# Patient Record
Sex: MALE | Race: ASIAN | NOT HISPANIC OR LATINO | Employment: OTHER | URBAN - METROPOLITAN AREA
[De-identification: names, ages, dates, MRNs, and addresses within clinical notes are randomized per-mention and may not be internally consistent; named-entity substitution may affect disease eponyms.]

---

## 2017-01-03 ENCOUNTER — CARE COORDINATION (OUTPATIENT)
Dept: GASTROENTEROLOGY | Facility: CLINIC | Age: 57
End: 2017-01-03

## 2017-01-03 DIAGNOSIS — B19.20 HEPATITIS C: ICD-10-CM

## 2017-01-03 DIAGNOSIS — R10.13 DYSPEPSIA: Primary | ICD-10-CM

## 2017-01-03 NOTE — PROGRESS NOTES
Care Coordination Telephone Call  GI service    Called patient to discuss plan of care.  At the request of Dr. Sandhu I have made referral to the hepatology department for evaluation of liver disease.  Also,we have discussed stool specimen for H. Pylori and he will go to [facility] to arrange for this.  He is not currently taking PPI.    I have asked the patient to call with any additional questions or concerns and have provided my contact information.      Evelin STALEYN, HNBC, STAR-T  RN Care Coordinator  Surgical Oncology and GI service  Ph: [phone]  FAX: [phone]

## 2017-02-01 ENCOUNTER — OFFICE VISIT (OUTPATIENT)
Dept: FAMILY MEDICINE | Facility: CLINIC | Age: 57
End: 2017-02-01
Payer: COMMERCIAL

## 2017-02-01 VITALS
TEMPERATURE: 97.1 F | DIASTOLIC BLOOD PRESSURE: 82 MMHG | BODY MASS INDEX: 33.6 KG/M2 | WEIGHT: 240 LBS | HEIGHT: 71 IN | HEART RATE: 53 BPM | SYSTOLIC BLOOD PRESSURE: 110 MMHG

## 2017-02-01 DIAGNOSIS — R09.81 NASAL CONGESTION: ICD-10-CM

## 2017-02-01 DIAGNOSIS — Z23 NEED FOR VACCINATION: ICD-10-CM

## 2017-02-01 DIAGNOSIS — R94.5 ABNORMAL RESULTS OF LIVER FUNCTION STUDIES: Primary | ICD-10-CM

## 2017-02-01 DIAGNOSIS — Z23 ENCOUNTER FOR IMMUNIZATION: Primary | ICD-10-CM

## 2017-02-01 DIAGNOSIS — F41.9 ANXIETY: ICD-10-CM

## 2017-02-01 DIAGNOSIS — G47.09 OTHER INSOMNIA: ICD-10-CM

## 2017-02-01 PROCEDURE — 90715 TDAP VACCINE 7 YRS/> IM: CPT | Performed by: FAMILY MEDICINE

## 2017-02-01 PROCEDURE — 90471 IMMUNIZATION ADMIN: CPT | Performed by: FAMILY MEDICINE

## 2017-02-01 PROCEDURE — 99214 OFFICE O/P EST MOD 30 MIN: CPT | Mod: 25 | Performed by: FAMILY MEDICINE

## 2017-02-01 RX ORDER — CLONAZEPAM 0.5 MG/1
.5-1 TABLET ORAL 2 TIMES DAILY PRN
Qty: 40 TABLET | Refills: 0 | Status: SHIPPED | OUTPATIENT
Start: 2017-02-01 | End: 2017-02-09

## 2017-02-01 RX ORDER — TRAZODONE HYDROCHLORIDE 50 MG/1
50 TABLET, FILM COATED ORAL AT BEDTIME
Qty: 30 TABLET | Refills: 1 | Status: SHIPPED | OUTPATIENT
Start: 2017-02-01 | End: 2017-09-18

## 2017-02-01 RX ORDER — FLUTICASONE PROPIONATE 50 MCG
1-2 SPRAY, SUSPENSION (ML) NASAL DAILY
Qty: 16 G | Status: SHIPPED | OUTPATIENT
Start: 2017-02-01 | End: 2017-09-18

## 2017-02-01 RX ORDER — SERTRALINE HYDROCHLORIDE 100 MG/1
100 TABLET, FILM COATED ORAL DAILY
Qty: 90 TABLET | Refills: 0 | Status: SHIPPED | OUTPATIENT
Start: 2017-02-01 | End: 2017-09-18

## 2017-02-01 ASSESSMENT — ANXIETY QUESTIONNAIRES
6. BECOMING EASILY ANNOYED OR IRRITABLE: NOT AT ALL
IF YOU CHECKED OFF ANY PROBLEMS ON THIS QUESTIONNAIRE, HOW DIFFICULT HAVE THESE PROBLEMS MADE IT FOR YOU TO DO YOUR WORK, TAKE CARE OF THINGS AT HOME, OR GET ALONG WITH OTHER PEOPLE: NOT DIFFICULT AT ALL
5. BEING SO RESTLESS THAT IT IS HARD TO SIT STILL: NOT AT ALL
3. WORRYING TOO MUCH ABOUT DIFFERENT THINGS: NOT AT ALL
7. FEELING AFRAID AS IF SOMETHING AWFUL MIGHT HAPPEN: NOT AT ALL
1. FEELING NERVOUS, ANXIOUS, OR ON EDGE: NOT AT ALL
2. NOT BEING ABLE TO STOP OR CONTROL WORRYING: NOT AT ALL
GAD7 TOTAL SCORE: 0

## 2017-02-01 ASSESSMENT — PATIENT HEALTH QUESTIONNAIRE - PHQ9: 5. POOR APPETITE OR OVEREATING: NOT AT ALL

## 2017-02-01 NOTE — MR AVS SNAPSHOT
After Visit Summary   2/1/2017    Angus Wynne    MRN: [id number]           Patient Information     Date Of Birth          1960        Visit Information        Provider Department      2/1/2017 2:00 PM Fifi Fleming MD [facility] [place]        Today's Diagnoses     Encounter for immunization    -  1     Anxiety         Other insomnia         Nasal congestion           Care Instructions    Take medications as directed.  Treatment  and symptomatic cares discussed   Follow up in 4-6 weeks sooner  if problem or concern           Follow-ups after your visit        Your next 10 appointments already scheduled     Feb 13, 2017  9:00 AM   Lab with  LAB   [facility] ([facility])    [address]  1st Floor  [facility] [id number] [phone]            Feb 13, 2017 10:00 AM   (Arrive by 9:45 AM)   New General Liver with Laure Andrews NP   [facility] ([facility])    [address]  [address] [facility] [id number] [phone]            Feb 27, 2017 10:00 AM   New Visit with Rachel Perez PSYD   [facility] [place] ([facility] [place])    [address] [place] [address] [phone]              Future tests that were ordered for you today     Open Future Orders        Priority Expected Expires Ordered    Hepatic panel Routine 2/1/2017 5/2/2017 2/1/2017    Basic metabolic panel Routine 2/1/2017 5/2/2017 2/1/2017    CBC with platelets Routine 2/1/2017 5/2/2017 2/1/2017    INR Routine 2/1/2017 5/2/2017 2/1/2017            Who to contact     If you have questions or need follow up information about today's clinic visit or your schedule please contact [facility] [place] directly at [phone].  Normal or non-critical lab and imaging results will be communicated to you by MyChart, letter or phone within 4 business days after the clinic has  "received the results. If you do not hear from us within 7 days, please contact the clinic through [employer] or phone. If you have a critical or abnormal lab result, we will notify you by phone as soon as possible.  Submit refill requests through [employer] or call your pharmacy and they will forward the refill request to us. Please allow 3 business days for your refill to be completed.          Additional Information About Your Visit        [facility] Information     [employer] gives you secure access to your electronic health record. If you see a primary care provider, you can also send messages to your care team and make appointments. If you have questions, please call your primary care clinic.  If you do not have a primary care provider, please call [phone] and they will assist you.        Care EveryWhere ID     This is your Care EveryWhere ID. This could be used by other organizations to access your [place] medical records  [id number]        Your Vitals Were     Pulse Temperature Height BMI (Body Mass Index)          53 97.1  F (36.2  C) (Tympanic) 5' 11.26\" (1.81 m) 33.23 kg/m2         Blood Pressure from Last 3 Encounters:   02/01/17 110/82   12/21/16 106/72   09/22/16 110/80    Weight from Last 3 Encounters:   02/01/17 240 lb (108.863 kg)   12/21/16 242 lb (109.77 kg)   09/22/16 235 lb 9.6 oz (106.867 kg)              We Performed the Following     INJECTION INTRAMUSCULAR OR SUB-Q     TDAP (ADACEL AGES 11-64)          Today's Medication Changes          These changes are accurate as of: 2/1/17  2:33 PM.  If you have any questions, ask your nurse or doctor.               Start taking these medicines.        Dose/Directions    fluticasone 50 MCG/ACT spray   Commonly known as:  FLONASE   Used for:  Nasal congestion   Started by:  Fifi Fleming MD        Dose:  1-2 spray   Spray 1-2 sprays into both nostrils daily   Quantity:  16 g   Refills:  prn         These medicines have changed or have updated " prescriptions.        Dose/Directions    clonazePAM 0.5 MG tablet   Commonly known as:  klonoPIN   This may have changed:    - medication strength  - how much to take  - when to take this  - reasons to take this   Used for:  Anxiety   Changed by:  Fifi Fleming MD        Dose:  0.5-1 mg   Take 1-2 tablets (0.5-1 mg) by mouth 2 times daily as needed for anxiety   Quantity:  40 tablet   Refills:  0            Where to get your medicines      These medications were sent to [facility] [place] - [place] - [address] [place] [address], [place] [address]     Phone:  [phone]    - fluticasone 50 MCG/ACT spray  - sertraline 100 MG tablet  - traZODone 50 MG tablet      Some of these will need a paper prescription and others can be bought over the counter.  Ask your nurse if you have questions.     Bring a paper prescription for each of these medications    - clonazePAM 0.5 MG tablet             Primary Care Provider Office Phone # Fax #    Fifi Fleming [phone]       [facility] [address] [place] [address] [address]  [place] [address]        Thank you!     Thank you for [place] [facility]  [place]. Our goal is always to provide you with excellent care. Hearing back from our patients is one way we can continue to improve our services. Please take a few minutes to complete the written survey that you may receive in the mail after your visit with us. Thank you!             Your Updated Medication List - Protect others around you: Learn how to safely use, store and throw away your medicines at www.disposemymeds.org.          This list is accurate as of: 2/1/17  2:33 PM.  Always use your most recent med list.                   Brand Name Dispense Instructions for use    clonazePAM 0.5 MG tablet    klonoPIN    40 tablet    Take 1-2 tablets (0.5-1 mg) by mouth 2 times daily as needed for anxiety       fluticasone 50 MCG/ACT  spray    FLONASE    16 g    Spray 1-2 sprays into both nostrils daily       levothyroxine 112 MCG tablet    SYNTHROID/LEVOTHROID    90 tablet    Take 1 tablet (112 mcg) by mouth daily       losartan 50 MG tablet    COZAAR     Take 50 mg by mouth daily       sertraline 100 MG tablet    ZOLOFT    90 tablet    Take 1 tablet (100 mg) by mouth daily       traZODone 50 MG tablet    DESYREL    30 tablet    Take 1 tablet (50 mg) by mouth At Bedtime

## 2017-02-01 NOTE — NURSING NOTE
"Chief Complaint   Patient presents with     Refill Request       Initial /98 mmHg  Pulse 61  Temp(Src) 97.1  F (36.2  C) (Tympanic)  Ht 5' 11.26\" (1.81 m)  Wt 240 lb (108.863 kg)  BMI 33.23 kg/m2 Estimated body mass index is 33.23 kg/(m^2) as calculated from the following:    Height as of this encounter: 5' 11.26\" (1.81 m).    Weight as of this encounter: 240 lb (108.863 kg).  BP completed using cuff size: josh TAYLOR MA Student  "

## 2017-02-01 NOTE — Clinical Note
My Depression Action Plan  Name: Angus Wynne   Date of Birth 1960  Date: 2/1/2017    My doctor: Fifi Fleming   My clinic: [address] [address]  [phone]          GREEN    ZONE   Good Control    What it looks like:     Things are going generally well. You have normal up s and down s. You may even feel depressed from time to time, but bad moods usually last less than a day.   What you need to do:  1. Continue to care for yourself (see self care plan)  2. Check your depression survival kit and update it as needed  3. Follow your physician s recommendations including any medication.  4. Do not stop taking medication unless you consult with your physician first.           YELLOW         ZONE Getting Worse    What it looks like:     Depression is starting to interfere with your life.     It may be hard to get out of bed; you may be starting to isolate yourself from others.    Symptoms of depression are starting to last most all day and this has happened for several days.     You may have suicidal thoughts but they are not constant.   What you need to do:     1. Call your care team, your response to treatment will improve if you keep your care team informed of your progress. Yellow periods are signs an adjustment may need to be made.     2. Continue your self-care, even if you have to fake it!    3. Talk to someone in your support network    4. Open up your depression survival kit           RED    ZONE Medical Alert - Get Help    What it looks like:     Depression is seriously interfering with your life.     You may experience these or other symptoms: You can t get out of bed most days, can t work or engage in other necessary activities, you have trouble taking care of basic hygiene, or basic responsibilities, thoughts of suicide or death that will not go away, self-injurious behavior.     What you need to do:  1. Call your care  team and request a same-day appointment. If they are not available (weekends or after hours) call your local crisis line, emergency room or 911.      Electronically signed by: Fifi Fleming, February 1, 2017    Depression Self Care Plan / Survival Kit    Self-Care for Depression  Here s the deal. Your body and mind are really not as separate as most people think.  What you do and think affects how you feel and how you feel influences what you do and think. This means if you do things that people who feel good do, it will help you feel better.  Sometimes this is all it takes.  There is also a place for medication and therapy depending on how severe your depression is, so be sure to consult with your medical provider and/ or Behavioral Health Consultant if your symptoms are worsening or not improving.     In order to better manage my stress, I will:    Exercise  Get some form of exercise, every day. This will help reduce pain and release endorphins, the  feel good  chemicals in your brain. This is almost as good as taking antidepressants!  This is not the same as joining a gym and then never going! (they count on that by the way ) It can be as simple as just going for a walk or doing some gardening, anything that will get you moving.      Hygiene   Maintain good hygiene (Get out of bed in the morning, Make your bed, Brush your teeth, Take a shower, and Get dressed like you were going to work, even if you are unemployed).  If your clothes don't fit try to get ones that do.    Diet  I will strive to eat foods that are good for me, drink plenty of water, and avoid excessive sugar, caffeine, alcohol, and other mood-altering substances.  Some foods that are helpful in depression are: complex carbohydrates, B vitamins, flaxseed, fish or fish oil, fresh fruits and vegetables.    Psychotherapy  I agree to participate in Individual Therapy (if recommended).    Medication  If prescribed medications, I agree to take them.   Missing doses can result in serious side effects.  I understand that drinking alcohol, or other illicit drug use, may cause potential side effects.  I will not stop my medication abruptly without first discussing it with my provider.    Staying Connected With Others  I will stay in touch with my friends, family members, and my primary care provider/team.    Use your imagination  Be creative.  We all have a creative side; it doesn t matter if it s oil painting, sand castles, or mud pies! This will also kick up the endorphins.    Witness Beauty  (AKA stop and smell the roses) Take a look outside, even in mid-winter. Notice colors, textures. Watch the squirrels and birds.     Service to others  Be of service to others.  There is always someone else in need.  By helping others we can  get out of ourselves  and remember the really important things.  This also provides opportunities for practicing all the other parts of the program.    Humor  Laugh and be silly!  Adjust your TV habits for less news and crime-drama and more comedy.    Control your stress  Try breathing deep, massage therapy, biofeedback, and meditation. Find time to relax each day.     My support system    Clinic Contact:  Phone number:    Contact 1:  Phone number:    Contact 2:  Phone number:    Lutheran/:  Phone number:    Therapist:  Phone number:    Local crisis center:    Phone number:    Other community support:  Phone number:

## 2017-02-01 NOTE — PATIENT INSTRUCTIONS
Take medications as directed.  Treatment  and symptomatic cares discussed   Follow up in 4-6 weeks sooner  if problem or concern

## 2017-02-01 NOTE — PROGRESS NOTES
SUBJECTIVE:                                                    Angus Wynne is a 56 year old male who presents to clinic today for the following health issues:      Medication Followup of all medications    Taking Medication as prescribed: yes    Side Effects:  None    Medication Helping Symptoms:  yes     Depression and Anxiety Follow-Up    Status since last visit: Improved with Zoloft although still has  some anxiety and especially at night when he cannot fall asleep ,  so he takes klonopin for that.   He is trying to cut down and now  still taking 1 mg at night and sometimes even half of taht, but he needs refill on script .      he was given trazodone at some point for sleep, although he does not recall if he tried it     Other associated symptoms:See phq-9 and heber 7     Complicating factors:  none     Significant life event: no          Current substance abuse: None, no alcohol since last visit  few weeks ago                   other concerns   He also has ongoing problem with nasal congestion for almost a year. He thinsk dryness in winter causes problem he is also taking a OTC decongestant nos spray and he is suing almost 3-4/ days sometimes and it does not really help much . denies any other allergy sx at this time except stuffy nose especially rt nostril feels worse   2. He also wants to get his TDAp today         Problem list and histories reviewed & adjusted, as indicated.  Additional history: as documented    Problem list, Medication list, Allergies, and Medical/Social/Surgical histories reviewed in [facility] and updated as appropriate.    ROS:  C: NEGATIVE for fever, chills, change in weight  HEETN- As per HPI   R: NEGATIVE for significant cough or SOB  CV: NEGATIVE for chest pain, palpitations or peripheral edema  GI: NEGATIVE for nausea, abdominal pain, heartburn, or change in bowel habits  N: NEGATIVE for weakness, dizziness or paresthesias  P: NEGATIVE for changes in mood or affect, except  as per HPI  "    OBJECTIVE:                                                    /82 mmHg  Pulse 53  Temp(Src) 97.1  F (36.2  C) (Tympanic)  Ht 5' 11.26\" (1.81 m)  Wt 240 lb (108.863 kg)  BMI 33.23 kg/m2  Body mass index is 33.23 kg/(m^2).  GENERAL: healthy, alert and no distress  NECK: no adenopathy,   ENT- nose with swollen turbinates rt more then left and clear  rhinorrhea . No sinus tenderness.   RESP: lungs clear to auscultation - no rales, rhonchi or wheezes  CV: regular rate and rhythm, normal S1 S2, no S3 or S4, no murmur,  NEURO: Normal strength and tone, mentation intact and speech normal  PSYCH: mentation appears normal, affect normal , speech slightly pressured          ASSESSMENT/PLAN:                                                        (F41.9) Anxiety  Comment: improved , doing better on Zoloft, need to go off klonopin   Plan: clonazepam (KLONOPIN) 0.5 MG tablet, sertraline        (ZOLOFT) 100 MG tablet            (G47.09) Other insomnia  Comment: need improvement   Plan: traZODone (DESYREL) 50 MG tablet      Discussed cares, talked about signs and symptoms of anxiety/ depression and sleep problem/  treatment options. Willing to try trazodone again, bc he did not take it previously given, he will continue with same dose of Zoloft for now. Need to continue to taper off klonopin, need to stay away form alcohol . Pros/ cons of med's discussed . encouraged to see  to help and referral given but he declines. spent sometimes counseling patient. Follow up in 4-6 weeks,  sooner if problem.       (R09.81) Nasal congestion  Comment: chronic OTC decongestant use  Plan: fluticasone (FLONASE) 50 MCG/ACT spray    Willing to try Flonase, need to taper off use of OTC nose spray . Follow up in 4-6 weeks sooner if problem       (Z23) Encounter for immunization  (primary encounter diagnosis)  Comment:   Plan: TDAP (ADACEL AGES 11-64), INJECTION         INTRAMUSCULAR OR SUB-Q            Patient expressed " understanding and agreement with treatment plan. All patient's questions were answered, will let me know if has more later.  Medications: Rx's: Reviewed the potential side effects/complications of medications prescribed.     Fifi Fleming MD  [facility]

## 2017-02-02 ASSESSMENT — PATIENT HEALTH QUESTIONNAIRE - PHQ9: SUM OF ALL RESPONSES TO PHQ QUESTIONS 1-9: 2

## 2017-02-02 ASSESSMENT — ANXIETY QUESTIONNAIRES: GAD7 TOTAL SCORE: 0

## 2017-02-09 ENCOUNTER — TELEPHONE (OUTPATIENT)
Dept: FAMILY MEDICINE | Facility: CLINIC | Age: 57
End: 2017-02-09

## 2017-02-09 DIAGNOSIS — F41.9 ANXIETY: Primary | ICD-10-CM

## 2017-02-09 RX ORDER — CLONAZEPAM 0.5 MG/1
.5-1 TABLET ORAL 2 TIMES DAILY PRN
Qty: 40 TABLET | Refills: 0 | Status: SHIPPED | OUTPATIENT
Start: 2017-02-09 | End: 2017-02-10

## 2017-02-09 RX ORDER — CLONAZEPAM 0.5 MG/1
.5-1 TABLET ORAL 2 TIMES DAILY PRN
Qty: 20 TABLET | Refills: 0 | Status: CANCELLED | OUTPATIENT
Start: 2017-02-09

## 2017-02-09 NOTE — TELEPHONE ENCOUNTER
patient states that he wants a refill of Klonopin 0.5 mg -1 mg daily    America Macias RN  [facility]  [phone]

## 2017-02-10 RX ORDER — CLONAZEPAM 0.5 MG/1
.5-1 TABLET ORAL 2 TIMES DAILY PRN
Qty: 40 TABLET | Refills: 0 | Status: SHIPPED | OUTPATIENT
Start: 2017-02-10 | End: 2017-09-18

## 2017-02-10 NOTE — TELEPHONE ENCOUNTER
Script printed after TC left  Nothing on Printer  Clinic pharmacy has nothing  Was this given to someone? Did it print? Please redo  Radha MELCHOR

## 2017-02-10 NOTE — TELEPHONE ENCOUNTER
Script not able to be found  Script printed after TC left   Nothing on Printer   Clinic pharmacy has nothing  Routing to covering Provider  Radha MELCHOR

## 2017-02-13 ENCOUNTER — TELEPHONE (OUTPATIENT)
Dept: FAMILY MEDICINE | Facility: CLINIC | Age: 57
End: 2017-02-13

## 2017-02-13 DIAGNOSIS — R94.5 ABNORMAL RESULTS OF LIVER FUNCTION STUDIES: ICD-10-CM

## 2017-02-13 DIAGNOSIS — Z87.19 HX OF ACUTE ALCOHOLIC HEPATITIS: Primary | ICD-10-CM

## 2017-02-13 DIAGNOSIS — R79.89 ELEVATED LFTS: ICD-10-CM

## 2017-02-13 LAB
ERYTHROCYTE [DISTWIDTH] IN BLOOD BY AUTOMATED COUNT: 12.7 % (ref 10–15)
HCT VFR BLD AUTO: 46.4 % (ref 40–53)
HGB BLD-MCNC: 15.7 G/DL (ref 13.3–17.7)
MCH RBC QN AUTO: 30.3 PG (ref 26.5–33)
MCHC RBC AUTO-ENTMCNC: 33.8 G/DL (ref 31.5–36.5)
MCV RBC AUTO: 90 FL (ref 78–100)
PLATELET # BLD AUTO: 165 10E9/L (ref 150–450)
RBC # BLD AUTO: 5.18 10E12/L (ref 4.4–5.9)
WBC # BLD AUTO: 5.2 10E9/L (ref 4–11)

## 2017-02-13 PROCEDURE — 80048 BASIC METABOLIC PNL TOTAL CA: CPT | Performed by: FAMILY MEDICINE

## 2017-02-13 PROCEDURE — 85027 COMPLETE CBC AUTOMATED: CPT | Performed by: FAMILY MEDICINE

## 2017-02-13 PROCEDURE — 85610 PROTHROMBIN TIME: CPT | Performed by: FAMILY MEDICINE

## 2017-02-13 PROCEDURE — 36415 COLL VENOUS BLD VENIPUNCTURE: CPT | Performed by: FAMILY MEDICINE

## 2017-02-13 PROCEDURE — 80076 HEPATIC FUNCTION PANEL: CPT | Performed by: FAMILY MEDICINE

## 2017-02-13 NOTE — TELEPHONE ENCOUNTER
Patient walk into clinic requesting to speak with Dr. Fleming   He spoke with specialist at [facility] and had lab ordered.  Lab done at  today - in process.     Per patient he does not wish to see specialist due to transportation issue and his insurance only cover through Feb 2017 as of now.  Insurance has been approve on a month to month basis.    Please review and advise.      Karen Booker RN

## 2017-02-13 NOTE — TELEPHONE ENCOUNTER
It is best that he see GI and they can decide on ordering test, as we discussed previously during office visit. He should reschedule appointment

## 2017-02-13 NOTE — TELEPHONE ENCOUNTER
Patient was to see hepatologist today but unable to make appointment. Is wondering if PCP will place an order for liver US? Please advise.     Triage to call with response [phone] okay to leave detailed message.     Maria Eugenia Marie RN   [facility] - Triage

## 2017-02-13 NOTE — TELEPHONE ENCOUNTER
Left detailed message informing of below. Encouraged patient to call with any questions or concerns.   Maria Eugenia Marie RN   [facility] - Triage

## 2017-02-13 NOTE — TELEPHONE ENCOUNTER
He can verify with insurance , to see if they can arrange transportation. Since GI specialist  has already ordered test and started the process to evaluate, it make sense to go back to see them at least once for evaluation, hopefull he would not have to go that way very frequently. He can also check with insurance if there is another group(like [place] [facility]) close to home, where it is more convenient for pt to go .  Let me know what he decides

## 2017-02-14 ENCOUNTER — TELEPHONE (OUTPATIENT)
Dept: GASTROENTEROLOGY | Facility: CLINIC | Age: 57
End: 2017-02-14

## 2017-02-14 ENCOUNTER — TRANSFERRED RECORDS (OUTPATIENT)
Dept: HEALTH INFORMATION MANAGEMENT | Facility: CLINIC | Age: 57
End: 2017-02-14

## 2017-02-14 LAB
ALBUMIN SERPL-MCNC: 4.2 G/DL (ref 3.4–5)
ALP SERPL-CCNC: 57 U/L (ref 40–150)
ALT SERPL W P-5'-P-CCNC: 51 U/L (ref 0–70)
ANION GAP SERPL CALCULATED.3IONS-SCNC: 9 MMOL/L (ref 3–14)
AST SERPL W P-5'-P-CCNC: 32 U/L (ref 0–45)
BILIRUB DIRECT SERPL-MCNC: 0.1 MG/DL (ref 0–0.2)
BILIRUB SERPL-MCNC: 0.4 MG/DL (ref 0.2–1.3)
BUN SERPL-MCNC: 13 MG/DL (ref 7–30)
CALCIUM SERPL-MCNC: 9.4 MG/DL (ref 8.5–10.1)
CHLORIDE SERPL-SCNC: 107 MMOL/L (ref 94–109)
CO2 SERPL-SCNC: 24 MMOL/L (ref 20–32)
CREAT SERPL-MCNC: 0.82 MG/DL (ref 0.66–1.25)
GFR SERPL CREATININE-BSD FRML MDRD: ABNORMAL ML/MIN/1.7M2
GLUCOSE SERPL-MCNC: 109 MG/DL (ref 70–99)
INR PPP: 0.95 (ref 0.86–1.14)
POTASSIUM SERPL-SCNC: 4.5 MMOL/L (ref 3.4–5.3)
PROT SERPL-MCNC: 7.6 G/DL (ref 6.8–8.8)
SODIUM SERPL-SCNC: 140 MMOL/L (ref 133–144)

## 2017-02-14 NOTE — TELEPHONE ENCOUNTER
Patient advised.  [place] [facility] referral pended.  States that [place] location is okay  Advised that when making appt he can request to look at other locations as well.    Please sign order.   Thank you    Karen Booker RN

## 2017-02-14 NOTE — TELEPHONE ENCOUNTER
Left message for patient with referral information   Preferred Location: [facility] [phone]  Patient to call and schedule appt with GI.  Advised that once appt scheduled, let us know the location so that lab result can be fax to them.    Patient to call with further questions    Karen Booker RN

## 2017-02-15 ENCOUNTER — TRANSFERRED RECORDS (OUTPATIENT)
Dept: HEALTH INFORMATION MANAGEMENT | Facility: CLINIC | Age: 57
End: 2017-02-15

## 2017-02-16 ENCOUNTER — TRANSFERRED RECORDS (OUTPATIENT)
Dept: HEALTH INFORMATION MANAGEMENT | Facility: CLINIC | Age: 57
End: 2017-02-16

## 2017-02-16 ENCOUNTER — TELEPHONE (OUTPATIENT)
Dept: FAMILY MEDICINE | Facility: CLINIC | Age: 57
End: 2017-02-16

## 2017-02-16 NOTE — TELEPHONE ENCOUNTER
Discussed lab results of U/S with pt over the phone. it was ordered by his GI, so he plans to follow up with them as well

## 2017-02-17 ENCOUNTER — TRANSFERRED RECORDS (OUTPATIENT)
Dept: HEALTH INFORMATION MANAGEMENT | Facility: CLINIC | Age: 57
End: 2017-02-17

## 2017-02-27 ENCOUNTER — OFFICE VISIT (OUTPATIENT)
Dept: PSYCHOLOGY | Facility: CLINIC | Age: 57
End: 2017-02-27
Payer: COMMERCIAL

## 2017-02-27 DIAGNOSIS — F43.22 ADJUSTMENT DISORDER WITH ANXIETY: Primary | ICD-10-CM

## 2017-02-27 PROCEDURE — 90834 PSYTX W PT 45 MINUTES: CPT | Performed by: PSYCHOLOGIST

## 2017-02-27 NOTE — Clinical Note
Angus Mccabe has started counseling. We have one more visit scheduled, and then he is probably traveling to [facility] for a few months. We discussed lifestyle changes to improve his sleep, increase social contact, and cope more appropriately with the stress of his son. Please contact me with questions or concerns, thank you!

## 2017-02-27 NOTE — MR AVS SNAPSHOT
MRN:[id number]                      After Visit Summary   2/27/2017    Angus Wynne    MRN: [id number]           Visit Information        Provider Department      2/27/2017 10:00 AM Rachel Perez PsyD [facility]      Your next 10 appointments already scheduled     Mar 16, 2017  1:00 PM CDT   Return Visit with Rachel Perez PsyD   [facility] [place] ([facility])    [address] [address] [phone]              MyChart Information     [employer] gives you secure access to your electronic health record. If you see a primary care provider, you can also send messages to your care team and make appointments. If you have questions, please call your primary care clinic.  If you do not have a primary care provider, please call [phone] and they will assist you.        Care EveryWhere ID     This is your Care EveryWhere ID. This could be used by other organizations to access your [place] medical records  [id number]

## 2017-02-28 NOTE — PROGRESS NOTES
Progress Note - Initial Session    Client Name:  Angus Wynne Date: 2/27/2017         Service Type: Individual      Session Start Time: 10:00  Session End Time: 10:45      Session Length: 45     Session #: 1     Attendees: Client attended alone         Diagnostic Assessment in progress.  Unable to complete documentation at the conclusion of the first session due to more time needed to complete diagnostic interview. Client did not complete intake paperwork, so intake information was gathered verbally.      Mental Status Assessment:  Appearance:   Appropriate   Eye Contact:   Good   Psychomotor Behavior: Normal   Attitude:   Cooperative  Pleasant   Orientation:   All  Speech   Rate / Production: Normal    Volume:  Normal   Mood:    client endorsed few symptoms on the PHQ-9 and RUTH-7, however during interview acknowledged worry and boredom   Affect:    Appropriate   Thought Content:  worry about his adult son   Thought Form:  Coherent  Logical   Insight:    Fair       Safety Issues and Plan for Safety and Risk Management:  Client denies current fears or concerns for personal safety.  Client denies current or recent suicidal ideation or behaviors.  Client denies current or recent homicidal ideation or behaviors.  Client denies current or recent self injurious behavior or ideation.  Client denies other safety concerns.  A safety and risk management plan has not been developed at this time, however client was given the after-hours number / 911 should there be a change in any of these risk factors.  Client reports there are no firearms in the house.      Diagnostic Criteria:  A. The development of emotional or behavioral symptoms in response to an identifiable stressor(s) occurring within 3 months of the onset of the stressor(s)  B. These symptoms or behaviors are clinically significant, as evidenced by one or both of the following:       - Significant impairment in social, occupational, or other important  areas of functioning  C. The stress-related disturbance does not meet criteria for another disorder & is not not an exacerbation of another mental disorder  D. The symptoms do not represent normal bereavement  E. Once the stressor or its consequences have terminated, the symptoms do not persist for more than an additional 6 months       * Adjustment Disorder with Anxiety: The predominant manfestations are symptoms such as nervousness, worry, or jitteriness, or, in children separation anxiety from major attachment figures        DSM5 Diagnoses: (Sustained by DSM5 Criteria Listed Above)  Diagnoses: Adjustment Disorders  309.24 (F43.22) With anxiety, provisional  Psychosocial & Contextual Factors: little social contact, son with a gambling problem  WHODAS 2.0 (12 item)            This questionnaire asks about difficulties due to health conditions. Health conditions  include  disease or illnesses, other health problems that may be short or long lasting,  injuries, mental health or emotional problems, and problems with alcohol or drugs.                     Think back over the past 30 days and answer these questions, thinking about how much  difficulty you had doing the following activities. For each question, please [place] only  one response.    S1 Standing for long periods such as 30 minutes? Mild =           2   S2 Taking care of household responsibilities? None =         1   S3 Learning a new task, for example, learning how to get to a new place? None =         1   S4 How much of a problem do you have joining community activities (for example, festivals, L[employer] day or other activities) in the same way as anyone else can? None =         1   S5 How much have you been emotionally affected by your health problems? Moderate =   3     In the past 30 days, how much difficulty did you have in:   S6 Concentrating on doing something for ten minutes? None =         1   S7 Walking a long distance such as a kilometer (or  equivalent)? None =         1   S8 Washing your whole body? None =         1   S9 Getting dressed? None =         1   S10 Dealing with people you do not know? None =         1   S11 Maintaining a friendship? None =         1   S12 Your day to day work? None =         1     H1 Overall, in the past 30 days, how many days were these difficulties present? Record number of days 0   H2 In the past 30 days, for how many days were you totally unable to carry out your usual activities or work because of any health condition? Record number of days  0   H3 In the past 30 days, not counting the days that you were totally unable, for how many days did you cut back or reduce your usual activities or work because of any health condition? Record number of days 0       Collateral Reports Completed:  Routed note to PCP      PLAN: (Homework, other):  Complete diagnostic interview next session.    Client verbalized agreement with increasing structure in his day by returning to his daily practice of yoga and meditation, and increasing physical health and social contacts by going to the gym to swim.       Rachel Perez PsyD LP

## 2017-09-18 ENCOUNTER — APPOINTMENT (OUTPATIENT)
Dept: GENERAL RADIOLOGY | Facility: CLINIC | Age: 57
End: 2017-09-18
Attending: EMERGENCY MEDICINE
Payer: COMMERCIAL

## 2017-09-18 ENCOUNTER — OFFICE VISIT (OUTPATIENT)
Dept: FAMILY MEDICINE | Facility: CLINIC | Age: 57
End: 2017-09-18
Payer: COMMERCIAL

## 2017-09-18 ENCOUNTER — HOSPITAL ENCOUNTER (OUTPATIENT)
Facility: CLINIC | Age: 57
Setting detail: OBSERVATION
Discharge: HOME | End: 2017-09-19
Attending: EMERGENCY MEDICINE | Admitting: INTERNAL MEDICINE
Payer: COMMERCIAL

## 2017-09-18 VITALS
DIASTOLIC BLOOD PRESSURE: 82 MMHG | OXYGEN SATURATION: 98 % | WEIGHT: 237 LBS | HEIGHT: 71 IN | BODY MASS INDEX: 33.18 KG/M2 | HEART RATE: 64 BPM | SYSTOLIC BLOOD PRESSURE: 120 MMHG | TEMPERATURE: 97 F

## 2017-09-18 DIAGNOSIS — E03.8 OTHER SPECIFIED HYPOTHYROIDISM: ICD-10-CM

## 2017-09-18 DIAGNOSIS — E78.5 HYPERLIPIDEMIA WITH TARGET LDL LESS THAN 130: ICD-10-CM

## 2017-09-18 DIAGNOSIS — R06.02 SOB (SHORTNESS OF BREATH): ICD-10-CM

## 2017-09-18 DIAGNOSIS — F10.90 ALCOHOL USE DISORDER: ICD-10-CM

## 2017-09-18 DIAGNOSIS — R53.83 OTHER FATIGUE: Primary | ICD-10-CM

## 2017-09-18 DIAGNOSIS — E78.5 HYPERLIPIDEMIA WITH TARGET LDL LESS THAN 130: Primary | ICD-10-CM

## 2017-09-18 DIAGNOSIS — F41.9 ANXIETY: ICD-10-CM

## 2017-09-18 DIAGNOSIS — G47.09 OTHER INSOMNIA: ICD-10-CM

## 2017-09-18 DIAGNOSIS — I10 ESSENTIAL HYPERTENSION: ICD-10-CM

## 2017-09-18 DIAGNOSIS — R07.9 CHEST PAIN: ICD-10-CM

## 2017-09-18 LAB
ANION GAP SERPL CALCULATED.3IONS-SCNC: 8 MMOL/L (ref 3–14)
BASOPHILS # BLD AUTO: 0 10E9/L (ref 0–0.2)
BASOPHILS NFR BLD AUTO: 0.2 %
BUN SERPL-MCNC: 11 MG/DL (ref 7–30)
CALCIUM SERPL-MCNC: 9.1 MG/DL (ref 8.5–10.1)
CHLORIDE SERPL-SCNC: 102 MMOL/L (ref 94–109)
CO2 SERPL-SCNC: 25 MMOL/L (ref 20–32)
CREAT SERPL-MCNC: 0.84 MG/DL (ref 0.66–1.25)
DIFFERENTIAL METHOD BLD: ABNORMAL
EOSINOPHIL # BLD AUTO: 0.2 10E9/L (ref 0–0.7)
EOSINOPHIL NFR BLD AUTO: 3.5 %
ERYTHROCYTE [DISTWIDTH] IN BLOOD BY AUTOMATED COUNT: 12.4 % (ref 10–15)
GFR SERPL CREATININE-BSD FRML MDRD: >90 ML/MIN/1.7M2
GLUCOSE SERPL-MCNC: 125 MG/DL (ref 70–99)
HCT VFR BLD AUTO: 45.7 % (ref 40–53)
HGB BLD-MCNC: 16 G/DL (ref 13.3–17.7)
IMM GRANULOCYTES # BLD: 0 10E9/L (ref 0–0.4)
IMM GRANULOCYTES NFR BLD: 0.2 %
INTERPRETATION ECG - MUSE: NORMAL
LYMPHOCYTES # BLD AUTO: 1.7 10E9/L (ref 0.8–5.3)
LYMPHOCYTES NFR BLD AUTO: 36.8 %
MCH RBC QN AUTO: 33.1 PG (ref 26.5–33)
MCHC RBC AUTO-ENTMCNC: 35 G/DL (ref 31.5–36.5)
MCV RBC AUTO: 94 FL (ref 78–100)
MONOCYTES # BLD AUTO: 0.3 10E9/L (ref 0–1.3)
MONOCYTES NFR BLD AUTO: 6.5 %
NEUTROPHILS # BLD AUTO: 2.4 10E9/L (ref 1.6–8.3)
NEUTROPHILS NFR BLD AUTO: 52.8 %
NRBC # BLD AUTO: 0 10*3/UL
NRBC BLD AUTO-RTO: 0 /100
PLATELET # BLD AUTO: 158 10E9/L (ref 150–450)
POTASSIUM SERPL-SCNC: 3.7 MMOL/L (ref 3.4–5.3)
RBC # BLD AUTO: 4.84 10E12/L (ref 4.4–5.9)
SODIUM SERPL-SCNC: 135 MMOL/L (ref 133–144)
TROPONIN I SERPL-MCNC: <0.015 UG/L (ref 0–0.04)
WBC # BLD AUTO: 4.6 10E9/L (ref 4–11)

## 2017-09-18 PROCEDURE — 36415 COLL VENOUS BLD VENIPUNCTURE: CPT | Performed by: PHYSICIAN ASSISTANT

## 2017-09-18 PROCEDURE — 99207 ZZC CDG-MDM COMPONENT: MEETS MODERATE - UP CODED: CPT | Performed by: PHYSICIAN ASSISTANT

## 2017-09-18 PROCEDURE — 84439 ASSAY OF FREE THYROXINE: CPT | Performed by: FAMILY MEDICINE

## 2017-09-18 PROCEDURE — 93005 ELECTROCARDIOGRAM TRACING: CPT

## 2017-09-18 PROCEDURE — 25000132 ZZH RX MED GY IP 250 OP 250 PS 637: Performed by: EMERGENCY MEDICINE

## 2017-09-18 PROCEDURE — 99207 ZZC APP CREDIT; MD BILLING SHARED VISIT: CPT | Performed by: INTERNAL MEDICINE

## 2017-09-18 PROCEDURE — 36415 COLL VENOUS BLD VENIPUNCTURE: CPT | Performed by: FAMILY MEDICINE

## 2017-09-18 PROCEDURE — 96374 THER/PROPH/DIAG INJ IV PUSH: CPT

## 2017-09-18 PROCEDURE — 93000 ELECTROCARDIOGRAM COMPLETE: CPT | Performed by: FAMILY MEDICINE

## 2017-09-18 PROCEDURE — 25000128 H RX IP 250 OP 636: Performed by: EMERGENCY MEDICINE

## 2017-09-18 PROCEDURE — 99285 EMERGENCY DEPT VISIT HI MDM: CPT | Mod: 25

## 2017-09-18 PROCEDURE — 80061 LIPID PANEL: CPT | Performed by: FAMILY MEDICINE

## 2017-09-18 PROCEDURE — 99207 ZZC CDG-CODE CATEGORY CHANGED: CPT | Performed by: PHYSICIAN ASSISTANT

## 2017-09-18 PROCEDURE — G0378 HOSPITAL OBSERVATION PER HR: HCPCS

## 2017-09-18 PROCEDURE — 84443 ASSAY THYROID STIM HORMONE: CPT | Performed by: FAMILY MEDICINE

## 2017-09-18 PROCEDURE — 99220 ZZC INITIAL OBSERVATION CARE,LEVL III: CPT | Performed by: PHYSICIAN ASSISTANT

## 2017-09-18 PROCEDURE — 84484 ASSAY OF TROPONIN QUANT: CPT | Performed by: PHYSICIAN ASSISTANT

## 2017-09-18 PROCEDURE — 80048 BASIC METABOLIC PNL TOTAL CA: CPT | Performed by: EMERGENCY MEDICINE

## 2017-09-18 PROCEDURE — 71020 XR CHEST 2 VW: CPT

## 2017-09-18 PROCEDURE — 84484 ASSAY OF TROPONIN QUANT: CPT | Performed by: EMERGENCY MEDICINE

## 2017-09-18 PROCEDURE — 80076 HEPATIC FUNCTION PANEL: CPT | Performed by: FAMILY MEDICINE

## 2017-09-18 PROCEDURE — 25000132 ZZH RX MED GY IP 250 OP 250 PS 637: Performed by: PHYSICIAN ASSISTANT

## 2017-09-18 PROCEDURE — 99215 OFFICE O/P EST HI 40 MIN: CPT | Performed by: FAMILY MEDICINE

## 2017-09-18 PROCEDURE — 85025 COMPLETE CBC W/AUTO DIFF WBC: CPT | Performed by: EMERGENCY MEDICINE

## 2017-09-18 RX ORDER — NITROGLYCERIN 0.4 MG/1
0.4 TABLET SUBLINGUAL EVERY 5 MIN PRN
Status: DISCONTINUED | OUTPATIENT
Start: 2017-09-18 | End: 2017-09-19 | Stop reason: HOSPADM

## 2017-09-18 RX ORDER — ASPIRIN 81 MG/1
81 TABLET ORAL DAILY
Status: DISCONTINUED | OUTPATIENT
Start: 2017-09-19 | End: 2017-09-19 | Stop reason: HOSPADM

## 2017-09-18 RX ORDER — LOSARTAN POTASSIUM 50 MG/1
50 TABLET ORAL DAILY
Qty: 90 TABLET | Refills: 1 | Status: SHIPPED | OUTPATIENT
Start: 2017-09-18 | End: 2018-07-25

## 2017-09-18 RX ORDER — TRAZODONE HYDROCHLORIDE 50 MG/1
50-100 TABLET, FILM COATED ORAL
Status: DISCONTINUED | OUTPATIENT
Start: 2017-09-18 | End: 2017-09-19 | Stop reason: HOSPADM

## 2017-09-18 RX ORDER — SERTRALINE HYDROCHLORIDE 100 MG/1
100 TABLET, FILM COATED ORAL DAILY
Qty: 90 TABLET | Refills: 0 | Status: SHIPPED | OUTPATIENT
Start: 2017-09-18 | End: 2017-12-06

## 2017-09-18 RX ORDER — ACETAMINOPHEN 325 MG/1
650 TABLET ORAL EVERY 4 HOURS PRN
Status: DISCONTINUED | OUTPATIENT
Start: 2017-09-18 | End: 2017-09-19 | Stop reason: HOSPADM

## 2017-09-18 RX ORDER — LEVOTHYROXINE SODIUM 112 UG/1
112 TABLET ORAL DAILY
Qty: 90 TABLET | Refills: 3 | Status: ON HOLD | OUTPATIENT
Start: 2017-09-18 | End: 2017-09-19

## 2017-09-18 RX ORDER — NALOXONE HYDROCHLORIDE 0.4 MG/ML
.1-.4 INJECTION, SOLUTION INTRAMUSCULAR; INTRAVENOUS; SUBCUTANEOUS
Status: DISCONTINUED | OUTPATIENT
Start: 2017-09-18 | End: 2017-09-19 | Stop reason: HOSPADM

## 2017-09-18 RX ORDER — LOSARTAN POTASSIUM 50 MG/1
50 TABLET ORAL DAILY
Status: DISCONTINUED | OUTPATIENT
Start: 2017-09-18 | End: 2017-09-19 | Stop reason: HOSPADM

## 2017-09-18 RX ORDER — CLONAZEPAM 0.5 MG/1
.5-1 TABLET ORAL 2 TIMES DAILY PRN
Status: DISCONTINUED | OUTPATIENT
Start: 2017-09-18 | End: 2017-09-19 | Stop reason: HOSPADM

## 2017-09-18 RX ORDER — MORPHINE SULFATE 2 MG/ML
2 INJECTION, SOLUTION INTRAMUSCULAR; INTRAVENOUS
Status: COMPLETED | OUTPATIENT
Start: 2017-09-18 | End: 2017-09-18

## 2017-09-18 RX ORDER — NITROGLYCERIN 0.4 MG/1
0.4 TABLET SUBLINGUAL EVERY 5 MIN PRN
Status: DISCONTINUED | OUTPATIENT
Start: 2017-09-18 | End: 2017-09-18

## 2017-09-18 RX ORDER — LIDOCAINE 40 MG/G
CREAM TOPICAL
Status: DISCONTINUED | OUTPATIENT
Start: 2017-09-18 | End: 2017-09-19 | Stop reason: HOSPADM

## 2017-09-18 RX ORDER — CLONAZEPAM 0.5 MG/1
.5-1 TABLET ORAL 2 TIMES DAILY PRN
Qty: 20 TABLET | Refills: 0 | Status: SHIPPED | OUTPATIENT
Start: 2017-09-18 | End: 2017-11-28

## 2017-09-18 RX ORDER — TRAZODONE HYDROCHLORIDE 50 MG/1
TABLET, FILM COATED ORAL
Qty: 90 TABLET | Refills: 1 | Status: SHIPPED | OUTPATIENT
Start: 2017-09-18 | End: 2018-01-22

## 2017-09-18 RX ORDER — ACETAMINOPHEN 650 MG/1
650 SUPPOSITORY RECTAL EVERY 4 HOURS PRN
Status: DISCONTINUED | OUTPATIENT
Start: 2017-09-18 | End: 2017-09-19 | Stop reason: HOSPADM

## 2017-09-18 RX ORDER — ALUMINA, MAGNESIA, AND SIMETHICONE 2400; 2400; 240 MG/30ML; MG/30ML; MG/30ML
15-30 SUSPENSION ORAL EVERY 4 HOURS PRN
Status: DISCONTINUED | OUTPATIENT
Start: 2017-09-18 | End: 2017-09-19 | Stop reason: HOSPADM

## 2017-09-18 RX ADMIN — CLONAZEPAM 1 MG: 0.5 TABLET ORAL at 22:38

## 2017-09-18 RX ADMIN — SERTRALINE HYDROCHLORIDE 100 MG: 50 TABLET ORAL at 20:06

## 2017-09-18 RX ADMIN — NITROGLYCERIN 0.4 MG: 0.4 TABLET SUBLINGUAL at 13:47

## 2017-09-18 RX ADMIN — NITROGLYCERIN 0.4 MG: 0.4 TABLET SUBLINGUAL at 13:10

## 2017-09-18 RX ADMIN — LOSARTAN POTASSIUM 50 MG: 50 TABLET ORAL at 17:06

## 2017-09-18 RX ADMIN — MORPHINE SULFATE 2 MG: 2 INJECTION, SOLUTION INTRAMUSCULAR; INTRAVENOUS at 13:41

## 2017-09-18 ASSESSMENT — ANXIETY QUESTIONNAIRES
1. FEELING NERVOUS, ANXIOUS, OR ON EDGE: NOT AT ALL
6. BECOMING EASILY ANNOYED OR IRRITABLE: NOT AT ALL
5. BEING SO RESTLESS THAT IT IS HARD TO SIT STILL: NOT AT ALL
IF YOU CHECKED OFF ANY PROBLEMS ON THIS QUESTIONNAIRE, HOW DIFFICULT HAVE THESE PROBLEMS MADE IT FOR YOU TO DO YOUR WORK, TAKE CARE OF THINGS AT HOME, OR GET ALONG WITH OTHER PEOPLE: NOT DIFFICULT AT ALL
7. FEELING AFRAID AS IF SOMETHING AWFUL MIGHT HAPPEN: NOT AT ALL
2. NOT BEING ABLE TO STOP OR CONTROL WORRYING: SEVERAL DAYS
GAD7 TOTAL SCORE: 2
3. WORRYING TOO MUCH ABOUT DIFFERENT THINGS: SEVERAL DAYS

## 2017-09-18 ASSESSMENT — PATIENT HEALTH QUESTIONNAIRE - PHQ9
SUM OF ALL RESPONSES TO PHQ QUESTIONS 1-9: 2
5. POOR APPETITE OR OVEREATING: NOT AT ALL

## 2017-09-18 ASSESSMENT — ENCOUNTER SYMPTOMS
NERVOUS/ANXIOUS: 1
DIAPHORESIS: 1
CHILLS: 1
SHORTNESS OF BREATH: 1

## 2017-09-18 NOTE — PROGRESS NOTES
SUBJECTIVE:   Angus Wynne is a 57 year old male who presents to clinic today for the following health issues:      Depression and Anxiety Follow-Up    Status since last visit: Worsened with dosage change    Other associated symptoms:None    Complicating factors:     Significant life event: No     Current substance abuse: None    PHQ-9 SCORE 9/23/2016 12/21/2016 2/1/2017   Total Score 0 3 2     RUTH-7 SCORE 9/23/2016 12/21/2016 2/1/2017   Total Score 0 4 0       PHQ-9  English  PHQ-9   Any Language  GAD7  Hypothyroidism Follow-up      Since last visit, patient describes the following symptoms: Weight stable, no hair loss, no skin changes, no constipation, no loose stools      Amount of exercise or physical activity: None    Problems taking medications regularly: No    Medication side effects: none    Diet: regular (no restrictions)    Medication Followup of Clonazepam and Sertraline     Taking Medication as prescribed:     Side Effects:      Medication Helping Symptoms:  NO           PROBLEMS TO ADD ON...  admits drinking too much when he was back in [place] , so got back a month ago.now he is trying to go down . Although  he admits it his anxiety can trigger all the problem and he is taking klonopin 1 mg at bed time he was quiet a bit down on it after taking Zoloft, but back hoe with other family stresses situation etc he starts drinking more and his anxiety can get worse, but he is willing to try to work on it again/ he was given trazodone did not help much but willing to try again so he can taper his klonopin     He is also concern with feeling more tired lately and e gets sob easily after doing things around the house sometimes feels tightness but he thinsk that could be anxiety , but he is worried about his hear bc of FAM hx of heart disease in his dad, so wanted to get checked. He tells me that he had possible stress test and angiogram for his heart 8-10 ago in [place], and it was good at that time . He denies  any chest pain , palpitation sob  at this time     Hyperlipidemia Follow-Up      Rate your low fat/cholesterol diet?: good    Taking statin?  No, but will consider although has liver issue related to alcohol so he worries and reluctant     Other lipid medications/supplements?:  none    Hypertension Follow-up      Outpatient blood pressures are being checked at home.  Results are good .    Low Salt Diet: no added salt    Depression and Anxiety Follow-Up    Status since last visit: No change, still has on and of anxiety , Zoloft has helped , still taking klonopin almost daily at night , no taking trazodone bc it made him tired next day     Other associated symptoms:See phq-9 and heber 7    although he understands he needs to go down on klonopin willing to try trazodone again for sleep     Complicating factors: frequent use of klonopin also alcohol use makes anxiety worse     Significant life event: No     Current substance abuse: Alcohol, he is trying to cut down again since e is back     PHQ-9 SCORE 12/21/2016 2/1/2017 9/18/2017   Total Score 3 2 2     HEBER-7 SCORE 12/21/2016 2/1/2017 9/18/2017   Total Score 4 0 2       PHQ-9  English  PHQ-9   Any Language  GAD7            Problem list and histories reviewed & adjusted, as indicated.  Additional history: as documented    Patient Active Problem List   Diagnosis     BPPV (benign paroxysmal positional vertigo), unspecified laterality     Other specified hypothyroidism     Hx of acute alcoholic hepatitis     Hx of colonic polyp     Gastroesophageal reflux disease, esophagitis presence not specified     Vitamin D deficiency     Anxiety     Alcohol use disorder (H)     Neck pain     Cervicalgia     Cervical spondylosis without myelopathy     Essential hypertension     Hyperlipidemia with target LDL less than 130     Nasal congestion     Past Surgical History:   Procedure Laterality Date     NO HISTORY OF SURGERY         Social History   Substance Use Topics     Smoking  "status: Former Smoker     Quit date: 9/28/2015     Smokeless tobacco: Never Used     Alcohol use No     Family History   Problem Relation Age of Onset     Coronary Artery Disease Father      at age 60      Hyperlipidemia Brother      DIABETES No family hx of      CEREBROVASCULAR DISEASE No family hx of      Colon Cancer No family hx of      Prostate Cancer No family hx of              Reviewed and updated as needed this visit by clinical staff       Reviewed and updated as needed this visit by Provider         ROS:  Constitutional, HEENT, cardiovascular, pulmonary, GI, , musculoskeletal, neuro, skin, endocrine and psych systems are negative, except as otherwise noted.      OBJECTIVE:   /82  Pulse 64  Temp 97  F (36.1  C) (Tympanic)  Ht 5' 11.26\" (1.81 m)  Wt 237 lb (107.5 kg)  SpO2 98%  BMI 32.81 kg/m2  Body mass index is 32.81 kg/(m^2).  GENERAL: healthy, alert and no distress  NECK: no adenopathy, no asymmetry, masses, or scars and thyroid normal to palpation  RESP: lungs clear to auscultation - no rales, rhonchi or wheezes  CV: regular rate and rhythm, normal S1 S2, no S3 or S4, no murmur,   ABDOMEN: soft, nontender, no hepatosplenomegaly, no masses and bowel sounds normal  MS: no edema  PSYCH: mentation appears normal, affect normal/bright        ASSESSMENT/PLAN:       (R53.83) Other fatigue  (primary encounter diagnosis)  Comment:   Plan: TSH with free T4 reflex            (E03.8) Other specified hypothyroidism  Comment:   Plan: levothyroxine (SYNTHROID/LEVOTHROID) 112 MCG         tablet            (F41.9) Anxiety  Comment:   Plan: sertraline (ZOLOFT) 100 MG tablet            (G47.09) Other insomnia  Comment:   Plan: traZODone (DESYREL) 50 MG tablet            (F10.99) Alcohol use disorder (H)  Comment: was doing more while he was in [place] a  Month ago   Plan:Hepatic panel   he is trying to go down again     (E78.5) Hyperlipidemia with target LDL less than 130  Comment:   Plan: Lipid Profile " (Chol, Trig, HDL, LDL calc)      (R06.02) SOB (shortness of breath)  Comment:   Plan: EKG 12-lead complete w/read - Clinics,         CARDIOLOGY EVAL ADULT REFERRAL                  Discussed cares concerns and talked about his need to reduce alcohol, and going de on klonopin and he is willing to try it again, so script were give   Also  wanted to proceed with EKG, and it shows some T waves , may suggest anterior lateral  ischemia, but no previous ekg available for comparison. although after I told him about EKG abnormality  he got really nervous and started having mild panic attack and reported feeling anxious and sob and then he felt slight chest tightness, at first he wanted to wait for his son to come and take him to er , his wife is also here with him although his anxiety got worse and he then decided that he wants to take the ambulance to ER, So then we called the ambulance .  he  left with his family and paramedics in stable condition. His son also arrived in the meantime and I talked to him as well . He was able to fill his clonopin from our [facility], so he was also told to take one pill as he was leaving to help calm down his anxiety a little bit. He was also given asa, as well   He left in stable condition via ambulance. [facility] ER was also notified       Patient expressed understanding and agreement with treatment plan. All patient's questions were answered, will let me know if has more later.  Medications: Rx's: Reviewed the potential side effects/complications of medications prescribed.   Spent 50+ min with patient and more then 50% of the time spent counseling and coordination of cares       Fifi Fleming MD  [facility]

## 2017-09-18 NOTE — ED NOTES
"[facility]  ED Nurse Handoff Report    ED Chief complaint: Chest Pain (pt at the clinic today with c/p chest pressure and some anxiety starting around 1100 today. 12 lead done at clinic and sent for further workup via ems. given klonopin at the clinic)      ED Diagnosis:   Final diagnoses:   None       Code Status: Full Code    Allergies: No Known Allergies    Activity level - Baseline/Home:  Independent    Activity Level - Current:   Independent     Needed?: No    Isolation: No  Infection: Not Applicable    Bariatric?: No    Vital Signs:   Vitals:    09/18/17 1335 09/18/17 1336 09/18/17 1344 09/18/17 1352   BP: 120/90  122/88 115/71   Pulse:    86   Resp:  8 17    Temp:       TempSrc:       SpO2: 97% 98% 97% 96%   Weight:       Height:           Cardiac Rhythm: ,   Cardiac  Cardiac Rhythm: Normal sinus rhythm    Pain level: 0-10 Pain Scale: (S) 2    Is this patient confused?: No    Patient Report: Initial Complaint: chest pain  Focused Assessment: hypertensive to 150/100, otherwise VSS on R/A. Pt at PCP this AM for fasting labs, began to have L sided chest \"heaviness\" 3/10 and radiating into \"lip and tongue\", has been experiencing increasing IBARRA for \"a few days\". Clinic EKG abnormal with no previous EKG to compare, pt's PCP gave klonopin and ASA. In ED pt received Nitro 0.4 mg SL x2, both times causing headache (morphine 2 mg IV helpful) and reduction of chest discomfort from 3 to 2/10 after second dose (second dose also caused an increase in pain radiating through L jaw, MD notified). Cardiac WNL other than EKG changes and \"heaviness\" since 1100; Respiratory exam exhibits IBARRA, otherwise WNL. Neuro WNL. Plan to admit.  Tests Performed: labs, EKG, CXR  Abnormal Results: EKG  Treatments provided: Nitro 0.4 mg SL x2, 2 mg morphine IV x2    Family Comments: wife and son at bedside    OBS brochure/video discussed/provided to patient: Yes    ED Medications:   Medications   nitroGLYcerin " (NITROSTAT) sublingual tablet 0.4 mg (0.4 mg Sublingual Given 9/18/17 1347)   morphine (PF) injection 2 mg (2 mg Intravenous Given 9/18/17 1341)       Drips infusing?:  No      ED NURSE PHONE NUMBER: [phone]

## 2017-09-18 NOTE — PHARMACY-ADMISSION MEDICATION HISTORY
Admission medication history interview status for the 9/18/2017  admission is complete. See EPIC admission navigator for prior to admission medications     Medication history source reliability:Good    Actions taken by pharmacist (provider contacted, etc):interviewed pt - family present     Additional medication history information not noted on PTA med list :Pt just received RX for Trazodone today - has not started yet    Medication reconciliation/reorder completed by provider prior to medication history? No    Time spent in this activity: 20 minutes    Prior to Admission medications    Medication Sig Last Dose Taking? Auth Provider   levothyroxine (SYNTHROID/LEVOTHROID) 112 MCG tablet Take 1 tablet (112 mcg) by mouth daily 9/18/2017 at Unknown time Yes Fifi Fleming MD   sertraline (ZOLOFT) 100 MG tablet Take 1 tablet (100 mg) by mouth daily 9/17/2017 at PM Yes Fifi Fleming MD   losartan (COZAAR) 50 MG tablet Take 1 tablet (50 mg) by mouth daily 9/17/2017 at Unknown time Yes Fifi Fleming MD   clonazePAM (KLONOPIN) 0.5 MG tablet Take 1-2 tablets (0.5-1 mg) by mouth 2 times daily as needed for anxiety 9/18/2017 at AM Yes Fifi Fleming MD   traZODone (DESYREL) 50 MG tablet Take 1-2 tab daily for sleep not started yet at [facility]  Fifi Fleming MD

## 2017-09-18 NOTE — ED PROVIDER NOTES
History     Chief Complaint:  Chest Pain     HPI   Angus Wynne is a 57 year old male with a history of hypertension and anxiety who presents to the emergency department today for evaluation of chest pain and is accompanied by his family. The patient was at his primary care clinic, when he had sudden onset of substernal chest pain, shortness of breath, and sweating at 1100. Now, he reports substernal chest heaviness that is also present in his lips and tongue, as well as some chills. He was given one dose of Klonopin for anxiety and one dose of aspirin at clinic. Of note, the patient had a negative angiography 7 years ago in [facility] for chest pain, but has been without symptoms until today. The patient also notes that he did take his blood pressure medication this morning.    Allergies:  No Known Drug Allergies    Medications:    Levothyroxine  Trazodone  Sertraline  Klonopin  Losartan  Flonase    Past Medical History:    Hypertension  Hypothyroid  Vertigo    Past Surgical History:    Coronary angiography    Family History:    CAD - father  Hyperlipidemia - brother    Social History:  The patient was accompanied to the ED by his family.  Smoking Status: Former smoker  Smokeless Tobacco: Never used  Alcohol Use: No  Marital Status:   [2]     Review of Systems   Constitutional: Positive for chills and diaphoresis.   Respiratory: Positive for shortness of breath.    Cardiovascular: Positive for chest pain.        Chest pressure   Psychiatric/Behavioral: The patient is nervous/anxious.    All other systems reviewed and are negative.    Physical Exam   Vitals:  Patient Vitals for the past 24 hrs:   BP Temp Temp src Pulse Heart Rate Resp SpO2 Height Weight   09/18/17 1415 - - - - 63 13 98 % - -   09/18/17 1352 115/71 - - 86 - - 96 % - -   09/18/17 1344 122/88 - - - 64 17 97 % - -   09/18/17 1336 - - - - 66 8 98 % - -   09/18/17 1335 120/90 - - - - - 97 % - -   09/18/17 1314 114/85 - - - 61 14 100 % - -   09/18/17  1257 (!) 150/104 98.4  F (36.9  C) Oral 56 - 20 100 % 1.829 m (6') 107.5 kg (237 lb)     Physical Exam  Constitutional: The patient is oriented to person, place, and time. Anxious.  HENT:   Head: Atraumatic  Right Ear: Normal  Left Ear: Normal  Nose: Nose normal.   Mouth/Throat: Oropharynx is clear and moist. No erythema or exudate.   Eyes: Conjunctivae and EOM are normal. Pupils are equal, round, and reactive to light. No discharge  Neck: Normal range of motion. Neck supple.   Cardiovascular: Normal rate, regular rhythm, no murmur gallops or rubs. Intact distal pulses.    Pulmonary/Chest: CTA bilaterally. No wheezes rale or rhonchi.  Abdominal: Soft. Non tender.  No masses   Musculoskeletal: No edema. No bony deformity. Normal range of motion  Lymphadenopathy:     The patient has no cervical adenopathy.   Neurological: The patient is alert and oriented to person, place, and time. The patient has normal strength and normal reflexes. No cranial nerve deficit. Coordination normal.  Skin: Skin is warm and dry. No rash noted. The patient is not diaphoretic.   Psychiatric: The patient has a normal mood and affect.    Emergency Department Course     ECG:  ECG taken at 1254, ECG read at 1257  Normal sinus rhythm  T wave abnormality, consider lateral ischemia  Abnormal ECG  Rate 64 bpm. TX interval 148. QRS duration 92. QT/QTc 432/445. P-R-T axes -11 -6 101.    Imaging:  Radiology findings were communicated with the patient and family who voiced understanding of the findings.    XR Chest 2 views  Negative.  Reading per radiology    Laboratory:  Laboratory findings were communicated with the patient and family who voiced understanding of the findings.    CBC: WNL. (WBC 4.6, HGB 16.0, )   BMP: Glucose 125 (H) o/w WNL (Creatinine 0.84)  Troponin (Collected 1304): <0.015    Interventions:  1310 nitroglycerin 0.4 mg sublingual  1341 morphine 2 mg IV  1347 nitroglycerin 0.4 mg sublingual     Emergency Department  Course:  Nursing notes and vitals reviewed.  I performed an exam of the patient as documented above.   IV was inserted and blood was drawn for laboratory testing, results above.  The patient was sent for a chest xray 2 views while in the emergency department, results above.   At 1340 the patient was rechecked and was updated on the results of laboratory and imaging studies.   I discussed the treatment plan with the patient. They expressed understanding of this plan and consented to admission. I discussed the patient with Kyung Mcbride PA-C, for Dr. Keith of the hospitalist service, who will admit the patient to a monitored bed for further evaluation and treatment.  I personally reviewed the laboratory and imaging results with the patient and family and answered all related questions prior to admission.    Impression & Plan      Medical Decision Making:  This patient presents with chest pain of unclear etiology, however, I have concern that it represent a cardiac etiology.  Their EKG, CXR, and trop have been unremarkable however I do feel the warrant admission to observation for further EKGs, trops, and provocative testing. consider pulmonary embolism but did not find clinical evidence to support this diagnosis. There are no focal infiltrates, effusions, pulm edema, or evidence of PTX on CXR. The pt has not had recent fevers or viral infections to suggest pericarditis.  They are at low risk for aortic pathology and have nl aortic contour on CXR.  They have not had recent chest trauma.  All of this was discussed with the pt and they have agreed to come in for further work-up.  So far they have received ASA, nitro and morphine for pain control.  At the time of their transfer out of the ED they are HD stable.    Diagnosis:    ICD-10-CM    1. Chest pain R07.9      Disposition:   Admission    Scribe Disclosure:  Valente ROMO, am serving as a scribe at 12:53 PM on 9/18/2017 to document services personally performed  by Raj Epstein MD, based on my observations and the provider's statements to me.    9/18/2017    EMERGENCY DEPARTMENT       Raj Epstein MD  09/18/17 0474

## 2017-09-18 NOTE — H&P
PRIMARY CARE PROVIDER:  Fifi Fleming MD.       CHIEF COMPLAINT:  Chest pain.  History obtained from the patient.      HISTORY OF PRESENT ILLNESS:  Angus Wynne is an extremely pleasant 57-year-old male with past medical history of hypertension and anxiety who presented to the Emergency Department from his primary care provider's office for evaluation of chest pain.  He had a routine appointment with his primary care provider today.  While there, he started discussing some recent symptoms of generalized exercise intolerance.  He noted that he becomes breathless after meals and when climbing a flight of stairs.  Occasionally has some chest pain but describes it as a mild tightness.  As part of a workup for this his primary care provider checked a TSH and fasting lipid profile which is pending.  Additionally, an EKG was obtained which did show some T-wave inversion in the lateral leads.  Upon hearing that his EKG was abnormal, he became acutely anxious and developed chest pain.  He elected to present to the Emergency Department for further evaluation.  In the Emergency Department, he was evaluated by Dr. Epstein.  Repeat EKG showed persistent but stable T-wave inversion.  Laboratory evaluation including troponin was negative.  Given his symptoms, admission was requested for further evaluation.      Presently the patient is evaluated in the Emergency Department, wife at bedside.  He received nitroglycerin and morphine with primary resolution of his symptoms.  He does note that if he takes a deep breath, he has a mild twinge.  He denies fevers or chills.  No recent upper respiratory symptoms.  He flew back from [place] over 2 months ago.  No previous cardiac history but does have a family history of cardiac illness.  He notes that he underwent multiple stress tests and an angiogram approximately 8 years ago in [place].  All of these were normal.  He had similar symptoms at that time but they were a worse degree and it was  determined that the symptoms are related hypothyroidism.      PAST MEDICAL HISTORY:   1.  Hypertension.   2.  Anxiety.   3.  History of alcoholic hepatitis.   4.  BPPV.        PRIOR TO ADMISSION MEDICATIONS:    1.  Klonopin 0.5 mg b.i.d. p.r.n.   2.  Flonase nasal spray.   3.  Synthroid 112 mcg daily.   4.  Cozaar 50 mg daily.   5.  Zoloft 100 mg daily.   6.  Trazodone 50 mg tablet 1 tablet daily.      ALLERGIES:  No known drug allergies.      PAST SURGICAL HISTORY:  No previous surgery.       FAMILY HISTORY:  Father had coronary artery disease and ended up with an ICD placement, mother had leukemia, elder brother had bladder cancer.      SOCIAL HISTORY:  He is a previous heavy smoker but quit 7 years ago.  He currently smokes socially, drinks alcohol 2 beverages per night.  He is .      REVIEW OF SYSTEMS:  A 10-point review of systems was completed.  Pertinent positives are noted other systems negative.      PHYSICAL EXAMINATION:     GENERAL: Angus Wynne is a pleasant 57-year-old male who appears his stated age and lying in bed in no acute distress.   VITAL SIGNS:  Blood pressure is 115/71, pulse 65, O2 sat 98% on room air.   HEENT:  Normocephalic, atraumatic.  Eyes:  Pupils equal, round, react to light.  Oropharynx is midline.  Mucous membranes are moist.   NECK:  Supple, no adenopathy, no thyromegaly.   CARDIOVASCULAR:  Regular rate and rhythm, no murmurs.  Chest wall nontender to palpation.   PULMONARY:  Normal effort.  Clear to auscultation bilaterally.   GASTROINTESTINAL:  Obese abdomen, nontender, nondistended.   EXTREMITIES:  Moves all 4 extremities.  Dorsalis pedis and radial pulses are palpated bilaterally.   NEUROLOGIC:  Alert and oriented.  Cranial nerves II-XII grossly intact.      LABORATORY DATA:  Reviewed in Epic.       I personally reviewed the EKG which reveals T-wave inversion in lateral leads.      ASSESSMENT:  Angus Wynne is a 57-year-old male with past medical history of hypertension  and anxiety who presented to the Emergency Department with complaints of chest pain and is being admitted for further observation.   1.  Chest pain.  The patient developed chest pain today after hearing that his EKG was slightly abnormal at clinic.  He did present with complaints of dyspnea on exertion.  EKG with T-wave changes in lateral leads.  Troponin was unremarkable.  He will be admitted under observation status.  Serial troponins will be obtained.  A fasting lipid profile is pending from his clinic visit earlier today.  If his troponins remain negative, he will undergo an exercise stress test in the morning.  He has undergone exercise stress tests in the past and believes he can complete this without difficulty.   2.  Hypertension.  Continue prior to admission losartan.   3.  Anxiety.  Continue Klonopin and Zoloft.   4.  Deep venous thrombosis prophylaxis.  Ambulation.      CODE STATUS:  Full code.      The patient was discussed with Dr. Keith of the Hospitalist Service who independently interviewed and examined the patient.  He is in agreement with the above plan.         CHEIKH KEITH MD       As dictated by DAYAN ALVAREZ PA-C            D: 2017 14:47   T: 2017 15:38   MT: GANESH      Name:     WILLIAM HARRIS   MRN:      [id number]        Account:      [id number]   :      1960           Admitted:     [id number]      Document: [id number]       cc: Fifi Fleming MD

## 2017-09-18 NOTE — ED NOTES
"2nd dose of Nitro SL caused pt's chest pain \"heaviness\" to decrease to 2/10 (from 3/10) and also caused pt to begin experiencing L jaw pain into L mandible.  "

## 2017-09-18 NOTE — IP AVS SNAPSHOT
MRN:[id number]                      After Visit Summary   9/18/2017    Angus Wynne    MRN: [id number]           Thank you!     Thank you for choosing [facility] for your care. Our goal is always to provide you with excellent care. Hearing back from our patients is one way we can continue to improve our services. Please take a few minutes to complete the written survey that you may receive in the mail after you visit with us. Thank you!        Patient Information     Date Of Birth          1960        About your hospital stay     You were admitted on:  September 18, 2017 You last received care in the:  [facility]    You were discharged on:  September 19, 2017        Reason for your hospital stay       You were admitted for evaluation of chest pain, shortness of breath and fatigue. You had a stress test that was negative. Your cholesterol is very high. You should continue lifestyle modifications but the recommendation is that you start taking Lipitor to help improve your numbers. Your thyroid function tests were mildly abnormal. Given hypothyroidism has caused similar symptoms of shortness of breath and fatigue in the past it is recommended that you increase your dose to 125 mcg/d.                  Who to Call     For medical emergencies, please call 911.  For non-urgent questions about your medical care, please call your primary care provider or clinic, [phone]          Attending Provider     Provider Specialty    Raj Epstein MD Emergency Medicine    SagarHoward menjivar MD Internal Medicine       Primary Care Provider Office Phone # Fax #    Fifi Fleming [phone]      After Care Instructions     Activity       Your activity upon discharge: activity as tolerated            Diet       Follow this diet upon discharge: Orders Placed This Encounter      Low Fat Diet                  Follow-up Appointments     Follow-up and recommended labs and tests         Follow up with primary care provider, Fifi Fleming, within 6 weeks to evaluate medication change and for hospital follow- up.  The following labs/tests are recommended: Repeat thyroid function tests and continue routine monitoring of cholesterol                  Your next 10 appointments already scheduled     Sep 25, 2017  1:40 PM CDT   Office Visit with Fifi Fleming MD   [facility] ([facility])    [address] [address] [phone]           Bring a current list of meds and any records pertaining to this visit. For Physicals, please bring immunization records and any forms needing to be filled out. Please arrive 10 minutes early to complete paperwork.              Pending Results     No orders found for last 3 day(s).            Statement of Approval     Ordered          09/19/17 3315  I have reviewed and agree with all the recommendations and orders detailed in this document.  EFFECTIVE NOW     Approved and electronically signed by:  Kyung Mcbride PA-C             Admission Information     Date & Time Provider Department Dept. Phone    9/18/2017 Howard Keith MD [facility] [phone]      Your Vitals Were     Blood Pressure Pulse Temperature Respirations Height Weight    118/78 (BP Location: Right arm) 86 98.3  F (36.8  C) (Oral) 16 1.829 m (6') 107.5 kg (237 lb)    Pulse Oximetry BMI (Body Mass Index)                100% 32.14 kg/m2          MyChart Information     [employer] gives you secure access to your electronic health record. If you see a primary care provider, you can also send messages to your care team and make appointments. If you have questions, please call your primary care clinic.  If you do not have a primary care provider, please call [phone] and they will assist you.        Care EveryWhere ID     This is your Care EveryWhere ID. This could be used by other organizations to access  your [facility] medical records  [id number]        Equal Access to Services     SAAD BARAKAT : Hadii jackelin Mace, waroldanda alayna, qaybta kaalmasarabjit shafer, nadeen rendon. So [facility] [phone].    ATENCIÓN: Si habla español, tiene a levine disposición servicios gratuitos de asistencia lingüística. Llame al [phone].    We comply with applicable federal civil rights laws and [place] laws. We do not discriminate on the basis of race, color, national origin, age, disability sex, sexual orientation or gender identity.               Review of your medicines      START taking        Dose / Directions    aspirin 81 MG EC tablet   Used for:  Hyperlipidemia with target LDL less than 130        Dose:  81 mg   Take 1 tablet (81 mg) by mouth daily   Refills:  0       atorvastatin 40 MG tablet   Commonly known as:  LIPITOR   Used for:  Hyperlipidemia with target LDL less than 130        Dose:  40 mg   Take 1 tablet (40 mg) by mouth daily   Quantity:  60 tablet   Refills:  1         CONTINUE these medicines which have NOT CHANGED        Dose / Directions    clonazePAM 0.5 MG tablet   Commonly known as:  klonoPIN   Used for:  Anxiety        Dose:  0.5-1 mg   Take 1-2 tablets (0.5-1 mg) by mouth 2 times daily as needed for anxiety   Quantity:  20 tablet   Refills:  0       levothyroxine 112 MCG tablet   Commonly known as:  SYNTHROID/LEVOTHROID   Used for:  Other specified hypothyroidism        Dose:  125 mcg   Take 1 tablet (112 mcg) by mouth daily   Quantity:  90 tablet   Refills:  3       losartan 50 MG tablet   Commonly known as:  COZAAR   Used for:  Essential hypertension        Dose:  50 mg   Take 1 tablet (50 mg) by mouth daily   Quantity:  90 tablet   Refills:  1       sertraline 100 MG tablet   Commonly known as:  ZOLOFT   Used for:  Anxiety        Dose:  100 mg   Take 1 tablet (100 mg) by mouth daily   Quantity:  90 tablet   Refills:  0       traZODone 50 MG tablet   Commonly  known as:  DESYREL   Used for:  Other insomnia        Take 1-2 tab daily for sleep   Quantity:  90 tablet   Refills:  1            Where to get your medicines      These medications were sent to [facility] [place] - [place] - [address]  [address], [place] [address]     Phone:  [phone]     atorvastatin 40 MG tablet    levothyroxine 112 MCG tablet         Some of these will need a paper prescription and others can be bought over the counter. Ask your nurse if you have questions.     You don't need a prescription for these medications     aspirin 81 MG EC tablet                Protect others around you: Learn how to safely use, store and throw away your medicines at www.disposemymeds.org.             Medication List: This is a list of all your medications and when to take them. Check marks below indicate your daily home schedule. Keep this list as a reference.      Medications           Morning Afternoon Evening Bedtime As Needed    aspirin 81 MG EC tablet   Take 1 tablet (81 mg) by mouth daily   Last time this was given:  81 mg on 9/19/2017  9:55 AM   Next Dose Due:  Tomorrow AM                                   atorvastatin 40 MG tablet   Commonly known as:  LIPITOR   Take 1 tablet (40 mg) by mouth daily                                clonazePAM 0.5 MG tablet   Commonly known as:  klonoPIN   Take 1-2 tablets (0.5-1 mg) by mouth 2 times daily as needed for anxiety   Last time this was given:  1 mg on 9/18/2017 10:38 PM                                levothyroxine 112 MCG tablet   Commonly known as:  SYNTHROID/LEVOTHROID   Take 1 tablet (112 mcg) by mouth daily   Last time this was given:  112 mcg on 9/19/2017  9:55 AM   Next Dose Due:  Tomorrow AM                                   losartan 50 MG tablet   Commonly known as:  COZAAR   Take 1 tablet (50 mg) by mouth daily   Last time this was given:  50 mg on 9/19/2017  9:55 AM   Next Dose Due:  Tomorrow AM                                    sertraline 100 MG tablet   Commonly known as:  ZOLOFT   Take 1 tablet (100 mg) by mouth daily   Last time this was given:  100 mg on 9/18/2017  8:06 PM   Next Dose Due:  Tonight                                    traZODone 50 MG tablet   Commonly known as:  DESYREL   Take 1-2 tab daily for sleep

## 2017-09-18 NOTE — MR AVS SNAPSHOT
After Visit Summary   9/18/2017    Angus Wynne    MRN: [id number]           Patient Information     Date Of Birth          1960        Visit Information        Provider Department      9/18/2017 9:40 AM Fifi Fleming MD [facility]        Today's Diagnoses     Other fatigue    -  1    Other specified hypothyroidism        Anxiety        Other insomnia        Alcohol use disorder (H)        SOB (shortness of breath)        Hyperlipidemia with target LDL less than 130        Essential hypertension           Follow-ups after your visit        Additional Services     CARDIOLOGY EVAL ADULT REFERRAL       Your provider has referred you to:  FMG: [facility] [phone]   [email or web address]  UMP: [facility] [phone]   [email or web address]    Please be aware that coverage of these services is subject to the terms and limitations of your health insurance plan.  Call member services at your health plan with any benefit or coverage questions.      Type of Referral:  New Cardiology Consult    Timeframe requested:  ASAP    Please bring the following to your appointment:  >>   Any x-rays, CTs or MRIs which have been performed.  Contact the facility where they were done to arrange for  prior to your scheduled appointment.    >>   List of current medications  >>   This referral request   >>   Any documents/labs given to you for this referral                  Your next 10 appointments already scheduled     Sep 25, 2017  1:40 PM CDT   Office Visit with Fifi Fleming MD   [facility] ([facility]    [address] [place] [id number] [phone]           Bring a current list of meds and any records pertaining to this visit. For Physicals, please  bring immunization records and any forms needing to be filled out. Please arrive 10 minutes early to complete paperwork.              Future tests that were ordered for you today     Open Standing Orders        Priority Remaining Interval Expires Ordered    Notify Provider-Pain Management Routine 81821/18121 PRN  9/18/2017    Pulse oximetry nursing Routine [id number]/[id number] PRN  9/18/2017    Notify Physician Routine [id number] PRN  9/18/2017    Notify Physician to consider change to inpatient status IF Routine [id number] PRN  9/18/2017    Notify Physician to consider change to inpatient status IF Routine [id number] PRN  9/18/2017    EKG 12-lead, tracing only Routine 100/100 CONDITIONAL (SPECIFY)  9/18/2017    Troponin I - Now then in 6 hours x 3  Timed 1/3 NOW THEN EVERY 6 HOURS  9/18/2017    Review medications with patient STAT [id number] PRN  9/18/2017    Oxygen: Nasal cannula, Oxygen mask STAT 43915/27965 CONTINUOUS  9/18/2017            Who to contact     If you have questions or need follow up information about today's clinic visit or your schedule please contact [facility] [place] directly at [phone].  Normal or non-critical lab and imaging results will be communicated to you by Telecardiahart, letter or phone within 4 business days after the clinic has received the results. If you do not hear from us within 7 days, please contact the clinic through Telecardiahart or phone. If you have a critical or abnormal lab result, we will notify you by phone as soon as possible.  Submit refill requests through [employer] or call your pharmacy and they will forward the refill request to us. Please allow 3 business days for your refill to be completed.          Additional Information About Your Visit        Tele[employer] Information     [employer] gives you secure access to your electronic health record. If you see a primary care provider, you can also send messages to your care team and make appointments. If you have questions,  "please call your primary care clinic.  If you do not have a primary care provider, please call [phone] and they will assist you.        Care EveryWhere ID     This is your Care EveryWhere ID. This could be used by other organizations to access your [facility] medical records  [id number]        Your Vitals Were     Pulse Temperature Height Pulse Oximetry BMI (Body Mass Index)       64 97  F (36.1  C) (Tympanic) 5' 11.26\" (1.81 m) 98% 32.81 kg/m2        Blood Pressure from Last 3 Encounters:   09/18/17 (!) 146/95   09/18/17 120/82   02/01/17 110/82    Weight from Last 3 Encounters:   09/18/17 237 lb (107.5 kg)   09/18/17 237 lb (107.5 kg)   02/01/17 240 lb (108.9 kg)              We Performed the Following     CARDIOLOGY EVAL ADULT REFERRAL     EKG 12-lead complete w/read - Clinics     Hepatic panel     Lipid Profile (Chol, Trig, HDL, LDL calc)     TSH with free T4 reflex          Today's Medication Changes          These changes are accurate as of: 9/18/17 12:47 PM.  If you have any questions, ask your nurse or doctor.               These medicines have changed or have updated prescriptions.        Dose/Directions    levothyroxine 112 MCG tablet   Commonly known as:  SYNTHROID/LEVOTHROID   This may have changed:  See the new instructions.   Used for:  Other specified hypothyroidism        Dose:  112 mcg   Take 1 tablet (112 mcg) by mouth daily   Quantity:  90 tablet   Refills:  3       traZODone 50 MG tablet   Commonly known as:  DESYREL   This may have changed:    - how much to take  - how to take this  - when to take this  - additional instructions   Used for:  Other insomnia        Take 1-2 tab daily for sleep   Quantity:  90 tablet   Refills:  1            Where to get your medicines      These medications were sent to [facility] [place] - [place] - [address]  [address], [place] [address]     Phone:  [phone]     levothyroxine 112 MCG tablet    " losartan 50 MG tablet    sertraline 100 MG tablet    traZODone 50 MG tablet         Some of these will need a paper prescription and others can be bought over the counter.  Ask your nurse if you have questions.     Bring a paper prescription for each of these medications     clonazePAM 0.5 MG tablet                Primary Care Provider Office Phone # Fax #    Fifi Dayron Fleming [phone]       [address]  [place] [address]        Equal Access to Services     [facility] YUVAL : Hadii aad ku hadasho Soomaali, waaxda luqadaha, qaybta kaalmada adeegyada, waxay idiin hayaan adeeg kharash lalinh . So [facility] [phone].    ATENCIÓN: Si habla español, tiene a levine disposición servicios gratuitos de asistencia lingüística. [facility] [phone].    We comply with applicable federal civil rights laws and [place] laws. We do not discriminate on the basis of race, color, national origin, age, disability sex, sexual orientation or gender identity.            Thank you!     Thank you for choosing [facility] [place]  for your care. Our goal is always to provide you with excellent care. Hearing back from our patients is one way we can continue to improve our services. Please take a few minutes to complete the written survey that you may receive in the mail after your visit with us. Thank you!             Your Updated Medication List - Protect others around you: Learn how to safely use, store and throw away your medicines at www.disposemymeds.org.          This list is accurate as of: 9/18/17 12:47 PM.  Always use your most recent med list.                   Brand Name Dispense Instructions for use Diagnosis    clonazePAM 0.5 MG tablet    klonoPIN    20 tablet    Take 1-2 tablets (0.5-1 mg) by mouth 2 times daily as needed for anxiety    Anxiety       levothyroxine 112 MCG tablet    SYNTHROID/LEVOTHROID    90 tablet    Take 1 tablet (112 mcg) by mouth daily    Other specified  hypothyroidism       losartan 50 MG tablet    COZAAR    90 tablet    Take 1 tablet (50 mg) by mouth daily    Essential hypertension       sertraline 100 MG tablet    ZOLOFT    90 tablet    Take 1 tablet (100 mg) by mouth daily    Anxiety       traZODone 50 MG tablet    DESYREL    90 tablet    Take 1-2 tab daily for sleep    Other insomnia

## 2017-09-18 NOTE — IP AVS SNAPSHOT
[facility]    [address] [place] [id number]    Phone:  [phone]                                       After Visit Summary   9/18/2017    Angus Wynne    MRN: [id number]           After Visit Summary Signature Page     I have received my discharge instructions, and my questions have been answered. I have discussed any challenges I see with this plan with the nurse or doctor.    ..........................................................................................................................................  Patient/Patient Representative Signature      ..........................................................................................................................................  Patient Representative Print Name and Relationship to Patient    ..................................................               ................................................  Date                                            Time    ..........................................................................................................................................  Reviewed by Signature/Title    ...................................................              ..............................................  Date                                                            Time

## 2017-09-18 NOTE — PROGRESS NOTES
Observation goals PRIOR TO DISCHARGE     Comments: List all goals to be met before discharge home:   - Serial troponins and stress test complete. - NO, stress test scheduled for tomorrow  - Seen and cleared by consultant if applicable - NO  - Adequate pain control on oral analgesia - Yes, pt denies pain  - Vital signs normal or at patient baseline - NO, BP elevated  - Safe disposition plan has been identified - Yes, home with wife  - Nurse to notify provider when observation goals have been met and patient is ready for discharge - Not ready for discharge

## 2017-09-18 NOTE — NURSING NOTE
"Chief Complaint   Patient presents with     Recheck Medication       Initial /82  Pulse 64  Temp 97  F (36.1  C) (Tympanic)  Ht 5' 11.26\" (1.81 m)  Wt 237 lb (107.5 kg)  SpO2 98%  BMI 32.81 kg/m2 Estimated body mass index is 32.81 kg/(m^2) as calculated from the following:    Height as of this encounter: 5' 11.26\" (1.81 m).    Weight as of this encounter: 237 lb (107.5 kg).  Medication Reconciliation: complete  "

## 2017-09-18 NOTE — ED NOTES
Bed: ED03  Expected date:   Expected time:   Means of arrival:   [facility]

## 2017-09-18 NOTE — PROGRESS NOTES
D: PT admitted after clinic visit today w/ PCP.    I: Post hosp PCP f/u appt scheduled for Next Monday--encouraging pt to follow thru on this CP episode post hosp d/c. See AVS for appt reflected.   Pippa Ross Care Transitions Nurse,  RN, BSN, [facility]  Cell Phone # [phone]

## 2017-09-19 ENCOUNTER — APPOINTMENT (OUTPATIENT)
Dept: CARDIOLOGY | Facility: CLINIC | Age: 57
End: 2017-09-19
Attending: PHYSICIAN ASSISTANT
Payer: COMMERCIAL

## 2017-09-19 VITALS
WEIGHT: 237 LBS | BODY MASS INDEX: 32.1 KG/M2 | RESPIRATION RATE: 16 BRPM | TEMPERATURE: 98.3 F | DIASTOLIC BLOOD PRESSURE: 78 MMHG | HEART RATE: 86 BPM | HEIGHT: 72 IN | OXYGEN SATURATION: 100 % | SYSTOLIC BLOOD PRESSURE: 118 MMHG

## 2017-09-19 LAB
ALBUMIN SERPL-MCNC: 4.3 G/DL (ref 3.4–5)
ALP SERPL-CCNC: 53 U/L (ref 40–150)
ALT SERPL W P-5'-P-CCNC: 64 U/L (ref 0–70)
AST SERPL W P-5'-P-CCNC: 50 U/L (ref 0–45)
BILIRUB DIRECT SERPL-MCNC: 0.2 MG/DL (ref 0–0.2)
BILIRUB SERPL-MCNC: 0.9 MG/DL (ref 0.2–1.3)
CHOLEST SERPL-MCNC: 309 MG/DL
HDLC SERPL-MCNC: 61 MG/DL
LDLC SERPL CALC-MCNC: 185 MG/DL
NONHDLC SERPL-MCNC: 248 MG/DL
PROT SERPL-MCNC: 8 G/DL (ref 6.8–8.8)
T4 FREE SERPL-MCNC: 0.9 NG/DL (ref 0.76–1.46)
TRIGL SERPL-MCNC: 317 MG/DL
TROPONIN I SERPL-MCNC: <0.015 UG/L (ref 0–0.04)
TSH SERPL DL<=0.005 MIU/L-ACNC: 9.03 MU/L (ref 0.4–4)

## 2017-09-19 PROCEDURE — 36415 COLL VENOUS BLD VENIPUNCTURE: CPT | Performed by: PHYSICIAN ASSISTANT

## 2017-09-19 PROCEDURE — 93016 CV STRESS TEST SUPVJ ONLY: CPT | Performed by: INTERNAL MEDICINE

## 2017-09-19 PROCEDURE — 40000264 ECHO STRESS WITH OPTISON

## 2017-09-19 PROCEDURE — 93018 CV STRESS TEST I&R ONLY: CPT | Performed by: INTERNAL MEDICINE

## 2017-09-19 PROCEDURE — 93321 DOPPLER ECHO F-UP/LMTD STD: CPT | Mod: 26 | Performed by: INTERNAL MEDICINE

## 2017-09-19 PROCEDURE — 84484 ASSAY OF TROPONIN QUANT: CPT | Performed by: PHYSICIAN ASSISTANT

## 2017-09-19 PROCEDURE — 25000132 ZZH RX MED GY IP 250 OP 250 PS 637: Performed by: PHYSICIAN ASSISTANT

## 2017-09-19 PROCEDURE — G0378 HOSPITAL OBSERVATION PER HR: HCPCS

## 2017-09-19 PROCEDURE — 25500064 ZZH RX 255 OP 636: Performed by: INTERNAL MEDICINE

## 2017-09-19 PROCEDURE — 99217 ZZC OBSERVATION CARE DISCHARGE: CPT | Performed by: PHYSICIAN ASSISTANT

## 2017-09-19 PROCEDURE — 93325 DOPPLER ECHO COLOR FLOW MAPG: CPT | Mod: 26 | Performed by: INTERNAL MEDICINE

## 2017-09-19 PROCEDURE — 93350 STRESS TTE ONLY: CPT | Mod: 26 | Performed by: INTERNAL MEDICINE

## 2017-09-19 RX ORDER — LEVOTHYROXINE SODIUM 112 UG/1
125 TABLET ORAL DAILY
Qty: 90 TABLET | Refills: 3 | Status: SHIPPED | OUTPATIENT
Start: 2017-09-19 | End: 2017-09-25 | Stop reason: DRUGHIGH

## 2017-09-19 RX ORDER — LEVOTHYROXINE SODIUM 112 UG/1
112 TABLET ORAL DAILY
Status: DISCONTINUED | OUTPATIENT
Start: 2017-09-19 | End: 2017-09-19 | Stop reason: HOSPADM

## 2017-09-19 RX ORDER — ATORVASTATIN CALCIUM 40 MG/1
40 TABLET, FILM COATED ORAL DAILY
Qty: 60 TABLET | Refills: 1 | Status: SHIPPED | OUTPATIENT
Start: 2017-09-19 | End: 2018-01-22

## 2017-09-19 RX ORDER — LEVOTHYROXINE SODIUM 25 UG/1
12.5 TABLET ORAL DAILY
Qty: 90 TABLET | Refills: 3 | Status: CANCELLED | OUTPATIENT
Start: 2017-09-19

## 2017-09-19 RX ADMIN — HUMAN ALBUMIN MICROSPHERES AND PERFLUTREN 6 ML: 10; .22 INJECTION, SOLUTION INTRAVENOUS at 09:56

## 2017-09-19 RX ADMIN — LOSARTAN POTASSIUM 50 MG: 50 TABLET ORAL at 09:55

## 2017-09-19 RX ADMIN — LEVOTHYROXINE SODIUM 112 MCG: 112 TABLET ORAL at 09:55

## 2017-09-19 RX ADMIN — ASPIRIN 81 MG: 81 TABLET, COATED ORAL at 09:55

## 2017-09-19 ASSESSMENT — ANXIETY QUESTIONNAIRES: GAD7 TOTAL SCORE: 2

## 2017-09-19 NOTE — PROGRESS NOTES
Observation Goals    1. Serial troponins and stress test complete. Partially Met. Serial troponins negative and stress test done, not yet reviewed.  2. Seen and cleared by consultant if applicable. Met. Stress echo done.  3. Adequate pain control on oral analgesia. Met. Denies pain.  4. Vital signs normal or at patient baseline. Met. Afebrile. Vss.   5. Safe disposition plan has been identified. Not Met. Discharge pending.

## 2017-09-19 NOTE — PLAN OF CARE
Problem: Goal Outcome Summary  Goal: Goal Outcome Summary  Outcome: Improving  Pt A&O. VSS. Tele NSR. Chest pain has subsided. Pt slightly anxious about labs/tests. Low fat/no caffeine diet. Plan to be NPO at midnight for stress echo tomorrow.         Observation goals PRIOR TO DISCHARGE     Comments: List all goals to be met before discharge home:   - Serial troponins and stress test complete. - Troponin's WDL, stress test planned for tomorrow  - Seen and cleared by consultant if applicable - NO  - Adequate pain control on oral analgesia - Yes  - Vital signs normal or at patient baseline - Yes  - Safe disposition plan has been identified - Yes

## 2017-09-19 NOTE — DISCHARGE SUMMARY
PRIMARY CARE PHYSICIAN:  Hollis Fleming MD       DATE OF ADMISSION:  09/18/2017      DATE OF DISCHARGE:  09/19/2017      DISCHARGE DIAGNOSES:   1.  Atypical chest pain, resolved.   2.  Dyslipidemia.   3.  Hypothyroidism.   4.  Hypertension.      DISCHARGE MEDICATIONS:       Review of your medicines      START taking       Dose / Directions    aspirin 81 MG EC tablet   Used for:  Hyperlipidemia with target LDL less than 130        Dose:  81 mg   Take 1 tablet (81 mg) by mouth daily   Refills:  0       atorvastatin 40 MG tablet   Commonly known as:  LIPITOR   Used for:  Hyperlipidemia with target LDL less than 130        Dose:  40 mg   Take 1 tablet (40 mg) by mouth daily   Quantity:  60 tablet   Refills:  1         CONTINUE these medicines which have NOT CHANGED       Dose / Directions    clonazePAM 0.5 MG tablet   Commonly known as:  klonoPIN   Used for:  Anxiety        Dose:  0.5-1 mg   Take 1-2 tablets (0.5-1 mg) by mouth 2 times daily as needed for anxiety   Quantity:  20 tablet   Refills:  0       levothyroxine 112 MCG tablet   Commonly known as:  SYNTHROID/LEVOTHROID   Used for:  Other specified hypothyroidism        Dose:  125 mcg   Take 1 tablet (112 mcg) by mouth daily   Quantity:  90 tablet   Refills:  3       losartan 50 MG tablet   Commonly known as:  COZAAR   Used for:  Essential hypertension        Dose:  50 mg   Take 1 tablet (50 mg) by mouth daily   Quantity:  90 tablet   Refills:  1       sertraline 100 MG tablet   Commonly known as:  ZOLOFT   Used for:  Anxiety        Dose:  100 mg   Take 1 tablet (100 mg) by mouth daily   Quantity:  90 tablet   Refills:  0       traZODone 50 MG tablet   Commonly known as:  DESYREL   Used for:  Other insomnia        Take 1-2 tab daily for sleep   Quantity:  90 tablet   Refills:  1            Where to get your medicines      These medications were sent to [facility] [place] - [address]  [address]  [place] [address]     Phone:  [phone]      atorvastatin 40 MG tablet     levothyroxine 112 MCG tablet         Some of these will need a paper prescription and others can be bought over the counter. Ask your nurse if you have questions.     You don't need a prescription for these medications      aspirin 81 MG EC tablet               DISPOSITION:  The patient will be discharged home on 09/19/2017.      FOLLOWUP INSTRUCTIONS:  Follow up with primary care provider in 4-6 weeks for repeat thyroid function studies as well as ongoing management of dyslipidemia.      PERTINENT LABORATORY DATA:   1.  Troponin less than 0.015 x3.  Labs obtained from PCP visit prior to admission include fasting lipid profile which revealed a cholesterol of 309 and LDL of 185.   2.  TSH of 9.03 and T4 of 0.90.   3.  Chest x-ray unremarkable.   4.  EKG with T-wave inversions in lateral leads.      IMAGING:  Stress echocardiogram completed 09/19/2017, shows normal stress echocardiogram with no evidence of stress-induced ischemia, no chest pain or significant ST changes with exercise.      PENDING LABORATORIES:  None.      PROCEDURES:  None.      CONSULTANTS:  None.      HISTORY OF PRESENT ILLNESS:  Angus Wynne is an extremely pleasant 57-year-old male with past medical history of hypertension and anxiety who presented to the Emergency Department from his primary care provider clinic for evaluation of chest pain.  He had initially gone to his primary care provider's for evaluation of fatigue and breathlessness.  As part of that workup, she obtained an EKG, which revealed some nonspecific T-wave inversions in the lateral leads.  After hearing that his EKG was abnormal, he developed acute chest pain and elected to present to the Emergency Department for further evaluation.  Repeat EKG was unremarkable and troponin showed no evidence of ischemia.  He was admitted under observation.  For additional details regarding HPI, please see H&P by Kyung  CARLIN Mcbride.      HOSPITAL COURSE:   1.  Atypical chest pain.  Chest pain was brought on in the setting of anxiety.  It resolved on arrival to the Emergency Department with nitroglycerin and morphine.  He remained symptom free throughout his stay.  The EKG was nonischemic.  Serial troponins were obtained and were negative.  He underwent an exercise stress test, which showed no abnormalities.  Will continue lifestyle modifications and risk factor reduction with treatment of hypertension and dyslipidemia.  He was instructed to take a daily baby aspirin.  He will continue ongoing followup with his primary care provider.   2.  Dyslipidemia.  Previously not on statin.  Fasting lipid profile obtained in his primary care provider's office yesterday revealed a total cholesterol of greater than 300 and LDL greater of 180.  Initiation of Lipitor 40 mg daily was recommended.  Discussed lifestyle modifications.  Followup with PCP for ongoing management is recommended.   3.  Hypothyroidism.  The patient has a history of hypothyroidism, typically maintained on Synthroid 112 mcg daily.  He does note that he had similar symptoms approximately a decade ago when he was initially diagnosed with hypothyroidism.  Thyroid function tests obtained at primary care provider's office revealed a TSH of 9 and T4 of 0.90.  Given elevated TSH and low normal T4, will adjust Synthroid to 125 mcg daily.  He will follow up with his primary care provider for thyroid function test in 4-6 weeks.   4.  Hypertension.  Continue losartan.      PHYSICAL EXAMINATION:   GENERAL:  Angus Wynne is a 57-year-old male who is sitting in bed in no acute distress.   VITAL SIGNS:  Blood pressure is 118/78, heart rate 60, temperature 98.3.   HEENT:  Normocephalic, atraumatic.  Eyes:  Pupils equal, round, react to light.   NECK:  Supple.  No adenopathy, no thyromegaly.   CARDIOVASCULAR:  Regular rate and rhythm.  No murmurs.   PULMONARY:  Normal effort.  Lungs clear  to auscultation.   ABDOMEN:  Obese abdomen, nontender, nondistended.   EXTREMITIES:  Moves all 4 extremities.  Dorsalis pedis and radial pulses are palpated bilaterally.   NEUROLOGIC:  Alert and oriented.  Cranial nerves II-XII grossly intact.      TOTAL DISCHARGE TIME:  Greater than 30 minutes.      This patient was discussed with Dr. Dewey Peoples of the Hospitalist Service, who independently interviewed and examined the patient.  He is in agreement with the above plan.         DEWEY PEOPLES MD       As dictated by DAYAN ALVAREZ PA-C            D: 2017 13:23   T: 2017 14:20   MT: TOOTIE      Name:     WILLIAM HARRIS   MRN:      [id number]        Account:        [id number]   :      1960           Admit Date:                                       Discharge Date: 2017      Document: [id number]

## 2017-09-19 NOTE — PROGRESS NOTES
Observation Goals    1. Serial troponins and stress test complete. Met. Serial troponins negative and stress test done, PA reviewed the result with pt.  2. Seen and cleared by consultant if applicable. Met. Stress echo done.  3. Adequate pain control on oral analgesia. Met. Denies pain.  4. Vital signs normal or at patient baseline. Met. Afebrile. Vss.   5. Safe disposition plan has been identified. Not Met. Discharge pending.     Discharge to home at 1330. Son is coming to pick him up. Pt requesting to wait in the lobby. Discharge summary reviewed with patient. All question answered. All belongings packed and sent home with patient. Denies chest pain. Independent ADLs.

## 2017-09-19 NOTE — PLAN OF CARE
Pt A&O. VSS. Tele NSR. Denies chest pain. Pt resting in room. Independent.     Observation Goals  Comments: List all goals to be met before discharge home:   - Serial troponins and stress test complete. - Troponin's WDL, stress test planned for tomorrow  - Seen and cleared by consultant if applicable - NO  - Adequate pain control on oral analgesia - Yes  - Vital signs normal or at patient baseline - Yes  - Safe disposition plan has been identified - Yes

## 2017-09-20 ENCOUNTER — TELEPHONE (OUTPATIENT)
Dept: FAMILY MEDICINE | Facility: CLINIC | Age: 57
End: 2017-09-20

## 2017-09-20 NOTE — TELEPHONE ENCOUNTER
ED / Discharge Outreach Protocol    Patient Contact    Attempt # 1    Was call answered?  No.  Left message on voicemail with information to call me back.  Lora Villalba RN - Triage  [facility]

## 2017-09-20 NOTE — TELEPHONE ENCOUNTER
Pt was seen at [facility] on Tue 9/19/2017.  Reason for visit: Chest Pain, Hyperlipidemia With Target Ldl Less Than 130      Thank you!  Pt has appointment 9/25/17  Radha MELCHOR

## 2017-09-21 NOTE — TELEPHONE ENCOUNTER
ED / Discharge Outreach Protocol    Patient Contact    Attempt # 2    Was call answered?  No.  Left message on voicemail with information to call me back.    America MaciasRN  [facility]  [phone]

## 2017-09-22 NOTE — TELEPHONE ENCOUNTER
ED / Discharge Outreach Protocol    Patient Contact    Attempt # 3    Was call answered?  No.  Left message on voicemail with information to call me back.  Patient does have follow up appointment scheduled for 9/25/17.  Ashleigh Msesina RN

## 2017-09-25 ENCOUNTER — OFFICE VISIT (OUTPATIENT)
Dept: FAMILY MEDICINE | Facility: CLINIC | Age: 57
End: 2017-09-25
Payer: COMMERCIAL

## 2017-09-25 VITALS
BODY MASS INDEX: 32.1 KG/M2 | HEART RATE: 68 BPM | WEIGHT: 237 LBS | SYSTOLIC BLOOD PRESSURE: 125 MMHG | HEIGHT: 72 IN | DIASTOLIC BLOOD PRESSURE: 91 MMHG | OXYGEN SATURATION: 98 % | TEMPERATURE: 97.5 F

## 2017-09-25 DIAGNOSIS — E78.5 HYPERLIPIDEMIA WITH TARGET LDL LESS THAN 130: ICD-10-CM

## 2017-09-25 DIAGNOSIS — Z09 HOSPITAL DISCHARGE FOLLOW-UP: Primary | ICD-10-CM

## 2017-09-25 DIAGNOSIS — F41.9 ANXIETY: ICD-10-CM

## 2017-09-25 DIAGNOSIS — E03.8 OTHER SPECIFIED HYPOTHYROIDISM: ICD-10-CM

## 2017-09-25 DIAGNOSIS — R09.81 NASAL CONGESTION: ICD-10-CM

## 2017-09-25 PROCEDURE — 99214 OFFICE O/P EST MOD 30 MIN: CPT | Performed by: FAMILY MEDICINE

## 2017-09-25 RX ORDER — FLUTICASONE PROPIONATE 50 MCG
1-2 SPRAY, SUSPENSION (ML) NASAL DAILY
Qty: 16 G | Status: SHIPPED | OUTPATIENT
Start: 2017-09-25 | End: 2019-02-03

## 2017-09-25 RX ORDER — LEVOTHYROXINE SODIUM 125 UG/1
125 TABLET ORAL DAILY
Qty: 90 TABLET | Refills: 0 | Status: SHIPPED | OUTPATIENT
Start: 2017-09-25 | End: 2018-01-22

## 2017-09-25 NOTE — PROGRESS NOTES
SUBJECTIVE:   Angus Wynne is a 57 year old male who presents to clinic today for the following health issues:          Hospital Follow-up Visit:    Hospital/Nursing Home/IP Rehab Facility: [facility]  Date of Admission: 9/18/2017  Date of Discharge: 9/19/2017  Reason(s) for Admission: Chest pain- feeling better regarding pain             Problems taking medications regularly:  None       Medication changes since discharge: aspirin 81 mg and Atorvastatin 40 mg        Problems adhering to non-medication therapy:  None    Summary of hospitalization:  [facility] discharge summary reviewed    was hospitalized for chest pain/ sob. Had negative cardiac w/u . It was thought be panic attack , he is improved and feeling better     Diagnostic Tests/Treatments reviewed.  Follow up needed: none  Other Healthcare Providers Involved in Patient s Care:         None  Update since discharge: improved.     Post Discharge Medication Reconciliation: discharge medications reconciled, continue medications without change.  Plan of care communicated with patient     Coding guidelines for this visit:  Type of Medical   Decision Making Face-to-Face Visit       within 7 Days of discharge Face-to-Face Visit        within 14 days of discharge   Moderate Complexity [id number]   High Complexity [id number]              PROBLEMS TO ADD ON...  Hyperlipidemia Follow-Up      Rate your low fat/cholesterol diet?: good    Taking statin?  Yes, no muscle aches from statin. His dose was started on Lipitor while  he was in hospital     Other lipid medications/supplements?:  none    Anxiety Follow-Up    Status since last visit: Improved , no panic attack since discharge. He is planning to start exercise especially swimming and he  thinks it really does help him relax    Other associated symptoms:None    Complicating factors:   Significant life event: No   Current substance abuse: None  Depression symptoms: No  RUTH-7 SCORE  12/21/2016 2/1/2017 9/18/2017   Total Score 4 0 2       GAD7          Hypothyroidism Follow-up       Had thyroid labs recently, showing elevated TSH, so here to discuss results . Taking his med's regularly     Since last visit, patient describes the following symptoms: Weight stable, no hair loss, no skin changes, no constipation, no loose stools     Also could bhakti refill on Flonase. Works good for his nasal congestion and allergies       Problem list and histories reviewed & adjusted, as indicated.  Additional history: as documented    Patient Active Problem List   Diagnosis     BPPV (benign paroxysmal positional vertigo), unspecified laterality     Other specified hypothyroidism     Hx of acute alcoholic hepatitis     Hx of colonic polyp     Gastroesophageal reflux disease, esophagitis presence not specified     Vitamin D deficiency     Anxiety     Alcohol use disorder (H)     Neck pain     Cervicalgia     Cervical spondylosis without myelopathy     Essential hypertension     Hyperlipidemia with target LDL less than 130     Nasal congestion     Past Surgical History:   Procedure Laterality Date     NO HISTORY OF SURGERY         Social History   Substance Use Topics     Smoking status: Former Smoker     Quit date: 9/28/2015     Smokeless tobacco: Never Used     Alcohol use No     Family History   Problem Relation Age of Onset     Coronary Artery Disease Father      at age 60      Hyperlipidemia Brother      DIABETES No family hx of      CEREBROVASCULAR DISEASE No family hx of      Colon Cancer No family hx of      Prostate Cancer No family hx of              Reviewed and updated as needed this visit by clinical staff       Reviewed and updated as needed this visit by Provider         ROS:  Constitutional, HEENT, cardiovascular, pulmonary, GI, , musculoskeletal, neuro, skin, endocrine and psych systems are negative, except as otherwise noted.      OBJECTIVE:   BP (!) 127/91  Pulse 68  Temp 97.5  F (36.4  C)  (Tympanic)  Ht 6' (1.829 m)  Wt 237 lb (107.5 kg)  SpO2 98%  BMI 32.14 kg/m2  Body mass index is 32.14 kg/(m^2).  GENERAL: healthy, alert and no distress  NECK: no adenopathy, no asymmetry, masses, or scars and thyroid normal to palpation  RESP: lungs clear to auscultation - no rales, rhonchi or wheezes  CV: regular rate and rhythm, normal S1 S2, no S3 or S4, no murmur, click or rub, no peripheral edema and peripheral pulses strong  ABDOMEN: soft, nontender, no hepatosplenomegaly, no masses and bowel sounds normal  MS: no edema  NEURO: Normal strength and tone, mentation intact and speech normal  PSYCH: mentation appears normal, affect normal/bright, fatigued, speech pressured, judgement and insight intact and appearance well groomed        ASSESSMENT/PLAN:       (Z09) Hospital discharge follow-up  (primary encounter diagnosis)  Comment:   Plan: was hospitalized for chest pain/ sob. Had negative cardiac w/u . It was thought be panic attack , he is improved and feeling better     (E03.8) Other specified hypothyroidism  Comment:   Plan: levothyroxine (SYNTHROID/LEVOTHROID) 125 MCG         tablet         thyroid test is abnormal suggesting lower than normal thyroid activity. Willing to  increase dose of  thyroid  medication now and we can recheck your levels in 6-12 weeks.        (E78.5) Hyperlipidemia with target LDL less than 130  Comment:   Plan: just started statin, per cardiology in the hospital     (F41.9) Anxiety  Comment:   Plan: improved. He will continue with zoloft. He will also taper himself  klonopin . F/u check in 2-3 months,sooner if problem     (R09.81) Nasal congestion  Comment: need to go back  on Flonase   Plan: fluticasone (FLONASE) 50 MCG/ACT spray                Patient expressed understanding and agreement with treatment plan. All patient's questions were answered, will let me know if has more later.  Medications: Rx's: Reviewed the potential side effects/complications of medications  prescribed.     Fifi Fleming MD  [facility]

## 2017-09-25 NOTE — PATIENT INSTRUCTIONS
Take medications as directed.  Treatment  and symptomatic cares discussed   Follow up for labs and med's check in 8-10 weeks , sooner  if problem as needed

## 2017-09-25 NOTE — NURSING NOTE
Chief Complaint   Patient presents with     Hospital F/U       Initial BP (!) 127/91  Pulse 68  Temp 97.5  F (36.4  C) (Tympanic)  Ht 6' (1.829 m)  Wt 237 lb (107.5 kg)  SpO2 98%  BMI 32.14 kg/m2 Estimated body mass index is 32.14 kg/(m^2) as calculated from the following:    Height as of this encounter: 6' (1.829 m).    Weight as of this encounter: 237 lb (107.5 kg).  Medication Reconciliation: complete

## 2017-09-25 NOTE — MR AVS SNAPSHOT
After Visit Summary   9/25/2017    Angus Wynne    MRN: [id number]           Patient Information     Date Of Birth          1960        Visit Information        Provider Department      9/25/2017 1:40 PM Fifi Fleming MD [facility]        Today's Diagnoses     Hospital discharge follow-up    -  1    Other specified hypothyroidism        Hyperlipidemia with target LDL less than 130        Anxiety        Nasal congestion          Care Instructions    Take medications as directed.  Treatment  and symptomatic cares discussed   Follow up for labs and med's check in 8-10 weeks , sooner  if problem as needed             Follow-ups after your visit        Who to contact     If you have questions or need follow up information about today's clinic visit or your schedule please contact [facility] [place] directly at [phone].  Normal or non-critical lab and imaging results will be communicated to you by MyChart, letter or phone within 4 business days after the clinic has received the results. If you do not hear from us within 7 days, please contact the clinic through MyChart or phone. If you have a critical or abnormal lab result, we will notify you by phone as soon as possible.  Submit refill requests through [employer] or call your pharmacy and they will forward the refill request to us. Please allow 3 business days for your refill to be completed.          Additional Information About Your Visit        MyChart Information     [employer] gives you secure access to your electronic health record. If you see a primary care provider, you can also send messages to your care team and make appointments. If you have questions, please call your primary care clinic.  If you do not have a primary care provider, please call [phone] and they will assist you.        Care EveryWhere ID     This is your Care EveryWhere ID. This could be used by other organizations to access your  [facility] medical records  [id number]        Your Vitals Were     Pulse Temperature Height Pulse Oximetry BMI (Body Mass Index)       68 97.5  F (36.4  C) (Tympanic) 6' (1.829 m) 98% 32.14 kg/m2        Blood Pressure from Last 3 Encounters:   09/25/17 (!) 127/91   09/19/17 118/78   09/18/17 120/82    Weight from Last 3 Encounters:   09/25/17 237 lb (107.5 kg)   09/18/17 237 lb (107.5 kg)   09/18/17 237 lb (107.5 kg)              Today, you had the following     No orders found for display         Today's Medication Changes          These changes are accurate as of: 9/25/17  2:34 PM.  If you have any questions, ask your nurse or doctor.               Start taking these medicines.        Dose/Directions    fluticasone 50 MCG/ACT spray   Commonly known as:  FLONASE   Used for:  Nasal congestion   Started by:  Fifi Fleming MD        Dose:  1-2 spray   Spray 1-2 sprays into both nostrils daily   Quantity:  16 g   Refills:  prn         These medicines have changed or have updated prescriptions.        Dose/Directions    levothyroxine 125 MCG tablet   Commonly known as:  SYNTHROID/LEVOTHROID   This may have changed:    - medication strength  - how much to take   Used for:  Other specified hypothyroidism   Changed by:  Fifi Fleming MD        Dose:  125 mcg   Take 1 tablet (125 mcg) by mouth daily   Quantity:  90 tablet   Refills:  0            Where to get your medicines      These medications were sent to [facility] [place] [address] [place] [address]     Phone:  [phone]     fluticasone 50 MCG/ACT spray    levothyroxine 125 MCG tablet                Primary Care Provider Office Phone # Fax #    Fifi Fleming [phone]       [address] [place] [address] [address]  [place] [address]        Equal Access to Services     SAAD BARAKAT AH: Hadii jackelin Mace, mackenzieda alayna, qajesica shafer,  nadeen irvinchristina diaz'aan ah. So [facility] [phone].    ATENCIÓN: Si estefanyla marty, tiene a levine disposición servicios gratuitos de asistencia lingüística. Cassidy calle [phone].    We comply with applicable federal civil rights laws and [place] laws. We do not discriminate on the basis of race, color, national origin, age, disability sex, sexual orientation or gender identity.            Thank you!     Thank you for choosing [facility] [place]  for your care. Our goal is always to provide you with excellent care. Hearing back from our patients is one way we can continue to improve our services. Please take a few minutes to complete the written survey that you may receive in the mail after your visit with us. Thank you!             Your Updated Medication List - Protect others around you: Learn how to safely use, store and throw away your medicines at www.disposemymeds.org.          This list is accurate as of: 9/25/17  2:34 PM.  Always use your most recent med list.                   Brand Name Dispense Instructions for use Diagnosis    aspirin 81 MG EC tablet      Take 1 tablet (81 mg) by mouth daily    Hyperlipidemia with target LDL less than 130       atorvastatin 40 MG tablet    LIPITOR    60 tablet    Take 1 tablet (40 mg) by mouth daily    Hyperlipidemia with target LDL less than 130       clonazePAM 0.5 MG tablet    klonoPIN    20 tablet    Take 1-2 tablets (0.5-1 mg) by mouth 2 times daily as needed for anxiety    Anxiety       fluticasone 50 MCG/ACT spray    FLONASE    16 g    Spray 1-2 sprays into both nostrils daily    Nasal congestion       levothyroxine 125 MCG tablet    SYNTHROID/LEVOTHROID    90 tablet    Take 1 tablet (125 mcg) by mouth daily    Other specified hypothyroidism       losartan 50 MG tablet    COZAAR    90 tablet    Take 1 tablet (50 mg) by mouth daily    Essential hypertension       sertraline 100 MG tablet    ZOLOFT    90 tablet    Take 1 tablet (100 mg) by  mouth daily    Anxiety       traZODone 50 MG tablet    DESYREL    90 tablet    Take 1-2 tab daily for sleep    Other insomnia

## 2017-11-28 DIAGNOSIS — F41.9 ANXIETY: ICD-10-CM

## 2017-11-28 RX ORDER — CLONAZEPAM 0.5 MG/1
.5-1 TABLET ORAL 2 TIMES DAILY PRN
Qty: 10 TABLET | Refills: 0 | Status: SHIPPED | OUTPATIENT
Start: 2017-11-28 | End: 2017-12-06

## 2017-11-28 NOTE — TELEPHONE ENCOUNTER
clonazePAM (KLONOPIN) 0.5 MG      Last Written Prescription Date:  9/18/17  Last Fill Quantity: 20,   # refills: 0  Last Office Visit: 9/25/17  Future Office visit:       Routing refill request to provider for review/approval because:  Drug not on the [facility], [facility] or [facility] refill protocol or controlled substance

## 2017-11-29 NOTE — TELEPHONE ENCOUNTER
Ok for refill. Script printed. Ok to give to patient.   . Remind pt to do follow up for med check

## 2017-11-29 NOTE — TELEPHONE ENCOUNTER
Script taken to clinic pharmacy  Patient informed and will call back when he checks with son to see when he can bring him  NTBS: Medication check  Radha MELCHOR

## 2017-12-06 ENCOUNTER — OFFICE VISIT (OUTPATIENT)
Dept: FAMILY MEDICINE | Facility: CLINIC | Age: 57
End: 2017-12-06
Payer: COMMERCIAL

## 2017-12-06 VITALS
TEMPERATURE: 97.8 F | OXYGEN SATURATION: 98 % | HEART RATE: 61 BPM | BODY MASS INDEX: 33.59 KG/M2 | HEIGHT: 72 IN | SYSTOLIC BLOOD PRESSURE: 146 MMHG | WEIGHT: 248 LBS | DIASTOLIC BLOOD PRESSURE: 101 MMHG

## 2017-12-06 DIAGNOSIS — M54.2 NECK PAIN: ICD-10-CM

## 2017-12-06 DIAGNOSIS — I10 ESSENTIAL HYPERTENSION: ICD-10-CM

## 2017-12-06 DIAGNOSIS — F41.9 ANXIETY: Primary | ICD-10-CM

## 2017-12-06 DIAGNOSIS — Z23 NEED FOR PROPHYLACTIC VACCINATION AND INOCULATION AGAINST INFLUENZA: ICD-10-CM

## 2017-12-06 PROCEDURE — 99214 OFFICE O/P EST MOD 30 MIN: CPT | Mod: 25 | Performed by: FAMILY MEDICINE

## 2017-12-06 PROCEDURE — 90686 IIV4 VACC NO PRSV 0.5 ML IM: CPT | Performed by: FAMILY MEDICINE

## 2017-12-06 PROCEDURE — 90471 IMMUNIZATION ADMIN: CPT | Performed by: FAMILY MEDICINE

## 2017-12-06 RX ORDER — CLONAZEPAM 0.5 MG/1
.25-.5 TABLET ORAL
Qty: 30 TABLET | Refills: 0
Start: 2017-12-06 | End: 2017-12-21

## 2017-12-06 RX ORDER — SERTRALINE HYDROCHLORIDE 100 MG/1
100 TABLET, FILM COATED ORAL DAILY
Qty: 90 TABLET | Refills: 1 | Status: SHIPPED | OUTPATIENT
Start: 2017-12-06 | End: 2018-07-25

## 2017-12-06 ASSESSMENT — ANXIETY QUESTIONNAIRES
6. BECOMING EASILY ANNOYED OR IRRITABLE: NOT AT ALL
GAD7 TOTAL SCORE: 3
3. WORRYING TOO MUCH ABOUT DIFFERENT THINGS: SEVERAL DAYS
7. FEELING AFRAID AS IF SOMETHING AWFUL MIGHT HAPPEN: NOT AT ALL
2. NOT BEING ABLE TO STOP OR CONTROL WORRYING: SEVERAL DAYS
5. BEING SO RESTLESS THAT IT IS HARD TO SIT STILL: NOT AT ALL
1. FEELING NERVOUS, ANXIOUS, OR ON EDGE: NOT AT ALL
IF YOU CHECKED OFF ANY PROBLEMS ON THIS QUESTIONNAIRE, HOW DIFFICULT HAVE THESE PROBLEMS MADE IT FOR YOU TO DO YOUR WORK, TAKE CARE OF THINGS AT HOME, OR GET ALONG WITH OTHER PEOPLE: NOT DIFFICULT AT ALL

## 2017-12-06 ASSESSMENT — PATIENT HEALTH QUESTIONNAIRE - PHQ9
SUM OF ALL RESPONSES TO PHQ QUESTIONS 1-9: 3
5. POOR APPETITE OR OVEREATING: SEVERAL DAYS

## 2017-12-06 NOTE — PROGRESS NOTES
"  SUBJECTIVE:   Angus Wynne is a 57 year old male who presents to clinic today for the following health issues:        Medication Followup of  klonopin / Zoloft     Taking Medication as prescribed: yes    Side Effects:  None    Medication Helping Symptoms:  yes         Depression and Anxiety Follow-Up    Status since last visit: No change, he is doing well on current dose of  Zoloft. It helps with anxiety . Also trazodone to help with sleep. He is trying to reduce the use  of klonopin, and now he is down to just 0.5 mg tab daily. He thinsk he still need one  dose to help him sleep.  So far he is feeling well and wish to continue on same med's     Other associated symptoms:None    Complicating factors:     Significant life event: No     Current substance abuse: None, he is not drinking much alcohol any more. Only occasional social now     PHQ-9 Score and MyChart F/U Questions 2/1/2017 9/18/2017 12/6/2017   Total Score 2 2 3   Q9: Suicide Ideation Not at all Not at all Not at all     RUTH-7 SCORE 2/1/2017 9/18/2017 12/6/2017   Total Score 0 2 3       PHQ-9  English  PHQ-9   Any Language  GAD7  Suicide Assessment Five-step Evaluation and Treatment (SAFE-T)      Amount of exercise or physical activity: None    Problems taking medications regularly: No    Medication side effects: none    Diet: regular (no restrictions)      PROBLEMS TO ADD ON...  Hypertension Follow-up      Outpatient blood pressures are not being checked  As regular but mostly it has been  ok. Note elevated BP today although he said \" he did not take the medication this morning.\" He is feeling well otherwise     Has gained weight but it is lack of excise bc renaldo stays home  most days and does not like going out  in cold for walk and does not drive,  so not able to get out for walk or go to gym etc,  but tries to do his  stretches etc.     Low Salt Diet: no added salt          Problem list and histories reviewed & adjusted, as indicated.  Additional " history: as documented    Patient Active Problem List   Diagnosis     BPPV (benign paroxysmal positional vertigo), unspecified laterality     Other specified hypothyroidism     Hx of acute alcoholic hepatitis     Hx of colonic polyp     Gastroesophageal reflux disease, esophagitis presence not specified     Vitamin D deficiency     Anxiety     Alcohol use disorder (H)     Neck pain     Cervicalgia     Cervical spondylosis without myelopathy     Essential hypertension     Hyperlipidemia with target LDL less than 130     Nasal congestion     Past Surgical History:   Procedure Laterality Date     NO HISTORY OF SURGERY         Social History   Substance Use Topics     Smoking status: Former Smoker     Quit date: 9/28/2015     Smokeless tobacco: Never Used     Alcohol use No     Family History   Problem Relation Age of Onset     Coronary Artery Disease Father      at age 60      Hyperlipidemia Brother      DIABETES No family hx of      CEREBROVASCULAR DISEASE No family hx of      Colon Cancer No family hx of      Prostate Cancer No family hx of              Reviewed and updated as needed this visit by clinical staffTobacco  Allergies  Meds       Reviewed and updated as needed this visit by Provider         ROS:  Constitutional, HEENT, cardiovascular, pulmonary, GI, , musculoskeletal, neuro, skin, endocrine and psych systems are negative, except as otherwise noted.      OBJECTIVE:   BP (!) 146/101  Pulse 61  Temp 97.8  F (36.6  C) (Tympanic)  Ht 6' (1.829 m)  Wt 248 lb (112.5 kg)  SpO2 98%  BMI 33.63 kg/m2  Body mass index is 33.63 kg/(m^2).  GENERAL: healthy, alert and no distress  NECK: no adenopathy, no asymmetry, masses, or scars and thyroid normal to palpation  RESP: lungs clear to auscultation - no rales, rhonchi or wheezes  CV: regular rate and rhythm, normal S1 S2, no S3 or S4, no murmur, click or rub, no peripheral edema and peripheral pulses strong  ABDOMEN: soft, nontender, no hepatosplenomegaly, no  masses and bowel sounds normal  MS: no edema  NEURO: Normal strength and tone, mentation intact and speech normal  PSYCH: mentation appears normal, affect normal/bright        ASSESSMENT/PLAN:         (F41.9) Anxiety  Comment: stable   Plan: sertraline (ZOLOFT) 100 MG tablet, clonazePAM         (KLONOPIN) 0.5 MG tablet   Plan:  Need to continue to taper off klonopin continue Zoloft same dose. Recheck in 3-4 months  sooner if problem    (I10) Essential hypertension  Comment:   Plan: BP not  in adequate control but he did not take his med's . Discussed cares, low fat low salt  Diet, need to loose weight etc. Check labs, call pt with results. Refill given. encouraged home BP monitoring/ nurse visit . Follow up recheck few weeks if remains elevated  or problem.       (Z23) Need for prophylactic vaccination and inoculation against influenza  (primary encounter diagnosis)  Comment:   Plan: Vaccine Administration, Initial [75692], FLU         VAC, SPLIT VIRUS IM > 3 YO (QUADRIVALENT)         [45208]      Fifi Fleming MD  [facility]      Injectable Influenza Immunization Documentation    1.  Is the person to be vaccinated sick today?   No    2. Does the person to be vaccinated have an allergy to a component   of the vaccine?   No  Egg Allergy Algorithm Link    3. Has the person to be vaccinated ever had a serious reaction   to influenza vaccine in the past?   No    4. Has the person to be vaccinated ever had Guillain-Barré syndrome?   No    Form completed by af

## 2017-12-06 NOTE — MR AVS SNAPSHOT
After Visit Summary   12/6/2017    Angus Wynne    MRN: [id number]           Patient Information     Date Of Birth          1960        Visit Information        Provider Department      12/6/2017 11:40 AM [facility]i [facility]        Today's Diagnoses     Need for prophylactic vaccination and inoculation against influenza    -  1    Anxiety        Neck pain        Essential hypertension           Follow-ups after your visit        Follow-up notes from your care team     Return in about 3 months (around 3/6/2018) for BP  check with nurse.      Who to contact     If you have questions or need follow up information about today's clinic visit or your schedule please contact [facility] directly at [phone].  Normal or non-critical lab and imaging results will be communicated to you by Runnithart, letter or phone within 4 business days after the clinic has received the results. If you do not hear from us within 7 days, please contact the clinic through Runnithart or phone. If you have a critical or abnormal lab result, we will notify you by phone as soon as possible.  Submit refill requests through ecobee or call your pharmacy and they will forward the refill request to us. Please allow 3 business days for your refill to be completed.          Additional Information About Your Visit        MyChart Information     ecobee gives you secure access to your electronic health record. If you see a primary care provider, you can also send messages to your care team and make appointments. If you have questions, please call your primary care clinic.  If you do not have a primary care provider, please call [phone] and they will assist you.        Care EveryWhere ID     This is your Care EveryWhere ID. This could be used by other organizations to access your [facility] medical records  [id number]        Your Vitals Were     Pulse Temperature Height Pulse  Oximetry BMI (Body Mass Index)       61 97.8  F (36.6  C) (Tympanic) 6' (1.829 m) 98% 33.63 kg/m2        Blood Pressure from Last 3 Encounters:   12/06/17 (!) 146/101   09/25/17 (!) 125/91   09/19/17 118/78    Weight from Last 3 Encounters:   12/06/17 248 lb (112.5 kg)   09/25/17 237 lb (107.5 kg)   09/18/17 237 lb (107.5 kg)              We Performed the Following     FLU VAC, SPLIT VIRUS IM > 3 YO (QUADRIVALENT) [82211]     Vaccine Administration, Initial [47433]          Today's Medication Changes          These changes are accurate as of: 12/6/17 12:03 PM.  If you have any questions, ask your nurse or doctor.               These medicines have changed or have updated prescriptions.        Dose/Directions    clonazePAM 0.5 MG tablet   Commonly known as:  klonoPIN   This may have changed:    - how much to take  - when to take this   Used for:  Anxiety   Changed by:  Fifi Fleming MD        Dose:  0.25-0.5 mg   Take 0.5-1 tablets (0.25-0.5 mg) by mouth nightly as needed for anxiety   Quantity:  30 tablet   Refills:  0            Where to get your medicines      These medications were sent to [facility] [place] [facility] [place], [place] [address]  [address] [place] [address] [place] [address]     Phone:  [phone]     sertraline 100 MG tablet         Some of these will need a paper prescription and others can be bought over the counter.  Ask your nurse if you have questions.     You don't need a prescription for these medications     clonazePAM 0.5 MG tablet                Primary Care Provider Office Phone # Fax #    Fifi Fleming [phone]       [address] [place] [address] [address]  [place] [address]        Equal Access to Services     [facility] YUVAL AH: Toi Mace, waaxda luqadaha, qaybta jimmiealalida shafer, nadeen rendon. So [facility] [place] [phone].    ATENCIÓN: Si habla español, tiene a levine disposición servicios gratuitos de  asistencia lingüística. Cassidy al [phone].    We comply with applicable federal civil rights laws and [place] laws. We do not discriminate on the basis of race, color, national origin, age, disability, sex, sexual orientation, or gender identity.            Thank you!     Thank you for choosing [facility]  for your care. Our goal is always to provide you with excellent care. Hearing back from our patients is one way we can continue to improve our services. Please take a few minutes to complete the written survey that you may receive in the mail after your visit with us. Thank you!             Your Updated Medication List - Protect others around you: Learn how to safely use, store and throw away your medicines at www.disposemymeds.org.          This list is accurate as of: 12/6/17 12:03 PM.  Always use your most recent med list.                   Brand Name Dispense Instructions for use Diagnosis    aspirin 81 MG EC tablet      Take 1 tablet (81 mg) by mouth daily    Hyperlipidemia with target LDL less than 130       atorvastatin 40 MG tablet    LIPITOR    60 tablet    Take 1 tablet (40 mg) by mouth daily    Hyperlipidemia with target LDL less than 130       clonazePAM 0.5 MG tablet    klonoPIN    30 tablet    Take 0.5-1 tablets (0.25-0.5 mg) by mouth nightly as needed for anxiety    Anxiety       fluticasone 50 MCG/ACT spray    FLONASE    16 g    Spray 1-2 sprays into both nostrils daily    Nasal congestion       levothyroxine 125 MCG tablet    SYNTHROID/LEVOTHROID    90 tablet    Take 1 tablet (125 mcg) by mouth daily    Other specified hypothyroidism       losartan 50 MG tablet    COZAAR    90 tablet    Take 1 tablet (50 mg) by mouth daily    Essential hypertension       sertraline 100 MG tablet    ZOLOFT    90 tablet    Take 1 tablet (100 mg) by mouth daily    Anxiety       traZODone 50 MG tablet    DESYREL    90 tablet    Take 1-2 tab daily for sleep    Other insomnia

## 2017-12-06 NOTE — NURSING NOTE
Chief Complaint   Patient presents with     Recheck Medication       Initial BP (!) 146/101  Pulse 61  Temp 97.8  F (36.6  C) (Tympanic)  Ht 6' (1.829 m)  Wt 248 lb (112.5 kg)  SpO2 98%  BMI 33.63 kg/m2 Estimated body mass index is 33.63 kg/(m^2) as calculated from the following:    Height as of this encounter: 6' (1.829 m).    Weight as of this encounter: 248 lb (112.5 kg).  Medication Reconciliation: complete

## 2017-12-07 ASSESSMENT — ANXIETY QUESTIONNAIRES: GAD7 TOTAL SCORE: 3

## 2017-12-21 ENCOUNTER — TELEPHONE (OUTPATIENT)
Dept: FAMILY MEDICINE | Facility: CLINIC | Age: 57
End: 2017-12-21

## 2017-12-21 DIAGNOSIS — F41.9 ANXIETY: ICD-10-CM

## 2017-12-21 RX ORDER — CLONAZEPAM 0.5 MG/1
.25-.5 TABLET ORAL
Qty: 30 TABLET | Refills: 0 | Status: SHIPPED | OUTPATIENT
Start: 2017-12-21 | End: 2018-01-22

## 2017-12-21 NOTE — TELEPHONE ENCOUNTER
Clonazepam      Last Written Prescription Date: 12/6/2017  Last Fill Quantity: 30,  # refills: 0   Last Office Visit with [facility], [facility] or [facility] prescribing provider: 12/6/2017                                             Routing refill request to provider for review/approval because:  Drug not on the [facility] refill protocol   Lora Villalba RN - Triage  [facility]

## 2018-01-22 DIAGNOSIS — E03.8 OTHER SPECIFIED HYPOTHYROIDISM: ICD-10-CM

## 2018-01-22 DIAGNOSIS — E78.5 HYPERLIPIDEMIA WITH TARGET LDL LESS THAN 130: ICD-10-CM

## 2018-01-22 DIAGNOSIS — G47.09 OTHER INSOMNIA: ICD-10-CM

## 2018-01-22 DIAGNOSIS — F41.9 ANXIETY: ICD-10-CM

## 2018-01-22 RX ORDER — CLONAZEPAM 0.5 MG/1
.25-.5 TABLET ORAL
Qty: 30 TABLET | Refills: 0 | Status: SHIPPED | OUTPATIENT
Start: 2018-01-22 | End: 2018-03-05

## 2018-01-22 RX ORDER — ATORVASTATIN CALCIUM 40 MG/1
40 TABLET, FILM COATED ORAL DAILY
Qty: 90 TABLET | Refills: 2 | Status: SHIPPED | OUTPATIENT
Start: 2018-01-22 | End: 2018-07-25

## 2018-01-22 NOTE — TELEPHONE ENCOUNTER
"Requested Prescriptions   Pending Prescriptions Disp Refills     atorvastatin (LIPITOR) 40 MG tablet 60 tablet 1     Sig: Take 1 tablet (40 mg) by mouth daily    Statins Protocol Passed    1/22/2018  1:26 PM       Passed - LDL on file in past 12 months    Recent Labs   Lab Test  09/18/17   1120   LDL  185*            Passed - No abnormal creatine kinase in past 12 months    No lab results found.         Passed - Recent or future visit with authorizing provider    Patient had office visit in the last year or has a visit in the next 30 days with authorizing provider.  See \"Patient Info\" tab in inbasket, or \"Choose Columns\" in Meds & Orders section of the refill encounter.            Passed - Patient is age 18 or older        Last Written Prescription Date:  9/19/2017  Last Fill Quantity: 60,  # refills: 1   Last Office Visit with [facility], [facility] or [facility] prescribing provider:  12/6/2017   Future Office Visit:         Prescription approved per [facility] Refill Protocol.    Fernanda Gonsales, BS, RN, PHN  [facility] [phone]      "

## 2018-01-22 NOTE — LETTER
January 30, 2018      Angus Wynne  [address]  [place] [address]        Dear Angus,     Your levothyroxine (SYNTHROID/LEVOTHROID) 125 MCG tablet and traZODone (DESYREL) 50 MG tablet were refilled  You are due for a Medication check/labs with Fifi Fleming MD. Your TSH was abnormal last time so she is wanting to recheck that.  Patient should come in Fasting to his appointment. Please do not eat 8-10 hours before your appointment . . Water and Black coffee is ok but do not add cream or sugar. You may take your medications.    Sincerely,    [facility] [place] Staff

## 2018-01-22 NOTE — TELEPHONE ENCOUNTER
clonazepam      Last Written Prescription Date:  12/21/17  Last Fill Quantity: 30,   # refills: 0  Last Office Visit: 12/6/17  Future Office visit:       Routing refill request to provider for review/approval because:  Drug not on the [facility] or [facility] refill protocol or controlled substance    Maria Eugenia Marie RN   [facility] - Triage

## 2018-01-23 RX ORDER — TRAZODONE HYDROCHLORIDE 50 MG/1
TABLET, FILM COATED ORAL
Qty: 90 TABLET | Refills: 0 | Status: SHIPPED | OUTPATIENT
Start: 2018-01-23 | End: 2018-03-05

## 2018-01-23 RX ORDER — LEVOTHYROXINE SODIUM 125 UG/1
TABLET ORAL
Qty: 30 TABLET | Refills: 0 | Status: SHIPPED | OUTPATIENT
Start: 2018-01-23 | End: 2018-02-15

## 2018-01-23 NOTE — TELEPHONE ENCOUNTER
Script refill faxed. Remind pt to do follow up for med check and labs, since he is due.  States TSH  was abnormal

## 2018-01-23 NOTE — TELEPHONE ENCOUNTER
Routing refill request to provider for review/approval because:  Labs out of range:  tsh    America Macias,RN  [facility]  [phone]

## 2018-01-23 NOTE — TELEPHONE ENCOUNTER
"  Requested Prescriptions   Pending Prescriptions Disp Refills     levothyroxine (SYNTHROID/LEVOTHROID) 125 MCG tablet [Pharmacy Med Name: LEVOTHYROXINE SODIUM 125MCG TABS]  Last Written Prescription Date:  9/25/2017  Last Fill Quantity: 90 tablet,  # refills: 0   [facility] Visit with [facility] or [facility] prescribing provider:  12/6/2017   Future Office Visit:      90 tablet 0     Sig: TAKE ONE TABLET BY MOUTH EVERY DAY    Thyroid Protocol Failed    1/22/2018 10:21 AM       Failed - Normal TSH on file in past 12 months    Recent Labs   Lab Test  09/18/17   1120   TSH  9.03*           Passed - Patient is 12 years or older       Passed - Recent or future visit with authorizing provider's specialty    Patient had office visit in the last year or has a visit in the next 30 days with authorizing provider.  See \"Patient Info\" tab in inGreenTec-USAet, or \"Choose Columns\" in Meds & Orders section of the refill encounter.                     traZODone (DESYREL) 50 MG tablet [Pharmacy Med Name: TRAZODONE HCL 50MG TABS]  Last Written Prescription Date:  9/18/2017  Last Fill Quantity: 90 tablet,  # refills: 1   Last Office Visit with [facility] or [facility] prescribing provider:  12/6/2017   Future Office Visit:      90 tablet 1     Sig: TAKE ONE TO TWO TABLETS BY MOUTH DAILY FOR SLEEP    Serotonin Modulators Passed    1/22/2018 10:21 AM       Passed - Recent or future visit with authorizing provider's specialty    Patient had office visit in the last year or has a visit in the next 30 days with authorizing provider.  See \"Patient Info\" tab in inbasket, or \"Choose Columns\" in Meds & Orders section of the refill encounter.          Passed - Patient is age 18 or older          "

## 2018-01-26 NOTE — TELEPHONE ENCOUNTER
Patient hasn't checked mychart   left voicemail message for patient to contact Main Clinic Number to schedule.  NTBS: Medication check/labs with Fifi Fleming MD  Patient should come in Fasting to his appointment. Please do not eat 8-10 hours before your appointment . . Water and Black coffee is ok but do not add cream or sugar. You may take your medications.  Radha MELCHOR

## 2018-02-15 ENCOUNTER — OFFICE VISIT (OUTPATIENT)
Dept: FAMILY MEDICINE | Facility: CLINIC | Age: 58
End: 2018-02-15
Payer: COMMERCIAL

## 2018-02-15 VITALS
SYSTOLIC BLOOD PRESSURE: 130 MMHG | WEIGHT: 255 LBS | HEIGHT: 72 IN | HEART RATE: 67 BPM | DIASTOLIC BLOOD PRESSURE: 88 MMHG | TEMPERATURE: 96.8 F | OXYGEN SATURATION: 98 % | BODY MASS INDEX: 34.54 KG/M2

## 2018-02-15 DIAGNOSIS — I10 ESSENTIAL HYPERTENSION: ICD-10-CM

## 2018-02-15 DIAGNOSIS — E78.5 HYPERLIPIDEMIA WITH TARGET LDL LESS THAN 130: ICD-10-CM

## 2018-02-15 DIAGNOSIS — Z87.19 HX OF ACUTE ALCOHOLIC HEPATITIS: ICD-10-CM

## 2018-02-15 DIAGNOSIS — R42 VERTIGO: ICD-10-CM

## 2018-02-15 DIAGNOSIS — Z00.00 ROUTINE GENERAL MEDICAL EXAMINATION AT A HEALTH CARE FACILITY: Primary | ICD-10-CM

## 2018-02-15 DIAGNOSIS — E03.8 OTHER SPECIFIED HYPOTHYROIDISM: ICD-10-CM

## 2018-02-15 DIAGNOSIS — Z12.5 SCREENING PSA (PROSTATE SPECIFIC ANTIGEN): ICD-10-CM

## 2018-02-15 PROBLEM — Z86.0100 HISTORY OF COLONIC POLYPS: Status: ACTIVE | Noted: 2018-02-15

## 2018-02-15 PROCEDURE — 36415 COLL VENOUS BLD VENIPUNCTURE: CPT | Performed by: FAMILY MEDICINE

## 2018-02-15 PROCEDURE — 84443 ASSAY THYROID STIM HORMONE: CPT | Performed by: FAMILY MEDICINE

## 2018-02-15 PROCEDURE — G0103 PSA SCREENING: HCPCS | Performed by: FAMILY MEDICINE

## 2018-02-15 PROCEDURE — 99396 PREV VISIT EST AGE 40-64: CPT | Performed by: FAMILY MEDICINE

## 2018-02-15 PROCEDURE — 84439 ASSAY OF FREE THYROXINE: CPT | Performed by: FAMILY MEDICINE

## 2018-02-15 PROCEDURE — 80061 LIPID PANEL: CPT | Performed by: FAMILY MEDICINE

## 2018-02-15 PROCEDURE — 99213 OFFICE O/P EST LOW 20 MIN: CPT | Mod: 25 | Performed by: FAMILY MEDICINE

## 2018-02-15 PROCEDURE — 80053 COMPREHEN METABOLIC PANEL: CPT | Performed by: FAMILY MEDICINE

## 2018-02-15 RX ORDER — MECLIZINE HYDROCHLORIDE 25 MG/1
25 TABLET ORAL 3 TIMES DAILY PRN
Qty: 20 TABLET | Refills: 0 | Status: SHIPPED | OUTPATIENT
Start: 2018-02-15 | End: 2018-07-25

## 2018-02-15 RX ORDER — LEVOTHYROXINE SODIUM 125 UG/1
125 TABLET ORAL DAILY
Qty: 90 TABLET | Refills: 1 | Status: SHIPPED | OUTPATIENT
Start: 2018-02-15 | End: 2018-05-14 | Stop reason: DRUGHIGH

## 2018-02-15 ASSESSMENT — ANXIETY QUESTIONNAIRES
5. BEING SO RESTLESS THAT IT IS HARD TO SIT STILL: NOT AT ALL
1. FEELING NERVOUS, ANXIOUS, OR ON EDGE: NOT AT ALL
6. BECOMING EASILY ANNOYED OR IRRITABLE: NOT AT ALL
2. NOT BEING ABLE TO STOP OR CONTROL WORRYING: NOT AT ALL
GAD7 TOTAL SCORE: 0
7. FEELING AFRAID AS IF SOMETHING AWFUL MIGHT HAPPEN: NOT AT ALL
3. WORRYING TOO MUCH ABOUT DIFFERENT THINGS: NOT AT ALL

## 2018-02-15 ASSESSMENT — PATIENT HEALTH QUESTIONNAIRE - PHQ9: 5. POOR APPETITE OR OVEREATING: NOT AT ALL

## 2018-02-15 NOTE — PATIENT INSTRUCTIONS
Preventive Health Recommendations  Male Ages 50   64    Yearly exam:             See your health care provider every year in order to  o   Review health changes.   o   Discuss preventive care.    o   Review your medicines if your doctor has prescribed any.     Have a cholesterol test every 5 years, or more frequently if you are at risk for high cholesterol/heart disease.     Have a diabetes test (fasting glucose) every three years. If you are at risk for diabetes, you should have this test more often.     Have a colonoscopy at age 50, or have a yearly FIT test (stool test). These exams will check for colon cancer.      Talk with your health care provider about whether or not a prostate cancer screening test (PSA) is right for you.    You should be tested each year for STDs (sexually transmitted diseases), if you re at risk.     Shots: Get a flu shot each year. Get a tetanus shot every 10 years.     Nutrition:    Eat at least 5 servings of fruits and vegetables daily.     Eat whole-grain bread, whole-wheat pasta and brown rice instead of white grains and rice.     Talk to your provider about Calcium and Vitamin D.     Lifestyle    Exercise for at least 150 minutes a week (30 minutes a day, 5 days a week). This will help you control your weight and prevent disease.     Limit alcohol to one drink per day.     No smoking.     Wear sunscreen to prevent skin cancer.     See your dentist every six months for an exam and cleaning.     See your eye doctor every 1 to 2 years.    Benign Paroxysmal Positional Vertigo    Benign paroxysmal positional vertigo is a common condition. You feel as if the room is spinning after changing position, moving your head quickly, or even just rolling over in bed.  Vertigo is a false feeling of motion plus disorientation that makes it seem as though the room is spinning. A vertigo attack may cause sudden nausea, vomiting, and heavy sweating. Severe vertigo causes a loss of balance. You may  even fall down.  Vertigo is caused by a problem with the inner ear. The inner ear is located behind the middle ear. It is a part of the balance center of the body. It contains small calcium particles within fluid-filled canals (semi-circular canals). These particles can move out of position as a result of aging, head trauma, or disease of the inner ear. Once that happens, moving your head in certain ways may cause the particles to stimulate the inner ear. This creates the feeling of vertigo.  An episode of vertigo may last seconds, minutes, or hours. Once you are over the first episode of vertigo, it may never return. Sometimes symptoms return off and on over several weeks or longer.  Home care  Follow these guidelines when caring for yourself at home:    Rest quietly in bed if your symptoms are severe. Change position slowly. There is usually one position that will feel best. This might be lying on one side or lying on your back with your head slightly raised on pillows.    Do not drive or work with dangerous machinery for one week after symptoms disappear. This is in case symptoms return suddenly.    Take medicine as prescribed to relieve your symptoms. Unless another medicine was prescribed for nausea, vomiting, and vertigo, you may use over-the-counter motion sickness medicine, such as meclizine or dimenhydrinate.  Follow-up care  Follow up with your healthcare provider, or as directed. Tell your provider about any ringing in the ear or hearing loss.  If you had a CT or MRI scan, a specialist will review it. You will be told of any new findings that may affect your care.  When to seek medical advice  Call your healthcare provider right away if any of these occur:    Vertigo gets worse even after taking prescribed medicine    Repeated vomiting even after taking prescribed medicine    Increased weakness or fainting    Severe headache or unusual drowsiness or confusion    Weakness of an arm or leg or one side of  the face    Difficulty walking    Difficulty with speech or vision    Seizure    Trouble hearing    Fever of 100.4 F (38 C) or higher, or as directed by your healthcare provider    Fast heart rate    Chest pain   Date Last Reviewed: 7/10/2015    [id number] [employer]. [address] [place] [address]. All rights reserved. This information is not intended as a substitute for professional medical care. Always follow your healthcare professional's instructions.

## 2018-02-15 NOTE — LETTER
My Depression Action Plan  Name: Angus Wynne   Date of Birth 1960  Date: 2/15/2018    My doctor: Fifi Fleming   My clinic: [address] [address]  [phone]          GREEN    ZONE   Good Control    What it looks like:     Things are going generally well. You have normal up s and down s. You may even feel depressed from time to time, but bad moods usually last less than a day.   What you need to do:  1. Continue to care for yourself (see self care plan)  2. Check your depression survival kit and update it as needed  3. Follow your physician s recommendations including any medication.  4. Do not stop taking medication unless you consult with your physician first.           YELLOW         ZONE Getting Worse    What it looks like:     Depression is starting to interfere with your life.     It may be hard to get out of bed; you may be starting to isolate yourself from others.    Symptoms of depression are starting to last most all day and this has happened for several days.     You may have suicidal thoughts but they are not constant.   What you need to do:     1. Call your care team, your response to treatment will improve if you keep your care team informed of your progress. Yellow periods are signs an adjustment may need to be made.     2. Continue your self-care, even if you have to fake it!    3. Talk to someone in your support network    4. Open up your depression survival kit           RED    ZONE Medical Alert - Get Help    What it looks like:     Depression is seriously interfering with your life.     You may experience these or other symptoms: You can t get out of bed most days, can t work or engage in other necessary activities, you have trouble taking care of basic hygiene, or basic responsibilities, thoughts of suicide or death that will not go away, self-injurious behavior.     What you need to do:  1. Call your care  team and request a same-day appointment. If they are not available (weekends or after hours) call your local crisis line, emergency room or 911.      Electronically signed by: Fifi Fleming, February 15, 2018    Depression Self Care Plan / Survival Kit    Self-Care for Depression  Here s the deal. Your body and mind are really not as separate as most people think.  What you do and think affects how you feel and how you feel influences what you do and think. This means if you do things that people who feel good do, it will help you feel better.  Sometimes this is all it takes.  There is also a place for medication and therapy depending on how severe your depression is, so be sure to consult with your medical provider and/ or Behavioral Health Consultant if your symptoms are worsening or not improving.     In order to better manage my stress, I will:    Exercise  Get some form of exercise, every day. This will help reduce pain and release endorphins, the  feel good  chemicals in your brain. This is almost as good as taking antidepressants!  This is not the same as joining a gym and then never going! (they count on that by the way ) It can be as simple as just going for a walk or doing some gardening, anything that will get you moving.      Hygiene   Maintain good hygiene (Get out of bed in the morning, Make your bed, Brush your teeth, Take a shower, and Get dressed like you were going to work, even if you are unemployed).  If your clothes don't fit try to get ones that do.    Diet  I will strive to eat foods that are good for me, drink plenty of water, and avoid excessive sugar, caffeine, alcohol, and other mood-altering substances.  Some foods that are helpful in depression are: complex carbohydrates, B vitamins, flaxseed, fish or fish oil, fresh fruits and vegetables.    Psychotherapy  I agree to participate in Individual Therapy (if recommended).    Medication  If prescribed medications, I agree to take them.   Missing doses can result in serious side effects.  I understand that drinking alcohol, or other illicit drug use, may cause potential side effects.  I will not stop my medication abruptly without first discussing it with my provider.    Staying Connected With Others  I will stay in touch with my friends, family members, and my primary care provider/team.    Use your imagination  Be creative.  We all have a creative side; it doesn t matter if it s oil painting, sand castles, or mud pies! This will also kick up the endorphins.    Witness Beauty  (AKA stop and smell the roses) Take a look outside, even in mid-winter. Notice colors, textures. Watch the squirrels and birds.     Service to others  Be of service to others.  There is always someone else in need.  By helping others we can  get out of ourselves  and remember the really important things.  This also provides opportunities for practicing all the other parts of the program.    Humor  Laugh and be silly!  Adjust your TV habits for less news and crime-drama and more comedy.    Control your stress  Try breathing deep, massage therapy, biofeedback, and meditation. Find time to relax each day.     My support system    Clinic Contact:  Phone number:    Contact 1:  Phone number:    Contact 2:  Phone number:    Alevism/:  Phone number:    Therapist:  Phone number:    Local crisis center:    Phone number:    Other community support:  Phone number:

## 2018-02-15 NOTE — MR AVS SNAPSHOT
After Visit Summary   2/15/2018    Angus Wynne    MRN: [id number]           Patient Information     Date Of Birth          1960        Visit Information        Provider Department      2/15/2018 10:40 AM Fifi Fleming MD [facility]        Today's Diagnoses     Routine general medical examination at a health care facility    -  1    Hx of acute alcoholic hepatitis        Hyperlipidemia with target LDL less than 130        Essential hypertension        Screening PSA (prostate specific antigen)        History of colonic polyps        Other specified hypothyroidism        Vertigo          Care Instructions      Preventive Health Recommendations  Male Ages 50 - 64    Yearly exam:             See your health care provider every year in order to  o   Review health changes.   o   Discuss preventive care.    o   Review your medicines if your doctor has prescribed any.     Have a cholesterol test every 5 years, or more frequently if you are at risk for high cholesterol/heart disease.     Have a diabetes test (fasting glucose) every three years. If you are at risk for diabetes, you should have this test more often.     Have a colonoscopy at age 50, or have a yearly FIT test (stool test). These exams will check for colon cancer.      Talk with your health care provider about whether or not a prostate cancer screening test (PSA) is right for you.    You should be tested each year for STDs (sexually transmitted diseases), if you re at risk.     Shots: Get a flu shot each year. Get a tetanus shot every 10 years.     Nutrition:    Eat at least 5 servings of fruits and vegetables daily.     Eat whole-grain bread, whole-wheat pasta and brown rice instead of white grains and rice.     Talk to your provider about Calcium and Vitamin D.     Lifestyle    Exercise for at least 150 minutes a week (30 minutes a day, 5 days a week). This will help you control your weight and prevent disease.      Limit alcohol to one drink per day.     No smoking.     Wear sunscreen to prevent skin cancer.     See your dentist every six months for an exam and cleaning.     See your eye doctor every 1 to 2 years.    Benign Paroxysmal Positional Vertigo    Benign paroxysmal positional vertigo is a common condition. You feel as if the room is spinning after changing position, moving your head quickly, or even just rolling over in bed.  Vertigo is a false feeling of motion plus disorientation that makes it seem as though the room is spinning. A vertigo attack may cause sudden nausea, vomiting, and heavy sweating. Severe vertigo causes a loss of balance. You may even fall down.  Vertigo is caused by a problem with the inner ear. The inner ear is located behind the middle ear. It is a part of the balance center of the body. It contains small calcium particles within fluid-filled canals (semi-circular canals). These particles can move out of position as a result of aging, head trauma, or disease of the inner ear. Once that happens, moving your head in certain ways may cause the particles to stimulate the inner ear. This creates the feeling of vertigo.  An episode of vertigo may last seconds, minutes, or hours. Once you are over the first episode of vertigo, it may never return. Sometimes symptoms return off and on over several weeks or longer.  Home care  Follow these guidelines when caring for yourself at home:    Rest quietly in bed if your symptoms are severe. Change position slowly. There is usually one position that will feel best. This might be lying on one side or lying on your back with your head slightly raised on pillows.    Do not drive or work with dangerous machinery for one week after symptoms disappear. This is in case symptoms return suddenly.    Take medicine as prescribed to relieve your symptoms. Unless another medicine was prescribed for nausea, vomiting, and vertigo, you may use over-the-counter motion  sickness medicine, such as meclizine or dimenhydrinate.  Follow-up care  Follow up with your healthcare provider, or as directed. Tell your provider about any ringing in the ear or hearing loss.  If you had a CT or MRI scan, a specialist will review it. You will be told of any new findings that may affect your care.  When to seek medical advice  Call your healthcare provider right away if any of these occur:    Vertigo gets worse even after taking prescribed medicine    Repeated vomiting even after taking prescribed medicine    Increased weakness or fainting    Severe headache or unusual drowsiness or confusion    Weakness of an arm or leg or one side of the face    Difficulty walking    Difficulty with speech or vision    Seizure    Trouble hearing    Fever of 100.4 F (38 C) or higher, or as directed by your healthcare provider    Fast heart rate    Chest pain   Date Last Reviewed: 7/10/2015    8664-3673 [employer]. [address] [place] [address]. All rights reserved. This information is not intended as a substitute for professional medical care. Always follow your healthcare professional's instructions.                Follow-ups after your visit        Follow-up notes from your care team     Return in about 3 months (around 5/15/2018).      Who to contact     If you have questions or need follow up information about today's clinic visit or your schedule please contact [facility] [place] directly at [phone].  Normal or non-critical lab and imaging results will be communicated to you by MyChart, letter or phone within 4 business days after the clinic has received the results. If you do not hear from us within 7 days, please contact the clinic through MyChart or phone. If you have a critical or abnormal lab result, we will notify you by phone as soon as possible.  Submit refill requests through [employer]umi or call your pharmacy and they will forward the refill request to us.  Please allow 3 business days for your refill to be completed.          Additional Information About Your Visit        [employer] Information     [employer] gives you secure access to your electronic health record. If you see a primary care provider, you can also send messages to your care team and make appointments. If you have questions, please call your primary care clinic.  If you do not have a primary care provider, please call [phone] and they will assist you.        Care EveryWhere ID     This is your Care EveryWhere ID. This could be used by other organizations to access your [place] medical records  [id number]        Your Vitals Were     Pulse Temperature Height Pulse Oximetry BMI (Body Mass Index)       67 96.8  F (36  C) (Tympanic) 6' (1.829 m) 98% 34.58 kg/m2        Blood Pressure from Last 3 Encounters:   02/15/18 130/88   12/06/17 (!) 146/101   09/25/17 (!) 125/91    Weight from Last 3 Encounters:   02/15/18 255 lb (115.7 kg)   12/06/17 248 lb (112.5 kg)   09/25/17 237 lb (107.5 kg)              We Performed the Following     Comprehensive metabolic panel     DEPRESSION ACTION PLAN (DAP)     Lipid panel reflex to direct LDL Fasting     Prostate spec antigen screen     TSH with free T4 reflex          Today's Medication Changes          These changes are accurate as of 2/15/18 11:35 AM.  If you have any questions, ask your nurse or doctor.               Start taking these medicines.        Dose/Directions    meclizine 25 MG tablet   Commonly known as:  ANTIVERT   Used for:  Vertigo   Started by:  Fifi Fleming MD        Dose:  25 mg   Take 1 tablet (25 mg) by mouth 3 times daily as needed for dizziness   Quantity:  20 tablet   Refills:  0         These medicines have changed or have updated prescriptions.        Dose/Directions    levothyroxine 125 MCG tablet   Commonly known as:  SYNTHROID/LEVOTHROID   This may have changed:  See the new instructions.   Used for:  Other specified  hypothyroidism   Changed by:  Fifi Fleming MD        Dose:  125 mcg   Take 1 tablet (125 mcg) by mouth daily   Quantity:  90 tablet   Refills:  1            Where to get your medicines      These medications were sent to [facility] [place] - [place], [place] - [address]  [address], [place] [address]     Phone:  [phone]     levothyroxine 125 MCG tablet    meclizine 25 MG tablet                Primary Care Provider Office Phone # Fax #    Fifi Fleming [phone]       [address] [place] [address] [address]  [place] [address]        Equal Access to Services     SYD BARAKAT : Hadii jackelin shea hadasho Soomaali, waaxda luqadaha, qaybta kaalmada adeegyada, nadeen esquivel . So [facility] [phone].    ATENCIÓN: Si habla español, tiene a levine disposición servicios gratuitos de asistencia lingüística. Llame al [phone].    We comply with applicable federal civil rights laws and [place] laws. We do not discriminate on the basis of race, color, national origin, age, disability, sex, sexual orientation, or gender identity.            Thank you!     Thank you for choosing [facility] [place]  for your care. Our goal is always to provide you with excellent care. Hearing back from our patients is one way we can continue to improve our services. Please take a few minutes to complete the written survey that you may receive in the mail after your visit with us. Thank you!             Your Updated Medication List - Protect others around you: Learn how to safely use, store and throw away your medicines at www.disposemymeds.org.          This list is accurate as of 2/15/18 11:35 AM.  Always use your most recent med list.                   Brand Name Dispense Instructions for use Diagnosis    aspirin 81 MG EC tablet      Take 1 tablet (81 mg) by mouth daily    Hyperlipidemia with target LDL less than 130       atorvastatin 40 MG  tablet    LIPITOR    90 tablet    Take 1 tablet (40 mg) by mouth daily    Hyperlipidemia with target LDL less than 130       clonazePAM 0.5 MG tablet    klonoPIN    30 tablet    Take 0.5-1 tablets (0.25-0.5 mg) by mouth nightly as needed for anxiety    Anxiety       fluticasone 50 MCG/ACT spray    FLONASE    16 g    Spray 1-2 sprays into both nostrils daily    Nasal congestion       levothyroxine 125 MCG tablet    SYNTHROID/LEVOTHROID    90 tablet    Take 1 tablet (125 mcg) by mouth daily    Other specified hypothyroidism       losartan 50 MG tablet    COZAAR    90 tablet    Take 1 tablet (50 mg) by mouth daily    Essential hypertension       meclizine 25 MG tablet    ANTIVERT    20 tablet    Take 1 tablet (25 mg) by mouth 3 times daily as needed for dizziness    Vertigo       sertraline 100 MG tablet    ZOLOFT    90 tablet    Take 1 tablet (100 mg) by mouth daily    Anxiety       traZODone 50 MG tablet    DESYREL    90 tablet    TAKE ONE TO TWO TABLETS BY MOUTH DAILY FOR SLEEP    Other insomnia

## 2018-02-15 NOTE — LETTER
February 20, 2018      Angus Wynne  [address] [place] [address]        Dear ,    We are writing to inform you of your test results.      Your PSA (prostate cancer blood test)  is normal.   Normal , kidney functions, calcium, glucose and  electrolytes(various salts in your body)       Lipid mildly elevated, much better than previous. stay on same medication dose. Need to watch diet( low fat, low cholesterol, high fiber diet) exercise. May also take omega-3 fatty acid( fish oil or flex seed oil capsule OTC) to help improve lipid. Follow up recheck in 1 year     Liver tests are mildly  Abnormal and higher then previous again . Ok to watch and return to clinic for a retest in 6-8 weeks   In the meantime you should  avoid high fat diet, alcohol and OTC tylenol like medication, to protect your liver .     Your thyroid test is much better but still slightly abnormal suggesting lower than normal thyroid activity.  I suggest that you incease dose of  thyroid  medication now and we can recheck your levels in 6-12 weeks.  I can send a new prescription  to your pharmacy , so let me know if you are willing to increase the dose and which pharmacy you want script sent.     Test results indicate you may require additional follow up, see comment below.    Resulted Orders   Lipid panel reflex to direct LDL Fasting   Result Value Ref Range    Cholesterol 196 <200 mg/dL    Triglycerides 191 (H) <150 mg/dL      Comment:      Borderline high:  150-199 mg/dl  High:             200-499 mg/dl  Very high:       >499 mg/dl      HDL Cholesterol 66 >39 mg/dL    LDL Cholesterol Calculated 92 <100 mg/dL      Comment:      Desirable:       <100 mg/dl    Non HDL Cholesterol 130 (H) <130 mg/dL      Comment:      Above Desirable:  130-159 mg/dl  Borderline high:  160-189 mg/dl  High:             190-219 mg/dl  Very high:       >219 mg/dl     Comprehensive metabolic panel   Result Value Ref Range    Sodium 138 133 - 144  mmol/L    Potassium 4.0 3.4 - 5.3 mmol/L    Chloride 103 94 - 109 mmol/L    Carbon Dioxide 25 20 - 32 mmol/L    Anion Gap 10 3 - 14 mmol/L    Glucose 103 (H) 70 - 99 mg/dL    Urea Nitrogen 13 7 - 30 mg/dL    Creatinine 0.80 0.66 - 1.25 mg/dL    GFR Estimate >90 >60 mL/min/1.7m2      Comment:      Non  GFR Calc    GFR Estimate If Black >90 >60 mL/min/1.7m2      Comment:       GFR Calc    Calcium 9.3 8.5 - 10.1 mg/dL    Bilirubin Total 0.6 0.2 - 1.3 mg/dL    Albumin 4.4 3.4 - 5.0 g/dL    Protein Total 7.9 6.8 - 8.8 g/dL    Alkaline Phosphatase 53 40 - 150 U/L    ALT 99 (H) 0 - 70 U/L    AST 58 (H) 0 - 45 U/L   Prostate spec antigen screen   Result Value Ref Range    PSA 0.33 0 - 4 ug/L      Comment:      Assay Method:  Chemiluminescence using Siemens Vista analyzer   TSH with free T4 reflex   Result Value Ref Range    TSH 4.48 (H) 0.40 - 4.00 mU/L   T4 free   Result Value Ref Range    T4 Free 0.95 0.76 - 1.46 ng/dL       If you have any questions or concerns, please call the clinic at the number listed above.       Sincerely,        Fifi Fleming MD

## 2018-02-15 NOTE — PROGRESS NOTES
SUBJECTIVE:   CC: Angus Wynne is an 57 year old male who presents for preventative health visit.     Healthy Habits:    Do you get at least three servings of calcium containing foods daily (dairy, green leafy vegetables, etc.)? yes    Amount of exercise or daily activities, outside of work: no    Problems taking medications regularly No    Medication side effects: No    Have you had an eye exam in the past two years? yes    Do you see a dentist twice per year? no    Do you have sleep apnea, excessive snoring or daytime drowsiness?yes snoring        PROBLEMS TO ADD ON...  He wants to have his liver tests done today.  They have been elevated previously because this because of his excessive alcohol use.  Although he has not been drinking at all for long time now she wants to have his blood test done today to make sure that all back to normal.  Trying to eat better and exercise more     Dizziness      Duration: had yesterday ,  last few seconds on and off a few times,,.  has hx of vertigo and had neuro w/u in [place] and test were all negative     Description   Feeling faint:  no   Feeling like the surroundings are moving: YES mostly happen when he is out of bed, move certain ways  Loss of consciousness or falls: no     Intensity:  mild    Accompanying signs and symptoms:   Nausea/vomiting: no   Palpitations: no   Weakness in arms or legs: no   Vision or speech changes: no   Ringing in ears (Tinnitus): no   Hearing loss related to dizziness: no   Other (fevers/chills/sweating/dyspnea): no     History (similar episodes/head trauma/previous evaluation/recent bleeding): He had an episode he had some vertigo many years ago when he was in [place] and had a significant neurological workup including CT scan and everything was normal.  He has not had any recurrence of vertigo  until yesterday.  He had taken some Antivert previously it has worked for him, although he does not really have any more.    Precipitating or  alleviating factors (new med's/chemicals): None  Worse with activity/head movement: YES    Therapies tried and outcome: None      Hypothyroidism Follow-up      Since last visit, patient describes the following symptoms: Weight stable, no hair loss, no skin changes, no constipation, no loose stools    Dose was increased recently , so need recheck     Hyperlipidemia Follow-Up      Rate your low fat/cholesterol diet?: good    Taking statin?  Yes, no muscle aches from statin    Other lipid medications/supplements?:  none    Hypertension Follow-up      Outpatient blood pressures are being checked at home.  Results are good .    Low Salt Diet: no added salt    Need refill , due for labs             Today's PHQ-2 Score:   PHQ-2 ( 1999 [employer]) 2/15/2018 2/1/2017   Q1: Little interest or pleasure in doing things 0 0   Q2: Feeling down, depressed or hopeless 0 0   PHQ-2 Score 0 0   Q1: Little interest or pleasure in doing things - -   Q2: Feeling down, depressed or hopeless - -   PHQ-2 Score - -       Abuse: Current or Past(Physical, Sexual or Emotional)- No  Do you feel safe in your environment - Yes    Social History   Substance Use Topics     Smoking status: Former Smoker     Quit date: 9/28/2015     Smokeless tobacco: Never Used     Alcohol use No      If you drink alcohol do you typically have >3 drinks per day or >7 drinks per week? No                      Last PSA:   PSA   Date Value Ref Range Status   12/28/2015 0.53 0 - 4 ug/L Final       Reviewed orders with patient. Reviewed health maintenance and updated orders accordingly - Yes      Reviewed and updated as needed this visit by clinical staff         Reviewed and updated as needed this visit by Provider        Past Medical History:   Diagnosis Date     HTN (hypertension)      Hypothyroid      Vertigo     2015 ,         ROS:  C: NEGATIVE for fever, chills, change in weight  I: NEGATIVE for worrisome rashes, moles or lesions  E: NEGATIVE for vision changes or  irritation  ENT: NEGATIVE for ear, mouth and throat problems  R: NEGATIVE for significant cough or SOB  CV: NEGATIVE for chest pain, palpitations or peripheral edema  GI: NEGATIVE for nausea, abdominal pain, heartburn, or change in bowel habits   male: negative for dysuria, hematuria, decreased urinary stream, erectile dysfunction, urethral discharge  M: NEGATIVE for significant arthralgias or myalgia  N: NEGATIVE for weakness, dizziness or paresthesias  NEURO: NEGATIVE for weakness, dizziness or paresthesias and except vertigo   ENDOCRINE: NEGATIVE for temperature intolerance, skin/hair changes  P: NEGATIVE for changes in mood or affect    OBJECTIVE:   There were no vitals taken for this visit.  EXAM:  GENERAL: healthy, alert and no distress  EYES: Eyes grossly normal to inspection, PERRL and conjunctivae and sclerae normal  HENT: ear canals and TM's normal, nose and mouth without ulcers or lesions  NECK: no adenopathy, no asymmetry, masses, or scars and thyroid normal to palpation  RESP: lungs clear to auscultation - no rales, rhonchi or wheezes  CV: regular rate and rhythm, normal S1 S2, no S3 or S4, no murmur, click or rub, no peripheral edema and peripheral pulses strong  ABDOMEN: soft, nontender, no hepatosplenomegaly, no masses and bowel sounds normal   (male): testicles normal without atrophy or masses, no hernias and penis normal without urethral discharge  RECTAL: normal sphincter tone, no rectal masses and prostate of normal size for age, smooth, nontender without masses/nodules  MS: no gross musculoskeletal defects noted, no edema  SKIN: no suspicious lesions or rashes  NEURO: Normal strength and tone, mentation intact and speech normal,   PSYCH: mentation appears normal, affect normal/bright    ASSESSMENT/PLAN:   (Z00.00) Routine general medical examination at a health care facility  (primary encounter diagnosis)  Comment:   Plan: Lipid panel reflex to direct LDL Fasting, DEPRESSION ACTION PLAN  (DAP)            (Z87.19) Hx of acute alcoholic hepatitis  Comment:   Plan: Comprehensive metabolic panel        He is staying sober.  Wants to have a follow-up liver test.    (E78.5) Hyperlipidemia with target LDL less than 130  Comment:   Plan: Lipid panel reflex to direct LDL Fasting            (I10) Essential hypertension  Comment: Stable need refill and due for labs  Plan: Comprehensive metabolic panel            (Z12.5) Screening PSA (prostate specific antigen)  Comment:   Plan: Prostate spec antigen screen    (E03.8) Other specified hypothyroidism  Comment:   Plan: TSH with free T4 reflex, levothyroxine         (SYNTHROID/LEVOTHROID) 125 MCG tablet        Check labs.  Adjust dose further if needed    (R42) Vertigo  Comment:   Plan: meclizine (ANTIVERT) 25 MG tablet        Patient with known history of vertigo, having some symptoms again since yesterday, mild.  clinically doing well.  Given prescription for meclizine to take as needed.  Talked about warning signs symptoms.  Should be seen urgently.  If any frequent or recurrent problem may consider further evaluation or physical therapy as needed.      COUNSELING:  Reviewed preventive health counseling, as reflected in patient instructions       Regular exercise       Healthy diet/nutrition       Vision screening       reports that he quit smoking about 2 years ago. He has never used smokeless tobacco.    Estimated body mass index is 33.63 kg/(m^2) as calculated from the following:    Height as of 12/6/17: 6' (1.829 m).    Weight as of 12/6/17: 248 lb (112.5 kg).   Weight management plan: Discussed healthy diet and exercise guidelines and patient will follow up in 12 months in clinic to re-evaluate.    Counseling Resources:  ATP IV Guidelines  Pooled Cohorts Equation Calculator  FRAX Risk Assessment  ICSI Preventive Guidelines  Dietary Guidelines for Americans, 2010  USDA's MyPlate  ASA Prophylaxis  Lung CA Screening    Fifi Fleming MD  [place]  [facility]

## 2018-02-15 NOTE — NURSING NOTE
Chief Complaint   Patient presents with     Physical       Initial /88  Pulse 67  Temp 96.8  F (36  C) (Tympanic)  Ht 6' (1.829 m)  Wt 255 lb (115.7 kg)  SpO2 98%  BMI 34.58 kg/m2 Estimated body mass index is 34.58 kg/(m^2) as calculated from the following:    Height as of this encounter: 6' (1.829 m).    Weight as of this encounter: 255 lb (115.7 kg).  Medication Reconciliation: complete

## 2018-02-16 LAB
ALBUMIN SERPL-MCNC: 4.4 G/DL (ref 3.4–5)
ALP SERPL-CCNC: 53 U/L (ref 40–150)
ALT SERPL W P-5'-P-CCNC: 99 U/L (ref 0–70)
ANION GAP SERPL CALCULATED.3IONS-SCNC: 10 MMOL/L (ref 3–14)
AST SERPL W P-5'-P-CCNC: 58 U/L (ref 0–45)
BILIRUB SERPL-MCNC: 0.6 MG/DL (ref 0.2–1.3)
BUN SERPL-MCNC: 13 MG/DL (ref 7–30)
CALCIUM SERPL-MCNC: 9.3 MG/DL (ref 8.5–10.1)
CHLORIDE SERPL-SCNC: 103 MMOL/L (ref 94–109)
CHOLEST SERPL-MCNC: 196 MG/DL
CO2 SERPL-SCNC: 25 MMOL/L (ref 20–32)
CREAT SERPL-MCNC: 0.8 MG/DL (ref 0.66–1.25)
GFR SERPL CREATININE-BSD FRML MDRD: >90 ML/MIN/1.7M2
GLUCOSE SERPL-MCNC: 103 MG/DL (ref 70–99)
HDLC SERPL-MCNC: 66 MG/DL
LDLC SERPL CALC-MCNC: 92 MG/DL
NONHDLC SERPL-MCNC: 130 MG/DL
POTASSIUM SERPL-SCNC: 4 MMOL/L (ref 3.4–5.3)
PROT SERPL-MCNC: 7.9 G/DL (ref 6.8–8.8)
PSA SERPL-ACNC: 0.33 UG/L (ref 0–4)
SODIUM SERPL-SCNC: 138 MMOL/L (ref 133–144)
T4 FREE SERPL-MCNC: 0.95 NG/DL (ref 0.76–1.46)
TRIGL SERPL-MCNC: 191 MG/DL
TSH SERPL DL<=0.005 MIU/L-ACNC: 4.48 MU/L (ref 0.4–4)

## 2018-02-16 ASSESSMENT — ANXIETY QUESTIONNAIRES: GAD7 TOTAL SCORE: 0

## 2018-02-16 ASSESSMENT — PATIENT HEALTH QUESTIONNAIRE - PHQ9: SUM OF ALL RESPONSES TO PHQ QUESTIONS 1-9: 0

## 2018-02-20 ENCOUNTER — OFFICE VISIT (OUTPATIENT)
Dept: PODIATRY | Facility: CLINIC | Age: 58
End: 2018-02-20
Payer: COMMERCIAL

## 2018-02-20 ENCOUNTER — RADIANT APPOINTMENT (OUTPATIENT)
Dept: GENERAL RADIOLOGY | Facility: CLINIC | Age: 58
End: 2018-02-20
Attending: FAMILY MEDICINE
Payer: COMMERCIAL

## 2018-02-20 VITALS
BODY MASS INDEX: 34.54 KG/M2 | SYSTOLIC BLOOD PRESSURE: 130 MMHG | DIASTOLIC BLOOD PRESSURE: 91 MMHG | WEIGHT: 255 LBS | HEIGHT: 72 IN

## 2018-02-20 DIAGNOSIS — G89.29 CHRONIC MIDLINE LOW BACK PAIN WITHOUT SCIATICA: ICD-10-CM

## 2018-02-20 DIAGNOSIS — M79.642 BILATERAL HAND PAIN: ICD-10-CM

## 2018-02-20 DIAGNOSIS — M79.641 BILATERAL HAND PAIN: ICD-10-CM

## 2018-02-20 DIAGNOSIS — M54.50 CHRONIC MIDLINE LOW BACK PAIN WITHOUT SCIATICA: ICD-10-CM

## 2018-02-20 DIAGNOSIS — M79.641 BILATERAL HAND PAIN: Primary | ICD-10-CM

## 2018-02-20 DIAGNOSIS — M79.642 BILATERAL HAND PAIN: Primary | ICD-10-CM

## 2018-02-20 LAB — ERYTHROCYTE [SEDIMENTATION RATE] IN BLOOD BY WESTERGREN METHOD: 9 MM/H (ref 0–20)

## 2018-02-20 PROCEDURE — 85652 RBC SED RATE AUTOMATED: CPT | Performed by: FAMILY MEDICINE

## 2018-02-20 PROCEDURE — 86140 C-REACTIVE PROTEIN: CPT | Performed by: FAMILY MEDICINE

## 2018-02-20 PROCEDURE — 99204 OFFICE O/P NEW MOD 45 MIN: CPT | Performed by: FAMILY MEDICINE

## 2018-02-20 PROCEDURE — 36415 COLL VENOUS BLD VENIPUNCTURE: CPT | Performed by: FAMILY MEDICINE

## 2018-02-20 PROCEDURE — 86200 CCP ANTIBODY: CPT | Performed by: FAMILY MEDICINE

## 2018-02-20 PROCEDURE — 73130 X-RAY EXAM OF HAND: CPT | Mod: LT

## 2018-02-20 PROCEDURE — 72100 X-RAY EXAM L-S SPINE 2/3 VWS: CPT | Mod: FY

## 2018-02-20 ASSESSMENT — ENCOUNTER SYMPTOMS: BACK PAIN: 1

## 2018-02-20 NOTE — MR AVS SNAPSHOT
After Visit Summary   2/20/2018    Angus Wynne    MRN: [id number]           Patient Information     Date Of Birth          1960        Visit Information        Provider Department      2/20/2018 3:20 PM Barrett Sarabia MD [facility] [place]        Today's Diagnoses     Bilateral hand pain    -  1    Chronic midline low back pain without sciatica           Follow-ups after your visit        Additional Services     [facility] PT, HAND, AND CHIROPRACTIC REFERRAL       **This order will print in the [facility] Scheduling Office**    Physical Therapy, Hand Therapy and Chiropractic Care are available through:    *[facility]  *[facility]  *[facility]    Call one number to schedule at any of the above locations: [phone].    Your provider has referred you to: Physical Therapy at [facility] or [facility]    Indication/Reason for Referral:    Chronic midline low back pain without sciatica    Onset of Illness:   Therapy Orders: Evaluate and Treat  Special Programs: None  Special Request: None    Bailey Avalos      Additional Comments for the Therapist or Chiropractor:     Please be aware that coverage of these services is subject to the terms and limitations of your health insurance plan.  Call member services at your health plan with any benefit or coverage questions.      Please bring the following to your appointment:    *Your personal calendar for scheduling future appointments  *Comfortable clothing                  Your next 10 appointments already scheduled     Feb 23, 2018  2:20 PM CST   Return Visit with Barrett Sarabia MD   [facility] [place] ([facility] [place])    [address] [place] [id number]   [phone]            Feb 26, 2018  9:20 AM CST   (Arrive by 9:05 AM)   HEIDI Extremity with Raj Norris PT   [facility]  [facility] ([facility] [place])     [address] [place] [address]  [address]  [place] [address]   [phone]            Mar 02, 2018 11:20 AM CST   HEIDI Extremity with Raj Norris PT   [facility] - [facility] ([facility] [place])    [address] [place] [address]  [address]  [place] [address]   [phone]              Who to contact     If you have questions or need follow up information about today's clinic visit or your schedule please contact [facility] [place] directly at [phone].  Normal or non-critical lab and imaging results will be communicated to you by MyChart, letter or phone within 4 business days after the clinic has received the results. If you do not hear from us within 7 days, please contact the clinic through [employer] or phone. If you have a critical or abnormal lab result, we will notify you by phone as soon as possible.  Submit refill requests through [email or web address] or call your pharmacy and they will forward the refill request to us. Please allow 3 business days for your refill to be completed.          Additional Information About Your Visit        [employer] Information     [email or web address] gives you secure access to your electronic health record. If you see a primary care provider, you can also send messages to your care team and make appointments. If you have questions, please call your primary care clinic.  If you do not have a primary care provider, please call [phone] and they will assist you.        Care EveryWhere ID     This is your Care EveryWhere ID. This could be used by other organizations to access your [facility] medical records  [id number]        Your Vitals Were     Height BMI (Body Mass Index)                6' (1.829 m) 34.58 kg/m2           Blood Pressure from Last 3 Encounters:   02/20/18 (!) 130/91   02/15/18 130/88   12/06/17 (!) 146/101    Weight from Last 3 Encounters:   02/20/18 255 lb (115.7 kg)   02/15/18 255 lb (115.7 kg)   12/06/17 248 lb (112.5 kg)               We Performed the Following     CRP inflammation     Cyclic Citrullinated Peptide Antibody IgG     Erythrocyte sedimentation rate auto     [facility] PT, HAND, AND CHIROPRACTIC REFERRAL        Primary Care Provider Office Phone # Fax #    Fifi Fleming [phone]       [address]  [place] [address]        Equal Access to Services     [facility]: Hadii aad ku hadasho Soomaali, waaxda luqadaha, qaybta kaalmada adeegyada, waxay idiin hayaan adeeg kharash la'aan . So [facility] [phone].    ATENCIÓN: Si lynne espilsa, tiene a levine disposición servicios gratuitos de asistencia lingüística. [facility] [phone].    We comply with applicable federal civil rights laws and [place] laws. We do not discriminate on the basis of race, color, national origin, age, disability, sex, sexual orientation, or gender identity.            Thank you!     Thank you for choosing [facility] [place]  for your care. Our goal is always to provide you with excellent care. Hearing back from our patients is one way we can continue to improve our services. Please take a few minutes to complete the written survey that you may receive in the mail after your visit with us. Thank you!             Your Updated Medication List - Protect others around you: Learn how to safely use, store and throw away your medicines at www.disposemymeds.org.          This list is accurate as of 2/20/18 11:59 PM.  Always use your most recent med list.                   Brand Name Dispense Instructions for use Diagnosis    aspirin 81 MG EC tablet      Take 1 tablet (81 mg) by mouth daily    Hyperlipidemia with target LDL less than 130       atorvastatin 40 MG tablet    LIPITOR    90 tablet    Take 1 tablet (40 mg) by mouth daily    Hyperlipidemia with target LDL less than 130       clonazePAM 0.5 MG tablet    klonoPIN    30 tablet    Take 0.5-1 tablets (0.25-0.5 mg) by mouth nightly as needed for anxiety    Anxiety        fluticasone 50 MCG/ACT spray    FLONASE    16 g    Spray 1-2 sprays into both nostrils daily    Nasal congestion       levothyroxine 125 MCG tablet    SYNTHROID/LEVOTHROID    90 tablet    Take 1 tablet (125 mcg) by mouth daily    Other specified hypothyroidism       losartan 50 MG tablet    COZAAR    90 tablet    Take 1 tablet (50 mg) by mouth daily    Essential hypertension       meclizine 25 MG tablet    ANTIVERT    20 tablet    Take 1 tablet (25 mg) by mouth 3 times daily as needed for dizziness    Vertigo       sertraline 100 MG tablet    ZOLOFT    90 tablet    Take 1 tablet (100 mg) by mouth daily    Anxiety       traZODone 50 MG tablet    DESYREL    90 tablet    TAKE ONE TO TWO TABLETS BY MOUTH DAILY FOR SLEEP    Other insomnia

## 2018-02-20 NOTE — LETTER
2/20/2018         RE: Angus Wynne  [address]  [place] [address]        Dear Colleague,    Thank you for referring your patient, Angus Wynne, to the [facility] [place]. Please see a copy of my visit note below.    HPI   [facility]   Clinic Visit s Feb 20, 2018    PCP: Fifi Fleming      Angus is a 57 year old male who is seen as self referral for   Chief Complaint   Patient presents with     Hand Pain       Injury: Reports insidious onset without acute precipitating event.     ambidextrous-hand dominant    Location of Pain: bilateral hands and tailbone  Duration of Pain: 6 year(s) - back and 2 years for hands  Rating of Pain at worst: 8/10  Rating of Pain Currently: 5/10  Pain is better with: movement   Pain is worse with: mornings   Treatment so far consists of: Pain medication: ibuprofen (Advil, Motrin)  Associated symptoms: stiffness  Recent imaging completed: No recent imaging completed.  Prior History of related problems: fracture scaphoid bone years ago    Social History: not currently working     Past Medical History:   Diagnosis Date     HTN (hypertension)      Hypothyroid      Vertigo     2015 ,        Patient Active Problem List    Diagnosis Date Noted     History of colonic polyps 02/15/2018     Priority: Medium     Vertigo 02/15/2018     Priority: Medium     Had had negative neurological workup in [place], few years ago       Nasal congestion 02/01/2017     Priority: Medium     chronic otc decongetsent use       Cervicalgia 05/02/2016     Priority: Medium     Cervical spondylosis without myelopathy 05/02/2016     Priority: Medium     Essential hypertension 05/02/2016     Priority: Medium     Hyperlipidemia with target LDL less than 130 05/02/2016     Priority: Medium     Neck pain 04/25/2016     Priority: Medium     chronic        Anxiety 03/08/2016     Priority: Medium     Alcohol use disorder (H) 03/08/2016     Priority: Medium      excessive use, he has stopped now since 03/01/2016        BPPV (benign paroxysmal positional vertigo), unspecified laterality 12/28/2015     Priority: Medium      06/ 2015 , was seen at [place] in [place] and had mri brain, normal       Other specified hypothyroidism 12/28/2015     Priority: Medium     Hx of acute alcoholic hepatitis 12/28/2015     Priority: Medium     04/2014        Hx of colonic polyp 12/28/2015     Priority: Medium     s/p colonoscopy , need 3 yr f/u        Gastroesophageal reflux disease, esophagitis presence not specified 12/28/2015     Priority: Medium     s/p endoscopy 10/02/2015, but they were unable to do biopsy , so need another f/u check        Vitamin D deficiency 12/28/2015     Priority: Medium       Family History   Problem Relation Age of Onset     Coronary Artery Disease Father      at age 60      Hyperlipidemia Brother      DIABETES No family hx of      CEREBROVASCULAR DISEASE No family hx of      Colon Cancer No family hx of      Prostate Cancer No family hx of        Social History     Social History     Marital status:      Spouse name: N/A     Number of children: N/A     Years of education: N/A     Social History Main Topics     Smoking status: Former Smoker     Quit date: 9/28/2015     Smokeless tobacco: Never Used     Alcohol use No       Past Surgical History:   Procedure Laterality Date     NO HISTORY OF SURGERY         Review of Systems   Musculoskeletal: Positive for back pain and joint pain.   All other systems reviewed and are negative.      Physical Exam  BP (!) 130/91  Ht 6' (1.829 m)  Wt 255 lb (115.7 kg)  BMI 34.58 kg/m2  Constitutional:well-developed, well-nourished, and in no distress.   Cardiovascular: Intact distal pulses.    Neurological: alert. Gait Normal:   Gait, station, stance, and balance appear normal for age  Skin: Skin is warm and dry.   Psychiatric: Mood and affect normal.   Respiratory: unlabored, speaks in full sentences  Lymph: no LAD, no  lymphangitis      Back Exam   Sensation: Normal.  Gait: Normal.    Tenderness   The patient is experiencing tenderness in the lumbar.    Range of Motion   Flexion:                    Normal  Extension:                Abnormal  Lateral Bend Left:    Abnormal  Lateral Bend Right:  Abnormal  Rotation Right:         Abnormal  Rotation Left:           Abnormal  SLR    Right: Negative  Left:    Negative    Muscle Strength   Normal back strength    Left Hand/Wrist Exam   Sensation: Normal    Tenderness   None    Range of Motion   Wrist  Pronation:  Normal  Supination: Normal  Flexion:       Normal  Extension:  Normal  Hand  DIP Thumb: Normal  DIP Index:    Normal  DIP Middle:  Normal  DIP Ring:     Normal  DIP Little:     Normal  MP Thumb:  Normal  MP Index:     Normal  MP Middle:   Normal  MP Ring:      Normal  MP Little:      Normal  PIP Index:     Normal  PIP Middle:   Normal  PIP Ring:      Normal  PIP Little:      Normal    Muscle Strength   Wrist Extension:   5/5  Wrist Flexion:       5/5  :                      5/5    Tests   Phalen s Sign: Negative  Tinel s Sign (Medial Nerve): Negative  Finkelstein: Negative    Right Hand/Wrist Exam   Sensation: Normal    Tenderness   None    Range of Motion   Wrist  Pronation:  Normal  Supination: Normal  Flexion:      Normal  Extension:  Normal  Hand  DIP Thumb: Normal  DIP Index:    Normal  DIP Middle:  Normal  DIP Ring:     Normal  DIP Little:     Normal  MP Thumb:  Normal  MP Index:     Normal  MP Middle:   Normal  MP Ring:      Normal  MP Little:      Normal  PIP Index:    Normal  PIP Middle:  Normal  PIP Ring:     Normal  PIP Little:     Normal    Muscle Strength   Wrist Extension:   5/5  Wrist Flexion:       5/5  :                      5/5    Tests   Phalen s Sign: Negative  Tinel s Sign (Medial Nerve): Negative  Finkelstein: Negative               X-ray images Ordered and independently reviewed by me in the office today with the patient. X-ray shows:   Recent  Results (from the past 48 hour(s))   XR Lumbar Spine 2/3 Views    Narrative    LUMBAR SPINE THREE VIEWS  2/20/2018 3:53 PM     HISTORY: Chronic midline lower back pain without sciatica.    COMPARISON: None.      Impression    IMPRESSION: Mild degenerative disc disease L3-L4 and L4-L5. Arterial  calcifications.    SARA DELUCA MD   XR Hand Bilateral G/E 3 Views    Narrative    2/20/2018    Normal alignment. No fractures, dislocation or lesions. No soft tissue   abnormalities.         ASSESSMENT/PLAN    ICD-10-CM    1. Bilateral hand pain M79.641 XR Hand Bilateral G/E 3 Views    M79.642 Erythrocyte sedimentation rate auto     CRP inflammation     Cyclic Citrullinated Peptide Antibody IgG   2. Chronic midline low back pain without sciatica M54.5 XR Lumbar Spine 2/3 Views    G89.29 [facility] PT, HAND, AND CHIROPRACTIC REFERRAL     Back: Nature of nonradicular low back pain discussed at length. Elaborated on role of activity rather than inactivity to minimize symptom duration. Short term pain relief symptoms discussed and side effects of various options reviewed. Emphasized long term management with strengthening and stabilization. Other options including chiropractic and physical therapy explored.  Pt opts to proceed with PT for low back.    Hand exam is unremarkable.  Morning stiffness noted.  Rheumatologic process cannot be excluded.  Differential includes polymyalgia rheumatica, rheumatoid arthritis, osteoarthritis, deconditioning.  Limited rheumatologic screening labs ordered.  Return for recheck.    Again, thank you for allowing me to participate in the care of your patient.        Sincerely,        Barrett Sarabia MD

## 2018-02-20 NOTE — PROGRESS NOTES
[facility]   Clinic Visit s Feb 20, 2018    PCP: Fifi Fleming Sikander      Angus is a 57 year old male who is seen as self referral for   Chief Complaint   Patient presents with     Hand Pain       Injury: Reports insidious onset without acute precipitating event.     ambidextrous-hand dominant    Location of Pain: bilateral hands and tailbone  Duration of Pain: 6 year(s) - back and 2 years for hands  Rating of Pain at worst: 8/10  Rating of Pain Currently: 5/10  Pain is better with: movement   Pain is worse with: mornings   Treatment so far consists of: Pain medication: ibuprofen (Advil, Motrin)  Associated symptoms: stiffness  Recent imaging completed: No recent imaging completed.  Prior History of related problems: fracture scaphoid bone years ago    Social History: not currently working     Past Medical History:   Diagnosis Date     HTN (hypertension)      Hypothyroid      Vertigo     2015 ,        Patient Active Problem List    Diagnosis Date Noted     History of colonic polyps 02/15/2018     Priority: Medium     Vertigo 02/15/2018     Priority: Medium     Had had negative neurological workup in [place], few years ago       Nasal congestion 02/01/2017     Priority: Medium     chronic otc decongetsent use       Cervicalgia 05/02/2016     Priority: Medium     Cervical spondylosis without myelopathy 05/02/2016     Priority: Medium     Essential hypertension 05/02/2016     Priority: Medium     Hyperlipidemia with target LDL less than 130 05/02/2016     Priority: Medium     Neck pain 04/25/2016     Priority: Medium     chronic        Anxiety 03/08/2016     Priority: Medium     Alcohol use disorder (H) 03/08/2016     Priority: Medium     excessive use, he has stopped now since 03/01/2016        BPPV (benign paroxysmal positional vertigo), unspecified laterality 12/28/2015     Priority: Medium      06/ 2015 , was seen at er in [place] and had mri brain, normal       Other specified  hypothyroidism 12/28/2015     Priority: Medium     Hx of acute alcoholic hepatitis 12/28/2015     Priority: Medium     04/2014        Hx of colonic polyp 12/28/2015     Priority: Medium     s/p colonoscopy , need 3 yr f/u        Gastroesophageal reflux disease, esophagitis presence not specified 12/28/2015     Priority: Medium     s/p endoscopy 10/02/2015, but they were unable to do biopsy , so need another f/u check        Vitamin D deficiency 12/28/2015     Priority: Medium       Family History   Problem Relation Age of Onset     Coronary Artery Disease Father      at age 60      Hyperlipidemia Brother      DIABETES No family hx of      CEREBROVASCULAR DISEASE No family hx of      Colon Cancer No family hx of      Prostate Cancer No family hx of        Social History     Social History     Marital status:      Spouse name: N/A     Number of children: N/A     Years of education: N/A     Social History Main Topics     Smoking status: Former Smoker     Quit date: 9/28/2015     Smokeless tobacco: Never Used     Alcohol use No       Past Surgical History:   Procedure Laterality Date     NO HISTORY OF SURGERY         Review of Systems   Musculoskeletal: Positive for back pain and joint pain.   All other systems reviewed and are negative.      Physical Exam  BP (!) 130/91  Ht 6' (1.829 m)  Wt 255 lb (115.7 kg)  BMI 34.58 kg/m2  Constitutional:well-developed, well-nourished, and in no distress.   Cardiovascular: Intact distal pulses.    Neurological: alert. Gait Normal:   Gait, station, stance, and balance appear normal for age  Skin: Skin is warm and dry.   Psychiatric: Mood and affect normal.   Respiratory: unlabored, speaks in full sentences  Lymph: no LAD, no lymphangitis      Back Exam   Sensation: Normal.  Gait: Normal.    Tenderness   The patient is experiencing tenderness in the lumbar.    Range of Motion   Flexion:                    Normal  Extension:                Abnormal  Lateral Bend Left:     Abnormal  Lateral Bend Right:  Abnormal  Rotation Right:         Abnormal  Rotation Left:           Abnormal  SLR    Right: Negative  Left:    Negative    Muscle Strength   Normal back strength    Left Hand/Wrist Exam   Sensation: Normal    Tenderness   None    Range of Motion   Wrist  Pronation:  Normal  Supination: Normal  Flexion:       Normal  Extension:  Normal  Hand  DIP Thumb: Normal  DIP Index:    Normal  DIP Middle:  Normal  DIP Ring:     Normal  DIP Little:     Normal  MP Thumb:  Normal  MP Index:     Normal  MP Middle:   Normal  MP Ring:      Normal  MP Little:      Normal  PIP Index:     Normal  PIP Middle:   Normal  PIP Ring:      Normal  PIP Little:      Normal    Muscle Strength   Wrist Extension:   5/5  Wrist Flexion:       5/5  :                      5/5    Tests   Phalen s Sign: Negative  Tinel s Sign (Medial Nerve): Negative  Finkelstein: Negative    Right Hand/Wrist Exam   Sensation: Normal    Tenderness   None    Range of Motion   Wrist  Pronation:  Normal  Supination: Normal  Flexion:      Normal  Extension:  Normal  Hand  DIP Thumb: Normal  DIP Index:    Normal  DIP Middle:  Normal  DIP Ring:     Normal  DIP Little:     Normal  MP Thumb:  Normal  MP Index:     Normal  MP Middle:   Normal  MP Ring:      Normal  MP Little:      Normal  PIP Index:    Normal  PIP Middle:  Normal  PIP Ring:     Normal  PIP Little:     Normal    Muscle Strength   Wrist Extension:   5/5  Wrist Flexion:       5/5  :                      5/5    Tests   Phalen s Sign: Negative  Tinel s Sign (Medial Nerve): Negative  Finkelstein: Negative               X-ray images Ordered and independently reviewed by me in the office today with the patient. X-ray shows:   Recent Results (from the past 48 hour(s))   XR Lumbar Spine 2/3 Views    Narrative    LUMBAR SPINE THREE VIEWS  2/20/2018 3:53 PM     HISTORY: Chronic midline lower back pain without sciatica.    COMPARISON: None.      Impression    IMPRESSION: Mild  degenerative disc disease L3-L4 and L4-L5. Arterial  calcifications.    SARA DELUCA MD   XR Hand Bilateral G/E 3 Views    Narrative    2/20/2018    Normal alignment. No fractures, dislocation or lesions. No soft tissue   abnormalities.         ASSESSMENT/PLAN    ICD-10-CM    1. Bilateral hand pain M79.641 XR Hand Bilateral G/E 3 Views    M79.642 Erythrocyte sedimentation rate auto     CRP inflammation     Cyclic Citrullinated Peptide Antibody IgG   2. Chronic midline low back pain without sciatica M54.5 XR Lumbar Spine 2/3 Views    G89.29 [facility] PT, HAND, AND CHIROPRACTIC REFERRAL     Back: Nature of nonradicular low back pain discussed at length. Elaborated on role of activity rather than inactivity to minimize symptom duration. Short term pain relief symptoms discussed and side effects of various options reviewed. Emphasized long term management with strengthening and stabilization. Other options including chiropractic and physical therapy explored.  Pt opts to proceed with PT for low back.    Hand exam is unremarkable.  Morning stiffness noted.  Rheumatologic process cannot be excluded.  Differential includes polymyalgia rheumatica, rheumatoid arthritis, osteoarthritis, deconditioning.  Limited rheumatologic screening labs ordered.  Return for recheck.

## 2018-02-21 LAB — CRP SERPL-MCNC: <2.9 MG/L (ref 0–8)

## 2018-02-23 ENCOUNTER — OFFICE VISIT (OUTPATIENT)
Dept: ORTHOPEDICS | Facility: CLINIC | Age: 58
End: 2018-02-23
Payer: COMMERCIAL

## 2018-02-23 VITALS
HEIGHT: 72 IN | BODY MASS INDEX: 34.54 KG/M2 | WEIGHT: 255 LBS | DIASTOLIC BLOOD PRESSURE: 91 MMHG | SYSTOLIC BLOOD PRESSURE: 130 MMHG

## 2018-02-23 DIAGNOSIS — M19.049 PRIMARY LOCALIZED OSTEOARTHROSIS OF HAND, UNSPECIFIED LATERALITY: Primary | ICD-10-CM

## 2018-02-23 PROCEDURE — 99213 OFFICE O/P EST LOW 20 MIN: CPT | Performed by: FAMILY MEDICINE

## 2018-02-23 ASSESSMENT — ENCOUNTER SYMPTOMS: BACK PAIN: 1

## 2018-02-23 NOTE — LETTER
2/23/2018         RE: Angus Wynne  [id number]  [place] [address]        Dear Colleague,    Thank you for referring your patient, Angus Wynne, to the [facility] [place]. Please see a copy of my visit note below.    HPI   [facility]   Follow-up Visit s Feb 23, 2018    PCP: Fifi Fleming      Subjective:  Angus is a 57 year old male who is seen in follow up for evaluation of   Chief Complaint   Patient presents with     RECHECK     His last visit was on 2/20/18.  Since that time, symptoms have been almost the same, very slightly better . Angus Wynne is accompanied today by spouse .     Patient has noticed a stable course of symptoms with labs treatment.    Pain is located bilateral hands and low back, constant.  Patient is using no aids.    Patient denies any new injuries.    Patient's past medical, surgical, social and family histories are reviewed today.    History from previous visit on 2/20/18  Injury: Reports insidious onset without acute precipitating event.     ambidextrous-hand dominant    Location of Pain: bilateral hands and tailbone  Duration of Pain: 6 year(s) - back and 2 years for hands  Rating of Pain at worst: 8/10  Rating of Pain Currently: 5/10  Pain is better with: movement   Pain is worse with: mornings   Treatment so far consists of: Pain medication: ibuprofen (Advil, Motrin)  Associated symptoms: stiffness  Recent imaging completed: No recent imaging completed.  Prior History of related problems: fracture scaphoid bone years ago    Social History: not currently working     Past Medical History:   Diagnosis Date     HTN (hypertension)      Hypothyroid      Vertigo     2015 ,        Patient Active Problem List    Diagnosis Date Noted     History of colonic polyps 02/15/2018     Priority: Medium     Vertigo 02/15/2018     Priority: Medium     Had had negative neurological workup in [place], few years ago        Nasal congestion 02/01/2017     Priority: Medium     chronic otc decongetsent use       Cervicalgia 05/02/2016     Priority: Medium     Cervical spondylosis without myelopathy 05/02/2016     Priority: Medium     Essential hypertension 05/02/2016     Priority: Medium     Hyperlipidemia with target LDL less than 130 05/02/2016     Priority: Medium     Neck pain 04/25/2016     Priority: Medium     chronic        Anxiety 03/08/2016     Priority: Medium     Alcohol use disorder (H) 03/08/2016     Priority: Medium     excessive use, he has stopped now since 03/01/2016        BPPV (benign paroxysmal positional vertigo), unspecified laterality 12/28/2015     Priority: Medium      06/ 2015 , was seen at er in [place] and had mri brain, normal       Other specified hypothyroidism 12/28/2015     Priority: Medium     Hx of acute alcoholic hepatitis 12/28/2015     Priority: Medium     04/2014        Hx of colonic polyp 12/28/2015     Priority: Medium     s/p colonoscopy , need 3 yr f/u        Gastroesophageal reflux disease, esophagitis presence not specified 12/28/2015     Priority: Medium     s/p endoscopy 10/02/2015, but they were unable to do biopsy , so need another f/u check        Vitamin D deficiency 12/28/2015     Priority: Medium       Family History   Problem Relation Age of Onset     Coronary Artery Disease Father      at age 60      Hyperlipidemia Brother      DIABETES No family hx of      CEREBROVASCULAR DISEASE No family hx of      Colon Cancer No family hx of      Prostate Cancer No family hx of        Social History     Social History     Marital status:      Spouse name: N/A     Number of children: N/A     Years of education: N/A     Social History Main Topics     Smoking status: Former Smoker     Quit date: 9/28/2015     Smokeless tobacco: Never Used     Alcohol use No       Past Surgical History:   Procedure Laterality Date     NO HISTORY OF SURGERY         Review of Systems   Musculoskeletal:  Positive for back pain and joint pain.   All other systems reviewed and are negative.      Physical Exam  BP (!) 130/91  Ht 6' (1.829 m)  Wt 255 lb (115.7 kg)  BMI 34.58 kg/m2  Constitutional:well-developed, well-nourished, and in no distress.   Cardiovascular: Intact distal pulses.    Neurological: alert. Gait Normal:   Gait, station, stance, and balance appear normal for age  Skin: Skin is warm and dry.   Psychiatric: Mood and affect normal.   Respiratory: unlabored, speaks in full sentences  Lymph: no LAD, no lymphangitis      Left Hand/Wrist Exam   Sensation: Normal    Tenderness   None    Range of Motion   Wrist  Pronation:  Normal  Supination: Normal  Flexion:       Normal  Extension:  Normal  Hand  DIP Thumb: Normal  DIP Index:    Normal  DIP Middle:  Normal  DIP Ring:     Normal  DIP Little:     Normal  MP Thumb:  Normal  MP Index:     Normal  MP Middle:   Normal  MP Ring:      Normal  MP Little:      Normal  PIP Index:     Normal  PIP Middle:   Normal  PIP Ring:      Normal  PIP Little:      Normal    Muscle Strength   Wrist Extension:   5/5  Wrist Flexion:       5/5  :                      5/5    Tests   Phalen s Sign: Negative  Tinel s Sign (Medial Nerve): Negative  Finkelstein: Negative    Right Hand/Wrist Exam   Sensation: Normal    Tenderness   None    Range of Motion   Wrist  Pronation:  Normal  Supination: Normal  Flexion:      Normal  Extension:  Normal  Hand  DIP Thumb: Normal  DIP Index:    Normal  DIP Middle:  Normal  DIP Ring:     Normal  DIP Little:     Normal  MP Thumb:  Normal  MP Index:     Normal  MP Middle:   Normal  MP Ring:      Normal  MP Little:      Normal  PIP Index:    Normal  PIP Middle:  Normal  PIP Ring:     Normal  PIP Little:     Normal    Muscle Strength   Wrist Extension:   5/5  Wrist Flexion:       5/5  :                      5/5    Tests   Phalen s Sign: Negative  Tinel s Sign (Medial Nerve): Negative  Finkelstein: Negative           Component      Latest Ref Rng  & Units 2/20/2018   Sed Rate      0 - 20 mm/h 9   CRP Inflammation      0.0 - 8.0 mg/L <2.9         ASSESSMENT/PLAN    ICD-10-CM    1. Primary localized osteoarthrosis of hand, unspecified laterality M19.049      Reassurance, normal labs.  Reviewed images and exam with patient as well.  Suspect mild osteoarthritis, but poor overall deconditioned health playing a role.  Encouraged regular activity, discussed over-the-counter pain remedies such as topical capsaicin or Lidoderm.  Monitor for increased pain swelling or other deformities.  Follow-up as needed.    Time spent in one-on-one evalution and discussion with an established patient of this clinic.  regarding nature of problem, course, prior treatments, and therapeutic options, at least 50% of which was spent in counseling and coordination of care: 15 minutes.    Again, thank you for allowing me to participate in the care of your patient.        Sincerely,        Barrett Sarabia MD

## 2018-02-23 NOTE — PROGRESS NOTES
[facility]   Follow-up Visit s Feb 23, 2018    PCP: Fifi Fleming      Subjective:  Angus is a 57 year old male who is seen in follow up for evaluation of   Chief Complaint   Patient presents with     RECHECK     His last visit was on 2/20/18.  Since that time, symptoms have been almost the same, very slightly better . Angus Wynne is accompanied today by spouse .     Patient has noticed a stable course of symptoms with labs treatment.    Pain is located bilateral hands and low back, constant.  Patient is using no aids.    Patient denies any new injuries.    Patient's past medical, surgical, social and family histories are reviewed today.    History from previous visit on 2/20/18  Injury: Reports insidious onset without acute precipitating event.     ambidextrous-hand dominant    Location of Pain: bilateral hands and tailbone  Duration of Pain: 6 year(s) - back and 2 years for hands  Rating of Pain at worst: 8/10  Rating of Pain Currently: 5/10  Pain is better with: movement   Pain is worse with: mornings   Treatment so far consists of: Pain medication: ibuprofen (Advil, Motrin)  Associated symptoms: stiffness  Recent imaging completed: No recent imaging completed.  Prior History of related problems: fracture scaphoid bone years ago    Social History: not currently working     Past Medical History:   Diagnosis Date     HTN (hypertension)      Hypothyroid      Vertigo     2015 ,        Patient Active Problem List    Diagnosis Date Noted     History of colonic polyps 02/15/2018     Priority: Medium     Vertigo 02/15/2018     Priority: Medium     Had had negative neurological workup in [place], few years ago       Nasal congestion 02/01/2017     Priority: Medium     chronic otc decongetsent use       Cervicalgia 05/02/2016     Priority: Medium     Cervical spondylosis without myelopathy 05/02/2016     Priority: Medium     Essential hypertension 05/02/2016     Priority: Medium      Hyperlipidemia with target LDL less than 130 05/02/2016     Priority: Medium     Neck pain 04/25/2016     Priority: Medium     chronic        Anxiety 03/08/2016     Priority: Medium     Alcohol use disorder (H) 03/08/2016     Priority: Medium     excessive use, he has stopped now since 03/01/2016        BPPV (benign paroxysmal positional vertigo), unspecified laterality 12/28/2015     Priority: Medium      06/ 2015 , was seen at er in [place] and had mri brain, normal       Other specified hypothyroidism 12/28/2015     Priority: Medium     Hx of acute alcoholic hepatitis 12/28/2015     Priority: Medium     04/2014        Hx of colonic polyp 12/28/2015     Priority: Medium     s/p colonoscopy , need 3 yr f/u        Gastroesophageal reflux disease, esophagitis presence not specified 12/28/2015     Priority: Medium     s/p endoscopy 10/02/2015, but they were unable to do biopsy , so need another f/u check        Vitamin D deficiency 12/28/2015     Priority: Medium       Family History   Problem Relation Age of Onset     Coronary Artery Disease Father      at age 60      Hyperlipidemia Brother      DIABETES No family hx of      CEREBROVASCULAR DISEASE No family hx of      Colon Cancer No family hx of      Prostate Cancer No family hx of        Social History     Social History     Marital status:      Spouse name: N/A     Number of children: N/A     Years of education: N/A     Social History Main Topics     Smoking status: Former Smoker     Quit date: 9/28/2015     Smokeless tobacco: Never Used     Alcohol use No       Past Surgical History:   Procedure Laterality Date     NO HISTORY OF SURGERY         Review of Systems   Musculoskeletal: Positive for back pain and joint pain.   All other systems reviewed and are negative.      Physical Exam  BP (!) 130/91  Ht 6' (1.829 m)  Wt 255 lb (115.7 kg)  BMI 34.58 kg/m2  Constitutional:well-developed, well-nourished, and in no distress.   Cardiovascular: Intact distal  pulses.    Neurological: alert. Gait Normal:   Gait, station, stance, and balance appear normal for age  Skin: Skin is warm and dry.   Psychiatric: Mood and affect normal.   Respiratory: unlabored, speaks in full sentences  Lymph: no LAD, no lymphangitis      Left Hand/Wrist Exam   Sensation: Normal    Tenderness   None    Range of Motion   Wrist  Pronation:  Normal  Supination: Normal  Flexion:       Normal  Extension:  Normal  Hand  DIP Thumb: Normal  DIP Index:    Normal  DIP Middle:  Normal  DIP Ring:     Normal  DIP Little:     Normal  MP Thumb:  Normal  MP Index:     Normal  MP Middle:   Normal  MP Ring:      Normal  MP Little:      Normal  PIP Index:     Normal  PIP Middle:   Normal  PIP Ring:      Normal  PIP Little:      Normal    Muscle Strength   Wrist Extension:   5/5  Wrist Flexion:       5/5  :                      5/5    Tests   Phalen s Sign: Negative  Tinel s Sign (Medial Nerve): Negative  Finkelstein: Negative    Right Hand/Wrist Exam   Sensation: Normal    Tenderness   None    Range of Motion   Wrist  Pronation:  Normal  Supination: Normal  Flexion:      Normal  Extension:  Normal  Hand  DIP Thumb: Normal  DIP Index:    Normal  DIP Middle:  Normal  DIP Ring:     Normal  DIP Little:     Normal  MP Thumb:  Normal  MP Index:     Normal  MP Middle:   Normal  MP Ring:      Normal  MP Little:      Normal  PIP Index:    Normal  PIP Middle:  Normal  PIP Ring:     Normal  PIP Little:     Normal    Muscle Strength   Wrist Extension:   5/5  Wrist Flexion:       5/5  :                      5/5    Tests   Phalen s Sign: Negative  Tinel s Sign (Medial Nerve): Negative  Finkelstein: Negative           Component      Latest Ref Rng & Units 2/20/2018   Sed Rate      0 - 20 mm/h 9   CRP Inflammation      0.0 - 8.0 mg/L <2.9         ASSESSMENT/PLAN    ICD-10-CM    1. Primary localized osteoarthrosis of hand, unspecified laterality M19.049      Reassurance, normal labs.  Reviewed images and exam with patient  as well.  Suspect mild osteoarthritis, but poor overall deconditioned health playing a role.  Encouraged regular activity, discussed over-the-counter pain remedies such as topical capsaicin or Lidoderm.  Monitor for increased pain swelling or other deformities.  Follow-up as needed.    Time spent in one-on-one evalution and discussion with an established patient of this clinic.  regarding nature of problem, course, prior treatments, and therapeutic options, at least 50% of which was spent in counseling and coordination of care: 15 minutes.

## 2018-02-23 NOTE — PATIENT INSTRUCTIONS
Patient Education    Capsaicin Medicated plaster    Capsaicin Topical cream    Capsaicin Topical solution  Capsaicin Topical cream  What is this medicine?  CAPSAICIN (cap SAY sin) is a pain reliever. It is used to treat pain in muscles or joints.  This medicine may be used for other purposes; ask your health care provider or pharmacist if you have questions.  What should I tell my health care provider before I take this medicine?  They need to know if you have any of these conditions:    broken or irritated skin    an unusual or allergic reaction to capsaicin, hot peppers, other medicines, foods, dyes, or preservatives    pregnant or trying to get pregnant    breast-feeding  How should I use this medicine?  Use this medicine on the skin. Do not take by mouth. Follow the directions on the package or prescription label. Rub into painful area until there is little or no visible medicine left on the skin surface. Wash hands with soap and water after applying. If you are using this medicine for pain in the hands, do not wash your hands for at least 30 minutes after using this medicine. After using this medicine do not use a bandage, a heating pad, or expose the affected area to direct sun. Use your medicine at regular intervals. Do not use your medicine more often than directed.  Talk to your pediatrician regarding the use of this medicine in children. Special care may be needed.  Overdosage: If you think you have taken too much of this medicine contact a poison control center or emergency room at once.  NOTE: This medicine is only for you. Do not share this medicine with others.  What if I miss a dose?  If you miss a dose, use it as soon as you can. If it is almost time for your next dose, use only that dose. Do not use double or extra doses.  What may interact with this medicine?  Interactions are not expected. Do not use any other skin products on the affected area without asking your doctor or health care  professional.  This list may not describe all possible interactions. Give your health care provider a list of all the medicines, herbs, non-prescription drugs, or dietary supplements you use. Also tell them if you smoke, drink alcohol, or use illegal drugs. Some items may interact with your medicine.  What should I watch for while using this medicine?  Tell your doctor or healthcare professional if your symptoms do not start to get better or if they get worse.  What side effects may I notice from receiving this medicine?  Side effects that you should report to your doctor or health care professional as soon as possible:    allergic reactions like skin rash, itching or hives, swelling of the face, lips, or tongue    burning pain, redness that does not go away    cough    skin sores or thinning  Side effects that usually do not require medical attention (report to your doctor or health care professional if they continue or are bothersome):    mild stinging or warmth where used  This list may not describe all possible side effects. Call your doctor for medical advice about side effects. You may report side effects to FDA at 2-082-FDA-2064.  Where should I keep my medicine?  Keep out of the reach of young children.  Store at room temperature, between 15 and 30 degrees C (59 and 86 degrees F). Do not freeze. Protect from light. Throw away any unused medicine after the expiration date.  NOTE:This sheet is a summary. It may not cover all possible information. If you have questions about this medicine, talk to your doctor, pharmacist, or health care provider. Copyright  2016 [employer]

## 2018-02-23 NOTE — MR AVS SNAPSHOT
After Visit Summary   2/23/2018    Angus Wynne    MRN: [id number]           Patient Information     Date Of Birth          1960        Visit Information        Provider Department      2/23/2018 2:20 PM Barrett Sarabia MD [facility] [place]        Today's Diagnoses     Primary localized osteoarthrosis of hand, unspecified laterality    -  1      Care Instructions      Patient Education    Capsaicin Medicated plaster    Capsaicin Topical cream    Capsaicin Topical solution  Capsaicin Topical cream  What is this medicine?  CAPSAICIN (cap SAY sin) is a pain reliever. It is used to treat pain in muscles or joints.  This medicine may be used for other purposes; ask your health care provider or pharmacist if you have questions.  What should I tell my health care provider before I take this medicine?  They need to know if you have any of these conditions:    broken or irritated skin    an unusual or allergic reaction to capsaicin, hot peppers, other medicines, foods, dyes, or preservatives    pregnant or trying to get pregnant    breast-feeding  How should I use this medicine?  Use this medicine on the skin. Do not take by mouth. Follow the directions on the package or prescription label. Rub into painful area until there is little or no visible medicine left on the skin surface. Wash hands with soap and water after applying. If you are using this medicine for pain in the hands, do not wash your hands for at least 30 minutes after using this medicine. After using this medicine do not use a bandage, a heating pad, or expose the affected area to direct sun. Use your medicine at regular intervals. Do not use your medicine more often than directed.  Talk to your pediatrician regarding the use of this medicine in children. Special care may be needed.  Overdosage: If you think you have taken too much of this medicine contact a poison control center or emergency room at  once.  NOTE: This medicine is only for you. Do not share this medicine with others.  What if I miss a dose?  If you miss a dose, use it as soon as you can. If it is almost time for your next dose, use only that dose. Do not use double or extra doses.  What may interact with this medicine?  Interactions are not expected. Do not use any other skin products on the affected area without asking your doctor or health care professional.  This list may not describe all possible interactions. Give your health care provider a list of all the medicines, herbs, non-prescription drugs, or dietary supplements you use. Also tell them if you smoke, drink alcohol, or use illegal drugs. Some items may interact with your medicine.  What should I watch for while using this medicine?  Tell your doctor or healthcare professional if your symptoms do not start to get better or if they get worse.  What side effects may I notice from receiving this medicine?  Side effects that you should report to your doctor or health care professional as soon as possible:    allergic reactions like skin rash, itching or hives, swelling of the face, lips, or tongue    burning pain, redness that does not go away    cough    skin sores or thinning  Side effects that usually do not require medical attention (report to your doctor or health care professional if they continue or are bothersome):    mild stinging or warmth where used  This list may not describe all possible side effects. Call your doctor for medical advice about side effects. You may report side effects to FDA at 8-887-FDA-9141.  Where should I keep my medicine?  Keep out of the reach of young children.  Store at room temperature, between 15 and 30 degrees C (59 and 86 degrees F). Do not freeze. Protect from light. Throw away any unused medicine after the expiration date.  NOTE:This sheet is a summary. It may not cover all possible information. If you have questions about this medicine, talk to  your doctor, pharmacist, or health care provider. Copyright  2016 Gold Standard                Follow-ups after your visit        Your next 10 appointments already scheduled     Feb 23, 2018  2:20 PM CST   Return Visit with Barrett Sarabia MD   [facility] [place] ([facility] [place])    [address]  [place] [address]   [phone]            Feb 26, 2018  9:20 AM CST   (Arrive by 9:05 AM)   HEIDI Extremity with Raj Norris, PT   [facility] [place] [facility] ([facility] [place])    [address]  [address]  [place] [address]   [phone]            Mar 02, 2018 11:20 AM CST   HEIDI Extremity with Raj Norris PT   [facility] [place] [facility] ([facility] [place])    [address]  [address]  [place] [address]   [phone]              Who to contact     If you have questions or need follow up information about today's clinic visit or your schedule please contact [facility] [place] directly at [phone].  Normal or non-critical lab and imaging results will be communicated to you by [employer], letter or phone within 4 business days after the clinic has received the results. If you do not hear from us within 7 days, please contact the clinic through [employer] or phone. If you have a critical or abnormal lab result, we will notify you by phone as soon as possible.  Submit refill requests through [employer] or call your pharmacy and they will forward the refill request to us. Please allow 3 business days for your refill to be completed.          Additional Information About Your Visit        [employer] Information     [employer] gives you secure access to your electronic health record. If you see a primary care provider, you can also send messages to your care team and make appointments. If you have questions, please call  your primary care clinic.  If you do not have a primary care provider, please call [phone] and they will assist you.        Care EveryWhere ID     This is your Care EveryWhere ID. This could be used by other organizations to access your [place] medical records  [id number]        Your Vitals Were     Height BMI (Body Mass Index)                6' (1.829 m) 34.58 kg/m2           Blood Pressure from Last 3 Encounters:   02/23/18 (!) 130/91   02/20/18 (!) 130/91   02/15/18 130/88    Weight from Last 3 Encounters:   02/23/18 255 lb (115.7 kg)   02/20/18 255 lb (115.7 kg)   02/15/18 255 lb (115.7 kg)              Today, you had the following     No orders found for display       Primary Care Provider Office Phone # Fax #    Fifi Dayron Fleming [phone] [address]  [place] [address]        Equal Access to Services     [facility]: Hadii aad ku hadasho Soomaali, waaxda luqadaha, qaybta kaalmada adeegyada, waxay idiin haysandran breanna esquivel . So [facility] [phone].    ATENCIÓN: Si habla español, tiene a levine disposición servicios gratuitos de asistencia lingüística. Llame al [phone].    We comply with applicable federal civil rights laws and [place] laws. We do not discriminate on the basis of race, color, national origin, age, disability, sex, sexual orientation, or gender identity.            Thank you!     Thank you for choosing [facility] [place]  for your care. Our goal is always to provide you with excellent care. Hearing back from our patients is one way we can continue to improve our services. Please take a few minutes to complete the written survey that you may receive in the mail after your visit with us. Thank you!             Your Updated Medication List - Protect others around you: Learn how to safely use, store and throw away your medicines at www.disposemymeds.org.          This list is accurate as of 2/23/18  2:09 PM.   Always use your most recent med list.                   Brand Name Dispense Instructions for use Diagnosis    aspirin 81 MG EC tablet      Take 1 tablet (81 mg) by mouth daily    Hyperlipidemia with target LDL less than 130       atorvastatin 40 MG tablet    LIPITOR    90 tablet    Take 1 tablet (40 mg) by mouth daily    Hyperlipidemia with target LDL less than 130       clonazePAM 0.5 MG tablet    klonoPIN    30 tablet    Take 0.5-1 tablets (0.25-0.5 mg) by mouth nightly as needed for anxiety    Anxiety       fluticasone 50 MCG/ACT spray    FLONASE    16 g    Spray 1-2 sprays into both nostrils daily    Nasal congestion       levothyroxine 125 MCG tablet    SYNTHROID/LEVOTHROID    90 tablet    Take 1 tablet (125 mcg) by mouth daily    Other specified hypothyroidism       losartan 50 MG tablet    COZAAR    90 tablet    Take 1 tablet (50 mg) by mouth daily    Essential hypertension       meclizine 25 MG tablet    ANTIVERT    20 tablet    Take 1 tablet (25 mg) by mouth 3 times daily as needed for dizziness    Vertigo       sertraline 100 MG tablet    ZOLOFT    90 tablet    Take 1 tablet (100 mg) by mouth daily    Anxiety       traZODone 50 MG tablet    DESYREL    90 tablet    TAKE ONE TO TWO TABLETS BY MOUTH DAILY FOR SLEEP    Other insomnia

## 2018-02-26 ENCOUNTER — THERAPY VISIT (OUTPATIENT)
Dept: PHYSICAL THERAPY | Facility: CLINIC | Age: 58
End: 2018-02-26
Payer: COMMERCIAL

## 2018-02-26 DIAGNOSIS — M54.50 LUMBAGO: Primary | ICD-10-CM

## 2018-02-26 LAB — CCP AB SER IA-ACNC: <1 U/ML

## 2018-02-26 PROCEDURE — 97161 PT EVAL LOW COMPLEX 20 MIN: CPT | Mod: GP | Performed by: PHYSICAL THERAPIST

## 2018-02-26 PROCEDURE — 97110 THERAPEUTIC EXERCISES: CPT | Mod: GP | Performed by: PHYSICAL THERAPIST

## 2018-02-26 PROCEDURE — G8981 BODY POS CURRENT STATUS: HCPCS | Mod: GP | Performed by: PHYSICAL THERAPIST

## 2018-02-26 PROCEDURE — G8982 BODY POS GOAL STATUS: HCPCS | Mod: GP | Performed by: PHYSICAL THERAPIST

## 2018-02-26 NOTE — MR AVS SNAPSHOT
After Visit Summary   2/26/2018    Angus Wynne    MRN: [id number]           Patient Information     Date Of Birth          1960        Visit Information        Provider Department      2/26/2018 9:20 AM Raj Norris PT [facility] [place] [facility]        Today's Diagnoses     Lumbago    -  1       Follow-ups after your visit        Your next 10 appointments already scheduled     Mar 06, 2018  9:40 AM CST   HEIDI Extremity with Raj Norris PT   [facility] ([facility] [place])    [address]  [address]  [place] [address] [phone]              Who to contact     If you have questions or need follow up information about today's clinic visit or your schedule please contact [facility] [place] [facility] directly at [phone].  Normal or non-critical lab and imaging results will be communicated to you by [employer], letter or phone within 4 business days after the clinic has received the results. If you do not hear from us within 7 days, please contact the clinic through [employer] or phone. If you have a critical or abnormal lab result, we will notify you by phone as soon as possible.  Submit refill requests through [employer] or call your pharmacy and they will forward the refill request to us. Please allow 3 business days for your refill to be completed.          Additional Information About Your Visit        MyChart Information     [employer] gives you secure access to your electronic health record. If you see a primary care provider, you can also send messages to your care team and make appointments. If you have questions, please call your primary care clinic.  If you do not have a primary care provider, please call [phone] and they will assist you.        Care EveryWhere ID     This is your Care EveryWhere ID. This could be used by other organizations to  access your [place] medical records  [id number]         Blood Pressure from Last 3 Encounters:   02/23/18 (!) 130/91   02/20/18 (!) 130/91   02/15/18 130/88    Weight from Last 3 Encounters:   02/23/18 115.7 kg (255 lb)   02/20/18 115.7 kg (255 lb)   02/15/18 115.7 kg (255 lb)              We Performed the Following     HC PT EVAL, LOW COMPLEXITY     HEIDI INITIAL EVAL REPORT     THERAPEUTIC EXERCISES        Primary Care Provider Office Phone # Fax #    Fifi Dayron Fleming [phone]       [address]  [place] [address]        Equal Access to Services     [facility] : Hadii aad ku hadasho Soomaali, waaxda luqadaha, qaybta kaalmada adeegyada, waxay lakeshain mina esquivel . So [facility] [phone].    ATENCIÓN: Si habla español, tiene a levine disposición servicios gratuitos de asistencia lingüística. Llame al [phone].    We comply with applicable federal civil rights laws and [place] laws. We do not discriminate on the basis of race, color, national origin, age, disability, sex, sexual orientation, or gender identity.            Thank you!     Thank you for choosing [facility]  [place] [facility]  for your care. Our goal is always to provide you with excellent care. Hearing back from our patients is one way we can continue to improve our services. Please take a few minutes to complete the written survey that you may receive in the mail after your visit with us. Thank you!             Your Updated Medication List - Protect others around you: Learn how to safely use, store and throw away your medicines at www.disposemymeds.org.          This list is accurate as of 2/26/18 11:41 AM.  Always use your most recent med list.                   Brand Name Dispense Instructions for use Diagnosis    aspirin 81 MG EC tablet      Take 1 tablet (81 mg) by mouth daily    Hyperlipidemia with target LDL less than 130       atorvastatin 40 MG tablet     LIPITOR    90 tablet    Take 1 tablet (40 mg) by mouth daily    Hyperlipidemia with target LDL less than 130       clonazePAM 0.5 MG tablet    klonoPIN    30 tablet    Take 0.5-1 tablets (0.25-0.5 mg) by mouth nightly as needed for anxiety    Anxiety       fluticasone 50 MCG/ACT spray    FLONASE    16 g    Spray 1-2 sprays into both nostrils daily    Nasal congestion       levothyroxine 125 MCG tablet    SYNTHROID/LEVOTHROID    90 tablet    Take 1 tablet (125 mcg) by mouth daily    Other specified hypothyroidism       losartan 50 MG tablet    COZAAR    90 tablet    Take 1 tablet (50 mg) by mouth daily    Essential hypertension       meclizine 25 MG tablet    ANTIVERT    20 tablet    Take 1 tablet (25 mg) by mouth 3 times daily as needed for dizziness    Vertigo       sertraline 100 MG tablet    ZOLOFT    90 tablet    Take 1 tablet (100 mg) by mouth daily    Anxiety       traZODone 50 MG tablet    DESYREL    90 tablet    TAKE ONE TO TWO TABLETS BY MOUTH DAILY FOR SLEEP    Other insomnia

## 2018-02-26 NOTE — PROGRESS NOTES
[facility] Initial Evaluation  Subjective:  Patient is a 57 year old male presenting with rehab back hpi.   Angus Wynne is a 57 year old male with a lumbar condition.  Condition occurred with:  Insidious onset.  Condition occurred: for unknown reasons.  This is a chronic condition  Patient is referred with a long hx of central LBP. Pain is worse with bending, sitting more than 30' and walking. He is better when lying down and when sitting upright. PMH includes vertigo, neck pain and bilateral hand stiffness in the morning..    Patient reports pain:  Central lumbar spine.  Radiates to:  No radiation.  Pain is described as aching and is constant and reported as 6/10.  Associated symptoms:  Loss of motion/stiffness. Pain is worse during the day.  Symptoms are exacerbated by bending, walking, sitting and twisting and relieved by rest.  Since onset symptoms are gradually worsening.  Special tests:  X-ray.      General health as reported by patient is fair.  Pertinent medical history includes:  Overweight, high blood pressure, thyroid problems and sleep disorder/apnea.    Other surgeries include:  No.  Current medications:  Thyroid medication, sleep medication and high blood pressure medication.    Patient is currently not working due to present treatment problem.      Barriers include:  None as reported by the patient.    Red flags:  None as reported by the patient.                        Objective:  System    Physical Exam      [facility] Lumbar Evaluation    Posture:  Sitting: fair  Standing: fair  Lordosis: WNL  Lateral Shift: no  Correction of Posture: better    Movement Loss:  Flexion (Flex): min and pain  Extension (EXT): major and pain  Side [place] R (SG R): major and pain  Side [place] L (SG L): major and pain  Test Movements:  FIS: During: increases  After: no worse  Mechanical Response: no effectPretest Movements: 3/10 central LB  Repeat FIS: During: increases  After: worse  Mechanical  Response: IncROM  EIS: During: increases  After: worse  Mechanical Response: no effect    JACK: During: increases  After: no worse  Mechanical Response: no effect  Repeat JACK: During: increases  After: worse  Mechanical Response: no effect        Static Tests:          Lying Prone in Extension: minor decrease in LBP    Conclusion: dysfunction  Principle of Treatment:    Flexion: SKTC x 10/ 4 x daily  Extension: DELFINO x 2-3'/ 4 x daily    Other: core strengthening                                       ROS    Assessment/Plan:    Patient is a 57 year old male with lumbar complaints.    Patient has the following significant findings with corresponding treatment plan.                Diagnosis 1:  LBP  Pain -  electric stimulation, mechanical traction, self management, education and home program  Decreased ROM/flexibility - therapeutic exercise and home program  Impaired muscle performance - neuro re-education and home program  Decreased function - therapeutic activities and home program    Therapy Evaluation Codes:   1) History comprised of:   Personal factors that impact the plan of care:      Language and Time since onset of symptoms.    Comorbidity factors that impact the plan of care are:      Dizziness, Osteoarthritis, Overweight and Sleep disorder/apnea.     Medications impacting care: None.  2) Examination of Body Systems comprised of:   Body structures and functions that impact the plan of care:      Lumbar spine.   Activity limitations that impact the plan of care are:      Bending, Dressing, Lifting, Reading/Computer work, Sitting and Walking.  3) Clinical presentation characteristics are:   Stable/Uncomplicated.  4) Decision-Making    Low complexity using standardized patient assessment instrument and/or measureable assessment of functional outcome.  Cumulative Therapy Evaluation is: Low complexity.    Previous and current functional limitations:  (See Goal Flow Sheet for this information)    Short term and Long  term goals: (See Goal Flow Sheet for this information)     Communication ability:  Patient appears to be able to clearly communicate and understand verbal and written communication and follow directions correctly.  Treatment Explanation - The following has been discussed with the patient:   RX ordered/plan of care  Anticipated outcomes  Possible risks and side effects  This patient would benefit from PT intervention to resume normal activities.   Rehab potential is fair.    Frequency:  1 X week, once daily  Duration:  for 4 weeks  Discharge Plan:  Achieve all LTG.  Independent in home treatment program.  Reach maximal therapeutic benefit.    Please refer to the daily flowsheet for treatment today, total treatment time and time spent performing 1:1 timed codes.

## 2018-03-05 DIAGNOSIS — G47.09 OTHER INSOMNIA: ICD-10-CM

## 2018-03-05 DIAGNOSIS — F41.9 ANXIETY: ICD-10-CM

## 2018-03-05 RX ORDER — CLONAZEPAM 0.5 MG/1
.25-.5 TABLET ORAL
Qty: 30 TABLET | Refills: 0 | Status: SHIPPED | OUTPATIENT
Start: 2018-03-05 | End: 2018-04-19

## 2018-03-06 NOTE — TELEPHONE ENCOUNTER
Script taken to the clinic pharmacy  Radha MELCHOR  
clonazepam      Last Written Prescription Date:  1/22/18  Last Fill Quantity: 30,   # refills: 0  Last Office Visit: 2/15/18  Future Office visit:       Routing refill request to provider for review/approval because:  Drug not on the G, P or [facility] refill protocol or controlled substance    Maria Eugenia Marie RN   [facility] - Triage       
no transient paralysis/no paresthesias/no weakness

## 2018-03-07 RX ORDER — TRAZODONE HYDROCHLORIDE 50 MG/1
TABLET, FILM COATED ORAL
Qty: 90 TABLET | Refills: 2 | Status: SHIPPED | OUTPATIENT
Start: 2018-03-07 | End: 2018-07-24

## 2018-03-07 NOTE — TELEPHONE ENCOUNTER
"Requested Prescriptions   Pending Prescriptions Disp Refills     traZODone (DESYREL) 50 MG tablet [Pharmacy Med Name: TRAZODONE HCL 50MG TABS]  Last Written Prescription Date:  1/23/18  Last Fill Quantity: 90,  # refills: 0   Last office visit: 2/15/2018 with prescribing provider:  2/15/18   Future Office Visit:     90 tablet 0     Sig: TAKE ONE TO TWO TABLETS BY MOUTH DAILY FOR SLEEP    Serotonin Modulators Passed    3/5/2018 10:57 AM       Passed - Recent (12 mo) or future (30 days) visit within the authorizing provider's specialty    Patient had office visit in the last year or has a visit in the next 30 days with authorizing provider.  See \"Patient Info\" tab in inbasket, or \"Choose Columns\" in Meds & Orders section of the refill encounter.            Passed - Patient is age 18 or older          "

## 2018-04-19 ENCOUNTER — TELEPHONE (OUTPATIENT)
Dept: FAMILY MEDICINE | Facility: CLINIC | Age: 58
End: 2018-04-19

## 2018-04-19 DIAGNOSIS — F41.9 ANXIETY: ICD-10-CM

## 2018-04-19 RX ORDER — CLONAZEPAM 0.5 MG/1
.25-.5 TABLET ORAL
Qty: 14 TABLET | Refills: 0 | Status: SHIPPED | OUTPATIENT
Start: 2018-04-19 | End: 2018-05-23

## 2018-04-19 NOTE — TELEPHONE ENCOUNTER
klonopin      Last Written Prescription Date:  3/5/18  Last Fill Quantity: 30,   # refills: 0  Last Office Visit: 2/15/18  Future Office visit:       Routing refill request to provider for review/approval because:  Drug not on the [facility], P or [facility] refill protocol or controlled substance+

## 2018-04-19 NOTE — LETTER
April 20, 2018      Angus Wynne  [address] [place] [address]        Dear Angus,     Your clonazepam was refilled for 14 days  You are due to be seen in clinic by your Provider prior to your next refill   Please contact the clinic and allow enough time to schedule prior to your next refill need.  [phone]      * If we do not get a response from you or see that you have scheduled in the next two days; we will try contacting you again.      Radha  for :  Dr. Fifi Miller

## 2018-05-03 ENCOUNTER — OFFICE VISIT (OUTPATIENT)
Dept: FAMILY MEDICINE | Facility: CLINIC | Age: 58
End: 2018-05-03
Payer: COMMERCIAL

## 2018-05-03 VITALS
BODY MASS INDEX: 35.76 KG/M2 | HEIGHT: 72 IN | HEART RATE: 66 BPM | WEIGHT: 264 LBS | SYSTOLIC BLOOD PRESSURE: 127 MMHG | OXYGEN SATURATION: 97 % | TEMPERATURE: 97.1 F | DIASTOLIC BLOOD PRESSURE: 88 MMHG

## 2018-05-03 DIAGNOSIS — I10 ESSENTIAL HYPERTENSION: ICD-10-CM

## 2018-05-03 DIAGNOSIS — E03.8 OTHER SPECIFIED HYPOTHYROIDISM: Primary | ICD-10-CM

## 2018-05-03 DIAGNOSIS — F41.9 ANXIETY: ICD-10-CM

## 2018-05-03 DIAGNOSIS — R79.89 ELEVATED LFTS: ICD-10-CM

## 2018-05-03 LAB
ALBUMIN SERPL-MCNC: 4.1 G/DL (ref 3.4–5)
ALP SERPL-CCNC: 63 U/L (ref 40–150)
ALT SERPL W P-5'-P-CCNC: 78 U/L (ref 0–70)
AST SERPL W P-5'-P-CCNC: 48 U/L (ref 0–45)
BILIRUB DIRECT SERPL-MCNC: 0.1 MG/DL (ref 0–0.2)
BILIRUB SERPL-MCNC: 0.4 MG/DL (ref 0.2–1.3)
PROT SERPL-MCNC: 7.6 G/DL (ref 6.8–8.8)
T4 FREE SERPL-MCNC: 0.82 NG/DL (ref 0.76–1.46)
TSH SERPL DL<=0.005 MIU/L-ACNC: 7.8 MU/L (ref 0.4–4)

## 2018-05-03 PROCEDURE — 84439 ASSAY OF FREE THYROXINE: CPT | Performed by: FAMILY MEDICINE

## 2018-05-03 PROCEDURE — 99214 OFFICE O/P EST MOD 30 MIN: CPT | Performed by: FAMILY MEDICINE

## 2018-05-03 PROCEDURE — 36415 COLL VENOUS BLD VENIPUNCTURE: CPT | Performed by: FAMILY MEDICINE

## 2018-05-03 PROCEDURE — 84443 ASSAY THYROID STIM HORMONE: CPT | Performed by: FAMILY MEDICINE

## 2018-05-03 PROCEDURE — 80076 HEPATIC FUNCTION PANEL: CPT | Performed by: FAMILY MEDICINE

## 2018-05-03 RX ORDER — LOSARTAN POTASSIUM 100 MG/1
100 TABLET ORAL DAILY
Qty: 90 TABLET | Refills: 1 | Status: SHIPPED | OUTPATIENT
Start: 2018-05-03 | End: 2018-12-13

## 2018-05-03 ASSESSMENT — ANXIETY QUESTIONNAIRES
3. WORRYING TOO MUCH ABOUT DIFFERENT THINGS: NOT AT ALL
IF YOU CHECKED OFF ANY PROBLEMS ON THIS QUESTIONNAIRE, HOW DIFFICULT HAVE THESE PROBLEMS MADE IT FOR YOU TO DO YOUR WORK, TAKE CARE OF THINGS AT HOME, OR GET ALONG WITH OTHER PEOPLE: NOT DIFFICULT AT ALL
5. BEING SO RESTLESS THAT IT IS HARD TO SIT STILL: NOT AT ALL
2. NOT BEING ABLE TO STOP OR CONTROL WORRYING: NOT AT ALL
1. FEELING NERVOUS, ANXIOUS, OR ON EDGE: NOT AT ALL
6. BECOMING EASILY ANNOYED OR IRRITABLE: NOT AT ALL
GAD7 TOTAL SCORE: 1
7. FEELING AFRAID AS IF SOMETHING AWFUL MIGHT HAPPEN: NOT AT ALL

## 2018-05-03 ASSESSMENT — PATIENT HEALTH QUESTIONNAIRE - PHQ9: 5. POOR APPETITE OR OVEREATING: SEVERAL DAYS

## 2018-05-03 NOTE — NURSING NOTE
Chief Complaint   Patient presents with     Recheck Medication       Initial BP (!) 135/91  Pulse 63  Temp 97.1  F (36.2  C) (Tympanic)  Ht 6' (1.829 m)  Wt 264 lb (119.7 kg)  SpO2 97%  BMI 35.8 kg/m2 Estimated body mass index is 35.8 kg/(m^2) as calculated from the following:    Height as of this encounter: 6' (1.829 m).    Weight as of this encounter: 264 lb (119.7 kg).  Medication Reconciliation: complete

## 2018-05-03 NOTE — MR AVS SNAPSHOT
After Visit Summary   5/3/2018    Angus Wynne    MRN: [id number]           Patient Information     Date Of Birth          1960        Visit Information        Provider Department      5/3/2018 8:00 AM Fifi Fleming MD [facility]        Today's Diagnoses     Other specified hypothyroidism    -  1    Essential hypertension        Anxiety        Elevated LFTs          Care Instructions    Take medications as directed.  treatment  and symptomatic cares discussed   Follow up if problem or concern             Follow-ups after your visit        Follow-up notes from your care team     Return in about 3 weeks (around 5/24/2018).      Who to contact     If you have questions or need follow up information about today's clinic visit or your schedule please contact [facility] [place] directly at [phone].  Normal or non-critical lab and imaging results will be communicated to you by MyChart, letter or phone within 4 business days after the clinic has received the results. If you do not hear from us within 7 days, please contact the clinic through MyChart or phone. If you have a critical or abnormal lab result, we will notify you by phone as soon as possible.  Submit refill requests through [employer] or call your pharmacy and they will forward the refill request to us. Please allow 3 business days for your refill to be completed.          Additional Information About Your Visit        MyChart Information     [employer] gives you secure access to your electronic health record. If you see a primary care provider, you can also send messages to your care team and make appointments. If you have questions, please call your primary care clinic.  If you do not have a primary care provider, please call [phone] and they will assist you.        Care EveryWhere ID     This is your Care EveryWhere ID. This could be used by other organizations to access your [facility]  records  [id number]        Your Vitals Were     Pulse Temperature Height Pulse Oximetry BMI (Body Mass Index)       66 97.1  F (36.2  C) (Tympanic) 6' (1.829 m) 97% 35.8 kg/m2        Blood Pressure from Last 3 Encounters:   05/03/18 127/88   02/23/18 (!) 130/91   02/20/18 (!) 130/91    Weight from Last 3 Encounters:   05/03/18 264 lb (119.7 kg)   02/23/18 255 lb (115.7 kg)   02/20/18 255 lb (115.7 kg)              We Performed the Following     Hepatic panel     T4 free     TSH with free T4 reflex          Today's Medication Changes          These changes are accurate as of 5/3/18 11:59 PM.  If you have any questions, ask your nurse or doctor.               These medicines have changed or have updated prescriptions.        Dose/Directions    * losartan 50 MG tablet   Commonly known as:  COZAAR   This may have changed:  Another medication with the same name was added. Make sure you understand how and when to take each.   Used for:  Essential hypertension   Changed by:  Fifi Fleming MD        Dose:  50 mg   Take 1 tablet (50 mg) by mouth daily   Quantity:  90 tablet   Refills:  1       * losartan 100 MG tablet   Commonly known as:  COZAAR   This may have changed:  You were already taking a medication with the same name, and this prescription was added. Make sure you understand how and when to take each.   Used for:  Essential hypertension   Changed by:  Fifi Fleming MD        Dose:  100 mg   Take 1 tablet (100 mg) by mouth daily   Quantity:  90 tablet   Refills:  1       * Notice:  This list has 2 medication(s) that are the same as other medications prescribed for you. Read the directions carefully, and ask your doctor or other care provider to review them with you.         Where to get your medicines      These medications were sent to [facility] [place] - [place], [place] - [address] [place], [place] [address] [facility] [place] [address]     Phone:  [phone]     losartan  100 MG tablet                Primary Care Provider Office Phone # Fax #    Fifi Dayron Fleming [phone]       [address]  [place] [address]        Equal Access to Services     SAAD BARAKAT : Hadii jackelin ku hadsamio Soomaali, waaxda luqadaha, qaybta kaalmada adeegyada, waxisaiah idiin ramakrishnan breanna love laCelestinoraeann rendon. So [facility] [phone].    ATENCIÓN: Si habla español, tiene a levine disposición servicios gratuitos de asistencia lingüística. Llame al [phone].    We comply with applicable federal civil rights laws and [place] laws. We do not discriminate on the basis of race, color, national origin, age, disability, sex, sexual orientation, or gender identity.            Thank you!     Thank you for choosing [facility] [place]  for your care. Our goal is always to provide you with excellent care. Hearing back from our patients is one way we can continue to improve our services. Please take a few minutes to complete the written survey that you may receive in the mail after your visit with us. Thank you!             Your Updated Medication List - Protect others around you: Learn how to safely use, store and throw away your medicines at www.disposemymeds.org.          This list is accurate as of 5/3/18 11:59 PM.  Always use your most recent med list.                   Brand Name Dispense Instructions for use Diagnosis    aspirin 81 MG EC tablet      Take 1 tablet (81 mg) by mouth daily    Hyperlipidemia with target LDL less than 130       atorvastatin 40 MG tablet    LIPITOR    90 tablet    Take 1 tablet (40 mg) by mouth daily    Hyperlipidemia with target LDL less than 130       clonazePAM 0.5 MG tablet    klonoPIN    14 tablet    Take 0.5-1 tablets (0.25-0.5 mg) by mouth nightly as needed for anxiety    Anxiety       fluticasone 50 MCG/ACT spray    FLONASE    16 g    Spray 1-2 sprays into both nostrils daily    Nasal congestion       levothyroxine 125 MCG tablet     SYNTHROID/LEVOTHROID    90 tablet    Take 1 tablet (125 mcg) by mouth daily    Other specified hypothyroidism       * losartan 50 MG tablet    COZAAR    90 tablet    Take 1 tablet (50 mg) by mouth daily    Essential hypertension       * losartan 100 MG tablet    COZAAR    90 tablet    Take 1 tablet (100 mg) by mouth daily    Essential hypertension       meclizine 25 MG tablet    ANTIVERT    20 tablet    Take 1 tablet (25 mg) by mouth 3 times daily as needed for dizziness    Vertigo       sertraline 100 MG tablet    ZOLOFT    90 tablet    Take 1 tablet (100 mg) by mouth daily    Anxiety       traZODone 50 MG tablet    DESYREL    90 tablet    TAKE ONE TO TWO TABLETS BY MOUTH DAILY FOR SLEEP    Other insomnia       * Notice:  This list has 2 medication(s) that are the same as other medications prescribed for you. Read the directions carefully, and ask your doctor or other care provider to review them with you.

## 2018-05-03 NOTE — PROGRESS NOTES
SUBJECTIVE:   Angus Wynne is a 57 year old male who presents to clinic today for the following health issues:      Medication Followup of Losartan, Levothyroxine, Atorvastatin, Sertraline     Taking Medication as prescribed: yes    Side Effects:  None    Medication Helping Symptoms:  yes         Hypertension Follow-up ADD ON ON PROBLEM       Outpatient blood pressures are being checked at store.  Results are ok. No problem taking med's.    Low Salt Diet: no added salt    Depression and Anxiety Follow-Up    Status since last visit: Improved some anxiety on and off, mostly at night ans hard to sleep, so take Klonopin mostly at bed time     Other associated symptoms:better , since he is on Zoloft and he is comfortable at current dose     Complicating factors:     Significant life event: No     Current substance abuse: None   wants thyroid checked, as it was not normal lat time. Has some fatigue, hard to loose weight although admits that he could better with exercise   Also had elevated LFT, not drinking as much now . Has seen GI previously as well. Just wants labs.      PHQ-9 9/18/2017 12/6/2017 2/15/2018   Total Score 2 3 0   Q9: Suicide Ideation Not at all Not at all Not at all     RUTH-7 SCORE 9/18/2017 12/6/2017 2/15/2018   Total Score 2 3 0       PHQ-9  English  PHQ-9   Any Language  RUTH-7  Suicide Assessment Five-step Evaluation and Treatment (SAFE-T)    Problem list and histories reviewed & adjusted, as indicated.  Additional history: as documented    Patient Active Problem List   Diagnosis     BPPV (benign paroxysmal positional vertigo), unspecified laterality     Other specified hypothyroidism     Hx of acute alcoholic hepatitis     Hx of colonic polyp     Gastroesophageal reflux disease, esophagitis presence not specified     Vitamin D deficiency     Anxiety     Alcohol use disorder (H)     Neck pain     Cervicalgia     Cervical spondylosis without myelopathy     Essential hypertension     Hyperlipidemia  with target LDL less than 130     Nasal congestion     History of colonic polyps     Vertigo     Lumbago     Past Surgical History:   Procedure Laterality Date     NO HISTORY OF SURGERY         Social History   Substance Use Topics     Smoking status: Former Smoker     Quit date: 9/28/2015     Smokeless tobacco: Never Used     Alcohol use No     Family History   Problem Relation Age of Onset     Coronary Artery Disease Father      at age 60      Hyperlipidemia Brother      DIABETES No family hx of      CEREBROVASCULAR DISEASE No family hx of      Colon Cancer No family hx of      Prostate Cancer No family hx of            Reviewed and updated as needed this visit by clinical staff       Reviewed and updated as needed this visit by Provider         ROS:  Constitutional, HEENT, cardiovascular, pulmonary, GI, , musculoskeletal, neuro, skin, endocrine and psych systems are negative, except as otherwise noted.    OBJECTIVE:     /88  Pulse 66  Temp 97.1  F (36.2  C) (Tympanic)  Ht 6' (1.829 m)  Wt 264 lb (119.7 kg)  SpO2 97%  BMI 35.8 kg/m2  Body mass index is 35.8 kg/(m^2).  GENERAL: healthy, alert and no distress  NECK: no adenopathy, no asymmetry, masses, or scars and thyroid normal to palpation  RESP: lungs clear to auscultation - no rales, rhonchi or wheezes  CV: regular rate and rhythm, normal S1 S2, no S3 or S4, no murmur, click or rub, no peripheral edema and peripheral pulses strong  ABDOMEN: soft, nontender, no hepatosplenomegaly, no masses and bowel sounds normal  MS: no gross musculoskeletal defects noted, no edema    Diagnostic Test Results:  none     ASSESSMENT/PLAN:     (E03.8) Other specified hypothyroidism  (primary encounter diagnosis)  Comment:   Plan: TSH with free T4 reflex, T4 free        check labs , adjust med's as needed.     (I10) Essential hypertension  Comment: stable   Plan: losartan (COZAAR) 100 MG tablet        BP in adequate control. Discussed cares, low fat low salt  diet  etc.  Refill given. encouraged home BP monitoring. Follow up recheck in 6 months, sooner if problem.       (F41.9) Anxiety  Comment:   Plan: he thinsk he is doing well on current med's. Still need Klonopin mostly to sleep at night , although his use has gone down quiet a bit   Need to monitor Klonopin use. F/u in 3-4 months.      (R79.89) Elevated LFTs  Comment:   Plan: Hepatic panel          Check labs. refill sent.Cares and  treatment discussed follow. up if problem   Patient expressed understanding and agreement with treatment plan. All patient's questions were answered, will let me know if has more later.  Medications: Rx's: Reviewed the potential side effects/complications of medications prescribed.       Fifi Fleming MD  [facility]

## 2018-05-04 ASSESSMENT — PATIENT HEALTH QUESTIONNAIRE - PHQ9: SUM OF ALL RESPONSES TO PHQ QUESTIONS 1-9: 0

## 2018-05-04 ASSESSMENT — ANXIETY QUESTIONNAIRES: GAD7 TOTAL SCORE: 1

## 2018-05-10 ENCOUNTER — TELEPHONE (OUTPATIENT)
Dept: FAMILY MEDICINE | Facility: CLINIC | Age: 58
End: 2018-05-10

## 2018-05-10 DIAGNOSIS — E03.8 OTHER SPECIFIED HYPOTHYROIDISM: Primary | ICD-10-CM

## 2018-05-10 NOTE — TELEPHONE ENCOUNTER
Spoke with patient regarding lab results. He reports he is taking his thyroid medication regularly as prescribed. He states he is only taking the one ordered by Dr. Fleming and not from Angela. Pharmacy pended, please advise.     Liver test, remain mildly abnormal but stable.  Need to monitor and recheck in 6-8 weeks.   Please continue to monitor and avoid alcohol use.   Thyroid test remains abnormal, affect is slightly worse than previous , and I am curious if you taking your medication regularly and have not been missing any doses?  Also make sure he is taking the same brand prescribed here(not switching back and forth from other brand from [facility]) sometimes switching brands can also affect the blood level.  If that is the case I your thoughts change the dose, and stay on the same medication dose and recheck in 6 weeks.     Although if you think you have been taking the prescription pretty consistently, we may have to consider the higher dose of the medication.  Let me know your test thoughts?    Triage to call with response [phone] okay to leave detailed message.     Maria Eugenia Marie RN   [facility] - Triage

## 2018-05-14 RX ORDER — LEVOTHYROXINE SODIUM 137 UG/1
137 TABLET ORAL DAILY
Qty: 90 TABLET | Refills: 1 | Status: SHIPPED | OUTPATIENT
Start: 2018-05-14 | End: 2018-12-13 | Stop reason: DRUGHIGH

## 2018-05-14 NOTE — TELEPHONE ENCOUNTER
Patient notified of information noted below from provider via voice mail message.   Bella Martinez RN Triage  [facility]

## 2018-05-23 DIAGNOSIS — F41.9 ANXIETY: ICD-10-CM

## 2018-05-23 NOTE — TELEPHONE ENCOUNTER
clonazepam      Last Written Prescription Date:  4/19/18  Last Fill Quantity: 14,   # refills: 0  Last Office Visit: 5/3/18  Future Office visit:       Routing refill request to provider for review/approval because:  Drug not on the G, P or [facility] refill protocol or controlled substance    Maria Eugenia Marie RN   [facility] - Triage

## 2018-05-29 RX ORDER — CLONAZEPAM 0.5 MG/1
.25-.5 TABLET ORAL
Qty: 14 TABLET | Refills: 0 | Status: SHIPPED | OUTPATIENT
Start: 2018-05-29 | End: 2018-06-21

## 2018-05-30 NOTE — TELEPHONE ENCOUNTER
Received faxed refill request from [facility] for Calcipotriene Cream 0.005% BID. This med has never been ordered previously. Left non detailed message for patient to return call to find out why he is taking the medication and who ordered it.   Maria Eugenia Marie RN   [facility] - Triage

## 2018-06-07 NOTE — TELEPHONE ENCOUNTER
I reached his wife and asked her to have him call us back  Tanya Talbot RN- Triage [employer]

## 2018-06-11 RX ORDER — CALCIPOTRIENE 50 UG/G
CREAM TOPICAL 2 TIMES DAILY
Qty: 100 G | Refills: 3 | OUTPATIENT
Start: 2018-06-11

## 2018-06-11 NOTE — TELEPHONE ENCOUNTER
I declined the Rx to the pharmacy.  Patient never called back  Tanya Talbot RN- Triage [employer]

## 2018-06-11 NOTE — TELEPHONE ENCOUNTER
Refill request is 12 days old- routing to provider to address.    America Macias,RN BSN  [facility]  [phone]

## 2018-06-12 NOTE — TELEPHONE ENCOUNTER
Patient returned call and reports that the request should have been for his wife and from dr. Sarabia.  Will clarify with his pharmacy  Lora Villalba RN - Triage  [facility]

## 2018-06-21 DIAGNOSIS — F41.9 ANXIETY: ICD-10-CM

## 2018-06-21 RX ORDER — CLONAZEPAM 0.5 MG/1
.25-.5 TABLET ORAL
Qty: 14 TABLET | Refills: 0 | Status: SHIPPED | OUTPATIENT
Start: 2018-06-21 | End: 2018-12-13

## 2018-06-21 NOTE — TELEPHONE ENCOUNTER
Clonazepam      Last Written Prescription Date: 5/29/2018  Last Fill Quantity: 14,  # refills: 0   Last Office Visit with [facility], [facility] or [facility] prescribing provider: 5/3/2018                                             Routing refill request to provider for review/approval because:  Drug not on the G refill protocol   Lora Villalba RN - Triage  [facility]

## 2018-06-21 NOTE — TELEPHONE ENCOUNTER
Prescription of clonazepam was taken to the clinic pharmacy  Date:June 21, 2018  Signature:Radha MELCHOR

## 2018-07-24 DIAGNOSIS — G47.09 OTHER INSOMNIA: ICD-10-CM

## 2018-07-25 ENCOUNTER — OFFICE VISIT (OUTPATIENT)
Dept: FAMILY MEDICINE | Facility: CLINIC | Age: 58
End: 2018-07-25
Payer: COMMERCIAL

## 2018-07-25 VITALS
SYSTOLIC BLOOD PRESSURE: 130 MMHG | OXYGEN SATURATION: 96 % | WEIGHT: 267 LBS | HEART RATE: 73 BPM | BODY MASS INDEX: 36.21 KG/M2 | DIASTOLIC BLOOD PRESSURE: 84 MMHG | TEMPERATURE: 98.2 F

## 2018-07-25 DIAGNOSIS — I10 ESSENTIAL HYPERTENSION: ICD-10-CM

## 2018-07-25 DIAGNOSIS — E78.5 HYPERLIPIDEMIA WITH TARGET LDL LESS THAN 130: ICD-10-CM

## 2018-07-25 DIAGNOSIS — F41.9 ANXIETY: ICD-10-CM

## 2018-07-25 DIAGNOSIS — E03.8 OTHER SPECIFIED HYPOTHYROIDISM: Primary | ICD-10-CM

## 2018-07-25 DIAGNOSIS — E66.01 MORBID OBESITY (H): ICD-10-CM

## 2018-07-25 PROCEDURE — 99214 OFFICE O/P EST MOD 30 MIN: CPT | Performed by: FAMILY MEDICINE

## 2018-07-25 PROCEDURE — 80053 COMPREHEN METABOLIC PANEL: CPT | Performed by: FAMILY MEDICINE

## 2018-07-25 PROCEDURE — 84443 ASSAY THYROID STIM HORMONE: CPT | Performed by: FAMILY MEDICINE

## 2018-07-25 PROCEDURE — 36415 COLL VENOUS BLD VENIPUNCTURE: CPT | Performed by: FAMILY MEDICINE

## 2018-07-25 RX ORDER — TRAZODONE HYDROCHLORIDE 50 MG/1
TABLET, FILM COATED ORAL
Qty: 90 TABLET | Refills: 2 | Status: SHIPPED | OUTPATIENT
Start: 2018-07-25 | End: 2018-12-13

## 2018-07-25 RX ORDER — SERTRALINE HYDROCHLORIDE 100 MG/1
100 TABLET, FILM COATED ORAL DAILY
Qty: 90 TABLET | Refills: 1 | Status: SHIPPED | OUTPATIENT
Start: 2018-07-25 | End: 2018-12-13

## 2018-07-25 RX ORDER — ATORVASTATIN CALCIUM 40 MG/1
40 TABLET, FILM COATED ORAL DAILY
Qty: 90 TABLET | Refills: 2 | Status: SHIPPED | OUTPATIENT
Start: 2018-07-25 | End: 2018-12-13

## 2018-07-25 NOTE — TELEPHONE ENCOUNTER
"Requested Prescriptions   Pending Prescriptions Disp Refills     traZODone (DESYREL) 50 MG tablet  Last Written Prescription Date:  3/7/18  Last Fill Quantity: 90,  # refills: 2   Last office visit: 5/3/2018 with prescribing provider:  YES    Future Office Visit:   Next 5 appointments (look out 90 days)     Jul 25, 2018  3:20 PM CDT   Office Visit with Vahe Mobley MD   [facility] ([address] [id number] [phone]                  90 tablet 2    Serotonin Modulators Passed    7/24/2018  5:50 PM       Passed - Recent (12 mo) or future (30 days) visit within the authorizing provider's specialty    Patient had office visit in the last 12 months or has a visit in the next 30 days with authorizing provider or within the authorizing provider's specialty.  See \"Patient Info\" tab in inbasket, or \"Choose Columns\" in Meds & Orders section of the refill encounter.           Passed - Patient is age 18 or older          "

## 2018-07-25 NOTE — PROGRESS NOTES
SUBJECTIVE:   Angus Wynne is a 58 year old male who presents to clinic today for the following health issues:      Hypertension Follow-up  Patient is currently on losartan 100 mg.  He denies any chest pain no shortness of breath.  He feels his blood pressure is stable.  He is planning to visit back home for 2-3 months.  His mood is stable    Outpatient blood pressures are not being checked.    Low Salt Diet: low salt      Amount of exercise or physical activity: None    Problems taking medications regularly: No    Medication side effects: none    Diet: low salt            Problem list and histories reviewed & adjusted, as indicated.  Additional history: as documented        Reviewed and updated as needed this visit by clinical staff  Tobacco  Allergies  Meds       Reviewed and updated as needed this visit by Provider         ROS:  CONSTITUTIONAL: NEGATIVE for fever, chills, change in weight  ENT/MOUTH: NEGATIVE for ear, mouth and throat problems  RESP: NEGATIVE for significant cough or SOB  CV: NEGATIVE for chest pain, palpitations or peripheral edema    OBJECTIVE:                                                    /84  Pulse 73  Temp 98.2  F (36.8  C) (Tympanic)  Wt 267 lb (121.1 kg)  SpO2 96%  BMI 36.21 kg/m2  Body mass index is 36.21 kg/(m^2).   GENERAL: healthy, alert, well nourished, well hydrated, no distress  HENT: ear canals- normal; TMs- normal; Nose- normal; Mouth- no ulcers, no lesions  NECK: no tenderness, no adenopathy, no asymmetry, no masses, no stiffness; thyroid- normal to palpation  RESP: lungs clear to auscultation - no rales, no rhonchi, no wheezes  CV: regular rates and rhythm, normal S1 S2, no S3 or S4 and no murmur, no click or rub -  ABDOMEN: soft, no tenderness, no  hepatosplenomegaly, no masses, normal bowel sounds  PSYCH: Alert and oriented times 3; speech- coherent , normal rate and volume; able to articulate logical thoughts, able to abstract reason, no tangential  thoughts, no hallucinations or delusions, affect- normal    {     ASSESSMENT/PLAN:                                                        ICD-10-CM    1. Other specified hypothyroidism E03.8    2. Hyperlipidemia with target LDL less than 130 E78.5 atorvastatin (LIPITOR) 40 MG tablet   3. Morbid obesity (H) E66.01    4. Anxiety F41.9 sertraline (ZOLOFT) 100 MG tablet   5. Essential hypertension I10 TSH     Comprehensive metabolic panel     Patient is overall stable on his medications.  Medication refilled as per his request.  We will get some blood work and will follow up on that.  Advised to follow-up in 6 months for recheck      Vahe Mobley MD  [facility]

## 2018-07-25 NOTE — MR AVS SNAPSHOT
After Visit Summary   7/25/2018    Angus Wynne    MRN: [id number]           Patient Information     Date Of Birth          1960        Visit Information        Provider Department      7/25/2018 3:20 PM [facility]        Today's Diagnoses     Other specified hypothyroidism    -  1    Hyperlipidemia with target LDL less than 130        Morbid obesity (H)        Anxiety        Essential hypertension           Follow-ups after your visit        Follow-up notes from your care team     Return in about 6 months (around 1/25/2019) for Physical Exam.      Who to contact     If you have questions or need follow up information about today's clinic visit or your schedule please contact [facility] directly at [phone].  Normal or non-critical lab and imaging results will be communicated to you by Codewarshart, letter or phone within 4 business days after the clinic has received the results. If you do not hear from us within 7 days, please contact the clinic through Codewarshart or phone. If you have a critical or abnormal lab result, we will notify you by phone as soon as possible.  Submit refill requests through Acertiv or call your pharmacy and they will forward the refill request to us. Please allow 3 business days for your refill to be completed.          Additional Information About Your Visit        MyChart Information     Acertiv gives you secure access to your electronic health record. If you see a primary care provider, you can also send messages to your care team and make appointments. If you have questions, please call your primary care clinic.  If you do not have a primary care provider, please call [phone] and they will assist you.        Care EveryWhere ID     This is your Care EveryWhere ID. This could be used by other organizations to access your [place] medical records  [id number]        Your Vitals Were     Pulse Temperature Pulse  Oximetry BMI (Body Mass Index)          73 98.2  F (36.8  C) (Tympanic) 96% 36.21 kg/m2         Blood Pressure from Last 3 Encounters:   07/25/18 130/84   05/03/18 127/88   02/23/18 (!) 130/91    Weight from Last 3 Encounters:   07/25/18 267 lb (121.1 kg)   05/03/18 264 lb (119.7 kg)   02/23/18 255 lb (115.7 kg)              We Performed the Following     Comprehensive metabolic panel     TSH          Today's Medication Changes          These changes are accurate as of 7/25/18  3:40 PM.  If you have any questions, ask your nurse or doctor.               These medicines have changed or have updated prescriptions.        Dose/Directions    losartan 100 MG tablet   Commonly known as:  COZAAR   This may have changed:  Another medication with the same name was removed. Continue taking this medication, and follow the directions you see here.   Used for:  Essential hypertension   Changed by:  Vahe Mobley MD        Dose:  100 mg   Take 1 tablet (100 mg) by mouth daily   Quantity:  90 tablet   Refills:  1         Stop taking these medicines if you haven't already. Please contact your care team if you have questions.     aspirin 81 MG EC tablet   Stopped by:  Vahe Mobley MD           meclizine 25 MG tablet   Commonly known as:  ANTIVERT   Stopped by:  Vahe Mobley MD                Where to get your medicines      These medications were sent to [facility] [place] - [place], [place] - [address]  [address], [place] [address]     Phone:  [phone]     atorvastatin 40 MG tablet    sertraline 100 MG tablet                Primary Care Provider Office Phone # Fax #    Fifi Fleming [phone]       [address] [place] [address] [address]  [place] [address]        Equal Access to Services     [facility] YUVAL AH: Hadii jackelin whitney Sojer, waaxda luqadaha, qaybta kaalmada adechristinayada, nadeen rendon. So [facility] [phone].    ATENCIÓN: Iqra batista  español, tiene a levine disposición servicios gratuitos de asistencia lingüística. Cassidy calle [phone].    We comply with applicable federal civil rights laws and [place] laws. We do not discriminate on the basis of race, color, national origin, age, disability, sex, sexual orientation, or gender identity.            Thank you!     Thank you for choosing [facility]  for your care. Our goal is always to provide you with excellent care. Hearing back from our patients is one way we can continue to improve our services. Please take a few minutes to complete the written survey that you may receive in the mail after your visit with us. Thank you!             Your Updated Medication List - Protect others around you: Learn how to safely use, store and throw away your medicines at www.disposemymeds.org.          This list is accurate as of 7/25/18  3:40 PM.  Always use your most recent med list.                   Brand Name Dispense Instructions for use Diagnosis    atorvastatin 40 MG tablet    LIPITOR    90 tablet    Take 1 tablet (40 mg) by mouth daily    Hyperlipidemia with target LDL less than 130       clonazePAM 0.5 MG tablet    klonoPIN    14 tablet    Take 0.5-1 tablets (0.25-0.5 mg) by mouth nightly as needed for anxiety    Anxiety       fluticasone 50 MCG/ACT spray    FLONASE    16 g    Spray 1-2 sprays into both nostrils daily    Nasal congestion       levothyroxine 137 MCG tablet    SYNTHROID/LEVOTHROID    90 tablet    Take 1 tablet (137 mcg) by mouth daily    Other specified hypothyroidism       losartan 100 MG tablet    COZAAR    90 tablet    Take 1 tablet (100 mg) by mouth daily    Essential hypertension       sertraline 100 MG tablet    ZOLOFT    90 tablet    Take 1 tablet (100 mg) by mouth daily    Anxiety       traZODone 50 MG tablet    DESYREL    90 tablet    TAKE ONE TO TWO TABLETS BY MOUTH DAILY FOR SLEEP    Other insomnia

## 2018-07-26 LAB
ALBUMIN SERPL-MCNC: 4.4 G/DL (ref 3.4–5)
ALP SERPL-CCNC: 55 U/L (ref 40–150)
ALT SERPL W P-5'-P-CCNC: 76 U/L (ref 0–70)
ANION GAP SERPL CALCULATED.3IONS-SCNC: 7 MMOL/L (ref 3–14)
AST SERPL W P-5'-P-CCNC: 37 U/L (ref 0–45)
BILIRUB SERPL-MCNC: 0.7 MG/DL (ref 0.2–1.3)
BUN SERPL-MCNC: 11 MG/DL (ref 7–30)
CALCIUM SERPL-MCNC: 8.7 MG/DL (ref 8.5–10.1)
CHLORIDE SERPL-SCNC: 103 MMOL/L (ref 94–109)
CO2 SERPL-SCNC: 29 MMOL/L (ref 20–32)
CREAT SERPL-MCNC: 0.95 MG/DL (ref 0.66–1.25)
GFR SERPL CREATININE-BSD FRML MDRD: 81 ML/MIN/1.7M2
GLUCOSE SERPL-MCNC: 102 MG/DL (ref 70–99)
POTASSIUM SERPL-SCNC: 4.4 MMOL/L (ref 3.4–5.3)
PROT SERPL-MCNC: 7.7 G/DL (ref 6.8–8.8)
SODIUM SERPL-SCNC: 139 MMOL/L (ref 133–144)
TSH SERPL DL<=0.005 MIU/L-ACNC: 4.82 MU/L (ref 0.4–4)

## 2018-07-26 ASSESSMENT — PATIENT HEALTH QUESTIONNAIRE - PHQ9: SUM OF ALL RESPONSES TO PHQ QUESTIONS 1-9: 3

## 2018-11-13 ENCOUNTER — HEALTH MAINTENANCE LETTER (OUTPATIENT)
Age: 58
End: 2018-11-13

## 2018-12-13 ENCOUNTER — OFFICE VISIT (OUTPATIENT)
Dept: FAMILY MEDICINE | Facility: CLINIC | Age: 58
End: 2018-12-13
Payer: COMMERCIAL

## 2018-12-13 VITALS
DIASTOLIC BLOOD PRESSURE: 88 MMHG | BODY MASS INDEX: 35.89 KG/M2 | HEART RATE: 74 BPM | SYSTOLIC BLOOD PRESSURE: 138 MMHG | OXYGEN SATURATION: 99 % | TEMPERATURE: 97.3 F | HEIGHT: 72 IN | WEIGHT: 265 LBS

## 2018-12-13 DIAGNOSIS — E03.8 OTHER SPECIFIED HYPOTHYROIDISM: ICD-10-CM

## 2018-12-13 DIAGNOSIS — G47.09 OTHER INSOMNIA: ICD-10-CM

## 2018-12-13 DIAGNOSIS — F41.9 ANXIETY: ICD-10-CM

## 2018-12-13 DIAGNOSIS — Z23 NEED FOR PROPHYLACTIC VACCINATION AND INOCULATION AGAINST INFLUENZA: Primary | ICD-10-CM

## 2018-12-13 DIAGNOSIS — E78.5 HYPERLIPIDEMIA WITH TARGET LDL LESS THAN 130: ICD-10-CM

## 2018-12-13 DIAGNOSIS — R06.02 SOB (SHORTNESS OF BREATH): ICD-10-CM

## 2018-12-13 DIAGNOSIS — Z12.11 COLON CANCER SCREENING: ICD-10-CM

## 2018-12-13 DIAGNOSIS — I10 ESSENTIAL HYPERTENSION: ICD-10-CM

## 2018-12-13 PROCEDURE — 80053 COMPREHEN METABOLIC PANEL: CPT | Performed by: FAMILY MEDICINE

## 2018-12-13 PROCEDURE — 84439 ASSAY OF FREE THYROXINE: CPT | Performed by: FAMILY MEDICINE

## 2018-12-13 PROCEDURE — 90682 RIV4 VACC RECOMBINANT DNA IM: CPT | Performed by: FAMILY MEDICINE

## 2018-12-13 PROCEDURE — 90471 IMMUNIZATION ADMIN: CPT | Performed by: FAMILY MEDICINE

## 2018-12-13 PROCEDURE — 99214 OFFICE O/P EST MOD 30 MIN: CPT | Mod: 25 | Performed by: FAMILY MEDICINE

## 2018-12-13 PROCEDURE — 84443 ASSAY THYROID STIM HORMONE: CPT | Performed by: FAMILY MEDICINE

## 2018-12-13 PROCEDURE — 80061 LIPID PANEL: CPT | Performed by: FAMILY MEDICINE

## 2018-12-13 PROCEDURE — 36415 COLL VENOUS BLD VENIPUNCTURE: CPT | Performed by: FAMILY MEDICINE

## 2018-12-13 RX ORDER — SERTRALINE HYDROCHLORIDE 100 MG/1
100 TABLET, FILM COATED ORAL DAILY
Qty: 90 TABLET | Refills: 1 | Status: SHIPPED | OUTPATIENT
Start: 2018-12-13 | End: 2019-08-19

## 2018-12-13 RX ORDER — TRAZODONE HYDROCHLORIDE 50 MG/1
TABLET, FILM COATED ORAL
Qty: 90 TABLET | Refills: 0 | Status: SHIPPED | OUTPATIENT
Start: 2018-12-13 | End: 2019-01-14

## 2018-12-13 RX ORDER — ATORVASTATIN CALCIUM 40 MG/1
40 TABLET, FILM COATED ORAL DAILY
Qty: 90 TABLET | Refills: 1 | Status: SHIPPED | OUTPATIENT
Start: 2018-12-13 | End: 2019-07-30

## 2018-12-13 RX ORDER — CLONAZEPAM 0.5 MG/1
0.5 TABLET ORAL
Qty: 14 TABLET | Refills: 0
Start: 2018-12-13 | End: 2021-05-28

## 2018-12-13 RX ORDER — LOSARTAN POTASSIUM 100 MG/1
100 TABLET ORAL DAILY
Qty: 90 TABLET | Refills: 1 | Status: SHIPPED | OUTPATIENT
Start: 2018-12-13 | End: 2019-06-10

## 2018-12-13 RX ORDER — LEVOTHYROXINE SODIUM 150 UG/1
150 TABLET ORAL DAILY
Qty: 90 TABLET | Refills: 1 | Status: SHIPPED | OUTPATIENT
Start: 2018-12-13 | End: 2019-06-10

## 2018-12-13 ASSESSMENT — ANXIETY QUESTIONNAIRES
1. FEELING NERVOUS, ANXIOUS, OR ON EDGE: NOT AT ALL
7. FEELING AFRAID AS IF SOMETHING AWFUL MIGHT HAPPEN: NOT AT ALL
3. WORRYING TOO MUCH ABOUT DIFFERENT THINGS: NOT AT ALL
IF YOU CHECKED OFF ANY PROBLEMS ON THIS QUESTIONNAIRE, HOW DIFFICULT HAVE THESE PROBLEMS MADE IT FOR YOU TO DO YOUR WORK, TAKE CARE OF THINGS AT HOME, OR GET ALONG WITH OTHER PEOPLE: NOT DIFFICULT AT ALL
6. BECOMING EASILY ANNOYED OR IRRITABLE: NOT AT ALL
GAD7 TOTAL SCORE: 0
2. NOT BEING ABLE TO STOP OR CONTROL WORRYING: NOT AT ALL
5. BEING SO RESTLESS THAT IT IS HARD TO SIT STILL: NOT AT ALL

## 2018-12-13 ASSESSMENT — PATIENT HEALTH QUESTIONNAIRE - PHQ9
5. POOR APPETITE OR OVEREATING: NOT AT ALL
SUM OF ALL RESPONSES TO PHQ QUESTIONS 1-9: 4

## 2018-12-13 ASSESSMENT — MIFFLIN-ST. JEOR: SCORE: 2050.03

## 2018-12-13 NOTE — PROGRESS NOTES
SUBJECTIVE:   Angus Wynne is a 58 year old male who presents to clinic today for the following health issues:      Medication Followup of  Atorvastatin, Levothyroxine, Clonazepam     Taking Medication as prescribed: yes    Side Effects:  None    Medication Helping Symptoms:  yes         Hyperlipidemia Follow-Up      Rate your low fat/cholesterol diet?: fair    Taking statin?  Yes, no muscle aches from statin    Other lipid medications/supplements?:  none    Hypertension Follow-up      Outpatient blood pressures are being checked at store.  Results are ok.     Concerned with exertional sob, but no associated chest pain, palpitation etc. Denies any cough, wheezing or previous hx of asthma.  He had a negative stress several months ago., although he is still worried and wants a referral to cardiology     Low Salt Diet: not monitoring salt      Amount of exercise or physical activity: None    Problems taking medications regularly: No    Medication side effects: none    Diet: regular (no restrictions)    PROBLEMS TO ADD ON...    Depression and Anxiety Follow-Up    Status since last visit: No change    Other associated symptoms:See phq-9 and heber 7    Complicating factors:     Significant life event: No     Current substance abuse: none. Has  hx of excessive alcohol use in the past, although he was drinking while in [place] 3 months  ago but he is not drinking now . occasional may be once a week     PHQ 2/15/2018 5/3/2018 7/25/2018   PHQ-9 Total Score 0 0 3   Q9: Suicide Ideation Not at all Not at all Not at all     HEBER-7 SCORE 12/6/2017 2/15/2018 5/3/2018   Total Score 3 0 1       PHQ-9  English  PHQ-9   Any Language  HEBER-7  Suicide Assessment Five-step Evaluation and Treatment (SAFE-T)  Hypothyroidism Follow-up      Since last visit, patient describes the following symptoms: Weight stable, no hair loss, no skin changes, no constipation, no loose stools. He was taking med's from [place] last few months, although needs a  refill. Due for labs       Problem list and histories reviewed & adjusted, as indicated.  Additional history: as documented    Patient Active Problem List   Diagnosis     BPPV (benign paroxysmal positional vertigo), unspecified laterality     Other specified hypothyroidism     Hx of acute alcoholic hepatitis     Hx of colonic polyp     Gastroesophageal reflux disease, esophagitis presence not specified     Vitamin D deficiency     Anxiety     Alcohol use disorder     Neck pain     Cervicalgia     Cervical spondylosis without myelopathy     Essential hypertension     Hyperlipidemia with target LDL less than 130     Nasal congestion     History of colonic polyps     Vertigo     Lumbago     Morbid obesity (H)     Past Surgical History:   Procedure Laterality Date     NO HISTORY OF SURGERY         Social History     Tobacco Use     Smoking status: Former Smoker     Last attempt to quit: 9/28/2015     Years since quitting: 3.2     Smokeless tobacco: Never Used   Substance Use Topics     Alcohol use: No     Alcohol/week: 0.0 oz     Family History   Problem Relation Age of Onset     Coronary Artery Disease Father         at age 60      Hyperlipidemia Brother      Diabetes No family hx of      Cerebrovascular Disease No family hx of      Colon Cancer No family hx of      Prostate Cancer No family hx of            Reviewed and updated as needed this visit by clinical staff       Reviewed and updated as needed this visit by Provider         ROS:  Constitutional, HEENT, cardiovascular, pulmonary, GI, , musculoskeletal, neuro, skin, endocrine and psych systems are negative, except as otherwise noted.    OBJECTIVE:     BP (!) 152/98   Pulse 74   Temp 97.3  F (36.3  C) (Tympanic)   Ht 1.829 m (6')   Wt 120.2 kg (265 lb)   SpO2 99%   BMI 35.94 kg/m    Body mass index is 35.94 kg/m .  GENERAL: healthy, alert and no distress  EYES: Eyes grossly normal to inspection, PERRL and conjunctivae and sclerae normal  HENT: ear canals  and TM's normal, nose and mouth without ulcers or lesions  NECK: no adenopathy, no asymmetry, masses, or scars and thyroid normal to palpation  RESP: lungs clear to auscultation - no rales, rhonchi or wheezes  CV: regular rate and rhythm, normal S1 S2, no S3 or S4, no murmur, click or rub, no peripheral edema and peripheral pulses strong  ABDOMEN: soft, nontender, no hepatosplenomegaly, no masses and bowel sounds normal  MS: no edema  SKIN: no suspicious lesions or rashes  NEURO: Normal strength and tone, mentation intact and speech normal  PSYCH: mentation appears normal, affect normal/bright        ASSESSMENT/PLAN:       (E78.5) Hyperlipidemia with target LDL less than 130  Comment:   Plan: atorvastatin (LIPITOR) 40 MG tablet,         Comprehensive metabolic panel, Lipid panel         reflex to direct LDL Fasting            (I10) Essential hypertension  Comment: stable  Plan: losartan (COZAAR) 100 MG tablet, Comprehensive         metabolic panel, Lipid panel reflex to direct         LDL Fasting            (F41.9) Anxiety  Comment:   Plan: sertraline (ZOLOFT) 100 MG tablet, clonazePAM         (KLONOPIN) 0.5 MG tablet            (E03.8) Other specified hypothyroidism  Comment:   Plan: levothyroxine (SYNTHROID/LEVOTHROID) 150 MCG         tablet, TSH with free T4 reflex            (G47.09) Other insomnia  Comment:   Plan: traZODone (DESYREL) 50 MG tablet            (R06.02) SOB (shortness of breath)  Comment: on and off exertional   Plan: CARDIOLOGY EVAL ADULT REFERRAL        He wish to proceed to see cardiology to further evaluate     (Z12.11) Colon cancer screening  Comment:   Plan: Fecal colorectal cancer screen (FIT)            (Z23) Need for prophylactic vaccination and inoculation against influenza  (primary encounter diagnosis)  Comment:   Plan: Vaccine Administration, Initial [92623], FLU         VACCINE, (RIV4) RECOMBINANT HA  , IM (FluBlok,         egg free) [22076]- >18 YRS (G recommended          50-64  YRS), T4 free, T4 free, CANCELED: FLU         VACCINE, SPLIT VIRUS, IM (QUADRIVALENT)         [51080]- >3 YRS                Patient expressed understanding and agreement with treatment plan. All patient's questions were answered, will let me know if has more later.  Medications: Rx's: Reviewed the potential side effects/complications of medications prescribed.       Fifi Fleming MD  [facility]

## 2018-12-14 LAB
ALBUMIN SERPL-MCNC: 4.4 G/DL (ref 3.4–5)
ALP SERPL-CCNC: 52 U/L (ref 40–150)
ALT SERPL W P-5'-P-CCNC: 51 U/L (ref 0–70)
ANION GAP SERPL CALCULATED.3IONS-SCNC: 13 MMOL/L (ref 3–14)
AST SERPL W P-5'-P-CCNC: 43 U/L (ref 0–45)
BILIRUB SERPL-MCNC: 0.8 MG/DL (ref 0.2–1.3)
BUN SERPL-MCNC: 7 MG/DL (ref 7–30)
CALCIUM SERPL-MCNC: 9.3 MG/DL (ref 8.5–10.1)
CHLORIDE SERPL-SCNC: 103 MMOL/L (ref 94–109)
CHOLEST SERPL-MCNC: 163 MG/DL
CO2 SERPL-SCNC: 21 MMOL/L (ref 20–32)
CREAT SERPL-MCNC: 0.99 MG/DL (ref 0.66–1.25)
GFR SERPL CREATININE-BSD FRML MDRD: 77 ML/MIN/1.7M2
GLUCOSE SERPL-MCNC: 95 MG/DL (ref 70–99)
HDLC SERPL-MCNC: 55 MG/DL
LDLC SERPL CALC-MCNC: 72 MG/DL
NONHDLC SERPL-MCNC: 108 MG/DL
POTASSIUM SERPL-SCNC: 4.3 MMOL/L (ref 3.4–5.3)
PROT SERPL-MCNC: 7.8 G/DL (ref 6.8–8.8)
SODIUM SERPL-SCNC: 137 MMOL/L (ref 133–144)
T4 FREE SERPL-MCNC: 0.97 NG/DL (ref 0.76–1.46)
TRIGL SERPL-MCNC: 181 MG/DL
TSH SERPL DL<=0.005 MIU/L-ACNC: 5.33 MU/L (ref 0.4–4)

## 2018-12-14 ASSESSMENT — ANXIETY QUESTIONNAIRES: GAD7 TOTAL SCORE: 0

## 2018-12-20 PROBLEM — Z12.11 COLON CANCER SCREENING: Status: ACTIVE | Noted: 2018-12-20

## 2018-12-21 NOTE — PATIENT INSTRUCTIONS
Take medications as directed.  Referral given  Check labs   Treatment  and  cares discussed   Follow up if problem or concern

## 2019-01-14 DIAGNOSIS — G47.09 OTHER INSOMNIA: ICD-10-CM

## 2019-01-14 NOTE — TELEPHONE ENCOUNTER
"Requested Prescriptions   Pending Prescriptions Disp Refills     traZODone (DESYREL) 50 MG tablet [Pharmacy Med Name: TRAZODONE HCL 50MG TABS]  Last Written Prescription Date:  12/13/18  Last Fill Quantity: 90,  # refills: 0   Last office visit: 12/13/2018 with prescribing provider:  Lonnie   Future Office Visit:     90 tablet 0     Sig: TAKE ONE TO TWO TABLETS BY MOUTH DAILY FOR SLEEP    Serotonin Modulators Passed - 1/14/2019  8:57 AM       Passed - Recent (12 mo) or future (30 days) visit within the authorizing provider's specialty    Patient had office visit in the last 12 months or has a visit in the next 30 days with authorizing provider or within the authorizing provider's specialty.  See \"Patient Info\" tab in inbasket, or \"Choose Columns\" in Meds & Orders section of the refill encounter.             Passed - Medication is active on med list       Passed - Patient is age 18 or older          "

## 2019-01-15 RX ORDER — TRAZODONE HYDROCHLORIDE 50 MG/1
TABLET, FILM COATED ORAL
Qty: 90 TABLET | Refills: 0 | Status: SHIPPED | OUTPATIENT
Start: 2019-01-15 | End: 2019-02-14

## 2019-01-15 NOTE — TELEPHONE ENCOUNTER
Routing refill request to provider for review/approval because:  Patient needs to be seen because:  Per last OV      Instructions         Return in about 2 months (around 2/13/2019).   Take medications as directed.  Referral given  Check labs   Treatment  and  cares discussed   Follow up if problem or concern           After Visit Summary (Printed 12/13/2018)

## 2019-02-03 ENCOUNTER — HOSPITAL ENCOUNTER (OUTPATIENT)
Facility: CLINIC | Age: 59
Setting detail: OBSERVATION
Discharge: HOME | End: 2019-02-04
Attending: EMERGENCY MEDICINE | Admitting: INTERNAL MEDICINE
Payer: COMMERCIAL

## 2019-02-03 ENCOUNTER — APPOINTMENT (OUTPATIENT)
Dept: CT IMAGING | Facility: CLINIC | Age: 59
End: 2019-02-03
Payer: COMMERCIAL

## 2019-02-03 ENCOUNTER — APPOINTMENT (OUTPATIENT)
Dept: GENERAL RADIOLOGY | Facility: CLINIC | Age: 59
End: 2019-02-03
Payer: COMMERCIAL

## 2019-02-03 DIAGNOSIS — M54.6 ACUTE MIDLINE THORACIC BACK PAIN: ICD-10-CM

## 2019-02-03 DIAGNOSIS — S22.000A CLOSED COMPRESSION FRACTURE OF THORACIC VERTEBRA, INITIAL ENCOUNTER (H): ICD-10-CM

## 2019-02-03 DIAGNOSIS — M25.532 LEFT WRIST PAIN: ICD-10-CM
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
## 2019-02-03 LAB
ANION GAP SERPL CALCULATED.3IONS-SCNC: 5 MMOL/L (ref 3–14)
BASOPHILS # BLD AUTO: 0 10E9/L (ref 0–0.2)
BASOPHILS NFR BLD AUTO: 0.2 %
BUN SERPL-MCNC: 10 MG/DL (ref 7–30)
CALCIUM SERPL-MCNC: 8.5 MG/DL (ref 8.5–10.1)
CHLORIDE SERPL-SCNC: 105 MMOL/L (ref 94–109)
CO2 SERPL-SCNC: 24 MMOL/L (ref 20–32)
CREAT SERPL-MCNC: 0.77 MG/DL (ref 0.66–1.25)
DIFFERENTIAL METHOD BLD: ABNORMAL
EOSINOPHIL # BLD AUTO: 0.2 10E9/L (ref 0–0.7)
EOSINOPHIL NFR BLD AUTO: 3.2 %
ERYTHROCYTE [DISTWIDTH] IN BLOOD BY AUTOMATED COUNT: 12 % (ref 10–15)
GFR SERPL CREATININE-BSD FRML MDRD: >90 ML/MIN/{1.73_M2}
GLUCOSE SERPL-MCNC: 114 MG/DL (ref 70–99)
HCT VFR BLD AUTO: 40.6 % (ref 40–53)
HGB BLD-MCNC: 14.2 G/DL (ref 13.3–17.7)
IMM GRANULOCYTES # BLD: 0 10E9/L (ref 0–0.4)
IMM GRANULOCYTES NFR BLD: 0.2 %
LYMPHOCYTES # BLD AUTO: 1.2 10E9/L (ref 0.8–5.3)
LYMPHOCYTES NFR BLD AUTO: 19 %
MCH RBC QN AUTO: 32.8 PG (ref 26.5–33)
MCHC RBC AUTO-ENTMCNC: 35 G/DL (ref 31.5–36.5)
MCV RBC AUTO: 94 FL (ref 78–100)
MONOCYTES # BLD AUTO: 0.4 10E9/L (ref 0–1.3)
MONOCYTES NFR BLD AUTO: 5.5 %
NEUTROPHILS # BLD AUTO: 4.6 10E9/L (ref 1.6–8.3)
NEUTROPHILS NFR BLD AUTO: 71.9 %
NRBC # BLD AUTO: 0 10*3/UL
NRBC BLD AUTO-RTO: 0 /100
PLATELET # BLD AUTO: 103 10E9/L (ref 150–450)
POTASSIUM SERPL-SCNC: 4.7 MMOL/L (ref 3.4–5.3)
RBC # BLD AUTO: 4.33 10E12/L (ref 4.4–5.9)
SODIUM SERPL-SCNC: 134 MMOL/L (ref 133–144)
WBC # BLD AUTO: 6.3 10E9/L (ref 4–11)

## 2019-02-03 PROCEDURE — 73130 X-RAY EXAM OF HAND: CPT | Mod: LT

## 2019-02-03 PROCEDURE — 40000847 ZZHCL STATISTIC MORPHOLOGY W/INTERP HISTOLOGY TC 85060: Performed by: INTERNAL MEDICINE

## 2019-02-03 PROCEDURE — 99220 ZZC INITIAL OBSERVATION CARE,LEVL III: CPT | Performed by: INTERNAL MEDICINE

## 2019-02-03 PROCEDURE — G0378 HOSPITAL OBSERVATION PER HR: HCPCS

## 2019-02-03 PROCEDURE — 85060 BLOOD SMEAR INTERPRETATION: CPT | Performed by: INTERNAL MEDICINE

## 2019-02-03 PROCEDURE — 73110 X-RAY EXAM OF WRIST: CPT | Mod: LT

## 2019-02-03 PROCEDURE — 80048 BASIC METABOLIC PNL TOTAL CA: CPT | Performed by: EMERGENCY MEDICINE

## 2019-02-03 PROCEDURE — 72131 CT LUMBAR SPINE W/O DYE: CPT

## 2019-02-03 PROCEDURE — 25000132 ZZH RX MED GY IP 250 OP 250 PS 637: Performed by: HOSPITALIST

## 2019-02-03 PROCEDURE — 72128 CT CHEST SPINE W/O DYE: CPT

## 2019-02-03 PROCEDURE — 99285 EMERGENCY DEPT VISIT HI MDM: CPT | Mod: 25

## 2019-02-03 PROCEDURE — 22310 CLOSED TX VERT FX W/O MANJ: CPT

## 2019-02-03 PROCEDURE — 72125 CT NECK SPINE W/O DYE: CPT

## 2019-02-03 PROCEDURE — 85025 COMPLETE CBC W/AUTO DIFF WBC: CPT | Performed by: EMERGENCY MEDICINE

## 2019-02-03 PROCEDURE — 25000132 ZZH RX MED GY IP 250 OP 250 PS 637: Performed by: INTERNAL MEDICINE

## 2019-02-03 RX ORDER — PROCHLORPERAZINE 25 MG
25 SUPPOSITORY, RECTAL RECTAL EVERY 12 HOURS PRN
Status: DISCONTINUED | OUTPATIENT
Start: 2019-02-03 | End: 2019-02-04 | Stop reason: HOSPADM

## 2019-02-03 RX ORDER — OXYCODONE HYDROCHLORIDE 5 MG/1
5-10 TABLET ORAL EVERY 4 HOURS PRN
Status: DISCONTINUED | OUTPATIENT
Start: 2019-02-03 | End: 2019-02-04 | Stop reason: HOSPADM

## 2019-02-03 RX ORDER — POLYETHYLENE GLYCOL 3350 17 G/17G
17 POWDER, FOR SOLUTION ORAL DAILY PRN
Status: DISCONTINUED | OUTPATIENT
Start: 2019-02-03 | End: 2019-02-04 | Stop reason: HOSPADM

## 2019-02-03 RX ORDER — ONDANSETRON 4 MG/1
4 TABLET, ORALLY DISINTEGRATING ORAL EVERY 6 HOURS PRN
Status: DISCONTINUED | OUTPATIENT
Start: 2019-02-03 | End: 2019-02-04 | Stop reason: HOSPADM

## 2019-02-03 RX ORDER — AMOXICILLIN 250 MG
2 CAPSULE ORAL 2 TIMES DAILY PRN
Status: DISCONTINUED | OUTPATIENT
Start: 2019-02-03 | End: 2019-02-04 | Stop reason: HOSPADM

## 2019-02-03 RX ORDER — HYDROMORPHONE HYDROCHLORIDE 1 MG/ML
.3-.5 INJECTION, SOLUTION INTRAMUSCULAR; INTRAVENOUS; SUBCUTANEOUS EVERY 4 HOURS PRN
Status: DISCONTINUED | OUTPATIENT
Start: 2019-02-03 | End: 2019-02-04 | Stop reason: HOSPADM

## 2019-02-03 RX ORDER — LANOLIN ALCOHOL/MO/W.PET/CERES
3 CREAM (GRAM) TOPICAL
Status: DISCONTINUED | OUTPATIENT
Start: 2019-02-03 | End: 2019-02-04 | Stop reason: HOSPADM

## 2019-02-03 RX ORDER — AMOXICILLIN 250 MG
1 CAPSULE ORAL 2 TIMES DAILY PRN
Status: DISCONTINUED | OUTPATIENT
Start: 2019-02-03 | End: 2019-02-04 | Stop reason: HOSPADM

## 2019-02-03 RX ORDER — CLONAZEPAM 0.5 MG/1
0.5 TABLET ORAL
Status: DISCONTINUED | OUTPATIENT
Start: 2019-02-03 | End: 2019-02-04 | Stop reason: HOSPADM

## 2019-02-03 RX ORDER — NALOXONE HYDROCHLORIDE 0.4 MG/ML
.1-.4 INJECTION, SOLUTION INTRAMUSCULAR; INTRAVENOUS; SUBCUTANEOUS
Status: DISCONTINUED | OUTPATIENT
Start: 2019-02-03 | End: 2019-02-04 | Stop reason: HOSPADM

## 2019-02-03 RX ORDER — ACETAMINOPHEN 500 MG
1000 TABLET ORAL 3 TIMES DAILY
Status: DISCONTINUED | OUTPATIENT
Start: 2019-02-03 | End: 2019-02-04 | Stop reason: HOSPADM

## 2019-02-03 RX ORDER — PROCHLORPERAZINE MALEATE 10 MG
10 TABLET ORAL EVERY 6 HOURS PRN
Status: DISCONTINUED | OUTPATIENT
Start: 2019-02-03 | End: 2019-02-04 | Stop reason: HOSPADM

## 2019-02-03 RX ORDER — ONDANSETRON 2 MG/ML
4 INJECTION INTRAMUSCULAR; INTRAVENOUS EVERY 6 HOURS PRN
Status: DISCONTINUED | OUTPATIENT
Start: 2019-02-03 | End: 2019-02-04 | Stop reason: HOSPADM

## 2019-02-03 RX ADMIN — CLONAZEPAM 0.5 MG: 0.5 TABLET ORAL at 22:37

## 2019-02-03 RX ADMIN — ACETAMINOPHEN 1000 MG: 500 TABLET, FILM COATED ORAL at 22:10

## 2019-02-03 ASSESSMENT — ENCOUNTER SYMPTOMS
HEADACHES: 0
MYALGIAS: 1
NAUSEA: 0
NUMBNESS: 0
NECK PAIN: 1
SHORTNESS OF BREATH: 0
VOMITING: 0
ABDOMINAL PAIN: 0
BACK PAIN: 1
WEAKNESS: 0

## 2019-02-03 ASSESSMENT — MIFFLIN-ST. JEOR: SCORE: 1966.12

## 2019-02-03 NOTE — ED TRIAGE NOTES
Pt was walking out on driveway catching a ride with [employer] and pt slipped on ice and fell on his back and hurt left hand. Sitting 8/10, stANDING 10/10

## 2019-02-04 VITALS
DIASTOLIC BLOOD PRESSURE: 82 MMHG | HEART RATE: 65 BPM | HEIGHT: 71 IN | SYSTOLIC BLOOD PRESSURE: 119 MMHG | RESPIRATION RATE: 16 BRPM | OXYGEN SATURATION: 97 % | WEIGHT: 250 LBS | TEMPERATURE: 97.9 F | BODY MASS INDEX: 35 KG/M2

## 2019-02-04 LAB
ANION GAP SERPL CALCULATED.3IONS-SCNC: 5 MMOL/L (ref 3–14)
BASOPHILS # BLD AUTO: 0 10E9/L (ref 0–0.2)
BASOPHILS NFR BLD AUTO: 0.4 %
BUN SERPL-MCNC: 10 MG/DL (ref 7–30)
CALCIUM SERPL-MCNC: 8.9 MG/DL (ref 8.5–10.1)
CHLORIDE SERPL-SCNC: 106 MMOL/L (ref 94–109)
CO2 SERPL-SCNC: 27 MMOL/L (ref 20–32)
COPATH REPORT: NORMAL
CREAT SERPL-MCNC: 1.02 MG/DL (ref 0.66–1.25)
DIFFERENTIAL METHOD BLD: ABNORMAL
EOSINOPHIL # BLD AUTO: 0.2 10E9/L (ref 0–0.7)
EOSINOPHIL NFR BLD AUTO: 4.5 %
ERYTHROCYTE [DISTWIDTH] IN BLOOD BY AUTOMATED COUNT: 12.3 % (ref 10–15)
GFR SERPL CREATININE-BSD FRML MDRD: 79 ML/MIN/{1.73_M2}
GLUCOSE SERPL-MCNC: 115 MG/DL (ref 70–99)
HCT VFR BLD AUTO: 43.4 % (ref 40–53)
HGB BLD-MCNC: 15.1 G/DL (ref 13.3–17.7)
IMM GRANULOCYTES # BLD: 0 10E9/L (ref 0–0.4)
IMM GRANULOCYTES NFR BLD: 0.2 %
LYMPHOCYTES # BLD AUTO: 1.5 10E9/L (ref 0.8–5.3)
LYMPHOCYTES NFR BLD AUTO: 31.8 %
MCH RBC QN AUTO: 32.6 PG (ref 26.5–33)
MCHC RBC AUTO-ENTMCNC: 34.8 G/DL (ref 31.5–36.5)
MCV RBC AUTO: 94 FL (ref 78–100)
MONOCYTES # BLD AUTO: 0.4 10E9/L (ref 0–1.3)
MONOCYTES NFR BLD AUTO: 8.2 %
NEUTROPHILS # BLD AUTO: 2.5 10E9/L (ref 1.6–8.3)
NEUTROPHILS NFR BLD AUTO: 54.9 %
NRBC # BLD AUTO: 0 10*3/UL
NRBC BLD AUTO-RTO: 0 /100
PLATELET # BLD AUTO: 116 10E9/L (ref 150–450)
POTASSIUM SERPL-SCNC: 4.7 MMOL/L (ref 3.4–5.3)
RBC # BLD AUTO: 4.63 10E12/L (ref 4.4–5.9)
RETICS # AUTO: 63.4 10E9/L (ref 25–95)
RETICS/RBC NFR AUTO: 1.4 % (ref 0.5–2)
SODIUM SERPL-SCNC: 138 MMOL/L (ref 133–144)
WBC # BLD AUTO: 4.6 10E9/L (ref 4–11)

## 2019-02-04 PROCEDURE — 25000132 ZZH RX MED GY IP 250 OP 250 PS 637: Performed by: INTERNAL MEDICINE

## 2019-02-04 PROCEDURE — 85045 AUTOMATED RETICULOCYTE COUNT: CPT | Performed by: INTERNAL MEDICINE

## 2019-02-04 PROCEDURE — 36415 COLL VENOUS BLD VENIPUNCTURE: CPT | Performed by: INTERNAL MEDICINE

## 2019-02-04 PROCEDURE — G0378 HOSPITAL OBSERVATION PER HR: HCPCS

## 2019-02-04 PROCEDURE — 99217 ZZC OBSERVATION CARE DISCHARGE: CPT | Performed by: HOSPITALIST

## 2019-02-04 PROCEDURE — 85025 COMPLETE CBC W/AUTO DIFF WBC: CPT | Performed by: INTERNAL MEDICINE

## 2019-02-04 PROCEDURE — 80048 BASIC METABOLIC PNL TOTAL CA: CPT | Performed by: INTERNAL MEDICINE

## 2019-02-04 PROCEDURE — 25000132 ZZH RX MED GY IP 250 OP 250 PS 637: Performed by: HOSPITALIST

## 2019-02-04 PROCEDURE — 99221 1ST HOSP IP/OBS SF/LOW 40: CPT | Performed by: PHYSICIAN ASSISTANT

## 2019-02-04 RX ORDER — ACETAMINOPHEN 500 MG
1000 TABLET ORAL 3 TIMES DAILY PRN
Qty: 180 TABLET | Refills: 0
Start: 2019-02-04 | End: 2019-07-30

## 2019-02-04 RX ORDER — LEVOTHYROXINE SODIUM 75 UG/1
150 TABLET ORAL DAILY
Status: DISCONTINUED | OUTPATIENT
Start: 2019-02-04 | End: 2019-02-04 | Stop reason: HOSPADM

## 2019-02-04 RX ORDER — LOSARTAN POTASSIUM 100 MG/1
100 TABLET ORAL DAILY
Status: DISCONTINUED | OUTPATIENT
Start: 2019-02-04 | End: 2019-02-04 | Stop reason: HOSPADM

## 2019-02-04 RX ADMIN — LOSARTAN POTASSIUM 100 MG: 100 TABLET, FILM COATED ORAL at 08:41

## 2019-02-04 RX ADMIN — LEVOTHYROXINE SODIUM 150 MCG: 75 TABLET ORAL at 08:41

## 2019-02-04 RX ADMIN — ACETAMINOPHEN 1000 MG: 500 TABLET, FILM COATED ORAL at 08:41

## 2019-02-04 NOTE — DISCHARGE INSTRUCTIONS
Plan:   -Orthotics consult for TLSO brace - OK to be off the shelf  - continue to wear TLSO at all times when out of bed  - Repeat XR of thoracic spine AP and Lateral in 6 weeks.   - Return to clinic following repeat XR spine in 6 weeks   - Call the [facility] clinic for any questions or concerns during interim, at [phone]  - Seek medical attention immediately if any paresthesias, weakness, gait instability, bowel/bladder incontinence, new pain.

## 2019-02-04 NOTE — PLAN OF CARE
AVSS; pt denied pain; on bedrest; neurosurgery consult ordered; pt NPO x meds after midnight; CMS intact with palpable DP/radial pulses; voiding; pt appeared to sleep well overnight.

## 2019-02-04 NOTE — PROGRESS NOTES
[facility]    Medicine Progress Note - Hospitalist Service       Date of Admission:  2/3/2019  Assessment & Plan   Mr. Angus Wynne is a 58-year-old male patient with history including hypertension, hyperlipidemia, hypothyroidism, depression/anxiety, osteoarthritis, gastroesophageal reflux disease, who presents with a fall and found to have suffered an acute T9 compression fracture.     1.  Fall with acute T9 compression fracture.   - CT of the thoracic spine showed acute T9 superior endplate compression fracture with less than 10% height loss without evidence of posterior extension or significant spinal canal narrowing.    - Neurosurgery was contacted and it was felt that he may benefit from kyphoplasty or possible brace.    - Admitted under observation - await NS recommendations   - acetaminophen scheduled 1000 mg 3 times a day.    - will order p.r.n. oxycodone and p.r.n. IV Dilaudid for breakthrough pain.    - Neurosurgery to see the patient to consider a kyphoplasty or a possible brace.     2.  Benign essential hypertension.  Continue losartan.     3.  Hyperlipidemia.  Resume atorvastatin at home.     4.  Depression/anxiety.  Continue sertraline and p.r.n. clonazepam.     5.  Hypothyroidism.  Continue levothyroxine.     6.  Thrombocytopenia.   - The patient has a low platelet count of 103,000.    - Previous platelet counts have been normal.  Unclear of the etiology.    - We will clear up if this could be a medication effect or another cause - smear pending    Diet: NPO for Medical/Clinical Reasons Except for: Meds    DVT Prophylaxis: Pneumatic Compression Devices  Bond Catheter: not present  Code Status: Full Code      Disposition Plan   Expected discharge: pending neurosurgery eval and recommendations    Entered: Camron Edwards MD 02/04/2019, 8:06 AM       The patient's care was discussed with the Bedside Nurse and Patient.    Camron Edwards MD  Hospitalist Service  [facility]  [facility]    ______________________________________________________________________    Interval History   No new complaints, no tingling, numbness or weakness in extremities reported. Comfortable unless tries to move significantly.     Data reviewed today: I reviewed all medications, new labs and imaging results over the last 24 hours. I personally reviewed no images or EKG's today.    Physical Exam   Vital Signs: Temp: 98.7  F (37.1  C) Temp src: Oral BP: 116/68 Pulse: 75   Resp: 16 SpO2: 98 % O2 Device: None (Room air)    Weight: 250 lbs 0 oz  General Appearance: Nad, very pleasant  Respiratory: ctabl  Cardiovascular: s1s2 heard, reg rate, no pitting edema  GI: s, nt, nd, +bs  Other: Aaox3, no focal deficits, eomi, moving all 4 extremities, sensation grossly intact    Data   Recent Labs   Lab 02/04/19  0611 02/03/19  1810 02/03/19  0611   WBC  --  6.3 4.6   HGB  --  14.2 15.1   MCV  --  94 94   PLT  --  103* 116*    134  --    POTASSIUM 4.7 4.7  --    CHLORIDE 106 105  --    CO2 27 24  --    BUN 10 10  --    CR 1.02 0.77  --    ANIONGAP 5 5  --    SAMMY 8.9 8.5  --    * 114*  --      Recent Results (from the past 24 hour(s))   XR Hand Left 3 Views    Narrative    HAND VIEWS LEFT  2/3/2019 6:24 PM     HISTORY: fall    COMPARISON: None.      Impression    IMPRESSION: Mild degenerative changes first carpometacarpal joint. No  evidence for acute fracture or dislocation.    ISMA GONZALES MD   XR Wrist Left 3 Views    Narrative    WRIST THREE VIEWS LEFT 2/3/2019 6:25 PM     HISTORY: fall pain, suspect scaphoid fx    COMPARISON: None.      Impression    IMPRESSION: No definite scaphoid fracture, on AP view the scaphoid is  foreshortened and suboptimally visualized. Recommend additional  dedicated navicular views.    ISMA GONZALES MD   CT Cervical Spine w/o Contrast    Narrative    CT CERVICAL SPINE WITHOUT CONTRAST   2/3/2019 6:53 PM     HISTORY: Cervical spine trauma, low clinical risk (NEXUS/CCR).      TECHNIQUE: Axial images of the cervical spine were obtained without  intravenous contrast. Multiplanar reformations were performed.   Radiation dose for this scan was reduced using automated exposure  control, adjustment of the mA and/or kV according to patient size, or  iterative reconstruction technique.    COMPARISON: None.    FINDINGS: Alignment is significant for slight loss of the normal  lordosis. Mild to moderate degenerative changes are present most  conspicuous at C5-C6 and C6-C7. No evidence of acute fracture or  traumatic subluxation. No evidence of acute traumatic disc herniation  or epidural hematoma. Superior endplate and C5 Schmorl's node noted.  Paraspinal soft tissues are unremarkable.      Impression    IMPRESSION: No evidence of acute fracture or subluxation in the  cervical spine.    SARA COLEMAN MD   CT Thoracic Spine w/o Contrast    Narrative    CT THORACIC SPINE WITHOUT CONTRAST   2/3/2019 6:56 PM     HISTORY: Thoracolumbar spine trauma, minor-mod, low back pain.     TECHNIQUE: Axial images of the thoracic spine were obtained without  intravenous contrast. Multiplanar reformations were performed.   Radiation dose for this scan was reduced using automated exposure  control, adjustment of the mA and/or kV according to patient size, or  iterative reconstruction technique.    COMPARISON: None.    FINDINGS:  Alignment is maintained. Multilevel mild to moderate degenerative  changes are present. A minimally displaced fracture is present  involving the superior endplate of the T9 vertebral body. No evidence  of posterior extension or significant spinal canal stenosis. No other  fractures are identified. No evidence of acute cement disc herniation  or epidural hematoma. There is suggestion of trace anterior paraspinal  blood product along the T9 fracture. Paraspinal soft tissues are  otherwise unremarkable.      Impression    IMPRESSION:  Acute T9 superior endplate compression fracture with less  than 10%  height loss. No evidence of posterior extension or significant spinal  canal narrowing.         SARA COLEMAN MD   CT Lumbar Spine w/o Contrast    Narrative    CT LUMBAR SPINE WITHOUT CONTRAST  2/3/2019 7:00 PM     HISTORY: Thoracolumbar spine trauma, minor-moderate, low back pain.     TECHNIQUE: Axial images of the lumbar spine were obtained without  intravenous contrast. Multiplanar reformations were performed.   Radiation dose for this scan was reduced using automated exposure  control, adjustment of the mA and/or kV according to patient size, or  iterative reconstruction technique.    COMPARISON: X-rays 2/20/2018    FINDINGS: Alignment is maintained. No evidence of acute fracture or  traumatic subluxation. There is slight height loss involving the L3  and L4 vertebral bodies, unchanged. No evidence of acute traumatic  disc herniation or epidural hematoma. Vascular calcifications noted.  Bladder is mildly distended.      Impression    IMPRESSION:  No evidence of acute fracture or traumatic subluxation.        SARA COLEMAN MD

## 2019-02-04 NOTE — CONSULTS
[facility]    Neurosurgery Consultation     Date of Admission:  2/3/2019  Date of Consult (When I saw the patient): 02/04/19    Assessment & Plan   Angus Wynne is a 58 year old male who was admitted on 2/3/2019. I was asked to see the patient for acute T9 compression fracture.    Active Problems:    Thoracic compression fracture (H)    Assessment: acute T9 superior endplate compression fracture with <10% height loss. Neuro intact on exam.      Plan:   -Orthotics consult for TLSO brace - OK to be off the shelf  - continue to wear TLSO at all times when out of bed  - Repeat XR of thoracic spine AP and Lateral in 6 weeks.   - Return to clinic following repeat XR spine in 6 weeks   - Call the [facility] for any questions or concerns during interim, at [phone]  - Seek medical attention immediately if any paresthesias, weakness, gait instability, bowel/bladder incontinence, new pain.    Case discussed with Obinna Latham PA-C (IR) regarding kyphoplasty. Advised conservative treatment with bracing and if pain persists in next few weeks can place referral for outpatient eval.      I have discussed the following assessment and plan with Dr. Reji Pearson who is in agreement with initial plan and will follow up with further consultation recommendations.    Mary Evans PA-C  [facility]  [address] [address]    Tel [phone]  Pager [phone]        Code Status    Full Code    Reason for Consult   Reason for consult: I was asked by Nader Edward [facility] to evaluate this patient for acute T9 compression fracture.    Primary Care Physician   Fifi Fleming    Chief Complaint   Back pain    History is obtained from the patient and electronic health record    History of Present Illness   Angus Wynne is a 58 year old male who presents with fall on ice yesterday afternoon. Was found to have acute T9 compression fracture.  Was admitted for observation. He has full strength on exam. No radicular pain, paresthesias, or weakness.    Past Medical History   I have reviewed this patient's medical history and updated it with pertinent information if needed.   Past Medical History:   Diagnosis Date     HTN (hypertension)      Hypothyroid      Vertigo     2015 ,        Past Surgical History   I have reviewed this patient's surgical history and updated it with pertinent information if needed.  Past Surgical History:   Procedure Laterality Date     NO HISTORY OF SURGERY         Prior to Admission Medications   Prior to Admission Medications   Prescriptions Last Dose Informant Patient Reported? Taking?   atorvastatin (LIPITOR) 40 MG tablet 2/2/2019 at HS Self No Yes   Sig: Take 1 tablet (40 mg) by mouth daily   clonazePAM (KLONOPIN) 0.5 MG tablet 2/2/2019 at HS Self No Yes   Sig: Take 1 tablet (0.5 mg) by mouth nightly as needed for anxiety   levothyroxine (SYNTHROID/LEVOTHROID) 150 MCG tablet 2/3/2019 at AM Self No Yes   Sig: Take 1 tablet (150 mcg) by mouth daily   losartan (COZAAR) 100 MG tablet 2/3/2019 at AM Self No Yes   Sig: Take 1 tablet (100 mg) by mouth daily   sertraline (ZOLOFT) 100 MG tablet 2/2/2019 at 1730 Self No Yes   Sig: Take 1 tablet (100 mg) by mouth daily   Patient taking differently: Take 100 mg by mouth daily with dinner   traZODone (DESYREL) 50 MG tablet  at PRN Self No Yes   Sig: TAKE ONE TO TWO TABLETS BY MOUTH DAILY FOR SLEEP      Facility-Administered Medications: None     Allergies   No Known Allergies    Social History   I have reviewed this patient's social history and updated it with pertinent information if needed. Angus Ricci  reports that he quit smoking about 3 years ago. he has never used smokeless tobacco. He reports that he does not drink alcohol or use drugs.    Family History   I have reviewed this patient's family history and updated it with pertinent information if needed.   Family History   Problem  "Relation Age of Onset     Coronary Artery Disease Father         at age 60      Hyperlipidemia Brother      Diabetes No family hx of      Cerebrovascular Disease No family hx of      Colon Cancer No family hx of      Prostate Cancer No family hx of        Review of Systems   CONSTITUTIONAL: NEGATIVE for fever, chills, change in weight  INTEGUMENTARY/SKIN: NEGATIVE for worrisome rashes, moles or lesions  EYES: NEGATIVE for vision changes or irritation  ENT/MOUTH: NEGATIVE for ear, mouth and throat problems  RESP: NEGATIVE for significant cough or SOB  BREAST: NEGATIVE for masses, tenderness or discharge  CV: NEGATIVE for chest pain, palpitations or peripheral edema  GI: NEGATIVE for nausea, abdominal pain, heartburn, or change in bowel habits  : NEGATIVE for frequency, dysuria, or hematuria  MUSCULOSKELETAL: NEGATIVE for significant arthralgias or myalgia  NEURO: NEGATIVE for weakness, dizziness or paresthesias  ENDOCRINE: NEGATIVE for temperature intolerance, skin/hair changes  HEME: NEGATIVE for bleeding problems  PSYCHIATRIC: NEGATIVE for changes in mood or affect    Physical Exam   Temp: 97.9  F (36.6  C) Temp src: Oral BP: 119/82 Pulse: 65   Resp: 16 SpO2: 97 % O2 Device: None (Room air)    Vital Signs with Ranges  Temp:  [97.9  F (36.6  C)-99.8  F (37.7  C)] 97.9  F (36.6  C)  Pulse:  [65-75] 65  Resp:  [16-20] 16  BP: (116-144)/() 119/82  SpO2:  [97 %-98 %] 97 %  250 lbs 0 oz     , Blood pressure 119/82, pulse 65, temperature 97.9  F (36.6  C), temperature source Oral, resp. rate 16, height 5' 11\" (1.803 m), weight 250 lb (113.4 kg), SpO2 97 %.  250 lbs 0 oz  HEENT:  Normocephalic, atraumatic.  PERRLA.  EOM s intact.   Neck:  Supple, non-tender, without lymphadenopathy.  Heart:  No peripheral edema  Lungs:  No SOB  Abdomen:  Soft, non-tender, non-distended.   Skin:  Warm and dry, good capillary refill.  Extremities:  Good radial and dorsalis pedis pulses bilaterally, no edema, cyanosis or " clubbing.    NEUROLOGICAL EXAMINATION:   Mental status:  Alert and Oriented x 3, speech is fluent.  Cranial nerves:  II-XII intact.   Motor:  Strength is 5/5 throughout the upper and lower extremities  Shoulder Abduction:  Right:  5/5   Left:  5/5  Biceps:                      Right:  5/5   Left:  5/5  Triceps:                     Right:  5/5   Left:  5/5  Wrist Extensors:       Right:  5/5   Left:  5/5  Wrist Flexors:           Right:  5/5   Left:  5/5  interosseus :            Right:  5/5   Left:  5/5   Hip Flexor:                Right: 5/5  Left:  5/5  Hip Adductor:             Right:  5/5  Left:  5/5  Hip Abductor:             Right:  5/5  Left:  5/5  Gastroc Soleus:        Right:  5/5  Left:  5/5  Tib/Ant:                      Right:  5/5  Left:  5/5  EHL:                     Right:  5/5  Left:  5/5  Sensation:  intact  Reflexes:   Negative Babinski.  Negative Clonus.    Gait:  Stable.    Thoracic examination reveals tenderness to mid spine.      Data   CBC RESULTS:   Recent Labs   Lab Test 02/03/19  1810   WBC 6.3   RBC 4.33*   HGB 14.2   HCT 40.6   MCV 94   MCH 32.8   MCHC 35.0   RDW 12.0   *     Basic Metabolic Panel:  Lab Results   Component Value Date     02/04/2019      Lab Results   Component Value Date    POTASSIUM 4.7 02/04/2019     Lab Results   Component Value Date    CHLORIDE 106 02/04/2019     Lab Results   Component Value Date    SAMMY 8.9 02/04/2019     Lab Results   Component Value Date    CO2 27 02/04/2019     Lab Results   Component Value Date    BUN 10 02/04/2019     Lab Results   Component Value Date    CR 1.02 02/04/2019     Lab Results   Component Value Date     02/04/2019     INR:  Lab Results   Component Value Date    INR 0.95 02/13/2017

## 2019-02-04 NOTE — H&P
Admitted:     02/03/2019      PRIMARY CARE PROVIDER:  Dr. Fifi Fleming, [facility].      CHIEF COMPLAINT:  Fall, back pain.      HISTORY OF PRESENT ILLNESS:  Mr. Angus Wynne is a 58-year-old gentleman with history notable for hypertension, hyperlipidemia, depression/anxiety, hypothyroidism, GERD, who presents with the above acute issues.  The patient suffered a fall on his icy driveway earlier this afternoon.  He did injure his left hand and also fell backwards and hurt his upper back.  He was assisted back into his home by his wife.  He has significant pain when he sat up and attempt to use the bathroom.  It was a pressure sensation when he was standing.  He also notes pain radiating from his back into his belly area when he takes deep breath.  Denies any urinary incontinence.  No paresthesias.  Overall, given his symptomatology, he came into [facility] for further evaluation.      The patient was seen in the ER.  Vital signs checked and show a temperature 99.1, heart rate 70, blood pressure 143/111, respiration rate 16, O2 saturation 98%.  He went on to have imaging including a left hand x-ray, which did not show any acute fracture.  He had left wrist x-rays, which showed no obvious fracture.  His CT cervical spine showed no evidence of acute fracture.  CT thoracic spine was done and showed acute T9 superior endplate compression fracture with less than 10% height loss without evidence of posterior extension or significant spinal canal narrowing.  CT lumbar spine did not show any acute fractures.  Neurosurgery was contacted, and it was felt that he may benefit from either a brace or possibly kyphoplasty.  Given these issues, a request for observation admission was made.      The patient denies any chest pain.  No shortness of breath.  No bloody stools or nausea, vomiting.  No fevers or chills.      PAST MEDICAL HISTORY:   1.  Hypertension.   2.  Hyperlipidemia.   3.  Depression/anxiety.   4.   Hypothyroidism.   5.  GERD.   6.  Osteoarthritis.   7.  Vitamin D deficiency.   8.  History of benign paroxysmal positional vertigo.   9.  History alcohol use disorder, but quit in 2016.      PAST SURGICAL HISTORY:  The patient denies any surgeries.      ALLERGIES:  NO KNOWN DRUG ALLERGIES.      HOME MEDICATIONS:   1.  Atorvastatin 40 mg a day.   2.  Clonazepam 0.5 mg at bedtime p.r.n.     3.  Levothyroxine 150 mcg a day.   4.  Losartan 100 mg a day.   5.  Sertraline 100 mg a day.   6.  Trazodone 50 mg 1-2 tablets at bedtime.      SOCIAL HISTORY:  The patient is .  He has 1 son.  He does not drink alcohol.  Used to smoke a pack a day of cigarettes, but cut down significantly about 10 years ago, and now only smokes a few cigarettes every 10 days, usually socially.  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      FAMILY HISTORY:  Reviewed and noncontributory.      REVIEW OF SYSTEMS:  As mentioned in HPI; otherwise, 10-point review of systems negative.      PHYSICAL EXAMINATION:   VITAL SIGNS:  Temperature 99.1, heart rate 70, blood pressure 131/92, respiration rate 16, O2 saturation 97%.   GENERAL:  A pleasant 58-year-old gentleman who was lying in bed, appeared conversant and friendly.   HEENT:  Pupils are equal, round, and reactive.  There was no scleral icterus or conjunctival injection.  Oropharynx reveals no erythema or exudate.   NECK:  No bruit, JVD, adenopathy.   CARDIOVASCULAR:  Regular rate and rhythm, without murmurs, rubs, or gallops.   LUNGS:  Diminished at bases.  No crackles or wheezes.   ABDOMEN:  Soft, nontender, nondistended, positive bowel sounds.  No femoral bruit.   EXTREMITIES:  No significant edema without knee joint swelling.   SKIN:  No skin rash.     NEUROLOGIC:  Grossly nonfocal.  The patient is able to move all extremities.      LABORATORY DATA:  BMP is normal.  Glucose slightly elevated at 114.  CBC showed a white count 6.3, hemoglobin 14.2, platelets 103.      IMAGING:  As above.       ASSESSMENT AND PLAN:  Mr. William Wynne is a 58-year-old male patient with history including hypertension, hyperlipidemia, hypothyroidism and depression/anxiety, who presents with a fall and found to have suffered an acute T9 compression fracture.     1.  Fall with acute T9 compression fracture.  CT of the thoracic spine showed acute T9 superior endplate compression fracture with less than 10% height loss without evidence of posterior extension or significant spinal canal narrowing.  Neurosurgery was contacted and it was felt that he may benefit from kyphoplasty or possible brace.  We will admit him under observation.  We will order acetaminophen schedule 1000 mg 3 times a day.  We will order p.r.n. oxycodone and p.r.n. IV Dilaudid for breakthrough pain.  We will ask Neurosurgery to see the patient to consider a kyphoplasty or a possible brace.     2.  Left wrist strain due to fall.  Continue wrist brace/immobilizer.    3.  Thrombocytopenia, unclear etiology.  The patient has a low platelet count of 103,000.  Previous platelet counts have been normal.  Unclear of the etiology; could be a medication effect or coming from other cause.  We will check peripheral smear.  Otherwise, monitor his CBC.    4.  Benign essential hypertension.  Continue losartan.     5.  Hyperlipidemia.  Resume atorvastatin at home.     6.  Depression/anxiety.  Continue sertraline and p.r.n. clonazepam.     7.  Hypothyroidism.  Continue levothyroxine.    8.  Prophylaxis:  Pneumo boots.      CODE STATUS:  FULL CODE.         GUERRERO MORENO JR., MD             D: 2019   T: 2019   MT: MUSA      Name:     WILLIAM WYNNE   MRN:      [id number]        Account:      [id number]   :      1960        Admitted:     2019                   Document: [id number]

## 2019-02-04 NOTE — PROVIDER NOTIFICATION
Dr Alvarez notified pt stated he thought he would be unable to void while on bedrest; MD stated ok to straight cath pt prn; also asked MD if blood morphology and reticulocyte count labs could be done with am labs; MD said that was ok.

## 2019-02-04 NOTE — PHARMACY-ADMISSION MEDICATION HISTORY
Admission Medication History    Admission medication history interview status for the 2/3/2019 admission is complete. See EPIC admission navigator for prior to admission medications     Medication history source reliability:Good    Actions taken by pharmacist (provider contacted, etc):None     Additional medication history information not noted on PTA med list :None    Medication reconciliation/reorder completed by provider prior to medication history? No    Time spent in this activity: 10 minutes    Prior to Admission medications    Medication Sig Last Dose Taking? Auth Provider   atorvastatin (LIPITOR) 40 MG tablet Take 1 tablet (40 mg) by mouth daily 2/2/2019 at HS Yes Fifi Fleming MD   clonazePAM (KLONOPIN) 0.5 MG tablet Take 1 tablet (0.5 mg) by mouth nightly as needed for anxiety 2/2/2019 at HS Yes Fifi Fleming MD   levothyroxine (SYNTHROID/LEVOTHROID) 150 MCG tablet Take 1 tablet (150 mcg) by mouth daily 2/3/2019 at AM Yes Fifi Fleming MD   losartan (COZAAR) 100 MG tablet Take 1 tablet (100 mg) by mouth daily 2/3/2019 at AM Yes Fifi Fleming MD   sertraline (ZOLOFT) 100 MG tablet Take 1 tablet (100 mg) by mouth daily  Patient taking differently: Take 100 mg by mouth daily with dinner 2/2/2019 at 1730 Fifi Hays MD   traZODone (DESYREL) 50 MG tablet TAKE ONE TO TWO TABLETS BY MOUTH DAILY FOR SLEEP  at PRN Yes Fifi Fleming MD David S. Cline, PharmD

## 2019-02-04 NOTE — ED NOTES
"[facility]  ED Nurse Handoff Report    ED Chief complaint: Fall      ED Diagnosis:   Final diagnoses:   Closed compression fracture of thoracic vertebra, initial encounter (H)   Acute midline thoracic back pain   Fall, initial encounter       Code Status: See prior    Allergies: No Known Allergies    Activity level - Baseline/Home:  Independent    Activity Level - Current:   Independent     Needed?: No    Isolation: No  Infection: Not Applicable  Bariatric?: No    Vital Signs:   Vitals:    02/03/19 1721 02/03/19 1800 02/03/19 1900 02/03/19 1906   BP: (!) 143/111  (!) 131/92    Pulse: 70      Resp: 16      Temp:    99.1  F (37.3  C)   SpO2: 98% 97%     Weight: 113.4 kg (250 lb)      Height: 1.803 m (5' 11\")          Cardiac Rhythm: ,        Pain level:      Is this patient confused?: No   Does this patient have a guardian?  No         If yes, is there guardianship documents in the Epic \"Code/ACP\" activity?  N/A         Guardian Notified?  N/A  [place] - Suicide Severity Rating Scale Completed?  Yes  If yes, what color did the patient score?  White    Patient Report: Initial Complaint: Fall; Back pain.  Focused Assessment: Pt slipped on ice landing on back. See CT spine results. Pt Aox3. Neuros intact.   Tests Performed: Labs and CT.  Abnormal Results:   Labs Ordered and Resulted from Time of ED Arrival Up to the Time of Departure from the ED   CBC WITH PLATELETS DIFFERENTIAL - Abnormal; Notable for the following components:       Result Value    RBC Count 4.33 (*)     Platelet Count 103 (*)     All other components within normal limits   BASIC METABOLIC PANEL - Abnormal; Notable for the following components:    Glucose 114 (*)     All other components within normal limits       Treatments provided: See mar.    Family Comments: N/A    OBS brochure/video discussed/provided to patient/family: Yes              Name of person given brochure if not patient: n.a              Relationship to " patient: n.a    ED Medications: Medications - No data to display    Drips infusing?:  No    For the majority of the shift this patient was Green.   Interventions performed were Routine ED cares.    Severe Sepsis OR Septic Shock Diagnosis Present: No    To be done/followed up on inpatient unit:  N/A    ED NURSE PHONE NUMBER: [phone]

## 2019-02-04 NOTE — PROGRESS NOTES
Observation Goals:     -diagnostic tests and consults completed and resulted - not met  -vital signs normal or at patient baseline - met    Waiting for neuro consult.

## 2019-02-04 NOTE — PROGRESS NOTES
-diagnostic tests and consults completed and resulted - met  -vital signs normal or at patient baseline - met  Nurse to notify provider when observation goals have been met and patient is ready for discharge.    Patient fitted with back brace per ortho. Instructions given.   Discharge information gone over with patient. Questions answered. Patient wheeled to door 6 where he was met by his family

## 2019-02-04 NOTE — PROGRESS NOTES
S: Pt. seen in [facility]. Male .Nathan GILLIS. RX: OTS TLSO with Sternal extension. DX: T9 superior endplate FX  O: Pt. has not had any orthotic intervention in the past  A: Today I F/D a [employer] 464 TLSO. TLSO to provide trunk support, triplanar control and restricts gross truck motion in the sagittal, coronal and transverse planes. Donning doffing wear and care instruction given.   P: Pt. to be seen as needed.    Coy CHIU,LO

## 2019-02-04 NOTE — PROGRESS NOTES
RECEIVING UNIT ED HANDOFF REVIEW    ED Nurse Handoff Report was reviewed by: Gilma Smallwood on February 3, 2019 at 9:05 PM

## 2019-02-04 NOTE — PLAN OF CARE
Alert and oriented, vs with slight elevated blood pressure. C/o of minimal pain at rest. Cms intact to left hand and everardo feet.   Tolerating diet. Voiding without diff.

## 2019-02-04 NOTE — PROGRESS NOTES
Observation Goals:    -diagnostic tests and consults completed and resulted - not met  -vital signs normal or at patient baseline - met

## 2019-02-04 NOTE — DISCHARGE SUMMARY
[facility]  Hospitalist Discharge Summary       Date of Admission:  2/3/2019  Date of Discharge:  2/4/2019  Discharging Provider: Camron Edwards MD      Discharge Diagnoses   1. Fall on ice  2. Thoracic compression fracture - acute T9 superior endplate compression fracture with <10% height loss  3. Thrombocytopenia    Follow-ups Needed After Discharge   Follow-up Appointments     Follow-up and recommended labs and tests       [facility] in 6 weeks with xray prior.  Please call the clinic at [phone] to schedule your appointment.         Follow-up and recommended labs and tests       Neurosurgery as indicated               Unresulted Labs Ordered in the Past 30 Days of this Admission     Date and Time Order Name Status Description    12/13/2018 1504 T4 FREE In process       PCP to follow up on above.     Hospital Course      Mr. Angus Wynne is a 58-year-old male patient with history including hypertension, hyperlipidemia, hypothyroidism, depression/anxiety, osteoarthritis, gastroesophageal reflux disease, who presents with a fall and found to have suffered an acute T9 compression fracture.     1.  Fall with acute T9 compression fracture.   - CT of the thoracic spine showed acute T9 superior endplate compression fracture with less than 10% height loss without evidence of posterior extension or significant spinal canal narrowing.    - Neurosurgery was contacted and it was felt that he may benefit from kyphoplasty or possible brace.    - Admitted under observation - await NS recommendations   - acetaminophen scheduled 1000 mg 3 times a day.    - will order p.r.n. oxycodone and p.r.n. IV Dilaudid for breakthrough pain.    - Neurosurgery to see the patient to consider a kyphoplasty or a possible brace --> recommended brace to be supplied by orthotics - NS ok with discharge to home - follow up and recommendations included above.     2.  Benign essential  hypertension.  Continue losartan.     3.  Hyperlipidemia.  Resume atorvastatin at home.     4.  Depression/anxiety.  Continue sertraline and p.r.n. clonazepam.     5.  Hypothyroidism.  Continue levothyroxine.     6.  Thrombocytopenia.   - The patient has a low platelet count of 103,000.    - Previous platelet counts have been normal.  Unclear of the etiology.    - Attempt to clear up if this could be a medication effect or another cause - smear pending  - Further management per PCP as outpatient.     Diet: NPO for Medical/Clinical Reasons Except for: Meds    DVT Prophylaxis: Pneumatic Compression Devices  Bond Catheter: not present  Code Status: Full Code        Consultations This Hospital Stay   SOCIAL WORK IP CONSULT  NEUROSURGERY IP CONSULT  ORTHOSIS SPINAL IP CONSULT  ORTHOSIS SPINAL IP CONSULT    Code Status   Full Code    Time Spent on this Encounter   I, Camron Edwards, personally saw the patient today and spent greater than 30 minutes discharging this patient.       Camron Edwards MD  [facility] [place]  ______________________________________________________________________    Physical Exam   Vital Signs: Temp: 97.9  F (36.6  C) Temp src: Oral BP: 119/82 Pulse: 65   Resp: 16 SpO2: 97 % O2 Device: None (Room air)    Weight: 250 lbs 0 oz  General Appearance: NAD, pleasant  HEENT: normocephalic, eomi, mmm  Respiratory: ctabl  Cardiovascular: s1s2 heard, reg rate, no pitting edema  GI: s, nt, nd, +bs  Other: Aaox3, no focal deficits, sensory grossly intact, moving all extremities without obvious deficit       Primary Care Physician   Fifi Fleming    Discharge Disposition   Discharged to home  Condition at discharge: Stable    Significant Results and Procedures   Most Recent 3 CBC's:  Recent Labs   Lab Test 02/03/19  1810 02/03/19  0611 09/18/17  1304   WBC 6.3 4.6 4.6   HGB 14.2 15.1 16.0   MCV 94 94 94   * 116* 158     Most Recent 3 BMP's:  Recent Labs   Lab Test 02/04/19  0611  02/03/19  1810 12/13/18  1504    134 137   POTASSIUM 4.7 4.7 4.3   CHLORIDE 106 105 103   CO2 27 24 21   BUN 10 10 7   CR 1.02 0.77 0.99   ANIONGAP 5 5 13   SAMMY 8.9 8.5 9.3   * 114* 95   ,   Results for orders placed or performed during the hospital encounter of 02/03/19   CT Lumbar Spine w/o Contrast    Narrative    CT LUMBAR SPINE WITHOUT CONTRAST  2/3/2019 7:00 PM     HISTORY: Thoracolumbar spine trauma, minor-moderate, low back pain.     TECHNIQUE: Axial images of the lumbar spine were obtained without  intravenous contrast. Multiplanar reformations were performed.   Radiation dose for this scan was reduced using automated exposure  control, adjustment of the mA and/or kV according to patient size, or  iterative reconstruction technique.    COMPARISON: X-rays 2/20/2018    FINDINGS: Alignment is maintained. No evidence of acute fracture or  traumatic subluxation. There is slight height loss involving the L3  and L4 vertebral bodies, unchanged. No evidence of acute traumatic  disc herniation or epidural hematoma. Vascular calcifications noted.  Bladder is mildly distended.      Impression    IMPRESSION:  No evidence of acute fracture or traumatic subluxation.        SARA COLEMAN MD   CT Thoracic Spine w/o Contrast    Narrative    CT THORACIC SPINE WITHOUT CONTRAST   2/3/2019 6:56 PM     HISTORY: Thoracolumbar spine trauma, minor-mod, low back pain.     TECHNIQUE: Axial images of the thoracic spine were obtained without  intravenous contrast. Multiplanar reformations were performed.   Radiation dose for this scan was reduced using automated exposure  control, adjustment of the mA and/or kV according to patient size, or  iterative reconstruction technique.    COMPARISON: None.    FINDINGS:  Alignment is maintained. Multilevel mild to moderate degenerative  changes are present. A minimally displaced fracture is present  involving the superior endplate of the T9 vertebral body. No evidence  of  posterior extension or significant spinal canal stenosis. No other  fractures are identified. No evidence of acute cement disc herniation  or epidural hematoma. There is suggestion of trace anterior paraspinal  blood product along the T9 fracture. Paraspinal soft tissues are  otherwise unremarkable.      Impression    IMPRESSION:  Acute T9 superior endplate compression fracture with less than 10%  height loss. No evidence of posterior extension or significant spinal  canal narrowing.         SARA COLEMAN MD   CT Cervical Spine w/o Contrast    Narrative    CT CERVICAL SPINE WITHOUT CONTRAST   2/3/2019 6:53 PM     HISTORY: Cervical spine trauma, low clinical risk (NEXUS/CCR).     TECHNIQUE: Axial images of the cervical spine were obtained without  intravenous contrast. Multiplanar reformations were performed.   Radiation dose for this scan was reduced using automated exposure  control, adjustment of the mA and/or kV according to patient size, or  iterative reconstruction technique.    COMPARISON: None.    FINDINGS: Alignment is significant for slight loss of the normal  lordosis. Mild to moderate degenerative changes are present most  conspicuous at C5-C6 and C6-C7. No evidence of acute fracture or  traumatic subluxation. No evidence of acute traumatic disc herniation  or epidural hematoma. Superior endplate and C5 Schmorl's node noted.  Paraspinal soft tissues are unremarkable.      Impression    IMPRESSION: No evidence of acute fracture or subluxation in the  cervical spine.    SARA COLEMAN MD   XR Hand Left 3 Views    Narrative    HAND VIEWS LEFT  2/3/2019 6:24 PM     HISTORY: fall    COMPARISON: None.      Impression    IMPRESSION: Mild degenerative changes first carpometacarpal joint. No  evidence for acute fracture or dislocation.    ISMA GONZALES MD   XR Wrist Left 3 Views    Narrative    WRIST THREE VIEWS LEFT 2/3/2019 6:25 PM     HISTORY: fall pain, suspect scaphoid fx    COMPARISON: None.       Impression    IMPRESSION: No definite scaphoid fracture, on AP view the scaphoid is  foreshortened and suboptimally visualized. Recommend additional  dedicated navicular views.    ISMA GONZALES MD       Discharge Orders      XR Thoracic Lumbar Standing 2 Views     Follow-up and recommended labs and tests     Please [facility] in 6 weeks with xray prior.  Please call the clinic at [phone] to schedule your appointment.     Activity    - continue to wear TLSO at all times when out of bed  - Repeat XR of thoracic spine AP and Lateral in 6 weeks.   - Return to clinic following repeat XR spine in 6 weeks   - Call the [facility] for any questions or concerns during interim, at [phone]  - Seek medical attention immediately if any paresthesias, weakness, gait instability, bowel/bladder incontinence, new pain.    If pain persists in 2-3 weeks, please contact our clinic and we can place referral for evaluation for possible kyphoplasty.     Reason for your hospital stay    Fall with back pain     Follow-up and recommended labs and tests     Neurosurgery as indicated     Activity    Your activity upon discharge: activity as directed by neurosurgery     Discharge Instructions    Continue with neurosurgery recommendations     Full Code     Diet    Follow this diet upon discharge: Orders Placed This Encounter      Regular Diet Adult     Discharge Medications   Current Discharge Medication List      START taking these medications    Details   acetaminophen (TYLENOL) 500 MG tablet Take 2 tablets (1,000 mg) by mouth 3 times daily as needed for mild pain  Qty: 180 tablet, Refills: 0    Associated Diagnoses: Closed compression fracture of thoracic vertebra, initial encounter (H)         CONTINUE these medications which have NOT CHANGED    Details   atorvastatin (LIPITOR) 40 MG tablet Take 1 tablet (40 mg) by mouth daily  Qty: 90 tablet, Refills: 1    Associated Diagnoses:  Hyperlipidemia with target LDL less than 130      clonazePAM (KLONOPIN) 0.5 MG tablet Take 1 tablet (0.5 mg) by mouth nightly as needed for anxiety  Qty: 14 tablet, Refills: 0    Comments: Has some from [facility] at this time  Associated Diagnoses: Anxiety      levothyroxine (SYNTHROID/LEVOTHROID) 150 MCG tablet Take 1 tablet (150 mcg) by mouth daily  Qty: 90 tablet, Refills: 1    Comments: Dose was increased to 150 in [facility], 09/2018  Associated Diagnoses: Other specified hypothyroidism      losartan (COZAAR) 100 MG tablet Take 1 tablet (100 mg) by mouth daily  Qty: 90 tablet, Refills: 1    Associated Diagnoses: Essential hypertension      sertraline (ZOLOFT) 100 MG tablet Take 1 tablet (100 mg) by mouth daily  Qty: 90 tablet, Refills: 1    Associated Diagnoses: Anxiety      traZODone (DESYREL) 50 MG tablet TAKE ONE TO TWO TABLETS BY MOUTH DAILY FOR SLEEP  Qty: 90 tablet, Refills: 0    Associated Diagnoses: Other insomnia           Allergies   No Known Allergies

## 2019-02-05 ENCOUNTER — TELEPHONE (OUTPATIENT)
Dept: FAMILY MEDICINE | Facility: CLINIC | Age: 59
End: 2019-02-05

## 2019-02-05 NOTE — TELEPHONE ENCOUNTER
Non detailed message left for pt to return call to clinic and ask to speak with a triage nurse.    Fara HAWKINS RN  EP Triage

## 2019-02-05 NOTE — TELEPHONE ENCOUNTER
Patient was discharged from [facility] on 02/04/2019 after being treated for Closed Compression Fracture Of Thoracic Vertebra, Initial Encounter (H). Triage please follow up with patient.      .Cherie AMADOR    [facility] [place]

## 2019-02-14 DIAGNOSIS — G47.09 OTHER INSOMNIA: ICD-10-CM

## 2019-02-14 RX ORDER — TRAZODONE HYDROCHLORIDE 50 MG/1
TABLET, FILM COATED ORAL
Qty: 90 TABLET | Refills: 0 | Status: SHIPPED | OUTPATIENT
Start: 2019-02-14 | End: 2019-04-15

## 2019-02-14 NOTE — TELEPHONE ENCOUNTER
"Requested Prescriptions   Pending Prescriptions Disp Refills     traZODone (DESYREL) 50 MG tablet [Pharmacy Med Name: TRAZODONE HCL 50MG TABS] 90 tablet 0     Sig: TAKE ONE TO TWO TABLETS BY MOUTH DAILY FOR SLEEP    Serotonin Modulators Passed - 2/14/2019  4:12 PM       Passed - Recent (12 mo) or future (30 days) visit within the authorizing provider's specialty    Patient had office visit in the last 12 months or has a visit in the next 30 days with authorizing provider or within the authorizing provider's specialty.  See \"Patient Info\" tab in inbasket, or \"Choose Columns\" in Meds & Orders section of the refill encounter.             Passed - Medication is active on med list       Passed - Patient is age 18 or older        Prescription approved per [facility] Refill Protocol.  Maria Eugenia Marie RN   [facility] - Triage     "

## 2019-02-18 ENCOUNTER — TELEPHONE (OUTPATIENT)
Dept: NEUROSURGERY | Facility: CLINIC | Age: 59
End: 2019-02-18

## 2019-02-18 DIAGNOSIS — S22.000A CLOSED COMPRESSION FRACTURE OF THORACIC VERTEBRA, INITIAL ENCOUNTER (H): ICD-10-CM

## 2019-02-18 DIAGNOSIS — S22.079A T9 VERTEBRAL FRACTURE (H): Primary | ICD-10-CM

## 2019-02-18 DIAGNOSIS — M51.34 DDD (DEGENERATIVE DISC DISEASE), THORACIC: ICD-10-CM

## 2019-02-18 NOTE — TELEPHONE ENCOUNTER
REASON FOR CALL: Pt's EC/sonEliazar called on pt's behalf. States that pt is in pain and they would like to f/u sooner than the 6wk that was recommended. Tried explaining to EC why it is important to f/u in 6wk, and why it was recommended. EC states that he would like to talk to an RN instead in regards to pt's pain and on what pt should do. They are not willing to schedule for a 6wk f/u at this time.

## 2019-02-21 RX ORDER — HYDROCODONE BITARTRATE AND ACETAMINOPHEN 5; 325 MG/1; MG/1
1-2 TABLET ORAL EVERY 6 HOURS PRN
Qty: 40 TABLET | Refills: 0 | Status: SHIPPED | OUTPATIENT
Start: 2019-02-21 | End: 2019-03-20

## 2019-02-21 NOTE — TELEPHONE ENCOUNTER
Patient returned call to discuss options and follow up for his T9 endplate fracture, seen through ED on 2/4 after a fall on the ice. I explained that we recommend bracing and follow up in 6 weeks to allow time for healing. Patient is reporting 6-8/10 pain and states tylenol isn't helping. I discussed possible vertebroplasty option and offered to place a referral to IR. Patient doesn't wish to proceed at this time and is asking if we could prescribe something to help with the pain. Will forward request to care team, if approved would like to  Rx in [place].

## 2019-02-21 NOTE — TELEPHONE ENCOUNTER
Informed patient's son Eliazar that Rx was approved and will be available for  at the  pharmacy in [place].    Appointment scheduled for follow up appointment with CLAU shen.

## 2019-03-20 ENCOUNTER — OFFICE VISIT (OUTPATIENT)
Dept: NEUROSURGERY | Facility: CLINIC | Age: 59
End: 2019-03-20
Attending: PHYSICIAN ASSISTANT
Payer: COMMERCIAL

## 2019-03-20 ENCOUNTER — ANCILLARY PROCEDURE (OUTPATIENT)
Dept: GENERAL RADIOLOGY | Facility: CLINIC | Age: 59
End: 2019-03-20
Attending: PHYSICIAN ASSISTANT
Payer: COMMERCIAL

## 2019-03-20 VITALS
SYSTOLIC BLOOD PRESSURE: 121 MMHG | BODY MASS INDEX: 33.86 KG/M2 | HEIGHT: 72 IN | DIASTOLIC BLOOD PRESSURE: 90 MMHG | TEMPERATURE: 98.3 F | OXYGEN SATURATION: 98 % | WEIGHT: 250 LBS | HEART RATE: 82 BPM

## 2019-03-20 DIAGNOSIS — S22.000A CLOSED COMPRESSION FRACTURE OF THORACIC VERTEBRA, INITIAL ENCOUNTER (H): Primary | ICD-10-CM

## 2019-03-20 DIAGNOSIS — S22.000A CLOSED COMPRESSION FRACTURE OF THORACIC VERTEBRA, INITIAL ENCOUNTER (H): ICD-10-CM

## 2019-03-20 PROCEDURE — 72080 X-RAY EXAM THORACOLMB 2/> VW: CPT

## 2019-03-20 PROCEDURE — 99213 OFFICE O/P EST LOW 20 MIN: CPT | Performed by: PHYSICIAN ASSISTANT

## 2019-03-20 PROCEDURE — G0463 HOSPITAL OUTPT CLINIC VISIT: HCPCS

## 2019-03-20 RX ORDER — HYDROCODONE BITARTRATE AND ACETAMINOPHEN 5; 325 MG/1; MG/1
1 TABLET ORAL EVERY 6 HOURS PRN
Qty: 30 TABLET | Refills: 0 | Status: SHIPPED | OUTPATIENT
Start: 2019-03-20 | End: 2019-07-22

## 2019-03-20 ASSESSMENT — PAIN SCALES - GENERAL: PAINLEVEL: MODERATE PAIN (4)

## 2019-03-20 ASSESSMENT — MIFFLIN-ST. JEOR: SCORE: 1981.99

## 2019-03-20 NOTE — NURSING NOTE
Angus Wynne is a 58 year old male who presents for:  Chief Complaint   Patient presents with     Follow Up     ED Follow up for T9 fracture.         Initial Vitals:  /90 (BP Location: Left arm, Patient Position: Sitting, Cuff Size: Adult Large)   Pulse 82   Temp 98.3  F (36.8  C) (Oral)   Ht 6' (1.829 m)   Wt 250 lb (113.4 kg)   SpO2 98%   BMI 33.91 kg/m   Estimated body mass index is 33.91 kg/m  as calculated from the following:    Height as of this encounter: 6' (1.829 m).    Weight as of this encounter: 250 lb (113.4 kg).. Body surface area is 2.4 meters squared. BP completed using cuff size: large  Moderate Pain (4)        Nursing Comments: ED follow up for T9 Fracture. Patient rates his pain level at a 4 today.         Daly Rosales

## 2019-03-20 NOTE — PATIENT INSTRUCTIONS
- Get thoracic xray downstairs - I will call and leave voicemail with results and recommendations  - Given one more refill for pain medication, will need to go to primary care doctor or pain clinic if ongoing pain  - Seek medical attention immediately if any paresthesias, weakness, gait instability, bowel/bladder incontinence, new pain.

## 2019-03-20 NOTE — PROGRESS NOTES
[facility]  Neurosurgery followup:    HPI: 6 week follow up for acute T9 compression fracture. States he has been doing well but does have some continued mid back pain at fracture height. Has been wearing TLSO but states it is causing pain and he feels it doesn't fit right. Norco has been helpful with pain. No radicular pain or weakness.  Exam:  Constitutional:  Alert, well nourished, NAD.  HEENT: Normocephalic, atraumatic.   Pulm:  Without shortness of breath   CV:  No pitting edema of BLE.      Neurological:  Awake  Alert  Oriented x 3  Motor exam:        IP Q DF PF EHL  R   5  5   5   5    5  L   5  5   5   5    5     Reflexes are 2+ in the patellar and Achilles. There is no clonus. Downgoing Babinski.    Able to spontaneously move L/E bilaterally  Sensation intact throughout all L/E dermatomes     Imaging: Thoracic xray reviewed in office today with continued T9 compression fracture.  A/P: 6 week follow up for acute T9 compression fracture. Doing well with some continued localized mid thoracic pain. No radicular pain or weakness. Has been wearing TLSO but feels as though it doesn't fit correctly. Thoracic xray with continued T9 compression fracture. He was offered vertebroplasty prior but he does not want to move forward with this. I did leave voicemail for patient and encourage he continue to wear brace when out of bed for another 6 weeks and follow up at this time. I did also offer an orthotics consult to have brace fitted. Advised to call back with any questions or concerns.      - Seek medical attention immediately if any paresthesias, weakness, gait instability, bowel/bladder incontinence, new pain.            Mary Evans PA-C  [facility]  [address]  [address] [place] [id number]    Tel [phone]  Pager [phone]

## 2019-03-20 NOTE — LETTER
3/20/2019         RE: Angus Wynne  [address]  [place] [address]        Dear Colleague,    Thank you for referring your patient, Angus Wynne, to the [facility]. Please see a copy of my visit note below.    [facility]  Neurosurgery followup:    HPI: 6 week follow up for acute T9 compression fracture. States he has been doing well but does have some continued mid back pain at fracture height. Has been wearing TLSO but states it is causing pain and he feels it doesn't fit right. Norco has been helpful with pain. No radicular pain or weakness.  Exam:  Constitutional:  Alert, well nourished, NAD.  HEENT: Normocephalic, atraumatic.   Pulm:  Without shortness of breath   CV:  No pitting edema of BLE.      Neurological:  Awake  Alert  Oriented x 3  Motor exam:        IP Q DF PF EHL  R   5  5   5   5    5  L   5  5   5   5    5     Reflexes are 2+ in the patellar and Achilles. There is no clonus. Downgoing Babinski.    Able to spontaneously move L/E bilaterally  Sensation intact throughout all L/E dermatomes     Imaging: Thoracic xray reviewed in office today with continued T9 compression fracture.  A/P: 6 week follow up for acute T9 compression fracture. Doing well with some continued localized mid thoracic pain. No radicular pain or weakness. Has been wearing TLSO but feels as though it doesn't fit correctly. Thoracic xray with continued T9 compression fracture. He was offered vertebroplasty prior but he does not want to move forward with this. I did leave voicemail for patient and encourage he continue to wear brace when out of bed for another 6 weeks and follow up at this time. I did also offer an orthotics consult to have brace fitted. Advised to call back with any questions or concerns.      - Seek medical attention immediately if any paresthesias, weakness, gait instability, bowel/bladder incontinence, new pain.            Mary Evans PA-C  [facility]  [address]  [address]  [place] [address]    Tel [phone]  Pager [phone]      Again, thank you for allowing me to participate in the care of your patient.        Sincerely,        Mary Evans PA-C

## 2019-03-22 DIAGNOSIS — S22.000D CLOSED COMPRESSION FRACTURE OF THORACIC VERTEBRA WITH ROUTINE HEALING, SUBSEQUENT ENCOUNTER: Primary | ICD-10-CM

## 2019-04-15 DIAGNOSIS — G47.09 OTHER INSOMNIA: ICD-10-CM

## 2019-04-15 NOTE — TELEPHONE ENCOUNTER
"Requested Prescriptions   Pending Prescriptions Disp Refills     traZODone (DESYREL) 50 MG tablet [Pharmacy Med Name: TRAZODONE HCL 50MG TABS] 90 tablet 0     Sig: TAKE ONE TO TWO TABLETS BY MOUTH DAILY FOR SLEEP  Last Written Prescription Date:  2/14/19  Last Fill Quantity: 90,  # refills: 0   Last office visit: 12/13/2018 with prescribing provider:  Lonnie   Future Office Visit:           Serotonin Modulators Passed - 4/15/2019  1:11 PM        Passed - Recent (12 mo) or future (30 days) visit within the authorizing provider's specialty     Patient had office visit in the last 12 months or has a visit in the next 30 days with authorizing provider or within the authorizing provider's specialty.  See \"Patient Info\" tab in inbasket, or \"Choose Columns\" in Meds & Orders section of the refill encounter.              Passed - Medication is active on med list        Passed - Patient is age 18 or older          "

## 2019-04-16 RX ORDER — TRAZODONE HYDROCHLORIDE 50 MG/1
TABLET, FILM COATED ORAL
Qty: 90 TABLET | Refills: 2 | Status: SHIPPED | OUTPATIENT
Start: 2019-04-16 | End: 2019-08-20

## 2019-04-18 ENCOUNTER — TELEPHONE (OUTPATIENT)
Dept: FAMILY MEDICINE | Facility: CLINIC | Age: 59
End: 2019-04-18

## 2019-04-18 NOTE — TELEPHONE ENCOUNTER
Patient states he was drinking soup and spilled the coup on his right hand      Right hand burn     Onset: Half an hour ago   Location; right hand (back of palm and all fingers)   Red: yes  Blistering: no   Itching: yes  Pain: 3-4/10  White patches: no  Broken skin: no  Movement okay: yes able to move and all fingers   Numbness and tingling in hand or fingers: no   Swelling: no states looks similar to left hand just red       Therapies tried:  Water  coconut oil Applied     Informed patient at this time Per telephone Triage Protocols for Nurses by Jayme advise that he continue to keep hand clean. May place hand under cool water as needed. Do not apply ointments, grease, butter, or pain killing medicines on hand. May apply aloe vera or yogurt for a soothing effect. Watch for signs of infection. If you begin to develop any blisters or other changes in the skin color I informed patient he should call the clinic back to get triage again. Pt advised of recommendations. Pt advised to call the clinic with any further symptoms or changes. Also advised to go to UC or ER if symptoms increase. The patient indicates understanding of these issues and agrees with the plan.    Florence STALEYN, RN   [facility]

## 2019-04-19 ENCOUNTER — OFFICE VISIT (OUTPATIENT)
Dept: FAMILY MEDICINE | Facility: CLINIC | Age: 59
End: 2019-04-19
Payer: COMMERCIAL

## 2019-04-19 ENCOUNTER — ALLIED HEALTH/NURSE VISIT (OUTPATIENT)
Dept: NURSING | Facility: CLINIC | Age: 59
End: 2019-04-19
Payer: COMMERCIAL

## 2019-04-19 VITALS — SYSTOLIC BLOOD PRESSURE: 117 MMHG | DIASTOLIC BLOOD PRESSURE: 82 MMHG

## 2019-04-19 DIAGNOSIS — T23.261A PARTIAL THICKNESS BURN OF BACK OF RIGHT HAND, INITIAL ENCOUNTER: Primary | ICD-10-CM

## 2019-04-19 DIAGNOSIS — T30.0 BURN: Primary | ICD-10-CM

## 2019-04-19 PROCEDURE — 99213 OFFICE O/P EST LOW 20 MIN: CPT | Performed by: PHYSICIAN ASSISTANT

## 2019-04-19 PROCEDURE — 99207 ZZC NO CHARGE NURSE ONLY: CPT

## 2019-04-19 NOTE — PROGRESS NOTES
SUBJECTIVE:   Angus Wynne is a 58 year old male who presents to clinic today for the following   health issues:      Pt walked into clinic with burn on right hand from hot soup yesterday.  Has a large blister that is fluid filled and several smaller blisters up the arm.  Pt is able to open and close hand.  Is able to feel sensation up arm.     Huddled with Daniel who will see pt.  Pt has been added to Daniel Dunham's schedule.    Fara HAWKINS RN  EP Triage        SUBJECTIVE:   Angus Wynne is a 58 year old male who presents to clinic today for the following   health issues:      Burn:  Angus presents to the clinic today with a burn to the dorsum of his right hand that he sustained yesterday after spilling hot soup.  Yesterday, the area was red, but today he noticed large fluid filled bullea to the dorsum of his right hand, and several smaller blistering lesions.  His hand is not currently painful.  He has not noticed any changes in sensation or ROM.  After this accident, he washed his hand and has left it open to air thus far.           Additional history: as documented    Reviewed  and updated as needed this visit by clinical staff         Reviewed and updated as needed this visit by Provider         Patient Active Problem List   Diagnosis     BPPV (benign paroxysmal positional vertigo), unspecified laterality     Other specified hypothyroidism     Hx of acute alcoholic hepatitis     Hx of colonic polyp     Gastroesophageal reflux disease, esophagitis presence not specified     Vitamin D deficiency     Anxiety     Alcohol use disorder     Neck pain     Cervicalgia     Cervical spondylosis without myelopathy     Essential hypertension     Hyperlipidemia with target LDL less than 130     Nasal congestion     History of colonic polyps     Vertigo     Lumbago     Morbid obesity (H)     Colon cancer screening     Thoracic compression fracture (H)     Past Surgical History:   Procedure Laterality Date     NO HISTORY OF  SURGERY         Social History     Tobacco Use     Smoking status: Former Smoker     Last attempt to quit: 9/28/2015     Years since quitting: 3.5     Smokeless tobacco: Never Used   Substance Use Topics     Alcohol use: No     Alcohol/week: 0.0 oz     Family History   Problem Relation Age of Onset     Coronary Artery Disease Father         at age 60      Hyperlipidemia Brother      Diabetes No family hx of      Cerebrovascular Disease No family hx of      Colon Cancer No family hx of      Prostate Cancer No family hx of          Current Outpatient Medications   Medication Sig Dispense Refill     atorvastatin (LIPITOR) 40 MG tablet Take 1 tablet (40 mg) by mouth daily (Patient not taking: Reported on 4/19/2019) 90 tablet 1     clonazePAM (KLONOPIN) 0.5 MG tablet Take 1 tablet (0.5 mg) by mouth nightly as needed for anxiety (Patient not taking: Reported on 3/20/2019) 14 tablet 0     HYDROcodone-acetaminophen (NORCO) 5-325 MG tablet Take 1 tablet by mouth every 6 hours as needed for pain 30 tablet 0     levothyroxine (SYNTHROID/LEVOTHROID) 150 MCG tablet Take 1 tablet (150 mcg) by mouth daily 90 tablet 1     losartan (COZAAR) 100 MG tablet Take 1 tablet (100 mg) by mouth daily 90 tablet 1     sertraline (ZOLOFT) 100 MG tablet Take 1 tablet (100 mg) by mouth daily (Patient taking differently: Take 100 mg by mouth daily with dinner) 90 tablet 1     traZODone (DESYREL) 50 MG tablet TAKE ONE TO TWO TABLETS BY MOUTH DAILY FOR SLEEP 90 tablet 2     No Known Allergies    ROS:  Constitutional, HEENT, cardiovascular, pulmonary, gi and gu systems are negative, except as otherwise noted.    OBJECTIVE:     /82 (BP Location: Left arm, Patient Position: Sitting, Cuff Size: Adult Large)   There is no height or weight on file to calculate BMI.  GENERAL: healthy, alert and no distress  SKIN:  3x3 round, fluid filled intact bullae to dorsum of right hand which is non TTP, there are several small fluid filled blisters just  proximal to this  the volar surface of his right forearm. There is mild surrounding erythema without TTP or warmth.  Patient has FROM of digits of right hand and wrist, he has 5/5 strength of digits.  sensation is intact along the skin of the right hand and arm to light touch    Diagnostic Test Results:  none     ASSESSMENT/PLAN:       1. Partial thickness burn of back of right hand, initial encounter  Advised that we avoid debridement of the intact bullae of the dorsum of his right hand as well as to the other smaller blistering areas in order to help prevent infection.  He was advised that these lesions may open spontaneously while they heal.  Today, I applied bacitracin ointment and a non stick dressing.  His hand was wrapped with gauze.  Home care instructions were given as well as directions in case the lesions open and drain.  He has been advised to monitor for signs of infection.  I recommended that we use keflex x 5 days for prophylaxis, he declines antibiotics today.  Follow up in 7 days, sooner if symptoms worsening.     Follow-Up: 7 days    Daniel Dunham PA-C  [facility]

## 2019-04-19 NOTE — NURSING NOTE
Pt walked into clinic with burn on right hand from hot soup yesterday.  Has a large blister that is fluid filled and several smaller blisters up the arm.  Pt is able to open and close hand.  Is able to feel sensation up arm.    Huddled with Daniel who will see pt.  Pt has been added to Daniel Dunham's schedule.    Fara HAWKINS RN  EP Triage

## 2019-06-11 ENCOUNTER — TELEPHONE (OUTPATIENT)
Dept: FAMILY MEDICINE | Facility: CLINIC | Age: 59
End: 2019-06-11

## 2019-06-11 DIAGNOSIS — I10 ESSENTIAL HYPERTENSION: Primary | ICD-10-CM

## 2019-06-11 DIAGNOSIS — E78.5 HYPERLIPIDEMIA WITH TARGET LDL LESS THAN 130: ICD-10-CM

## 2019-06-11 NOTE — LETTER
June 18, 2019      Angus Wynne  [address] [place] [address]        Dear Angus,    I care about your health and have reviewed your health plan. I have reviewed your medical conditions, medication list, and lab results and am making recommendations based on this review, to better manage your health.    You are in particular need of attention regarding:  -Cardiology appointment    I am recommending that you:  -Please [phone] to schedule a Cardiology appointment    Here is a list of Health Maintenance topics that are due now or due soon:  Health Maintenance Due   Topic Date Due     One-time HIV Screening  06/15/1973     Zoster (Shingles) Vaccine (1 of 2) 06/15/2008     Colonscopy  09/09/2018     Preventive Care Visit  02/15/2019     Liver Function Panel  05/03/2019     Depression Assessment  06/13/2019       Please call us at [phone] (or use [employer]) to address the above recommendations.     Thank you for trusting [facility] and we appreciate the opportunity to serve you.  We look forward to supporting your healthcare needs in the future.    Healthy Regards,    Fifi Fleming MD

## 2019-06-11 NOTE — TELEPHONE ENCOUNTER
Reason for Call:  Other appointment    Detailed comments: Pt has seen Wali the cardiologist before, would like to set up an appt with him now.  Personal referral please.  Please put message on my chart.    Phone Number Patient can be reached at: Cell number on file:    Telephone Information:   Mobile [phone]       Best Time: anytime    Can we leave a detailed message on this number? YES    Call taken on 6/11/2019 at 1:58 PM by Andressa Liang

## 2019-06-13 NOTE — TELEPHONE ENCOUNTER
Referral placed     Remind pt to do follow up for med check and routine physical here as well,   since she is due.

## 2019-06-14 NOTE — TELEPHONE ENCOUNTER
Called patient and left a non detailed vm to call back.      .Cherie AMADOR    [facility] [place]

## 2019-06-17 ENCOUNTER — ANCILLARY PROCEDURE (OUTPATIENT)
Dept: GENERAL RADIOLOGY | Facility: CLINIC | Age: 59
End: 2019-06-17
Attending: PHYSICIAN ASSISTANT
Payer: COMMERCIAL

## 2019-06-17 ENCOUNTER — OFFICE VISIT (OUTPATIENT)
Dept: NEUROSURGERY | Facility: CLINIC | Age: 59
End: 2019-06-17
Attending: PHYSICIAN ASSISTANT
Payer: COMMERCIAL

## 2019-06-17 VITALS
WEIGHT: 250 LBS | HEIGHT: 72 IN | DIASTOLIC BLOOD PRESSURE: 88 MMHG | BODY MASS INDEX: 33.86 KG/M2 | SYSTOLIC BLOOD PRESSURE: 129 MMHG | OXYGEN SATURATION: 94 % | HEART RATE: 74 BPM | TEMPERATURE: 98.2 F

## 2019-06-17 DIAGNOSIS — S22.000D CLOSED COMPRESSION FRACTURE OF THORACIC VERTEBRA WITH ROUTINE HEALING, SUBSEQUENT ENCOUNTER: Primary | ICD-10-CM

## 2019-06-17 PROCEDURE — 99213 OFFICE O/P EST LOW 20 MIN: CPT | Performed by: PHYSICIAN ASSISTANT

## 2019-06-17 PROCEDURE — G0463 HOSPITAL OUTPT CLINIC VISIT: HCPCS

## 2019-06-17 PROCEDURE — 72070 X-RAY EXAM THORAC SPINE 2VWS: CPT

## 2019-06-17 ASSESSMENT — MIFFLIN-ST. JEOR: SCORE: 1976.99

## 2019-06-17 ASSESSMENT — PAIN SCALES - GENERAL: PAINLEVEL: MODERATE PAIN (4)

## 2019-06-17 NOTE — LETTER
6/17/2019         RE: Angus Wynne  [address]  [place] [address]        Dear Colleague,    Thank you for referring your patient, Angus Wynne, to the [facility]. Please see a copy of my visit note below.    [facility]  Neurosurgery followup:    HPI: ~4 months s/p T9 compression fracture. States he has been doing well. He has not been wearing brace and only has some mild localized low back stiffness, worse with axial loading movements. No radicular pain or weakness.  Exam:  Constitutional:  Alert, well nourished, NAD.  HEENT: Normocephalic, atraumatic.   Pulm:  Without shortness of breath   CV:  No pitting edema of BLE.      Neurological:  Awake  Alert  Oriented x 3  Motor exam:        IP Q DF PF EHL  R   5  5   5   5    5  L   5  5   5   5    5     Reflexes are 2+ in the patellar and Achilles. There is no clonus. Downgoing Babinski.    Able to spontaneously move L/E bilaterally  Sensation intact throughout all L/E dermatomes  Imaging: XR thoracic spine from today reviewed in office. Reveals stable T9 compression fracture.  A/P: ~4 months s/p T9 compression fracture. Doing well with only mild localized low back pain with axial loading movement. XRs with stable T9 fracture. Discussed no longer needs to wear brace and can slowly increase activity and start to exercise. Discussed formal PT, but he would like to do yoga/gym on his own. Follow up as needed. Patient voiced understanding and agreement.      Mary Evans PA-C  [facility]  [address] [address]    Tel [phone]  Pager [phone]\    Again, thank you for allowing me to participate in the care of your patient.        Sincerely,        Mary Evans PA-C

## 2019-06-17 NOTE — TELEPHONE ENCOUNTER
Called patient left a non detailed vm to call back.      .Cherie AMADOR    [facility] [place]

## 2019-06-17 NOTE — PROGRESS NOTES
[facility]  Neurosurgery followup:    HPI: ~4 months s/p T9 compression fracture. States he has been doing well. He has not been wearing brace and only has some mild localized low back stiffness, worse with axial loading movements. No radicular pain or weakness.  Exam:  Constitutional:  Alert, well nourished, NAD.  HEENT: Normocephalic, atraumatic.   Pulm:  Without shortness of breath   CV:  No pitting edema of BLE.      Neurological:  Awake  Alert  Oriented x 3  Motor exam:        IP Q DF PF EHL  R   5  5   5   5    5  L   5  5   5   5    5     Reflexes are 2+ in the patellar and Achilles. There is no clonus. Downgoing Babinski.    Able to spontaneously move L/E bilaterally  Sensation intact throughout all L/E dermatomes  Imaging: XR thoracic spine from today reviewed in office. Reveals stable T9 compression fracture.  A/P: ~4 months s/p T9 compression fracture. Doing well with only mild localized low back pain with axial loading movement. XRs with stable T9 fracture. Discussed no longer needs to wear brace and can slowly increase activity and start to exercise. Discussed formal PT, but he would like to do yoga/gym on his own. Follow up as needed. Patient voiced understanding and agreement.      Mary Evans PA-C  [facility]  [address] [address]    Tel [phone]  Pager [phone]\

## 2019-06-17 NOTE — NURSING NOTE
Angus Wynne is a 59 year old male who presents for:  Chief Complaint   Patient presents with     Follow Up     Follow up for thoracic compression fracture.         Initial Vitals:  /88 (BP Location: Left arm, Patient Position: Sitting, Cuff Size: Adult Large)   Pulse 74   Temp 98.2  F (36.8  C) (Oral)   Ht 6' (1.829 m)   Wt 250 lb (113.4 kg)   SpO2 94%   BMI 33.91 kg/m   Estimated body mass index is 33.91 kg/m  as calculated from the following:    Height as of this encounter: 6' (1.829 m).    Weight as of this encounter: 250 lb (113.4 kg).. Body surface area is 2.4 meters squared. BP completed using cuff size: large  Moderate Pain (4)        Nursing Comments: Pain level of a 4 out of 10 today.         Daly Rosales

## 2019-06-18 NOTE — TELEPHONE ENCOUNTER
Called patient phone went straight to , TC will send patient a letter.      .Cherie AMADOR    [facility]

## 2019-07-19 PROBLEM — R06.09 DOE (DYSPNEA ON EXERTION): Status: ACTIVE | Noted: 2019-07-19

## 2019-07-22 ENCOUNTER — OFFICE VISIT (OUTPATIENT)
Dept: CARDIOLOGY | Facility: CLINIC | Age: 59
End: 2019-07-22
Attending: FAMILY MEDICINE
Payer: COMMERCIAL

## 2019-07-22 VITALS
SYSTOLIC BLOOD PRESSURE: 115 MMHG | HEART RATE: 67 BPM | BODY MASS INDEX: 33.46 KG/M2 | DIASTOLIC BLOOD PRESSURE: 81 MMHG | WEIGHT: 247 LBS | HEIGHT: 72 IN

## 2019-07-22 DIAGNOSIS — R06.09 DOE (DYSPNEA ON EXERTION): Primary | ICD-10-CM

## 2019-07-22 DIAGNOSIS — I10 ESSENTIAL HYPERTENSION: ICD-10-CM

## 2019-07-22 DIAGNOSIS — E78.5 HYPERLIPIDEMIA WITH TARGET LDL LESS THAN 130: ICD-10-CM

## 2019-07-22 PROCEDURE — 93000 ELECTROCARDIOGRAM COMPLETE: CPT | Performed by: INTERNAL MEDICINE

## 2019-07-22 PROCEDURE — 99204 OFFICE O/P NEW MOD 45 MIN: CPT | Performed by: INTERNAL MEDICINE

## 2019-07-22 RX ORDER — ASPIRIN 81 MG/1
81 TABLET ORAL DAILY
COMMUNITY
End: 2021-09-08

## 2019-07-22 ASSESSMENT — MIFFLIN-ST. JEOR: SCORE: 1963.38

## 2019-07-22 NOTE — LETTER
7/22/2019    Fifi Fleming MD  [address]  [place] [address]    RE: Angus Wynne       Dear Colleague,    I had the pleasure of seeing Angus Wynne in the [facility].      Cardiology Consultation     Assessment & Plan     1.  History of T9 compression fracture, followed by neurosurgery  2.  History of alcoholic hepatitis  3.  Hypothyroidism  4.  Gastroesophageal reflux disease  5.  Anxiety  6.  Hypertension  7.  Hyperlipidemia   8.  ETOH abuse      Recommendations    We reviewed patient's prior cardiac testing and his recent symptoms.  We have offered him a nuclear myocardial perfusion study however patient wishes to have a more formal evaluation of his coronaries.  After lengthy discussion we have agreed to proceed with coronary CTA for evaluation.  This will be scheduled in the next few weeks.  I will have him follow-up with my [facility] colleagues to go over the results of the study.  For the time being let us continue with his aspirin losartan and Lipitor at their current dosages.        Thank you kindly for this consult.        Raolu Camargo MD      HPI:    Patient is a pleasant 59-year-old male with a prior cardiac history of coronary angiography being performed 10 years ago in [place] for an abnormal EKG.  The details are unknown to patient.  However he remembers being told that his coronary anatomy was normal.  He in 2017 had an EKG performed due to shortness of breath.  This demonstrated symmetric T wave inversions in V4 to V6.  As a result his primary care physician advised a hospital evaluation.  He underwent a stress echocardiogram demonstrating normal LV systolic function at rest with no evidence of ischemia.  The Duke score was low for suspected coronary artery disease.  Patient more recently had a fall in February 2019 he slipped on a patch of ice and hurt his vertebra..  He had a T9 fracture.  Neurosurgery advise some form of surgical therapy  however patient wished conservative management and wore a brace for several months.  Fortunately he states that he has gotten back to 90% of his previous level of functioning as a result.  He is sent to cartilage clinic due to long-standing shortness of breath which he feels is worse today than it was last year.  He does report of weight gain during the time of his recent injury and being sedentary as a result.  He also has anxiety in the background and he mentioned several times that he wishes to have an evaluation of his coronaries to allay his concerns of significant obstructive disease.  He has never had any chest pain however he has dyspnea on exertion which is confounded by recent weight gain as well.  Here for evaluation.  EKG performed today demonstrates again sinus rhythm with symmetric T wave inversions in V4 to V6 which were seen in the past.      Stress Echo:   Interpretation Summary  This was a normal stress echocardiogram with no evidence of stress-induced  ischemia.  There was no chest pain or significant ST changes with exercise.  The Duke treadmill score was low risk ( >5 Edwards score).  Elevated BP art rest. The study was technically difficult. There is no  comparison study available.      Primary Care Physician   Fifi Fleming      Patient Active Problem List   Diagnosis     BPPV (benign paroxysmal positional vertigo), unspecified laterality     Other specified hypothyroidism     Hx of acute alcoholic hepatitis     Hx of colonic polyp     Gastroesophageal reflux disease, esophagitis presence not specified     Vitamin D deficiency     Anxiety     Alcohol use disorder     Neck pain     Cervicalgia     Cervical spondylosis without myelopathy     Essential hypertension     Hyperlipidemia with target LDL less than 130     Nasal congestion     History of colonic polyps     Vertigo     Lumbago     Morbid obesity (H)     Colon cancer screening     Thoracic compression fracture (H)     IBARRA (dyspnea on  exertion)       Past Medical History   I have reviewed this patient's medical history and updated it with pertinent information if needed.   Past Medical History:   Diagnosis Date     HTN (hypertension)      Hypothyroid      Vertigo     2015 ,        Past Surgical History   I have reviewed this patient's surgical history and updated it with pertinent information if needed.  Past Surgical History:   Procedure Laterality Date     NO HISTORY OF SURGERY         Prior to Admission Medications   Cannot display prior to admission medications because the patient has not been admitted in this contact.     [unfilled]  [unfilled]  Allergies   No Known Allergies    Social History    reports that he quit smoking about 3 years ago. He has never used smokeless tobacco. He reports that he does not drink alcohol or use drugs.    Family History   Family History   Problem Relation Age of Onset     Coronary Artery Disease Father         at age 60      Hyperlipidemia Brother      Diabetes No family hx of      Cerebrovascular Disease No family hx of      Colon Cancer No family hx of      Prostate Cancer No family hx of        Review of Systems   The comprehensive 10 point Review of Systems is negative other than noted in the HPI or here.     Physical Exam   Vital Signs with Ranges     Wt Readings from Last 4 Encounters:   07/22/19 112 kg (247 lb)   06/17/19 113.4 kg (250 lb)   03/20/19 113.4 kg (250 lb)   02/03/19 113.4 kg (250 lb)     [unfilled]      Vitals:  Blood pressure 115/81, pulse 67, height 1.829 m (6'), weight 112 kg (247 lb).      Constitutional:   awake, alert, cooperative, no apparent distress, and appears stated age     ENT:   Normocephalic, without obvious abnormality, atraumatic, sinuses nontender on palpation, external ears without lesions, oral pharynx with moist mucous membranes, tonsils without erythema or exudates, gums normal and good dentition.     Neck:   Supple, symmetrical, trachea midline, no  adenopathy, thyroid symmetric, not enlarged and no tenderness, skin normal     Back:   Symmetric, no curvature, spinous processes are non-tender on palpation, paraspinous muscles are non-tender on palpation, no costal vertebral tenderness     Lungs:   No increased work of breathing, good air exchange, clear to auscultation bilaterally, no crackles or wheezing     Cardiovascular:   Normal apical impulse, regular rate and rhythm, normal S1 and S2, no S3 or S4, and no murmur noted     Abdomen:   No scars, normal bowel sounds, soft, non-distended, non-tender, no masses palpated, no hepatosplenomegally     Musculoskeletal:   There is no redness, warmth, or swelling of the joints.  Full range of motion noted.  Motor strength is 5 out of 5 all extremities bilaterally.  Tone is normal.       Neurologic:   Awake, alert, oriented to name, place and time.  Cranial nerves II-XII are grossly intact.  Motor is 5 out of 5 bilaterally.  Cerebellar finger to nose, heel to shin intact.  Sensory is intact.  Babinski down going, Romberg negative, and gait is normal.       @LABRCNTIPR(tropi:5,troponinies:5)@    @LABRCNTIPR(wbc:3,hgb:3,mcv:3,plt:3,inr:3,na:3,potassium:3,chloride:3,co2:3,bun:3,cr:3,gfrestimated:3,gfrestblack:3,aniongap:3,akhil:3,glc:3,albumin:2,prottotal:2,bilitotal:2,alkphos:2,alt:2,ast:2,lipase:2,tropi:3)@  Recent Labs   Lab Test 18  1504 02/15/18  1134   CHOL 163 196   HDL 55 66   LDL 72 92   TRIG 181* 191*     @LABRCNTIP(wbc:3,hgb:3,hct:3,mcv:3,plt:3,iron:3,ironsat:3,reticabsct:3,retp:3,feb:3,hilda:3,b12:3,folic:3,epoe:3,morph:3)@  @LABRCNTIP(PH:3,PHV:3,PO2:3,PO2V:3,sat:3,PCO2:3,PCO2V:3,HCO3:3,HCO3V:3)@  @LABRCNTIP(NTBNPI:3,NTBNP:3)@  @LABRCNTIP(DD:1)@  @LABRCNTIP(sed:3,crp:3)@  @LABRCNTIP(PLT:3)@  @LABRCNTIP(TSH:3)@  @LABRCNTIP(color:1,appearance:1,urineg,urinebili:1,urineketone:1,s,ubld:1,urineph:1,protein:1,urobilinogen:1,nitrite:1,leukest:1,rbcu:1,wbcu:1)@    Imaging:  No results found for this or any  previous visit (from the past 48 hour(s)).    Echo:  No results found for this or any previous visit (from the past 4320 hour(s)).         Thank you for allowing me to participate in the care of your patient.    Sincerely,     Raoul Camargo MD     [facility]

## 2019-07-22 NOTE — PROGRESS NOTES
Cardiology Consultation     Assessment & Plan     1.  History of T9 compression fracture, followed by neurosurgery  2.  History of alcoholic hepatitis  3.  Hypothyroidism  4.  Gastroesophageal reflux disease  5.  Anxiety  6.  Hypertension  7.  Hyperlipidemia   8.  ETOH abuse      Recommendations    We reviewed patient's prior cardiac testing and his recent symptoms.  We have offered him a nuclear myocardial perfusion study however patient wishes to have a more formal evaluation of his coronaries.  After lengthy discussion we have agreed to proceed with coronary CTA for evaluation.  This will be scheduled in the next few weeks.  I will have him follow-up with my [facility] colleagues to go over the results of the study.  For the time being let us continue with his aspirin losartan and Lipitor at their current dosages.        Thank you kindly for this consult.        Raoul Camargo MD      HPI:    Patient is a pleasant 59-year-old male with a prior cardiac history of coronary angiography being performed 10 years ago in [place] for an abnormal EKG.  The details are unknown to patient.  However he remembers being told that his coronary anatomy was normal.  He in 2017 had an EKG performed due to shortness of breath.  This demonstrated symmetric T wave inversions in V4 to V6.  As a result his primary care physician advised a hospital evaluation.  He underwent a stress echocardiogram demonstrating normal LV systolic function at rest with no evidence of ischemia.  The Duke score was low for suspected coronary artery disease.  Patient more recently had a fall in February 2019 he slipped on a patch of ice and hurt his vertebra..  He had a T9 fracture.  Neurosurgery advise some form of surgical therapy however patient wished conservative management and wore a brace for several months.  Fortunately he states that he has gotten back to 90% of his previous level of functioning as a result.  He is sent to cartilage clinic due  to long-standing shortness of breath which he feels is worse today than it was last year.  He does report of weight gain during the time of his recent injury and being sedentary as a result.  He also has anxiety in the background and he mentioned several times that he wishes to have an evaluation of his coronaries to allay his concerns of significant obstructive disease.  He has never had any chest pain however he has dyspnea on exertion which is confounded by recent weight gain as well.  Here for evaluation.  EKG performed today demonstrates again sinus rhythm with symmetric T wave inversions in V4 to V6 which were seen in the past.      Stress Echo:   Interpretation Summary  This was a normal stress echocardiogram with no evidence of stress-induced  ischemia.  There was no chest pain or significant ST changes with exercise.  The Duke treadmill score was low risk ( >5 Edwards score).  Elevated BP art rest. The study was technically difficult. There is no  comparison study available.      Primary Care Physician   Fifi Fleming      Patient Active Problem List   Diagnosis     BPPV (benign paroxysmal positional vertigo), unspecified laterality     Other specified hypothyroidism     Hx of acute alcoholic hepatitis     Hx of colonic polyp     Gastroesophageal reflux disease, esophagitis presence not specified     Vitamin D deficiency     Anxiety     Alcohol use disorder     Neck pain     Cervicalgia     Cervical spondylosis without myelopathy     Essential hypertension     Hyperlipidemia with target LDL less than 130     Nasal congestion     History of colonic polyps     Vertigo     Lumbago     Morbid obesity (H)     Colon cancer screening     Thoracic compression fracture (H)     IBARRA (dyspnea on exertion)       Past Medical History   I have reviewed this patient's medical history and updated it with pertinent information if needed.   Past Medical History:   Diagnosis Date     HTN (hypertension)      Hypothyroid       Vertigo     2015 ,        Past Surgical History   I have reviewed this patient's surgical history and updated it with pertinent information if needed.  Past Surgical History:   Procedure Laterality Date     NO HISTORY OF SURGERY         Prior to Admission Medications   Cannot display prior to admission medications because the patient has not been admitted in this contact.     [unfilled]  [unfilled]  Allergies   No Known Allergies    Social History    reports that he quit smoking about 3 years ago. He has never used smokeless tobacco. He reports that he does not drink alcohol or use drugs.    Family History   Family History   Problem Relation Age of Onset     Coronary Artery Disease Father         at age 60      Hyperlipidemia Brother      Diabetes No family hx of      Cerebrovascular Disease No family hx of      Colon Cancer No family hx of      Prostate Cancer No family hx of        Review of Systems   The comprehensive 10 point Review of Systems is negative other than noted in the HPI or here.     Physical Exam   Vital Signs with Ranges     Wt Readings from Last 4 Encounters:   07/22/19 112 kg (247 lb)   06/17/19 113.4 kg (250 lb)   03/20/19 113.4 kg (250 lb)   02/03/19 113.4 kg (250 lb)     [unfilled]      Vitals:  Blood pressure 115/81, pulse 67, height 1.829 m (6'), weight 112 kg (247 lb).      Constitutional:   awake, alert, cooperative, no apparent distress, and appears stated age     ENT:   Normocephalic, without obvious abnormality, atraumatic, sinuses nontender on palpation, external ears without lesions, oral pharynx with moist mucous membranes, tonsils without erythema or exudates, gums normal and good dentition.     Neck:   Supple, symmetrical, trachea midline, no adenopathy, thyroid symmetric, not enlarged and no tenderness, skin normal     Back:   Symmetric, no curvature, spinous processes are non-tender on palpation, paraspinous muscles are non-tender on palpation, no costal vertebral  tenderness     Lungs:   No increased work of breathing, good air exchange, clear to auscultation bilaterally, no crackles or wheezing     Cardiovascular:   Normal apical impulse, regular rate and rhythm, normal S1 and S2, no S3 or S4, and no murmur noted     Abdomen:   No scars, normal bowel sounds, soft, non-distended, non-tender, no masses palpated, no hepatosplenomegally     Musculoskeletal:   There is no redness, warmth, or swelling of the joints.  Full range of motion noted.  Motor strength is 5 out of 5 all extremities bilaterally.  Tone is normal.       Neurologic:   Awake, alert, oriented to name, place and time.  Cranial nerves II-XII are grossly intact.  Motor is 5 out of 5 bilaterally.  Cerebellar finger to nose, heel to shin intact.  Sensory is intact.  Babinski down going, Romberg negative, and gait is normal.       @LABRCNTIPR(tropi:5,troponinies:5)@    @LABRCNTIPR(wbc:3,hgb:3,mcv:3,plt:3,inr:3,na:3,potassium:3,chloride:3,co2:3,bun:3,cr:3,gfrestimated:3,gfrestblack:3,aniongap:3,akhil:3,glc:3,albumin:2,prottotal:2,bilitotal:2,alkphos:2,alt:2,ast:2,lipase:2,tropi:3)@  Recent Labs   Lab Test 18  1504 02/15/18  1134   CHOL 163 196   HDL 55 66   LDL 72 92   TRIG 181* 191*     @LABRCNTIP(wbc:3,hgb:3,hct:3,mcv:3,plt:3,iron:3,ironsat:3,reticabsct:3,retp:3,feb:3,hilda:3,b12:3,folic:3,epoe:3,morph:3)@  @LABRCNTIP(PH:3,PHV:3,PO2:3,PO2V:3,sat:3,PCO2:3,PCO2V:3,HCO3:3,HCO3V:3)@  @LABRCNTIP(NTBNPI:3,NTBNP:3)@  @LABRCNTIP(DD:1)@  @LABRCNTIP(sed:3,crp:3)@  @LABRCNTIP(PLT:3)@  @LABRCNTIP(TSH:3)@  @LABRCNTIP(color:1,appearance:1,urineg,urinebili:1,urineketone:1,s,ubld:1,urineph:1,protein:1,urobilinogen:1,nitrite:1,leukest:1,rbcu:1,wbcu:1)@    Imaging:  No results found for this or any previous visit (from the past 48 hour(s)).    Echo:  No results found for this or any previous visit (from the past 4320 hour(s)).

## 2019-07-30 ENCOUNTER — OFFICE VISIT (OUTPATIENT)
Dept: FAMILY MEDICINE | Facility: CLINIC | Age: 59
End: 2019-07-30
Payer: COMMERCIAL

## 2019-07-30 VITALS
OXYGEN SATURATION: 98 % | DIASTOLIC BLOOD PRESSURE: 72 MMHG | HEART RATE: 66 BPM | BODY MASS INDEX: 34.95 KG/M2 | SYSTOLIC BLOOD PRESSURE: 128 MMHG | TEMPERATURE: 97.9 F | HEIGHT: 72 IN | WEIGHT: 258 LBS

## 2019-07-30 DIAGNOSIS — Z86.0100 HISTORY OF COLONIC POLYPS: ICD-10-CM

## 2019-07-30 DIAGNOSIS — F41.9 ANXIETY: ICD-10-CM

## 2019-07-30 DIAGNOSIS — E78.5 HYPERLIPIDEMIA WITH TARGET LDL LESS THAN 130: ICD-10-CM

## 2019-07-30 DIAGNOSIS — Z00.00 ROUTINE HISTORY AND PHYSICAL EXAMINATION OF ADULT: Primary | ICD-10-CM

## 2019-07-30 DIAGNOSIS — I10 ESSENTIAL HYPERTENSION: ICD-10-CM

## 2019-07-30 DIAGNOSIS — Z87.19 HX OF ACUTE ALCOHOLIC HEPATITIS: ICD-10-CM

## 2019-07-30 PROCEDURE — 80061 LIPID PANEL: CPT | Performed by: FAMILY MEDICINE

## 2019-07-30 PROCEDURE — G0103 PSA SCREENING: HCPCS | Performed by: FAMILY MEDICINE

## 2019-07-30 PROCEDURE — 99396 PREV VISIT EST AGE 40-64: CPT | Performed by: FAMILY MEDICINE

## 2019-07-30 PROCEDURE — 80053 COMPREHEN METABOLIC PANEL: CPT | Performed by: FAMILY MEDICINE

## 2019-07-30 PROCEDURE — 36415 COLL VENOUS BLD VENIPUNCTURE: CPT | Performed by: FAMILY MEDICINE

## 2019-07-30 PROCEDURE — 99213 OFFICE O/P EST LOW 20 MIN: CPT | Mod: 25 | Performed by: FAMILY MEDICINE

## 2019-07-30 PROCEDURE — 87389 HIV-1 AG W/HIV-1&-2 AB AG IA: CPT | Performed by: FAMILY MEDICINE

## 2019-07-30 RX ORDER — ATORVASTATIN CALCIUM 40 MG/1
40 TABLET, FILM COATED ORAL DAILY
Qty: 90 TABLET | Refills: 1 | Status: SHIPPED | OUTPATIENT
Start: 2019-07-30 | End: 2019-08-13

## 2019-07-30 ASSESSMENT — ANXIETY QUESTIONNAIRES
7. FEELING AFRAID AS IF SOMETHING AWFUL MIGHT HAPPEN: NOT AT ALL
3. WORRYING TOO MUCH ABOUT DIFFERENT THINGS: SEVERAL DAYS
GAD7 TOTAL SCORE: 2
2. NOT BEING ABLE TO STOP OR CONTROL WORRYING: SEVERAL DAYS
5. BEING SO RESTLESS THAT IT IS HARD TO SIT STILL: NOT AT ALL
6. BECOMING EASILY ANNOYED OR IRRITABLE: NOT AT ALL
IF YOU CHECKED OFF ANY PROBLEMS ON THIS QUESTIONNAIRE, HOW DIFFICULT HAVE THESE PROBLEMS MADE IT FOR YOU TO DO YOUR WORK, TAKE CARE OF THINGS AT HOME, OR GET ALONG WITH OTHER PEOPLE: NOT DIFFICULT AT ALL
1. FEELING NERVOUS, ANXIOUS, OR ON EDGE: NOT AT ALL

## 2019-07-30 ASSESSMENT — MIFFLIN-ST. JEOR: SCORE: 2013.28

## 2019-07-30 ASSESSMENT — PATIENT HEALTH QUESTIONNAIRE - PHQ9
5. POOR APPETITE OR OVEREATING: NOT AT ALL
SUM OF ALL RESPONSES TO PHQ QUESTIONS 1-9: 2

## 2019-07-30 NOTE — PATIENT INSTRUCTIONS
Patient Education     Prevention Guidelines, Men Ages 50 to 64  Screening tests and vaccines are an important part of managing your health. A screening test is done to find possible disorders or diseases in people who don't have any symptoms. The goal is to find a disease early so lifestyle changes can be made and you can be watched more closely to reduce the risk of disease, or to detect it early enough to treat it most effectively. Screening tests are not considered diagnostic, but are used to determine if more testing is needed. Health counseling is essential, too. Below are guidelines for these, for men ages 50 to 64. Talk with your healthcare provider to make sure you re up-to-date on what you need.  Screening Who needs it How often   Alcohol misuse All men in this age group At routine exams   Blood pressure All men in this age group Yearly checkup if your blood pressure is normal  Normal blood pressure is less than 120/80 mm Hg  If your blood pressure reading is higher than normal, follow the advice of your healthcare provider      Colorectal cancer All men in this age group Flexible sigmoidoscopy every 5 years, or colonoscopy every 10 years, or double-contrast barium enema every 5 years; yearly fecal occult blood test or fecal immunochemical test; or a stool DNA test as often as your healthcare provider advises; talk with your healthcare provider about which tests are best for you   Depression All men in this age group At routine exams   Type 2 diabetes or prediabetes All men beginning at age 45 and men without symptoms at any age who are overweight or obese and have 1 or more other risk factors for diabetes At least every 3 years (yearly if your blood sugar has already begun to rise)   Type 2 diabetes All men with prediabetes Every year   Hepatitis C Men at increased risk for infection - talk with your healthcare provider At routine exams. All men ages 50 to 70 should be tested at least once for hepatitis  C.   High cholesterol or triglycerides All men in this age group At least every 5 years   HIV Men at increased risk for infection - talk with your healthcare provider At routine exams   Lung cancer Adults age 55 to 80 who have smoked Yearly screening in smokers with 30 pack-year history of smoking or who quit within 15 years   Obesity All men in this age group At routine exams   Prostate cancer Starting at age 45, talk to healthcare provider about risks and benefits of digital rectal exam (DI) and prostate-specific antigen (PSA) screening1 At routine exams   Syphilis Men at increased risk for infection - talk with your healthcare provider At routine exams   Tuberculosis Men at increased risk for infection - talk with your healthcare provider Ask your healthcare provider   Vision All men in this age group Ask your healthcare provider   Vaccine Who needs it How often   Chickenpox (varicella) All men in this age group who have no record of this infection or vaccine 2 doses; second dose should be given at least 4 weeks after the first dose   Hepatitis A Men at increased risk for infection - talk with your healthcare provider 2 doses given at least 6 months apart   Hepatitis B Men at increased risk for infection - talk with your healthcare provider 3 doses over 6 months; second dose should be given 1 month after the first dose; the third dose should be given at least 2 months after the second dose and at least 4 months after the first dose   Haemophilus influenzae Type B (HIB) Men at increased risk for infection - talk with your healthcare provider 1 to 3 doses   Influenza (flu) All men in this age group Once a year   Measles, mumps, rubella (MMR) Men in this age group through their late 50s who have no record of these infections or vaccines 1 or 2 doses; ask your healthcare provider   Meningococcal Men at increased risk for infection - talk with your healthcare provider 1 or more doses   Pneumococcal conjugate vaccine  (PCV13) and pneumococcal polysaccharide vaccine (PPSV23) Men at increased risk for infection - talk with your healthcare provider PCV13: 1 dose ages 19 to 65 (protects against 13 types of pneumococcal bacteria)     PPSV23: 1 to 2 doses through age 64, or 1 dose at 65 or older (protects against 23 types of pneumococcal bacteria)      Tetanus/diphtheria/  pertussis (Td/Tdap) booster All men in this age group Td every 10 years, or a one-time dose of Tdap instead of a Td booster after age 18, then Td every 10 years   Zoster All men ages 60 and older 1 dose   Counseling Who needs it How often   Diet and exercise Men who are overweight or obese When diagnosed, and then at routine exams   Sexually transmitted infection prevention Men at increased risk for infection - talk with your healthcare provider At routine exams   Use of daily aspirin Men in this age group at risk for cardiovascular health problems At routine exams   Use of tobacco and the health effects it can cause All men in this age group Every visit   [address] [place] [address] [facility]  Date Last Reviewed: 2/1/2017 2000-2018 [employer]. [address] [place] [address], [place] [address]. All rights reserved. This information is not intended as a substitute for professional medical care. Always follow your healthcare professional's instructions.

## 2019-07-30 NOTE — PROGRESS NOTES
SUBJECTIVE:   CC: Angus Wynne is an 59 year old male who presents for preventative health visit.     Healthy Habits:    Getting at least 3 servings of Calcium per day:  Yes    Bi-annual eye exam:  Yes    Dental care twice a year:  NO    Sleep apnea or symptoms of sleep apnea:  None    Diet:  Regular (no restrictions)    Frequency of exercise:  None    Duration of exercise:  N/A    Taking medications regularly:  Yes    Medication side effects:  None    PHQ-2 Total Score:    Additional concerns today:  No          PROBLEMS TO ADD ON...  Has hx of elevated LFT related to alcohol use. Wants labs today. He is feeling well and denies any GI sx. He still drinks some but not much now   Hyperlipidemia Follow-Up      Are you having any of the following symptoms? (Select all that apply)  No complaints of shortness of breath, chest pain or pressure.  No increased sweating or nausea with activity.  No left-sided neck or arm pain.  No complaints of pain in calves when walking 1-2 blocks.    Are you regularly taking any medication or supplement to lower your cholesterol?   Yes- see epic     Are you having muscle aches or other side effects that you think could be caused by your cholesterol lowering medication?  No      Hypertension Follow-up      Do you check your blood pressure regularly outside of the clinic? Yes     Are you following a low salt diet? Yes    Are your blood pressures ever more than 140 on the top number (systolic) OR more   than 90 on the bottom number (diastolic), for example 140/90? No  Anxiety Follow-Up    How are you doing with your anxiety since your last visit? No change    Are you having other symptoms that might be associated with anxiety? No    Have you had a significant life event? No     Are you feeling depressed? No    Do you have any concerns with your use of alcohol or other drugs? Yes:  not much but still drinks soem over weekend     Social History     Tobacco Use     Smoking status: Former Smoker      Last attempt to quit: 9/28/2015     Years since quitting: 3.8     Smokeless tobacco: Never Used   Substance Use Topics     Alcohol use: No     Alcohol/week: 0.0 oz     Drug use: No     RUTH-7 SCORE 5/3/2018 12/13/2018 7/30/2019   Total Score 1 0 2     PHQ 7/25/2018 12/13/2018 7/30/2019   PHQ-9 Total Score 3 4 2   Q9: Thoughts of better off dead/self-harm past 2 weeks Not at all Not at all Not at all     No flowsheet data found.  No flowsheet data found.      Today's PHQ-2 Score:   PHQ-2 ( 1999 Pfizer) 7/30/2019   Q1: Little interest or pleasure in doing things 0   Q2: Feeling down, depressed or hopeless 0   PHQ-2 Score 0   Q1: Little interest or pleasure in doing things -   Q2: Feeling down, depressed or hopeless -   PHQ-2 Score -       Abuse: Current or Past(Physical, Sexual or Emotional)- No  Do you feel safe in your environment? Yes    Social History     Tobacco Use     Smoking status: Former Smoker     Last attempt to quit: 9/28/2015     Years since quitting: 3.8     Smokeless tobacco: Never Used   Substance Use Topics     Alcohol use: No     Alcohol/week: 0.0 oz     If you drink alcohol do you typically have >3 drinks per day or >7 drinks per week? No    Alcohol Use 12/28/2015   Prescreen: >3 drinks/day or >7 drinks/week? The patient does not drink >3 drinks per day nor >7 drinks per week.   No flowsheet data found.    Last PSA:   PSA   Date Value Ref Range Status   02/15/2018 0.33 0 - 4 ug/L Final     Comment:     Assay Method:  Chemiluminescence using Siemens Vista analyzer       Reviewed orders with patient. Reviewed health maintenance and updated orders accordingly - Yes  Patient Active Problem List   Diagnosis     BPPV (benign paroxysmal positional vertigo), unspecified laterality     Other specified hypothyroidism     Hx of acute alcoholic hepatitis     Hx of colonic polyp     Gastroesophageal reflux disease, esophagitis presence not specified     Vitamin D deficiency     Anxiety     Alcohol use  disorder     Neck pain     Cervicalgia     Cervical spondylosis without myelopathy     Essential hypertension     Hyperlipidemia with target LDL less than 130     Nasal congestion     History of colonic polyps     Vertigo     Lumbago     Morbid obesity (H)     Colon cancer screening     Thoracic compression fracture (H)     IBARRA (dyspnea on exertion)     Past Surgical History:   Procedure Laterality Date     NO HISTORY OF SURGERY         Social History     Tobacco Use     Smoking status: Former Smoker     Last attempt to quit: 9/28/2015     Years since quitting: 3.8     Smokeless tobacco: Never Used   Substance Use Topics     Alcohol use: No     Alcohol/week: 0.0 oz     Family History   Problem Relation Age of Onset     Coronary Artery Disease Father         at age 60      Hyperlipidemia Brother      Diabetes No family hx of      Cerebrovascular Disease No family hx of      Colon Cancer No family hx of      Prostate Cancer No family hx of            Reviewed and updated as needed this visit by clinical staff         Reviewed and updated as needed this visit by Provider        Past Medical History:   Diagnosis Date     HTN (hypertension)      Hypothyroid      Vertigo     2015 ,         Review of Systems  CONSTITUTIONAL: NEGATIVE for fever, chills, change in weight  INTEGUMENTARY/SKIN: NEGATIVE for worrisome rashes, moles or lesions  EYES: NEGATIVE for vision changes or irritation  ENT: NEGATIVE for ear, mouth and throat problems  RESP: NEGATIVE for significant cough or SOB  CV: NEGATIVE for chest pain, palpitations or peripheral edema  GI: NEGATIVE for nausea, abdominal pain, heartburn, or change in bowel habits   male: negative for dysuria, hematuria, decreased urinary stream, erectile dysfunction, urethral discharge  MUSCULOSKELETAL: NEGATIVE for significant arthralgias or myalgia  NEURO: NEGATIVE for weakness, dizziness or paresthesias  ENDOCRINE: NEGATIVE for temperature intolerance, skin/hair  changes  HEME/ALLERGY/IMMUNE: NEGATIVE for bleeding problems  PSYCHIATRIC: NEGATIVE for changes in mood or affect     OBJECTIVE:     Physical Exam  GENERAL: healthy, alert and no distress  EYES: Eyes grossly normal to inspection, PERRL and conjunctivae and sclerae normal  HENT: ear canals and TM's normal, nose and mouth without ulcers or lesions  NECK: no adenopathy, no asymmetry, masses, or scars and thyroid normal to palpation  RESP: lungs clear to auscultation - no rales, rhonchi or wheezes  CV: regular rate and rhythm, normal S1 S2, no S3 or S4, no murmur, click or rub, no peripheral edema   ABDOMEN: soft, nontender, no hepatosplenomegaly, no masses and bowel sounds normal   (male): normal male genitalia without lesions or urethral discharge, no hernia  RECTAL: normal sphincter tone, no rectal masses, prostate normal size, smooth, nontender without nodules or masses  MS: no gross musculoskeletal defects noted, no edema  SKIN: no suspicious lesions or rashes  NEURO: Normal strength and tone, mentation intact and speech normal  PSYCH: mentation appears normal, affect normal        ASSESSMENT/PLAN:   (Z00.00) Routine history and physical examination of adult  (primary encounter diagnosis)  Comment:   Plan: HIV Screening, GASTROENTEROLOGY ADULT REF         PROCEDURE ONLY, Lipid panel reflex to direct         LDL Fasting, atorvastatin (LIPITOR) 40 MG         tablet, Comprehensive metabolic panel, Prostate        spec antigen screen            (E78.5) Hyperlipidemia with target LDL less than 130  Comment:   Plan: Lipid panel reflex to direct LDL Fasting,         atorvastatin (LIPITOR) 40 MG tablet,         Comprehensive metabolic panel            (I10) Essential hypertension  Comment: stable   Plan: Comprehensive metabolic panel            (F41.9) Anxiety  Comment:   Plan: stable.     (Z86.010) History of colonic polyps  Comment:   Plan:     (Z87.19) Hx of acute alcoholic hepatitis  Comment:   Plan: Comprehensive  metabolic panel        Discussed cares concerns. Reminded to avoid alcohol consumption. Has hx of elevated LFT, so check labs. Cares and  concerns discussed follow up if problem         Check labs. refill sent.Cares and  treatment discussed. follow up if problem   Patient expressed understanding and agreement with treatment plan. All patient's questions were answered, will let me know if has more later.  Medications: Rx's: Reviewed the potential side effects/complications of medications prescribed.     COUNSELING:   Reviewed preventive health counseling, as reflected in patient instructions       Regular exercise       Healthy diet/nutrition       Vision screening       Hearing screening       Immunizations    Consider Vaccinated for: Zoster             Alcohol Use       Colon cancer screening       Prostate cancer screening    Estimated body mass index is 33.5 kg/m  as calculated from the following:    Height as of 7/22/19: 1.829 m (6').    Weight as of 7/22/19: 112 kg (247 lb).     Weight management plan: Discussed healthy diet and exercise guidelines     reports that he quit smoking about 3 years ago. He has never used smokeless tobacco.      Counseling Resources:  ATP IV Guidelines  Pooled Cohorts Equation Calculator  FRAX Risk Assessment  ICSI Preventive Guidelines  Dietary Guidelines for Americans, 2010  USDA's MyPlate  ASA Prophylaxis  Lung CA Screening    Fifi Fleming MD  [facility] [place]

## 2019-07-31 ENCOUNTER — HOSPITAL ENCOUNTER (OUTPATIENT)
Dept: CARDIOLOGY | Facility: CLINIC | Age: 59
Discharge: HOME | End: 2019-07-31
Attending: INTERNAL MEDICINE | Admitting: INTERNAL MEDICINE
Payer: COMMERCIAL

## 2019-07-31 VITALS — SYSTOLIC BLOOD PRESSURE: 111 MMHG | HEART RATE: 65 BPM | DIASTOLIC BLOOD PRESSURE: 69 MMHG

## 2019-07-31 DIAGNOSIS — R06.09 DOE (DYSPNEA ON EXERTION): ICD-10-CM

## 2019-07-31 LAB
ALBUMIN SERPL-MCNC: 4.5 G/DL (ref 3.4–5)
ALP SERPL-CCNC: 58 U/L (ref 40–150)
ALT SERPL W P-5'-P-CCNC: 76 U/L (ref 0–70)
ANION GAP SERPL CALCULATED.3IONS-SCNC: 8 MMOL/L (ref 3–14)
AST SERPL W P-5'-P-CCNC: 56 U/L (ref 0–45)
BILIRUB SERPL-MCNC: 0.5 MG/DL (ref 0.2–1.3)
BUN SERPL-MCNC: 11 MG/DL (ref 7–30)
CALCIUM SERPL-MCNC: 9.1 MG/DL (ref 8.5–10.1)
CHLORIDE SERPL-SCNC: 106 MMOL/L (ref 94–109)
CHOLEST SERPL-MCNC: 276 MG/DL
CO2 SERPL-SCNC: 25 MMOL/L (ref 20–32)
CREAT SERPL-MCNC: 0.91 MG/DL (ref 0.66–1.25)
GFR SERPL CREATININE-BSD FRML MDRD: >90 ML/MIN/{1.73_M2}
GLUCOSE SERPL-MCNC: 109 MG/DL (ref 70–99)
HDLC SERPL-MCNC: 63 MG/DL
HIV 1+2 AB+HIV1 P24 AG SERPL QL IA: NONREACTIVE
LDLC SERPL CALC-MCNC: 151 MG/DL
NONHDLC SERPL-MCNC: 213 MG/DL
POTASSIUM SERPL-SCNC: 4.4 MMOL/L (ref 3.4–5.3)
PROT SERPL-MCNC: 8.3 G/DL (ref 6.8–8.8)
PSA SERPL-ACNC: 0.33 UG/L (ref 0–4)
SODIUM SERPL-SCNC: 139 MMOL/L (ref 133–144)
TRIGL SERPL-MCNC: 308 MG/DL

## 2019-07-31 PROCEDURE — 25000132 ZZH RX MED GY IP 250 OP 250 PS 637: Performed by: INTERNAL MEDICINE

## 2019-07-31 PROCEDURE — 75574 CT ANGIO HRT W/3D IMAGE: CPT

## 2019-07-31 PROCEDURE — 25000128 H RX IP 250 OP 636: Performed by: INTERNAL MEDICINE

## 2019-07-31 PROCEDURE — 75574 CT ANGIO HRT W/3D IMAGE: CPT | Mod: 26 | Performed by: INTERNAL MEDICINE

## 2019-07-31 PROCEDURE — 25000125 ZZHC RX 250: Performed by: INTERNAL MEDICINE

## 2019-07-31 RX ORDER — ACYCLOVIR 200 MG/1
0-1 CAPSULE ORAL
Status: DISCONTINUED | OUTPATIENT
Start: 2019-07-31 | End: 2019-08-01 | Stop reason: HOSPADM

## 2019-07-31 RX ORDER — NITROGLYCERIN 0.4 MG/1
0.4 TABLET SUBLINGUAL
Status: DISCONTINUED | OUTPATIENT
Start: 2019-07-31 | End: 2019-08-01 | Stop reason: HOSPADM

## 2019-07-31 RX ORDER — METHYLPREDNISOLONE SODIUM SUCCINATE 125 MG/2ML
125 INJECTION, POWDER, LYOPHILIZED, FOR SOLUTION INTRAMUSCULAR; INTRAVENOUS
Status: DISCONTINUED | OUTPATIENT
Start: 2019-07-31 | End: 2019-08-01 | Stop reason: HOSPADM

## 2019-07-31 RX ORDER — DIPHENHYDRAMINE HYDROCHLORIDE 50 MG/ML
25-50 INJECTION INTRAMUSCULAR; INTRAVENOUS
Status: DISCONTINUED | OUTPATIENT
Start: 2019-07-31 | End: 2019-08-01 | Stop reason: HOSPADM

## 2019-07-31 RX ORDER — ONDANSETRON 2 MG/ML
4 INJECTION INTRAMUSCULAR; INTRAVENOUS
Status: DISCONTINUED | OUTPATIENT
Start: 2019-07-31 | End: 2019-08-01 | Stop reason: HOSPADM

## 2019-07-31 RX ORDER — DIPHENHYDRAMINE HCL 25 MG
25 CAPSULE ORAL
Status: DISCONTINUED | OUTPATIENT
Start: 2019-07-31 | End: 2019-08-01 | Stop reason: HOSPADM

## 2019-07-31 RX ORDER — IOPAMIDOL 755 MG/ML
500 INJECTION, SOLUTION INTRAVASCULAR ONCE
Status: COMPLETED | OUTPATIENT
Start: 2019-07-31 | End: 2019-07-31

## 2019-07-31 RX ORDER — METOPROLOL TARTRATE 50 MG
50-100 TABLET ORAL
Status: DISCONTINUED | OUTPATIENT
Start: 2019-07-31 | End: 2019-08-01 | Stop reason: HOSPADM

## 2019-07-31 RX ORDER — METOPROLOL TARTRATE 1 MG/ML
5-15 INJECTION, SOLUTION INTRAVENOUS
Status: DISCONTINUED | OUTPATIENT
Start: 2019-07-31 | End: 2019-08-01 | Stop reason: HOSPADM

## 2019-07-31 RX ADMIN — IOPAMIDOL 120 ML: 755 INJECTION, SOLUTION INTRAVENOUS at 13:09

## 2019-07-31 RX ADMIN — METOPROLOL TARTRATE 5 MG: 5 INJECTION INTRAVENOUS at 11:40

## 2019-07-31 RX ADMIN — NITROGLYCERIN 0.4 MG: 0.4 TABLET SUBLINGUAL at 11:36

## 2019-07-31 ASSESSMENT — ANXIETY QUESTIONNAIRES: GAD7 TOTAL SCORE: 2

## 2019-08-13 ENCOUNTER — OFFICE VISIT (OUTPATIENT)
Dept: CARDIOLOGY | Facility: CLINIC | Age: 59
End: 2019-08-13
Attending: INTERNAL MEDICINE
Payer: COMMERCIAL

## 2019-08-13 VITALS
HEART RATE: 65 BPM | WEIGHT: 258.3 LBS | DIASTOLIC BLOOD PRESSURE: 80 MMHG | HEIGHT: 72 IN | BODY MASS INDEX: 34.98 KG/M2 | SYSTOLIC BLOOD PRESSURE: 118 MMHG

## 2019-08-13 DIAGNOSIS — I25.10 CORONARY ARTERY DISEASE INVOLVING NATIVE CORONARY ARTERY OF NATIVE HEART WITHOUT ANGINA PECTORIS: Primary | ICD-10-CM

## 2019-08-13 DIAGNOSIS — E78.5 HYPERLIPIDEMIA LDL GOAL <100: ICD-10-CM

## 2019-08-13 DIAGNOSIS — R06.09 DOE (DYSPNEA ON EXERTION): ICD-10-CM

## 2019-08-13 PROCEDURE — 99214 OFFICE O/P EST MOD 30 MIN: CPT | Performed by: PHYSICIAN ASSISTANT

## 2019-08-13 RX ORDER — ROSUVASTATIN CALCIUM 40 MG/1
40 TABLET, COATED ORAL DAILY
Qty: 30 TABLET | Refills: 3 | Status: SHIPPED | OUTPATIENT
Start: 2019-08-13 | End: 2020-01-22

## 2019-08-13 ASSESSMENT — MIFFLIN-ST. JEOR: SCORE: 2014.64

## 2019-08-13 NOTE — PROGRESS NOTES
Primary Cardiologist: Dr. Camargo     Reason for Visit: review CT coronary angiogram results    History of Present Illness:   This is a pleasant 59 year old male who with past medical history notable for hypertension, hyperlipidemia, anxiety, hx of stress echocardiogram 2 yrs ago, demonstrating no evidence of ischemia. He was seen by Dr. Camargo for reports of persistent dyspnea on exertion and was ultimately set up for CT coronary angiogram as patient strongly desired to find out what his coronary arteries looked like.     He returns to clinic, stating he is doing ok. He admits that he has not been very active since his back injury. He also does not eat very healthy. He sometimes will notice exertional SOB but this is stable. He has no other symptoms or concerns.     Assessment and Plan:   This is a pleasant 59 year old male who with past medical history notable for hypertension, hyperlipidemia, anxiety, hx of stress echocardiogram 2 yrs ago, demonstrating no evidence of ischemia. He was seen by Dr. Camargo for reports of persistent dyspnea on exertion and was ultimately set up for CT coronary angiogram as patient strongly desired to find out what  What his coronary arteries looked like.     His CT coronary angiogram shows only trivial disease in all 3 arteries. At this point, we will focus on risk factor modification. He will continue to be on baby aspirin. His most recent lipid panel shows a significant backslide in his numbers. He tells me his atorvastatin was increased to 40 mg several months ago. It is unclear what may have caused his numbers to get worse but I suspect his recent sedentary lifestyle is contributory. We will switch this to rosuvastatin 40 mg and have his numbers checked in about 2 months. He would like to do this through Dr. Fleming's office. He will follow up with us on as needed basis.     Thank you for allowing me to participate in the care of this patient today.     Thank you for allowing me to  participate in the care of this pleasant patient today.    This note was completed in part using Dragon voice recognition software. Although reviewed after completion, some word and grammatical errors may occur.    Orders this Visit:  No orders of the defined types were placed in this encounter.    Orders Placed This Encounter   Medications     rosuvastatin (CRESTOR) 40 MG tablet     Sig: Take 1 tablet (40 mg) by mouth daily     Dispense:  30 tablet     Refill:  3     Medications Discontinued During This Encounter   Medication Reason     atorvastatin (LIPITOR) 40 MG tablet          Encounter Diagnoses   Name Primary?     IBARRA (dyspnea on exertion)      Coronary artery disease involving native coronary artery of native heart without angina pectoris Yes     Hyperlipidemia LDL goal <100        CURRENT MEDICATIONS:  Current Outpatient Medications   Medication Sig Dispense Refill     aspirin 81 MG EC tablet Take 81 mg by mouth daily       clonazePAM (KLONOPIN) 0.5 MG tablet Take 1 tablet (0.5 mg) by mouth nightly as needed for anxiety 14 tablet 0     levothyroxine (SYNTHROID/LEVOTHROID) 150 MCG tablet TAKE ONE TABLET BY MOUTH ONCE DAILY 90 tablet 0     losartan (COZAAR) 100 MG tablet TAKE ONE TABLET BY MOUTH ONCE DAILY 90 tablet 0     rosuvastatin (CRESTOR) 40 MG tablet Take 1 tablet (40 mg) by mouth daily 30 tablet 3     sertraline (ZOLOFT) 100 MG tablet Take 1 tablet (100 mg) by mouth daily (Patient taking differently: Take 100 mg by mouth daily with dinner) 90 tablet 1     traZODone (DESYREL) 50 MG tablet TAKE ONE TO TWO TABLETS BY MOUTH DAILY FOR SLEEP 90 tablet 2       ALLERGIES   No Known Allergies    PAST MEDICAL HISTORY:  Past Medical History:   Diagnosis Date     HTN (hypertension)      Hypothyroid      Vertigo     2015 ,        PAST SURGICAL HISTORY:  Past Surgical History:   Procedure Laterality Date     NO HISTORY OF SURGERY         FAMILY HISTORY:  Family History   Problem Relation Age of Onset     Coronary  Artery Disease Father         at age 60      Hyperlipidemia Brother      Diabetes No family hx of      Cerebrovascular Disease No family hx of      Colon Cancer No family hx of      Prostate Cancer No family hx of        SOCIAL HISTORY:  Social History     Socioeconomic History     Marital status:      Spouse name: None     Number of children: None     Years of education: None     Highest education level: None   Occupational History     None   Social Needs     Financial resource strain: None     Food insecurity:     Worry: None     Inability: None     Transportation needs:     Medical: None     Non-medical: None   Tobacco Use     Smoking status: Former Smoker     Last attempt to quit: 9/28/2015     Years since quitting: 3.8     Smokeless tobacco: Never Used   Substance and Sexual Activity     Alcohol use: No     Alcohol/week: 0.0 oz     Comment: 2-3 drinks a week     Drug use: No     Sexual activity: Yes   Lifestyle     Physical activity:     Days per week: None     Minutes per session: None     Stress: None   Relationships     Social connections:     Talks on phone: None     Gets together: None     Attends Anabaptist service: None     Active member of club or organization: None     Attends meetings of clubs or organizations: None     Relationship status: None     Intimate partner violence:     Fear of current or ex partner: None     Emotionally abused: None     Physically abused: None     Forced sexual activity: None   Other Topics Concern     Parent/sibling w/ CABG, MI or angioplasty before 65F 55M? Not Asked   Social History Narrative    , one child, non smoker - quit 3 months  ago , alcohol none now( but had used excessively previously )        Review of Systems:  Skin:  not assessed     Eyes:    double vision  ENT:  Positive for vertigo  Respiratory:  Positive for dyspnea on exertion;shortness of breath  Cardiovascular:    Positive for;palpitations;chest pain;heaviness  Gastroenterology: not  assessed    Genitourinary:  not assessed    Musculoskeletal:       Neurologic:  Negative    Psychiatric:  Positive for anxiety  Heme/Lymph/Imm:  Negative    Endocrine:  Positive for thyroid disorder    Physical Exam:  Vitals: /80   Pulse 65   Ht 1.829 m (6')   Wt 117.2 kg (258 lb 4.8 oz)   BMI 35.03 kg/m       GEN:  NAD  NECK: No JVD  C/V:  Regular rate and rhythm, no murmur, rub or gallop.  RESP: Clear to auscultation bilaterally without wheezing, rales, or rhonchi.  GI: Abdomen soft, nontender, nondistended.   EXTREM: No LE edema.   NEURO: Alert and oriented, cooperative. No obvious focal deficits.   PSYCH: Normal affect.  SKIN: Warm and dry.       Recent Lab Results:  LIPID RESULTS:  Lab Results   Component Value Date    CHOL 276 (H) 07/30/2019    HDL 63 07/30/2019     (H) 07/30/2019    TRIG 308 (H) 07/30/2019       LIVER ENZYME RESULTS:  Lab Results   Component Value Date    AST 56 (H) 07/30/2019    ALT 76 (H) 07/30/2019       CBC RESULTS:  Lab Results   Component Value Date    WBC 6.3 02/03/2019    RBC 4.33 (L) 02/03/2019    HGB 14.2 02/03/2019    HCT 40.6 02/03/2019    MCV 94 02/03/2019    MCH 32.8 02/03/2019    MCHC 35.0 02/03/2019    RDW 12.0 02/03/2019     (L) 02/03/2019       BMP RESULTS:  Lab Results   Component Value Date     07/30/2019    POTASSIUM 4.4 07/30/2019    CHLORIDE 106 07/30/2019    CO2 25 07/30/2019    ANIONGAP 8 07/30/2019     (H) 07/30/2019    BUN 11 07/30/2019    CR 0.91 07/30/2019    GFRESTIMATED >90 07/30/2019    GFRESTBLACK >90 07/30/2019    SAMMY 9.1 07/30/2019        A1C RESULTS:  No results found for: A1C    INR RESULTS:  Lab Results   Component Value Date    INR 0.95 02/13/2017           Jerry Monahan PA-C   August 13, 2019

## 2019-08-13 NOTE — LETTER
8/13/2019    Fifi Fleming MD  [address]  [place] [address]    RE: Angus Wynne       Dear Colleague,    I had the pleasure of seeing Angus Wynne in the [facility].    Primary Cardiologist: Dr. Camargo     Reason for Visit: review CT coronary angiogram results    History of Present Illness:   This is a pleasant 59 year old male who with past medical history notable for hypertension, hyperlipidemia, anxiety, hx of stress echocardiogram 2 yrs ago, demonstrating no evidence of ischemia. He was seen by Dr. Camargo for reports of persistent dyspnea on exertion and was ultimately set up for CT coronary angiogram as patient strongly desired to find out what his coronary arteries looked like.     He returns to clinic, stating he is doing ok. He admits that he has not been very active since his back injury. He also does not eat very healthy. He sometimes will notice exertional SOB but this is stable. He has no other symptoms or concerns.     Assessment and Plan:   This is a pleasant 59 year old male who with past medical history notable for hypertension, hyperlipidemia, anxiety, hx of stress echocardiogram 2 yrs ago, demonstrating no evidence of ischemia. He was seen by Dr. Camargo for reports of persistent dyspnea on exertion and was ultimately set up for CT coronary angiogram as patient strongly desired to find out what  What his coronary arteries looked like.     His CT coronary angiogram shows only trivial disease in all 3 arteries. At this point, we will focus on risk factor modification. He will continue to be on baby aspirin. His most recent lipid panel shows a significant backslide in his numbers. He tells me his atorvastatin was increased to 40 mg several months ago. It is unclear what may have caused his numbers to get worse but I suspect his recent sedentary lifestyle is contributory. We will switch this to rosuvastatin 40 mg and have his numbers checked in about  2 months. He would like to do this through Dr. Fleming's office. He will follow up with us on as needed basis.     Thank you for allowing me to participate in the care of this patient today.     Thank you for allowing me to participate in the care of this pleasant patient today.    This note was completed in part using Dragon voice recognition software. Although reviewed after completion, some word and grammatical errors may occur.    Orders this Visit:  No orders of the defined types were placed in this encounter.    Orders Placed This Encounter   Medications     rosuvastatin (CRESTOR) 40 MG tablet     Sig: Take 1 tablet (40 mg) by mouth daily     Dispense:  30 tablet     Refill:  3     Medications Discontinued During This Encounter   Medication Reason     atorvastatin (LIPITOR) 40 MG tablet          Encounter Diagnoses   Name Primary?     IBARRA (dyspnea on exertion)      Coronary artery disease involving native coronary artery of native heart without angina pectoris Yes     Hyperlipidemia LDL goal <100        CURRENT MEDICATIONS:  Current Outpatient Medications   Medication Sig Dispense Refill     aspirin 81 MG EC tablet Take 81 mg by mouth daily       clonazePAM (KLONOPIN) 0.5 MG tablet Take 1 tablet (0.5 mg) by mouth nightly as needed for anxiety 14 tablet 0     levothyroxine (SYNTHROID/LEVOTHROID) 150 MCG tablet TAKE ONE TABLET BY MOUTH ONCE DAILY 90 tablet 0     losartan (COZAAR) 100 MG tablet TAKE ONE TABLET BY MOUTH ONCE DAILY 90 tablet 0     rosuvastatin (CRESTOR) 40 MG tablet Take 1 tablet (40 mg) by mouth daily 30 tablet 3     sertraline (ZOLOFT) 100 MG tablet Take 1 tablet (100 mg) by mouth daily (Patient taking differently: Take 100 mg by mouth daily with dinner) 90 tablet 1     traZODone (DESYREL) 50 MG tablet TAKE ONE TO TWO TABLETS BY MOUTH DAILY FOR SLEEP 90 tablet 2       ALLERGIES   No Known Allergies    PAST MEDICAL HISTORY:  Past Medical History:   Diagnosis Date     HTN (hypertension)       Hypothyroid      Vertigo     2015 ,        PAST SURGICAL HISTORY:  Past Surgical History:   Procedure Laterality Date     NO HISTORY OF SURGERY         FAMILY HISTORY:  Family History   Problem Relation Age of Onset     Coronary Artery Disease Father         at age 60      Hyperlipidemia Brother      Diabetes No family hx of      Cerebrovascular Disease No family hx of      Colon Cancer No family hx of      Prostate Cancer No family hx of        SOCIAL HISTORY:  Social History     Socioeconomic History     Marital status:      Spouse name: None     Number of children: None     Years of education: None     Highest education level: None   Occupational History     None   Social Needs     Financial resource strain: None     Food insecurity:     Worry: None     Inability: None     Transportation needs:     Medical: None     Non-medical: None   Tobacco Use     Smoking status: Former Smoker     Last attempt to quit: 9/28/2015     Years since quitting: 3.8     Smokeless tobacco: Never Used   Substance and Sexual Activity     Alcohol use: No     Alcohol/week: 0.0 oz     Comment: 2-3 drinks a week     Drug use: No     Sexual activity: Yes   Lifestyle     Physical activity:     Days per week: None     Minutes per session: None     Stress: None   Relationships     Social connections:     Talks on phone: None     Gets together: None     Attends Sabianist service: None     Active member of club or organization: None     Attends meetings of clubs or organizations: None     Relationship status: None     Intimate partner violence:     Fear of current or ex partner: None     Emotionally abused: None     Physically abused: None     Forced sexual activity: None   Other Topics Concern     Parent/sibling w/ CABG, MI or angioplasty before 65F 55M? Not Asked   Social History Narrative    , one child, non smoker - quit 3 months  ago , alcohol none now( but had used excessively previously )        Review of Systems:  Skin:  not  assessed     Eyes:    double vision  ENT:  Positive for vertigo  Respiratory:  Positive for dyspnea on exertion;shortness of breath  Cardiovascular:    Positive for;palpitations;chest pain;heaviness  Gastroenterology: not assessed    Genitourinary:  not assessed    Musculoskeletal:       Neurologic:  Negative    Psychiatric:  Positive for anxiety  Heme/Lymph/Imm:  Negative    Endocrine:  Positive for thyroid disorder    Physical Exam:  Vitals: /80   Pulse 65   Ht 1.829 m (6')   Wt 117.2 kg (258 lb 4.8 oz)   BMI 35.03 kg/m        GEN:  NAD  NECK: No JVD  C/V:  Regular rate and rhythm, no murmur, rub or gallop.  RESP: Clear to auscultation bilaterally without wheezing, rales, or rhonchi.  GI: Abdomen soft, nontender, nondistended.   EXTREM: No LE edema.   NEURO: Alert and oriented, cooperative. No obvious focal deficits.   PSYCH: Normal affect.  SKIN: Warm and dry.       Recent Lab Results:  LIPID RESULTS:  Lab Results   Component Value Date    CHOL 276 (H) 07/30/2019    HDL 63 07/30/2019     (H) 07/30/2019    TRIG 308 (H) 07/30/2019       LIVER ENZYME RESULTS:  Lab Results   Component Value Date    AST 56 (H) 07/30/2019    ALT 76 (H) 07/30/2019       CBC RESULTS:  Lab Results   Component Value Date    WBC 6.3 02/03/2019    RBC 4.33 (L) 02/03/2019    HGB 14.2 02/03/2019    HCT 40.6 02/03/2019    MCV 94 02/03/2019    MCH 32.8 02/03/2019    MCHC 35.0 02/03/2019    RDW 12.0 02/03/2019     (L) 02/03/2019       BMP RESULTS:  Lab Results   Component Value Date     07/30/2019    POTASSIUM 4.4 07/30/2019    CHLORIDE 106 07/30/2019    CO2 25 07/30/2019    ANIONGAP 8 07/30/2019     (H) 07/30/2019    BUN 11 07/30/2019    CR 0.91 07/30/2019    GFRESTIMATED >90 07/30/2019    GFRESTBLACK >90 07/30/2019    SAMMY 9.1 07/30/2019        A1C RESULTS:  No results found for: A1C    INR RESULTS:  Lab Results   Component Value Date    INR 0.95 02/13/2017         Thank you for allowing me to participate in  the care of your patient.    Sincerely,     Jerry Monahan PA-C     [facility]

## 2019-08-13 NOTE — PATIENT INSTRUCTIONS
Today's Plan:   1) Stop atorvastatin and start ROSUVASTATIN. Check cholesterol in 2 months.   2) Increase activity.    If you have questions or concerns please call my nurse team at [phone].     Scheduling phone number: [phone]  Reminder: Please bring in all current medications, over the counter supplements and vitamin bottles to your next appointment.    It was a pleasure seeing you today!     Jerry Monahan PA-C  8/13/2019

## 2019-08-19 DIAGNOSIS — G47.09 OTHER INSOMNIA: ICD-10-CM

## 2019-08-19 DIAGNOSIS — F41.9 ANXIETY: ICD-10-CM

## 2019-08-19 NOTE — TELEPHONE ENCOUNTER
"Requested Prescriptions   Pending Prescriptions Disp Refills     sertraline (ZOLOFT) 100 MG tablet [Pharmacy Med Name: SERTRALINE HCL 100MG TABS] 90 tablet 1     Sig: TAKE ONE TABLET BY MOUTH ONCE DAILY       SSRIs Protocol Passed - 8/19/2019  9:03 AM        Passed - Recent (12 mo) or future (30 days) visit within the authorizing provider's specialty     Patient had office visit in the last 12 months or has a visit in the next 30 days with authorizing provider or within the authorizing provider's specialty.  See \"Patient Info\" tab in inbasket, or \"Choose Columns\" in Meds & Orders section of the refill encounter.              Passed - Medication is active on med list        Passed - Patient is age 18 or older        traZODone (DESYREL) 50 MG tablet [Pharmacy Med Name: TRAZODONE HCL 50MG TABS] 90 tablet 2     Sig: TAKE ONE TO TWO TABLETS BY MOUTH DAILY FOR SLEEP       Serotonin Modulators Passed - 8/19/2019  9:03 AM        Passed - Recent (12 mo) or future (30 days) visit within the authorizing provider's specialty     Patient had office visit in the last 12 months or has a visit in the next 30 days with authorizing provider or within the authorizing provider's specialty.  See \"Patient Info\" tab in inbasket, or \"Choose Columns\" in Meds & Orders section of the refill encounter.              Passed - Medication is active on med list        Passed - Patient is age 18 or older        traZODone (DESYREL) 50 MG tablet  Last Written Prescription Date:  4-16-19  Last Fill Quantity: 90,  # refills: 2   Last office visit: 7/30/2019 with prescribing provider:  ERIKA Fleming   Future Office Visit:      sertraline (ZOLOFT) 100 MG tablet  Last Written Prescription Date:  12-13-18  Last Fill Quantity: 90,  # refills: 1   Last office visit: 7/30/2019 with prescribing provider:  ERIKA Fleming   Future Office Visit:      "

## 2019-08-20 ENCOUNTER — TELEPHONE (OUTPATIENT)
Dept: FAMILY MEDICINE | Facility: CLINIC | Age: 59
End: 2019-08-20

## 2019-08-20 DIAGNOSIS — G47.09 OTHER INSOMNIA: ICD-10-CM

## 2019-08-20 RX ORDER — SERTRALINE HYDROCHLORIDE 100 MG/1
100 TABLET, FILM COATED ORAL DAILY
Qty: 90 TABLET | Refills: 1 | Status: SHIPPED | OUTPATIENT
Start: 2019-08-20 | End: 2019-10-08

## 2019-08-20 RX ORDER — TRAZODONE HYDROCHLORIDE 50 MG/1
TABLET, FILM COATED ORAL
Qty: 90 TABLET | Refills: 2 | Status: SHIPPED | OUTPATIENT
Start: 2019-08-20 | End: 2019-12-19

## 2019-08-20 RX ORDER — TRAZODONE HYDROCHLORIDE 50 MG/1
TABLET, FILM COATED ORAL
Qty: 90 TABLET | Refills: 2 | OUTPATIENT
Start: 2019-08-20

## 2019-08-20 NOTE — TELEPHONE ENCOUNTER
Pharmacy dropped off a prescription for Trazodone 50 mg from 4/16/2019 but, there is only 30 tablets left on Rx which is not enough for 45 days supply.       Direction: take one to two tablets by mouth daily for sleep.  Qty: 90..............45 days  Refills: 2

## 2019-09-05 ENCOUNTER — HOSPITAL ENCOUNTER (OUTPATIENT)
Facility: CLINIC | Age: 59
End: 2019-09-05
Attending: INTERNAL MEDICINE | Admitting: INTERNAL MEDICINE
Payer: COMMERCIAL

## 2019-09-24 DIAGNOSIS — E03.8 OTHER SPECIFIED HYPOTHYROIDISM: ICD-10-CM

## 2019-09-24 DIAGNOSIS — I10 ESSENTIAL HYPERTENSION: ICD-10-CM

## 2019-09-24 NOTE — LETTER
October 1, 2019      Angus Wynne  [address] [place] [address]        Dear Angus,    I care about your health and have reviewed your health plan. I have reviewed your medical conditions, medication list, and lab results and am making recommendations based on this review, to better manage your health.    You are in particular need of attention regarding:  -Medication    I am recommending that you:  -Schedule an appointemnt    Here is a list of Health Maintenance topics that are due now or due soon:  Health Maintenance Due   Topic Date Due     Pneumococcal Vaccine (1 of 1 - PPSV23) 06[birth year]/1977     Colonscopy  09/09/2018     Liver Function Panel  05/03/2019     Flu Vaccine (1) 09/01/2019     Zoster (Shingles) Vaccine (2 of 2) 10/09/2019       Please call us at [phone] (or [email or web address]) to address the above recommendations.     Thank you for trusting [facility] and we appreciate the opportunity to serve you.  We look forward to supporting your healthcare needs in the future.    Healthy Regards,    Fifi Fleming MD

## 2019-09-24 NOTE — TELEPHONE ENCOUNTER
"Requested Prescriptions   Pending Prescriptions Disp Refills     levothyroxine (SYNTHROID/LEVOTHROID) 150 MCG tablet [Pharmacy Med Name:  Last Written Prescription Date:  6-  Last Fill Quantity: 90 tablet,  # refills: 0   Last office visit: 7/30/2019 with prescribing provider:     Future Office Visit:     LEVOTHYROXINE SODIUM 150MCG TABS] 90 tablet 0     Sig: TAKE ONE TABLET BY MOUTH ONCE DAILY       Thyroid Protocol Failed - 9/24/2019 11:07 AM        Failed - Normal TSH on file in past 12 months     Recent Labs   Lab Test 12/13/18  1504   TSH 5.33*              Passed - Patient is 12 years or older        Passed - Recent (12 mo) or future (30 days) visit within the authorizing provider's specialty     Patient had office visit in the last 12 months or has a visit in the next 30 days with authorizing provider or within the authorizing provider's specialty.  See \"Patient Info\" tab in inbasket, or \"Choose Columns\" in Meds & Orders section of the refill encounter.              Passed - Medication is active on med list                losartan (COZAAR) 100 MG tablet [Pharmacy Med Name: LOSARTAN POTASSIUM  Last Written Prescription Date:  6-  Last Fill Quantity: 90 tablet,  # refills: 0   Last office visit: 7/30/2019 with prescribing provider:     Future Office Visit:     100MG TABS] 90 tablet 0     Sig: TAKE ONE TABLET BY MOUTH ONCE DAILY       Angiotensin-II Receptors Passed - 9/24/2019 11:07 AM        Passed - Last blood pressure under 140/90 in past 12 months     BP Readings from Last 3 Encounters:   08/13/19 118/80   07/31/19 111/69   07/30/19 128/72                 Passed - Recent (12 mo) or future (30 days) visit within the authorizing provider's specialty     Patient had office visit in the last 12 months or has a visit in the next 30 days with authorizing provider or within the authorizing provider's specialty.  See \"Patient Info\" tab in inbasket, or \"Choose Columns\" in Meds & Orders section of the " refill encounter.              Passed - Medication is active on med list        Passed - Patient is age 18 or older        Passed - Normal serum creatinine on file in past 12 months     Recent Labs   Lab Test 07/30/19  1431   CR 0.91             Passed - Normal serum potassium on file in past 12 months     Recent Labs   Lab Test 07/30/19  1431   POTASSIUM 4.4

## 2019-09-25 RX ORDER — LOSARTAN POTASSIUM 100 MG/1
TABLET ORAL
Qty: 90 TABLET | Refills: 1 | Status: SHIPPED | OUTPATIENT
Start: 2019-09-25 | End: 2020-02-20

## 2019-09-25 NOTE — TELEPHONE ENCOUNTER
Routing refill request to provider for review/approval because:  Labs out of range:  TSH    Filled per [facility] protocol-losartan    REBEKA PerezN, RN  [employer] Triage

## 2019-09-26 RX ORDER — LEVOTHYROXINE SODIUM 150 UG/1
TABLET ORAL
Qty: 30 TABLET | Refills: 0 | Status: SHIPPED | OUTPATIENT
Start: 2019-09-26 | End: 2019-10-08

## 2019-09-26 NOTE — TELEPHONE ENCOUNTER
Routing to team to inform and assist in scheduling.   Maria Eugenia Marie RN   [facility] - Triage

## 2019-09-30 NOTE — TELEPHONE ENCOUNTER
Cherie Terrazas contacted Angus on 09/30/19 and left a message. If patient calls back please schedule appointment as soon as possible for a med check.      .Cherie AMADOR    [facility] [place]

## 2019-10-01 NOTE — TELEPHONE ENCOUNTER
Cherie Terrazas contacted Angus on 10/01/19 and left a message. If patient calls back please schedule appointment as soon as possible for a med check. Letter sent.        .Cherie AMADOR    [facility] [place]

## 2019-10-08 ENCOUNTER — OFFICE VISIT (OUTPATIENT)
Dept: FAMILY MEDICINE | Facility: CLINIC | Age: 59
End: 2019-10-08
Payer: COMMERCIAL

## 2019-10-08 VITALS
DIASTOLIC BLOOD PRESSURE: 82 MMHG | WEIGHT: 263 LBS | OXYGEN SATURATION: 98 % | BODY MASS INDEX: 35.62 KG/M2 | TEMPERATURE: 97.3 F | SYSTOLIC BLOOD PRESSURE: 128 MMHG | HEIGHT: 72 IN | HEART RATE: 73 BPM

## 2019-10-08 DIAGNOSIS — E03.8 OTHER SPECIFIED HYPOTHYROIDISM: ICD-10-CM

## 2019-10-08 DIAGNOSIS — F41.9 ANXIETY: ICD-10-CM

## 2019-10-08 DIAGNOSIS — L72.3 SEBACEOUS CYST: ICD-10-CM

## 2019-10-08 DIAGNOSIS — Z23 NEED FOR VACCINATION: ICD-10-CM

## 2019-10-08 DIAGNOSIS — Z23 NEED FOR PROPHYLACTIC VACCINATION AND INOCULATION AGAINST INFLUENZA: ICD-10-CM

## 2019-10-08 DIAGNOSIS — B35.1 ONYCHOMYCOSIS: Primary | ICD-10-CM

## 2019-10-08 PROCEDURE — 90670 PCV13 VACCINE IM: CPT | Performed by: FAMILY MEDICINE

## 2019-10-08 PROCEDURE — 84439 ASSAY OF FREE THYROXINE: CPT | Performed by: FAMILY MEDICINE

## 2019-10-08 PROCEDURE — 90471 IMMUNIZATION ADMIN: CPT | Performed by: FAMILY MEDICINE

## 2019-10-08 PROCEDURE — 84443 ASSAY THYROID STIM HORMONE: CPT | Performed by: FAMILY MEDICINE

## 2019-10-08 PROCEDURE — 99214 OFFICE O/P EST MOD 30 MIN: CPT | Mod: 25 | Performed by: FAMILY MEDICINE

## 2019-10-08 PROCEDURE — 90472 IMMUNIZATION ADMIN EACH ADD: CPT | Performed by: FAMILY MEDICINE

## 2019-10-08 PROCEDURE — 90682 RIV4 VACC RECOMBINANT DNA IM: CPT | Performed by: FAMILY MEDICINE

## 2019-10-08 PROCEDURE — 36415 COLL VENOUS BLD VENIPUNCTURE: CPT | Performed by: FAMILY MEDICINE

## 2019-10-08 RX ORDER — SERTRALINE HYDROCHLORIDE 100 MG/1
100 TABLET, FILM COATED ORAL DAILY
Qty: 90 TABLET | Refills: 1 | Status: SHIPPED | OUTPATIENT
Start: 2019-10-08 | End: 2020-05-06

## 2019-10-08 RX ORDER — CICLOPIROX 80 MG/ML
SOLUTION TOPICAL
Qty: 6 ML | Refills: 0 | Status: SHIPPED | OUTPATIENT
Start: 2019-10-08 | End: 2021-05-28

## 2019-10-08 RX ORDER — LEVOTHYROXINE SODIUM 150 UG/1
150 TABLET ORAL DAILY
Qty: 90 TABLET | Refills: 3 | Status: SHIPPED | OUTPATIENT
Start: 2019-10-08 | End: 2020-11-03

## 2019-10-08 ASSESSMENT — PATIENT HEALTH QUESTIONNAIRE - PHQ9
5. POOR APPETITE OR OVEREATING: NOT AT ALL
SUM OF ALL RESPONSES TO PHQ QUESTIONS 1-9: 0

## 2019-10-08 ASSESSMENT — ANXIETY QUESTIONNAIRES
6. BECOMING EASILY ANNOYED OR IRRITABLE: NOT AT ALL
7. FEELING AFRAID AS IF SOMETHING AWFUL MIGHT HAPPEN: NOT AT ALL
1. FEELING NERVOUS, ANXIOUS, OR ON EDGE: NOT AT ALL
3. WORRYING TOO MUCH ABOUT DIFFERENT THINGS: NOT AT ALL
GAD7 TOTAL SCORE: 0
2. NOT BEING ABLE TO STOP OR CONTROL WORRYING: NOT AT ALL
5. BEING SO RESTLESS THAT IT IS HARD TO SIT STILL: NOT AT ALL

## 2019-10-08 ASSESSMENT — MIFFLIN-ST. JEOR: SCORE: 2035.96

## 2019-10-08 NOTE — PROGRESS NOTES
Maria Eugenia Wynne is a 59 year old male who presents to clinic today for the following health issues:    HPI     He has some cyst on his back and one behind the left ear.  It has been there  for few years.  does not cause any significant pain but may have increased in size and it is bothersome to him, so he is debating removal.     He is also concerned of the fungal fungus on his toenail (both feet big toes) for last several months.  Has been a  for that is then going to be wanted to get checked.     Is also due for his thyroid check.  Last TSH were out of range medication dose was adjusted.  He is feeling okay on current dose due for labs.           Anxiety Follow-Up    How are you doing with your anxiety since your last visit? Worsened a little , but he think he is doing okay on current medication.  Takes his Xanax (and usually gets his prescription from Angela)  3 -4 times a week mostly to help with sleep at night as trazodone sometimes does not help enough sleep    Are you having other symptoms that might be associated with anxiety? No    Have you had a significant life event? No     Are you feeling depressed? Yes:  n/a    Do you have any concerns with your use of alcohol or other drugs? No, he has reduced his alcohol use although still uses pack of beer every 2 to 3 weeks.     Social History     Tobacco Use     Smoking status: Former Smoker     Last attempt to quit: 2015     Years since quittin.0     Smokeless tobacco: Never Used   Substance Use Topics     Alcohol use: No     Alcohol/week: 0.0 standard drinks     Comment: 2-3 drinks a week     Drug use: No     RUTH-7 SCORE 5/3/2018 2018 2019   Total Score 1 0 2     PHQ 2018   PHQ-9 Total Score 3 4 2   Q9: Thoughts of better off dead/self-harm past 2 weeks Not at all Not at all Not at all     No flowsheet data found.  No flowsheet data found.        How many servings of fruits and vegetables  do you eat daily?  2-3    On average, how many sweetened beverages do you drink each day (soda, juice, sweet tea, etc)?   0    How many days per week do you miss taking your medication? 0          Patient Active Problem List   Diagnosis     BPPV (benign paroxysmal positional vertigo), unspecified laterality     Other specified hypothyroidism     Hx of acute alcoholic hepatitis     Hx of colonic polyp     Gastroesophageal reflux disease, esophagitis presence not specified     Vitamin D deficiency     Anxiety     Alcohol use disorder     Neck pain     Cervicalgia     Cervical spondylosis without myelopathy     Essential hypertension     Hyperlipidemia with target LDL less than 130     Nasal congestion     History of colonic polyps     Vertigo     Lumbago     Morbid obesity (H)     Colon cancer screening     Thoracic compression fracture (H)     IBARRA (dyspnea on exertion)     Past Surgical History:   Procedure Laterality Date     NO HISTORY OF SURGERY         Social History     Tobacco Use     Smoking status: Former Smoker     Last attempt to quit: 2015     Years since quittin.0     Smokeless tobacco: Never Used   Substance Use Topics     Alcohol use: No     Alcohol/week: 0.0 standard drinks     Comment: 2-3 drinks a week     Family History   Problem Relation Age of Onset     Coronary Artery Disease Father         at age 60      Hyperlipidemia Brother      Diabetes No family hx of      Cerebrovascular Disease No family hx of      Colon Cancer No family hx of      Prostate Cancer No family hx of            Reviewed and updated as needed this visit by Provider         Review of Systems   ROS COMP: Constitutional, HEENT, cardiovascular, pulmonary, GI, , musculoskeletal, neuro, skin, endocrine and psych systems are negative, except as otherwise noted.      Objective    There were no vitals taken for this visit.  There is no height or weight on file to calculate BMI.  Physical Exam   GENERAL: healthy, alert and no  distress  NECK: no adenopathy, thyroid nontender.   RESP: lungs clear to auscultation - no rales, rhonchi or wheezes  CV: regular rate and rhythm, normal S1 S2, no S3 or S4,   ABDOMEN: soft, nontender, no hepatosplenomegaly, no masses and bowel sounds normal  MS: no leg edema.    Big toes both feet black discoloration still have nail.  Non tender  He has large what feels like a sebaceous cyst to the right of midline mid back, approximately an 1.5 inch.  Nontender mobile  Also had another smaller lump behind the left ear on his scalp, mobile non tender.  Feels like possible cyst or lipoma  NEURO: Normal grossly normal  PSYCH: mentation appears normal, affect normal, slightly uptight, speech pressured.  Well-groomed.  Insight intact            Assessment & Plan         (F41.9) Anxiety  Comment:    Plan: sertraline (ZOLOFT) 100 MG tablet        He is comfortable with current dose of Zoloft.  Discussed exercise adequate sleep reducing stress.    Also encouraged him to taper off his Xanax that could be contributing some of the rebound anxiety.  Talked about avoiding excessive alcohol use.    he is comfortable staying on same dose of Zoloft and trazodone for now.  Refills given.  Follow-up check in 4 to 6 months sooner if any problem.     (E03.8) Other specified hypothyroidism  Comment:   Plan: TSH with free T4 reflex, levothyroxine         (SYNTHROID/LEVOTHROID) 150 MCG tablet        Check lab.  This medication is needed.     (B35.1) Onychomycosis  (primary encounter diagnosis)  Comment: Big toes both feet  Plan: PODIATRY/FOOT & ANKLE SURGERY REFERRAL,         ciclopirox (PENLAC) 8 % external solution        Discussed care and concerns, and the stable nature of this requiring long-term treatment.  Is willing to try topical Penlac to see if that would help.  Does not improve or get any worse he will see the podiatrist to further evaluate.     (Z23) Need for vaccination  Comment:   Plan: Pneumococcal vaccine 13 valent  PCV13 IM         (Prevnar) [03266], ADMIN: Vaccine, Initial         (14576)            (Z23) Need for prophylactic vaccination and inoculation against influenza  Comment:   Plan: INFLUENZA QUAD, RECOMBINANT, P-FREE (RIV4)         (FLUBLOCK) [79635], Vaccine Administration,         Each Additional [52965]            (L72.3) Sebaceous cyst  Comment: back , also one behind lef ear   Plan: SKIN CARE REFERRAL        Patient skin debating removal.  Referral given.  Cares and concerns discussed follow-up as needed.        Check labs. refill sent.Cares and  treatment discussed.  follow up if problem   Patient expressed understanding and agreement with treatment plan. All patient's questions were answered, will let me know if has more later.  Medications: Rx's: Reviewed the potential side effects/complications of medications prescribed.     BMI:   Estimated body mass index is 35.67 kg/m  as calculated from the following:    Height as of this encounter: 1.829 m (6').    Weight as of this encounter: 119.3 kg (263 lb).   Weight management plan: Discussed healthy diet and exercise guidelines    Fifi Fleming MD  [facility]

## 2019-10-09 LAB
T4 FREE SERPL-MCNC: 0.84 NG/DL (ref 0.76–1.46)
TSH SERPL DL<=0.005 MIU/L-ACNC: 4.89 MU/L (ref 0.4–4)

## 2019-10-09 ASSESSMENT — ANXIETY QUESTIONNAIRES: GAD7 TOTAL SCORE: 0

## 2019-10-21 ENCOUNTER — OFFICE VISIT (OUTPATIENT)
Dept: FAMILY MEDICINE | Facility: CLINIC | Age: 59
End: 2019-10-21
Payer: COMMERCIAL

## 2019-10-21 VITALS — DIASTOLIC BLOOD PRESSURE: 88 MMHG | SYSTOLIC BLOOD PRESSURE: 130 MMHG

## 2019-10-21 DIAGNOSIS — L28.0 LICHEN SIMPLEX CHRONICUS: ICD-10-CM

## 2019-10-21 DIAGNOSIS — L72.0 EPIDERMOID CYST OF SKIN: Primary | ICD-10-CM

## 2019-10-21 DIAGNOSIS — T30.0 BURN INJURY: ICD-10-CM

## 2019-10-21 PROCEDURE — 99213 OFFICE O/P EST LOW 20 MIN: CPT | Performed by: FAMILY MEDICINE

## 2019-10-21 RX ORDER — CLOBETASOL PROPIONATE 0.5 MG/G
OINTMENT TOPICAL 2 TIMES DAILY
Qty: 30 G | Refills: 0 | Status: SHIPPED | OUTPATIENT
Start: 2019-10-21 | End: 2019-11-20

## 2019-10-21 RX ORDER — TRIAMCINOLONE ACETONIDE 1 MG/G
OINTMENT TOPICAL 2 TIMES DAILY
Qty: 30 G | Refills: 0 | Status: SHIPPED | OUTPATIENT
Start: 2019-10-21 | End: 2019-11-04

## 2019-10-21 NOTE — PATIENT INSTRUCTIONS
FUTURE APPOINTMENTS  Follow up at your convenience for two 40 minute appointments for excision of the cysts on the scalp and back. Be aware that you will have to modify activity for the 2 weeks after the procedure.    Make sure to apply sunscreen regularly to the burn injury site on the right hand.    TOPICAL STEROID INSTRUCTIONS  Triamcinolone 0.1% ointment OR clobetasol 0.05% ointment  Compare prices, but do not use both.  Apply two times per day for 10-14 days. Then, use only when needed.  1. Wash hands before applying topical steroid.  2. Apply sparingly (just enough to rub in) onto affected areas of the left elbow.      This higher strength steroid should never be used on face nor groin.    After the initial treatment, topical steroid may be used as needed for flare-ups but only for short-term treatment. If you are using this for prolonged periods of time to control flare-ups, return to clinic for re-evaluation of treatment.    Keep in mind to also regularly use moisturizer, as this preventative measure can help maintain your skin's natural protective moisture barrier.
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
## 2019-11-04 ENCOUNTER — OFFICE VISIT (OUTPATIENT)
Dept: FAMILY MEDICINE | Facility: CLINIC | Age: 59
End: 2019-11-04
Payer: COMMERCIAL

## 2019-11-04 VITALS — DIASTOLIC BLOOD PRESSURE: 84 MMHG | SYSTOLIC BLOOD PRESSURE: 132 MMHG

## 2019-11-04 DIAGNOSIS — L72.11 PILAR CYST: Primary | ICD-10-CM

## 2019-11-04 DIAGNOSIS — L72.0 EPIDERMOID CYST: ICD-10-CM

## 2019-11-04 PROCEDURE — 12031 INTMD RPR S/A/T/EXT 2.5 CM/<: CPT | Performed by: FAMILY MEDICINE

## 2019-11-04 PROCEDURE — 11423 EXC H-F-NK-SP B9+MARG 2.1-3: CPT | Mod: 51 | Performed by: FAMILY MEDICINE

## 2019-11-04 PROCEDURE — 88304 TISSUE EXAM BY PATHOLOGIST: CPT | Mod: TC | Performed by: FAMILY MEDICINE

## 2019-11-04 PROCEDURE — 11441 EXC FACE-MM B9+MARG 0.6-1 CM: CPT | Mod: 51 | Performed by: FAMILY MEDICINE

## 2019-11-04 NOTE — LETTER
11/4/2019         RE: Angus Wynne  [address]  [place] [address]        Dear Colleague,    Thank you for referring your patient, Angus Wynne, to the [facility] [place]. Please see a copy of my visit note below.    [facility] - PRIMARY CARE SKIN    CC: cyst   SUBJECTIVE:   Angus Wynne is a(n) 59 year old male who presents to clinic today with wife for excision of cysts behind the left ear, on the right ear, and on the back. He has two appointments scheduled and will return for excision of the remaining cyst(s) on 11/14/19.    Family Medical History  Skin Cancer: NO  Son has ichthyosis.    Occupation: retired, previously worked as a  (indoor).    Refer to electronic medical record (EMR) for past medical history and medications.    INTEGUMENTARY/SKIN: POSITIVE for lumps or bumps  ROS: 14 point review of systems was negative except the symptoms listed above in the HPI.    This document serves as a record of the services and decisions personally performed and made by Sunitha Hart MD and was created by Kodak Lara, a trained medical scribe, based on personal observations and provider statements to the medical scribe.  November 4, 2019 8:34 AM   Kodak Lara     OBJECTIVE:   GENERAL: healthy, alert and no distress.  SKIN: Brown Skin Type - V.  Scalp, Trunk, Hands and Arm were examined. The dermatoscope was used to help evaluate pigmented lesions.  Skin Pertinent Findings:  Left occipital scalp: 3 cm subepidermal mass most consistent with epidermoid cyst    Right back, at T8: 4 cm subepidermal mass most consistent with epidermoid cyst    Right ear, pinna: 1 cm subepidermal mass most consistent with epidermoid cyst    ASSESSMENT:     Encounter Diagnoses   Name Primary?     Pilar cyst Yes     Epidermoid cyst      MDM:   Discussion regarding treatment options for epidermoid cysts, including observation and excision.   Discussed risks of the excision  "procedure, including infection and scarring. It was explained that the cyst can reoccur, especially if it has become inflamed or infected prior to removal.    PLAN:   Patient Instructions   FUTURE APPOINTMENTS  Follow up on 11/14/19 for excision of the cyst on the back and for Suture Removal.    WOUND CARE INSTRUCTIONS  1. Wash hands before every dressing change.  2. After 24 hours, change dressing daily.  3. Wash the wound area with a mild soap, then rinse.  4. Gently pat dry with a sterile gauze or Q-tip.  5. Using a Q-tip, apply Vaseline or Aquaphor only over entire wound. Do NOT use Neosporin - as many people react to neomycin.  6. Finally, cover with a bandage or sterile non-stick gauze with micropore paper tape.  7. Repeat once daily until wound has healed.      Soap, water and shampoo will not hurt this area.    Do not go swimming or take baths, but showering is encouraged.    Limit use of the area where the procedure was done for a few days to allow for optimal healing.    If you experience bleeding:  Wash hands and hold firm pressure on the area for 10 minutes without checking to see if the bleeding has stopped. \"Checking\" pulls off the protective wound clot and restarts the bleeding all over again. Re-apply pressure for 10 minutes if necessary to stop bleeding.  Use additional sterile gauze and tape to maintain pressure once bleeding has stopped.  If bleeding continues, then call back to clinic at [phone].    Signs of Infection:  Infection can occur in any area where skin has been disrupted.  If you notice persistent redness, swelling, colored drainage, increasing pain, fever or other signs of infection, please call us at: [phone] and ask to have me or my colleague paged. We will call you back to discuss.    Pathology Results:  You will be notified, generally via letter or MyChart, in approximately 10 days. If there is anything we need to discuss or further treatment needed, I will call you " to discuss it.    PATIENT INFORMATION : WOUNDS  During the healing process you will notice a number of changes. All wounds develop a small halo of redness surrounding the wound.  This means healing is occurring. Severe itching with extensive redness usually indicates sensitivity to the ointment or bandage tape used to dress the wound.  You should call our office if this develops.      Swelling  and/or discoloration around your surgical site is common, particularly when performed around the eye.    All wounds normally drain.  The larger the wound the more drainage there will be.  After 7-10 days, you will notice the wound beginning to shrink and new skin will begin to grow.  The wound is healed when you can see skin has formed over the entire area.  A healed wound has a healthy, shiny look to the surface and is red to dark pink in color to normalize.  Wounds may take approximately 4-6 weeks to heal.  Larger wounds may take 6-8 weeks. After the wound is healed you may discontinue dressing changes.    You may experience a sensation of tightness as your wound heals. This is normal and will gradually subside.    Your healed wound may be sensitive to temperature changes. This sensitivity improves with time, but if you re having a lot of discomfort, try to avoid temperature extremes.    Patients frequently experience itching after their wound appears to have healed because of the continue healing under the skin.  Plain Vaseline will help relieve the itching.        TT: 40 minutes  CT: 10 minutes    The information in this document, created by the medical scribe for me, accurately reflects the services I personally performed and the decisions made by me. I have reviewed and approved this document for accuracy prior to leaving the patient care area.  November 4, 2019 8:34 AM  Sunitha Hart MD  [facility]    Again, thank you for allowing me to participate in the care of your patient.         Sincerely,        Sunitha Hart MD

## 2019-11-04 NOTE — PATIENT INSTRUCTIONS
"FUTURE APPOINTMENTS  Follow up on 11/14/19 for excision of the cyst on the back and for Suture Removal.    If any problems with wound healing:  call directly back to clinic RN at [phone]    WOUND CARE INSTRUCTIONS  1. Wash hands before every dressing change.  2. After 24 hours, change dressing daily.  3. Wash the wound area with a mild soap, then rinse.  4. Gently pat dry with a sterile gauze or Q-tip.  5. Using a Q-tip, apply Vaseline or Aquaphor only over entire wound. Do NOT use Neosporin - as many people react to neomycin.  6. Finally, cover with a bandage or sterile non-stick gauze with micropore paper tape.  7. Repeat once daily until wound has healed.      Soap, water and shampoo will not hurt this area.    Do not go swimming or take baths, but showering is encouraged.    Limit use of the area where the procedure was done for a few days to allow for optimal healing.    If you experience bleeding:  Wash hands and hold firm pressure on the area for 10 minutes without checking to see if the bleeding has stopped. \"Checking\" pulls off the protective wound clot and restarts the bleeding all over again. Re-apply pressure for 10 minutes if necessary to stop bleeding.  Use additional sterile gauze and tape to maintain pressure once bleeding has stopped.  If bleeding continues, then call back to clinic at [phone].    Signs of Infection:  Infection can occur in any area where skin has been disrupted.  If you notice persistent redness, swelling, colored drainage, increasing pain, fever or other signs of infection, please call us at: [phone] and ask to have me or my colleague paged. We will call you back to discuss.    Pathology Results:  You will be notified, generally via letter or MyChart, in approximately 10 days. If there is anything we need to discuss or further treatment needed, I will call you to discuss it.    PATIENT INFORMATION : WOUNDS  During the healing process you will notice a number " of changes. All wounds develop a small halo of redness surrounding the wound.  This means healing is occurring. Severe itching with extensive redness usually indicates sensitivity to the ointment or bandage tape used to dress the wound.  You should call our office if this develops.      Swelling  and/or discoloration around your surgical site is common, particularly when performed around the eye.    All wounds normally drain.  The larger the wound the more drainage there will be.  After 7-10 days, you will notice the wound beginning to shrink and new skin will begin to grow.  The wound is healed when you can see skin has formed over the entire area.  A healed wound has a healthy, shiny look to the surface and is red to dark pink in color to normalize.  Wounds may take approximately 4-6 weeks to heal.  Larger wounds may take 6-8 weeks. After the wound is healed you may discontinue dressing changes.    You may experience a sensation of tightness as your wound heals. This is normal and will gradually subside.    Your healed wound may be sensitive to temperature changes. This sensitivity improves with time, but if you re having a lot of discomfort, try to avoid temperature extremes.    Patients frequently experience itching after their wound appears to have healed because of the continue healing under the skin.  Plain Vaseline will help relieve the itching.

## 2019-11-04 NOTE — PROCEDURES
Name : Excision  Indication : Excision of irritated/enlarging cyst.  Location(s) : Left occipital scalp: 3 cm subepidermal mass most consistent with epidermoid cyst  Completed by : Swati Hart MD  Photo Taken : no.  Anesthesia : Patient was anesthetized by infiltrating the area surrounding the lesion with 1% lidocaine.   epinephrine 1:[id number] : YES.  Note : Discussed risks of the excision procedure, including pain, infection, scarring, hypopigmentation, hyperpigmentation and potential for recurrence or need for retreatment. Benefits of treatment and alternative treatments were also discussed.    During this procedure, the universal protocol was utilized. The patient's identity was confirmed by no less than two patient identifiers, correct procedure was verified, correct site was verified and marked as applicable and a final pause was completed.    Sterile technique was used throughout the procedure. The skin was cleaned and prepped with surgical cleanser. Once adequate anesthesia was obtained, lesion excision was performed using a #10 blade to make a longitudinal incision over the top of the cyst. The cyst lining was identified, then dissected out with a Metzenbaum and a curved Daphnie. The lining was removed in total    Culture was not obtained.    Tissue samples submitted : YES.    Irrigated with sterile saline: Yes.    Direct pressure was applied for hemostasis.   Defect was approximated with 0 Vicryl.  Edges were approximated with 3-0 Prolene interrupted simple stitch.    No bleeding was present upon the completion of the procedure. A dry sterile dressing was applied. Patient tolerated the procedure well and was discharged in satisfactory condition.  Total number of stitches in closure of epidermis : 3    Length : <2.5 cm.    Primary provider and referring provider will be informed regarding the wound culture and tissue sample report when it returns.    Suture removal : 7-10 days.    Name : Excision  Indication :  Excision of irritated/enlarging cyst.  Location(s) : right ear pinna: 1 cm subepidermal mass most consistent with cyst.  Completed by : Swati Hart MD  Photo Taken : no.  Anesthesia : Patient was anesthetized by infiltrating the area surrounding the lesion with 1% lidocaine.   epinephrine 1:[id number] : YES.  Note : Discussed risks of the excision procedure, including pain, infection, scarring, hypopigmentation, hyperpigmentation and potential for recurrence or need for retreatment. Benefits of treatment and alternative treatments were also discussed.    During this procedure, the universal protocol was utilized. The patient's identity was confirmed by no less than two patient identifiers, correct procedure was verified, correct site was verified and marked as applicable and a final pause was completed.    Sterile technique was used throughout the procedure. The skin was cleaned and prepped with surgical cleanser. Once adequate anesthesia was obtained, lesion excision was performed using a #15 blade to make a longitudinal incision over the top of the cyst. The cyst lining was identified, then dissected out with a Metzenbaum and a curved Daphnie. The lining was removed in total.    Culture was not obtained.    Tissue samples submitted : NO.    Irrigated with sterile saline: Yes.    Direct pressure was applied for hemostasis.   Edges were approximated with 4-0 Prolene interrupted simple stitch.    No bleeding was present upon the completion of the procedure. A dry sterile dressing was applied. Patient tolerated the procedure well and was discharged in satisfactory condition.  Total number of stitches in closure of epidermis : 3    Primary provider and referring provider will be informed regarding the wound culture and tissue sample report when it returns.    Suture removal : 7-10 days.

## 2019-11-04 NOTE — PROGRESS NOTES
[facility] - PRIMARY CARE SKIN    CC: cyst   SUBJECTIVE:   Angus Wynne is a(n) 59 year old male who presents to clinic today with wife for excision of cysts behind the left ear, on the right ear, and on the back. He has two appointments scheduled and will return for excision of the remaining cyst(s) on 11/14/19.    Family Medical History  Skin Cancer: NO  Son has ichthyosis.    Occupation: retired, previously worked as a  (indoor).    Refer to electronic medical record (EMR) for past medical history and medications.    INTEGUMENTARY/SKIN: POSITIVE for lumps or bumps  ROS: 14 point review of systems was negative except the symptoms listed above in the HPI.    This document serves as a record of the services and decisions personally performed and made by Sunitha Hart MD and was created by Kodak Lara, a trained medical scribe, based on personal observations and provider statements to the medical scribe.  November 4, 2019 8:34 AM   Kodak Lara     OBJECTIVE:   GENERAL: healthy, alert and no distress.  SKIN: Brown Skin Type - V.  Scalp, Trunk, Hands and Arm were examined. The dermatoscope was used to help evaluate pigmented lesions.  Skin Pertinent Findings:  Left occipital scalp: 3 cm subepidermal mass most consistent with epidermoid cyst    Right back, at T8: 4 cm subepidermal mass most consistent with epidermoid cyst    Right ear, pinna: 1 cm subepidermal mass most consistent with epidermoid cyst    ASSESSMENT:     Encounter Diagnoses   Name Primary?     Pilar cyst Yes     Epidermoid cyst      MDM:   Discussion regarding treatment options for epidermoid cysts, including observation and excision.   Discussed risks of the excision procedure, including infection and scarring. It was explained that the cyst can reoccur, especially if it has become inflamed or infected prior to removal.    PLAN:   Patient Instructions   FUTURE APPOINTMENTS  Follow up on 11/14/19 for excision of the cyst  "on the back and for Suture Removal.    WOUND CARE INSTRUCTIONS  1. Wash hands before every dressing change.  2. After 24 hours, change dressing daily.  3. Wash the wound area with a mild soap, then rinse.  4. Gently pat dry with a sterile gauze or Q-tip.  5. Using a Q-tip, apply Vaseline or Aquaphor only over entire wound. Do NOT use Neosporin - as many people react to neomycin.  6. Finally, cover with a bandage or sterile non-stick gauze with micropore paper tape.  7. Repeat once daily until wound has healed.      Soap, water and shampoo will not hurt this area.    Do not go swimming or take baths, but showering is encouraged.    Limit use of the area where the procedure was done for a few days to allow for optimal healing.    If you experience bleeding:  Wash hands and hold firm pressure on the area for 10 minutes without checking to see if the bleeding has stopped. \"Checking\" pulls off the protective wound clot and restarts the bleeding all over again. Re-apply pressure for 10 minutes if necessary to stop bleeding.  Use additional sterile gauze and tape to maintain pressure once bleeding has stopped.  If bleeding continues, then call back to clinic at [phone].    Signs of Infection:  Infection can occur in any area where skin has been disrupted.  If you notice persistent redness, swelling, colored drainage, increasing pain, fever or other signs of infection, please call us at: [phone] and ask to have me or my colleague paged. We will call you back to discuss.    Pathology Results:  You will be notified, generally via letter or MyChart, in approximately 10 days. If there is anything we need to discuss or further treatment needed, I will call you to discuss it.    PATIENT INFORMATION : WOUNDS  During the healing process you will notice a number of changes. All wounds develop a small halo of redness surrounding the wound.  This means healing is occurring. Severe itching with extensive redness usually " indicates sensitivity to the ointment or bandage tape used to dress the wound.  You should call our office if this develops.      Swelling  and/or discoloration around your surgical site is common, particularly when performed around the eye.    All wounds normally drain.  The larger the wound the more drainage there will be.  After 7-10 days, you will notice the wound beginning to shrink and new skin will begin to grow.  The wound is healed when you can see skin has formed over the entire area.  A healed wound has a healthy, shiny look to the surface and is red to dark pink in color to normalize.  Wounds may take approximately 4-6 weeks to heal.  Larger wounds may take 6-8 weeks. After the wound is healed you may discontinue dressing changes.    You may experience a sensation of tightness as your wound heals. This is normal and will gradually subside.    Your healed wound may be sensitive to temperature changes. This sensitivity improves with time, but if you re having a lot of discomfort, try to avoid temperature extremes.    Patients frequently experience itching after their wound appears to have healed because of the continue healing under the skin.  Plain Vaseline will help relieve the itching.        TT: 40 minutes  CT: 10 minutes    The information in this document, created by the medical scribe for me, accurately reflects the services I personally performed and the decisions made by me. I have reviewed and approved this document for accuracy prior to leaving the patient care area.  November 4, 2019 8:34 AM  Sunitha Hart MD  [facility] [place]

## 2019-11-06 LAB — COPATH REPORT: NORMAL

## 2019-11-11 ENCOUNTER — TELEPHONE (OUTPATIENT)
Dept: FAMILY MEDICINE | Facility: CLINIC | Age: 59
End: 2019-11-11

## 2019-11-11 NOTE — TELEPHONE ENCOUNTER
----- Message from Sunitha Hart MD sent at 11/9/2019  8:23 AM CST -----  Please call :     Benign cyst    Thank you,  MAY

## 2019-11-11 NOTE — TELEPHONE ENCOUNTER
Called and LM for patient to call back in regards to pathology results.    BARBARA Turner-BSN-PHN  [facility]  [phone]

## 2019-11-13 NOTE — TELEPHONE ENCOUNTER
Called and spoke to patient.     Educated patient on pathology results- benign cyst.     No further treatment needed, patient voiced understanding.    BARBARA Turner-BSN-PHN  [facility]  [phone]

## 2019-11-14 ENCOUNTER — OFFICE VISIT (OUTPATIENT)
Dept: FAMILY MEDICINE | Facility: CLINIC | Age: 59
End: 2019-11-14
Payer: COMMERCIAL

## 2019-11-14 VITALS — SYSTOLIC BLOOD PRESSURE: 110 MMHG | DIASTOLIC BLOOD PRESSURE: 64 MMHG

## 2019-11-14 DIAGNOSIS — L72.0 EPIDERMOID CYST: Primary | ICD-10-CM

## 2019-11-14 PROCEDURE — 12032 INTMD RPR S/A/T/EXT 2.6-7.5: CPT | Mod: 79 | Performed by: FAMILY MEDICINE

## 2019-11-14 PROCEDURE — 11404 EXC TR-EXT B9+MARG 3.1-4 CM: CPT | Mod: 79 | Performed by: FAMILY MEDICINE

## 2019-11-14 PROCEDURE — 88304 TISSUE EXAM BY PATHOLOGIST: CPT | Mod: TC | Performed by: FAMILY MEDICINE

## 2019-11-14 NOTE — PROCEDURES
Name: Wide Excision  Indication: Wide excision of tissue for pathology evaluation of epidermoid cyst.  Location(s): Right back, at T8: 4 cm subepidermal mass most consistent with epidermoid cyst  Completed by: Swati Hart MD.  Photo Taken: NO.  Anesthesia: Patient was anesthetized by infiltrating the area surrounding the lesion with 1% lidocaine and epinephrine 1:[id number].  Note: Prior to procedure we discussed expectations for healing, risk of infection, and scar formation. Discussed other treatment options available. Discussed the risk of pain, infection, scarring, hypo- or hyperpigmentation and recurrence or need for re-treatment. The benefits of treatment and alternative treatments were also discussed.    During this procedure, the universal protocol was utilized. The patient's identity was confirmed by no less than two patient identifiers, correct procedure was verified, correct site was verified and marked as applicable and a final pause was completed.    Sterile technique was used throughout the procedure. The skin was cleaned and prepped with Chloroprep. Sterile drapes were laid out. Once adequate anesthesia was obtained, a vertical incision was made with a #10 blade through the epidermis and dermis.  Large amount of keratin was expressed. Thick lining was dissected out with a Metzenbaum.The tissue specimen was placed in formalin and sent to pathology.             Direct pressure and cautery was applied for hemostasis.              Irrigated with sterile saline: YES.              Culture was not obtained.  Defect was approximated with 0 Vicryl.  Edges were approximated with 3-0 Prolene interrupted simple stitch.  Minimal bleeding occurred. Dressing was applied and patient left in satisfactory condition.    Widest diameter: 4 cm.  Final length of defect: 4 cm.

## 2019-11-14 NOTE — LETTER
"    11/14/2019         RE: Angus Wynne  [address]  [place] [address]        Dear Colleague,    Thank you for referring your patient, Angus Wynne, to the [facility]. Please see a copy of my visit note below.    [facility] - PRIMARY CARE SKIN    CC: cyst   SUBJECTIVE:   Angus Wynne is a(n) 59 year old male who presents to clinic today to have a epidermoid cyst removed on his back.      Family Medical History  Skin Cancer: NO  Son has ichthyosis.    Occupation: retired, previously worked as a  (indoor).    Refer to electronic medical record (EMR) for past medical history and medications.    INTEGUMENTARY/SKIN: POSITIVE for lumps or bumps  ROS: 14 point review of systems was negative except the symptoms listed above in the HPI.        OBJECTIVE:   GENERAL: healthy, alert and no distress.  SKIN: Brown Skin Type - V.  Scalp, Trunk, Hands and Arm were examined. The dermatoscope was used to help evaluate pigmented lesions.  Skin Pertinent Findings:    Right back, at T8: 4 cm subepidermal mass most consistent with epidermoid cyst      ASSESSMENT:     Encounter Diagnosis   Name Primary?     Epidermoid cyst Yes     MDM:   Discussion regarding treatment options for epidermoid cysts, including observation and excision.   Discussed risks of the excision procedure, including infection and scarring. It was explained that the cyst can reoccur, especially if it has become inflamed or infected prior to removal.    PLAN:   Patient Instructions   FUTURE APPOINTMENTS  Follow up in 10-14 days for suture removal   Dr. Hart cell phone #: [phone] \"For emergency only\"  For other questions please feel free to call [facility] at [phone]    BANDAGE CARE INSTRUCTIONS    Keep dressing completely dry.    Make sure to avoid soaking the procedure area.    Limit use of the area where the procedure was done for a few days to allow for optimal " "healing.    If you experience bleeding:  Wash hands and hold firm pressure on the area for 10 minutes without checking to see if the bleeding has stopped. \"Checking\" pulls off the protective wound clot and restarts the bleeding all over again. Re-apply pressure for 10 minutes if necessary to stop bleeding.  Use additional sterile gauze and tape to maintain pressure once bleeding has stopped.  If bleeding continues, then call back to clinic at [phone].    Signs of Infection:  Infection can occur in any area where skin has been disrupted.  If you notice persistent redness, swelling, colored drainage, increasing pain, fever or other signs of infection, please call us at: [phone] and ask to have me or my colleague paged. We will call you back to discuss.    Pathology Results:  You will be notified, generally via letter or MyChart, in approximately 10 days. If there is anything we need to discuss or further treatment needed, I will call you to discuss it.        TT: 20 minutes  CT: 15 minutes      Again, thank you for allowing me to participate in the care of your patient.        Sincerely,        Sunitha Hart MD    "

## 2019-11-14 NOTE — PROGRESS NOTES
"[facility] - PRIMARY CARE SKIN    CC: cyst   SUBJECTIVE:   Angus Wynne is a(n) 59 year old male who presents to clinic today to have a epidermoid cyst removed on his back.      Family Medical History  Skin Cancer: NO  Son has ichthyosis.    Occupation: retired, previously worked as a  (indoor).    Refer to electronic medical record (EMR) for past medical history and medications.    INTEGUMENTARY/SKIN: POSITIVE for lumps or bumps  ROS: 14 point review of systems was negative except the symptoms listed above in the HPI.        OBJECTIVE:   GENERAL: healthy, alert and no distress.  SKIN: Brown Skin Type - V.  Scalp, Trunk, Hands and Arm were examined. The dermatoscope was used to help evaluate pigmented lesions.  Skin Pertinent Findings:    Right back, at T8: 4 cm subepidermal mass most consistent with epidermoid cyst      ASSESSMENT:     Encounter Diagnosis   Name Primary?     Epidermoid cyst Yes     MDM:   Discussion regarding treatment options for epidermoid cysts, including observation and excision.   Discussed risks of the excision procedure, including infection and scarring. It was explained that the cyst can reoccur, especially if it has become inflamed or infected prior to removal.    PLAN:   Patient Instructions   FUTURE APPOINTMENTS  Follow up in 10-14 days for suture removal   Dr. Hart cell phone #: [phone] \"For emergency only\"  For other questions please feel free to call [facility] [place] at [phone]    BANDAGE CARE INSTRUCTIONS    Keep dressing completely dry.    Make sure to avoid soaking the procedure area.    Limit use of the area where the procedure was done for a few days to allow for optimal healing.    If you experience bleeding:  Wash hands and hold firm pressure on the area for 10 minutes without checking to see if the bleeding has stopped. \"Checking\" pulls off the protective wound clot and restarts the bleeding all over again. Re-apply " pressure for 10 minutes if necessary to stop bleeding.  Use additional sterile gauze and tape to maintain pressure once bleeding has stopped.  If bleeding continues, then call back to clinic at [phone].    Signs of Infection:  Infection can occur in any area where skin has been disrupted.  If you notice persistent redness, swelling, colored drainage, increasing pain, fever or other signs of infection, please call us at: [phone] and ask to have me or my colleague paged. We will call you back to discuss.    Pathology Results:  You will be notified, generally via letter or MyChart, in approximately 10 days. If there is anything we need to discuss or further treatment needed, I will call you to discuss it.        TT: 20 minutes  CT: 15 minutes

## 2019-11-14 NOTE — PATIENT INSTRUCTIONS
"FUTURE APPOINTMENTS  Follow up in 10-14 days for suture removal   Dr. Hart cell phone #: [phone] \"For emergency only\"  For other questions please feel free to call [facility] [place] at [phone]    BANDAGE CARE INSTRUCTIONS    Keep dressing completely dry.    Make sure to avoid soaking the procedure area.    Limit use of the area where the procedure was done for a few days to allow for optimal healing.    If you experience bleeding:  Wash hands and hold firm pressure on the area for 10 minutes without checking to see if the bleeding has stopped. \"Checking\" pulls off the protective wound clot and restarts the bleeding all over again. Re-apply pressure for 10 minutes if necessary to stop bleeding.  Use additional sterile gauze and tape to maintain pressure once bleeding has stopped.  If bleeding continues, then call back to clinic at [phone].    Signs of Infection:  Infection can occur in any area where skin has been disrupted.  If you notice persistent redness, swelling, colored drainage, increasing pain, fever or other signs of infection, please call us at: [phone] and ask to have me or my colleague paged. We will call you back to discuss.    Pathology Results:  You will be notified, generally via letter or MyChart, in approximately 10 days. If there is anything we need to discuss or further treatment needed, I will call you to discuss it.      "

## 2019-11-18 ENCOUNTER — TELEPHONE (OUTPATIENT)
Dept: FAMILY MEDICINE | Facility: CLINIC | Age: 59
End: 2019-11-18

## 2019-11-18 LAB — COPATH REPORT: NORMAL

## 2019-11-18 NOTE — LETTER
November 18, 2019    Angus Wynne  [address] [place] [address]        Dear Angus,    Great news! The lesion(s) that was removed has been found to be benign (not cancer) and is called a(n) cyst. Thus, no further treatment is needed.        Thank you for allowing me to be involved in your health care and for choosing [facility].  If you have any questions or concerns please feel free to contact me at [phone].      Sincerely,      Sunitha Hart M.D.

## 2019-11-18 NOTE — TELEPHONE ENCOUNTER
----- Message from Sunitha Hart MD sent at 11/18/2019 11:33 AM CST -----  Please call :    Benign cyst    Thank you,   Sunitha Hart M.D.

## 2019-11-27 ENCOUNTER — ALLIED HEALTH/NURSE VISIT (OUTPATIENT)
Dept: NURSING | Facility: CLINIC | Age: 59
End: 2019-11-27
Payer: COMMERCIAL

## 2019-11-27 DIAGNOSIS — Z48.02 ENCOUNTER FOR REMOVAL OF SUTURES: Primary | ICD-10-CM

## 2019-11-27 PROCEDURE — 99207 ZZC NO CHARGE NURSE ONLY: CPT

## 2019-11-27 NOTE — NURSING NOTE
Angus Wynne presents to the clinic today for removal of sutures.  The patient has had the sutures in place for 13 days.  There has been no history of infection or drainage.  6 sutures are seen located on the back.  The wound is healing well with no signs of infection.  Tetanus status is up to date.   All sutures were easily removed today.  Routine wound care discussed.  The patient will follow up as needed.  America MIRANDA RN BSN  [facility]  [phone]

## 2020-01-21 DIAGNOSIS — E78.5 HYPERLIPIDEMIA LDL GOAL <100: ICD-10-CM

## 2020-01-21 NOTE — TELEPHONE ENCOUNTER
"Last Written Prescription Date:  8/13/19  Last Fill Quantity: 30 tablets,  # refills: 3   Last office visit: 10/8/2019 with prescribing provider:   Lonnie  Future Office Visit:      Requested Prescriptions   Pending Prescriptions Disp Refills     rosuvastatin (CRESTOR) 40 MG tablet 30 tablet 3     Sig: Take 1 tablet (40 mg) by mouth daily       Statins Protocol Passed - 1/21/2020  4:41 PM        Passed - LDL on file in past 12 months     Recent Labs   Lab Test 07/30/19  1431   *             Passed - No abnormal creatine kinase in past 12 months     No lab results found.             Passed - Recent (12 mo) or future (30 days) visit within the authorizing provider's specialty     Patient has had an office visit with the authorizing provider or a provider within the authorizing providers department within the previous 12 mos or has a future within next 30 days. See \"Patient Info\" tab in inbasket, or \"Choose Columns\" in Meds & Orders section of the refill encounter.              Passed - Medication is active on med list        Passed - Patient is age 18 or older          "

## 2020-01-22 RX ORDER — ROSUVASTATIN CALCIUM 40 MG/1
40 TABLET, COATED ORAL DAILY
Qty: 30 TABLET | Refills: 0 | Status: SHIPPED | OUTPATIENT
Start: 2020-01-22 | End: 2020-02-20

## 2020-01-22 NOTE — TELEPHONE ENCOUNTER
A 30 day supply is given, patient is due for an office visit.  Please call to  assist the patient in scheduling an appointment.  Per LOV note from 7/30/2019: Lipid mildly elevated.  Need to watch diet( low fat, low cholesterol, high fiber diet) exercise. May also take omega-3 fatty acid( fish oil or flex seed oil capsule OTC) to help improve lipid.  Stay on same medication dose Follow up recheck in 4-6 months.     MONI Williamson, RN  [employer] [facility]

## 2020-01-22 NOTE — TELEPHONE ENCOUNTER
Burtt message sent.      .Cherie AMADOR    [facility] [place]

## 2020-01-29 NOTE — TELEPHONE ENCOUNTER
Cherie Terrazas contacted Angus on 01/29/20 and left a message. If patient calls back please schedule appointment as soon as possible.    /  .Cherie AMADOR    [facility] [place]

## 2020-02-04 ENCOUNTER — TRANSFERRED RECORDS (OUTPATIENT)
Dept: HEALTH INFORMATION MANAGEMENT | Facility: CLINIC | Age: 60
End: 2020-02-04

## 2020-02-05 NOTE — TELEPHONE ENCOUNTER
Cherie Terrazas contacted Angus on 02/05/20 and left a message. If patient calls back please schedule appointment as soon as possible.     .Cherie AMADOR    [facility] [place]

## 2020-02-20 DIAGNOSIS — I10 ESSENTIAL HYPERTENSION: ICD-10-CM

## 2020-02-20 DIAGNOSIS — E78.5 HYPERLIPIDEMIA LDL GOAL <100: ICD-10-CM

## 2020-02-20 RX ORDER — LOSARTAN POTASSIUM 100 MG/1
TABLET ORAL
Qty: 90 TABLET | Refills: 1 | Status: SHIPPED | OUTPATIENT
Start: 2020-02-20 | End: 2020-08-11

## 2020-02-20 RX ORDER — ROSUVASTATIN CALCIUM 40 MG/1
TABLET, COATED ORAL
Qty: 30 TABLET | Refills: 1 | Status: SHIPPED | OUTPATIENT
Start: 2020-02-20 | End: 2020-04-21

## 2020-02-20 NOTE — LETTER
March 4, 2020      Angus Wynne  [address] [place] [address]        Dear Angus,    I care about your health and have reviewed your health plan. I have reviewed your medical conditions, medication list, and lab results and am making recommendations based on this review, to better manage your health.    You are in particular need of attention regarding:  -Medications and labs    I am recommending that you:  -Schedule an appointment    Here is a list of Health Maintenance topics that are due now or due soon:  Health Maintenance Due   Topic Date Due     Colonoscopy  09/09/2018     Liver Function Panel  11/03/2018     PHQ-2  01/01/2020       Please call us at [phone] (or use [employer]) to address the above recommendations.     Thank you for trusting [facility] and we appreciate the opportunity to serve you.  We look forward to supporting your healthcare needs in the future.    Healthy Regards,    Fifi Fleming MD

## 2020-02-20 NOTE — TELEPHONE ENCOUNTER
Routing refill request to provider for review/approval because:  Rossana given x1 and patient did not follow up, please advise  Patient is due to recheck LDL from back in July 2019.     Heidy Miranda RN, BSN  [facility]

## 2020-02-20 NOTE — TELEPHONE ENCOUNTER
"Last Written Prescription Date:  1/22/2020  Last Fill Quantity: 30,  # refills: 0   Last office visit: 11/14/2019 with prescribing provider:    Future Office Visit:      Requested Prescriptions   Pending Prescriptions Disp Refills     ROSUVASTATIN 40 MG PO tablet [Pharmacy Med Name: ROSUVASTATIN 40MG TABLETS] 30 tablet 0     Sig: TAKE ONE TABLET BY MOUTH ONCE DAILY. DUE FOR FOLLOW UP       Statins Protocol Passed - 2/20/2020  9:16 AM        Passed - LDL on file in past 12 months     Recent Labs   Lab Test 07/30/19  1431   *             Passed - No abnormal creatine kinase in past 12 months     No lab results found.             Passed - Recent (12 mo) or future (30 days) visit within the authorizing provider's specialty     Patient has had an office visit with the authorizing provider or a provider within the authorizing providers department within the previous 12 mos or has a future within next 30 days. See \"Patient Info\" tab in inbasket, or \"Choose Columns\" in Meds & Orders section of the refill encounter.              Passed - Medication is active on med list        Passed - Patient is age 18 or older          "

## 2020-02-20 NOTE — TELEPHONE ENCOUNTER
"Requested Prescriptions   Pending Prescriptions Disp Refills     LOSARTAN 100 MG PO tablet [Pharmacy Med Name: LOSARTAN 100MG TABLETS] 90 tablet 1     Sig: TAKE ONE TABLET BY MOUTH ONCE DAILY       Angiotensin-II Receptors Passed - 2/20/2020  9:16 AM        Passed - Last blood pressure under 140/90 in past 12 months     BP Readings from Last 3 Encounters:   11/14/19 110/64   11/04/19 132/84   10/21/19 130/88                 Passed - Recent (12 mo) or future (30 days) visit within the authorizing provider's specialty     Patient has had an office visit with the authorizing provider or a provider within the authorizing providers department within the previous 12 mos or has a future within next 30 days. See \"Patient Info\" tab in inbasket, or \"Choose Columns\" in Meds & Orders section of the refill encounter.              Passed - Medication is active on med list        Passed - Patient is age 18 or older        Passed - Normal serum creatinine on file in past 12 months     Recent Labs   Lab Test 07/30/19  1431   CR 0.91             Passed - Normal serum potassium on file in past 12 months     Recent Labs   Lab Test 07/30/19  1431   POTASSIUM 4.4                    Last Written Prescription Date:  9/25/2019  Last Fill Quantity: 90,  # refills: 1   Last office visit: 11/14/2019 with prescribing provider:  11/14/2019   Future Office Visit:      "

## 2020-02-21 NOTE — TELEPHONE ENCOUNTER
Routing to team to inform and assist in scheduling.   Maria Eugenia Marie RN   [facility] - Triage

## 2020-02-25 NOTE — TELEPHONE ENCOUNTER
Called and left vm with interp to call back and schedule.    .Cherie AMADOR    [facility] [place]

## 2020-03-04 NOTE — TELEPHONE ENCOUNTER
Called pt and left message with interp, patient needs apt for meds and labs. Letter sent.    .Cherie AMADOR    [facility] [place]

## 2020-04-20 DIAGNOSIS — E78.5 HYPERLIPIDEMIA LDL GOAL <100: ICD-10-CM

## 2020-04-20 NOTE — LETTER
April 24, 2020      Angus Wynne  [address] [place] [address]        Dear Angus,    I care about your health and have reviewed your health plan. I have reviewed your medical conditions, medication list, and lab results and am making recommendations based on this review, to better manage your health.    You are in particular need of attention regarding:  -Labs   -medication     I am recommending that you:  -Schedule a lab and telephone visit with me    Here is a list of Health Maintenance topics that are due now or due soon:  Health Maintenance Due   Topic Date Due     Colorectal Cancer Screening  09/09/2018     Liver Function Panel  11/03/2018     PHQ-2  01/01/2020     Anxiety Assessment  04/08/2020       Please call us at [phone] (or use [employer]) to address the above recommendations.     Thank you for trusting [facility] and we appreciate the opportunity to serve you.  We look forward to supporting your healthcare needs in the future.    Healthy Regards,    Fifi Fleming MD

## 2020-04-21 RX ORDER — ROSUVASTATIN CALCIUM 40 MG/1
TABLET, COATED ORAL
Qty: 30 TABLET | Refills: 1 | Status: SHIPPED | OUTPATIENT
Start: 2020-04-21 | End: 2020-06-17

## 2020-04-21 NOTE — TELEPHONE ENCOUNTER
Routing to team to inform and assist in scheduling.   Maria Eugenia Marie RN   [facility] - Triage

## 2020-04-21 NOTE — TELEPHONE ENCOUNTER
Bea message sent.    .Cherie AMADOR    [facility] [place]

## 2020-04-21 NOTE — TELEPHONE ENCOUNTER
Routing refill request to provider for review/approval because:  Labs out of range:  Lipid    Maria Eugenia Marie RN   [facility] - Triage

## 2020-04-23 NOTE — TELEPHONE ENCOUNTER
hay left with desire to schedule a telephone visit with pcp.    .Cherie AMADOR    [facility] [place]

## 2020-04-24 NOTE — TELEPHONE ENCOUNTER
vm left with interp, letter sent.  Patient needs to schedule A1c and virtual visit with pcp.    .Cherie AMADOR    [facility] [place]

## 2020-05-06 ENCOUNTER — VIRTUAL VISIT (OUTPATIENT)
Dept: FAMILY MEDICINE | Facility: CLINIC | Age: 60
End: 2020-05-06
Payer: COMMERCIAL

## 2020-05-06 DIAGNOSIS — F41.9 ANXIETY: ICD-10-CM

## 2020-05-06 PROCEDURE — 99214 OFFICE O/P EST MOD 30 MIN: CPT | Mod: 95 | Performed by: FAMILY MEDICINE

## 2020-05-06 RX ORDER — CLONAZEPAM 0.5 MG/1
TABLET ORAL
Qty: 10 TABLET | Refills: 0 | Status: SHIPPED | OUTPATIENT
Start: 2020-05-06 | End: 2020-06-17

## 2020-05-06 RX ORDER — SERTRALINE HYDROCHLORIDE 100 MG/1
150 TABLET, FILM COATED ORAL DAILY
Qty: 45 TABLET | Refills: 3 | Status: SHIPPED | OUTPATIENT
Start: 2020-05-06 | End: 2020-07-14

## 2020-05-06 NOTE — PROGRESS NOTES
"Angus Wynne is a 59 year old male who is being evaluated via a billable telephone visit.      The patient has been notified of following:     \"This telephone visit will be conducted via a call between you and your physician/provider. We have found that certain health care needs can be provided without the need for a physical exam.  This service lets us provide the care you need with a short phone conversation.  If a prescription is necessary we can send it directly to your pharmacy.  If lab work is needed we can place an order for that and you can then stop by our lab to have the test done at a later time.    Telephone visits are billed at different rates depending on your insurance coverage. During this emergency period, for some insurers they may be billed the same as an in-person visit.  Please reach out to your insurance provider with any questions.    If during the course of the call the physician/provider feels a telephone visit is not appropriate, you will not be charged for this service.\"    Patient has given verbal consent for Telephone visit?  Yes    What phone number would you like to be contacted at? [phone]    How would you like to obtain your AVS? Adam [place] Cid     Angus Wynne is a 59 year old male who presents to clinic today for the following health issues:    HPI  Medication Followup of  Anxiety  med's     Taking Medication as prescribed: yes    Side Effects:  None    Medication Helping Symptoms:  yes                 BP Readings from Last 2 Encounters:   11/14/19 110/64   11/04/19 132/84     LDL Cholesterol Calculated (mg/dL)   Date Value   07/30/2019 151 (H)   12/13/2018 72             Anxiety Follow-Up    How are you doing with your anxiety since your last visit? Worsened lately bc of covid . Has anxiety sometimes at bed time or during the day time as well.     Are you having other symptoms that might be associated with anxiety? Yes . He used to take clonazepam for anxiety, could " use refill med's     Have you had a significant life event? OTHER:  Recent lock down bc of covid isolation, not going out and feels nervous sometimes      Are you feeling depressed? No not as depressed but more anxious     Do you have any concerns with your use of alcohol or other drugs? No he  states he had no alcohol at all  last couple of months     Social History     Tobacco Use     Smoking status: Former Smoker     Last attempt to quit: 2015     Years since quittin.6     Smokeless tobacco: Never Used   Substance Use Topics     Alcohol use: No     Alcohol/week: 0.0 standard drinks     Comment: 2-3 drinks a week     Drug use: No     RUTH-7 SCORE 2018 2019 10/8/2019   Total Score 0 2 0     PHQ 2018 2019 10/8/2019   PHQ-9 Total Score 4 2 0   Q9: Thoughts of better off dead/self-harm past 2 weeks Not at all Not at all Not at all     Last PHQ-9 10/8/2019   1.  Little interest or pleasure in doing things 0   2.  Feeling down, depressed, or hopeless 0   3.  Trouble falling or staying asleep, or sleeping too much 0   4.  Feeling tired or having little energy 0   5.  Poor appetite or overeating 0   6.  Feeling bad about yourself 0   7.  Trouble concentrating 0   8.  Moving slowly or restless 0   Q9: Thoughts of better off dead/self-harm past 2 weeks 0   PHQ-9 Total Score 0   Difficulty at work, home, or with people -     RUTH-7  10/8/2019   1. Feeling nervous, anxious, or on edge 0   2. Not being able to stop or control worrying 0   3. Worrying too much about different things 0   4. Trouble relaxing 0   5. Being so restless that it is hard to sit still 0   6. Becoming easily annoyed or irritable 0   7. Feeling afraid, as if something awful might happen 0   RUTH-7 Total Score 0   If you checked any problems, how difficult have they made it for you to do your work, take care of things at home, or get along with other people? -         Patient Active Problem List   Diagnosis     BPPV (benign  paroxysmal positional vertigo), unspecified laterality     Other specified hypothyroidism     Hx of acute alcoholic hepatitis     Hx of colonic polyp     Gastroesophageal reflux disease, esophagitis presence not specified     Vitamin D deficiency     Anxiety     Alcohol use disorder     Neck pain     Cervicalgia     Cervical spondylosis without myelopathy     Essential hypertension     Hyperlipidemia with target LDL less than 130     Nasal congestion     History of colonic polyps     Vertigo     Lumbago     Morbid obesity (H)     Colon cancer screening     Thoracic compression fracture (H)     IBARRA (dyspnea on exertion)     Past Surgical History:   Procedure Laterality Date     NO HISTORY OF SURGERY         Social History     Tobacco Use     Smoking status: Former Smoker     Last attempt to quit: 2015     Years since quittin.6     Smokeless tobacco: Never Used   Substance Use Topics     Alcohol use: No     Alcohol/week: 0.0 standard drinks     Comment: 2-3 drinks a week     Family History   Problem Relation Age of Onset     Coronary Artery Disease Father         at age 60      Hyperlipidemia Brother      Diabetes No family hx of      Cerebrovascular Disease No family hx of      Colon Cancer No family hx of      Prostate Cancer No family hx of            Reviewed and updated as needed this visit by Provider         Review of Systems   ROS COMP: Constitutional, HEENT, cardiovascular, pulmonary, GI, , musculoskeletal, neuro, skin, endocrine and psych systems are negative, except as otherwise noted.               Assessment/Plan:    (F41.9) Anxiety  Comment:   Plan: clonazePAM (KLONOPIN) 0.5 MG tablet, sertraline        (ZOLOFT) 100 MG tablet          Discussed cares, talked about signs and symptoms of anxiety/ depression and treatment options. Willing to increase the dose of Zoloft to 150 mg. Continue trazodone for sleep. Although he wants to try OTC melatonin as well. Gave small refill on clonazepam to take  as needed. Pros/ cons of med's discussed . encouraged to see  to help and referral offered, but he declined.    spent sometimes counseling patient. Follow up in 3-4 weeks,  sooner if problem.   Script  sent.Cares and  treatment discussed.    Patient expressed understanding and agreement with treatment plan. All patient's questions were answered, will let me know if has more later.  Medications: Rx's: Reviewed the potential side effects/complications of medications prescribed.     Phone call duration:  15  minutes    Fifi Fleming MD

## 2020-06-11 ENCOUNTER — VIRTUAL VISIT (OUTPATIENT)
Dept: FAMILY MEDICINE | Facility: CLINIC | Age: 60
End: 2020-06-11
Payer: COMMERCIAL

## 2020-06-11 DIAGNOSIS — K21.9 GASTROESOPHAGEAL REFLUX DISEASE, ESOPHAGITIS PRESENCE NOT SPECIFIED: ICD-10-CM

## 2020-06-11 DIAGNOSIS — R13.10 DYSPHAGIA, UNSPECIFIED TYPE: Primary | ICD-10-CM

## 2020-06-11 PROCEDURE — 99214 OFFICE O/P EST MOD 30 MIN: CPT | Mod: 95 | Performed by: FAMILY MEDICINE

## 2020-06-11 RX ORDER — OMEPRAZOLE 40 MG/1
40 CAPSULE, DELAYED RELEASE ORAL DAILY
Qty: 30 CAPSULE | Refills: 0 | Status: SHIPPED | OUTPATIENT
Start: 2020-06-11 | End: 2020-07-14

## 2020-06-11 ASSESSMENT — ANXIETY QUESTIONNAIRES
6. BECOMING EASILY ANNOYED OR IRRITABLE: NOT AT ALL
2. NOT BEING ABLE TO STOP OR CONTROL WORRYING: NOT AT ALL
5. BEING SO RESTLESS THAT IT IS HARD TO SIT STILL: NOT AT ALL
3. WORRYING TOO MUCH ABOUT DIFFERENT THINGS: NOT AT ALL
1. FEELING NERVOUS, ANXIOUS, OR ON EDGE: NOT AT ALL
GAD7 TOTAL SCORE: 0
7. FEELING AFRAID AS IF SOMETHING AWFUL MIGHT HAPPEN: NOT AT ALL

## 2020-06-11 ASSESSMENT — PATIENT HEALTH QUESTIONNAIRE - PHQ9: 5. POOR APPETITE OR OVEREATING: NOT AT ALL

## 2020-06-11 NOTE — PATIENT INSTRUCTIONS
Take medications as directed.  Cares and symptomatic cares discussed   Follow up in 3-4 weeks , sooner if problem or concern     Patient Education     Dysphagia (Adult)    Dysphagia is trouble swallowing. If you have dysphagia, you may have symptoms that include:    Choking or coughing when you eat or drink    Food getting stuck    Drooling    Inability to swallow    Pain behind the breastbone after swallowing    Vomiting after you eat or drink    Aspirating (inhaling into the lungs) foods or liquids when you swallow  The main causes of dysphagia are problems that affect the mouth, throat or tongue. Dysphagia may be caused by a problem with the esophagus (tube that carries food from the mouth to the stomach). These include blockage, swelling, or irritation from acid reflux or injury. An infection of the esophagus or an allergic reaction in the esophagus can also cause dysphagia. Problems in the brain, such as a stroke or Parkinson's disease, can affect the muscles that coordinate swallowing.  Dysphagia is treated by treating the cause. Your healthcare provider may evaluate you using X-ray, special esophagus monitors, a fiber optic evaluation of swallowing, or an upper endoscopy, which uses a thin, lighted tube sent through the mouth to the esophagus. You may be given medicine to reduce stomach acid or control muscle spasms. If the problem doesn't go away, a procedure can be done to widen (dilate) the esophagus. If you have muscle or nerve problems, you may be advised to see a speech or occupational therapist. He or she may give you exercises and instructions to help make eating safer. If you have an infection or allergic condition, your healthcare provide will prescribe medicine for it.  Home care  To help ease the symptoms of dysphagia:    Take any medicine you ve been given exactly as directed. Ask for liquid medicines if you need them.    To make eating easier:  ? Eat slowly.   ? Eat in a relaxed  setting.  ? Don t talk while you eat.  ? Take small bites. Chew slowly and completely before you swallow.  ? Sit upright during and after meals. Chewing food releases enzymes in your mouth that start the digestive process. Chew soft foods at least 5 to10 times. Chew more dense food (meats and vegetables) up to 30 times before swallowing. Count the number of times you chew until you get a sense of how soft the food needs to be before swallowing.  ? Don't eat dry bread products or meat fibers.  ? Puree solid foods if needed. Thicken liquids with milk, juice, broth, gravy, or starch to make them easier to swallow.  ? Ask your healthcare provider if a liquid diet may be better for you.  Follow-up care  Follow up with your healthcare provider or as directed. Your healthcare provider can give you information about tests you may need.   When to seek medical advice  Call your healthcare provider right away for any of the following:    Inability to keep down food or liquid    Symptoms that get worse quickly    Coughing that won't stop    Continuing to lose weight    Fever of 100.4 F (38 C) or higher, or as directed by your healthcare provider    Other symptoms as indicated by your healthcare provider  Call 911  Call 911 for any of the following:    Trouble breathing    Inability to talk    Drooling, inability to control secretions    Loss of consciousness  Date Last Reviewed: 3/1/2018    8132-9485 [employer]. [address] [place] [address], [place] [address]. All rights reserved. This information is not intended as a substitute for professional medical care. Always follow your healthcare professional's instructions.           Patient Education     GERD (Adult)    The esophagus is a tube that carries food from the mouth to the stomach. A valve (the LES, lower esophageal sphincter) at the lower end of the esophagus prevents stomach acid from flowing upward. When this valve doesn't work properly, stomach contents may  "repeatedly flow back up (reflux) into the esophagus. This is called gastroesophageal reflux disease (GERD). GERD can irritate the esophagus. It can cause problems with pain, swallowing or breathing. In severe cases, GERD can cause recurrent pneumonia (from aspiration or breathing in particles) or other serious problems.  Symptoms of reflux include burning, pressure or sharp pain in the upper abdomen or mid to lower chest. The pain can spread to the neck, back, or shoulder. There may be belching, an acid taste in the back of the throat, chronic cough, or sore throat, or hoarseness. GERD symptoms often occur during the day after a big meal. They can also occur at night when lying down.   Home care  Lifestyle changes can help reduce symptoms. If needed, your healthcare provider may prescribe medicines. Symptoms often improve with treatment, but if treatment is stopped, the symptoms often return after a few months. So most persons with GERD will need to continue treatment or get treatment on and off.  Lifestyle changes    Limit or avoid fatty, fried, and spicy foods, as well as coffee, chocolate, mint, and foods with high acid content such as tomatoes and citrus fruit and juices (orange, grapefruit, lemon).    Don t eat large meals, especially at night. Frequent, smaller meals are best. Don't lie down right after eating. And don t eat anything 3 hours before going to bed.    Don't drink alcohol or smoke. As much as possible, stay away from second hand smoke.    If you are overweight, losing weight will reduce symptoms.     Don't wear tight clothing around your stomach area.    If your symptoms occur during sleep, use a foam wedge to elevate your upper body (not just your head.) Or, place 4\" blocks under the head of your bed. Or use 2 bed risers under your bedframe.  Medicines  If needed, medicines can help relieve the symptoms of GERD and prevent damage to the esophagus. Discuss a medicine plan with your healthcare " provider. This may include one or more of the following medicines:    Antacids to help neutralize the normal acids in your stomach.    Acid blockers (Histamine or H2 blockers) to decrease acid production.    Acid inhibitors (proton pump inhibitors PPIs) to decrease acid production in a different way than the blockers. They may work better, but can take a little longer to take effect.  Take an antacid 30 to 60 minutes after eating and at bedtime, but not at the same time as an acid blocker.  Try not to take medicines such as ibuprofen and aspirin. If you are taking aspirin for your heart or other medical reasons, talk to your healthcare provider about stopping it.  Follow-up care  Follow up with your healthcare provider or as advised by our staff.  When to seek medical advice  Call your healthcare provider if any of the following occur:    Stomach pain gets worse or moves to the lower right abdomen (appendix area)    Chest pain appears or gets worse, or spreads to the back, neck, shoulder, or arm    An over-the-counter trial of medicine doesn't relieve your symptoms    Weight loss that can't be explained    Trouble or pain swallowing    Frequent vomiting (can t keep down liquids)    Blood in the stool or vomit (red or black in color)    Feeling weak or dizzy    Fever of 100.4 F (38 C) or higher, or as directed by your healthcare provider  Date Last Reviewed: 3/1/2018    2182-8897 [employer]. [address] [place] [address], [place] [address]. All rights reserved. This information is not intended as a substitute for professional medical care. Always follow your healthcare professional's instructions.

## 2020-06-11 NOTE — PROGRESS NOTES
"Angus Wynne is a 59 year old male who is being evaluated via a billable telephone visit.      The patient has been notified of following:     \"This telephone visit will be conducted via a call between you and your physician/provider. We have found that certain health care needs can be provided without the need for a physical exam.  This service lets us provide the care you need with a short phone conversation.  If a prescription is necessary we can send it directly to your pharmacy.  If lab work is needed we can place an order for that and you can then stop by our lab to have the test done at a later time.    Telephone visits are billed at different rates depending on your insurance coverage. During this emergency period, for some insurers they may be billed the same as an in-person visit.  Please reach out to your insurance provider with any questions.    If during the course of the call the physician/provider feels a telephone visit is not appropriate, you will not be charged for this service.\"    Patient has given verbal consent for Telephone visit?  Yes    What phone number would you like to be contacted at? [phone]    How would you like to obtain your AVS? Adam Del Cid     Angus Wynne is a 59 year old male who presents via phone visit today for the following health issues:    HPI  GERD/Heartburn  Onset: 3-4 days. Difficult to swollow, hurts to swallow mostly . Mostly warm liquids     Description:     Burning in chest: no     Intensity: moderate    Progression of Symptoms: waxing and waning    Accompanying Signs & Symptoms: no cough, no chest pain. No heart burn or indigestion although has been burping more and has noted some epigastric discomfort when he swallows      . No sore throat as such but has discomfort  only with swallowing especially with warm liquids.     He thinks he has been  trying more hot liquids/ spicies lately to protect from covid.     Alcohol once a week one beer/ week     He " "has had mild GERD sx on and off in the past. He also had endoscopy 6 yrs ago for some reason in [place] , so he was told that his \"stomach looked angry \"      Does it feel like food gets stuck: no   Nausea: no   Vomiting (bloody?): no   Abdominal Pain: no , but has mild epigastric discomfort soemtimes on and off , lately especially with food swallowing.   Black-Tarry stools: no :  Bloody stools: no     History:   Previous ulcers: no     Precipitating factors:   Caffeine use: no   Alcohol use: no   NSAID/Aspirin use: no   Tobacco use: no   Worse with spicy foods.    Therapies Tried and outcome:none      PROBLEMS TO ADD ON...    Patient Active Problem List   Diagnosis     BPPV (benign paroxysmal positional vertigo), unspecified laterality     Other specified hypothyroidism     Hx of acute alcoholic hepatitis     Hx of colonic polyp     Gastroesophageal reflux disease, esophagitis presence not specified     Vitamin D deficiency     Anxiety     Alcohol use disorder     Neck pain     Cervicalgia     Cervical spondylosis without myelopathy     Essential hypertension     Hyperlipidemia with target LDL less than 130     Nasal congestion     History of colonic polyps     Vertigo     Lumbago     Morbid obesity (H)     Colon cancer screening     Thoracic compression fracture (H)     IBARRA (dyspnea on exertion)     Past Surgical History:   Procedure Laterality Date     NO HISTORY OF SURGERY         Social History     Tobacco Use     Smoking status: Former Smoker     Last attempt to quit: 2015     Years since quittin.7     Smokeless tobacco: Never Used   Substance Use Topics     Alcohol use: No     Alcohol/week: 0.0 standard drinks     Comment: 2-3 drinks a week     Family History   Problem Relation Age of Onset     Coronary Artery Disease Father         at age 60      Hyperlipidemia Brother      Diabetes No family hx of      Cerebrovascular Disease No family hx of      Colon Cancer No family hx of      Prostate Cancer " No family hx of            Reviewed and updated as needed this visit by Provider         Review of Systems   Constitutional, HEENT, cardiovascular, pulmonary, GI, , musculoskeletal, neuro, skin, endocrine and psych systems are negative, except as otherwise noted.                 Assessment/Plan:  (R13.10) Dysphagia, unspecified type  (primary encounter diagnosis)  Comment:   Plan: omeprazole (PRILOSEC) 40 MG DR capsule            (K21.9) Gastroesophageal reflux disease, esophagitis presence not specified  Comment:   Plan: omeprazole (PRILOSEC) 40 MG DR capsule                      Discussed possible differential diagnosis for her symptoms.   Sounds like it could be related to GERD, also talked about and other causes of his sx. . he is willing to try Prilosec 40 mg to see if helps with sx talked about reflux precautions etc . Suggest follow up in 3-4 week, sooner if problem.   Consider GI referral and further evaluation if needed.   Talked about warning s/s for which should be seen    script sent.Cares and  treatment discussed.  follow up if problem   Patient expressed understanding and agreement with treatment plan. All patient's questions were answered, will let me know if has more later.  Medications: Rx's: Reviewed the potential side effects/complications of medications prescribed.       Phone call duration:  18   minutes    Fifi Fleming MD

## 2020-06-12 ASSESSMENT — ANXIETY QUESTIONNAIRES: GAD7 TOTAL SCORE: 0

## 2020-06-17 DIAGNOSIS — F41.9 ANXIETY: ICD-10-CM

## 2020-06-17 DIAGNOSIS — E78.5 HYPERLIPIDEMIA LDL GOAL <100: ICD-10-CM

## 2020-06-17 RX ORDER — CLONAZEPAM 0.5 MG/1
TABLET ORAL
Qty: 10 TABLET | Refills: 0 | Status: SHIPPED | OUTPATIENT
Start: 2020-06-17 | End: 2020-07-14

## 2020-06-17 RX ORDER — ROSUVASTATIN CALCIUM 40 MG/1
TABLET, COATED ORAL
Qty: 30 TABLET | Refills: 0 | Status: SHIPPED | OUTPATIENT
Start: 2020-06-17 | End: 2020-07-14

## 2020-06-17 NOTE — TELEPHONE ENCOUNTER
Clonazepam 0.5 mg  Last Written Prescription Date:  5/6/20  Last Fill Quantity: 10,   # refills: 0  Last Office Visit: 6/11/20 Lonnie virtual   Future Office visit:       Routing refill request to provider for review/approval because:  Drug not on the FMG, UMP or  Health refill protocol or controlled substance    [place] reviewed:  reviewed- no concerns on 6/17/20    [place]:  [email or web address]    Fara HAWKINS RN  EP Triage

## 2020-06-17 NOTE — TELEPHONE ENCOUNTER
Prescription approved per [facility] Refill Protocol for rosuvastatin.    Fara HAWKINS RN  EP Triage

## 2020-07-14 ENCOUNTER — VIRTUAL VISIT (OUTPATIENT)
Dept: FAMILY MEDICINE | Facility: CLINIC | Age: 60
End: 2020-07-14
Payer: COMMERCIAL

## 2020-07-14 DIAGNOSIS — E78.5 HYPERLIPIDEMIA WITH TARGET LDL LESS THAN 130: ICD-10-CM

## 2020-07-14 DIAGNOSIS — Z87.19 HX OF ACUTE ALCOHOLIC HEPATITIS: ICD-10-CM

## 2020-07-14 DIAGNOSIS — K21.9 GASTROESOPHAGEAL REFLUX DISEASE, ESOPHAGITIS PRESENCE NOT SPECIFIED: Primary | ICD-10-CM

## 2020-07-14 DIAGNOSIS — E78.5 HYPERLIPIDEMIA LDL GOAL <100: ICD-10-CM

## 2020-07-14 DIAGNOSIS — R79.89 ELEVATED LFTS: ICD-10-CM

## 2020-07-14 DIAGNOSIS — F41.9 ANXIETY: ICD-10-CM

## 2020-07-14 DIAGNOSIS — E03.8 OTHER SPECIFIED HYPOTHYROIDISM: ICD-10-CM

## 2020-07-14 DIAGNOSIS — G47.09 OTHER INSOMNIA: ICD-10-CM

## 2020-07-14 PROCEDURE — 99214 OFFICE O/P EST MOD 30 MIN: CPT | Mod: 95 | Performed by: FAMILY MEDICINE

## 2020-07-14 RX ORDER — TRAZODONE HYDROCHLORIDE 50 MG/1
TABLET, FILM COATED ORAL
Qty: 90 TABLET | Refills: 3 | Status: SHIPPED | OUTPATIENT
Start: 2020-07-14 | End: 2020-12-30

## 2020-07-14 RX ORDER — CLONAZEPAM 0.5 MG/1
TABLET ORAL
Qty: 10 TABLET | Refills: 0 | Status: SHIPPED | OUTPATIENT
Start: 2020-07-14 | End: 2020-08-11

## 2020-07-14 RX ORDER — SERTRALINE HYDROCHLORIDE 100 MG/1
150 TABLET, FILM COATED ORAL DAILY
Qty: 45 TABLET | Refills: 4 | Status: SHIPPED | OUTPATIENT
Start: 2020-07-14 | End: 2020-11-18

## 2020-07-14 RX ORDER — OMEPRAZOLE 40 MG/1
40 CAPSULE, DELAYED RELEASE ORAL DAILY
Qty: 90 CAPSULE | Refills: 1 | Status: SHIPPED | OUTPATIENT
Start: 2020-07-14 | End: 2020-12-29

## 2020-07-14 RX ORDER — ROSUVASTATIN CALCIUM 40 MG/1
40 TABLET, COATED ORAL DAILY
Qty: 90 TABLET | Refills: 3 | Status: SHIPPED | OUTPATIENT
Start: 2020-07-14 | End: 2021-09-22

## 2020-07-14 NOTE — PROGRESS NOTES
"Angus Wynne is a 60 year old male who is being evaluated via a billable telephone visit.      The patient has been notified of following:     \"This telephone visit will be conducted via a call between you and your physician/provider. We have found that certain health care needs can be provided without the need for a physical exam.  This service lets us provide the care you need with a short phone conversation.  If a prescription is necessary we can send it directly to your pharmacy.  If lab work is needed we can place an order for that and you can then stop by our lab to have the test done at a later time.    Telephone visits are billed at different rates depending on your insurance coverage. During this emergency period, for some insurers they may be billed the same as an in-person visit.  Please reach out to your insurance provider with any questions.    If during the course of the call the physician/provider feels a telephone visit is not appropriate, you will not be charged for this service.\"    Patient has given verbal consent for Telephone visit?  Yes    What phone number would you like to be contacted at? [phone]    How would you like to obtain your AVS? Adam Del Cid     Angus Wynne is a 60 year old male who presents via phone visit today for the following health issues:    HPI  Medication Followup of Omeprazole/ anxiety med's     Taking Medication as prescribed: yes    Side Effects:  None    Medication Helping Symptoms:  yes         GERD/Heartburn      Duration: gi sx have improved.     Description (location/character/radiation):     Intensity:  mild    Accompanying signs and symptoms:  food getting stuck: no   nausea/vomiting/blood: no   abdominal pain: no   black/tarry or bloody stools: no :    History (similar episodes/previous evaluation): had endoscopy 4-5 yrs ago     Precipitating or alleviating factors:  worse with  no particular food or drink. Since medication use it has improved "   current NSAID/Aspirin use: no     Therapies tried and outcome: Omeprazole (Prilosec)    Depression and Anxiety Follow-Up    How are you doing with your depression since your last visit? No change    How are you doing with your anxiety since your last visit?  Improved most;y on Zoloft and taking trazodone for sleep, but needs clonazepam occasionally for anxiety  so wants refill     Are you having other symptoms that might be associated with depression or anxiety? No    Have you had a significant life event? No     Do you have any concerns with your use of alcohol or other drugs? No  ...  PROBLEMS TO ADD ON...    Last TSH was out of range so need a follow up labs. Also need refill on some med's . Denies any fatigue, weight change etc     Hyperlipidemia Follow-Up    Due for fasting labs and need refill     Are you regularly taking any medication or supplement to lower your cholesterol?   Yes- see epic     Are you having muscle aches or other side effects that you think could be caused by your cholesterol lowering medication?  No          Social History     Tobacco Use     Smoking status: Former Smoker     Last attempt to quit: 2015     Years since quittin.7     Smokeless tobacco: Never Used   Substance Use Topics     Alcohol use: No     Alcohol/week: 0.0 standard drinks     Comment: 2-3 drinks a week     Drug use: No     PHQ 2018 2019 10/8/2019   PHQ-9 Total Score 4 2 0   Q9: Thoughts of better off dead/self-harm past 2 weeks Not at all Not at all Not at all     RUTH-7 SCORE 2019 10/8/2019 2020   Total Score 2 0 0     Last PHQ-9 10/8/2019   1.  Little interest or pleasure in doing things 0   2.  Feeling down, depressed, or hopeless 0   3.  Trouble falling or staying asleep, or sleeping too much 0   4.  Feeling tired or having little energy 0   5.  Poor appetite or overeating 0   6.  Feeling bad about yourself 0   7.  Trouble concentrating 0   8.  Moving slowly or restless 0   Q9: Thoughts  of better off dead/self-harm past 2 weeks 0   PHQ-9 Total Score 0   Difficulty at work, home, or with people -     RUTH-7  2020   1. Feeling nervous, anxious, or on edge 0   2. Not being able to stop or control worrying 0   3. Worrying too much about different things 0   4. Trouble relaxing 0   5. Being so restless that it is hard to sit still 0   6. Becoming easily annoyed or irritable 0   7. Feeling afraid, as if something awful might happen 0   RUTH-7 Total Score 0   If you checked any problems, how difficult have they made it for you to do your work, take care of things at home, or get along with other people? -         Suicide Assessment Five-step Evaluation and Treatment (SAFE-T)      Patient Active Problem List   Diagnosis     BPPV (benign paroxysmal positional vertigo), unspecified laterality     Other specified hypothyroidism     Hx of acute alcoholic hepatitis     Hx of colonic polyp     Gastroesophageal reflux disease, esophagitis presence not specified     Vitamin D deficiency     Anxiety     Alcohol use disorder     Neck pain     Cervicalgia     Cervical spondylosis without myelopathy     Essential hypertension     Hyperlipidemia with target LDL less than 130     Nasal congestion     History of colonic polyps     Vertigo     Lumbago     Morbid obesity (H)     Colon cancer screening     Thoracic compression fracture (H)     IBARRA (dyspnea on exertion)     Dysphagia, unspecified type     Past Surgical History:   Procedure Laterality Date     NO HISTORY OF SURGERY         Social History     Tobacco Use     Smoking status: Former Smoker     Last attempt to quit: 2015     Years since quittin.7     Smokeless tobacco: Never Used   Substance Use Topics     Alcohol use: No     Alcohol/week: 0.0 standard drinks     Comment: 2-3 drinks a week     Family History   Problem Relation Age of Onset     Coronary Artery Disease Father         at age 60      Hyperlipidemia Brother      Diabetes No family hx of       Cerebrovascular Disease No family hx of      Colon Cancer No family hx of      Prostate Cancer No family hx of            Reviewed and updated as needed this visit by Provider         Review of Systems   Constitutional, HEENT, cardiovascular, pulmonary, GI, , musculoskeletal, neuro, skin, endocrine and psych systems are negative, except as otherwise noted.       Objective   Sounds    Alert and oriented coherent speech, normal   rate and volume, able to articulate logical thoughts,           Assessment/Plan:      (K21.9) Gastroesophageal reflux disease, esophagitis presence not specified  (primary encounter diagnosis)  Comment:   Plan: omeprazole (PRILOSEC) 40 MG DR capsule        Improved and stable. Refill sent. Discussed reflux precautions f/u as needed     (G47.09) Other insomnia  Comment: stable   Plan: traZODone (DESYREL) 50 MG tablet        Need refill    (F41.9) Anxiety  Comment:   Plan: sertraline (ZOLOFT) 100 MG tablet, clonazePAM         (KLONOPIN) 0.5 MG tablet          Discussed cares, talked about signs and symptoms of anxiety/ depression and treatment options. Willing to continue same mess gave small refill on klonopin for occasional use . Pros/ cons of med's discussed . encouraged to see  to help and referral given . spent sometimes counseling patient. Follow up in 2-3 months, sooner if problem.       (E78.5) Hyperlipidemia with target LDL less than 130  Comment:   Plan: Lipid panel reflex to direct LDL Fasting,         Comprehensive metabolic panel            (E03.8) Other specified hypothyroidism  Comment:   Plan: **TSH with free T4 reflex FUTURE anytime            (R79.89) Elevated LFTs  Comment: related to  alcoholic hepatitis previously  Plan: Comprehensive metabolic panel            (Z87.19) Hx of acute alcoholic hepatitis  Comment:   Plan: Comprehensive metabolic panel              Check labs, he will schedule. refill sent.Cares and  treatment discussed. follow up if problem    Patient expressed understanding and agreement with treatment plan. All patient's questions were answered, will let me know if has more later.  Medications: Rx's: Reviewed the potential side effects/complications of medications prescribed.         Phone call duration:  23  minutes    Fifi Fleming MD

## 2020-08-09 DIAGNOSIS — F41.9 ANXIETY: ICD-10-CM

## 2020-08-10 NOTE — TELEPHONE ENCOUNTER
Clonazepam 0.5 mg      Last Written Prescription Date:  7/14/20  Last Fill Quantity: 10,   # refills: 0  Last Office Visit: 7/14/20 virtual Lonnie  Future Office visit:       Routing refill request to provider for review/approval because:  Drug not on the FMG, UMP or  Health refill protocol or controlled substance    [place] reviewed:  not reviewed/not due - last done on 06/17/20    [place]:  [email or web address]    Fara HAWKINS RN  EP Triage

## 2020-08-11 RX ORDER — CLONAZEPAM 0.5 MG/1
TABLET ORAL
Qty: 10 TABLET | Refills: 0 | Status: SHIPPED | OUTPATIENT
Start: 2020-08-11 | End: 2020-09-14

## 2020-09-14 DIAGNOSIS — F41.9 ANXIETY: ICD-10-CM

## 2020-09-14 RX ORDER — CLONAZEPAM 0.5 MG/1
TABLET ORAL
Qty: 10 TABLET | Refills: 0 | Status: SHIPPED | OUTPATIENT
Start: 2020-09-14 | End: 2020-11-18

## 2020-09-14 NOTE — TELEPHONE ENCOUNTER
Clonazepam 0.5 mg      Last Written Prescription Date:  8/11/20  Last Fill Quantity: 10,   # refills: 0  Last Office Visit: 7/14/20 estrella Fleming  Future Office visit:    Next 5 appointments (look out 90 days)    Oct 15, 2020  9:00 AM CDT  PHYSICAL with Fifi Fleming MD  [facility] ([facility]) [address] [place] [id number]  [phone]           Routing refill request to provider for review/approval because:  Drug not on the FMG, UMP or  Health refill protocol or controlled substance    RX [place]) reviewed:  not reviewed/not due - last done on 6/17/20    [place]:  [email or web address]    Fara HAWKINS RN  EP Triage

## 2020-10-30 DIAGNOSIS — E03.8 OTHER SPECIFIED HYPOTHYROIDISM: ICD-10-CM

## 2020-10-30 NOTE — TELEPHONE ENCOUNTER
Routing refill request to provider for review/approval because:  Labs not current:  TSH last done on 10/8/19    Fara HAWKINS RN  EP Triage

## 2020-11-03 RX ORDER — LEVOTHYROXINE SODIUM 150 UG/1
TABLET ORAL
Qty: 90 TABLET | Refills: 3 | Status: SHIPPED | OUTPATIENT
Start: 2020-11-03 | End: 2021-03-31

## 2020-11-04 NOTE — TELEPHONE ENCOUNTER
Patient scheduled with pcp 11/18/2020    .Cherie AMADOR    [facility] [place]

## 2020-11-18 ENCOUNTER — OFFICE VISIT (OUTPATIENT)
Dept: FAMILY MEDICINE | Facility: CLINIC | Age: 60
End: 2020-11-18
Payer: COMMERCIAL

## 2020-11-18 VITALS
DIASTOLIC BLOOD PRESSURE: 72 MMHG | TEMPERATURE: 96.6 F | HEART RATE: 87 BPM | HEIGHT: 72 IN | OXYGEN SATURATION: 99 % | BODY MASS INDEX: 35.67 KG/M2 | SYSTOLIC BLOOD PRESSURE: 110 MMHG

## 2020-11-18 DIAGNOSIS — F41.9 ANXIETY: ICD-10-CM

## 2020-11-18 DIAGNOSIS — E66.01 MORBID OBESITY (H): ICD-10-CM

## 2020-11-18 DIAGNOSIS — Z12.11 COLON CANCER SCREENING: ICD-10-CM

## 2020-11-18 DIAGNOSIS — I10 ESSENTIAL HYPERTENSION: ICD-10-CM

## 2020-11-18 DIAGNOSIS — Z12.5 SCREENING PSA (PROSTATE SPECIFIC ANTIGEN): ICD-10-CM

## 2020-11-18 DIAGNOSIS — Z23 NEED FOR PROPHYLACTIC VACCINATION AND INOCULATION AGAINST INFLUENZA: ICD-10-CM

## 2020-11-18 DIAGNOSIS — E78.5 HYPERLIPIDEMIA WITH TARGET LDL LESS THAN 130: ICD-10-CM

## 2020-11-18 DIAGNOSIS — Z87.19 HX OF ACUTE ALCOHOLIC HEPATITIS: ICD-10-CM

## 2020-11-18 DIAGNOSIS — Z00.00 ROUTINE HISTORY AND PHYSICAL EXAMINATION OF ADULT: Primary | ICD-10-CM

## 2020-11-18 DIAGNOSIS — E03.8 OTHER SPECIFIED HYPOTHYROIDISM: ICD-10-CM

## 2020-11-18 PROCEDURE — 80061 LIPID PANEL: CPT | Performed by: FAMILY MEDICINE

## 2020-11-18 PROCEDURE — 90682 RIV4 VACC RECOMBINANT DNA IM: CPT | Performed by: FAMILY MEDICINE

## 2020-11-18 PROCEDURE — 80053 COMPREHEN METABOLIC PANEL: CPT | Performed by: FAMILY MEDICINE

## 2020-11-18 PROCEDURE — G0103 PSA SCREENING: HCPCS | Performed by: FAMILY MEDICINE

## 2020-11-18 PROCEDURE — 84439 ASSAY OF FREE THYROXINE: CPT | Performed by: FAMILY MEDICINE

## 2020-11-18 PROCEDURE — 36415 COLL VENOUS BLD VENIPUNCTURE: CPT | Performed by: FAMILY MEDICINE

## 2020-11-18 PROCEDURE — 99396 PREV VISIT EST AGE 40-64: CPT | Mod: 25 | Performed by: FAMILY MEDICINE

## 2020-11-18 PROCEDURE — 84443 ASSAY THYROID STIM HORMONE: CPT | Performed by: FAMILY MEDICINE

## 2020-11-18 PROCEDURE — 99213 OFFICE O/P EST LOW 20 MIN: CPT | Mod: 25 | Performed by: FAMILY MEDICINE

## 2020-11-18 PROCEDURE — 90471 IMMUNIZATION ADMIN: CPT | Performed by: FAMILY MEDICINE

## 2020-11-18 RX ORDER — CLONAZEPAM 0.5 MG/1
TABLET ORAL
Qty: 14 TABLET | Refills: 0 | Status: SHIPPED | OUTPATIENT
Start: 2020-11-18 | End: 2021-04-08

## 2020-11-18 RX ORDER — LOSARTAN POTASSIUM 100 MG/1
100 TABLET ORAL DAILY
Qty: 90 TABLET | Refills: 1 | Status: SHIPPED | OUTPATIENT
Start: 2020-11-18 | End: 2021-03-01

## 2020-11-18 RX ORDER — SERTRALINE HYDROCHLORIDE 100 MG/1
150 TABLET, FILM COATED ORAL DAILY
Qty: 45 TABLET | Refills: 4 | Status: SHIPPED | OUTPATIENT
Start: 2020-11-18 | End: 2021-02-27

## 2020-11-18 ASSESSMENT — ANXIETY QUESTIONNAIRES
7. FEELING AFRAID AS IF SOMETHING AWFUL MIGHT HAPPEN: NOT AT ALL
GAD7 TOTAL SCORE: 0
1. FEELING NERVOUS, ANXIOUS, OR ON EDGE: NOT AT ALL
2. NOT BEING ABLE TO STOP OR CONTROL WORRYING: NOT AT ALL
5. BEING SO RESTLESS THAT IT IS HARD TO SIT STILL: NOT AT ALL
3. WORRYING TOO MUCH ABOUT DIFFERENT THINGS: NOT AT ALL
6. BECOMING EASILY ANNOYED OR IRRITABLE: NOT AT ALL

## 2020-11-18 ASSESSMENT — PATIENT HEALTH QUESTIONNAIRE - PHQ9
5. POOR APPETITE OR OVEREATING: NOT AT ALL
SUM OF ALL RESPONSES TO PHQ QUESTIONS 1-9: 2

## 2020-11-18 NOTE — PROGRESS NOTES
SUBJECTIVE:   CC: Angus Wynne is an 60 year old male who presents for preventative health visit.       Patient has been advised of split billing requirements and indicates understanding: Yes     Healthy Habits:    Getting at least 3 servings of Calcium per day:  Yes    Bi-annual eye exam:  Yes    Dental care twice a year:  Yes    Sleep apnea or symptoms of sleep apnea:  None    Diet:  Regular (no restrictions)    Frequency of exercise:  None    Duration of exercise:  N/A    Taking medications regularly:  Yes    Barriers to taking medications:  None    Medication side effects:  None    PHQ-2 Total Score:    Additional concerns today:  No          PROBLEMS TO ADD ON...  Hyperlipidemia Follow-Up      Are you regularly taking any medication or supplement to lower your cholesterol?   Yes- see epic     Are you having muscle aches or other side effects that you think could be caused by your cholesterol lowering medication?  No    Hypertension Follow-up      Do you check your blood pressure regularly outside of the clinic? Yes     Are you following a low salt diet? Yes    Are your blood pressures ever more than 140 on the top number (systolic) OR more   than 90 on the bottom number (diastolic), for example 140/90? No    Depression and Anxiety Follow-Up    How are you doing with your depression since your last visit? Worsened somewhat more anxious sometimes mostly in the morning so sometimes needs klonopin so could bhakti refill     How are you doing with your anxiety since your last visit?  Worsened more anxious bc of covid , could have been drinking a bit more although trying to cut down again .    Are you having other symptoms that might be associated with depression or anxiety? Yes:  not as depressed but anxious soemtimes     Have you had a significant life event? OTHER: mostly stressed bc of covid      Do you have any concerns with your use of alcohol or other drugs? Yes:  was drinking a bit more but now trying to cut  down again     Social History     Tobacco Use     Smoking status: Former Smoker     Quit date: 2015     Years since quittin.1     Smokeless tobacco: Never Used   Substance Use Topics     Alcohol use: No     Alcohol/week: 0.0 standard drinks     Comment: 2-3 drinks a week     Drug use: No     PHQ 2018 2019 10/8/2019   PHQ-9 Total Score 4 2 0   Q9: Thoughts of better off dead/self-harm past 2 weeks Not at all Not at all Not at all     RUTH-7 SCORE 2019 10/8/2019 2020   Total Score 2 0 0     Last PHQ-9 2020   1.  Little interest or pleasure in doing things 0   2.  Feeling down, depressed, or hopeless 0   3.  Trouble falling or staying asleep, or sleeping too much 1   4.  Feeling tired or having little energy 0   5.  Poor appetite or overeating 1   6.  Feeling bad about yourself 0   7.  Trouble concentrating 0   8.  Moving slowly or restless 0   Q9: Thoughts of better off dead/self-harm past 2 weeks 0   PHQ-9 Total Score 2   Difficulty at work, home, or with people -     RUTH-7  2020   1. Feeling nervous, anxious, or on edge 0   2. Not being able to stop or control worrying 0   3. Worrying too much about different things 0   4. Trouble relaxing 0   5. Being so restless that it is hard to sit still 0   6. Becoming easily annoyed or irritable 0   7. Feeling afraid, as if something awful might happen 0   RUTH-7 Total Score 0   If you checked any problems, how difficult have they made it for you to do your work, take care of things at home, or get along with other people? -       Suicide Assessment Five-step Evaluation and Treatment (SAFE-T)    Hypothyroidism Follow-up      Since last visit, patient describes the following symptoms: Weight stable, no hair loss, no skin changes, no constipation, no loose stools      Today's PHQ-2 Score:   PHQ-2 (  [employer]) 2020   Q1: Little interest or pleasure in doing things 0   Q2: Feeling down, depressed or hopeless 0   PHQ-2 Score 0   Q1:  Little interest or pleasure in doing things -   Q2: Feeling down, depressed or hopeless -   PHQ-2 Score -       Abuse: Current or Past(Physical, Sexual or Emotional)- No  Do you feel safe in your environment? Yes        Social History     Tobacco Use     Smoking status: Former Smoker     Quit date: 2015     Years since quittin.1     Smokeless tobacco: Never Used   Substance Use Topics     Alcohol use: No     Alcohol/week: 0.0 standard drinks     Comment: 2-3 drinks a week     If you drink alcohol do you typically have >3 drinks per day or >7 drinks per week? No    No flowsheet data found.    Last PSA:   PSA   Date Value Ref Range Status   2019 0.33 0 - 4 ug/L Final     Comment:     Assay Method:  Chemiluminescence using Siemens Vista analyzer       Reviewed orders with patient. Reviewed health maintenance and updated orders accordingly - Yes  Patient Active Problem List   Diagnosis     BPPV (benign paroxysmal positional vertigo), unspecified laterality     Other specified hypothyroidism     Hx of acute alcoholic hepatitis     Hx of colonic polyp     Gastroesophageal reflux disease, esophagitis presence not specified     Vitamin D deficiency     Anxiety     Alcohol use disorder     Neck pain     Cervicalgia     Cervical spondylosis without myelopathy     Essential hypertension     Hyperlipidemia with target LDL less than 130     Nasal congestion     History of colonic polyps     Vertigo     Lumbago     Morbid obesity (H)     Colon cancer screening     Thoracic compression fracture (H)     IBARRA (dyspnea on exertion)     Dysphagia, unspecified type     Elevated LFTs     Past Surgical History:   Procedure Laterality Date     NO HISTORY OF SURGERY         Social History     Tobacco Use     Smoking status: Former Smoker     Quit date: 2015     Years since quittin.1     Smokeless tobacco: Never Used   Substance Use Topics     Alcohol use: No     Alcohol/week: 0.0 standard drinks     Comment: 2-3  drinks a week     Family History   Problem Relation Age of Onset     Coronary Artery Disease Father         at age 60      Hyperlipidemia Brother      Diabetes No family hx of      Cerebrovascular Disease No family hx of      Colon Cancer No family hx of      Prostate Cancer No family hx of            Reviewed and updated as needed this visit by clinical staff  Tobacco  Allergies  Meds      Soc Hx        Reviewed and updated as needed this visit by Provider                Past Medical History:   Diagnosis Date     HTN (hypertension)      Hypothyroid      Vertigo     2015 ,         Review of Systems  CONSTITUTIONAL: NEGATIVE for fever, chills, change in weight  INTEGUMENTARY/SKIN: NEGATIVE for worrisome rashes, moles or lesions  EYES: NEGATIVE for vision changes or irritation  ENT: NEGATIVE for ear, mouth and throat problems  RESP: NEGATIVE for significant cough or SOB  CV: NEGATIVE for chest pain, palpitations or peripheral edema  GI: NEGATIVE for nausea, abdominal pain, heartburn, or change in bowel habits   male: negative for dysuria, hematuria, decreased urinary stream, erectile dysfunction, urethral discharge  MUSCULOSKELETAL: NEGATIVE for significant arthralgias or myalgia  NEURO: NEGATIVE for weakness, dizziness or paresthesias  ENDOCRINE: NEGATIVE for temperature intolerance, skin/hair changes  PSYCHIATRIC: NEGATIVE for changes in mood or affect    OBJECTIVE:   There were no vitals taken for this visit.    Physical Exam  GENERAL: healthy, alert and no distress  EYES: Eyes grossly normal to inspection, PERRL and conjunctivae and sclerae normal  HENT: ear canals and TM's normal, nose and mouth without ulcers or lesions  NECK: no adenopathy, no asymmetry, masses, or scars and thyroid normal to palpation  RESP: lungs clear to auscultation - no rales, rhonchi or wheezes  CV: regular rate and rhythm, normal S1 S2, no S3 or S4, no murmur, click or rub, no peripheral edema   ABDOMEN: soft, nontender, no  hepatosplenomegaly, no masses and bowel sounds normal   (male): normal male genitalia without lesions or urethral discharge, no hernia  RECTAL: normal sphincter tone, no rectal masses and prostate of normal size for age, smooth, nontender without masses  MS: no gross musculoskeletal defects noted, no edema  SKIN: no suspicious lesions or rashes  NEURO: Normal strength and tone, mentation intact and speech normal  PSYCH: mentation appears normal, affect normal/bright  PSYCH: mentation appears normal and affect normal      ASSESSMENT/PLAN:   (Z00.00) Routine history and physical examination of adult  (primary encounter diagnosis)  Comment:   Plan: T4 free            (E78.5) Hyperlipidemia with target LDL less than 130  Comment:   Plan: Lipid panel reflex to direct LDL Fasting            (E03.8) Other specified hypothyroidism  Comment:   Plan: TSH with free T4 reflex            (Z87.19) Hx of acute alcoholic hepatitis  Comment:   Plan: Comprehensive metabolic panel    (I10) Essential hypertension  Comment:   Plan: Comprehensive metabolic panel, losartan         (COZAAR) 100 MG tablet            (E66.01) Morbid obesity (H)  Comment:   Plan: TSH with free T4 reflex            (F41.9) Anxiety  Comment:   Plan: sertraline (ZOLOFT) 100 MG tablet, clonazePAM         (KLONOPIN) 0.5 MG tablet          Discussed cares, talked about signs and symptoms of anxiety/ depression and treatment options. Willing to continue Zoloft, also gave refill on klonopin to use only as needed for acute anxiety but needs to watch and decrease the use of klonopin . Talked about staying off alcohol use and need to continue to watch.    Pros/ cons of med's discussed . encouraged to see  if needed  . spent sometimes counseling patient. Follow up in 2-3 months, sooner if problem.       (Z12.5) Screening PSA (prostate specific antigen)  Comment:   Plan: PSA, screen            (Z12.11) Colon cancer screening  Comment:   Plan: GASTROENTEROLOGY  ADULT REF PROCEDURE ONLY            (Z23) Need for prophylactic vaccination and inoculation against influenza  Comment:   Plan: INFLUENZA QUAD, RECOMBINANT, P-FREE (RIV4)         (FLUBLOCK) [64781]            Check labs. script  sent.Cares and  treatment discussed follow up if problem   Patient expressed understanding and agreement with treatment plan. All patient's questions were answered, will let me know if has more later.  Medications: Rx's: Reviewed the potential side effects/complications of medications prescribed.       COUNSELING:   Reviewed preventive health counseling, as reflected in patient instructions       Regular exercise       Healthy diet/nutrition       Vision screening       Immunizations    Vaccinated for: Pneumococcal and Declined: Zoster due to Other will consider later                Colon cancer screening       Prostate cancer screening    Estimated body mass index is 35.67 kg/m  as calculated from the following:    Height as of 10/8/19: 1.829 m (6').    Weight as of 10/8/19: 119.3 kg (263 lb).     Weight management plan: Discussed healthy diet and exercise guidelines    He reports that he quit smoking about 5 years ago. He has never used smokeless tobacco.      Counseling Resources:  ATP IV Guidelines  Pooled Cohorts Equation Calculator  FRAX Risk Assessment  ICSI Preventive Guidelines  Dietary Guidelines for Americans, 2010  USDA's MyPlate  ASA Prophylaxis  Lung CA Screening    Fifi Fleming MD  [facility]

## 2020-11-18 NOTE — PATIENT INSTRUCTIONS
Patient Education     Prevention Guidelines, Men Ages 50 to 64  Screening tests and vaccines are an important part of managing your health. A screening test is done to find diseases in people who don't have any symptoms. The goal is to find a disease early so lifestyle changes and checkups can reduce the risk of disease. Or the goal may be to detect it early to treat it most effectively. Screening tests are not used to diagnose a disease. But they are used to see if more testing is needed. Health counseling is important, too. Below are guidelines for these, for men ages 50 to 64. Talk with your healthcare provider to make sure you re up-to-date on what you need.   Screening Who needs it How often   Alcohol misuse All men in this age group  At routine exams   Blood pressure All men in this age group  Yearly checkup if your blood pressure is normal   Normal blood pressure is less than 120/80 mm Hg   If your blood pressure reading is higher than normal, follow the advice of your healthcare provider    Colorectal cancer All men at average risk in this age group  Multiple tests are available and are used at different times. Possible tests include:     Flexible sigmoidoscopy every 5 years, or    Colonoscopy every 10 years, or    CT colonography (virtual colonoscopy) every 5 years, or    Yearly fecal occult blood test, or    Yearly fecal immunochemical test every year, or    Stool DNA test, every 3 years  If you choose a test other than a colonoscopy and have an abnormal test result, you will need to follow-up with a colonoscopy. Screening recommendations advice vary varies among expert groups. Talk with your healthcare provider about which tests are best for you.   Some people should be screened using a different schedule because of their personal or family health history. Talk with your healthcare provider about your health history.    Depression All men in this age group  At routine exams   Type 2 diabetes or  prediabetes  All men beginning at age 45 and men without symptoms at any age who are overweight or obese and have 1 or more other risk factors for diabetes  At least every 3 years (yearly if your blood sugar has already begun to rise)    Type 2 diabetes All men with prediabetes  Every year   Hepatitis C Men at increased risk for infection - talk with your healthcare provider  At routine exams. All men ages 50 to 70 should be tested at least once for hepatitis C.    High cholesterol or triglycerides  All men in this age group  At least every 5 years    HIV All males ages 15 to 65 and younger or older men at increased risk. Talk with your healthcare provider  At routine exams   Lung cancer  Adults age 55 to 74 who in fairly good health and are at higher risk for lung cancer defined as current smokers or persons who have quit within past 15 years, and have a 30-pack-year smoking history (Eligibility criteria may vary across major organizations; Age limit may extend to age 80.)   Talk with your healthcare provider for more information.  Yearly lung cancer screening with a low-dose CT scan(LDCT)    Obesity All men in this age group  At routine exams   BMI (body mass index) All men in this age group3 Every year, to help find out if you are at a healthy weight for your height    Prostate cancer Starting at age 45, talk to healthcare provider about risks and benefits of digital rectal exam (DI) and prostate-specific antigen (PSA) screening1  At routine exams   Syphilis Men at increased risk for infection - talk with your healthcare provider  At routine exams   Tuberculosis Men at increased risk for infection - talk with your healthcare provider  Ask your healthcare provider    Vision All men in this age group (2)        Men ages 40 to 54: every 2 to 4 years if no risk factors for eye disease (2)    Men ages 55 to 64: every 1 to 3 years if no risk factors for eye disease (2)  Ask your healthcare provider if you need  glaucoma screening with a dilated eye exam every 2 years(2).    Vaccine Who needs it How often   Chickenpox (varicella)  All men in this age group who have no record of this infection or vaccine  2 doses; second dose should be given at least 4 weeks after the first dose    Hepatitis A Men at increased risk for infection - talk with your healthcare provider  2 or 3 doses (depending on the vaccine) given at least 6 months apart; check with your healthcare provider    Hepatitis B Men at increased risk for infection - talk with your healthcare provider  2 or 3 doses (depending on the vaccine) over 6 months;check with your healthcare provider. The second dose should be given 1 month after the first dose; the third dose should be given at least 2 months after the second dose and at least 4 months after the first dose.    Haemophilus influenzae Type B (HIB)  Men at increased risk for infection - talk with your healthcare provider  1 to 3 doses   Influenza (flu) All men in this age group  Once a year   Measles, mumps, rubella (MMR)  Men in this age group through their late 50s who have no record of these infections or vaccines  1 or 2 doses; ask your healthcare provider    Meningococcal ACWY (MenACWY)  Men at increased risk for infection - talk with your healthcare provider  1 or more doses depending on your case. Then a booster every 5 years if you are still at risk. Check with your healthcare provider.    Meningococcal B (MenB) Men at increased risk for infection - talk with your healthcare provider  2 or more doses, depending on the vaccine and your case; check with your healthcare provider    Pneumococcal conjugate vaccine (PCV13) and pneumococcal polysaccharide vaccine (PPSV23)  Men at increased risk for infection - talk with your healthcare provider  PCV13: 1 dose ages 19 to 65 (protects against 13 types of pneumococcal bacteria)   PPSV23: 1 to 2 doses through age 64, or 1 dose at 65 or older (protects against 23  types of pneumococcal bacteria)    Tetanus/diphtheria/pertussis (Td/Tdap) booster All men in this age group  Td every 10 years, or a 1-time dose of Tdap instead of a Td booster after age 18, then Td every 10 years    Zoster recombinant (RZV)  All men ages 50 and older  2 doses given 2 to 6 months apart    Zoster live (ZVL) All men ages 60 and older 1 dose   Counseling Who needs it How often   Diet and exercise Men who are overweight or obese  When diagnosed, and then at routine exams    Sexually transmitted infection prevention  Men at increased risk for infection - talk with your healthcare provider  At routine exams   Daily low-dose aspirin  Men ages 40 to 70 years who are not at increased risk of bleeding and are at high risk of cardiovascular disease 4; talk with your healthcare provider  At routine exams   Use of statins Men ages 20 to 75 years who have an LDL-C level of more than 190 mg/dL (4)  At routine exams, or more often as directed by your healthcare provider. Statin dosages may vary based on your overall health, risk factors, and other health conditions such as diabetes. Check with your healthcare provider. (4)    Use of tobacco and the health effects it can cause  All men in this age group  Every visit   1 National Comprehensive Cancer Network   2 American Academy of Ophthalmology  3 American Cancer Society  4 American College of Cardiology  [employer] last reviewed this educational content on 5/1/2019 2000-2020 [employer]. [address] [place] [address], [place] [address]. All rights reserved. This information is not intended as a substitute for professional medical care. Always follow your healthcare professional's instructions.

## 2020-11-19 LAB
ALBUMIN SERPL-MCNC: 4 G/DL (ref 3.4–5)
ALP SERPL-CCNC: 59 U/L (ref 40–150)
ALT SERPL W P-5'-P-CCNC: 74 U/L (ref 0–70)
ANION GAP SERPL CALCULATED.3IONS-SCNC: 5 MMOL/L (ref 3–14)
AST SERPL W P-5'-P-CCNC: 98 U/L (ref 0–45)
BILIRUB SERPL-MCNC: 0.4 MG/DL (ref 0.2–1.3)
BUN SERPL-MCNC: 12 MG/DL (ref 7–30)
CALCIUM SERPL-MCNC: 9.3 MG/DL (ref 8.5–10.1)
CHLORIDE SERPL-SCNC: 103 MMOL/L (ref 94–109)
CHOLEST SERPL-MCNC: 209 MG/DL
CO2 SERPL-SCNC: 26 MMOL/L (ref 20–32)
CREAT SERPL-MCNC: 0.87 MG/DL (ref 0.66–1.25)
GFR SERPL CREATININE-BSD FRML MDRD: >90 ML/MIN/{1.73_M2}
GLUCOSE SERPL-MCNC: 114 MG/DL (ref 70–99)
HDLC SERPL-MCNC: 59 MG/DL
LDLC SERPL CALC-MCNC: 109 MG/DL
NONHDLC SERPL-MCNC: 150 MG/DL
POTASSIUM SERPL-SCNC: 4.2 MMOL/L (ref 3.4–5.3)
PROT SERPL-MCNC: 7.7 G/DL (ref 6.8–8.8)
PSA SERPL-ACNC: 0.32 UG/L (ref 0–4)
SODIUM SERPL-SCNC: 134 MMOL/L (ref 133–144)
T4 FREE SERPL-MCNC: 0.76 NG/DL (ref 0.76–1.46)
TRIGL SERPL-MCNC: 207 MG/DL
TSH SERPL DL<=0.005 MIU/L-ACNC: 21.49 MU/L (ref 0.4–4)

## 2020-11-19 ASSESSMENT — ANXIETY QUESTIONNAIRES: GAD7 TOTAL SCORE: 0

## 2020-11-22 DIAGNOSIS — E03.8 OTHER SPECIFIED HYPOTHYROIDISM: Primary | ICD-10-CM

## 2020-11-28 DIAGNOSIS — Z11.59 ENCOUNTER FOR SCREENING FOR OTHER VIRAL DISEASES: Primary | ICD-10-CM

## 2020-12-14 DIAGNOSIS — Z11.59 ENCOUNTER FOR SCREENING FOR OTHER VIRAL DISEASES: ICD-10-CM

## 2020-12-14 PROCEDURE — U0003 INFECTIOUS AGENT DETECTION BY NUCLEIC ACID (DNA OR RNA); SEVERE ACUTE RESPIRATORY SYNDROME CORONAVIRUS 2 (SARS-COV-2) (CORONAVIRUS DISEASE [COVID-19]), AMPLIFIED PROBE TECHNIQUE, MAKING USE OF HIGH THROUGHPUT TECHNOLOGIES AS DESCRIBED BY CMS-2020-01-R: HCPCS | Performed by: SPECIALIST

## 2020-12-15 LAB
SARS-COV-2 RNA SPEC QL NAA+PROBE: NOT DETECTED
SPECIMEN SOURCE: NORMAL

## 2020-12-17 ENCOUNTER — HOSPITAL ENCOUNTER (OUTPATIENT)
Facility: CLINIC | Age: 60
Discharge: HOME | End: 2020-12-17
Attending: SPECIALIST | Admitting: SPECIALIST
Payer: COMMERCIAL

## 2020-12-17 VITALS
HEIGHT: 72 IN | OXYGEN SATURATION: 98 % | WEIGHT: 255 LBS | DIASTOLIC BLOOD PRESSURE: 99 MMHG | HEART RATE: 74 BPM | RESPIRATION RATE: 15 BRPM | BODY MASS INDEX: 34.54 KG/M2 | SYSTOLIC BLOOD PRESSURE: 138 MMHG

## 2020-12-17 LAB — COLONOSCOPY: NORMAL

## 2020-12-17 PROCEDURE — G0500 MOD SEDAT ENDO SERVICE >5YRS: HCPCS | Performed by: SPECIALIST

## 2020-12-17 PROCEDURE — 88305 TISSUE EXAM BY PATHOLOGIST: CPT | Mod: 26 | Performed by: PATHOLOGY

## 2020-12-17 PROCEDURE — 250N000011 HC RX IP 250 OP 636: Performed by: SPECIALIST

## 2020-12-17 PROCEDURE — 88305 TISSUE EXAM BY PATHOLOGIST: CPT | Mod: TC | Performed by: SPECIALIST

## 2020-12-17 PROCEDURE — 45385 COLONOSCOPY W/LESION REMOVAL: CPT | Mod: PT | Performed by: SPECIALIST

## 2020-12-17 PROCEDURE — 99153 MOD SED SAME PHYS/QHP EA: CPT | Performed by: SPECIALIST

## 2020-12-17 RX ORDER — ONDANSETRON 2 MG/ML
4 INJECTION INTRAMUSCULAR; INTRAVENOUS
Status: DISCONTINUED | OUTPATIENT
Start: 2020-12-17 | End: 2020-12-17 | Stop reason: HOSPADM

## 2020-12-17 RX ORDER — LIDOCAINE 40 MG/G
CREAM TOPICAL
Status: DISCONTINUED | OUTPATIENT
Start: 2020-12-17 | End: 2020-12-17 | Stop reason: HOSPADM

## 2020-12-17 RX ORDER — FENTANYL CITRATE 50 UG/ML
INJECTION, SOLUTION INTRAMUSCULAR; INTRAVENOUS PRN
Status: DISCONTINUED | OUTPATIENT
Start: 2020-12-17 | End: 2020-12-17 | Stop reason: HOSPADM

## 2020-12-17 ASSESSMENT — MIFFLIN-ST. JEOR: SCORE: 1994.67

## 2020-12-17 NOTE — H&P
Pre-Endoscopy History and Physical     Angus Wynne MRN# [id number]   YOB: 1960 Age: 60 year old     Date of Procedure: 12/17/2020  Primary care provider: Fifi Fleming  Type of Endoscopy: Colonoscopy with possible biopsy, possible polypectomy  Reason for Procedure: history of polyps  Type of Anesthesia Anticipated: Conscious Sedation    HPI:    Angus is a 60 year old male who will be undergoing the above procedure.      A history and physical has been performed. The patient's medications and allergies have been reviewed. The risks and benefits of the procedure and the sedation options and risks were discussed with the patient.  All questions were answered and informed consent was obtained.      He denies a personal or family history of anesthesia complications or bleeding disorders.     Patient Active Problem List   Diagnosis     BPPV (benign paroxysmal positional vertigo), unspecified laterality     Other specified hypothyroidism     Hx of acute alcoholic hepatitis     Hx of colonic polyp     Gastroesophageal reflux disease, esophagitis presence not specified     Vitamin D deficiency     Anxiety     Alcohol use disorder     Neck pain     Cervicalgia     Cervical spondylosis without myelopathy     Essential hypertension     Hyperlipidemia with target LDL less than 130     Nasal congestion     History of colonic polyps     Vertigo     Lumbago     Morbid obesity (H)     Colon cancer screening     Thoracic compression fracture (H)     IBARRA (dyspnea on exertion)     Dysphagia, unspecified type     Elevated LFTs        Past Medical History:   Diagnosis Date     GERD (gastroesophageal reflux disease)      HTN (hypertension)      Hyperlipidemia      Hypothyroid      Vertigo     2015 ,         Past Surgical History:   Procedure Laterality Date     AS ESOPHAGOSCOPY, DIAGNOSTIC       COLONOSCOPY         Social History     Tobacco Use     Smoking status: Former Smoker     Quit date: 9/28/2015     Years  since quittin.2     Smokeless tobacco: Never Used   Substance Use Topics     Alcohol use: No     Alcohol/week: 0.0 standard drinks     Comment: 2-3 drinks a week       Family History   Problem Relation Age of Onset     Coronary Artery Disease Father         at age 60      Hyperlipidemia Brother      Cancer Brother         wall of scrotum     Breast Cancer Mother      Diabetes No family hx of      Cerebrovascular Disease No family hx of      Colon Cancer No family hx of      Prostate Cancer No family hx of        Prior to Admission medications    Medication Sig Start Date End Date Taking? Authorizing Provider   aspirin 81 MG EC tablet Take 81 mg by mouth daily   Yes Reported, Patient   clonazePAM (KLONOPIN) 0.5 MG tablet TAKE 1/2 TO ONE TABLET BY MOUTH AS NEEDED FOR ANXIETY 20  Yes Fifi Fleming MD   levothyroxine (SYNTHROID/LEVOTHROID) 150 MCG tablet TAKE ONE TABLET BY MOUTH ONCE DAILY 11/3/20  Yes Fifi Fleming MD   losartan (COZAAR) 100 MG tablet Take 1 tablet (100 mg) by mouth daily 20  Yes Fifi Fleming MD   rosuvastatin (CRESTOR) 40 MG tablet Take 1 tablet (40 mg) by mouth daily 20  Yes Fifi Fleming MD   sertraline (ZOLOFT) 100 MG tablet Take 1.5 tablets (150 mg) by mouth daily with dinner 20  Yes Fifi Fleming MD   traZODone (DESYREL) 50 MG tablet TAKE ONE TO TWO TABLETS BY MOUTH EVERY DAY FOR SLEEP 20  Yes Fifi Fleming MD   ciclopirox (PENLAC) 8 % external solution Apply to adjacent skin and affected nails daily.  Remove with alcohol every 7 days, then repeat.  Patient not taking: Reported on 2020 10/8/19   Fifi Fleming MD   clonazePAM (KLONOPIN) 0.5 MG tablet Take 1 tablet (0.5 mg) by mouth nightly as needed for anxiety  Patient not taking: Reported on 2020   Fifi Fleming MD   omeprazole (PRILOSEC) 40 MG DR capsule Take 1 capsule (40 mg) by mouth daily 20   Fifi Fleming MD        No Known Allergies     REVIEW OF SYSTEMS:   5 point ROS negative except as noted above in HPI, including Gen., Resp., CV, GI &  system review.    PHYSICAL EXAM:   BP (!) 143/98   Resp 18   Ht 1.829 m (6')   Wt 115.7 kg (255 lb)   SpO2 98%   BMI 34.58 kg/m   Estimated body mass index is 34.58 kg/m  as calculated from the following:    Height as of this encounter: 1.829 m (6').    Weight as of this encounter: 115.7 kg (255 lb).   GENERAL APPEARANCE: alert, and oriented  MENTAL STATUS: alert  AIRWAY EXAM: Mallampatti Class II (visualization of the soft palate, fauces, and uvula)  RESP: lungs clear to auscultation - no rales, rhonchi or wheezes  CV: regular rates and rhythm  DIAGNOSTICS:    Not indicated    IMPRESSION   ASA Class 2 - Mild systemic disease    PLAN:   Plan for Colonoscopy with possible biopsy, possible polypectomy. We discussed the risks, benefits and alternatives and the patient wished to proceed.    The above has been forwarded to the consulting provider.      Signed Electronically by: Dewey Nava MD  December 17, 2020

## 2020-12-18 LAB — COPATH REPORT: NORMAL

## 2020-12-21 NOTE — RESULT ENCOUNTER NOTE
Assessment: Adenomatous polyps are benign, but have malignant potential. This increases the risk of colon cancer and impacts the frequency of colonoscopy. Based on the updated polypectomy surveillance recommendations, individuals with 1-2 adenomas <10 mm should undergo surveillance colonoscopy in 7-10 years. If the previous polyps were adenomatous and greater than 10mm; however, the recommendation is five years (2025); the colonoscopy report states the polyps were > 1 cm, but the histology is not known.     Recommendations: Surveillance colonoscopy in ~6 years (2026).  First degree relatives of patients with adenomatous polyps are at increased risk of developing adenomas and colorectal cancer; screening colonoscopy is recommended.

## 2021-02-27 DIAGNOSIS — F41.9 ANXIETY: ICD-10-CM

## 2021-02-27 DIAGNOSIS — I10 ESSENTIAL HYPERTENSION: ICD-10-CM

## 2021-02-27 RX ORDER — SERTRALINE HYDROCHLORIDE 100 MG/1
TABLET, FILM COATED ORAL
Qty: 45 TABLET | Refills: 4 | Status: CANCELLED | OUTPATIENT
Start: 2021-02-27

## 2021-02-27 RX ORDER — SERTRALINE HYDROCHLORIDE 100 MG/1
TABLET, FILM COATED ORAL
Qty: 135 TABLET | Refills: 2 | Status: SHIPPED | OUTPATIENT
Start: 2021-02-27 | End: 2021-10-11

## 2021-02-27 NOTE — TELEPHONE ENCOUNTER
Failed protocol.  please route to  team if patient needs an appointment     America MIRANDARN BSN  [facility]  [phone]

## 2021-03-01 RX ORDER — LOSARTAN POTASSIUM 100 MG/1
TABLET ORAL
Qty: 90 TABLET | Refills: 0 | Status: SHIPPED | OUTPATIENT
Start: 2021-03-01 | End: 2021-07-28

## 2021-03-02 NOTE — TELEPHONE ENCOUNTER
Script refill faxed. Remind pt to do follow up for bp nurse only check ,  since it was high last time

## 2021-03-08 NOTE — TELEPHONE ENCOUNTER
Pt returned call. He was informed lab and nurse only appt needed. Pt was transferred to EP scheduling.

## 2021-03-22 ENCOUNTER — IMMUNIZATION (OUTPATIENT)
Dept: NURSING | Facility: CLINIC | Age: 61
End: 2021-03-22
Payer: COMMERCIAL

## 2021-03-22 PROCEDURE — 0001A PR COVID VAC PFIZER DIL RECON 30 MCG/0.3 ML IM: CPT

## 2021-03-22 PROCEDURE — 91300 PR COVID VAC PFIZER DIL RECON 30 MCG/0.3 ML IM: CPT

## 2021-03-30 DIAGNOSIS — E03.8 OTHER SPECIFIED HYPOTHYROIDISM: ICD-10-CM

## 2021-03-30 LAB
T4 FREE SERPL-MCNC: 0.8 NG/DL (ref 0.76–1.46)
TSH SERPL DL<=0.005 MIU/L-ACNC: 10.48 MU/L (ref 0.4–4)

## 2021-03-30 PROCEDURE — 84443 ASSAY THYROID STIM HORMONE: CPT | Performed by: FAMILY MEDICINE

## 2021-03-30 PROCEDURE — 36415 COLL VENOUS BLD VENIPUNCTURE: CPT | Performed by: FAMILY MEDICINE

## 2021-03-30 PROCEDURE — 84439 ASSAY OF FREE THYROXINE: CPT | Performed by: FAMILY MEDICINE

## 2021-03-31 DIAGNOSIS — E03.8 OTHER SPECIFIED HYPOTHYROIDISM: Primary | ICD-10-CM

## 2021-03-31 RX ORDER — LEVOTHYROXINE SODIUM 175 UG/1
175 TABLET ORAL DAILY
Qty: 30 TABLET | Refills: 1 | Status: SHIPPED | OUTPATIENT
Start: 2021-03-31 | End: 2021-05-28

## 2021-04-01 ENCOUNTER — TELEPHONE (OUTPATIENT)
Dept: FAMILY MEDICINE | Facility: CLINIC | Age: 61
End: 2021-04-01

## 2021-04-01 NOTE — TELEPHONE ENCOUNTER
----- Message from Regina Irby MD sent at 3/31/2021 10:40 PM CDT -----  Also given your severely elevated TSH in the past we will need to make sure there is no autoimmune thyroid problems causing this. I have placed additional labs of thyroid antibody work up to make sure you dont have Hashimotos which you can do along with repeat TSH in 5/2021  Future orders placed for you..     Please let me know if you have any questions.

## 2021-04-01 NOTE — RESULT ENCOUNTER NOTE
Your TSH levels are remaining abnormal inspite of your current Levothyroxine. I have increased the dose of your medication and sent to your pharmacy. You will need repeat TSH check in 5 weeks of starting the new dosage to make sure it normalizes versus further titration of the dose if necessary. Future orders for TSH placed around  5/6/21 which is a nonfasting lab work.     Please let me know if you have any questions.  Regina Irby MD on 3/31/2021 at 10:30 PM  ( Covering Physician for  )

## 2021-04-01 NOTE — TELEPHONE ENCOUNTER
Pt called back     Result message given from Dr. Irby.     Pt is requesting an increase in thyroid medication now. He reports symptoms of weight gain, sweating and fatigue that he equates with his TSH level.     Pt also requesting a refill of his Klonopin 0.5mg for anxiety.     Pt is going to [place] last week of April; will need to have repeat labs drawn before then, wants to know if this is okay?  Will be in [place] 1-2 months. Pt would like a call back with plan.   Gretchen Nix RN

## 2021-04-01 NOTE — TELEPHONE ENCOUNTER
Called patient using Liliana  Leslie.    Left non detailed voice message for pt to call back and speak with RN.     Gretchen Nix RN

## 2021-04-08 ENCOUNTER — VIRTUAL VISIT (OUTPATIENT)
Dept: FAMILY MEDICINE | Facility: CLINIC | Age: 61
End: 2021-04-08
Payer: COMMERCIAL

## 2021-04-08 DIAGNOSIS — E03.8 OTHER SPECIFIED HYPOTHYROIDISM: Primary | ICD-10-CM

## 2021-04-08 DIAGNOSIS — E78.5 HYPERLIPIDEMIA WITH TARGET LDL LESS THAN 130: ICD-10-CM

## 2021-04-08 DIAGNOSIS — F41.9 ANXIETY: ICD-10-CM

## 2021-04-08 DIAGNOSIS — E66.01 MORBID OBESITY (H): ICD-10-CM

## 2021-04-08 DIAGNOSIS — G47.09 OTHER INSOMNIA: ICD-10-CM

## 2021-04-08 DIAGNOSIS — Z87.19 HX OF ACUTE ALCOHOLIC HEPATITIS: ICD-10-CM

## 2021-04-08 DIAGNOSIS — Z86.0100 HISTORY OF COLONIC POLYPS: ICD-10-CM

## 2021-04-08 DIAGNOSIS — I10 ESSENTIAL HYPERTENSION: ICD-10-CM

## 2021-04-08 DIAGNOSIS — D69.6 THROMBOCYTOPENIA, UNSPECIFIED (H): ICD-10-CM

## 2021-04-08 PROCEDURE — 99214 OFFICE O/P EST MOD 30 MIN: CPT | Mod: 95 | Performed by: FAMILY MEDICINE

## 2021-04-08 RX ORDER — TRAZODONE HYDROCHLORIDE 50 MG/1
TABLET, FILM COATED ORAL
Qty: 90 TABLET | Refills: 3 | Status: SHIPPED | OUTPATIENT
Start: 2021-04-08 | End: 2021-10-11

## 2021-04-08 RX ORDER — CLONAZEPAM 0.5 MG/1
TABLET ORAL
Qty: 20 TABLET | Refills: 0 | Status: SHIPPED | OUTPATIENT
Start: 2021-04-08 | End: 2021-06-21

## 2021-04-08 ASSESSMENT — ANXIETY QUESTIONNAIRES
5. BEING SO RESTLESS THAT IT IS HARD TO SIT STILL: SEVERAL DAYS
2. NOT BEING ABLE TO STOP OR CONTROL WORRYING: SEVERAL DAYS
7. FEELING AFRAID AS IF SOMETHING AWFUL MIGHT HAPPEN: NOT AT ALL
3. WORRYING TOO MUCH ABOUT DIFFERENT THINGS: NEARLY EVERY DAY
1. FEELING NERVOUS, ANXIOUS, OR ON EDGE: NEARLY EVERY DAY
6. BECOMING EASILY ANNOYED OR IRRITABLE: NOT AT ALL
GAD7 TOTAL SCORE: 11

## 2021-04-08 ASSESSMENT — PATIENT HEALTH QUESTIONNAIRE - PHQ9
5. POOR APPETITE OR OVEREATING: NEARLY EVERY DAY
SUM OF ALL RESPONSES TO PHQ QUESTIONS 1-9: 9

## 2021-04-08 NOTE — PROGRESS NOTES
Angus is a 60 year old who is being evaluated via a billable telephone visit.      What phone number would you like to be contacted at? [phone]  How would you like to obtain your AVS? Abiolahart    Assessment & Plan     (E03.8) Other specified hypothyroidism  (primary encounter diagnosis)  Comment:   Plan: TSH with free T4 reflex        Check labs adjust dose  as needed     (F41.9) Anxiety  Comment:   Plan: clonazePAM (KLONOPIN) 0.5 MG tablet, MENTAL         HEALTH REFERRAL  - Adult; Outpatient Treatment;        Individual/Couples/Family/Group Therapy/Health         Psychology; [facility] [place]: [facility]         [phone]; We will contact you to schedule        the appointment or please call with any         Questions            Discussed cares, talked about signs and symptoms of anxiety/ depression and treatment options. Gave refill on klonopin to use as needed , although he understands that it is habit forming and he plans to use infrequently.   Pros/ cons of med's discussed . encouraged to see  to help and referral given . spent sometimes counseling patient. Follow up in 2-3 weeks,  sooner if problem.       (I10) Essential hypertension  Comment:   Plan: Comprehensive metabolic panel     BP was not  in adequate control previously . Discussed cares, low fat low salt  diet etc.     He will schedule for labs/ nurse visit.  encouraged home BP monitoring. Follow up recheck in 1-2 weeks with BP readings adjust med's as needed ,      (Z86.010) History of colonic polyps  Comment:   Plan: he will schedule     (E78.5) Hyperlipidemia with target LDL less than 130  Comment:   Plan: Lipid panel reflex to direct LDL Fasting            (E66.01) Morbid obesity (H)  Comment:   Plan: Healthy diet and exercise reviewed. Risks of obesity discussed.  Encourage exercise.      (G47.09) Other insomnia  Comment:   Plan: MENTAL HEALTH REFERRAL  - Adult; Outpatient         Treatment; Individual/Couples/Family/Group          Therapy/Health Psychology; [facility]: [facility] [phone]; We will         contact you to schedule the appointment or         please call with any questions, traZODone         (DESYREL) 50 MG tablet          (D69.6) Thrombocytopenia, unspecified (H)  Comment:   Plan: **CBC with platelets FUTURE anytime            (Z87.19) Hx of acute alcoholic hepatitis  Comment:   Plan: Comprehensive metabolic panel      Check labs. Script sent.Cares and  treatment discussed.   Patient expressed understanding and agreement with treatment plan. All patient's questions were answered, will let me know if has more later.  Medications: Rx's: Reviewed the potential side effects/complications of medications prescribed.       Fifi Fleming MD  [facility] TANVIR Greco is a 60 year old who presents for the following health issues     HPI          Pt is requesting an increase in thyroid medication now. He reports symptoms of weight gain, sweating and fatigue that he equates with his TSH level.      Pt also requesting a refill of his Klonopin 0.5mg for anxiety.      Pt is going to [facility] last week of April; will need to have repeat labs drawn before then, wants to know if this is okay?  Will be in [facility] 1-2 months. Pt would like a call back with plan.      Hypothyroidism Follow-up      Since last visit, patient describes the following symptoms: Weight gain, bc of lack of exercise , eating ok although soemtimes eat less then his usual  . But has increased  anxiety over the last one month,  so just staying in bed most of the time       Hypertension Follow-up      Do you check your blood pressure regularly outside of the clinic? Yes sometimes checks and he thinks  it is ok 130/ 90     Are you following a low salt diet? Yes    Are your blood pressures ever more than 140 on the top number (systolic) OR more   than 90 on the bottom number (diastolic), for example 140/90? No  sometimes     Depression and Anxiety Follow-Up            How are you doing with your depression since your last visit? Worsened has bene totally out of klonipin and has not taken last 2 weeks     How are you doing with your anxiety since your last visit?  Worsened     Are you having other symptoms that might be associated with depression or anxiety? Yes:  yes, feeling down , lack of motivation and very anxious, not sleeping as good     Have you had a significant life event? No     Do you have any concerns with your use of alcohol or other drugs? No denies drinking much lately     Social History     Tobacco Use     Smoking status: Former Smoker     Quit date: 2015     Years since quittin.5     Smokeless tobacco: Never Used   Substance Use Topics     Alcohol use: No     Alcohol/week: 0.0 standard drinks     Comment: 2-3 drinks a week     Drug use: No     PHQ 10/8/2019 2020 2021   PHQ-9 Total Score 0 2 9   Q9: Thoughts of better off dead/self-harm past 2 weeks Not at all Not at all Not at all     RUTH-7 SCORE 2020   Total Score 0 0 11     Last PHQ-9 2021   1.  Little interest or pleasure in doing things 1   2.  Feeling down, depressed, or hopeless 1   3.  Trouble falling or staying asleep, or sleeping too much 3   4.  Feeling tired or having little energy 3   5.  Poor appetite or overeating 0   6.  Feeling bad about yourself 0   7.  Trouble concentrating 1   8.  Moving slowly or restless 0   Q9: Thoughts of better off dead/self-harm past 2 weeks 0   PHQ-9 Total Score 9   Difficulty at work, home, or with people -     RUTH-7  2021   1. Feeling nervous, anxious, or on edge 3   2. Not being able to stop or control worrying 1   3. Worrying too much about different things 3   4. Trouble relaxing 3   5. Being so restless that it is hard to sit still 1   6. Becoming easily annoyed or irritable 0   7. Feeling afraid, as if something awful might happen 0   RUTH-7 Total Score 11    If you checked any problems, how difficult have they made it for you to do your work, take care of things at home, or get along with other people? -       Suicide Assessment Five-step Evaluation and Treatment (SAFE-T)          Review of Systems   Constitutional, HEENT, cardiovascular, pulmonary, GI, , musculoskeletal, neuro, skin, endocrine and psych systems are negative, except as otherwise noted.      Objective           Vitals:  No vitals were obtained today due to virtual visit.    Physical Exam   healthy, alert and no distress  PSYCH: Alert and oriented times 3; coherent speech but pressured , normal   rate and volume, able to articulate logical thoughts, able   to abstract reason, no tangential thoughts, no hallucinations   or delusions  His affect is anxious  RESP: No cough, no audible wheezing, able to talk in full sentences  Remainder of exam unable to be completed due to telephone visits            Phone call duration: 25  Minutes

## 2021-04-08 NOTE — PATIENT INSTRUCTIONS
Take medications as directed.  Cares and symptomatic cares discussed   Follow up in 2-3 weeks ,  if problem or concern

## 2021-04-09 ASSESSMENT — ANXIETY QUESTIONNAIRES: GAD7 TOTAL SCORE: 11

## 2021-04-12 ENCOUNTER — IMMUNIZATION (OUTPATIENT)
Dept: NURSING | Facility: CLINIC | Age: 61
End: 2021-04-12
Attending: INTERNAL MEDICINE
Payer: COMMERCIAL

## 2021-04-12 PROCEDURE — 0002A PR COVID VAC PFIZER DIL RECON 30 MCG/0.3 ML IM: CPT

## 2021-04-12 PROCEDURE — 91300 PR COVID VAC PFIZER DIL RECON 30 MCG/0.3 ML IM: CPT

## 2021-04-18 PROBLEM — D69.6 THROMBOCYTOPENIA, UNSPECIFIED (H): Status: ACTIVE | Noted: 2021-04-18

## 2021-04-18 PROBLEM — S22.000A THORACIC COMPRESSION FRACTURE (H): Status: RESOLVED | Noted: 2019-02-03 | Resolved: 2021-04-18

## 2021-04-19 ENCOUNTER — TELEPHONE (OUTPATIENT)
Dept: FAMILY MEDICINE | Facility: CLINIC | Age: 61
End: 2021-04-19

## 2021-04-19 DIAGNOSIS — Z71.84 TRAVEL ADVICE ENCOUNTER: Primary | ICD-10-CM

## 2021-04-19 NOTE — TELEPHONE ENCOUNTER
Pt calling states he is traveling out of the country on 4[place]27 and needs a covid test 48 hours before.  Pt has appt with Dr. Fleming scheduled for 4/26.    Orders pended for covid please sign.      Please call pt at [phone] when order is signed by Dr. Fleming.     Fara HAWKINS RN  EP Triage

## 2021-04-25 DIAGNOSIS — Z71.84 TRAVEL ADVICE ENCOUNTER: ICD-10-CM

## 2021-04-25 LAB
SARS-COV-2 RNA RESP QL NAA+PROBE: NORMAL
SPECIMEN SOURCE: NORMAL

## 2021-04-25 PROCEDURE — U0005 INFEC AGEN DETEC AMPLI PROBE: HCPCS | Performed by: FAMILY MEDICINE

## 2021-04-25 PROCEDURE — U0003 INFECTIOUS AGENT DETECTION BY NUCLEIC ACID (DNA OR RNA); SEVERE ACUTE RESPIRATORY SYNDROME CORONAVIRUS 2 (SARS-COV-2) (CORONAVIRUS DISEASE [COVID-19]), AMPLIFIED PROBE TECHNIQUE, MAKING USE OF HIGH THROUGHPUT TECHNOLOGIES AS DESCRIBED BY CMS-2020-01-R: HCPCS | Performed by: FAMILY MEDICINE

## 2021-04-26 LAB
LABORATORY COMMENT REPORT: NORMAL
SARS-COV-2 RNA RESP QL NAA+PROBE: NEGATIVE
SPECIMEN SOURCE: NORMAL

## 2021-05-22 ENCOUNTER — NURSE TRIAGE (OUTPATIENT)
Dept: NURSING | Facility: CLINIC | Age: 61
End: 2021-05-22

## 2021-05-22 NOTE — TELEPHONE ENCOUNTER
Pt states when he tries to have bowel movement very painful due to hemrroids.  Pt requesting appt for 5/24/21 with PCP.  Pt warm transferred to .  Ashli Painter RN, [facility] Nurse Advisor      Reason for Disposition    [1] Home treatment > 3 days for rectal pain AND [2] not improved    Protocols used: RECTAL SYMPTOMS-A-AH

## 2021-05-23 ENCOUNTER — OFFICE VISIT (OUTPATIENT)
Dept: URGENT CARE | Facility: URGENT CARE | Age: 61
End: 2021-05-23
Payer: COMMERCIAL

## 2021-05-23 VITALS
DIASTOLIC BLOOD PRESSURE: 89 MMHG | SYSTOLIC BLOOD PRESSURE: 129 MMHG | HEART RATE: 87 BPM | OXYGEN SATURATION: 99 % | TEMPERATURE: 97.5 F

## 2021-05-23 DIAGNOSIS — K64.4 EXTERNAL HEMORRHOIDS: Primary | ICD-10-CM

## 2021-05-23 PROCEDURE — 99213 OFFICE O/P EST LOW 20 MIN: CPT | Performed by: INTERNAL MEDICINE

## 2021-05-23 RX ORDER — HYDROCORTISONE 25 MG/G
CREAM TOPICAL 2 TIMES DAILY PRN
Qty: 30 G | Refills: 0 | Status: SHIPPED | OUTPATIENT
Start: 2021-05-23 | End: 2021-07-19

## 2021-05-23 RX ORDER — HYDROCORTISONE ACETATE 25 MG/1
25 SUPPOSITORY RECTAL 2 TIMES DAILY
Qty: 24 SUPPOSITORY | Refills: 0 | Status: SHIPPED | OUTPATIENT
Start: 2021-05-23 | End: 2021-05-23

## 2021-05-23 RX ORDER — HYDROCORTISONE ACETATE 25 MG/1
25 SUPPOSITORY RECTAL 2 TIMES DAILY
Qty: 24 SUPPOSITORY | Refills: 0 | Status: SHIPPED | OUTPATIENT
Start: 2021-05-23 | End: 2021-07-15

## 2021-05-23 NOTE — PATIENT INSTRUCTIONS
Use the hydrocortisone 2.5 % cream twice daily to reduce inflammation and pain.    I have given you a prescription for the hydrocortisone suppository -- this may be very expensive.  You can use the hydrocortisone cream in place.    We want your bowel movements to be not too frequent so I do recommend trying to bulk up the stool a bit.  It may be reasonable to reduce any raw fruits or vegetables for the time being and only eat cooked vegetables and grains.  Ensign diet.

## 2021-05-23 NOTE — PROGRESS NOTES
Assessment & Plan     External hemorrhoids  - hydrocortisone, Perianal, (HYDROCORTISONE) 2.5 % cream; Place rectally 2 times daily as needed for hemorrhoids  - hydrocortisone (ANUSOL-HC) 25 MG suppository; Place 1 suppository (25 mg) rectally 2 times daily    Anjel Alford MD  [facility]ENT [facility]   Had traveled recently.  Noted that he had developed a lot of rectal pain and noted some prolapsing of tissue when he passed a bowel movement.  This has been very painful.  He has tried Preparation H.  Also has tried hydrocortisone suppository for two days.  He is denying hard stool at the present time.      Review of Systems   Constitutional, HEENT, cardiovascular, pulmonary, gi and gu systems are negative, except as otherwise noted.      Objective    /89   Pulse 87   Temp 97.5  F (36.4  C) (Tympanic)   SpO2 99%   There is no height or weight on file to calculate BMI.  Physical Exam   GENERAL APPEARANCE: adult male, moving carefully and gingerly  RECTAL: inflamed but not thrombosed external hemorrhoid; DI shows no rectal polyp, mass or internal hemorrhoid

## 2021-05-26 DIAGNOSIS — E03.8 OTHER SPECIFIED HYPOTHYROIDISM: ICD-10-CM

## 2021-05-27 NOTE — TELEPHONE ENCOUNTER
Routing refill request to provider for review/approval because:  Labs out of range:    TSH   Date Value Ref Range Status   03/30/2021 10.48 (H) 0.40 - 4.00 mU/L Final     Gretchen Nix RN

## 2021-05-28 RX ORDER — LEVOTHYROXINE SODIUM 175 UG/1
175 TABLET ORAL DAILY
Qty: 30 TABLET | Refills: 0 | Status: SHIPPED | OUTPATIENT
Start: 2021-05-28 | End: 2021-06-24

## 2021-05-28 NOTE — TELEPHONE ENCOUNTER
Due for labs, only 1 month supply submitted.  No additional refills until labs completed, already ordered by Dr Fleming. Please have patient schedule a lab-only appointment.

## 2021-06-21 ENCOUNTER — MYC REFILL (OUTPATIENT)
Dept: FAMILY MEDICINE | Facility: CLINIC | Age: 61
End: 2021-06-21

## 2021-06-21 DIAGNOSIS — F41.9 ANXIETY: ICD-10-CM

## 2021-06-21 RX ORDER — CLONAZEPAM 0.5 MG/1
TABLET ORAL
Qty: 10 TABLET | Refills: 0 | Status: CANCELLED | OUTPATIENT
Start: 2021-06-21

## 2021-06-21 NOTE — TELEPHONE ENCOUNTER
Duplicate request.  rx sent to pharmacy per Dr. Fleming today 6/21/21    Fara HAWKINS RN  EP Triage

## 2021-06-24 DIAGNOSIS — Z71.84 TRAVEL ADVICE ENCOUNTER: ICD-10-CM

## 2021-06-24 DIAGNOSIS — E03.8 OTHER SPECIFIED HYPOTHYROIDISM: ICD-10-CM

## 2021-06-24 LAB
LABORATORY COMMENT REPORT: NORMAL
SARS-COV-2 RNA RESP QL NAA+PROBE: NEGATIVE
SARS-COV-2 RNA RESP QL NAA+PROBE: NORMAL
SPECIMEN SOURCE: NORMAL
SPECIMEN SOURCE: NORMAL

## 2021-06-24 PROCEDURE — U0003 INFECTIOUS AGENT DETECTION BY NUCLEIC ACID (DNA OR RNA); SEVERE ACUTE RESPIRATORY SYNDROME CORONAVIRUS 2 (SARS-COV-2) (CORONAVIRUS DISEASE [COVID-19]), AMPLIFIED PROBE TECHNIQUE, MAKING USE OF HIGH THROUGHPUT TECHNOLOGIES AS DESCRIBED BY CMS-2020-01-R: HCPCS | Performed by: FAMILY MEDICINE

## 2021-06-24 PROCEDURE — U0005 INFEC AGEN DETEC AMPLI PROBE: HCPCS | Performed by: FAMILY MEDICINE

## 2021-06-24 RX ORDER — LEVOTHYROXINE SODIUM 175 UG/1
TABLET ORAL
Qty: 90 TABLET | Refills: 0 | Status: SHIPPED | OUTPATIENT
Start: 2021-06-24 | End: 2021-09-01

## 2021-06-24 NOTE — TELEPHONE ENCOUNTER
Levothyroxine   Routing refill request to provider for review/approval because:  Labs out of range:  TSH      TSH   Date Value Ref Range Status   03/30/2021 10.48 (H) 0.40 - 4.00 mU/L Final

## 2021-07-15 ENCOUNTER — HOSPITAL ENCOUNTER (EMERGENCY)
Facility: CLINIC | Age: 61
Discharge: HOME | End: 2021-07-15
Attending: EMERGENCY MEDICINE | Admitting: EMERGENCY MEDICINE
Payer: COMMERCIAL

## 2021-07-15 VITALS
HEIGHT: 72 IN | TEMPERATURE: 97.9 F | WEIGHT: 230 LBS | SYSTOLIC BLOOD PRESSURE: 124 MMHG | OXYGEN SATURATION: 99 % | HEART RATE: 79 BPM | DIASTOLIC BLOOD PRESSURE: 80 MMHG | BODY MASS INDEX: 31.15 KG/M2 | RESPIRATION RATE: 28 BRPM

## 2021-07-15 DIAGNOSIS — K60.2 ANAL FISSURE: ICD-10-CM

## 2021-07-15 DIAGNOSIS — N41.0 ACUTE PROSTATITIS: ICD-10-CM

## 2021-07-15 PROCEDURE — 99282 EMERGENCY DEPT VISIT SF MDM: CPT

## 2021-07-15 RX ORDER — CIPROFLOXACIN 500 MG/1
500 TABLET, FILM COATED ORAL 2 TIMES DAILY
Qty: 20 TABLET | Refills: 0 | Status: SHIPPED | OUTPATIENT
Start: 2021-07-15 | End: 2021-07-25

## 2021-07-15 RX ORDER — HYDROCORTISONE ACETATE 25 MG/1
25 SUPPOSITORY RECTAL 2 TIMES DAILY
Qty: 14 SUPPOSITORY | Refills: 0 | Status: SHIPPED | OUTPATIENT
Start: 2021-07-15 | End: 2021-07-22

## 2021-07-15 RX ORDER — ASPIRIN 81 MG
100 TABLET, DELAYED RELEASE (ENTERIC COATED) ORAL 2 TIMES DAILY
Qty: 20 TABLET | Refills: 1 | Status: SHIPPED | OUTPATIENT
Start: 2021-07-15 | End: 2021-09-08

## 2021-07-15 ASSESSMENT — ENCOUNTER SYMPTOMS
ROS GI COMMENTS: RECTAL PAIN
ANAL BLEEDING: 0
ABDOMINAL PAIN: 0
VOMITING: 0
FEVER: 0
APPETITE CHANGE: 1

## 2021-07-15 ASSESSMENT — MIFFLIN-ST. JEOR: SCORE: 1876.27

## 2021-07-15 NOTE — ED PROVIDER NOTES
History   Chief Complaint:  Pain in my anus     HPI   Angus Wynne is a 61 year old male with history of hypertension, hyperlipidemia, and hypothyroidism who presents with anal pain. Patient complains of pain in his anus/rectum for approximately a 2 months. He was seen in urgent care on 5/23/21 at which time he was diagnosed with hemorrhoids and prescribed Hydrocortisone suppositories.  He took a trip to [facility] and returned on July 13th.  He states he was having pain in his anus throughout the trips, which was associated with having bowel movements.  He is able to pass stool but has pain when doing so. He had felt constipated, so he took a laxative and then had diarrhea yesterday, then the pain worsened with having a bowel movement today. He has been taking a hydrocortisone suppositories for the past two days. He last took this at 0200 this morning. He denies vomiting, fevers, or abdominal pain.  He denies dysuria/urgency/frequency of urination.  He denies any drainage or bleeding from his rectum. He had a colonoscopy in December of 2020 as seen below.     Colonoscopy: 12/17/2020 11:32 AM   - The examined portion of the ileum was normal.   - One 5 mm polyp in the ascending colon, removed with a cold snare. Resected and retrieved.   - One 5 mm polyp in the sigmoid colon, removed with  a cold snare. Resected and retrieved.   - Diverticulosis in the sigmoid colon and in the ascending colon.   - The examination was otherwise normal on direct and retroflexion views.     Review of Systems   Constitutional: Positive for appetite change. Negative for fever.   Gastrointestinal: Negative for abdominal pain, anal bleeding and vomiting.        Rectal Pain   All other systems reviewed and are negative.    Allergies:  No known drug allergies    Medications:  Aspirin 81 mg   Klonopin   Levothyroxine   Losartan   Omeprazole  Crestor   Zoloft   Trazodone     Past Medical History:    GERD  Hypertension   Hyperlipidemia    Hypothyroidism   Vertigo   Colonic Polyp   Cervicalgia   Alcoholic Hepatitis      Past Surgical History:    Esophagoscopy   Colonoscopy     Family History:    Father - CAD  Mother - Breast Cancer  Brother - Hyperlipidemia, Cancer on the Wall of Scrotum     Social History:  Arrives to the emergency department with his son.     Physical Exam     Patient Vitals for the past 24 hrs:   BP Temp Temp src Pulse Resp SpO2 Height Weight   07/15/21 0743 124/80 97.9  F (36.6  C) Oral 79 28 99 % 1.829 m (6') 104.3 kg (230 lb)       Physical Exam    Nursing note and vitals reviewed.  Constitutional:  Appears well-developed and well-nourished.   HENT:   Head:    Atraumatic.   Mouth/Throat:   Aphthous ulcer on the left side of the tongue without any surrounding swelling or drainage, 5 mm.  Oropharynx is clear and moist. No oropharyngeal exudate.   Eyes:    Pupils are equal, round, and reactive to light.   Neck:    Normal range of motion. Neck supple.      No tracheal deviation present. No thyromegaly present.   Cardiovascular:  Normal rate, regular rhythm, no murmur   Pulmonary/Chest: Breath sounds are clear and equal without wheezes or crackles.  Abdominal:   Soft. Bowel sounds are normal. Exhibits no distension and      no mass. There is no tenderness.      There is no rebound and no guarding.   Anorectal:  There are no visible hemorrhoids seen. There is tenderness to the posterior aspect of the anus with an anal fissure, but without any visible bleeding. No palpable swelling to the buttock or areas of rectum that I could palpate. The prostate is tender and mildly enlarged. No palpable mass or abscess.   Musculoskeletal:  Exhibits no edema.   Lymphadenopathy:  No cervical adenopathy.   Neurological:   Alert and oriented to person, place, and time.   Skin:    Skin is warm and dry. No rash noted. No pallor.     Emergency Department Course     Emergency Department Course:    Reviewed:  I reviewed nursing notes, vitals, past  medical history and care everywhere    Assessments:  0756 I obtained history and examined the patient as noted above.     Disposition:  The patient was discharged to home.     Impression & Plan     Medical Decision Making:  Angus Wynne is a 61 year old male who complains of anal pain which has been present for two months. There is no sign of hemorrhoids here. I feel that there is concern for possible prostatitis so he was treated with Cipro orally although there was no sign of sepsis. Likely, that the pain was exacerbated by the diarrhea causing an anal fissure so he was referred to colorectal surgery for his ongoing pain. There was no sign of abscess and he has not been having any symptoms concerning for urinary retention or UTI. I felt he could be safely discharged home. He was given Anusol-HC and Cipro and told signs and symptoms to return to. I also did not feel there was any sign of ureteral calculus and he has a normal abdominal exam without any abdominal pain or tenderness.     Diagnosis:    ICD-10-CM    1. Acute prostatitis  N41.0    2. Anal fissure  K60.2        Discharge Medications:  New Prescriptions    CIPROFLOXACIN (CIPRO) 500 MG TABLET    Take 1 tablet (500 mg) by mouth 2 times daily for 10 days    HYDROCORTISONE (ANUSOL-HC) 25 MG SUPPOSITORY    Place 1 suppository (25 mg) rectally 2 times daily for 7 days       Scribe Disclosure:  I, James Cross, am serving as a scribe at 7:47 AM on 7/15/2021 to document services personally performed by Karin Marie MD based on my observations and the provider's statements to me.              Karin Marie MD  07/15/21 5843

## 2021-07-16 ENCOUNTER — TELEPHONE (OUTPATIENT)
Dept: SURGERY | Facility: CLINIC | Age: 61
End: 2021-07-16

## 2021-07-16 NOTE — TELEPHONE ENCOUNTER
[facility] Call Center    Phone Message    May a detailed message be left on voicemail: yes     Reason for Call: Other: Patient called to make appt for DX: Rectal pain.  Patient was in ER on  7/15/21 and is having severe rectal pain.  All records in [facility], per patient.  Please assess and follow up with patient.  Thank you!    Action Taken: Message routed to:  [facility]    Travel Screening: Not Applicable

## 2021-07-16 NOTE — TELEPHONE ENCOUNTER
I called Angus stating that we do not have any available appts before his scheduled appt on 8/18/2021. I told him he can try OTC pain medication, avoiding constipation and warm baths. He stated understanding. He is on the wait list as well

## 2021-07-19 ENCOUNTER — HOSPITAL ENCOUNTER (EMERGENCY)
Facility: CLINIC | Age: 61
Discharge: HOME | End: 2021-07-19
Attending: EMERGENCY MEDICINE | Admitting: EMERGENCY MEDICINE
Payer: COMMERCIAL

## 2021-07-19 VITALS
SYSTOLIC BLOOD PRESSURE: 153 MMHG | HEART RATE: 72 BPM | TEMPERATURE: 97.6 F | BODY MASS INDEX: 31.15 KG/M2 | OXYGEN SATURATION: 99 % | WEIGHT: 230 LBS | HEIGHT: 72 IN | DIASTOLIC BLOOD PRESSURE: 93 MMHG | RESPIRATION RATE: 22 BRPM

## 2021-07-19 DIAGNOSIS — K64.4 EXTERNAL HEMORRHOIDS: ICD-10-CM

## 2021-07-19 PROCEDURE — 99282 EMERGENCY DEPT VISIT SF MDM: CPT

## 2021-07-19 RX ORDER — HYDROCORTISONE 25 MG/G
CREAM TOPICAL 2 TIMES DAILY
Qty: 30 G | Refills: 0 | Status: SHIPPED | OUTPATIENT
Start: 2021-07-19 | End: 2021-07-29

## 2021-07-19 ASSESSMENT — ENCOUNTER SYMPTOMS
CONSTIPATION: 1
FEVER: 0
BLOOD IN STOOL: 0
RECTAL PAIN: 1

## 2021-07-19 ASSESSMENT — MIFFLIN-ST. JEOR: SCORE: 1876.27

## 2021-07-19 NOTE — DISCHARGE INSTRUCTIONS
Push fluids, continue the stool softener, add MiraLAX once a day to keep your stools soft formed.  2.5% hydrocortisone cream 2 times a day, you can also use Anusol cream or ointment over-the-counter 2 times a day as well.   Tylenol or Motrin as needed for pain.  Follow-up with your doctor in 3 to 4 days for a recheck.  Keep your appointment with colorectal in August.

## 2021-07-19 NOTE — ED PROVIDER NOTES
History   Chief Complaint:  Rectal/perineal Pain       HPI   Angus Wynne is a 61 year old male with history of hypertension, hyperlipidemia, and hypothyroidism who presents with rectal pain. He reports that he has been having rectal pain for a few months now. He reports that he has an appointment in August with a colorectal surgeon but notes that the pain was so severe that he needed to come in. He reports that before coming here today he was in the bathroom for about 5 hours having issues passing stool because of his rectal hemorrhoids, he had to keep pushing them in but it was very painful.  He has cleaned out his colon, said he does not feel constipated and has no abdominal pain from constipation.  His stools were not super hard today.  He also recently finished Cipro for possible prostatitis.  He denies fevers. He denies blood in the stool. He reports that he is using Hydrocortisone suppositories. He denies seeing his primary care provider for this issue.The patient is COVID vaccinated.       Colonoscopy: 12/17/2020 11:32 AM   - The examined portion of the ileum was normal.   - One 5 mm polyp in the ascending colon, removed with a cold snare. Resected and retrieved.   - One 5 mm polyp in the sigmoid colon, removed with  a cold snare. Resected and retrieved.   - Diverticulosis in the sigmoid colon and in the ascending colon.   - The examination was otherwise normal on direct and retroflexion views.        Review of Systems   Constitutional: Negative for fever.   Gastrointestinal: Positive for constipation and rectal pain. Negative for blood in stool.   All other systems reviewed and are negative.        Allergies:  No Known Allergies    Medications:  ASA 81  Cipro   Klonopin  Colace   Levothyroxine   Cozaar   Prilosec   Crestor   Zoloft   Desyrel     Past Medical History:    GERD   Hypertension   Hyperlipidemia   Hypothyroid   Vertigo  Thrombocytopenia  Dysphagia  IBARRA   Morbid obesity  Lumbago   Cervical  spondylosis without myelopathy   Hyperlipidemia   Anxiety  Alcohol use disorder   BPPV    Acute alcoholic hepatitis   Colonic polyp   Vitamin D deficiency      Past Surgical History:    Esophagoscopy   Colonoscopy     Family History:    CAD  Breast cancer    Social History:  Presents with son.  PCP Fifi Fleming MD.     Physical Exam     Patient Vitals for the past 24 hrs:   BP Temp Temp src Pulse Resp SpO2 Height Weight   07/19/21 0651 (!) 153/93 -- -- 72 -- 99 % -- --   07/19/21 0402 (!) 157/100 97.6  F (36.4  C) Oral 84 22 100 % 1.829 m (6') 104.3 kg (230 lb)       Physical Exam   Nursing note and vitals reviewed.    Constitutional: Patient appears awake and alert and comfortable at rest.  GI: No abdominal pain.  No rebound or guarding.  No flank pain.  : Rectal exam reveals no redness, he has external hemorrhoids that are soft but very tender.  There is no obvious thrombosed hemorrhoid present.  No firmness no dark discoloration.  I do not see any blood.  He did not want a digital rectal exam as he had one recently and it was incredibly painful.  I cannot see an obvious fissure.  Psych: Thought processes are clear, mentation is normal.  Mood and affect normal.    Emergency Department Course     Emergency Department Course:    Reviewed:  I reviewed nursing notes, vitals, past medical history and care everywhere    Assessments:  0603 I obtained history and examined the patient as noted above.     Disposition:  The patient was discharged to home.       Impression & Plan     CMS Diagnoses: None    Medical Decision Making:  Patient comes in with pain in his anus.  He has large hemorrhoids but no evidence on my exam of a thrombosis.  There is no obvious fissure.  This patient needs to follow-up in the clinic and I am going to put him on 2.5% hydrocortisone cream twice a day along with a sitz bath once a day or so.  He will continue to keep his stool soft formed and I want him seen in about 3 days in the clinic to make  sure he is getting better.  I do not want to prescribe narcotics as this will cause him constipation issues which will make the hemorrhoids worse.  He has to follow-up with colorectal because of an abnormal colonoscopy and he has an appointment in about 4 weeks.  He can use Tylenol or ibuprofen.  He is comfortable with this plan will follow up in the clinic in 3 to 4 days.  Do not find a more serious cause for his pain.  I do not think he has a perirectal abscess or any further prostatitis issues.  There is no erythema no fluctuance or swelling around the anus other than the hemorrhoids.  And that is where his tenderness is.    Push fluids, continue the stool softener, add MiraLAX once a day to keep your stools soft formed.  2.5% hydrocortisone cream 2 times a day, you can also use Anusol cream or ointment over-the-counter 2 times a day as well.   Tylenol or Motrin as needed for pain.  Follow-up with your doctor in 3 to 4 days for a recheck.  Keep your appointment with colorectal in August.    Covid-19  Angus Wynne was evaluated during a global COVID-19 pandemic, which necessitated consideration that the patient might be at risk for infection with the SARS-CoV-2 virus that causes COVID-19.   Applicable protocols for evaluation were followed during the patient's care.   COVID-19 was considered as part of the patient's evaluation.     Diagnosis:    ICD-10-CM    1. External hemorrhoids  K64.4        Discharge Medications:  Discharge Medication List as of 7/19/2021  6:45 AM          Scribe Disclosure:  I, Karissa Alcazar, am serving as a scribe at 5:59 AM on 7/19/2021 to document services personally performed by Anat Oliveira MD based on my observations and the provider's statements to me.        Anat Oliveira MD  07/19/21 0713

## 2021-07-19 NOTE — TELEPHONE ENCOUNTER
RECORDS RECEIVED FROM: N/A   Appt date: 09/07/2021   NOTES STATUS DETAILS   OFFICE NOTE from referring provider  N/A    OFFICE NOTE from other specialist   Internal 5/23/2021 Office visit with Dr. Anjel Alford ([facility])     5/22/2021 [facility] Nurse Triage     DISCHARGE SUMMARY from hospital  Internal 7/19/2021, 7/15/2021 ([facility])    DISCHARGE REPORT from the ER N/A    OPERATIVE REPORT  N/A    MEDICATION LIST Internal    LABS     PFC TESTING N/A    ANAL PAP N/A    BIOPSIES/PATHOLOGY RELATED TO DIAGNOSIS Internal 12/17/2020   DIAGNOSTIC PROCEDURES     COLONOSCOPY Internal/ Received  12/17/2020  9/9/15 ([facility])    UPPER ENDOSCOPY (EGD) N/A    FLEX SIGMOIDOSCOPY  N/A    ERCP N/A    IMAGING (DISC & REPORT)      CT  N/A    MRI N/A    XRAY N/A    ULTRASOUND (ENDOANAL/ENDORECTAL) N/A

## 2021-07-19 NOTE — ED TRIAGE NOTES
Pt c/o rectal pain. Pt states that he was seen here re ently and prescribed antibiotic and told to f/u with colorectal surgeon. Pt states that he has an appointment in August and the pain is too severe to wait till then. Pt states that he has not been able to stool d/t pain.

## 2021-07-29 ENCOUNTER — OFFICE VISIT (OUTPATIENT)
Dept: FAMILY MEDICINE | Facility: CLINIC | Age: 61
End: 2021-07-29
Payer: COMMERCIAL

## 2021-07-29 VITALS
OXYGEN SATURATION: 99 % | DIASTOLIC BLOOD PRESSURE: 76 MMHG | HEART RATE: 91 BPM | SYSTOLIC BLOOD PRESSURE: 105 MMHG | TEMPERATURE: 97.9 F

## 2021-07-29 DIAGNOSIS — F41.9 ANXIETY: ICD-10-CM

## 2021-07-29 DIAGNOSIS — K62.89 RECTAL PAIN: Primary | ICD-10-CM

## 2021-07-29 DIAGNOSIS — K60.2 ANAL FISSURE: ICD-10-CM

## 2021-07-29 DIAGNOSIS — R79.89 ELEVATED LFTS: ICD-10-CM

## 2021-07-29 PROCEDURE — 99214 OFFICE O/P EST MOD 30 MIN: CPT | Performed by: FAMILY MEDICINE

## 2021-07-29 RX ORDER — HYDROCORTISONE 25 MG/G
CREAM TOPICAL 2 TIMES DAILY
Qty: 30 G | Refills: 0 | Status: SHIPPED | OUTPATIENT
Start: 2021-07-29 | End: 2022-08-01

## 2021-07-29 RX ORDER — CLONAZEPAM 0.5 MG/1
TABLET ORAL
Qty: 10 TABLET | Refills: 0 | Status: SHIPPED | OUTPATIENT
Start: 2021-07-29 | End: 2021-09-03

## 2021-07-29 ASSESSMENT — ANXIETY QUESTIONNAIRES
3. WORRYING TOO MUCH ABOUT DIFFERENT THINGS: NOT AT ALL
8. IF YOU CHECKED OFF ANY PROBLEMS, HOW DIFFICULT HAVE THESE MADE IT FOR YOU TO DO YOUR WORK, TAKE CARE OF THINGS AT HOME, OR GET ALONG WITH OTHER PEOPLE?: NOT DIFFICULT AT ALL
7. FEELING AFRAID AS IF SOMETHING AWFUL MIGHT HAPPEN: NOT AT ALL
GAD7 TOTAL SCORE: 0
5. BEING SO RESTLESS THAT IT IS HARD TO SIT STILL: NOT AT ALL
GAD7 TOTAL SCORE: 0
1. FEELING NERVOUS, ANXIOUS, OR ON EDGE: NOT AT ALL
6. BECOMING EASILY ANNOYED OR IRRITABLE: NOT AT ALL
4. TROUBLE RELAXING: NOT AT ALL
7. FEELING AFRAID AS IF SOMETHING AWFUL MIGHT HAPPEN: NOT AT ALL
GAD7 TOTAL SCORE: 0
2. NOT BEING ABLE TO STOP OR CONTROL WORRYING: NOT AT ALL

## 2021-07-29 NOTE — PROGRESS NOTES
Assessment & Plan     (K62.89) Rectal pain  (primary encounter diagnosis)  Comment:   Plan: hydrocortisone, Perianal, (HYDROCORTISONE) 2.5         % cream         Push fluids, continue  MiraLAX once a day to keep your stools soft formed. you can also use Anusol cream or ointment  2 times a day as well. discussed high fibre / fluid diet to regulate Bm.  Tylenol or Motrin as needed for pain.  Keep your appointment with colorectal in August.        Has appointment with coloretal specialist need to keep appointment.Talked about warning s/s for which should be seen. Cares and symptomatic treatment discussed.       (K60.2) Anal fissure  Comment: improved   Plan: Need to keep follow appointment with colorectal specialist     (R79.89) Elevated LFTs  Comment: wants follow up labs   Plan: Comprehensive metabolic panel (BMP + Alb, Alk         Phos, ALT, AST, Total. Bili, TP), Lipid panel         reflex to direct LDL Fasting,         Comprehensive metabolic panel    (F41.9) Anxiety  Comment: has been aggravated recently so need refill on klonipin to use as needed   Plan: clonazePAM (KLONOPIN) 0.5 MG tablet            Check labs. refill sent.Cares and  treatment discussed follow. up if problem   Patient expressed understanding and agreement with treatment plan. All patient's questions were answered, will let me know if has more later.  Medications: Rx's: Reviewed the potential side effects/complications of medications prescribed.          BMI:   Estimated body mass index is 31.19 kg/m  as calculated from the following:    Height as of 7/19/21: 1.829 m (6').    Weight as of 7/19/21: 104.3 kg (230 lb).   Weight management plan: Discussed healthy diet and exercise guidelines    Fifi Fleming MD  [facility] TANVIR Greco is a 61 year old who presents for the following health issues     HPI     Hemorrhoids   Onset/Duration: 1 month   Description:   Dennise-anal lump: YES  Pain:  YES  Itching: YES  Accompanying Signs & Symptoms:    He was at er twice and  then his brothe arranged to see  specilaist and he was given  Nitroglycerin Ointment Compound 0.2% in 30g Vaseline 0.2 %- helped wit pain although it gives him HA . Pain is better .   Taking  stool softener and bm is better. He has another appointmentwith his own coorectal specialist on 08/18// 21, so he is planning to  keep appointment   Blood in stool: no  Changes in stool pattern: no  History:   Any previous GI studies done:Colonoscopy  Family History of colon cancer: no  Precipitating factors:   None  Alleviating factors:  None  Therapies tried and outcome: preparation H    ANXIETY FOLLOW UP   Also wants his LFT rechecked. Admits he was drinking a bit more recently. Wife was also concerned  And somewhat frustrated .   Admits has been more anxious since flare of rectal problem wants refill on klonipin to use as needed.        Review of Systems   Constitutional, HEENT, cardiovascular, pulmonary, GI, , musculoskeletal, neuro, skin, endocrine and psych systems are negative, except as otherwise noted.      Objective    There were no vitals taken for this visit.  There is no height or weight on file to calculate BMI.  Physical Exam   GENERAL: healthy, alert and no distress  EYES: Eyes grossly normal to inspection, PERRL and conjunctivae and sclerae normal  HENT: ear canals and TM's normal, nose and mouth without ulcers or lesions  NECK: no adenopathy, no asymmetry, masses, or scars and thyroid normal to palpation  RESP: lungs clear to auscultation - no rales, rhonchi or wheezes  CV: regular rate and rhythm, normal S1 S2, no S3 or S4, no murmur,   ABDOMEN: soft, nontender, no hepatosplenomegaly, no masses and bowel sounds normal  RECTAL: deferred  MS: no gross musculoskeletal defects noted, no edema  SKIN: no suspicious lesions or rashes  NEURO: Normal strength and tone, mentation intact and speech normal  PSYCH: mentation appears normal,  affect normal but somewhat uptight, mentation insight intact ,

## 2021-07-30 ASSESSMENT — ANXIETY QUESTIONNAIRES: GAD7 TOTAL SCORE: 0

## 2021-08-11 DIAGNOSIS — I10 ESSENTIAL HYPERTENSION: ICD-10-CM

## 2021-08-11 RX ORDER — LOSARTAN POTASSIUM 100 MG/1
TABLET ORAL
Qty: 90 TABLET | Refills: 0 | OUTPATIENT
Start: 2021-08-11

## 2021-08-11 NOTE — TELEPHONE ENCOUNTER
rx denied.  Should have refills at pharmacy from 7/28/21 rx.    Fara HAWKINS RN  EP Triage    
never

## 2021-08-18 ENCOUNTER — PRE VISIT (OUTPATIENT)
Dept: SURGERY | Facility: CLINIC | Age: 61
End: 2021-08-18

## 2021-08-18 ENCOUNTER — MYC MEDICAL ADVICE (OUTPATIENT)
Dept: SURGERY | Facility: CLINIC | Age: 61
End: 2021-08-18

## 2021-08-25 ENCOUNTER — TELEPHONE (OUTPATIENT)
Dept: GASTROENTEROLOGY | Facility: OUTPATIENT CENTER | Age: 61
End: 2021-08-25

## 2021-08-25 ENCOUNTER — TELEPHONE (OUTPATIENT)
Dept: SURGERY | Facility: CLINIC | Age: 61
End: 2021-08-25

## 2021-08-25 NOTE — TELEPHONE ENCOUNTER
[facility] Call Center    Phone Message    May a detailed message be left on voicemail: yes     Reason for Call: Other: Angus is calling in to schedule an appt for DX: Rectal Pain. Patient states that he has been to the ER twice for this and would like to be seen as soon as possible. Writer scheduled patient first available appt on 11/1. HP TE being sent as per guidelines. Please review and call patient back to discuss.     Action Taken: Message routed to:  [facility]Rectal    Travel Screening: Not Applicable

## 2021-08-28 DIAGNOSIS — F41.9 ANXIETY: Primary | ICD-10-CM

## 2021-08-28 DIAGNOSIS — E03.8 OTHER SPECIFIED HYPOTHYROIDISM: ICD-10-CM

## 2021-08-30 NOTE — TELEPHONE ENCOUNTER
Routing refill request to provider for review/approval because:  Labs out of range:  TSH 10/48 on 3/30/21    Fara HAWKINS RN  EP Triage

## 2021-09-01 RX ORDER — LEVOTHYROXINE SODIUM 175 UG/1
TABLET ORAL
Qty: 30 TABLET | Refills: 0 | Status: SHIPPED | OUTPATIENT
Start: 2021-09-01 | End: 2021-09-02

## 2021-09-01 NOTE — TELEPHONE ENCOUNTER
Pt also wants to request some anxiety medication for procedure and time up to procedure. He is hoping to have the 0.5 mg of ativan and he is hoping for 10 days supply for one tablet a day.   Mary Taylor

## 2021-09-01 NOTE — TELEPHONE ENCOUNTER
Pt wanted to wait to get this drawn because he has a hard time getting out of bed due to anal pain and wants to put off lab as soon as he finds out more from his appt with [facility] on 9/7 he will follow up with labs.

## 2021-09-01 NOTE — TELEPHONE ENCOUNTER
Script refill faxed. Remind pt to do follow up for  Labs,order is in place  since he is past due.

## 2021-09-02 RX ORDER — LORAZEPAM 0.5 MG/1
0.5 TABLET ORAL DAILY PRN
Qty: 10 TABLET | Refills: 0 | Status: SHIPPED | OUTPATIENT
Start: 2021-09-02 | End: 2021-09-02

## 2021-09-02 NOTE — TELEPHONE ENCOUNTER
Script  faxed.   Remind pt to do follow up for med check and labs,  whenever he can do it,   But do it as soon as he can

## 2021-09-03 RX ORDER — CLONAZEPAM 0.5 MG/1
TABLET ORAL
Qty: 10 TABLET | Refills: 0 | Status: SHIPPED | OUTPATIENT
Start: 2021-09-03 | End: 2021-10-12

## 2021-09-03 RX ORDER — LEVOTHYROXINE SODIUM 175 UG/1
175 TABLET ORAL DAILY
Qty: 30 TABLET | Refills: 0 | Status: SHIPPED | OUTPATIENT
Start: 2021-09-03 | End: 2021-09-23

## 2021-09-03 NOTE — TELEPHONE ENCOUNTER
Pt called he is very angry     Was upset he had to hold for over an hour to get through then was transferred to [place]     Told him I will ask an EP nurse to ask a provider to look at this    Patient expressed frustration numerous times and wants a call back     Alicia SAUCEDA RN

## 2021-09-03 NOTE — TELEPHONE ENCOUNTER
Patient has run out of levothyroxine tablets. States that he has taken this medication for a long time and can't not take it.     Patient states that he is having terrible rectal pain at this time and it would be impossible for him to come for lab appointment today.     Patient also states that he is having anxiety. He has appointment with GI on Tuesday 9/7/21. States that right now he is in a mess. Would like a small refill of clonazepam.    Please advise. Triage to send Mailcloudt OR  Can we leave a detailed message on this number? YES  Phone number patient can be reached at: Cell number on file:    Telephone Information:   Mobile [phone]       Ashleigh Messina RN  [facility] Triage

## 2021-09-07 ENCOUNTER — OFFICE VISIT (OUTPATIENT)
Dept: SURGERY | Facility: CLINIC | Age: 61
End: 2021-09-07
Payer: COMMERCIAL

## 2021-09-07 ENCOUNTER — LAB (OUTPATIENT)
Dept: LAB | Facility: CLINIC | Age: 61
End: 2021-09-07
Attending: SURGERY
Payer: COMMERCIAL

## 2021-09-07 ENCOUNTER — DOCUMENTATION ONLY (OUTPATIENT)
Dept: SURGERY | Facility: CLINIC | Age: 61
End: 2021-09-07

## 2021-09-07 VITALS
WEIGHT: 232 LBS | DIASTOLIC BLOOD PRESSURE: 91 MMHG | HEIGHT: 72 IN | SYSTOLIC BLOOD PRESSURE: 139 MMHG | OXYGEN SATURATION: 99 % | HEART RATE: 124 BPM | BODY MASS INDEX: 31.42 KG/M2

## 2021-09-07 DIAGNOSIS — Z20.822 ENCOUNTER FOR LABORATORY TESTING FOR COVID-19 VIRUS: Primary | ICD-10-CM

## 2021-09-07 DIAGNOSIS — K60.2 ANAL FISSURE: Primary | ICD-10-CM

## 2021-09-07 DIAGNOSIS — Z20.822 ENCOUNTER FOR LABORATORY TESTING FOR COVID-19 VIRUS: ICD-10-CM

## 2021-09-07 DIAGNOSIS — K62.89 ANAL PAIN: Primary | ICD-10-CM

## 2021-09-07 PROCEDURE — U0003 INFECTIOUS AGENT DETECTION BY NUCLEIC ACID (DNA OR RNA); SEVERE ACUTE RESPIRATORY SYNDROME CORONAVIRUS 2 (SARS-COV-2) (CORONAVIRUS DISEASE [COVID-19]), AMPLIFIED PROBE TECHNIQUE, MAKING USE OF HIGH THROUGHPUT TECHNOLOGIES AS DESCRIBED BY CMS-2020-01-R: HCPCS | Mod: 90 | Performed by: PATHOLOGY

## 2021-09-07 PROCEDURE — 99203 OFFICE O/P NEW LOW 30 MIN: CPT | Performed by: NURSE PRACTITIONER

## 2021-09-07 PROCEDURE — U0005 INFEC AGEN DETEC AMPLI PROBE: HCPCS | Mod: 90 | Performed by: PATHOLOGY

## 2021-09-07 ASSESSMENT — MIFFLIN-ST. JEOR: SCORE: 1885.35

## 2021-09-07 ASSESSMENT — PAIN SCALES - GENERAL: PAINLEVEL: SEVERE PAIN (6)

## 2021-09-07 NOTE — LETTER
2021       RE: Angus Wynne  [address]  [place] [address]     Dear Colleague,    Thank you for referring your patient, Angus Wynne, to the [facility] [place] [facility]. Please see a copy of my visit note below.    Colon and Rectal Surgery Consult Clinic Note    Date: 2021     Referring provider:  Referred Self, MD  No address on file     RE: Angus Wynne  : 1960  MICH: 2021    Angus Wynne is a very pleasant 61 year old male who presents today for rectal pain.    HPI:  Angus reports that he has had rectal pain that has been severe for 2 months. He feels a swelling in his anus and stool feels blocked and this is very painful. He has to press inside his anus in order to have a bowel movement or pass urine. He has been to the ER twice for this and saw a colorectal surgeon at [facility] and was diagnosed with an anal fissure about one month ago. He has been on stool softeners, fiber supplement, and laxatives and stools have been very soft. He is using topical nitroglycerine twice a day but the pain has not improved at all. It is a constat pain that is worse with bowel movements. He states that it is interfering with his life and relationships. He is asking to be admitted to the hospital for treatment. He reportedly had a normal colonoscopy about a year and a half ago.     Physical Examination: Exam was chaperoned by Heather Guadalupe MA   BP (!) 139/91 (BP Location: Left arm, Patient Position: Sitting, Cuff Size: Adult Regular)   Pulse (!) 124   Ht 6'   Wt 232 lb   SpO2 99%   BMI 31.46 kg/m    General: alert, oriented, in no acute distress, sitting comfortably  HEENT: mucous membranes moist  Perianal external examination:  Some mixed hemorrhoids and what appears to be an anal fissure in the posterior position. Exam was very limited due to patient discomfort.  Digital  rectal examination: Was deferred in order not to cause further trauma    Anoscopy: Was deferred in order not to cause further trauma    Assessment/Plan: 61 year old male with anal pain. His exam is very limited today due to discomfort. He appears to have at least one anal fissure and has been on constipation management and topical nitroglycerine for a month with constant pain. Due to the severity of his pain, I recommended an examination under anesthesia. Discussed that if pain appears to be due to fissures would recommend possible Botox injections versus sphincterotomy. He states that he is okay with either and has no difficulty holding bowel movements. Also discuss potential hemorrhoidectomy if these seem to be contributing to his pain. He is agreeable to this. If no significant findings on examination under anesthesia would consider MRI.     Medical history:  Past Medical History:   Diagnosis Date     GERD (gastroesophageal reflux disease)      HTN (hypertension)      Hyperlipidemia      Hypothyroid      Vertigo     2015 ,        Surgical history:  Past Surgical History:   Procedure Laterality Date     AS ESOPHAGOSCOPY, DIAGNOSTIC       COLONOSCOPY         Problem list:  Patient Active Problem List    Diagnosis Date Noted     Anal fissure 09/07/2021     Priority: Medium     Added automatically from request for surgery [id number]       Thrombocytopenia, unspecified (H) 04/18/2021     Priority: Medium     Elevated LFTs 07/14/2020     Priority: Medium     related to  alcoholic hapatitis previously       Dysphagia, unspecified type 06/11/2020     Priority: Medium     IBARRA (dyspnea on exertion) 07/19/2019     Priority: Medium     Colon cancer screening 12/20/2018     Priority: Medium     Morbid obesity (H) 07/25/2018     Priority: Medium     Lumbago 02/26/2018     Priority: Medium     History of colonic polyps 02/15/2018     Priority: Medium     Vertigo 02/15/2018     Priority: Medium     Had had negative neurological  workup in [place], few years ago       Nasal congestion 02/01/2017     Priority: Medium     chronic otc decongetsent use       Cervicalgia 05/02/2016     Priority: Medium     Cervical spondylosis without myelopathy 05/02/2016     Priority: Medium     Essential hypertension 05/02/2016     Priority: Medium     Hyperlipidemia with target LDL less than 130 05/02/2016     Priority: Medium     Neck pain 04/25/2016     Priority: Medium     chronic        Anxiety 03/08/2016     Priority: Medium     Alcohol use disorder 03/08/2016     Priority: Medium     excessive use, he has stopped now since 03/01/2016        BPPV (benign paroxysmal positional vertigo), unspecified laterality 12/28/2015     Priority: Medium      06/ 2015 , was seen at er in [place] and had mri brain, normal       Other specified hypothyroidism 12/28/2015     Priority: Medium     Hx of acute alcoholic hepatitis 12/28/2015     Priority: Medium     04/2014        Hx of colonic polyp 12/28/2015     Priority: Medium     s/p colonoscopy , need 3 yr f/u        Gastroesophageal reflux disease, esophagitis presence not specified 12/28/2015     Priority: Medium     s/p endoscopy 10/02/2015, but they were unable to do biopsy , so need another f/u check   IMO Regulatory Load OCT 2020       Vitamin D deficiency 12/28/2015     Priority: Medium       Medications:  Current Outpatient Medications   Medication Sig Dispense Refill     clonazePAM (KLONOPIN) 0.5 MG tablet TAKE 1/2 TO ONE TABLET BY MOUTH AS NEEDED FOR ANXIETY 10 tablet 0     docusate sodium (COLACE) 100 MG tablet Take 1 tablet (100 mg) by mouth 2 times daily (to prevent constipation) 20 tablet 1     hydrocortisone, Perianal, (HYDROCORTISONE) 2.5 % cream Place rectally 2 times daily 30 g 0     levothyroxine (SYNTHROID/LEVOTHROID) 175 MCG tablet Take 1 tablet (175 mcg) by mouth daily 30 tablet 0     losartan (COZAAR) 100 MG tablet Take 1 tablet (100 mg) by mouth daily 90 tablet 0     omeprazole (PRILOSEC) 40  MG DR capsule TAKE 1 CAPSULE(40 MG) BY MOUTH DAILY 90 capsule 3     rosuvastatin (CRESTOR) 40 MG tablet Take 1 tablet (40 mg) by mouth daily 90 tablet 3     sertraline (ZOLOFT) 100 MG tablet TAKE 1 AND 1/2 TABLETS(150 MG) BY MOUTH DAILY WITH DINNER 135 tablet 2     traZODone (DESYREL) 50 MG tablet TAKE 1 TO 2 TABLETS BY MOUTH EVERY DAY FOR SLEEP 90 tablet 3     aspirin 81 MG EC tablet Take 81 mg by mouth daily (Patient not taking: Reported on 2021)         Allergies:  No Known Allergies    Family history:  Family History   Problem Relation Age of Onset     Coronary Artery Disease Father         at age 60      Hyperlipidemia Brother      Cancer Brother         wall of scrotum     Breast Cancer Mother      Diabetes No family hx of      Cerebrovascular Disease No family hx of      Colon Cancer No family hx of      Prostate Cancer No family hx of        Social history:  Social History     Tobacco Use     Smoking status: Former Smoker     Quit date: 2015     Years since quittin.9     Smokeless tobacco: Never Used   Substance Use Topics     Alcohol use: No     Alcohol/week: 0.0 standard drinks     Comment: 2-3 drinks a week    Marital status: .    Nursing Notes:   Heather Guadalupe  2021 11:14 AM  Signed  Chief Complaint   Patient presents with     Rectal Problem     rectal pain       Vitals:    21 1111   BP: (!) 139/91   BP Location: Left arm   Patient Position: Sitting   Cuff Size: Adult Regular   Pulse: (!) 124   SpO2: 99%   Weight: 232 lb   Height: 6'       Body mass index is 31.46 kg/m .    Heather Guadalupe CMA         40 minutes spent on the date of the encounter doing chart review, history and exam, documentation and further activities as noted above.     YUKO Marr, NP-C  Colon and Rectal Surgery   [facility]    This note was created using speech recognition software and may contain unintended word substitutions.        Again, thank you  for allowing me to participate in the care of your patient.      Sincerely,    YUKO Wilson CNP

## 2021-09-07 NOTE — PROGRESS NOTES
Case Request received to schedule:    Patient Name: Angus Wynne   MRN: [id number]   Case#: [id number]   Surgeon(s) and Role:      * Rajendra Armando MD - Primary   Date requested: * No dates entered *   Location: [facility] [place]   Procedure(s):   EXAM UNDER ANESTHESIA, ANUS, possibe botox infection, possible hemmorhoidectomy, possible sphincterotomy (N/A)   HEMORRHOIDECTOMY, INTERNAL (N/A)   INJECTION, ONABOTULINUMTOXINA (N/A)     Additional Instructions for the Case  Please schedule patient with Dr. Armando on 9/9 at 1:45 PM at the [facility]. Thank-you!  Multi-surgeon case:  no  Time in minutes:  60  Anesthesia: general due to possible hemorrhoidectomy   Prep: no  Pre-Op: pac vs PCP  Labs: no  WOC: no  Special equipment: no  Special Instructions: no    Schedule with Elvira Francois NP 3-4 weeks after surgery.    Met with patient in clinic, completed the following scheduling, then sent Surgery Packet to patient via Send[employer] including related scheduling information and prep instructions:     Required: Yes, you will need a  18 years or older to drive you home from your procedure as well as stay with you for 24 hours after your procedure    Surgery: Exam under anesthesia, anus, possibe botox injection, possible hemorrhoidectomy, possible sphincterotomy    Date: Thursday September 9, 2021          Surgeon: Dr. Rajendra Armando    Time: You will receive a call 1-3 days prior to your surgery with your finalized surgery and arrival time.     Location: [facility] [address] [place] [address]  [phone]      Upcoming Appointments:     Pre-operative COVID-19 Test:  Pre-procedure asymptomatic COVID PCR:  9/7/2021 at 12:30 PM                                                                      [facility]1st [facility] Lab                                                                      [address] [place] [address]                                                                       [place] [address]    Pre-operative Physical appointment:   Clinic appointment with Michelle Banda NP:   9/7/2021 at 4:30 PM                                                 [facility]-5th [facility]-N[facility] Practition[facility]                                                 [address] [place] [id number]    Post operative appointment:  Clinic appointment with Elvira Francois NP: 10/6/2021 at 12:15 PM                                                                      [facility]-4th Floor(4K)                                                                      [address] [place] [id number]

## 2021-09-07 NOTE — PROGRESS NOTES
Colon and Rectal Surgery Consult Clinic Note    Date: 2021     Referring provider:  Referred Self, MD  No address on file     RE: Angus Wynne  : 1960  MICH: 2021    Angus Wynne is a very pleasant 61 year old male who presents today for rectal pain.    HPI:  Angus reports that he has had rectal pain that has been severe for 2 months. He feels a swelling in his anus and stool feels blocked and this is very painful. He has to press inside his anus in order to have a bowel movement or pass urine. He has been to the ER twice for this and saw a colorectal surgeon at [facility] and was diagnosed with an anal fissure about one month ago. He has been on stool softeners, fiber supplement, and laxatives and stools have been very soft. He is using topical nitroglycerine twice a day but the pain has not improved at all. It is a constat pain that is worse with bowel movements. He states that it is interfering with his life and relationships. He is asking to be admitted to the hospital for treatment. He reportedly had a normal colonoscopy about a year and a half ago.     Physical Examination: Exam was chaperoned by Heather Guadalupe MA   BP (!) 139/91 (BP Location: Left arm, Patient Position: Sitting, Cuff Size: Adult Regular)   Pulse (!) 124   Ht 6'   Wt 232 lb   SpO2 99%   BMI 31.46 kg/m    General: alert, oriented, in no acute distress, sitting comfortably  HEENT: mucous membranes moist  Perianal external examination:  Some mixed hemorrhoids and what appears to be an anal fissure in the posterior position. Exam was very limited due to patient discomfort.  Digital rectal examination: Was deferred in order not to cause further trauma    Anoscopy: Was deferred in order not to cause further trauma    Assessment/Plan: 61 year old male with anal pain. His exam is very limited today due to discomfort. He appears to have at least one anal fissure and has been on constipation management and topical  nitroglycerine for a month with constant pain. Due to the severity of his pain, I recommended an examination under anesthesia. Discussed that if pain appears to be due to fissures would recommend possible Botox injections versus sphincterotomy. He states that he is okay with either and has no difficulty holding bowel movements. Also discuss potential hemorrhoidectomy if these seem to be contributing to his pain. He is agreeable to this. If no significant findings on examination under anesthesia would consider MRI.     Medical history:  Past Medical History:   Diagnosis Date     GERD (gastroesophageal reflux disease)      HTN (hypertension)      Hyperlipidemia      Hypothyroid      Vertigo     2015 ,        Surgical history:  Past Surgical History:   Procedure Laterality Date     AS ESOPHAGOSCOPY, DIAGNOSTIC       COLONOSCOPY         Problem list:  Patient Active Problem List    Diagnosis Date Noted     Anal fissure 09/07/2021     Priority: Medium     Added automatically from request for surgery [id number]       Thrombocytopenia, unspecified (H) 04/18/2021     Priority: Medium     Elevated LFTs 07/14/2020     Priority: Medium     related to  alcoholic hapatitis previously       Dysphagia, unspecified type 06/11/2020     Priority: Medium     IBARRA (dyspnea on exertion) 07/19/2019     Priority: Medium     Colon cancer screening 12/20/2018     Priority: Medium     Morbid obesity (H) 07/25/2018     Priority: Medium     Lumbago 02/26/2018     Priority: Medium     History of colonic polyps 02/15/2018     Priority: Medium     Vertigo 02/15/2018     Priority: Medium     Had had negative neurological workup in [place], few years ago       Nasal congestion 02/01/2017     Priority: Medium     chronic otc decongetsent use       Cervicalgia 05/02/2016     Priority: Medium     Cervical spondylosis without myelopathy 05/02/2016     Priority: Medium     Essential hypertension 05/02/2016     Priority: Medium     Hyperlipidemia with  target LDL less than 130 05/02/2016     Priority: Medium     Neck pain 04/25/2016     Priority: Medium     chronic        Anxiety 03/08/2016     Priority: Medium     Alcohol use disorder 03/08/2016     Priority: Medium     excessive use, he has stopped now since 03/01/2016        BPPV (benign paroxysmal positional vertigo), unspecified laterality 12/28/2015     Priority: Medium      06/ 2015 , was seen at er in [place] and had mri brain, normal       Other specified hypothyroidism 12/28/2015     Priority: Medium     Hx of acute alcoholic hepatitis 12/28/2015     Priority: Medium     04/2014        Hx of colonic polyp 12/28/2015     Priority: Medium     s/p colonoscopy , need 3 yr f/u        Gastroesophageal reflux disease, esophagitis presence not specified 12/28/2015     Priority: Medium     s/p endoscopy 10/02/2015, but they were unable to do biopsy , so need another f/u check   IMO Regulatory Load OCT 2020       Vitamin D deficiency 12/28/2015     Priority: Medium       Medications:  Current Outpatient Medications   Medication Sig Dispense Refill     clonazePAM (KLONOPIN) 0.5 MG tablet TAKE 1/2 TO ONE TABLET BY MOUTH AS NEEDED FOR ANXIETY 10 tablet 0     docusate sodium (COLACE) 100 MG tablet Take 1 tablet (100 mg) by mouth 2 times daily (to prevent constipation) 20 tablet 1     hydrocortisone, Perianal, (HYDROCORTISONE) 2.5 % cream Place rectally 2 times daily 30 g 0     levothyroxine (SYNTHROID/LEVOTHROID) 175 MCG tablet Take 1 tablet (175 mcg) by mouth daily 30 tablet 0     losartan (COZAAR) 100 MG tablet Take 1 tablet (100 mg) by mouth daily 90 tablet 0     omeprazole (PRILOSEC) 40 MG DR capsule TAKE 1 CAPSULE(40 MG) BY MOUTH DAILY 90 capsule 3     rosuvastatin (CRESTOR) 40 MG tablet Take 1 tablet (40 mg) by mouth daily 90 tablet 3     sertraline (ZOLOFT) 100 MG tablet TAKE 1 AND 1/2 TABLETS(150 MG) BY MOUTH DAILY WITH DINNER 135 tablet 2     traZODone (DESYREL) 50 MG tablet TAKE 1 TO 2 TABLETS BY MOUTH  EVERY DAY FOR SLEEP 90 tablet 3     aspirin 81 MG EC tablet Take 81 mg by mouth daily (Patient not taking: Reported on 2021)         Allergies:  No Known Allergies    Family history:  Family History   Problem Relation Age of Onset     Coronary Artery Disease Father         at age 60      Hyperlipidemia Brother      Cancer Brother         wall of scrotum     Breast Cancer Mother      Diabetes No family hx of      Cerebrovascular Disease No family hx of      Colon Cancer No family hx of      Prostate Cancer No family hx of        Social history:  Social History     Tobacco Use     Smoking status: Former Smoker     Quit date: 2015     Years since quittin.9     Smokeless tobacco: Never Used   Substance Use Topics     Alcohol use: No     Alcohol/week: 0.0 standard drinks     Comment: 2-3 drinks a week    Marital status: .    Nursing Notes:   Heather Guadalupe  2021 11:14 AM  Signed  Chief Complaint   Patient presents with     Rectal Problem     rectal pain       Vitals:    21 1111   BP: (!) 139/91   BP Location: Left arm   Patient Position: Sitting   Cuff Size: Adult Regular   Pulse: (!) 124   SpO2: 99%   Weight: 232 lb   Height: 6'       Body mass index is 31.46 kg/m .    Heather Guadalupe CMA         40 minutes spent on the date of the encounter doing chart review, history and exam, documentation and further activities as noted above.     YUKO Marr, NP-C  Colon and Rectal Surgery   [facility]    This note was created using speech recognition software and may contain unintended word substitutions.

## 2021-09-07 NOTE — NURSING NOTE
Chief Complaint   Patient presents with     Rectal Problem     rectal pain       Vitals:    09/07/21 1111   BP: (!) 139/91   BP Location: Left arm   Patient Position: Sitting   Cuff Size: Adult Regular   Pulse: (!) 124   SpO2: 99%   Weight: 232 lb   Height: 6'       Body mass index is 31.46 kg/m .    Heather Guadalupe CMA

## 2021-09-08 ENCOUNTER — OFFICE VISIT (OUTPATIENT)
Dept: FAMILY MEDICINE | Facility: CLINIC | Age: 61
End: 2021-09-08
Payer: COMMERCIAL

## 2021-09-08 ENCOUNTER — ANESTHESIA EVENT (OUTPATIENT)
Dept: SURGERY | Facility: AMBULATORY SURGERY CENTER | Age: 61
End: 2021-09-08
Payer: COMMERCIAL

## 2021-09-08 ENCOUNTER — TELEPHONE (OUTPATIENT)
Dept: SURGERY | Facility: CLINIC | Age: 61
End: 2021-09-08

## 2021-09-08 VITALS
BODY MASS INDEX: 33.39 KG/M2 | OXYGEN SATURATION: 98 % | DIASTOLIC BLOOD PRESSURE: 74 MMHG | HEART RATE: 88 BPM | SYSTOLIC BLOOD PRESSURE: 128 MMHG | WEIGHT: 246.2 LBS | TEMPERATURE: 97.8 F

## 2021-09-08 DIAGNOSIS — Z01.818 PREOP GENERAL PHYSICAL EXAM: Primary | ICD-10-CM

## 2021-09-08 DIAGNOSIS — D69.6 THROMBOCYTOPENIA, UNSPECIFIED (H): ICD-10-CM

## 2021-09-08 DIAGNOSIS — R79.89 ELEVATED LFTS: ICD-10-CM

## 2021-09-08 DIAGNOSIS — E78.5 HYPERLIPIDEMIA WITH TARGET LDL LESS THAN 130: ICD-10-CM

## 2021-09-08 DIAGNOSIS — I10 ESSENTIAL HYPERTENSION: ICD-10-CM

## 2021-09-08 DIAGNOSIS — K60.2 ANAL FISSURE: ICD-10-CM

## 2021-09-08 PROBLEM — E66.811 OBESITY (BMI 30.0-34.9): Status: ACTIVE | Noted: 2018-07-25

## 2021-09-08 PROBLEM — Z12.11 COLON CANCER SCREENING: Status: RESOLVED | Noted: 2018-12-20 | Resolved: 2021-09-08

## 2021-09-08 LAB
ERYTHROCYTE [DISTWIDTH] IN BLOOD BY AUTOMATED COUNT: 13.1 % (ref 10–15)
HCT VFR BLD AUTO: 42 % (ref 40–53)
HGB BLD-MCNC: 14.3 G/DL (ref 13.3–17.7)
MCH RBC QN AUTO: 30.5 PG (ref 26.5–33)
MCHC RBC AUTO-ENTMCNC: 34 G/DL (ref 31.5–36.5)
MCV RBC AUTO: 90 FL (ref 78–100)
PLATELET # BLD AUTO: 149 10E3/UL (ref 150–450)
RBC # BLD AUTO: 4.69 10E6/UL (ref 4.4–5.9)
SARS-COV-2 RNA RESP QL NAA+PROBE: NEGATIVE
WBC # BLD AUTO: 6.4 10E3/UL (ref 4–11)

## 2021-09-08 PROCEDURE — 85027 COMPLETE CBC AUTOMATED: CPT | Performed by: INTERNAL MEDICINE

## 2021-09-08 PROCEDURE — 93000 ELECTROCARDIOGRAM COMPLETE: CPT | Performed by: INTERNAL MEDICINE

## 2021-09-08 PROCEDURE — 99214 OFFICE O/P EST MOD 30 MIN: CPT | Performed by: INTERNAL MEDICINE

## 2021-09-08 PROCEDURE — 36415 COLL VENOUS BLD VENIPUNCTURE: CPT | Performed by: INTERNAL MEDICINE

## 2021-09-08 PROCEDURE — 80061 LIPID PANEL: CPT | Performed by: INTERNAL MEDICINE

## 2021-09-08 PROCEDURE — 84443 ASSAY THYROID STIM HORMONE: CPT | Performed by: INTERNAL MEDICINE

## 2021-09-08 PROCEDURE — 80053 COMPREHEN METABOLIC PANEL: CPT | Performed by: INTERNAL MEDICINE

## 2021-09-08 NOTE — PATIENT INSTRUCTIONS

## 2021-09-08 NOTE — H&P (VIEW-ONLY)
[address] [address]  Phone: [phone]  Primary Provider: Fifi Fleming  Pre-op Performing Provider: STEFANI LUCIANO      PREOPERATIVE EVALUATION:  Today's date: 9/8/2021    Angus Wynne is a 61 year old male who presents for a preoperative evaluation.    Surgical Information:  Surgery/Procedure: rectal  Surgery Location: [facility]  Surgeon:   Surgery Date: 9-9-21  Time of Surgery:   Where patient plans to recover: At home with family  Fax number for surgical facility: Note does not need to be faxed, will be available electronically in Epic.    Type of Anesthesia Anticipated: General    Assessment & Plan     The proposed surgical procedure is considered LOW risk.    Preop general physical exam  - TSH; Future  - CBC with platelets; Future  - TSH  - CBC with platelets  - EKG 12-lead complete w/read - Clinics    Anal fissure    Essential hypertension  Well controlled     Thrombocytopenia, unspecified (H)  plts are 149 today     Elevated LFTs  Requesting blood work that his pcp recommended   - Comprehensive metabolic panel (BMP + Alb, Alk Phos, ALT, AST, Total. Bili, TP)    Hyperlipidemia with target LDL less than 130  - Lipid panel reflex to direct LDL Non-fasting         Risks and Recommendations:  The patient has the following additional risks and recommendations for perioperative complications:   - No identified additional risk factors other than previously addressed    Medication Instructions:  Patient is to take all scheduled medications on the day of surgery    RECOMMENDATION:  APPROVAL GIVEN to proceed with proposed procedure, without further diagnostic evaluation.    Subjective     HPI related to upcoming procedure: Angus has been dealing with very painful anal fissure/rectal pain and frequent bowel movements for a few months.  He is undergoing surgery.       Preop Questions 9/8/2021   1. Have you ever had a heart  attack or stroke? No   2. Have you ever had surgery on your heart or blood vessels, such as a stent placement, a coronary artery bypass, or surgery on an artery in your head, neck, heart, or legs? No   3. Do you have chest pain with activity? No   4. Do you have a history of  heart failure? No   5. Do you currently have a cold, bronchitis or symptoms of other infection? No   6. Do you have a cough, shortness of breath, or wheezing? No   7. Do you or anyone in your family have previous history of blood clots? No   8. Do you or does anyone in your family have a serious bleeding problem such as prolonged bleeding following surgeries or cuts? No   9. Have you ever had problems with anemia or been told to take iron pills? No   10. Have you had any abnormal blood loss such as black, tarry or bloody stools? No   11. Have you ever had a blood transfusion? No   12. Are you willing to have a blood transfusion if it is medically needed before, during, or after your surgery? NO    13. Have you or any of your relatives ever had problems with anesthesia? No   14. Do you have sleep apnea, excessive snoring or daytime drowsiness? No   15. Do you have any artifical heart valves or other implanted medical devices like a pacemaker, defibrillator, or continuous glucose monitor? No   16. Do you have artificial joints? No   17. Are you allergic to latex? No       Health Care Directive:  Patient does not have a Health Care Directive or Living Will:     Preoperative Review of :   reviewed - controlled substances reflected in medication list.      Status of Chronic Conditions:  See problem list for active medical problems.  Problems all longstanding and stable, except as noted/documented.  See ROS for pertinent symptoms related to these conditions.      Review of Systems  Const, HEENT, cv, pulm, gi reviewed,  otherwise negative unless noted above.      Patient Active Problem List    Diagnosis Date Noted     Anal fissure 09/07/2021      Priority: Medium     Added automatically from request for surgery [id number]       Thrombocytopenia, unspecified (H) 04/18/2021     Priority: Medium     Elevated LFTs 07/14/2020     Priority: Medium     related to  alcoholic hapatitis previously       Dysphagia, unspecified type 06/11/2020     Priority: Medium     IBARRA (dyspnea on exertion) 07/19/2019     Priority: Medium     Obesity (BMI 30.0-34.9) 07/25/2018     Priority: Medium     Lumbago 02/26/2018     Priority: Medium     History of colonic polyps 02/15/2018     Priority: Medium     Vertigo 02/15/2018     Priority: Medium     Had had negative neurological workup in [place], few years ago       Nasal congestion 02/01/2017     Priority: Medium     chronic otc decongetsent use       Cervicalgia 05/02/2016     Priority: Medium     Cervical spondylosis without myelopathy 05/02/2016     Priority: Medium     Essential hypertension 05/02/2016     Priority: Medium     Hyperlipidemia with target LDL less than 130 05/02/2016     Priority: Medium     Neck pain 04/25/2016     Priority: Medium     chronic        Anxiety 03/08/2016     Priority: Medium     Alcohol use disorder 03/08/2016     Priority: Medium     excessive use, he has stopped now since 03/01/2016        BPPV (benign paroxysmal positional vertigo), unspecified laterality 12/28/2015     Priority: Medium      06/ 2015 , was seen at er in [place] and had mri brain, normal       Other specified hypothyroidism 12/28/2015     Priority: Medium     Hx of acute alcoholic hepatitis 12/28/2015     Priority: Medium     04/2014        Hx of colonic polyp 12/28/2015     Priority: Medium     s/p colonoscopy , need 3 yr f/u        Gastroesophageal reflux disease, esophagitis presence not specified 12/28/2015     Priority: Medium     s/p endoscopy 10/02/2015, but they were unable to do biopsy , so need another f/u check   IMO Regulatory Load OCT 2020       Vitamin D deficiency 12/28/2015     Priority: Medium      Past Medical  History:   Diagnosis Date     GERD (gastroesophageal reflux disease)      HTN (hypertension)      Hyperlipidemia      Hypothyroid      Vertigo      ,      Past Surgical History:   Procedure Laterality Date     AS ESOPHAGOSCOPY, DIAGNOSTIC       COLONOSCOPY       Current Outpatient Medications   Medication Sig Dispense Refill     clonazePAM (KLONOPIN) 0.5 MG tablet TAKE 1/2 TO ONE TABLET BY MOUTH AS NEEDED FOR ANXIETY 10 tablet 0     hydrocortisone, Perianal, (HYDROCORTISONE) 2.5 % cream Place rectally 2 times daily 30 g 0     levothyroxine (SYNTHROID/LEVOTHROID) 175 MCG tablet Take 1 tablet (175 mcg) by mouth daily 30 tablet 0     losartan (COZAAR) 100 MG tablet Take 1 tablet (100 mg) by mouth daily 90 tablet 0     omeprazole (PRILOSEC) 40 MG DR capsule TAKE 1 CAPSULE(40 MG) BY MOUTH DAILY 90 capsule 3     rosuvastatin (CRESTOR) 40 MG tablet Take 1 tablet (40 mg) by mouth daily 90 tablet 3     sertraline (ZOLOFT) 100 MG tablet TAKE 1 AND 1/2 TABLETS(150 MG) BY MOUTH DAILY WITH DINNER 135 tablet 2     traZODone (DESYREL) 50 MG tablet TAKE 1 TO 2 TABLETS BY MOUTH EVERY DAY FOR SLEEP 90 tablet 3       No Known Allergies     Social History     Tobacco Use     Smoking status: Former Smoker     Quit date: 2015     Years since quittin.9     Smokeless tobacco: Never Used   Substance Use Topics     Alcohol use: No     Alcohol/week: 0.0 standard drinks     Comment: 2-3 drinks a week       History   Drug Use No         Objective     /74 (Cuff Size: Adult Large)   Pulse 88   Temp 97.8  F (36.6  C) (Tympanic)   Wt 111.7 kg (246 lb 3.2 oz)   SpO2 98%   BMI 33.39 kg/m      Physical Exam  Gen: well appearing, pleasant man, no distress  HEENT: PERRL, sclera nonicteric, MMM  Neck: supple, no LAD  Pulm: breathing comfortably, CTAB, no wheezes or rales  CV: RRR, normal S1 and S2, no murmurs  Abd: BS present, soft, nontender, nondistended  Ext: 2+ distal pulses, no LE edema      Recent Labs   Lab Test  11/18/20  1054      POTASSIUM 4.2   CR 0.87        Diagnostics:  Labs pending at this time.  Results will be reviewed when available.   EKG: atrial rhythm, normal axis, normal intervals, no acute ST/T changes c/w ischemia, no LVH by voltage criteria, nonspecific ST-T changes    Revised Cardiac Risk Index (RCRI):  The patient has the following serious cardiovascular risks for perioperative complications:   - No serious cardiac risks = 0 points     RCRI Interpretation: 0 points: Class I (very low risk - 0.4% complication rate)           Signed Electronically by: Karissa Petersen MD  Copy of this evaluation report is provided to requesting physician.

## 2021-09-08 NOTE — TELEPHONE ENCOUNTER
Spoke with patient, explained that I received a message that he missed his Pre-op H&P that I scheduled for him yesterday when he was in clinic. It was scheduled at the NP Clinic at the [facility] yesterday afternoon.    Patient misunderstood and thought that his office visit with Elvira Francois NP, was his Pre-op H&P. I explained again that it is not, and that he'd need a pre-op H&P today or surgery tomorrow will be canceled. Patient wants to go to his PCP at [facility]. I checked the clinics at the [facility], all were full the rest of the day today. I offered to call and try to schedule a Pre-op H&P at [facility] since patient seems confused about what is needed, patient agreed to accept my help.    Called [facility], spoke with scheduling. Patient's PCP Dr. Fleming does not have any available appointments today. They were able to book a Pre-op H&P appointment with Dr. Petersen today at 11:40 AM.      I called patient back, explained that his PCP is booked up today, so he's scheduled with Dr. Petersen at 11:40 AM. Patient expressed understanding, and stated that he'd arrive at [facility] at 11:30 AM today for his Pre-op H&P appointment.

## 2021-09-08 NOTE — PROGRESS NOTES
[address] [address]  Phone: [phone]  Primary Provider: Fifi Fleming  Pre-op Performing Provider: STEFANI LUCIANO      PREOPERATIVE EVALUATION:  Today's date: 9/8/2021    Angus Wynne is a 61 year old male who presents for a preoperative evaluation.    Surgical Information:  Surgery/Procedure: rectal  Surgery Location: [facility]  Surgeon:   Surgery Date: 9-9-21  Time of Surgery:   Where patient plans to recover: At home with family  Fax number for surgical facility: Note does not need to be faxed, will be available electronically in Epic.    Type of Anesthesia Anticipated: General    Assessment & Plan     The proposed surgical procedure is considered LOW risk.    Preop general physical exam  - TSH; Future  - CBC with platelets; Future  - TSH  - CBC with platelets  - EKG 12-lead complete w/read - Clinics    Anal fissure    Essential hypertension  Well controlled     Thrombocytopenia, unspecified (H)  plts are 149 today     Elevated LFTs  Requesting blood work that his pcp recommended   - Comprehensive metabolic panel (BMP + Alb, Alk Phos, ALT, AST, Total. Bili, TP)    Hyperlipidemia with target LDL less than 130  - Lipid panel reflex to direct LDL Non-fasting         Risks and Recommendations:  The patient has the following additional risks and recommendations for perioperative complications:   - No identified additional risk factors other than previously addressed    Medication Instructions:  Patient is to take all scheduled medications on the day of surgery    RECOMMENDATION:  APPROVAL GIVEN to proceed with proposed procedure, without further diagnostic evaluation.    Subjective     HPI related to upcoming procedure: Angus has been dealing with very painful anal fissure/rectal pain and frequent bowel movements for a few months.  He is undergoing surgery.       Preop Questions 9/8/2021   1. Have you ever had a heart  attack or stroke? No   2. Have you ever had surgery on your heart or blood vessels, such as a stent placement, a coronary artery bypass, or surgery on an artery in your head, neck, heart, or legs? No   3. Do you have chest pain with activity? No   4. Do you have a history of  heart failure? No   5. Do you currently have a cold, bronchitis or symptoms of other infection? No   6. Do you have a cough, shortness of breath, or wheezing? No   7. Do you or anyone in your family have previous history of blood clots? No   8. Do you or does anyone in your family have a serious bleeding problem such as prolonged bleeding following surgeries or cuts? No   9. Have you ever had problems with anemia or been told to take iron pills? No   10. Have you had any abnormal blood loss such as black, tarry or bloody stools? No   11. Have you ever had a blood transfusion? No   12. Are you willing to have a blood transfusion if it is medically needed before, during, or after your surgery? NO    13. Have you or any of your relatives ever had problems with anesthesia? No   14. Do you have sleep apnea, excessive snoring or daytime drowsiness? No   15. Do you have any artifical heart valves or other implanted medical devices like a pacemaker, defibrillator, or continuous glucose monitor? No   16. Do you have artificial joints? No   17. Are you allergic to latex? No       Health Care Directive:  Patient does not have a Health Care Directive or Living Will:     Preoperative Review of :   reviewed - controlled substances reflected in medication list.      Status of Chronic Conditions:  See problem list for active medical problems.  Problems all longstanding and stable, except as noted/documented.  See ROS for pertinent symptoms related to these conditions.      Review of Systems  Const, HEENT, cv, pulm, gi reviewed,  otherwise negative unless noted above.      Patient Active Problem List    Diagnosis Date Noted     Anal fissure 09/07/2021      Priority: Medium     Added automatically from request for surgery [id number]       Thrombocytopenia, unspecified (H) 04/18/2021     Priority: Medium     Elevated LFTs 07/14/2020     Priority: Medium     related to  alcoholic hapatitis previously       Dysphagia, unspecified type 06/11/2020     Priority: Medium     IBARRA (dyspnea on exertion) 07/19/2019     Priority: Medium     Obesity (BMI 30.0-34.9) 07/25/2018     Priority: Medium     Lumbago 02/26/2018     Priority: Medium     History of colonic polyps 02/15/2018     Priority: Medium     Vertigo 02/15/2018     Priority: Medium     Had had negative neurological workup in [place], few years ago       Nasal congestion 02/01/2017     Priority: Medium     chronic otc decongetsent use       Cervicalgia 05/02/2016     Priority: Medium     Cervical spondylosis without myelopathy 05/02/2016     Priority: Medium     Essential hypertension 05/02/2016     Priority: Medium     Hyperlipidemia with target LDL less than 130 05/02/2016     Priority: Medium     Neck pain 04/25/2016     Priority: Medium     chronic        Anxiety 03/08/2016     Priority: Medium     Alcohol use disorder 03/08/2016     Priority: Medium     excessive use, he has stopped now since 03/01/2016        BPPV (benign paroxysmal positional vertigo), unspecified laterality 12/28/2015     Priority: Medium      06/ 2015 , was seen at er in [place] and had mri brain, normal       Other specified hypothyroidism 12/28/2015     Priority: Medium     Hx of acute alcoholic hepatitis 12/28/2015     Priority: Medium     04/2014        Hx of colonic polyp 12/28/2015     Priority: Medium     s/p colonoscopy , need 3 yr f/u        Gastroesophageal reflux disease, esophagitis presence not specified 12/28/2015     Priority: Medium     s/p endoscopy 10/02/2015, but they were unable to do biopsy , so need another f/u check   IMO Regulatory Load OCT 2020       Vitamin D deficiency 12/28/2015     Priority: Medium      Past Medical  History:   Diagnosis Date     GERD (gastroesophageal reflux disease)      HTN (hypertension)      Hyperlipidemia      Hypothyroid      Vertigo      ,      Past Surgical History:   Procedure Laterality Date     AS ESOPHAGOSCOPY, DIAGNOSTIC       COLONOSCOPY       Current Outpatient Medications   Medication Sig Dispense Refill     clonazePAM (KLONOPIN) 0.5 MG tablet TAKE 1/2 TO ONE TABLET BY MOUTH AS NEEDED FOR ANXIETY 10 tablet 0     hydrocortisone, Perianal, (HYDROCORTISONE) 2.5 % cream Place rectally 2 times daily 30 g 0     levothyroxine (SYNTHROID/LEVOTHROID) 175 MCG tablet Take 1 tablet (175 mcg) by mouth daily 30 tablet 0     losartan (COZAAR) 100 MG tablet Take 1 tablet (100 mg) by mouth daily 90 tablet 0     omeprazole (PRILOSEC) 40 MG DR capsule TAKE 1 CAPSULE(40 MG) BY MOUTH DAILY 90 capsule 3     rosuvastatin (CRESTOR) 40 MG tablet Take 1 tablet (40 mg) by mouth daily 90 tablet 3     sertraline (ZOLOFT) 100 MG tablet TAKE 1 AND 1/2 TABLETS(150 MG) BY MOUTH DAILY WITH DINNER 135 tablet 2     traZODone (DESYREL) 50 MG tablet TAKE 1 TO 2 TABLETS BY MOUTH EVERY DAY FOR SLEEP 90 tablet 3       No Known Allergies     Social History     Tobacco Use     Smoking status: Former Smoker     Quit date: 2015     Years since quittin.9     Smokeless tobacco: Never Used   Substance Use Topics     Alcohol use: No     Alcohol/week: 0.0 standard drinks     Comment: 2-3 drinks a week       History   Drug Use No         Objective     /74 (Cuff Size: Adult Large)   Pulse 88   Temp 97.8  F (36.6  C) (Tympanic)   Wt 111.7 kg (246 lb 3.2 oz)   SpO2 98%   BMI 33.39 kg/m      Physical Exam  Gen: well appearing, pleasant man, no distress  HEENT: PERRL, sclera nonicteric, MMM  Neck: supple, no LAD  Pulm: breathing comfortably, CTAB, no wheezes or rales  CV: RRR, normal S1 and S2, no murmurs  Abd: BS present, soft, nontender, nondistended  Ext: 2+ distal pulses, no LE edema      Recent Labs   Lab Test  11/18/20  1054      POTASSIUM 4.2   CR 0.87        Diagnostics:  Labs pending at this time.  Results will be reviewed when available.   EKG: atrial rhythm, normal axis, normal intervals, no acute ST/T changes c/w ischemia, no LVH by voltage criteria, nonspecific ST-T changes    Revised Cardiac Risk Index (RCRI):  The patient has the following serious cardiovascular risks for perioperative complications:   - No serious cardiac risks = 0 points     RCRI Interpretation: 0 points: Class I (very low risk - 0.4% complication rate)           Signed Electronically by: Karissa Petersen MD  Copy of this evaluation report is provided to requesting physician.

## 2021-09-08 NOTE — H&P (VIEW-ONLY)
[address] [address]  Phone: [phone]  Primary Provider: Fifi Fleming  Pre-op Performing Provider: STEFANI LUCIANO      PREOPERATIVE EVALUATION:  Today's date: 9/8/2021    Angus Wynne is a 61 year old male who presents for a preoperative evaluation.    Surgical Information:  Surgery/Procedure: rectal  Surgery Location: [facility]  Surgeon:   Surgery Date: 9-9-21  Time of Surgery:   Where patient plans to recover: At home with family  Fax number for surgical facility: Note does not need to be faxed, will be available electronically in Epic.    Type of Anesthesia Anticipated: General    Assessment & Plan     The proposed surgical procedure is considered LOW risk.    Preop general physical exam  - TSH; Future  - CBC with platelets; Future  - TSH  - CBC with platelets  - EKG 12-lead complete w/read - Clinics    Anal fissure    Essential hypertension  Well controlled     Thrombocytopenia, unspecified (H)  plts are 149 today     Elevated LFTs  Requesting blood work that his pcp recommended   - Comprehensive metabolic panel (BMP + Alb, Alk Phos, ALT, AST, Total. Bili, TP)    Hyperlipidemia with target LDL less than 130  - Lipid panel reflex to direct LDL Non-fasting         Risks and Recommendations:  The patient has the following additional risks and recommendations for perioperative complications:   - No identified additional risk factors other than previously addressed    Medication Instructions:  Patient is to take all scheduled medications on the day of surgery    RECOMMENDATION:  APPROVAL GIVEN to proceed with proposed procedure, without further diagnostic evaluation.    Subjective     HPI related to upcoming procedure: Angus has been dealing with very painful anal fissure/rectal pain and frequent bowel movements for a few months.  He is undergoing surgery.       Preop Questions 9/8/2021   1. Have you ever had a heart  attack or stroke? No   2. Have you ever had surgery on your heart or blood vessels, such as a stent placement, a coronary artery bypass, or surgery on an artery in your head, neck, heart, or legs? No   3. Do you have chest pain with activity? No   4. Do you have a history of  heart failure? No   5. Do you currently have a cold, bronchitis or symptoms of other infection? No   6. Do you have a cough, shortness of breath, or wheezing? No   7. Do you or anyone in your family have previous history of blood clots? No   8. Do you or does anyone in your family have a serious bleeding problem such as prolonged bleeding following surgeries or cuts? No   9. Have you ever had problems with anemia or been told to take iron pills? No   10. Have you had any abnormal blood loss such as black, tarry or bloody stools? No   11. Have you ever had a blood transfusion? No   12. Are you willing to have a blood transfusion if it is medically needed before, during, or after your surgery? NO    13. Have you or any of your relatives ever had problems with anesthesia? No   14. Do you have sleep apnea, excessive snoring or daytime drowsiness? No   15. Do you have any artifical heart valves or other implanted medical devices like a pacemaker, defibrillator, or continuous glucose monitor? No   16. Do you have artificial joints? No   17. Are you allergic to latex? No       Health Care Directive:  Patient does not have a Health Care Directive or Living Will:     Preoperative Review of :   reviewed - controlled substances reflected in medication list.      Status of Chronic Conditions:  See problem list for active medical problems.  Problems all longstanding and stable, except as noted/documented.  See ROS for pertinent symptoms related to these conditions.      Review of Systems  Const, HEENT, cv, pulm, gi reviewed,  otherwise negative unless noted above.      Patient Active Problem List    Diagnosis Date Noted     Anal fissure 09/07/2021      Priority: Medium     Added automatically from request for surgery [id number]       Thrombocytopenia, unspecified (H) 04/18/2021     Priority: Medium     Elevated LFTs 07/14/2020     Priority: Medium     related to  alcoholic hapatitis previously       Dysphagia, unspecified type 06/11/2020     Priority: Medium     IBARRA (dyspnea on exertion) 07/19/2019     Priority: Medium     Obesity (BMI 30.0-34.9) 07/25/2018     Priority: Medium     Lumbago 02/26/2018     Priority: Medium     History of colonic polyps 02/15/2018     Priority: Medium     Vertigo 02/15/2018     Priority: Medium     Had had negative neurological workup in [place], few years ago       Nasal congestion 02/01/2017     Priority: Medium     chronic otc decongetsent use       Cervicalgia 05/02/2016     Priority: Medium     Cervical spondylosis without myelopathy 05/02/2016     Priority: Medium     Essential hypertension 05/02/2016     Priority: Medium     Hyperlipidemia with target LDL less than 130 05/02/2016     Priority: Medium     Neck pain 04/25/2016     Priority: Medium     chronic        Anxiety 03/08/2016     Priority: Medium     Alcohol use disorder 03/08/2016     Priority: Medium     excessive use, he has stopped now since 03/01/2016        BPPV (benign paroxysmal positional vertigo), unspecified laterality 12/28/2015     Priority: Medium      06/ 2015 , was seen at er in [place] and had mri brain, normal       Other specified hypothyroidism 12/28/2015     Priority: Medium     Hx of acute alcoholic hepatitis 12/28/2015     Priority: Medium     04/2014        Hx of colonic polyp 12/28/2015     Priority: Medium     s/p colonoscopy , need 3 yr f/u        Gastroesophageal reflux disease, esophagitis presence not specified 12/28/2015     Priority: Medium     s/p endoscopy 10/02/2015, but they were unable to do biopsy , so need another f/u check   IMO Regulatory Load OCT 2020       Vitamin D deficiency 12/28/2015     Priority: Medium      Past Medical  History:   Diagnosis Date     GERD (gastroesophageal reflux disease)      HTN (hypertension)      Hyperlipidemia      Hypothyroid      Vertigo      ,      Past Surgical History:   Procedure Laterality Date     AS ESOPHAGOSCOPY, DIAGNOSTIC       COLONOSCOPY       Current Outpatient Medications   Medication Sig Dispense Refill     clonazePAM (KLONOPIN) 0.5 MG tablet TAKE 1/2 TO ONE TABLET BY MOUTH AS NEEDED FOR ANXIETY 10 tablet 0     hydrocortisone, Perianal, (HYDROCORTISONE) 2.5 % cream Place rectally 2 times daily 30 g 0     levothyroxine (SYNTHROID/LEVOTHROID) 175 MCG tablet Take 1 tablet (175 mcg) by mouth daily 30 tablet 0     losartan (COZAAR) 100 MG tablet Take 1 tablet (100 mg) by mouth daily 90 tablet 0     omeprazole (PRILOSEC) 40 MG DR capsule TAKE 1 CAPSULE(40 MG) BY MOUTH DAILY 90 capsule 3     rosuvastatin (CRESTOR) 40 MG tablet Take 1 tablet (40 mg) by mouth daily 90 tablet 3     sertraline (ZOLOFT) 100 MG tablet TAKE 1 AND 1/2 TABLETS(150 MG) BY MOUTH DAILY WITH DINNER 135 tablet 2     traZODone (DESYREL) 50 MG tablet TAKE 1 TO 2 TABLETS BY MOUTH EVERY DAY FOR SLEEP 90 tablet 3       No Known Allergies     Social History     Tobacco Use     Smoking status: Former Smoker     Quit date: 2015     Years since quittin.9     Smokeless tobacco: Never Used   Substance Use Topics     Alcohol use: No     Alcohol/week: 0.0 standard drinks     Comment: 2-3 drinks a week       History   Drug Use No         Objective     /74 (Cuff Size: Adult Large)   Pulse 88   Temp 97.8  F (36.6  C) (Tympanic)   Wt 111.7 kg (246 lb 3.2 oz)   SpO2 98%   BMI 33.39 kg/m      Physical Exam  Gen: well appearing, pleasant man, no distress  HEENT: PERRL, sclera nonicteric, MMM  Neck: supple, no LAD  Pulm: breathing comfortably, CTAB, no wheezes or rales  CV: RRR, normal S1 and S2, no murmurs  Abd: BS present, soft, nontender, nondistended  Ext: 2+ distal pulses, no LE edema      Recent Labs   Lab Test  11/18/20  1054      POTASSIUM 4.2   CR 0.87        Diagnostics:  Labs pending at this time.  Results will be reviewed when available.   EKG: atrial rhythm, normal axis, normal intervals, no acute ST/T changes c/w ischemia, no LVH by voltage criteria, nonspecific ST-T changes    Revised Cardiac Risk Index (RCRI):  The patient has the following serious cardiovascular risks for perioperative complications:   - No serious cardiac risks = 0 points     RCRI Interpretation: 0 points: Class I (very low risk - 0.4% complication rate)           Signed Electronically by: Karissa Petersen MD  Copy of this evaluation report is provided to requesting physician.

## 2021-09-09 ENCOUNTER — PREP FOR PROCEDURE (OUTPATIENT)
Dept: SURGERY | Facility: CLINIC | Age: 61
End: 2021-09-09

## 2021-09-09 ENCOUNTER — TELEPHONE (OUTPATIENT)
Dept: SURGERY | Facility: CLINIC | Age: 61
End: 2021-09-09

## 2021-09-09 ENCOUNTER — ANESTHESIA (OUTPATIENT)
Dept: SURGERY | Facility: AMBULATORY SURGERY CENTER | Age: 61
End: 2021-09-09
Payer: COMMERCIAL

## 2021-09-09 ENCOUNTER — HOSPITAL ENCOUNTER (OUTPATIENT)
Facility: AMBULATORY SURGERY CENTER | Age: 61
End: 2021-09-09
Attending: COLON & RECTAL SURGERY
Payer: COMMERCIAL

## 2021-09-09 VITALS
BODY MASS INDEX: 32.51 KG/M2 | TEMPERATURE: 97 F | WEIGHT: 240 LBS | RESPIRATION RATE: 20 BRPM | OXYGEN SATURATION: 96 % | HEIGHT: 72 IN | HEART RATE: 78 BPM | DIASTOLIC BLOOD PRESSURE: 96 MMHG | SYSTOLIC BLOOD PRESSURE: 141 MMHG

## 2021-09-09 DIAGNOSIS — K60.2 ANAL FISSURE: ICD-10-CM

## 2021-09-09 LAB
ALBUMIN SERPL-MCNC: 3.4 G/DL (ref 3.4–5)
ALP SERPL-CCNC: 58 U/L (ref 40–150)
ALT SERPL W P-5'-P-CCNC: 51 U/L (ref 0–70)
ANION GAP SERPL CALCULATED.3IONS-SCNC: 6 MMOL/L (ref 3–14)
AST SERPL W P-5'-P-CCNC: 41 U/L (ref 0–45)
BILIRUB SERPL-MCNC: 0.6 MG/DL (ref 0.2–1.3)
BUN SERPL-MCNC: 9 MG/DL (ref 7–30)
CALCIUM SERPL-MCNC: 7.3 MG/DL (ref 8.5–10.1)
CHLORIDE BLD-SCNC: 103 MMOL/L (ref 94–109)
CHOLEST SERPL-MCNC: 228 MG/DL
CO2 SERPL-SCNC: 23 MMOL/L (ref 20–32)
CREAT SERPL-MCNC: 0.73 MG/DL (ref 0.66–1.25)
GFR SERPL CREATININE-BSD FRML MDRD: >90 ML/MIN/1.73M2
GLUCOSE BLD-MCNC: 111 MG/DL (ref 70–99)
HDLC SERPL-MCNC: 72 MG/DL
LDLC SERPL CALC-MCNC: 103 MG/DL
NONHDLC SERPL-MCNC: 156 MG/DL
POTASSIUM BLD-SCNC: 4 MMOL/L (ref 3.4–5.3)
PROT SERPL-MCNC: 7.2 G/DL (ref 6.8–8.8)
SODIUM SERPL-SCNC: 132 MMOL/L (ref 133–144)
TRIGL SERPL-MCNC: 263 MG/DL
TSH SERPL DL<=0.005 MIU/L-ACNC: 0.03 MU/L (ref 0.4–4)

## 2021-09-09 PROCEDURE — 88305 TISSUE EXAM BY PATHOLOGIST: CPT | Mod: 26 | Performed by: PATHOLOGY

## 2021-09-09 PROCEDURE — 46505 CHEMODENERVATION ANAL MUSC: CPT

## 2021-09-09 PROCEDURE — 88305 TISSUE EXAM BY PATHOLOGIST: CPT | Mod: TC | Performed by: COLON & RECTAL SURGERY

## 2021-09-09 PROCEDURE — 46505 CHEMODENERVATION ANAL MUSC: CPT | Performed by: COLON & RECTAL SURGERY

## 2021-09-09 RX ORDER — ONDANSETRON 4 MG/1
4 TABLET, ORALLY DISINTEGRATING ORAL
Status: DISCONTINUED | OUTPATIENT
Start: 2021-09-09 | End: 2021-09-10 | Stop reason: HOSPADM

## 2021-09-09 RX ORDER — SODIUM CHLORIDE, SODIUM LACTATE, POTASSIUM CHLORIDE, CALCIUM CHLORIDE 600; 310; 30; 20 MG/100ML; MG/100ML; MG/100ML; MG/100ML
INJECTION, SOLUTION INTRAVENOUS CONTINUOUS
Status: DISCONTINUED | OUTPATIENT
Start: 2021-09-09 | End: 2021-09-09 | Stop reason: HOSPADM

## 2021-09-09 RX ORDER — FENTANYL CITRATE 50 UG/ML
25 INJECTION, SOLUTION INTRAMUSCULAR; INTRAVENOUS EVERY 5 MIN PRN
Status: DISCONTINUED | OUTPATIENT
Start: 2021-09-09 | End: 2021-09-09 | Stop reason: HOSPADM

## 2021-09-09 RX ORDER — ONDANSETRON 4 MG/1
4 TABLET, ORALLY DISINTEGRATING ORAL EVERY 30 MIN PRN
Status: DISCONTINUED | OUTPATIENT
Start: 2021-09-09 | End: 2021-09-10 | Stop reason: HOSPADM

## 2021-09-09 RX ORDER — KETAMINE HYDROCHLORIDE 10 MG/ML
INJECTION INTRAMUSCULAR; INTRAVENOUS PRN
Status: DISCONTINUED | OUTPATIENT
Start: 2021-09-09 | End: 2021-09-09

## 2021-09-09 RX ORDER — ONDANSETRON 2 MG/ML
INJECTION INTRAMUSCULAR; INTRAVENOUS PRN
Status: DISCONTINUED | OUTPATIENT
Start: 2021-09-09 | End: 2021-09-09

## 2021-09-09 RX ORDER — LIDOCAINE 40 MG/G
CREAM TOPICAL
Status: DISCONTINUED | OUTPATIENT
Start: 2021-09-09 | End: 2021-09-09 | Stop reason: HOSPADM

## 2021-09-09 RX ORDER — LIDOCAINE HYDROCHLORIDE 20 MG/ML
INJECTION, SOLUTION INFILTRATION; PERINEURAL PRN
Status: DISCONTINUED | OUTPATIENT
Start: 2021-09-09 | End: 2021-09-09

## 2021-09-09 RX ORDER — GLYCOPYRROLATE 0.2 MG/ML
INJECTION, SOLUTION INTRAMUSCULAR; INTRAVENOUS PRN
Status: DISCONTINUED | OUTPATIENT
Start: 2021-09-09 | End: 2021-09-09

## 2021-09-09 RX ORDER — DEXAMETHASONE SODIUM PHOSPHATE 4 MG/ML
INJECTION, SOLUTION INTRA-ARTICULAR; INTRALESIONAL; INTRAMUSCULAR; INTRAVENOUS; SOFT TISSUE PRN
Status: DISCONTINUED | OUTPATIENT
Start: 2021-09-09 | End: 2021-09-09

## 2021-09-09 RX ORDER — OXYCODONE HYDROCHLORIDE 5 MG/1
5 TABLET ORAL EVERY 4 HOURS PRN
Status: DISCONTINUED | OUTPATIENT
Start: 2021-09-09 | End: 2021-09-10 | Stop reason: HOSPADM

## 2021-09-09 RX ORDER — PROPOFOL 10 MG/ML
INJECTION, EMULSION INTRAVENOUS CONTINUOUS PRN
Status: DISCONTINUED | OUTPATIENT
Start: 2021-09-09 | End: 2021-09-09

## 2021-09-09 RX ORDER — ACETAMINOPHEN 325 MG/1
975 TABLET ORAL ONCE
Status: COMPLETED | OUTPATIENT
Start: 2021-09-09 | End: 2021-09-09

## 2021-09-09 RX ORDER — ONDANSETRON 2 MG/ML
4 INJECTION INTRAMUSCULAR; INTRAVENOUS EVERY 30 MIN PRN
Status: DISCONTINUED | OUTPATIENT
Start: 2021-09-09 | End: 2021-09-10 | Stop reason: HOSPADM

## 2021-09-09 RX ORDER — SODIUM CHLORIDE, SODIUM LACTATE, POTASSIUM CHLORIDE, CALCIUM CHLORIDE 600; 310; 30; 20 MG/100ML; MG/100ML; MG/100ML; MG/100ML
INJECTION, SOLUTION INTRAVENOUS CONTINUOUS
Status: DISCONTINUED | OUTPATIENT
Start: 2021-09-09 | End: 2021-09-10 | Stop reason: HOSPADM

## 2021-09-09 RX ORDER — BUPIVACAINE HYDROCHLORIDE AND EPINEPHRINE 2.5; 5 MG/ML; UG/ML
INJECTION, SOLUTION EPIDURAL; INFILTRATION; INTRACAUDAL; PERINEURAL PRN
Status: DISCONTINUED | OUTPATIENT
Start: 2021-09-09 | End: 2021-09-09 | Stop reason: HOSPADM

## 2021-09-09 RX ORDER — HYDROMORPHONE HYDROCHLORIDE 1 MG/ML
0.2 INJECTION, SOLUTION INTRAMUSCULAR; INTRAVENOUS; SUBCUTANEOUS EVERY 5 MIN PRN
Status: DISCONTINUED | OUTPATIENT
Start: 2021-09-09 | End: 2021-09-09 | Stop reason: HOSPADM

## 2021-09-09 RX ORDER — KETOROLAC TROMETHAMINE 30 MG/ML
INJECTION, SOLUTION INTRAMUSCULAR; INTRAVENOUS PRN
Status: DISCONTINUED | OUTPATIENT
Start: 2021-09-09 | End: 2021-09-09

## 2021-09-09 RX ORDER — PROPOFOL 10 MG/ML
INJECTION, EMULSION INTRAVENOUS PRN
Status: DISCONTINUED | OUTPATIENT
Start: 2021-09-09 | End: 2021-09-09

## 2021-09-09 RX ADMIN — DEXAMETHASONE SODIUM PHOSPHATE 4 MG: 4 INJECTION, SOLUTION INTRA-ARTICULAR; INTRALESIONAL; INTRAMUSCULAR; INTRAVENOUS; SOFT TISSUE at 13:08

## 2021-09-09 RX ADMIN — ACETAMINOPHEN 975 MG: 325 TABLET ORAL at 11:11

## 2021-09-09 RX ADMIN — PROPOFOL 200 MCG/KG/MIN: 10 INJECTION, EMULSION INTRAVENOUS at 13:10

## 2021-09-09 RX ADMIN — SODIUM CHLORIDE, SODIUM LACTATE, POTASSIUM CHLORIDE, CALCIUM CHLORIDE: 600; 310; 30; 20 INJECTION, SOLUTION INTRAVENOUS at 11:11

## 2021-09-09 RX ADMIN — PROPOFOL 50 MG: 10 INJECTION, EMULSION INTRAVENOUS at 13:34

## 2021-09-09 RX ADMIN — KETAMINE HYDROCHLORIDE 10 MG: 10 INJECTION INTRAMUSCULAR; INTRAVENOUS at 13:22

## 2021-09-09 RX ADMIN — LIDOCAINE HYDROCHLORIDE 100 MG: 20 INJECTION, SOLUTION INFILTRATION; PERINEURAL at 13:08

## 2021-09-09 RX ADMIN — ONDANSETRON 4 MG: 2 INJECTION INTRAMUSCULAR; INTRAVENOUS at 13:08

## 2021-09-09 RX ADMIN — PROPOFOL 50 MG: 10 INJECTION, EMULSION INTRAVENOUS at 13:35

## 2021-09-09 RX ADMIN — GLYCOPYRROLATE 0.2 MG: 0.2 INJECTION, SOLUTION INTRAMUSCULAR; INTRAVENOUS at 13:08

## 2021-09-09 RX ADMIN — KETAMINE HYDROCHLORIDE 10 MG: 10 INJECTION INTRAMUSCULAR; INTRAVENOUS at 13:18

## 2021-09-09 RX ADMIN — KETOROLAC TROMETHAMINE 30 MG: 30 INJECTION, SOLUTION INTRAMUSCULAR; INTRAVENOUS at 13:15

## 2021-09-09 ASSESSMENT — MIFFLIN-ST. JEOR: SCORE: 1921.63

## 2021-09-09 NOTE — ANESTHESIA PREPROCEDURE EVALUATION
Anesthesia Pre-Procedure Evaluation    Patient: Angus Wynne   MRN: [id number] : 1960        Preoperative Diagnosis: Anal fissure [K60.2]   Procedure : Procedure(s):  EXAM UNDER ANESTHESIA, ANUS,possible sphincterotomy  POSSIBLE HEMORRHOIDECTOMY, INTERNAL  POSSIBLE INJECTION, ONABOTULINUMTOXINA     Past Medical History:   Diagnosis Date     GERD (gastroesophageal reflux disease)      HTN (hypertension)      Hyperlipidemia      Hypothyroid      Vertigo      ,       Past Surgical History:   Procedure Laterality Date     AS ESOPHAGOSCOPY, DIAGNOSTIC       COLONOSCOPY        No Known Allergies   Social History     Tobacco Use     Smoking status: Former Smoker     Quit date: 2015     Years since quittin.9     Smokeless tobacco: Never Used   Substance Use Topics     Alcohol use: No     Alcohol/week: 0.0 standard drinks     Comment: 2-3 drinks a week      Wt Readings from Last 1 Encounters:   21 108.9 kg (240 lb)              OUTSIDE LABS:  CBC:   Lab Results   Component Value Date    WBC 6.4 2021    WBC 6.3 2019    HGB 14.3 2021    HGB 14.2 2019    HCT 42.0 2021    HCT 40.6 2019     (L) 2021     (L) 2019     BMP:   Lab Results   Component Value Date     2020     2019    POTASSIUM 4.2 2020    POTASSIUM 4.4 2019    CHLORIDE 103 2020    CHLORIDE 106 2019    CO2 26 2020    CO2 25 2019    BUN 12 2020    BUN 11 2019    CR 0.87 2020    CR 0.91 2019     (H) 2020     (H) 2019     COAGS:   Lab Results   Component Value Date    INR 0.95 2017     POC: No results found for: BGM, HCG, HCGS  HEPATIC:   Lab Results   Component Value Date    ALBUMIN 4.0 2020    PROTTOTAL 7.7 2020    ALT 74 (H) 2020    AST 98 (H) 2020     (H) 2016    ALKPHOS 59 2020    BILITOTAL 0.4 2020     OTHER:   Lab  Results   Component Value Date    SAMMY 9.3 11/18/2020    TSH 10.48 (H) 03/30/2021    T4 0.80 03/30/2021    CRP <2.9 02/20/2018    SED 9 02/20/2018       Anesthesia Plan    ASA Status:  3      Anesthesia Type: General.              Consents    Anesthesia Plan(s) and associated risks, benefits, and realistic alternatives discussed. Questions answered and patient/representative(s) expressed understanding.     - Discussed with:  Patient         Postoperative Care    Pain management: Multi-modal analgesia.   PONV prophylaxis: Ondansetron (or other 5HT-3), Background Propofol Infusion     Comments:                Taylor Lyle MD

## 2021-09-09 NOTE — TELEPHONE ENCOUNTER
Left vm msg for patient and sent [employer] message requesting that he arrive earlier than initially discussed for surgery today, now at 10:45 AM for a 12:15 PM procedure at the [facility].     Sent [employer] message with this info as well.    When I do get in touch with patient, will update him that his former surgeon Dr. Armando is unexpectedly out today, so his surgery will be done by Dr. Garduno instead.

## 2021-09-09 NOTE — DISCHARGE INSTRUCTIONS
Anorectal Surgery Instructions    What can I expect after anorectal surgery?  Most anorectal procedures are done as outpatient surgery, and you go home the same day as the procedure. A few surgical procedures will require that you stay in the hospital for about one to three days. No matter where the procedure is done or how long or short it takes, these recommendations will help you heal and feel more comfortable.    Medicines:  The anal area is very sensitive; you can expect to have some pain for up to 2-4 weeks after the procedure. Your doctor will give you a prescription for one or more pain medications.    Take naprosyn 500 mg twice a day OR ibuprofen 600 mg four times a day     Take this on a regular basis (not as needed) following your surgery.     The drugs are best taken with food.  Do not take if it causes stomach upset or if you have a history of ulcers or gastritis. You can stop the naprosyn (or ibuprofen) or reduce the dose when you are feeling better.    DO NOT use naprosyn, ibuprofen, or other similar agents (eg. Advil or Aleve) if you have inflammatory bowel disease (Ulcerative Colitis or Crohn's disease) or if your doctor as advised you against using these medications    Take acetominaphen (Tylenol) 650-1000 mg four times a day.     Take this on a regular basis (not as needed) following surgery for pain control.     Take the lower dose if you are >65 years old or have liver disease. The maximum dose of acetominaphen is 4000 mg a day. You can stop the acetaminophen or reduce the dose when you are feeling better.    It is important to realize that many narcotic pain relievers (including vicodin, percocet, tylenol #3) also have acetaminophen, and excessive doses of acetaminophen can be dangerous, so do not take these in addition to acetominaphen.  You may take narcotics that don't contain acetominaphen such as oxycodone.      Take oxycodone AS NEEDED in addition to the acetominaphen and naprosyn.       Because narcotics have side effects (including constipation), you should reduce your use of these medications as tolerated as your pain improves.    *In general, the best strategy is to take (if you are able to tolerate it) the tylenol and naprosen on a regular basis until your pain has largely gone away. You can take the narcotic pain medicine as needed in addition to the tylenol and ibuprofen. As your pain begins to lessen, you should cut back on your narcotic use while continuing to take your regular tylenol and naprosyn doses.      Refilling prescriptions. If you need additional pain medication, please call the triage nurse at [phone] during normal business hours (8 a.m. to 4 p.m., Monday though Friday) or have your pharmacy fax a refill request to [phone]. If you call after hours or on the weekends, the doctor on call may not know you personally and may not renew narcotic pain medication by phone. Call your primary care provider for all other medication refills.    Perineal care:  External gauze dressing can be removed the morning after surgery. If you have an adhesive dressing stuck to the incision, DO NOT remove this.   Tub baths:    If possible, take a tub bath immediately after each bowel movement.     Baths should be take at least 3 times daily for the first week to 10 days following your procedure. You should soak in the tub for 10 to 15 minutes each time with water as warm as you can tolerate.     Even after you go back to work, it is a good idea to sit in the tub in the morning, after returning from work, and again in the evening before bedtime.    Bleeding/Infection:    You can expect to have some bleeding after bowel movements, but it should stop soon after you wipe.     Use a wet cloth or perianal pad (Tucks or Preparation H pads) to gently wipe the area after each bowel movement.    Do not rub the anal area or use a lot of pressure.    Using a spray bottle filled with warm water helps  loosen any remaining stool. Blot gently with a soft dry cloth or tissue paper.    Infection around the anal opening is not very common. The anal area has excellent blood supply, which helps the area to heal. Bloody discharge after bowel movements is normal and may last 2 to 4 weeks after your surgery. However, if you bleed between bowel movements and cannot get it to stop, call the triage nurse immediately [phone].    Bowel function:  Take a fiber supplement such as Metamucil, which is over the counter. It is important to drink six to eight glasses of water or juice everyday when using fiber products.    If you do not have a bowel movement after 1-2 days:    Take Milk of Magnesia-2 tablespoons.       If there are no results, repeat this or add over the counter Miralax.      If you still do not have results, contact the clinic.     If there are no results, repeat this. Stop taking Milk of Magnesia or other laxatives if you begin to have diarrhea.    * Constipation will cause you to strain when you have a bowel movement. The hard stool will be difficult to pass, will increase pain and bleeding, and will slow down healing.  Try to avoid constipation and/or diarrhea as this can make the pain and bleeding worse.    * It is important to have regular bowel movements at least every other day and to keep your stool soft.  A high fiber diet, including at least four servings of fruits or vegetables daily, will help to keep your bowel movements regular and soft.    Activity:  After your procedure, there are no restrictions on your activity     except restrictions surrounding being on narcotics and in pain, such as no heavy machine operating or driving.     You may walk, climb stairs, ride in a car, and sit as tolerated.     It is helpful to avoid sitting in one position for long periods (2 or more hours).    After some surgeries, you may be told not to perform any lifting (more than 10 pounds) for several weeks after  surgery.    When to call:  When do I need to call the doctor or triage nurse?    If you experience any of the problems listed here, call our triage nurse during business hours ([phone]).     The nurse will help you with your problem or have the doctor call you.     After hours and on weekends, please call the main hospital number ([phone]) and ask for the colon and rectal surgery person on call.     Some is available to help you 24 hours a day, seven days a week.    Call for:   ? Fever greater than 101 degrees   ? Chills   ? Foul-smelling drainage   ? Nausea and vomiting   ? Diarrhea - greater than 3 water stools in 24 hours   ? Constipation - no bowel movement after 3 days   ? Severe bleeding that does not stop soon after a bowel movement   ? Problems with the incision, including increased pain, swelling, or redness  Anorectal Surgery Instructions    What can I expect after anorectal surgery?  Most anorectal procedures are done as outpatient surgery, and you go home the same day as the procedure. A few surgical procedures will require that you stay in the hospital for about one to three days. No matter where the procedure is done or how long or short it takes, these recommendations will help you heal and feel more comfortable.    Medicines:  The anal area is very sensitive; you can expect to have some pain for up to 2-4 weeks after the procedure. Your doctor will give you a prescription for one or more pain medications.    Take naprosyn 500 mg twice a day OR ibuprofen 600 mg four times a day     Take this on a regular basis (not as needed) following your surgery.     The drugs are best taken with food.  Do not take if it causes stomach upset or if you have a history of ulcers or gastritis. You can stop the naprosyn (or ibuprofen) or reduce the dose when you are feeling better.    DO NOT use naprosyn, ibuprofen, or other similar agents (eg. Advil or Aleve) if you have inflammatory bowel disease (Ulcerative  Colitis or Crohn's disease) or if your doctor as advised you against using these medications    Take acetominaphen (Tylenol) 650-1000 mg four times a day.     Take this on a regular basis (not as needed) following surgery for pain control.     Take the lower dose if you are >65 years old or have liver disease. The maximum dose of acetominaphen is 4000 mg a day. You can stop the acetaminophen or reduce the dose when you are feeling better.    It is important to realize that many narcotic pain relievers (including vicodin, percocet, tylenol #3) also have acetaminophen, and excessive doses of acetaminophen can be dangerous, so do not take these in addition to acetominaphen.  You may take narcotics that don't contain acetominaphen such as oxycodone.      Take oxycodone AS NEEDED in addition to the acetominaphen and naprosyn.      Because narcotics have side effects (including constipation), you should reduce your use of these medications as tolerated as your pain improves.    *In general, the best strategy is to take (if you are able to tolerate it) the tylenol and naprosen on a regular basis until your pain has largely gone away. You can take the narcotic pain medicine as needed in addition to the tylenol and ibuprofen. As your pain begins to lessen, you should cut back on your narcotic use while continuing to take your regular tylenol and naprosyn doses.      Refilling prescriptions. If you need additional pain medication, please call the triage nurse at [phone] during normal business hours (8 a.m. to 4 p.m., Monday though Friday) or have your pharmacy fax a refill request to [phone]. If you call after hours or on the weekends, the doctor on call may not know you personally and may not renew narcotic pain medication by phone. Call your primary care provider for all other medication refills.    Perineal care:  External gauze dressing can be removed the morning after surgery. If you have an adhesive dressing  stuck to the incision, DO NOT remove this.   Tub baths:    If possible, take a tub bath immediately after each bowel movement.     Baths should be take at least 3 times daily for the first week to 10 days following your procedure. You should soak in the tub for 10 to 15 minutes each time with water as warm as you can tolerate.     Even after you go back to work, it is a good idea to sit in the tub in the morning, after returning from work, and again in the evening before bedtime.    Bleeding/Infection:    You can expect to have some bleeding after bowel movements, but it should stop soon after you wipe.     Use a wet cloth or perianal pad (Tucks or Preparation H pads) to gently wipe the area after each bowel movement.    Do not rub the anal area or use a lot of pressure.    Using a spray bottle filled with warm water helps loosen any remaining stool. Blot gently with a soft dry cloth or tissue paper.    Infection around the anal opening is not very common. The anal area has excellent blood supply, which helps the area to heal. Bloody discharge after bowel movements is normal and may last 2 to 4 weeks after your surgery. However, if you bleed between bowel movements and cannot get it to stop, call the triage nurse immediately [phone].    Bowel function:  Take a fiber supplement such as Metamucil, which is over the counter. It is important to drink six to eight glasses of water or juice everyday when using fiber products.    If you do not have a bowel movement after 1-2 days:    Take Milk of Magnesia-2 tablespoons.       If there are no results, repeat this or add over the counter Miralax.      If you still do not have results, contact the clinic.     If there are no results, repeat this. Stop taking Milk of Magnesia or other laxatives if you begin to have diarrhea.    * Constipation will cause you to strain when you have a bowel movement. The hard stool will be difficult to pass, will increase pain and bleeding,  and will slow down healing.  Try to avoid constipation and/or diarrhea as this can make the pain and bleeding worse.    * It is important to have regular bowel movements at least every other day and to keep your stool soft.  A high fiber diet, including at least four servings of fruits or vegetables daily, will help to keep your bowel movements regular and soft.    Activity:  After your procedure, there are no restrictions on your activity     except restrictions surrounding being on narcotics and in pain, such as no heavy machine operating or driving.     You may walk, climb stairs, ride in a car, and sit as tolerated.     It is helpful to avoid sitting in one position for long periods (2 or more hours).    After some surgeries, you may be told not to perform any lifting (more than 10 pounds) for several weeks after surgery.    When to call:  When do I need to call the doctor or triage nurse?    If you experience any of the problems listed here, call our triage nurse during business hours ([phone]).     The nurse will help you with your problem or have the doctor call you.     After hours and on weekends, please call the main hospital number ([phone]) and ask for the colon and rectal surgery person on call.     Some is available to help you 24 hours a day, seven days a week.    Call for:   ? Fever greater than 101 degrees   ? Chills   ? Foul-smelling drainage   ? Nausea and vomiting   ? Diarrhea - greater than 3 water stools in 24 hours   ? Constipation - no bowel movement after 3 days   ? Severe bleeding that does not stop soon after a bowel movement   ? Problems with the incision, including increased pain, swelling, or redness    [facility]  Home Care Following Anesthesia  For 24 hours after surgery:  1. Get plenty of rest.  A responsible adult must stay with you for at least 24 hours after you leave the surgery center.  2. Do not drive or use heavy equipment.  If  you have weakness or tingling, don't drive or use heavy equipment until this feeling goes away.   3. Do not drink alcohol.   4. Avoid strenuous or risky activities.  Ask for help when climbing stairs.  5. You may feel lightheaded.  IF so, sit for a few minutes before standing.  Have someone help you get up.   6. If you have nausea (feel sick to your stomach): Drink only clear liquids such as apple juice, ginger ale, broth or 7-Up.  Rest may also help.  Be sure to drink enough fluids.  Move to a regular diet as you feel able.   7. You may have a slight fever.  Call the doctor if your fever is over 100 F (37.7 C) (taken under the tongue) or lasts longer than 24 hours.  8. You may have a dry mouth, a sore throat, muscle aches or trouble sleeping. These should go away after 24 hours.  9. Do not make important or legal decisions.   10. It is recommended to avoid smoking.          Tips for taking pain medications  To get the best pain relief possible, remember these points:    Take pain medications as directed, before pain becomes severe.    Pain medication can upset your stomach: taking it with food may help.    Constipation is a common side effect of pain medication. Drink plenty of  fluids.    Eat foods high in fiber. Take a stool softener if recommended by your doctor or pharmacist.    Do not drink alcohol, drive or operate machinery while taking pain medications.    Ask about other ways to control pain, such as with heat, ice or relaxation.    Tylenol/Acetaminophen Consumption  To help encourage the safe use of acetaminophen, the makers of TYLENOL  have lowered the maximum daily dose for single-ingredient Extra Strength TYLENOL  (acetaminophen) products sold in the [place] from 8 pills per day (4,000 mg) to 6 pills per day (3,000 mg). The dosing interval has also changed from 2 pills every 4-6 hours to 2 pills every 6 hours.    If you feel your pain relief is insufficient, you may take Tylenol/Acetaminophen in addition  to your narcotic pain medication.     Be careful not to exceed 3,000 mg of Tylenol/Acetaminophen in a 24 hour period from all sources.    If you are taking extra strength Tylenol/acetaminophen (500 mg), the maximum dose is 6 tablets in 24 hours.    If you are taking regular strength acetaminophen (325 mg), the maximum dose is 9 tablets in 24 hours.    Call a doctor for any of the followin. Signs of infection (fever, growing tenderness at the surgery site, a large amount of drainage or bleeding, severe pain, foul-smelling drainage, redness, swelling).  2. It has been over 8 to 10 hours since surgery and you are still not able to urinate (pass water).  3. Headache for over 24 hours.  4. Numbness, tingling or weakness the day after surgery (if you had spinal anesthesia).  5. Signs of Covid-19 infection (temperature over 100 degrees, shortness of breath, cough, loss of taste/smell, generalized body aches, persistent headache, chills, sore throat, nausea/vomiting/diarrhea)  Your doctor is:       Dr. Henrik Garduno, Colon Rectal: [phone]               Or dial [phone] and ask for the resident on call for:  Colon Rectal  For emergency care, call the:  [facility]:  [phone] (TTY for hearing impaired: [phone])

## 2021-09-09 NOTE — ANESTHESIA POSTPROCEDURE EVALUATION
Patient: Angus Wynne    Procedure(s):  EXAM UNDER ANESTHESIA, ANUS, ANAL BISOPY, BOTOX INJECTION    Diagnosis:Anal fissure [K60.2]  Diagnosis Additional Information: No value filed.    Anesthesia Type:  General    Note:  Disposition: Outpatient   Postop Pain Control: Uneventful            Sign Out: Well controlled pain   PONV: No   Neuro/Psych: Uneventful            Sign Out: Acceptable/Baseline neuro status   Airway/Respiratory: Uneventful            Sign Out: Acceptable/Baseline resp. status   CV/Hemodynamics: Uneventful            Sign Out: Acceptable CV status; No obvious hypovolemia; No obvious fluid overload   Other NRE: NONE   DID A NON-ROUTINE EVENT OCCUR? No           Last vitals:  Vitals Value Taken Time   /72 09/09/21 1347   Temp 35.9  C (96.6  F) 09/09/21 1347   Pulse     Resp 20 09/09/21 1347   SpO2         Electronically Signed By: Taylor Lyle MD  September 9, 2021  2:19 PM

## 2021-09-09 NOTE — BRIEF OP NOTE
[facility] [place]    Brief Operative Note    Pre-operative diagnosis: Anal fissure [K60.2]  Post-operative diagnosis anal erosion; mixed hemorrhoids    Procedure: Procedure(s):  EXAM UNDER ANESTHESIA, ANUS, ANAL BISOPY, BOTOX INJECTION  Surgeon: Surgeon(s) and Role:     * Henrik Garduno MD - Primary  Anesthesia: General   Estimated blood loss: Minimal  Drains: None  Specimens:   ID Type Source Tests Collected by Time Destination   1 : posterior midline anal margin Biopsy Anus SURGICAL PATHOLOGY EXAM Henrik Garduno MD 9/9/2021  1:32 PM      Findings:   minimal erosion PML anal margin; modest mixed hemorrhoids.  Complications: None.  Implants: * No implants in log *

## 2021-09-09 NOTE — OP NOTE
"Procedure Date: 09/09/2021    PREOPERATIVE DIAGNOSES:    1.  Anal pain.  2.  History of anal fissure.    POSTOPERATIVE DIAGNOSES:     1.  Anal pain.  2.  No fissure identified.  3.  Minimal posterior midline anal margin mucosal erosion.  4.  Mixed hemorrhoids.    PROCEDURES PERFORMED:  Examination under anesthesia with biopsy of anal erosion and Botox injection.    INDICATIONS FOR PROCEDURE:  Angus Wynne is a 61-year-old man who was seen in Colorectal Surgery Clinic with complaints of 2 months of severe anal pain.  The pain was associated with defecation.  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  He has symptoms of obstructed defecation related to this and says he goes to the bathroom up to 40 times daily; however, he denies any problems with bowel leakage.  He has been treated with topical nitroglycerin without success.  He has not been having fevers and denies anal drainage.  Examination in clinic was difficult, but given the extent of the patient's pain he was referred for examination under anesthesia with possible sphincterotomy, possible Botox injection, or possible other procedures as indicated by the operative findings.  I discussed the procedure in detail with the patient in the preoperative area.  I discussed the pros and cons of sphincterotomy for fissure versus Botox injection.  We specifically discussed the risk of alterations in continence associated with anorectal surgery, particularly sphincterotomy.  I answered all of the patient's questions to his stated satisfaction.  He expressed understanding and eagerness to proceed.    SURGEON:  Henrik Garduno MD    ASSISTANT:  None.    DESCRIPTION OF PROCEDURE:  With the patient in the prone jackknife position and the buttocks taped apart, under intravenous sedation by Anesthesia, the perianal skin was prepped and draped in a sterile fashion.  A timeout was performed.  Local infiltration of 0.25% " Marcaine with epinephrine was utilized and a satisfactory perianal examination was obtained.  Examination under anesthesia demonstrated plump, nonthrombosed external hemorrhoids circumferentially.  Careful digital rectal examination showed a minimal palpable band of palpable internal sphincter in the distal anal canal measuring less than 1 cm in length.  There was no anal stenosis.  There were no palpable masses.  There was no erythema or evidence of a perianal abscess.  On anoscopic examination, modest internal hemorrhoids were noted in the left lateral and right posterolateral positions.  These were not grossly enlarged and the hemorrhoid tissue was not redundant enough to prolapse.  There was friability at the anal margin posteriorly over a very short distance, but no gross inflammation.  There was no scarring to suggest a prior fissure.  I used a crypt hook to interrogate the anal crypts and could not find anything suspicious for an internal fistula opening.  I obtained a small biopsy of the posterior midline anal margin, but I anticipate this will return as being normal tissue.  The tiny amount of bleeding was controlled with pinpoint electrocautery.  Given the slight anal tightness and the patient's ongoing symptoms, I chose to treat him with a Botox injection.  I injected a total of 100 mL in divided doses in the intersphincteric groove posteriorly extending from the left posterolateral to right posterolateral positions.  With this, the procedure was terminated.    ESTIMATED BLOOD LOSS:  Minimal.    Sponge, needle and instrument counts were reported as correct at the conclusion of the case x2.  There were no immediate operative complications.     Henrik Garduno MD        D: 2021   T: 2021   MT: [id number]    Name:     WILLIAM HARRIS  MRN:      [id number]        Account:        [id number]   :      1960           Procedure Date: 2021     Document: [id number]    cc:  Fifi Fleming MD    Elvira Herrera, MSN, NP-C

## 2021-09-09 NOTE — ANESTHESIA CARE TRANSFER NOTE
Patient: Angus Wynne    Procedure(s):  EXAM UNDER ANESTHESIA, ANUS, ANAL BISOPY,  POSSIBLE HEMORRHOIDECTOMY, INTERNAL  POSSIBLE INJECTION, ONABOTULINUMTOXINA    Diagnosis: Anal fissure [K60.2]  Diagnosis Additional Information: No value filed.    Anesthesia Type:   General     Note:    Oropharynx: oropharynx clear of all foreign objects and spontaneously breathing  Level of Consciousness: awake  Oxygen Supplementation: room air    Independent Airway: airway patency satisfactory and stable  Dentition: dentition unchanged  Vital Signs Stable: post-procedure vital signs reviewed and stable  Report to RN Given: handoff report given  Patient transferred to: Phase II    Handoff Report: Identifed the Patient, Identified the Reponsible Provider, Reviewed the pertinent medical history, Discussed the surgical course, Reviewed Intra-OP anesthesia mangement and issues during anesthesia, Set expectations for post-procedure period and Allowed opportunity for questions and acknowledgement of understanding      Vitals:  Vitals Value Taken Time   BP     Temp     Pulse     Resp     SpO2         Electronically Signed By: YUKO Grimes CRNA  September 9, 2021  1:43 PM

## 2021-09-10 LAB
PATH REPORT.COMMENTS IMP SPEC: NORMAL
PATH REPORT.GROSS SPEC: NORMAL
PATH REPORT.MICROSCOPIC SPEC OTHER STN: NORMAL
PATH REPORT.RELEVANT HX SPEC: NORMAL
PHOTO IMAGE: NORMAL

## 2021-09-15 ENCOUNTER — TELEPHONE (OUTPATIENT)
Dept: SURGERY | Facility: CLINIC | Age: 61
End: 2021-09-15

## 2021-09-15 NOTE — TELEPHONE ENCOUNTER
Scheduled patient for HRA with FRANCESCA Marr. Patient is anxious and wants this set up for October.      Heather Guadalupe, CMA

## 2021-09-22 ENCOUNTER — TELEPHONE (OUTPATIENT)
Dept: SURGERY | Facility: CLINIC | Age: 61
End: 2021-09-22

## 2021-09-22 NOTE — TELEPHONE ENCOUNTER
Health Call Center    Phone Message    May a detailed message be left on voicemail: yes     Reason for Call: Other: Angus is calling in asking for a call back. He states that he is still dealing with a lot of pain following his procedure with Dr. Garduno and painkillers do not seem to alleviate this pain. He is wondering if there is any way to move up his appointments with Elvira. Please call back as soon as possible to discuss.     Action Taken: Message routed to:  [facility] ([facility]/Rectal    Travel Screening: Not Applicable

## 2021-09-28 ENCOUNTER — OFFICE VISIT (OUTPATIENT)
Dept: SURGERY | Facility: CLINIC | Age: 61
End: 2021-09-28
Payer: COMMERCIAL

## 2021-09-28 ENCOUNTER — TELEPHONE (OUTPATIENT)
Dept: SURGERY | Facility: CLINIC | Age: 61
End: 2021-09-28

## 2021-09-28 ENCOUNTER — DOCUMENTATION ONLY (OUTPATIENT)
Dept: SURGERY | Facility: CLINIC | Age: 61
End: 2021-09-28

## 2021-09-28 ENCOUNTER — LAB (OUTPATIENT)
Dept: LAB | Facility: CLINIC | Age: 61
End: 2021-09-28
Attending: SURGERY
Payer: COMMERCIAL

## 2021-09-28 VITALS
HEIGHT: 72 IN | BODY MASS INDEX: 32.51 KG/M2 | HEART RATE: 92 BPM | OXYGEN SATURATION: 100 % | WEIGHT: 240 LBS | DIASTOLIC BLOOD PRESSURE: 82 MMHG | SYSTOLIC BLOOD PRESSURE: 135 MMHG

## 2021-09-28 DIAGNOSIS — K59.09 OTHER CONSTIPATION: ICD-10-CM

## 2021-09-28 DIAGNOSIS — Z20.822 ENCOUNTER FOR LABORATORY TESTING FOR COVID-19 VIRUS: ICD-10-CM

## 2021-09-28 DIAGNOSIS — K60.2 ANAL FISSURE: Primary | ICD-10-CM

## 2021-09-28 DIAGNOSIS — K62.89 ANAL PAIN: ICD-10-CM

## 2021-09-28 LAB — SARS-COV-2 RNA RESP QL NAA+PROBE: NEGATIVE

## 2021-09-28 PROCEDURE — U0005 INFEC AGEN DETEC AMPLI PROBE: HCPCS | Mod: 90 | Performed by: PATHOLOGY

## 2021-09-28 PROCEDURE — U0003 INFECTIOUS AGENT DETECTION BY NUCLEIC ACID (DNA OR RNA); SEVERE ACUTE RESPIRATORY SYNDROME CORONAVIRUS 2 (SARS-COV-2) (CORONAVIRUS DISEASE [COVID-19]), AMPLIFIED PROBE TECHNIQUE, MAKING USE OF HIGH THROUGHPUT TECHNOLOGIES AS DESCRIBED BY CMS-2020-01-R: HCPCS | Mod: 90 | Performed by: PATHOLOGY

## 2021-09-28 PROCEDURE — 99215 OFFICE O/P EST HI 40 MIN: CPT | Performed by: NURSE PRACTITIONER

## 2021-09-28 RX ORDER — NAPROXEN 500 MG/1
500 TABLET ORAL 2 TIMES DAILY WITH MEALS
Qty: 28 TABLET | Refills: 0 | Status: SHIPPED | OUTPATIENT
Start: 2021-09-28 | End: 2021-10-12

## 2021-09-28 RX ORDER — ACETAMINOPHEN 500 MG
500 TABLET ORAL EVERY 6 HOURS
Status: DISCONTINUED | OUTPATIENT
Start: 2021-09-28 | End: 2022-08-01

## 2021-09-28 ASSESSMENT — MIFFLIN-ST. JEOR: SCORE: 1921.63

## 2021-09-28 ASSESSMENT — PAIN SCALES - GENERAL: PAINLEVEL: WORST PAIN (10)

## 2021-09-28 NOTE — LETTER
2021       RE: Angus Wynne  [address]  [place] [address]     Dear Colleague,    Thank you for referring your patient, Angus Wynne, to the [facility] COLON AND RECTAL SURGERY CLINIC [place] at [facility]. Please see a copy of my visit note below.    Colon and Rectal Surgery Follow-Up Clinic Note    RE: Angus Wynne  : 1960  MICH: 2021    Angus Wynne is a very pleasant 61 year old male here for follow-up of anal pain.    Interval history: I saw Angus in clinic on  with severe anal pain. He underwent examination under anesthesia with Dr. Garduno on 21 with a small ulceration in the anal canal with biopsy showing AIN1 but no obvious fissure or source of bleeding. He injected Botox at that time.   Angus constipation following the surgery.  He has had very severe pain since then.  He is crying in pain today.  He has his brother with him and a second brother on the phone, who is a physician.  They are very concerned about the amount of pain that the patient is in and for several months.  He has extreme pain with bowel movements and this pain never quite subsides but is much worse with stools.  No fevers or chills.  Some bright red blood with bowel movements. Stools are now very soft with the use of laxatives.  He reportedly had a normal colonoscopy about a year and a half ago.    Physical Examination: Exam was chaperoned by Heather Guadalupe MA   /82 (BP Location: Right arm, Patient Position: Sitting, Cuff Size: Adult Large)   Pulse 92   Ht 6'   Wt 240 lb   SpO2 100%   BMI 32.55 kg/m    General: Alert, oriented, appears quite distressed and crying throughout the visit  HEENT: mucous membranes moist  Perianal external examination:  Large posterior midline anal fissure upon eversion of buttocks with surrounding hemorrhoids    Digital rectal examination: Was deferred in order not to cause further  trauma.    Anoscopy: Was deferred in order not to cause further trauma.    Assessment/Plan: 61 year old male with large anal fissure.  This was not apparent on examination under anesthesia few weeks ago.  However, he does have a very large fissure on exam today.  He had quite a bit of pain during exam with passing flatus.  He has had severe pain for several months now.  No improvement after Botox injections.  Given the size of the fissure and the amount of pain that he is in, I would recommend a sphincterotomy at this time.  We discussed the risks of surgery including fecal incontinence.  He states an understanding of these risks and wishes to proceed with surgical intervention.  Did discuss that if the hemorrhoids seem to be impairing healing of the fissure, would also consider a surgical hemorrhoidectomy at the time of the sphincterotomy.  We will have him continue on topical diltiazem as he found topical nitroglycerin helpful for his symptoms but was getting headaches with this.  We will have him take scheduled naproxen and Tylenol for the pain in the meantime.  Would like him to avoid any narcotic use due to risk for constipation and making the fissure worse. Patient's questions were answered to his stated satisfaction and he is in agreement with this plan.      Medical history:  Past Medical History:   Diagnosis Date     GERD (gastroesophageal reflux disease)      HTN (hypertension)      Hyperlipidemia      Hypothyroid      Vertigo     2015 ,        Surgical history:  Past Surgical History:   Procedure Laterality Date     AS ESOPHAGOSCOPY, DIAGNOSTIC       COLONOSCOPY       EXAM UNDER ANESTHESIA ANUS N/A 9/9/2021    Procedure: EXAM UNDER ANESTHESIA, ANUS, ANAL BISOPY, BOTOX INJECTION;  Surgeon: Henrik Garduno MD;  [place] [facility] [place]       Problem list:  Patient Active Problem List    Diagnosis Date Noted     Anal fissure 09/07/2021     Priority: Medium     Added automatically from request for surgery [id number]        Thrombocytopenia, unspecified (H) 04/18/2021     Priority: Medium     Elevated LFTs 07/14/2020     Priority: Medium     related to  alcoholic hapatitis previously       Dysphagia, unspecified type 06/11/2020     Priority: Medium     IBARRA (dyspnea on exertion) 07/19/2019     Priority: Medium     Obesity (BMI 30.0-34.9) 07/25/2018     Priority: Medium     Lumbago 02/26/2018     Priority: Medium     History of colonic polyps 02/15/2018     Priority: Medium     Vertigo 02/15/2018     Priority: Medium     Had had negative neurological workup in [place], few years ago       Nasal congestion 02/01/2017     Priority: Medium     chronic otc decongetsent use       Cervicalgia 05/02/2016     Priority: Medium     Cervical spondylosis without myelopathy 05/02/2016     Priority: Medium     Essential hypertension 05/02/2016     Priority: Medium     Hyperlipidemia with target LDL less than 130 05/02/2016     Priority: Medium     Neck pain 04/25/2016     Priority: Medium     chronic        Anxiety 03/08/2016     Priority: Medium     Alcohol use disorder 03/08/2016     Priority: Medium     excessive use, he has stopped now since 03/01/2016        BPPV (benign paroxysmal positional vertigo), unspecified laterality 12/28/2015     Priority: Medium      06/ 2015 , was seen at er in [place] and had mri brain, normal       Other specified hypothyroidism 12/28/2015     Priority: Medium     Hx of acute alcoholic hepatitis 12/28/2015     Priority: Medium     04/2014        Hx of colonic polyp 12/28/2015     Priority: Medium     s/p colonoscopy , need 3 yr f/u        Gastroesophageal reflux disease, esophagitis presence not specified 12/28/2015     Priority: Medium     s/p endoscopy 10/02/2015, but they were unable to do biopsy , so need another f/u check   IMO Regulatory Load OCT 2020       Vitamin D deficiency 12/28/2015     Priority: Medium       Medications:  Current Outpatient Medications   Medication Sig Dispense Refill      clonazePAM (KLONOPIN) 0.5 MG tablet TAKE 1/2 TO ONE TABLET BY MOUTH AS NEEDED FOR ANXIETY 10 tablet 0     diltiazem 2% in PLO gel Apply small amount to anal opening three times daily for 8 weeks. 60 g 0     hydrocortisone, Perianal, (HYDROCORTISONE) 2.5 % cream Place rectally 2 times daily 30 g 0     levothyroxine (SYNTHROID/LEVOTHROID) 175 MCG tablet TAKE 1 TABLET(175 MCG) BY MOUTH DAILY 30 tablet 1     losartan (COZAAR) 100 MG tablet TAKE 1 TABLET(100 MG) BY MOUTH DAILY 90 tablet 0     naproxen (NAPROSYN) 500 MG tablet Take 1 tablet (500 mg) by mouth 2 times daily (with meals) for 14 days 28 tablet 0     omeprazole (PRILOSEC) 40 MG DR capsule TAKE 1 CAPSULE(40 MG) BY MOUTH DAILY 90 capsule 3     rosuvastatin (CRESTOR) 40 MG tablet TAKE 1 TABLET(40 MG) BY MOUTH DAILY 90 tablet 2     sertraline (ZOLOFT) 100 MG tablet TAKE 1 AND 1/2 TABLETS(150 MG) BY MOUTH DAILY WITH DINNER 135 tablet 2     traZODone (DESYREL) 50 MG tablet TAKE 1 TO 2 TABLETS BY MOUTH EVERY DAY FOR SLEEP 90 tablet 3       Allergies:  No Known Allergies    Family history:  Family History   Problem Relation Age of Onset     Coronary Artery Disease Father         at age 60      Hyperlipidemia Brother      Cancer Brother         wall of scrotum     Breast Cancer Mother      Diabetes No family hx of      Cerebrovascular Disease No family hx of      Colon Cancer No family hx of      Prostate Cancer No family hx of        Social history:  Social History     Tobacco Use     Smoking status: Former Smoker     Quit date: 2015     Years since quittin.0     Smokeless tobacco: Never Used   Substance Use Topics     Alcohol use: No     Alcohol/week: 0.0 standard drinks     Comment: 2-3 drinks a week     Marital status: .    Nursing Notes:   Heather Guadalupe  2021  2:01 PM  Signed  Chief Complaint   Patient presents with     Post-op Visit       Vitals:    21 1359   BP: 135/82   BP Location: Right arm   Patient Position: Sitting   Cuff Size:  Adult Large   Pulse: 92   SpO2: 100%   Weight: 240 lb   Height: 6'       Body mass index is 32.55 kg/m .    Heather Guadalupe CMA         45 minutes spent on the date of the encounter doing chart review, history and exam, documentation and further activities as noted above.     JESSICA MarrC  Colon and Rectal Surgery  [facility]        Again, thank you for allowing me to participate in the care of your patient.      Sincerely,    YUKO Marr CNP

## 2021-09-28 NOTE — PROGRESS NOTES
Colon and Rectal Surgery Follow-Up Clinic Note    RE: Angus Wynne  : 1960  MICH: 2021    Angus Wynne is a very pleasant 61 year old male here for follow-up of anal pain.    Interval history: I saw Angus in clinic on  with severe anal pain. He underwent examination under anesthesia with Dr. Garduno on 21 with a small ulceration in the anal canal with biopsy showing AIN1 but no obvious fissure or source of bleeding. He injected Botox at that time.   Angus constipation following the surgery.  He has had very severe pain since then.  He is crying in pain today.  He has his brother with him and a second brother on the phone, who is a physician.  They are very concerned about the amount of pain that the patient is in and for several months.  He has extreme pain with bowel movements and this pain never quite subsides but is much worse with stools.  No fevers or chills.  Some bright red blood with bowel movements. Stools are now very soft with the use of laxatives.  He reportedly had a normal colonoscopy about a year and a half ago.    Physical Examination: Exam was chaperoned by Heather Guadalupe MA   /82 (BP Location: Right arm, Patient Position: Sitting, Cuff Size: Adult Large)   Pulse 92   Ht 6'   Wt 240 lb   SpO2 100%   BMI 32.55 kg/m    General: Alert, oriented, appears quite distressed and crying throughout the visit  HEENT: mucous membranes moist  Perianal external examination:  Large posterior midline anal fissure upon eversion of buttocks with surrounding hemorrhoids    Digital rectal examination: Was deferred in order not to cause further trauma.    Anoscopy: Was deferred in order not to cause further trauma.    Assessment/Plan: 61 year old male with large anal fissure.  This was not apparent on examination under anesthesia few weeks ago.  However, he does have a very large fissure on exam today.  He had quite a bit of pain during exam with passing flatus.  He has had severe  pain for several months now.  No improvement after Botox injections.  Given the size of the fissure and the amount of pain that he is in, I would recommend a sphincterotomy at this time.  We discussed the risks of surgery including fecal incontinence.  He states an understanding of these risks and wishes to proceed with surgical intervention.  Did discuss that if the hemorrhoids seem to be impairing healing of the fissure, would also consider a surgical hemorrhoidectomy at the time of the sphincterotomy.  We will have him continue on topical diltiazem as he found topical nitroglycerin helpful for his symptoms but was getting headaches with this.  We will have him take scheduled naproxen and Tylenol for the pain in the meantime.  Would like him to avoid any narcotic use due to risk for constipation and making the fissure worse. Patient's questions were answered to his stated satisfaction and he is in agreement with this plan.      Medical history:  Past Medical History:   Diagnosis Date     GERD (gastroesophageal reflux disease)      HTN (hypertension)      Hyperlipidemia      Hypothyroid      Vertigo     2015 ,        Surgical history:  Past Surgical History:   Procedure Laterality Date     AS ESOPHAGOSCOPY, DIAGNOSTIC       COLONOSCOPY       EXAM UNDER ANESTHESIA ANUS N/A 9/9/2021    Procedure: EXAM UNDER ANESTHESIA, ANUS, ANAL BISOPY, BOTOX INJECTION;  Surgeon: Henrik Garduno MD;  Location: [facility] OR       Problem list:  Patient Active Problem List    Diagnosis Date Noted     Anal fissure 09/07/2021     Priority: Medium     Added automatically from request for surgery [id number]       Thrombocytopenia, unspecified (H) 04/18/2021     Priority: Medium     Elevated LFTs 07/14/2020     Priority: Medium     related to  alcoholic hapatitis previously       Dysphagia, unspecified type 06/11/2020     Priority: Medium     IBARRA (dyspnea on exertion) 07/19/2019     Priority: Medium     Obesity (BMI 30.0-34.9) 07/25/2018      Priority: Medium     Lumbago 02/26/2018     Priority: Medium     History of colonic polyps 02/15/2018     Priority: Medium     Vertigo 02/15/2018     Priority: Medium     Had had negative neurological workup in [place], few years ago       Nasal congestion 02/01/2017     Priority: Medium     chronic otc decongetsent use       Cervicalgia 05/02/2016     Priority: Medium     Cervical spondylosis without myelopathy 05/02/2016     Priority: Medium     Essential hypertension 05/02/2016     Priority: Medium     Hyperlipidemia with target LDL less than 130 05/02/2016     Priority: Medium     Neck pain 04/25/2016     Priority: Medium     chronic        Anxiety 03/08/2016     Priority: Medium     Alcohol use disorder 03/08/2016     Priority: Medium     excessive use, he has stopped now since 03/01/2016        BPPV (benign paroxysmal positional vertigo), unspecified laterality 12/28/2015     Priority: Medium      06/ 2015 , was seen at er in [place] and had mri brain, normal       Other specified hypothyroidism 12/28/2015     Priority: Medium     Hx of acute alcoholic hepatitis 12/28/2015     Priority: Medium     04/2014        Hx of colonic polyp 12/28/2015     Priority: Medium     s/p colonoscopy , need 3 yr f/u        Gastroesophageal reflux disease, esophagitis presence not specified 12/28/2015     Priority: Medium     s/p endoscopy 10/02/2015, but they were unable to do biopsy , so need another f/u check   IMO Regulatory Load OCT 2020       Vitamin D deficiency 12/28/2015     Priority: Medium       Medications:  Current Outpatient Medications   Medication Sig Dispense Refill     clonazePAM (KLONOPIN) 0.5 MG tablet TAKE 1/2 TO ONE TABLET BY MOUTH AS NEEDED FOR ANXIETY 10 tablet 0     diltiazem 2% in PLO gel Apply small amount to anal opening three times daily for 8 weeks. 60 g 0     hydrocortisone, Perianal, (HYDROCORTISONE) 2.5 % cream Place rectally 2 times daily 30 g 0     levothyroxine (SYNTHROID/LEVOTHROID) 175 MCG  tablet TAKE 1 TABLET(175 MCG) BY MOUTH DAILY 30 tablet 1     losartan (COZAAR) 100 MG tablet TAKE 1 TABLET(100 MG) BY MOUTH DAILY 90 tablet 0     naproxen (NAPROSYN) 500 MG tablet Take 1 tablet (500 mg) by mouth 2 times daily (with meals) for 14 days 28 tablet 0     omeprazole (PRILOSEC) 40 MG DR capsule TAKE 1 CAPSULE(40 MG) BY MOUTH DAILY 90 capsule 3     rosuvastatin (CRESTOR) 40 MG tablet TAKE 1 TABLET(40 MG) BY MOUTH DAILY 90 tablet 2     sertraline (ZOLOFT) 100 MG tablet TAKE 1 AND 1/2 TABLETS(150 MG) BY MOUTH DAILY WITH DINNER 135 tablet 2     traZODone (DESYREL) 50 MG tablet TAKE 1 TO 2 TABLETS BY MOUTH EVERY DAY FOR SLEEP 90 tablet 3       Allergies:  No Known Allergies    Family history:  Family History   Problem Relation Age of Onset     Coronary Artery Disease Father         at age 60      Hyperlipidemia Brother      Cancer Brother         wall of scrotum     Breast Cancer Mother      Diabetes No family hx of      Cerebrovascular Disease No family hx of      Colon Cancer No family hx of      Prostate Cancer No family hx of        Social history:  Social History     Tobacco Use     Smoking status: Former Smoker     Quit date: 2015     Years since quittin.0     Smokeless tobacco: Never Used   Substance Use Topics     Alcohol use: No     Alcohol/week: 0.0 standard drinks     Comment: 2-3 drinks a week     Marital status: .    Nursing Notes:   Heather Guadalupe  2021  2:01 PM  Signed  Chief Complaint   Patient presents with     Post-op Visit       Vitals:    21 1359   BP: 135/82   BP Location: Right arm   Patient Position: Sitting   Cuff Size: Adult Large   Pulse: 92   SpO2: 100%   Weight: 240 lb   Height: 6'       Body mass index is 32.55 kg/m .    Heather Guadalupe CMA         45 minutes spent on the date of the encounter doing chart review, history and exam, documentation and further activities as noted above.     Elvira Francois, NP-C  Colon and Rectal Surgery  [place]  [facility]

## 2021-09-28 NOTE — PROGRESS NOTES
Spoke with Elvira Francois NP, in clinic on 9/28/2021, she requested that patient be scheduled for a sphincterotomy with Dr. Armando on 9/30/2021 at the [facility].    Spoke with patient in clinic room, scheduled surgery and COVID test. Pre-op H&P already completed. Provided patient with a printed copy of his Surgery Packet including all appointments, information, and instructions:     Required: Yes, you will need a  18 years or older to drive you home from your procedure as well as stay with you for 24 hours after your procedure    Surgery: Anal sphincterotomy    Date: Thursday September 30, 2021 Surgeon: Dr. Rajendra Armando    Time: You will receive a call 1-3 days prior to your surgery with your finalized surgery and arrival time.     Location: [facility] [address]--[address] [place] [address]  [phone]      Upcoming / Related Appointments:     Pre-operative History & Physical appointment:   Clinic appointment with Karissa Petersen MD: 9/8/2021 at 11:40 AM                                                  Pre-operative COVID-19 Test:  Pre-procedure asymptomatic COVID PCR:  9/28/2021 at 2:45 PM                                                                      [facility] [place] [place] [address] Lab                                                                      [address] [place] [address]

## 2021-09-28 NOTE — NURSING NOTE
Chief Complaint   Patient presents with     Post-op Visit       Vitals:    09/28/21 1359   BP: 135/82   BP Location: Right arm   Patient Position: Sitting   Cuff Size: Adult Large   Pulse: 92   SpO2: 100%   Weight: 240 lb   Height: 6'       Body mass index is 32.55 kg/m .    Heather Guadalupe CMA

## 2021-09-29 ENCOUNTER — ANESTHESIA EVENT (OUTPATIENT)
Dept: SURGERY | Facility: AMBULATORY SURGERY CENTER | Age: 61
End: 2021-09-29
Payer: COMMERCIAL

## 2021-09-29 RX ORDER — FENTANYL CITRATE 50 UG/ML
25 INJECTION, SOLUTION INTRAMUSCULAR; INTRAVENOUS
Status: CANCELLED | OUTPATIENT
Start: 2021-09-29

## 2021-09-30 ENCOUNTER — ANESTHESIA (OUTPATIENT)
Dept: SURGERY | Facility: AMBULATORY SURGERY CENTER | Age: 61
End: 2021-09-30
Payer: COMMERCIAL

## 2021-09-30 ENCOUNTER — HOSPITAL ENCOUNTER (OUTPATIENT)
Facility: AMBULATORY SURGERY CENTER | Age: 61
End: 2021-09-30
Attending: SURGERY
Payer: COMMERCIAL

## 2021-09-30 VITALS
WEIGHT: 240 LBS | RESPIRATION RATE: 16 BRPM | TEMPERATURE: 98.3 F | HEART RATE: 107 BPM | SYSTOLIC BLOOD PRESSURE: 140 MMHG | DIASTOLIC BLOOD PRESSURE: 89 MMHG | HEIGHT: 72 IN | OXYGEN SATURATION: 100 % | BODY MASS INDEX: 32.51 KG/M2

## 2021-09-30 DIAGNOSIS — K60.2 ANAL FISSURE: Primary | ICD-10-CM

## 2021-09-30 PROCEDURE — 46257 REMOVE IN/EX HEM GRP & FISS: CPT

## 2021-09-30 PROCEDURE — 88304 TISSUE EXAM BY PATHOLOGIST: CPT | Mod: 26 | Performed by: PATHOLOGY

## 2021-09-30 PROCEDURE — 46257 REMOVE IN/EX HEM GRP & FISS: CPT | Performed by: SURGERY

## 2021-09-30 PROCEDURE — 88304 TISSUE EXAM BY PATHOLOGIST: CPT | Mod: TC | Performed by: SURGERY

## 2021-09-30 RX ORDER — PROPOFOL 10 MG/ML
INJECTION, EMULSION INTRAVENOUS PRN
Status: DISCONTINUED | OUTPATIENT
Start: 2021-09-30 | End: 2021-09-30

## 2021-09-30 RX ORDER — SODIUM CHLORIDE, SODIUM LACTATE, POTASSIUM CHLORIDE, CALCIUM CHLORIDE 600; 310; 30; 20 MG/100ML; MG/100ML; MG/100ML; MG/100ML
INJECTION, SOLUTION INTRAVENOUS CONTINUOUS
Status: DISCONTINUED | OUTPATIENT
Start: 2021-09-30 | End: 2021-09-30 | Stop reason: HOSPADM

## 2021-09-30 RX ORDER — CEFAZOLIN SODIUM 2 G/50ML
2 SOLUTION INTRAVENOUS SEE ADMIN INSTRUCTIONS
Status: DISCONTINUED | OUTPATIENT
Start: 2021-09-30 | End: 2021-09-30 | Stop reason: HOSPADM

## 2021-09-30 RX ORDER — BUPIVACAINE HYDROCHLORIDE 2.5 MG/ML
INJECTION, SOLUTION EPIDURAL; INFILTRATION; INTRACAUDAL PRN
Status: DISCONTINUED | OUTPATIENT
Start: 2021-09-30 | End: 2021-09-30 | Stop reason: HOSPADM

## 2021-09-30 RX ORDER — CEFAZOLIN SODIUM 2 G/50ML
2 SOLUTION INTRAVENOUS
Status: DISCONTINUED | OUTPATIENT
Start: 2021-09-30 | End: 2021-09-30 | Stop reason: HOSPADM

## 2021-09-30 RX ORDER — FENTANYL CITRATE 50 UG/ML
25 INJECTION, SOLUTION INTRAMUSCULAR; INTRAVENOUS EVERY 5 MIN PRN
Status: DISCONTINUED | OUTPATIENT
Start: 2021-09-30 | End: 2021-10-01 | Stop reason: HOSPADM

## 2021-09-30 RX ORDER — OXYCODONE HYDROCHLORIDE 5 MG/1
5 TABLET ORAL EVERY 4 HOURS PRN
Status: DISCONTINUED | OUTPATIENT
Start: 2021-09-30 | End: 2021-10-01 | Stop reason: HOSPADM

## 2021-09-30 RX ORDER — OXYCODONE HYDROCHLORIDE 5 MG/1
5-10 TABLET ORAL EVERY 4 HOURS PRN
Qty: 12 TABLET | Refills: 0 | Status: SHIPPED | OUTPATIENT
Start: 2021-09-30 | End: 2021-12-29

## 2021-09-30 RX ORDER — FENTANYL CITRATE 0.05 MG/ML
INJECTION, SOLUTION INTRAMUSCULAR; INTRAVENOUS PRN
Status: DISCONTINUED | OUTPATIENT
Start: 2021-09-30 | End: 2021-09-30

## 2021-09-30 RX ORDER — ONDANSETRON 4 MG/1
4 TABLET, ORALLY DISINTEGRATING ORAL EVERY 30 MIN PRN
Status: DISCONTINUED | OUTPATIENT
Start: 2021-09-30 | End: 2021-10-01 | Stop reason: HOSPADM

## 2021-09-30 RX ORDER — PROPOFOL 10 MG/ML
INJECTION, EMULSION INTRAVENOUS CONTINUOUS PRN
Status: DISCONTINUED | OUTPATIENT
Start: 2021-09-30 | End: 2021-09-30

## 2021-09-30 RX ORDER — ACETAMINOPHEN 325 MG/1
975 TABLET ORAL ONCE
Status: DISCONTINUED | OUTPATIENT
Start: 2021-09-30 | End: 2021-09-30 | Stop reason: HOSPADM

## 2021-09-30 RX ORDER — ONDANSETRON 2 MG/ML
4 INJECTION INTRAMUSCULAR; INTRAVENOUS EVERY 30 MIN PRN
Status: DISCONTINUED | OUTPATIENT
Start: 2021-09-30 | End: 2021-10-01 | Stop reason: HOSPADM

## 2021-09-30 RX ORDER — ACETAMINOPHEN 325 MG/1
650 TABLET ORAL EVERY 4 HOURS PRN
Qty: 50 TABLET | Refills: 0 | Status: SHIPPED | OUTPATIENT
Start: 2021-09-30

## 2021-09-30 RX ORDER — ONDANSETRON 4 MG/1
4 TABLET, ORALLY DISINTEGRATING ORAL
Status: DISCONTINUED | OUTPATIENT
Start: 2021-09-30 | End: 2021-10-01 | Stop reason: HOSPADM

## 2021-09-30 RX ORDER — KETOROLAC TROMETHAMINE 30 MG/ML
15 INJECTION, SOLUTION INTRAMUSCULAR; INTRAVENOUS EVERY 6 HOURS PRN
Status: DISCONTINUED | OUTPATIENT
Start: 2021-09-30 | End: 2021-10-01 | Stop reason: HOSPADM

## 2021-09-30 RX ORDER — ONDANSETRON 4 MG/1
4-8 TABLET, ORALLY DISINTEGRATING ORAL EVERY 8 HOURS PRN
Qty: 4 TABLET | Refills: 0 | Status: SHIPPED | OUTPATIENT
Start: 2021-09-30 | End: 2022-08-01

## 2021-09-30 RX ORDER — OXYCODONE HYDROCHLORIDE 5 MG/1
5 TABLET ORAL
Status: COMPLETED | OUTPATIENT
Start: 2021-09-30 | End: 2021-09-30

## 2021-09-30 RX ORDER — GABAPENTIN 300 MG/1
300 CAPSULE ORAL
Status: COMPLETED | OUTPATIENT
Start: 2021-09-30 | End: 2021-09-30

## 2021-09-30 RX ORDER — POLYETHYLENE GLYCOL 3350 17 G/17G
17 POWDER, FOR SOLUTION ORAL DAILY
Qty: 510 G | Refills: 1 | Status: SHIPPED | OUTPATIENT
Start: 2021-09-30 | End: 2022-08-01

## 2021-09-30 RX ORDER — ONDANSETRON 2 MG/ML
4 INJECTION INTRAMUSCULAR; INTRAVENOUS ONCE
Status: DISCONTINUED | OUTPATIENT
Start: 2021-09-30 | End: 2021-09-30 | Stop reason: HOSPADM

## 2021-09-30 RX ORDER — LIDOCAINE HYDROCHLORIDE 20 MG/ML
INJECTION, SOLUTION INFILTRATION; PERINEURAL PRN
Status: DISCONTINUED | OUTPATIENT
Start: 2021-09-30 | End: 2021-09-30

## 2021-09-30 RX ORDER — MEPERIDINE HYDROCHLORIDE 25 MG/ML
12.5 INJECTION INTRAMUSCULAR; INTRAVENOUS; SUBCUTANEOUS
Status: DISCONTINUED | OUTPATIENT
Start: 2021-09-30 | End: 2021-10-01 | Stop reason: HOSPADM

## 2021-09-30 RX ORDER — ACETAMINOPHEN 325 MG/1
975 TABLET ORAL ONCE
Status: COMPLETED | OUTPATIENT
Start: 2021-09-30 | End: 2021-09-30

## 2021-09-30 RX ORDER — GLYCOPYRROLATE 0.2 MG/ML
INJECTION, SOLUTION INTRAMUSCULAR; INTRAVENOUS PRN
Status: DISCONTINUED | OUTPATIENT
Start: 2021-09-30 | End: 2021-09-30

## 2021-09-30 RX ORDER — KETAMINE HYDROCHLORIDE 10 MG/ML
INJECTION INTRAMUSCULAR; INTRAVENOUS PRN
Status: DISCONTINUED | OUTPATIENT
Start: 2021-09-30 | End: 2021-09-30

## 2021-09-30 RX ORDER — SODIUM CHLORIDE, SODIUM LACTATE, POTASSIUM CHLORIDE, CALCIUM CHLORIDE 600; 310; 30; 20 MG/100ML; MG/100ML; MG/100ML; MG/100ML
INJECTION, SOLUTION INTRAVENOUS CONTINUOUS
Status: DISCONTINUED | OUTPATIENT
Start: 2021-09-30 | End: 2021-10-01 | Stop reason: HOSPADM

## 2021-09-30 RX ORDER — LIDOCAINE 40 MG/G
CREAM TOPICAL
Status: DISCONTINUED | OUTPATIENT
Start: 2021-09-30 | End: 2021-09-30 | Stop reason: HOSPADM

## 2021-09-30 RX ORDER — ACETAMINOPHEN 325 MG/1
650 TABLET ORAL
Status: DISCONTINUED | OUTPATIENT
Start: 2021-09-30 | End: 2021-10-01 | Stop reason: HOSPADM

## 2021-09-30 RX ADMIN — KETAMINE HYDROCHLORIDE 20 MG: 10 INJECTION INTRAMUSCULAR; INTRAVENOUS at 12:20

## 2021-09-30 RX ADMIN — OXYCODONE HYDROCHLORIDE 5 MG: 5 TABLET ORAL at 13:29

## 2021-09-30 RX ADMIN — ACETAMINOPHEN 975 MG: 325 TABLET ORAL at 10:31

## 2021-09-30 RX ADMIN — SODIUM CHLORIDE, SODIUM LACTATE, POTASSIUM CHLORIDE, CALCIUM CHLORIDE: 600; 310; 30; 20 INJECTION, SOLUTION INTRAVENOUS at 12:10

## 2021-09-30 RX ADMIN — SODIUM CHLORIDE, SODIUM LACTATE, POTASSIUM CHLORIDE, CALCIUM CHLORIDE: 600; 310; 30; 20 INJECTION, SOLUTION INTRAVENOUS at 11:15

## 2021-09-30 RX ADMIN — SODIUM CHLORIDE, SODIUM LACTATE, POTASSIUM CHLORIDE, CALCIUM CHLORIDE: 600; 310; 30; 20 INJECTION, SOLUTION INTRAVENOUS at 11:50

## 2021-09-30 RX ADMIN — PROPOFOL 150 MCG/KG/MIN: 10 INJECTION, EMULSION INTRAVENOUS at 12:15

## 2021-09-30 RX ADMIN — FENTANYL CITRATE 25 MCG: 0.05 INJECTION, SOLUTION INTRAMUSCULAR; INTRAVENOUS at 12:33

## 2021-09-30 RX ADMIN — LIDOCAINE HYDROCHLORIDE 100 MG: 20 INJECTION, SOLUTION INFILTRATION; PERINEURAL at 12:15

## 2021-09-30 RX ADMIN — GLYCOPYRROLATE 0.2 MG: 0.2 INJECTION, SOLUTION INTRAMUSCULAR; INTRAVENOUS at 12:28

## 2021-09-30 RX ADMIN — FENTANYL CITRATE 50 MCG: 0.05 INJECTION, SOLUTION INTRAMUSCULAR; INTRAVENOUS at 12:28

## 2021-09-30 RX ADMIN — PROPOFOL 40 MG: 10 INJECTION, EMULSION INTRAVENOUS at 12:18

## 2021-09-30 RX ADMIN — GABAPENTIN 300 MG: 300 CAPSULE ORAL at 10:31

## 2021-09-30 RX ADMIN — FENTANYL CITRATE 25 MCG: 0.05 INJECTION, SOLUTION INTRAMUSCULAR; INTRAVENOUS at 12:39

## 2021-09-30 ASSESSMENT — MIFFLIN-ST. JEOR: SCORE: 1921.63

## 2021-09-30 NOTE — ANESTHESIA POSTPROCEDURE EVALUATION
Patient: Angus Wynne    Procedure(s):  Anal sphincterotomy and hemorroidectomy    Diagnosis:Anal fissure [K60.2]  Diagnosis Additional Information: No value filed.    Anesthesia Type:  MAC    Note:  Disposition: Outpatient   Postop Pain Control: Uneventful            Sign Out: Well controlled pain   PONV: No   Neuro/Psych: Uneventful            Sign Out: Acceptable/Baseline neuro status   Airway/Respiratory: Uneventful            Sign Out: Acceptable/Baseline resp. status   CV/Hemodynamics: Uneventful            Sign Out: Acceptable CV status; No obvious hypovolemia; No obvious fluid overload   Other NRE: NONE   DID A NON-ROUTINE EVENT OCCUR? No           Last vitals:  Vitals Value Taken Time   /91 09/30/21 1306   Temp 37.1  C (98.8  F) 09/30/21 1306   Pulse 95 09/30/21 1321   Resp 14 09/30/21 1321   SpO2 99 % 09/30/21 1321       Electronically Signed By: STEFANI NGUYEN MD  September 30, 2021  1:31 PM

## 2021-09-30 NOTE — ANESTHESIA PREPROCEDURE EVALUATION
Anesthesia Pre-Procedure Evaluation    Patient: Angus Wynne   MRN: [id number] : 1960        Preoperative Diagnosis: Anal fissure [K60.2]   Procedure : Procedure(s):  Anal sphincterotomy     Past Medical History:   Diagnosis Date     GERD (gastroesophageal reflux disease)      HTN (hypertension)      Hyperlipidemia      Hypothyroid      Vertigo      ,       Past Surgical History:   Procedure Laterality Date     AS ESOPHAGOSCOPY, DIAGNOSTIC       COLONOSCOPY       EXAM UNDER ANESTHESIA ANUS N/A 2021    Procedure: EXAM UNDER ANESTHESIA, ANUS, ANAL BISOPY, BOTOX INJECTION;  Surgeon: Henrik Garduno MD;  [place] [facility] [place]      No Known Allergies   Social History     Tobacco Use     Smoking status: Former Smoker     Quit date: 2015     Years since quittin.0     Smokeless tobacco: Never Used   Substance Use Topics     Alcohol use: No     Alcohol/week: 0.0 standard drinks     Comment: 2-3 drinks a week      Wt Readings from Last 1 Encounters:   21 108.9 kg (240 lb)              OUTSIDE LABS:  CBC:   Lab Results   Component Value Date    WBC 6.4 2021    WBC 6.3 2019    HGB 14.3 2021    HGB 14.2 2019    HCT 42.0 2021    HCT 40.6 2019     (L) 2021     (L) 2019     BMP:   Lab Results   Component Value Date     (L) 2021     2020    POTASSIUM 4.0 2021    POTASSIUM 4.2 2020    CHLORIDE 103 2021    CHLORIDE 103 2020    CO2 23 2021    CO2 26 2020    BUN 9 2021    BUN 12 2020    CR 0.73 2021    CR 0.87 2020     (H) 2021     (H) 2020     COAGS:   Lab Results   Component Value Date    INR 0.95 2017     POC: No results found for: BGM, HCG, HCGS  HEPATIC:   Lab Results   Component Value Date    ALBUMIN 3.4 2021    PROTTOTAL 7.2 2021    ALT 51 2021    AST 41 2021     (H) 2016    ALKPHOS 58  09/08/2021    BILITOTAL 0.6 09/08/2021     OTHER:   Lab Results   Component Value Date    SAMMY 7.3 (L) 09/08/2021    TSH 0.03 (L) 09/08/2021    T4 0.80 03/30/2021    CRP <2.9 02/20/2018    SED 9 02/20/2018       Anesthesia Plan    ASA Status:  3      Anesthesia Type: MAC.   Induction: Intravenous, Propofol.   Maintenance: TIVA.        Consents    Anesthesia Plan(s) and associated risks, benefits, and realistic alternatives discussed. Questions answered and patient/representative(s) expressed understanding.     - Discussed with:  Patient         Postoperative Care    Pain management: Multi-modal analgesia.   PONV prophylaxis: Background Propofol Infusion, Ondansetron (or other 5HT-3)     Comments:                Taylor Lyle MD

## 2021-09-30 NOTE — ANESTHESIA CARE TRANSFER NOTE
Patient: Angus Wynne    Procedure(s):  Anal sphincterotomy and hemorroidectomy    Diagnosis: Anal fissure [K60.2]  Diagnosis Additional Information: No value filed.    Anesthesia Type:   MAC     Note:                Vital Signs Stable: post-procedure vital signs reviewed and stable  Report to RN Given: handoff report given  Patient transferred to: Phase II    Handoff Report: Identifed the Patient, Identified the Reponsible Provider, Reviewed the pertinent medical history, Discussed the surgical course, Reviewed Intra-OP anesthesia mangement and issues during anesthesia, Set expectations for post-procedure period and Allowed opportunity for questions and acknowledgement of understanding      Vitals:  Vitals Value Taken Time   BP     Temp     Pulse     Resp     SpO2         Electronically Signed By: YUKO Leavitt CRNA  September 30, 2021  1:04 PM

## 2021-09-30 NOTE — DISCHARGE INSTRUCTIONS
Anorectal Surgery Instructions    What can I expect after anorectal surgery?  Most anorectal procedures are done as outpatient surgery, and you go home the same day as the procedure. A few surgical procedures will require that you stay in the hospital for about one to three days. No matter where the procedure is done or how long or short it takes, these recommendations will help you heal and feel more comfortable.    Medicines:  The anal area is very sensitive; you can expect to have some pain for up to 2-4 weeks after the procedure. Your doctor will give you a prescription for one or more pain medications.    Take naprosyn 500 mg twice a day OR ibuprofen 600 mg four times a day     Take this on a regular basis (not as needed) following your surgery.     The drugs are best taken with food.  Do not take if it causes stomach upset or if you have a history of ulcers or gastritis. You can stop the naprosyn (or ibuprofen) or reduce the dose when you are feeling better.    DO NOT use naprosyn, ibuprofen, or other similar agents (eg. Advil or Aleve) if you have inflammatory bowel disease (Ulcerative Colitis or Crohn's disease) or if your doctor as advised you against using these medications    Take acetominaphen (Tylenol) 650-1000 mg four times a day.     Take this on a regular basis (not as needed) following surgery for pain control.     Take the lower dose if you are >65 years old or have liver disease. The maximum dose of acetominaphen is 4000 mg a day. You can stop the acetaminophen or reduce the dose when you are feeling better.    It is important to realize that many narcotic pain relievers (including vicodin, percocet, tylenol #3) also have acetaminophen, and excessive doses of acetaminophen can be dangerous, so do not take these in addition to acetominaphen.  You may take narcotics that don't contain acetominaphen such as oxycodone.      Take oxycodone AS NEEDED in addition to the acetominaphen and naprosyn.       Because narcotics have side effects (including constipation), you should reduce your use of these medications as tolerated as your pain improves.    *In general, the best strategy is to take (if you are able to tolerate it) the tylenol and naprosen on a regular basis until your pain has largely gone away. You can take the narcotic pain medicine as needed in addition to the tylenol and ibuprofen. As your pain begins to lessen, you should cut back on your narcotic use while continuing to take your regular tylenol and naprosyn doses.      Refilling prescriptions. If you need additional pain medication, please call the triage nurse at [phone] during normal business hours (8 a.m. to 4 p.m., Monday though Friday) or have your pharmacy fax a refill request to [phone]. If you call after hours or on the weekends, the doctor on call may not know you personally and may not renew narcotic pain medication by phone. Call your primary care provider for all other medication refills.    Perineal care:  External gauze dressing can be removed the morning after surgery. If you have an adhesive dressing stuck to the incision, DO NOT remove this.   Tub baths:    If possible, take a tub bath immediately after each bowel movement.     Baths should be take at least 3 times daily for the first week to 10 days following your procedure. You should soak in the tub for 10 to 15 minutes each time with water as warm as you can tolerate.     Even after you go back to work, it is a good idea to sit in the tub in the morning, after returning from work, and again in the evening before bedtime.    Bleeding/Infection:    You can expect to have some bleeding after bowel movements, but it should stop soon after you wipe.     Use a wet cloth or perianal pad (Tucks or Preparation H pads) to gently wipe the area after each bowel movement.    Do not rub the anal area or use a lot of pressure.    Using a spray bottle filled with warm water helps  loosen any remaining stool. Blot gently with a soft dry cloth or tissue paper.    Infection around the anal opening is not very common. The anal area has excellent blood supply, which helps the area to heal. Bloody discharge after bowel movements is normal and may last 2 to 4 weeks after your surgery. However, if you bleed between bowel movements and cannot get it to stop, call the triage nurse immediately [phone].    Bowel function:  Take a fiber supplement such as Metamucil, which is over the counter. It is important to drink six to eight glasses of water or juice everyday when using fiber products.    If you do not have a bowel movement after 1-2 days:    Take Milk of Magnesia-2 tablespoons.       If there are no results, repeat this or add over the counter Miralax.      If you still do not have results, contact the clinic.     If there are no results, repeat this. Stop taking Milk of Magnesia or other laxatives if you begin to have diarrhea.    * Constipation will cause you to strain when you have a bowel movement. The hard stool will be difficult to pass, will increase pain and bleeding, and will slow down healing.  Try to avoid constipation and/or diarrhea as this can make the pain and bleeding worse.    * It is important to have regular bowel movements at least every other day and to keep your stool soft.  A high fiber diet, including at least four servings of fruits or vegetables daily, will help to keep your bowel movements regular and soft.    Activity:  After your procedure, there are no restrictions on your activity     except restrictions surrounding being on narcotics and in pain, such as no heavy machine operating or driving.     You may walk, climb stairs, ride in a car, and sit as tolerated.     It is helpful to avoid sitting in one position for long periods (2 or more hours).    After some surgeries, you may be told not to perform any lifting (more than 10 pounds) for several weeks after  surgery.    When to call:  When do I need to call the doctor or triage nurse?    If you experience any of the problems listed here, call our triage nurse during business hours ([phone]).     The nurse will help you with your problem or have the doctor call you.     After hours and on weekends, please call the main hospital number ([phone]) and ask for the colon and rectal surgery person on call.     Some is available to help you 24 hours a day, seven days a week.    Call for:   ? Fever greater than 101 degrees   ? Chills   ? Foul-smelling drainage   ? Nausea and vomiting   ? Diarrhea - greater than 3 water stools in 24 hours   ? Constipation - no bowel movement after 3 days   ? Severe bleeding that does not stop soon after a bowel movement   ? Problems with the incision, including increased pain, swelling, or redness

## 2021-10-01 ENCOUNTER — TELEPHONE (OUTPATIENT)
Dept: SURGERY | Facility: CLINIC | Age: 61
End: 2021-10-01

## 2021-10-01 NOTE — TELEPHONE ENCOUNTER
"Mr. Wynne is a 60 yo male who underwent anal sphincterotomy and hemorrhoidectomy 9/30/21 with Dr. Armando. He is calling for 8-9 out of 10 anal pain with stools and bright red blood when wiping. His pain was about 10/10 on POD #0. He has taken 5 mg oxycodone x 1 as well as tylenol and naproxen sparingly. He's been doing sitz baths twice a day. He is also taking miralax daily in the morning. His first bowel movement since surgery was this afternoon and he has severe pain with this. He was also concerned with the BRBPR with wiping. He denies fevers, \"cup\" size blood from his anal canal and denies hard stools. His stools are loose with \"lots of gas.\"    I told him pain after this type of procedure is normal but should progressively getting better. Instructed him to alternate taking 650 mg tylenol and 500 mg naproxen not to exceed the dosage recommended per day on the medication. I instructed him he should have something for pain about every 3 hours even if he is not in 8 or 9 out of 10 pain. Between the tylenol and naproxen base pain control he can take 1-2 tablets of oxycodone for breakthrough pain. I suggested he take 1-2 pills about every 4 hours as instructed. Also he should continue with the miralax daily and Sitz baths TID. If his pain does not improve in the next day or two he should either go to the ED for evaluation or call the colorectal clinic to get a sooner post-op appointment (currently scheduled Oct 6). He is agreeable with this plan.     Karissa Pacheco  General Surgery Resident, PGY-2  "

## 2021-10-02 NOTE — OP NOTE
"[facility] Colorectal Surgery Operative Report    PREOPERATIVE DIAGNOSIS:  1. Symptomatic mixed hemorrhoids.  2. Anal fissure    POSTOPERATIVE DIAGNOSIS:   1. Symptomatic mixed hemorrhoids.  2. Anal fissure    PROCEDURE:  1. Evaluation under anesthesia.  2. hemorrhoidectomy x1  3. Lateral internal sphincterotomy    ANESTHESIA: MAC plus local anesthesia.    SURGEON:  Rajendra Armando MD, PhD    ASSISTANT(S): No qualified assistant was available    INDICATIONS FOR PROCEDURE  Angus Wynne is a 61 year old male who presented with acute anal pain due to a very apparent posterior midline fissure that was noted in clinic by Elvira Francois NP, as well as symptomatic mixed hemorrhoids. I thoroughly discussed the risks, benefits, and alternatives of operative treatment with the patient and he agreed to proceed.    General risks related to anorectal surgery were reviewed with the patient. These include, but are not limited to urinary retention, abscess, infection, bleeding, chronic pain, anal stenosis, fistula, fissure, and fecal incontinence (especially with sphincterotomy).     OPERATIVE PROCEDURE: After obtaining informed consent, the patient was brought to the operating room and placed in the prone jackknife position. MAC anesthesia was gently induced without difficulty. Appropriate preoperative mechanical deep venous thrombosis prophylaxis was administered. Bilateral lower extremity pneumatic compression devices were applied and all pressure points were cushioned. Appropriate parenteral antibiotics were given.  The perianal area was then prepped and draped in the standard sterile fashion. After a \"time-out\" was performed, total of 30 mL of Bupivacaine 0.25% without epinephrine was injected as a pudendal block. Digital rectal exam and anoscopy were performed. Findings: very large posterior midline fissure, large mixed internal/external hemorrhoids in the right and left lateral positions. Location of largest " hemorrhoid columns: right lateral.     The larger of the hemorrhoids was exactly in the area where I would normally have made my incision in the anoderm.  The large external component of the right lateral hemorrhoid prohibited this so I used sharp and electrocautery to incise the perianal skin surrounding the hemorrhoid at which point I used electrocautery to lift the hemorrhoidal cushion off of the internal anal sphincter.  Then, with the anal sphincter muscle exposed, I dissected in the plane between internal and external anal sphincter.  I then used metzenbaum scissors to cut several fibers of the internal anal sphincter.  After performing the sphincterotomy I used the handheld ligasure device to remove the remainder of the right lateral hemorrhoid.  I oversewed the site with 3-0 vicryl suture in locking fashion. The skin defect was reapproximated with 4-0 Monocryl. The distal rectum and anal canal were irrigated. Hemostasis was achieved. There was no evidence of anal stenosis or significant residual hemorrhoids. Of note, the left lateral hemorrhoid was smaller and after hemorrhoidectomy on the right side I did not believe that left sided hemorrhoidectomy was warranted at this time. Instrument, sponge, and needle counts were all correct as reported to me. Bacitracin was applied, as well as a sterile dressing. The patient tolerated the procedure well.    COMPLICATIONS: none, immediately.    ESTIMATED BLOOD LOSS: 10 mL.    SPECIMEN(S): hemorrhoid    DRAINS/TUBES: None.    OPERATIVE FINDINGS: Large mixed hemorrhoids at the right and left lateral anus, very large posterior midline fissure    DISPOSITION: PACU.      Rajendra Armando MD, PhD  Division of Colon and Rectal Surgery  [facility]      CC:  [facility] Surgery billing.  Elvira Francois NP.

## 2021-10-04 ENCOUNTER — HEALTH MAINTENANCE LETTER (OUTPATIENT)
Age: 61
End: 2021-10-04

## 2021-10-04 LAB
PATH REPORT.COMMENTS IMP SPEC: NORMAL
PATH REPORT.COMMENTS IMP SPEC: NORMAL
PATH REPORT.FINAL DX SPEC: NORMAL
PATH REPORT.GROSS SPEC: NORMAL
PATH REPORT.MICROSCOPIC SPEC OTHER STN: NORMAL
PATH REPORT.RELEVANT HX SPEC: NORMAL
PHOTO IMAGE: NORMAL

## 2021-10-06 ENCOUNTER — TELEPHONE (OUTPATIENT)
Dept: SURGERY | Facility: CLINIC | Age: 61
End: 2021-10-06

## 2021-10-06 NOTE — TELEPHONE ENCOUNTER
Patient called in, rescheduled his post-op appt with Elvira Francois NP, d/t not feeling well today. Appt rescheduled to tomorrow 10/7/2021 at 9:45 AM.    Transferred call to RNCC's due to patient has medical questions.

## 2021-10-07 ENCOUNTER — OFFICE VISIT (OUTPATIENT)
Dept: SURGERY | Facility: CLINIC | Age: 61
End: 2021-10-07
Payer: COMMERCIAL

## 2021-10-07 VITALS
HEART RATE: 81 BPM | SYSTOLIC BLOOD PRESSURE: 154 MMHG | BODY MASS INDEX: 33.05 KG/M2 | TEMPERATURE: 98.5 F | WEIGHT: 244 LBS | OXYGEN SATURATION: 100 % | DIASTOLIC BLOOD PRESSURE: 98 MMHG | HEIGHT: 72 IN

## 2021-10-07 DIAGNOSIS — K60.2 ANAL FISSURE: ICD-10-CM

## 2021-10-07 DIAGNOSIS — Z09 FOLLOW-UP EXAMINATION AFTER COLORECTAL SURGERY: Primary | ICD-10-CM

## 2021-10-07 PROCEDURE — 99024 POSTOP FOLLOW-UP VISIT: CPT | Performed by: NURSE PRACTITIONER

## 2021-10-07 ASSESSMENT — PAIN SCALES - GENERAL: PAINLEVEL: EXTREME PAIN (8)

## 2021-10-07 ASSESSMENT — MIFFLIN-ST. JEOR: SCORE: 1939.78

## 2021-10-07 NOTE — PROGRESS NOTES
Colon and Rectal Surgery Postoperative Clinic Note    RE: Angus Wynne  : 1960  MICH: 10/7/2021    Angus Wynne is a very pleasant 61 year old male with anal fissure and mixed hemorrhoids who underwent examination under anesthesia with Botox injection with Dr. Garduno on 21 but unfortunately had significant constipation after this and developed a worsening and very painful fissure. He is now status post examination under anesthesia with hemorrhoidectomy and lateral internal sphincterotomy on 21 with Dr. Armando.    Interval history: Angus continues to have pain. He has sutures on the right side that are poking and causing increased pain. He thinks his wound opened up and he is concerned about this. He has some pressure with urination and has to stand to urinate. Some times stool comes out at the same time as he is pushing to void. Stools are soft.    Physical Examination: Exam was chaperoned by Ariana Schmitt MA   BP (!) 154/98 (BP Location: Left arm, Patient Position: Sitting, Cuff Size: Adult Regular)   Pulse 81   Temp 98.5  F (36.9  C) (Oral)   Ht 6'   Wt 244 lb   SpO2 100%   BMI 33.09 kg/m    General: alert, oriented, in no acute distress but appears comfortale  HEENT: mucous membranes moist  Perianal external examination:  Posterior midline anal fissure still present. Right anterolateral hemorrhoidectomy site I open with sutures coming out. These were trimmed. No erythema and no purulent drainage.    Digital rectal examination: Was deferred.    Anoscopy: Was deferred.    Assessment/Plan:  61 year old male status post examination under anesthesia with hemorrhoidectomy and lateral internal sphincterotomy on 21 with Dr. Armando. His hemorrhoidectomy site is open and sutures were trimmed today. Reassured him that this will still heal but may take a little extra time. Also reassured him that having some continued pain is normal-he still needs the fissure to heal and now has a  hemorrhoidectomy wound that needs to heal. Continue with scheduled tylenol and ibuprofen and warm tub baths for pain. Keep stools soft. Would like to see him again in 2 weeks for a recheck. Patient's questions were answered to his stated satisfaction and he is in agreement with this plan.      Medical history:  Past Medical History:   Diagnosis Date     GERD (gastroesophageal reflux disease)      HTN (hypertension)      Hyperlipidemia      Hypothyroid      Vertigo     2015 ,        Surgical history:  Past Surgical History:   Procedure Laterality Date     AS ESOPHAGOSCOPY, DIAGNOSTIC       COLONOSCOPY       EXAM UNDER ANESTHESIA ANUS N/A 9/9/2021    Procedure: EXAM UNDER ANESTHESIA, ANUS, ANAL BISOPY, BOTOX INJECTION;  Surgeon: Henrik Garduno MD;  Location: [facility] OR     SPHINCTEROTOMY RECTUM N/A 9/30/2021    Procedure: Anal sphincterotomy and hemorroidectomy;  Surgeon: Rajendra Armando MD;  Location: [facility] OR       Problem list:  Patient Active Problem List    Diagnosis Date Noted     Anal fissure 09/07/2021     Priority: Medium     Added automatically from request for surgery [id number]       Thrombocytopenia, unspecified (H) 04/18/2021     Priority: Medium     Elevated LFTs 07/14/2020     Priority: Medium     related to  alcoholic hapatitis previously       Dysphagia, unspecified type 06/11/2020     Priority: Medium     IBARRA (dyspnea on exertion) 07/19/2019     Priority: Medium     Obesity (BMI 30.0-34.9) 07/25/2018     Priority: Medium     Lumbago 02/26/2018     Priority: Medium     History of colonic polyps 02/15/2018     Priority: Medium     Vertigo 02/15/2018     Priority: Medium     Had had negative neurological workup in [place], few years ago       Nasal congestion 02/01/2017     Priority: Medium     chronic otc decongetsent use       Cervicalgia 05/02/2016     Priority: Medium     Cervical spondylosis without myelopathy 05/02/2016     Priority: Medium     Essential hypertension 05/02/2016      Priority: Medium     Hyperlipidemia with target LDL less than 130 05/02/2016     Priority: Medium     Neck pain 04/25/2016     Priority: Medium     chronic        Anxiety 03/08/2016     Priority: Medium     Alcohol use disorder 03/08/2016     Priority: Medium     excessive use, he has stopped now since 03/01/2016        BPPV (benign paroxysmal positional vertigo), unspecified laterality 12/28/2015     Priority: Medium      06/ 2015 , was seen at er in [place] and had mri brain, normal       Other specified hypothyroidism 12/28/2015     Priority: Medium     Hx of acute alcoholic hepatitis 12/28/2015     Priority: Medium     04/2014        Hx of colonic polyp 12/28/2015     Priority: Medium     s/p colonoscopy , need 3 yr f/u        Gastroesophageal reflux disease, esophagitis presence not specified 12/28/2015     Priority: Medium     s/p endoscopy 10/02/2015, but they were unable to do biopsy , so need another f/u check   IMO Regulatory Load OCT 2020       Vitamin D deficiency 12/28/2015     Priority: Medium       Medications:  Current Outpatient Medications   Medication Sig Dispense Refill     acetaminophen (TYLENOL) 325 MG tablet Take 2 tablets (650 mg) by mouth every 4 hours as needed for mild pain 50 tablet 0     clonazePAM (KLONOPIN) 0.5 MG tablet TAKE 1/2 TO ONE TABLET BY MOUTH AS NEEDED FOR ANXIETY 10 tablet 0     hydrocortisone, Perianal, (HYDROCORTISONE) 2.5 % cream Place rectally 2 times daily 30 g 0     levothyroxine (SYNTHROID/LEVOTHROID) 175 MCG tablet TAKE 1 TABLET(175 MCG) BY MOUTH DAILY 30 tablet 1     losartan (COZAAR) 100 MG tablet TAKE 1 TABLET(100 MG) BY MOUTH DAILY 90 tablet 0     naproxen (NAPROSYN) 500 MG tablet Take 1 tablet (500 mg) by mouth 2 times daily (with meals) for 14 days 28 tablet 0     omeprazole (PRILOSEC) 40 MG DR capsule TAKE 1 CAPSULE(40 MG) BY MOUTH DAILY 90 capsule 3     ondansetron (ZOFRAN-ODT) 4 MG ODT tab Take 1-2 tablets (4-8 mg) by mouth every 8 hours as needed for  nausea 4 tablet 0     oxyCODONE (ROXICODONE) 5 MG tablet Take 1-2 tablets (5-10 mg) by mouth every 4 hours as needed for moderate to severe pain 12 tablet 0     polyethylene glycol (MIRALAX) 17 GM/Dose powder Take 17 g by mouth daily 510 g 1     rosuvastatin (CRESTOR) 40 MG tablet TAKE 1 TABLET(40 MG) BY MOUTH DAILY 90 tablet 2     sertraline (ZOLOFT) 100 MG tablet TAKE 1 AND 1/2 TABLETS(150 MG) BY MOUTH DAILY WITH DINNER 135 tablet 2     traZODone (DESYREL) 50 MG tablet TAKE 1 TO 2 TABLETS BY MOUTH EVERY DAY FOR SLEEP 90 tablet 3     diltiazem 2% in PLO gel Apply small amount to anal opening three times daily for 8 weeks. (Patient not taking: Reported on 10/7/2021) 60 g 0       Allergies:  No Known Allergies    Family history:  Family History   Problem Relation Age of Onset     Coronary Artery Disease Father         at age 60      Hyperlipidemia Brother      Cancer Brother         wall of scrotum     Breast Cancer Mother      Diabetes No family hx of      Cerebrovascular Disease No family hx of      Colon Cancer No family hx of      Prostate Cancer No family hx of        Social history:  Social History     Tobacco Use     Smoking status: Former Smoker     Quit date: 2015     Years since quittin.0     Smokeless tobacco: Never Used   Substance Use Topics     Alcohol use: No     Alcohol/week: 0.0 standard drinks     Comment: 2-3 drinks a week     Marital status: .    Nursing Notes:   Ariana Cohen CMA  10/7/2021  9:45 AM  Signed  Chief Complaint   Patient presents with     Surgical Followup     Post-op follow up       Vitals:    10/07/21 0939   BP: (!) 154/98   BP Location: Left arm   Patient Position: Sitting   Cuff Size: Adult Regular   Pulse: 81   Temp: 98.5  F (36.9  C)   TempSrc: Oral   SpO2: 100%   Weight: 244 lb   Height: 6'       Body mass index is 33.09 kg/m .            Ariana Tompkins CMA         30 minutes spent on the date of the encounter doing chart review, history  and exam, documentation and further activities as noted above.   This is a postop visit.    YUKO Marr, NP-C  Colon and Rectal Surgery  [facility]    This note was created using speech recognition software and may contain unintended word substitutions.

## 2021-10-07 NOTE — NURSING NOTE
Chief Complaint   Patient presents with     Surgical Followup     Post-op follow up       Vitals:    10/07/21 0939   BP: (!) 154/98   BP Location: Left arm   Patient Position: Sitting   Cuff Size: Adult Regular   Pulse: 81   Temp: 98.5  F (36.9  C)   TempSrc: Oral   SpO2: 100%   Weight: 244 lb   Height: 6'       Body mass index is 33.09 kg/m .            Ariaan Tompkins, CMA

## 2021-10-07 NOTE — LETTER
10/7/2021       RE: Angus Wynne  [address]  [place] [address]     Dear Colleague,    Thank you for referring your patient, Angus Wynne, to the [facility] AND RE[facility]GERY [facility] [place] [facility]. Please see a copy of my visit note below.    Colon and Rectal Surgery Postoperative Clinic Note    RE: Angus Wynne  : 1960  MICH: 10/7/2021    Angus Wynne is a very pleasant 61 year old male with anal fissure and mixed hemorrhoids who underwent examination under anesthesia with Botox injection with Dr. Garduno on 21 but unfortunately had significant constipation after this and developed a worsening and very painful fissure. He is now status post examination under anesthesia with hemorrhoidectomy and lateral internal sphincterotomy on 21 with Dr. Armando.    Interval history: Angus continues to have pain. He has sutures on the right side that are poking and causing increased pain. He thinks his wound opened up and he is concerned about this. He has some pressure with urination and has to stand to urinate. Some times stool comes out at the same time as he is pushing to void. Stools are soft.    Physical Examination: Exam was chaperoned by Ariana Schmitt MA   BP (!) 154/98 (BP Location: Left arm, Patient Position: Sitting, Cuff Size: Adult Regular)   Pulse 81   Temp 98.5  F (36.9  C) (Oral)   Ht 6'   Wt 244 lb   SpO2 100%   BMI 33.09 kg/m    General: alert, oriented, in no acute distress but appears comfortale  HEENT: mucous membranes moist  Perianal external examination:  Posterior midline anal fissure still present. Right anterolateral hemorrhoidectomy site I open with sutures coming out. These were trimmed. No erythema and no purulent drainage.    Digital rectal examination: Was deferred.    Anoscopy: Was deferred.    Assessment/Plan:  61 year old male status post examination under anesthesia  with hemorrhoidectomy and lateral internal sphincterotomy on 9/30/21 with Dr. Armando. His hemorrhoidectomy site is open and sutures were trimmed today. Reassured him that this will still heal but may take a little extra time. Also reassured him that having some continued pain is normal-he still needs the fissure to heal and now has a hemorrhoidectomy wound that needs to heal. Continue with scheduled tylenol and ibuprofen and warm tub baths for pain. Keep stools soft. Would like to see him again in 2 weeks for a recheck. Patient's questions were answered to his stated satisfaction and he is in agreement with this plan.      Medical history:  Past Medical History:   Diagnosis Date     GERD (gastroesophageal reflux disease)      HTN (hypertension)      Hyperlipidemia      Hypothyroid      Vertigo     2015 ,        Surgical history:  Past Surgical History:   Procedure Laterality Date     AS ESOPHAGOSCOPY, DIAGNOSTIC       COLONOSCOPY       EXAM UNDER ANESTHESIA ANUS N/A 9/9/2021    Procedure: EXAM UNDER ANESTHESIA, ANUS, ANAL BISOPY, BOTOX INJECTION;  Surgeon: Henrik Garduno MD;  Location: [facility] OR     SPHINCTEROTOMY RECTUM N/A 9/30/2021    Procedure: Anal sphincterotomy and hemorroidectomy;  Surgeon: Rajendra Armando MD;  Location: [facility] OR       Problem list:  Patient Active Problem List    Diagnosis Date Noted     Anal fissure 09/07/2021     Priority: Medium     Added automatically from request for surgery [id number]       Thrombocytopenia, unspecified (H) 04/18/2021     Priority: Medium     Elevated LFTs 07/14/2020     Priority: Medium     related to  alcoholic hapatitis previously       Dysphagia, unspecified type 06/11/2020     Priority: Medium     IBARRA (dyspnea on exertion) 07/19/2019     Priority: Medium     Obesity (BMI 30.0-34.9) 07/25/2018     Priority: Medium     Lumbago 02/26/2018     Priority: Medium     History of colonic polyps 02/15/2018     Priority: Medium     Vertigo 02/15/2018     Priority:  Medium     Had had negative neurological workup in [place], few years ago       Nasal congestion 02/01/2017     Priority: Medium     chronic otc decongetsent use       Cervicalgia 05/02/2016     Priority: Medium     Cervical spondylosis without myelopathy 05/02/2016     Priority: Medium     Essential hypertension 05/02/2016     Priority: Medium     Hyperlipidemia with target LDL less than 130 05/02/2016     Priority: Medium     Neck pain 04/25/2016     Priority: Medium     chronic        Anxiety 03/08/2016     Priority: Medium     Alcohol use disorder 03/08/2016     Priority: Medium     excessive use, he has stopped now since 03/01/2016        BPPV (benign paroxysmal positional vertigo), unspecified laterality 12/28/2015     Priority: Medium      06/ 2015 , was seen at er in [place] and had mri brain, normal       Other specified hypothyroidism 12/28/2015     Priority: Medium     Hx of acute alcoholic hepatitis 12/28/2015     Priority: Medium     04/2014        Hx of colonic polyp 12/28/2015     Priority: Medium     s/p colonoscopy , need 3 yr f/u        Gastroesophageal reflux disease, esophagitis presence not specified 12/28/2015     Priority: Medium     s/p endoscopy 10/02/2015, but they were unable to do biopsy , so need another f/u check   IMO Regulatory Load OCT 2020       Vitamin D deficiency 12/28/2015     Priority: Medium       Medications:  Current Outpatient Medications   Medication Sig Dispense Refill     acetaminophen (TYLENOL) 325 MG tablet Take 2 tablets (650 mg) by mouth every 4 hours as needed for mild pain 50 tablet 0     clonazePAM (KLONOPIN) 0.5 MG tablet TAKE 1/2 TO ONE TABLET BY MOUTH AS NEEDED FOR ANXIETY 10 tablet 0     hydrocortisone, Perianal, (HYDROCORTISONE) 2.5 % cream Place rectally 2 times daily 30 g 0     levothyroxine (SYNTHROID/LEVOTHROID) 175 MCG tablet TAKE 1 TABLET(175 MCG) BY MOUTH DAILY 30 tablet 1     losartan (COZAAR) 100 MG tablet TAKE 1 TABLET(100 MG) BY MOUTH DAILY 90  tablet 0     naproxen (NAPROSYN) 500 MG tablet Take 1 tablet (500 mg) by mouth 2 times daily (with meals) for 14 days 28 tablet 0     omeprazole (PRILOSEC) 40 MG DR capsule TAKE 1 CAPSULE(40 MG) BY MOUTH DAILY 90 capsule 3     ondansetron (ZOFRAN-ODT) 4 MG ODT tab Take 1-2 tablets (4-8 mg) by mouth every 8 hours as needed for nausea 4 tablet 0     oxyCODONE (ROXICODONE) 5 MG tablet Take 1-2 tablets (5-10 mg) by mouth every 4 hours as needed for moderate to severe pain 12 tablet 0     polyethylene glycol (MIRALAX) 17 GM/Dose powder Take 17 g by mouth daily 510 g 1     rosuvastatin (CRESTOR) 40 MG tablet TAKE 1 TABLET(40 MG) BY MOUTH DAILY 90 tablet 2     sertraline (ZOLOFT) 100 MG tablet TAKE 1 AND 1/2 TABLETS(150 MG) BY MOUTH DAILY WITH DINNER 135 tablet 2     traZODone (DESYREL) 50 MG tablet TAKE 1 TO 2 TABLETS BY MOUTH EVERY DAY FOR SLEEP 90 tablet 3     diltiazem 2% in PLO gel Apply small amount to anal opening three times daily for 8 weeks. (Patient not taking: Reported on 10/7/2021) 60 g 0       Allergies:  No Known Allergies    Family history:  Family History   Problem Relation Age of Onset     Coronary Artery Disease Father         at age 60      Hyperlipidemia Brother      Cancer Brother         wall of scrotum     Breast Cancer Mother      Diabetes No family hx of      Cerebrovascular Disease No family hx of      Colon Cancer No family hx of      Prostate Cancer No family hx of        Social history:  Social History     Tobacco Use     Smoking status: Former Smoker     Quit date: 2015     Years since quittin.0     Smokeless tobacco: Never Used   Substance Use Topics     Alcohol use: No     Alcohol/week: 0.0 standard drinks     Comment: 2-3 drinks a week     Marital status: .    Nursing Notes:   Ariana Cohen CMA  10/7/2021  9:45 AM  Signed  Chief Complaint   Patient presents with     Surgical Followup     Post-op follow up       Vitals:    10/07/21 0939   BP: (!) 154/98   BP  Location: Left arm   Patient Position: Sitting   Cuff Size: Adult Regular   Pulse: 81   Temp: 98.5  F (36.9  C)   TempSrc: Oral   SpO2: 100%   Weight: 244 lb   Height: 6'       Body mass index is 33.09 kg/m .            Ariana Tompkins CMA         30 minutes spent on the date of the encounter doing chart review, history and exam, documentation and further activities as noted above.   This is a postop visit.    YUKO Marr, NP-C  Colon and Rectal Surgery  [facility]    This note was created using speech recognition software and may contain unintended word substitutions.          Again, thank you for allowing me to participate in the care of your patient.      Sincerely,    YUOK Marr CNP

## 2021-10-11 DIAGNOSIS — G47.09 OTHER INSOMNIA: ICD-10-CM

## 2021-10-11 DIAGNOSIS — F41.9 ANXIETY: ICD-10-CM

## 2021-10-11 RX ORDER — SERTRALINE HYDROCHLORIDE 100 MG/1
TABLET, FILM COATED ORAL
Qty: 135 TABLET | Refills: 0 | Status: SHIPPED | OUTPATIENT
Start: 2021-10-11 | End: 2021-12-29

## 2021-10-11 RX ORDER — TRAZODONE HYDROCHLORIDE 50 MG/1
TABLET, FILM COATED ORAL
Qty: 90 TABLET | Refills: 0 | Status: SHIPPED | OUTPATIENT
Start: 2021-10-11 | End: 2021-11-01

## 2021-10-11 NOTE — TELEPHONE ENCOUNTER
Routing refill request to provider for review/approval because:  Drug interaction warning    Fara HAWKINS RN  EP Triage

## 2021-10-12 RX ORDER — CLONAZEPAM 0.5 MG/1
TABLET ORAL
Qty: 10 TABLET | Refills: 0 | Status: SHIPPED | OUTPATIENT
Start: 2021-10-12 | End: 2021-11-15

## 2021-10-12 NOTE — TELEPHONE ENCOUNTER
Routing refill request to provider for review/approval because:  Drug not on the [facility] refill protocol     Pending Prescriptions:                       Disp   Refills    clonazePAM (KLONOPIN) 0.5 MG tablet [Phar*10 tab*             Sig: TAKE ONE-HALF TO 1 TABLET BY MOUTH AS NEEDED FOR           ANXIETY    Last Written Prescription Date: 09/03/21  Last Fill Quantity:10 ,  # refills: 0  Last office visit: 9/8/2021 with prescribing provider:    Future Office Visit:  none        Hollie Marin RN  [facility]

## 2021-10-15 ENCOUNTER — MYC MEDICAL ADVICE (OUTPATIENT)
Dept: SURGERY | Facility: CLINIC | Age: 61
End: 2021-10-15

## 2021-10-20 ENCOUNTER — OFFICE VISIT (OUTPATIENT)
Dept: SURGERY | Facility: CLINIC | Age: 61
End: 2021-10-20
Payer: COMMERCIAL

## 2021-10-20 VITALS
TEMPERATURE: 98.8 F | OXYGEN SATURATION: 99 % | HEIGHT: 72 IN | BODY MASS INDEX: 33.18 KG/M2 | SYSTOLIC BLOOD PRESSURE: 140 MMHG | HEART RATE: 92 BPM | WEIGHT: 245 LBS | DIASTOLIC BLOOD PRESSURE: 77 MMHG

## 2021-10-20 DIAGNOSIS — Z09 FOLLOW-UP EXAMINATION AFTER COLORECTAL SURGERY: Primary | ICD-10-CM

## 2021-10-20 DIAGNOSIS — K60.2 ANAL FISSURE: ICD-10-CM

## 2021-10-20 PROCEDURE — 99024 POSTOP FOLLOW-UP VISIT: CPT | Performed by: NURSE PRACTITIONER

## 2021-10-20 ASSESSMENT — MIFFLIN-ST. JEOR: SCORE: 1944.31

## 2021-10-20 ASSESSMENT — PAIN SCALES - GENERAL: PAINLEVEL: MODERATE PAIN (4)

## 2021-10-20 NOTE — NURSING NOTE
Chief Complaint   Patient presents with     Surgical Followup     Post op, DOS: 9/9/21 and 9/30/21       Vitals:    10/20/21 1232   BP: (!) 140/77   BP Location: Left arm   Patient Position: Sitting   Cuff Size: Adult Large   Pulse: 92   Temp: 98.8  F (37.1  C)   TempSrc: Oral   SpO2: 99%   Weight: 245 lb   Height: 6'       Body mass index is 33.23 kg/m .                          Yamile Lainez, EMT

## 2021-10-20 NOTE — LETTER
10/20/2021       RE: Angus Wynne  [address]  [place] [address]     Dear Colleague,    Thank you for referring your patient, Angus Wynne, to the [facility] COLON AND RECTAL SURGERY CLINIC [place] [facility]. Please see a copy of my visit note below.    Colon and Rectal Surgery Postoperative Clinic Note    RE: Angus Wynne  : 1960  MICH: 10/20/2021    Angus Wynne is a very pleasant 61 year old male with anal fissure and mixed hemorrhoids who underwent examination under anesthesia with Botox injection with Dr. Garduno on 21 but unfortunately had significant constipation after this and developed a worsening and very painful fissure. He is now status post examination under anesthesia with hemorrhoidectomy and lateral internal sphincterotomy on 21 with Dr. Armando.    Interval history: Angus is doing a lot better. No significant pain. Some itching after bowel movements. No fecal incontinence and stools are soft. He is doing meditation and yoga breathing in a hot tub bath for an hour every morning.     Physical Examination: Exam was chaperoned by Ariana Schmitt MA   BP (!) 140/77 (BP Location: Left arm, Patient Position: Sitting, Cuff Size: Adult Large)   Pulse 92   Temp 98.8  F (37.1  C) (Oral)   Ht 6'   Wt 245 lb   SpO2 99%   BMI 33.23 kg/m    General: alert, oriented, in no acute distress but appears comfortale  HEENT: mucous membranes moist  Perianal external examination:  Posterior midline anal fissure still present but starting to heal. Right anterolateral hemorrhoidectomy site I open with sutures coming out. No erythema and no purulent drainage.    Digital rectal examination: Was deferred.    Anoscopy: Was deferred.    Assessment/Plan:  61 year old male status post examination under anesthesia with hemorrhoidectomy and lateral internal sphincterotomy on 21 with Dr. Armando. His symptoms have  improved significantly. No significant pain. No incontinence. Will have him use Calmoseptine for itching and keep gauze tucked between his buttocks. Continue to avoid constipation and straining. Would like to see him in about 3 weeks for follow up. Patient's questions were answered to his stated satisfaction and he is in agreement with this plan.    Medical history:  Past Medical History:   Diagnosis Date     GERD (gastroesophageal reflux disease)      HTN (hypertension)      Hyperlipidemia      Hypothyroid      Vertigo     2015 ,        Surgical history:  Past Surgical History:   Procedure Laterality Date     AS ESOPHAGOSCOPY, DIAGNOSTIC       COLONOSCOPY       EXAM UNDER ANESTHESIA ANUS N/A 9/9/2021    Procedure: EXAM UNDER ANESTHESIA, ANUS, ANAL BISOPY, BOTOX INJECTION;  Surgeon: Henrik Garduno MD;  Location: [facility] OR     SPHINCTEROTOMY RECTUM N/A 9/30/2021    Procedure: Anal sphincterotomy and hemorroidectomy;  Surgeon: Rajendra Armando MD;  Location: [facility] OR       Problem list:    Patient Active Problem List    Diagnosis Date Noted     Anal fissure 09/07/2021     Priority: Medium     Added automatically from request for surgery [id number]       Thrombocytopenia, unspecified (H) 04/18/2021     Priority: Medium     Elevated LFTs 07/14/2020     Priority: Medium     related to  alcoholic hapatitis previously       Dysphagia, unspecified type 06/11/2020     Priority: Medium     IBARRA (dyspnea on exertion) 07/19/2019     Priority: Medium     Obesity (BMI 30.0-34.9) 07/25/2018     Priority: Medium     Lumbago 02/26/2018     Priority: Medium     History of colonic polyps 02/15/2018     Priority: Medium     Vertigo 02/15/2018     Priority: Medium     Had had negative neurological workup in [place], few years ago       Nasal congestion 02/01/2017     Priority: Medium     chronic otc decongetsent use       Cervicalgia 05/02/2016     Priority: Medium     Cervical spondylosis without myelopathy 05/02/2016      Priority: Medium     Essential hypertension 05/02/2016     Priority: Medium     Hyperlipidemia with target LDL less than 130 05/02/2016     Priority: Medium     Neck pain 04/25/2016     Priority: Medium     chronic        Anxiety 03/08/2016     Priority: Medium     Alcohol use disorder 03/08/2016     Priority: Medium     excessive use, he has stopped now since 03/01/2016        BPPV (benign paroxysmal positional vertigo), unspecified laterality 12/28/2015     Priority: Medium      06/ 2015 , was seen at er in [place] and had mri brain, normal       Other specified hypothyroidism 12/28/2015     Priority: Medium     Hx of acute alcoholic hepatitis 12/28/2015     Priority: Medium     04/2014        Hx of colonic polyp 12/28/2015     Priority: Medium     s/p colonoscopy , need 3 yr f/u        Gastroesophageal reflux disease, esophagitis presence not specified 12/28/2015     Priority: Medium     s/p endoscopy 10/02/2015, but they were unable to do biopsy , so need another f/u check   IMO Regulatory Load OCT 2020       Vitamin D deficiency 12/28/2015     Priority: Medium       Medications:  Current Outpatient Medications   Medication Sig Dispense Refill     acetaminophen (TYLENOL) 325 MG tablet Take 2 tablets (650 mg) by mouth every 4 hours as needed for mild pain 50 tablet 0     clonazePAM (KLONOPIN) 0.5 MG tablet TAKE ONE-HALF TO 1 TABLET BY MOUTH AS NEEDED FOR ANXIETY 10 tablet 0     hydrocortisone, Perianal, (HYDROCORTISONE) 2.5 % cream Place rectally 2 times daily 30 g 0     levothyroxine (SYNTHROID/LEVOTHROID) 175 MCG tablet TAKE 1 TABLET(175 MCG) BY MOUTH DAILY 30 tablet 1     losartan (COZAAR) 100 MG tablet TAKE 1 TABLET(100 MG) BY MOUTH DAILY 90 tablet 0     omeprazole (PRILOSEC) 40 MG DR capsule TAKE 1 CAPSULE(40 MG) BY MOUTH DAILY 90 capsule 3     ondansetron (ZOFRAN-ODT) 4 MG ODT tab Take 1-2 tablets (4-8 mg) by mouth every 8 hours as needed for nausea 4 tablet 0     polyethylene glycol (MIRALAX) 17 GM/Dose  powder Take 17 g by mouth daily 510 g 1     rosuvastatin (CRESTOR) 40 MG tablet TAKE 1 TABLET(40 MG) BY MOUTH DAILY 90 tablet 2     sertraline (ZOLOFT) 100 MG tablet TAKE 1 AND 1/2 TABLETS(150 MG) BY MOUTH DAILY WITH DINNER 135 tablet 0     traZODone (DESYREL) 50 MG tablet TAKE 1 TO 2 TABLETS BY MOUTH EVERY DAY FOR SLEEP 90 tablet 0     diltiazem 2% in PLO gel Apply small amount to anal opening three times daily for 8 weeks. (Patient not taking: Reported on 10/7/2021) 60 g 0     oxyCODONE (ROXICODONE) 5 MG tablet Take 1-2 tablets (5-10 mg) by mouth every 4 hours as needed for moderate to severe pain (Patient not taking: Reported on 10/20/2021) 12 tablet 0       Allergies:  No Known Allergies    Family history:  Family History   Problem Relation Age of Onset     Coronary Artery Disease Father         at age 60      Hyperlipidemia Brother      Cancer Brother         wall of scrotum     Breast Cancer Mother      Diabetes No family hx of      Cerebrovascular Disease No family hx of      Colon Cancer No family hx of      Prostate Cancer No family hx of        Social history:  Social History     Tobacco Use     Smoking status: Former Smoker     Quit date: 2015     Years since quittin.0     Smokeless tobacco: Never Used   Substance Use Topics     Alcohol use: No     Alcohol/week: 0.0 standard drinks     Comment: 2-3 drinks a week     Marital status: .    Nursing Notes:   Yamile Lainez, EMT  10/20/2021 12:36 PM  Signed  Chief Complaint   Patient presents with     Surgical Followup     Post op, DOS: 21 and 21       Vitals:    10/20/21 1232   BP: (!) 140/77   BP Location: Left arm   Patient Position: Sitting   Cuff Size: Adult Large   Pulse: 92   Temp: 98.8  F (37.1  C)   TempSrc: Oral   SpO2: 99%   Weight: 245 lb   Height: 6'       Body mass index is 33.23 kg/m .                          Yamile Lainez, EMT         10 minutes spent on the date of the encounter doing chart review, history and  exam, documentation and further activities as noted above.   This is a postop visit.    YUKO Marr, NP-C  Colon and Rectal Surgery  [facility]    This note was created using speech recognition software and may contain unintended word substitutions.          Again, thank you for allowing me to participate in the care of your patient.      Sincerely,    YUKO Marr CNP

## 2021-10-20 NOTE — PROGRESS NOTES
Colon and Rectal Surgery Postoperative Clinic Note    RE: Angus Wynne  : 1960  MICH: 10/20/2021    Angus Wynne is a very pleasant 61 year old male with anal fissure and mixed hemorrhoids who underwent examination under anesthesia with Botox injection with Dr. Garduno on 21 but unfortunately had significant constipation after this and developed a worsening and very painful fissure. He is now status post examination under anesthesia with hemorrhoidectomy and lateral internal sphincterotomy on 21 with Dr. Armando.    Interval history: Angus is doing a lot better. No significant pain. Some itching after bowel movements. No fecal incontinence and stools are soft. He is doing meditation and yoga breathing in a hot tub bath for an hour every morning.     Physical Examination: Exam was chaperoned by Ariana Schmitt MA   BP (!) 140/77 (BP Location: Left arm, Patient Position: Sitting, Cuff Size: Adult Large)   Pulse 92   Temp 98.8  F (37.1  C) (Oral)   Ht 6'   Wt 245 lb   SpO2 99%   BMI 33.23 kg/m    General: alert, oriented, in no acute distress but appears comfortale  HEENT: mucous membranes moist  Perianal external examination:  Posterior midline anal fissure still present but starting to heal. Right anterolateral hemorrhoidectomy site I open with sutures coming out. No erythema and no purulent drainage.    Digital rectal examination: Was deferred.    Anoscopy: Was deferred.    Assessment/Plan:  61 year old male status post examination under anesthesia with hemorrhoidectomy and lateral internal sphincterotomy on 21 with Dr. Armando. His symptoms have improved significantly. No significant pain. No incontinence. Will have him use Calmoseptine for itching and keep gauze tucked between his buttocks. Continue to avoid constipation and straining. Would like to see him in about 3 weeks for follow up. Patient's questions were answered to his stated satisfaction and he is in agreement  with this plan.    Medical history:  Past Medical History:   Diagnosis Date     GERD (gastroesophageal reflux disease)      HTN (hypertension)      Hyperlipidemia      Hypothyroid      Vertigo     2015 ,        Surgical history:  Past Surgical History:   Procedure Laterality Date     AS ESOPHAGOSCOPY, DIAGNOSTIC       COLONOSCOPY       EXAM UNDER ANESTHESIA ANUS N/A 9/9/2021    Procedure: EXAM UNDER ANESTHESIA, ANUS, ANAL BISOPY, BOTOX INJECTION;  Surgeon: Henrik Garduno MD;  Location: [facility] OR     SPHINCTEROTOMY RECTUM N/A 9/30/2021    Procedure: Anal sphincterotomy and hemorroidectomy;  Surgeon: Rajendra Armando MD;  [place] [facility] [place]       Problem list:    Patient Active Problem List    Diagnosis Date Noted     Anal fissure 09/07/2021     Priority: Medium     Added automatically from request for surgery [id number]       Thrombocytopenia, unspecified (H) 04/18/2021     Priority: Medium     Elevated LFTs 07/14/2020     Priority: Medium     related to  alcoholic hapatitis previously       Dysphagia, unspecified type 06/11/2020     Priority: Medium     IBARRA (dyspnea on exertion) 07/19/2019     Priority: Medium     Obesity (BMI 30.0-34.9) 07/25/2018     Priority: Medium     Lumbago 02/26/2018     Priority: Medium     History of colonic polyps 02/15/2018     Priority: Medium     Vertigo 02/15/2018     Priority: Medium     Had had negative neurological workup in [place], few years ago       Nasal congestion 02/01/2017     Priority: Medium     chronic otc decongetsent use       Cervicalgia 05/02/2016     Priority: Medium     Cervical spondylosis without myelopathy 05/02/2016     Priority: Medium     Essential hypertension 05/02/2016     Priority: Medium     Hyperlipidemia with target LDL less than 130 05/02/2016     Priority: Medium     Neck pain 04/25/2016     Priority: Medium     chronic        Anxiety 03/08/2016     Priority: Medium     Alcohol use disorder 03/08/2016     Priority: Medium     excessive use, he  has stopped now since 03/01/2016        BPPV (benign paroxysmal positional vertigo), unspecified laterality 12/28/2015     Priority: Medium      06/ 2015 , was seen at er in [place] and had mri brain, normal       Other specified hypothyroidism 12/28/2015     Priority: Medium     Hx of acute alcoholic hepatitis 12/28/2015     Priority: Medium     04/2014        Hx of colonic polyp 12/28/2015     Priority: Medium     s/p colonoscopy , need 3 yr f/u        Gastroesophageal reflux disease, esophagitis presence not specified 12/28/2015     Priority: Medium     s/p endoscopy 10/02/2015, but they were unable to do biopsy , so need another f/u check   IMO Regulatory Load OCT 2020       Vitamin D deficiency 12/28/2015     Priority: Medium       Medications:  Current Outpatient Medications   Medication Sig Dispense Refill     acetaminophen (TYLENOL) 325 MG tablet Take 2 tablets (650 mg) by mouth every 4 hours as needed for mild pain 50 tablet 0     clonazePAM (KLONOPIN) 0.5 MG tablet TAKE ONE-HALF TO 1 TABLET BY MOUTH AS NEEDED FOR ANXIETY 10 tablet 0     hydrocortisone, Perianal, (HYDROCORTISONE) 2.5 % cream Place rectally 2 times daily 30 g 0     levothyroxine (SYNTHROID/LEVOTHROID) 175 MCG tablet TAKE 1 TABLET(175 MCG) BY MOUTH DAILY 30 tablet 1     losartan (COZAAR) 100 MG tablet TAKE 1 TABLET(100 MG) BY MOUTH DAILY 90 tablet 0     omeprazole (PRILOSEC) 40 MG DR capsule TAKE 1 CAPSULE(40 MG) BY MOUTH DAILY 90 capsule 3     ondansetron (ZOFRAN-ODT) 4 MG ODT tab Take 1-2 tablets (4-8 mg) by mouth every 8 hours as needed for nausea 4 tablet 0     polyethylene glycol (MIRALAX) 17 GM/Dose powder Take 17 g by mouth daily 510 g 1     rosuvastatin (CRESTOR) 40 MG tablet TAKE 1 TABLET(40 MG) BY MOUTH DAILY 90 tablet 2     sertraline (ZOLOFT) 100 MG tablet TAKE 1 AND 1/2 TABLETS(150 MG) BY MOUTH DAILY WITH DINNER 135 tablet 0     traZODone (DESYREL) 50 MG tablet TAKE 1 TO 2 TABLETS BY MOUTH EVERY DAY FOR SLEEP 90 tablet 0      diltiazem 2% in PLO gel Apply small amount to anal opening three times daily for 8 weeks. (Patient not taking: Reported on 10/7/2021) 60 g 0     oxyCODONE (ROXICODONE) 5 MG tablet Take 1-2 tablets (5-10 mg) by mouth every 4 hours as needed for moderate to severe pain (Patient not taking: Reported on 10/20/2021) 12 tablet 0       Allergies:  No Known Allergies    Family history:  Family History   Problem Relation Age of Onset     Coronary Artery Disease Father         at age 60      Hyperlipidemia Brother      Cancer Brother         wall of scrotum     Breast Cancer Mother      Diabetes No family hx of      Cerebrovascular Disease No family hx of      Colon Cancer No family hx of      Prostate Cancer No family hx of        Social history:  Social History     Tobacco Use     Smoking status: Former Smoker     Quit date: 2015     Years since quittin.0     Smokeless tobacco: Never Used   Substance Use Topics     Alcohol use: No     Alcohol/week: 0.0 standard drinks     Comment: 2-3 drinks a week     Marital status: .    Nursing Notes:   Yamile Lainez, EMT  10/20/2021 12:36 PM  Signed  Chief Complaint   Patient presents with     Surgical Followup     Post op, DOS: 21 and 21       Vitals:    10/20/21 1232   BP: (!) 140/77   BP Location: Left arm   Patient Position: Sitting   Cuff Size: Adult Large   Pulse: 92   Temp: 98.8  F (37.1  C)   TempSrc: Oral   SpO2: 99%   Weight: 245 lb   Height: 6'       Body mass index is 33.23 kg/m .                          Yamile Lainez, EMT         10 minutes spent on the date of the encounter doing chart review, history and exam, documentation and further activities as noted above.   This is a postop visit.    YUKO Marr, NP-C  Colon and Rectal Surgery  [facility]    This note was created using speech recognition software and may contain unintended word substitutions.

## 2021-11-01 ENCOUNTER — MYC REFILL (OUTPATIENT)
Dept: FAMILY MEDICINE | Facility: CLINIC | Age: 61
End: 2021-11-01

## 2021-11-01 DIAGNOSIS — F41.9 ANXIETY: ICD-10-CM

## 2021-11-01 DIAGNOSIS — G47.09 OTHER INSOMNIA: ICD-10-CM

## 2021-11-02 RX ORDER — TRAZODONE HYDROCHLORIDE 50 MG/1
TABLET, FILM COATED ORAL
Qty: 90 TABLET | Refills: 0 | Status: SHIPPED | OUTPATIENT
Start: 2021-11-02 | End: 2021-12-17

## 2021-11-02 RX ORDER — CLONAZEPAM 0.5 MG/1
TABLET ORAL
Qty: 10 TABLET | Refills: 0 | OUTPATIENT
Start: 2021-11-02

## 2021-11-02 NOTE — TELEPHONE ENCOUNTER
Clonazepam 0.5 mg      Last Written Prescription Date:  10/12/21  Last Fill Quantity: 10,   # refills: 0  Last Office Visit: 9/8/21 Ditty  Future Office visit:       Routing refill request to provider for review/approval because:  Drug not on the [facility], [facility] or [facility] refill protocol or controlled substance    Fara HAWKINS RN  EP Triage

## 2021-11-09 DIAGNOSIS — F41.9 ANXIETY: ICD-10-CM

## 2021-11-15 RX ORDER — CLONAZEPAM 0.5 MG/1
TABLET ORAL
Qty: 10 TABLET | Refills: 0 | Status: SHIPPED | OUTPATIENT
Start: 2021-11-15 | End: 2022-02-11

## 2021-11-15 NOTE — TELEPHONE ENCOUNTER
Called with the  and she left a voice message to have the patient called the clinic and schedule a physical with Dr. Fleming since he is due 11/18/2021.    Katelin Calderón on 11/15/2021 at 3:40 PM

## 2021-11-21 DIAGNOSIS — E03.8 OTHER SPECIFIED HYPOTHYROIDISM: ICD-10-CM

## 2021-11-22 NOTE — TELEPHONE ENCOUNTER
Routing refill request to provider for review/approval because:  Labs out of range:  TSH  Elina GARCIA RN  [facility]

## 2021-11-24 RX ORDER — LEVOTHYROXINE SODIUM 175 UG/1
TABLET ORAL
Qty: 30 TABLET | Refills: 0 | Status: SHIPPED | OUTPATIENT
Start: 2021-11-24 | End: 2021-12-22

## 2021-12-16 DIAGNOSIS — I10 ESSENTIAL HYPERTENSION: ICD-10-CM

## 2021-12-16 DIAGNOSIS — G47.09 OTHER INSOMNIA: ICD-10-CM

## 2021-12-17 RX ORDER — LOSARTAN POTASSIUM 100 MG/1
TABLET ORAL
Qty: 30 TABLET | Refills: 0 | Status: SHIPPED | OUTPATIENT
Start: 2021-12-17 | End: 2021-12-29

## 2021-12-17 RX ORDER — TRAZODONE HYDROCHLORIDE 50 MG/1
TABLET, FILM COATED ORAL
Qty: 90 TABLET | Refills: 3 | Status: SHIPPED | OUTPATIENT
Start: 2021-12-17 | End: 2022-06-07

## 2021-12-17 NOTE — TELEPHONE ENCOUNTER
Routing refill request to provider for review/approval because:  Failed BP protocol    Fara HAWKINS RN  EP Triage

## 2021-12-29 ENCOUNTER — OFFICE VISIT (OUTPATIENT)
Dept: FAMILY MEDICINE | Facility: CLINIC | Age: 61
End: 2021-12-29
Payer: COMMERCIAL

## 2021-12-29 VITALS
TEMPERATURE: 97.6 F | SYSTOLIC BLOOD PRESSURE: 134 MMHG | DIASTOLIC BLOOD PRESSURE: 88 MMHG | HEIGHT: 72 IN | RESPIRATION RATE: 14 BRPM | HEART RATE: 71 BPM | OXYGEN SATURATION: 97 % | BODY MASS INDEX: 33.23 KG/M2

## 2021-12-29 DIAGNOSIS — E03.8 OTHER SPECIFIED HYPOTHYROIDISM: ICD-10-CM

## 2021-12-29 DIAGNOSIS — I10 ESSENTIAL HYPERTENSION: ICD-10-CM

## 2021-12-29 DIAGNOSIS — Z23 HIGH PRIORITY FOR 2019-NCOV VACCINE: ICD-10-CM

## 2021-12-29 DIAGNOSIS — Z00.00 ROUTINE HISTORY AND PHYSICAL EXAMINATION OF ADULT: Primary | ICD-10-CM

## 2021-12-29 DIAGNOSIS — F41.9 ANXIETY: ICD-10-CM

## 2021-12-29 DIAGNOSIS — E78.5 HYPERLIPIDEMIA WITH TARGET LDL LESS THAN 130: ICD-10-CM

## 2021-12-29 DIAGNOSIS — Z12.5 SCREENING PSA (PROSTATE SPECIFIC ANTIGEN): ICD-10-CM

## 2021-12-29 LAB
ALBUMIN SERPL-MCNC: 3.9 G/DL (ref 3.4–5)
ALP SERPL-CCNC: 56 U/L (ref 40–150)
ALT SERPL W P-5'-P-CCNC: 39 U/L (ref 0–70)
ANION GAP SERPL CALCULATED.3IONS-SCNC: 6 MMOL/L (ref 3–14)
AST SERPL W P-5'-P-CCNC: 34 U/L (ref 0–45)
BILIRUB SERPL-MCNC: 0.5 MG/DL (ref 0.2–1.3)
BUN SERPL-MCNC: 12 MG/DL (ref 7–30)
CALCIUM SERPL-MCNC: 9.4 MG/DL (ref 8.5–10.1)
CHLORIDE BLD-SCNC: 104 MMOL/L (ref 94–109)
CHOLEST SERPL-MCNC: 223 MG/DL
CO2 SERPL-SCNC: 27 MMOL/L (ref 20–32)
CREAT SERPL-MCNC: 0.86 MG/DL (ref 0.66–1.25)
FASTING STATUS PATIENT QL REPORTED: YES
GFR SERPL CREATININE-BSD FRML MDRD: >90 ML/MIN/1.73M2
GLUCOSE BLD-MCNC: 117 MG/DL (ref 70–99)
HDLC SERPL-MCNC: 74 MG/DL
LDLC SERPL CALC-MCNC: 106 MG/DL
NONHDLC SERPL-MCNC: 149 MG/DL
POTASSIUM BLD-SCNC: 4.4 MMOL/L (ref 3.4–5.3)
PROT SERPL-MCNC: 7.8 G/DL (ref 6.8–8.8)
PSA SERPL-MCNC: 0.48 UG/L (ref 0–4)
SODIUM SERPL-SCNC: 137 MMOL/L (ref 133–144)
T4 FREE SERPL-MCNC: 0.89 NG/DL (ref 0.76–1.46)
TRIGL SERPL-MCNC: 213 MG/DL
TSH SERPL DL<=0.005 MIU/L-ACNC: 0.2 MU/L (ref 0.4–4)

## 2021-12-29 PROCEDURE — 91300 COVID-19,PF,PFIZER (12+ YRS): CPT | Performed by: FAMILY MEDICINE

## 2021-12-29 PROCEDURE — G0103 PSA SCREENING: HCPCS | Performed by: FAMILY MEDICINE

## 2021-12-29 PROCEDURE — 80061 LIPID PANEL: CPT | Performed by: FAMILY MEDICINE

## 2021-12-29 PROCEDURE — 90471 IMMUNIZATION ADMIN: CPT | Performed by: FAMILY MEDICINE

## 2021-12-29 PROCEDURE — 36415 COLL VENOUS BLD VENIPUNCTURE: CPT | Performed by: FAMILY MEDICINE

## 2021-12-29 PROCEDURE — 99396 PREV VISIT EST AGE 40-64: CPT | Mod: 25 | Performed by: FAMILY MEDICINE

## 2021-12-29 PROCEDURE — 84439 ASSAY OF FREE THYROXINE: CPT | Performed by: FAMILY MEDICINE

## 2021-12-29 PROCEDURE — 90682 RIV4 VACC RECOMBINANT DNA IM: CPT | Performed by: FAMILY MEDICINE

## 2021-12-29 PROCEDURE — 0004A COVID-19,PF,PFIZER (12+ YRS): CPT | Performed by: FAMILY MEDICINE

## 2021-12-29 PROCEDURE — 80053 COMPREHEN METABOLIC PANEL: CPT | Performed by: FAMILY MEDICINE

## 2021-12-29 PROCEDURE — 84443 ASSAY THYROID STIM HORMONE: CPT | Performed by: FAMILY MEDICINE

## 2021-12-29 PROCEDURE — 99214 OFFICE O/P EST MOD 30 MIN: CPT | Mod: 25 | Performed by: FAMILY MEDICINE

## 2021-12-29 RX ORDER — LOSARTAN POTASSIUM 100 MG/1
100 TABLET ORAL DAILY
Qty: 90 TABLET | Refills: 1 | Status: SHIPPED | OUTPATIENT
Start: 2021-12-29 | End: 2022-06-22

## 2021-12-29 RX ORDER — SERTRALINE HYDROCHLORIDE 100 MG/1
150 TABLET, FILM COATED ORAL DAILY
Qty: 135 TABLET | Refills: 1 | Status: SHIPPED | OUTPATIENT
Start: 2021-12-29 | End: 2022-08-23

## 2021-12-29 RX ORDER — LEVOTHYROXINE SODIUM 175 UG/1
TABLET ORAL
Qty: 90 TABLET | Refills: 3 | Status: SHIPPED | OUTPATIENT
Start: 2021-12-29 | End: 2022-02-20

## 2021-12-29 ASSESSMENT — ENCOUNTER SYMPTOMS
COUGH: 0
HEMATURIA: 0
NERVOUS/ANXIOUS: 0
ARTHRALGIAS: 1
DIZZINESS: 0
FEVER: 0
PALPITATIONS: 1
EYE PAIN: 0
CHILLS: 0
MYALGIAS: 0
NAUSEA: 0
SHORTNESS OF BREATH: 1
ABDOMINAL PAIN: 0
DYSURIA: 0
JOINT SWELLING: 0
SORE THROAT: 0
WEAKNESS: 0
PARESTHESIAS: 0
CONSTIPATION: 0
HEARTBURN: 0
DIARRHEA: 0
HEMATOCHEZIA: 0
FREQUENCY: 0
HEADACHES: 0

## 2021-12-29 ASSESSMENT — PAIN SCALES - GENERAL: PAINLEVEL: NO PAIN (0)

## 2021-12-29 NOTE — PROGRESS NOTES
SUBJECTIVE:   CC: Angus Wynne is an 61 year old male who presents for preventative health visit.     Patient has been advised of split billing requirements and indicates understanding: Yes  Healthy Habits:     Getting at least 3 servings of Calcium per day:  Yes    Bi-annual eye exam:  NO    Dental care twice a year:  Yes    Sleep apnea or symptoms of sleep apnea:  None    Diet:  Low salt    Frequency of exercise:  None    Duration of exercise:  N/A    Taking medications regularly:  Yes    Barriers to taking medications:  None    Medication side effects:  None    PHQ-2 Total Score: 0    Additional concerns today:  No          PROBLEMS TO ADD ON...  Hyperlipidemia Follow-Up      Are you regularly taking any medication or supplement to lower your cholesterol?   Yes- Crestor     Are you having muscle aches or other side effects that you think could be caused by your cholesterol lowering medication?  No    Hypertension Follow-up      Do you check your blood pressure regularly outside of the clinic? Yes     Are you following a low salt diet? No    Are your blood pressures ever more than 140 on the top number (systolic) OR more   than 90 on the bottom number (diastolic), for example 140/90? No    Depression and Anxiety Follow-Up    How are you doing with your depression since your last visit? Improved but still has need for klonopin on and off , and sometimes he feels like he may be sob when he is anxious so he needs it few times a month we agreed on doing 10/ per month for him  To use as needed     How are you doing with your anxiety since your last visit?  No change    Are you having other symptoms that might be associated with depression or anxiety? No      Have you had a significant life event? No     Do you have any concerns with your use of alcohol or other drugs? Yes:  alcohol , he has cut down signifcantly and has it  now only 2/ week     Social History     Tobacco Use     Smoking status: Former Smoker     Quit  date: 2015     Years since quittin.2     Smokeless tobacco: Never Used   Substance Use Topics     Alcohol use: No     Alcohol/week: 0.0 standard drinks     Comment: 2-3 drinks a week     Drug use: No     PHQ 10/8/2019 2020 2021   PHQ-9 Total Score 0 2 9   Q9: Thoughts of better off dead/self-harm past 2 weeks Not at all Not at all Not at all     RUTH-7 SCORE 2020   Total Score - - 0 (minimal anxiety)   Total Score 0 11 0     Last PHQ-9 2021   1.  Little interest or pleasure in doing things 1   2.  Feeling down, depressed, or hopeless 1   3.  Trouble falling or staying asleep, or sleeping too much 3   4.  Feeling tired or having little energy 3   5.  Poor appetite or overeating 0   6.  Feeling bad about yourself 0   7.  Trouble concentrating 1   8.  Moving slowly or restless 0   Q9: Thoughts of better off dead/self-harm past 2 weeks 0   PHQ-9 Total Score 9   Difficulty at work, home, or with people -     RUTH-7  2021   1. Feeling nervous, anxious, or on edge 0   2. Not being able to stop or control worrying 0   3. Worrying too much about different things 0   4. Trouble relaxing 0   5. Being so restless that it is hard to sit still 0   6. Becoming easily annoyed or irritable 0   7. Feeling afraid, as if something awful might happen 0   RUTH-7 Total Score 0   If you checked any problems, how difficult have they made it for you to do your work, take care of things at home, or get along with other people? -       Suicide Assessment Five-step Evaluation and Treatment (SAFE-T)    Hypothyroidism Follow-up      Since last visit, patient describes the following symptoms:  no hair loss, no skin changes, no constipation, no loose stools and weight gain- did not want to weight self but he this it lack of exercise.       Today's PHQ-2 Score:   PHQ-2 (  [employer]) 2021   Q1: Little interest or pleasure in doing things 0   Q2: Feeling down, depressed or hopeless 0   PHQ-2  Score 0   PHQ-2 Total Score (12-17 Years)- Positive if 3 or more points; Administer PHQ-A if positive -   Q1: Little interest or pleasure in doing things Not at all   Q2: Feeling down, depressed or hopeless Not at all   PHQ-2 Score 0       Abuse: Current or Past(Physical, Sexual or Emotional)- No  Do you feel safe in your environment? Yes    Have you ever done Advance Care Planning? (For example, a Health Directive, POLST, or a discussion with a medical provider or your loved ones about your wishes): No, advance care planning information given to patient to review.  Advanced care planning was discussed at today's visit.    Social History     Tobacco Use     Smoking status: Former Smoker     Quit date: 2015     Years since quittin.2     Smokeless tobacco: Never Used   Substance Use Topics     Alcohol use: No     Alcohol/week: 0.0 standard drinks     Comment: 2-3 drinks a week     If you drink alcohol do you typically have >3 drinks per day or >7 drinks per week? No    Alcohol Use 2021   Prescreen: >3 drinks/day or >7 drinks/week? No   Prescreen: >3 drinks/day or >7 drinks/week? -   No flowsheet data found.    Last PSA:   PSA   Date Value Ref Range Status   2020 0.32 0 - 4 ug/L Final     Comment:     Assay Method:  Chemiluminescence using Siemens Vista analyzer       Reviewed orders with patient. Reviewed health maintenance and updated orders accordingly - Yes  Patient Active Problem List   Diagnosis     BPPV (benign paroxysmal positional vertigo), unspecified laterality     Other specified hypothyroidism     Hx of acute alcoholic hepatitis     Hx of colonic polyp     Gastroesophageal reflux disease, esophagitis presence not specified     Vitamin D deficiency     Anxiety     Alcohol use disorder     Neck pain     Cervicalgia     Cervical spondylosis without myelopathy     Essential hypertension     Hyperlipidemia with target LDL less than 130     Nasal congestion     History of colonic polyps      Vertigo     Lumbago     Obesity (BMI 30.0-34.9)     IBARRA (dyspnea on exertion)     Dysphagia, unspecified type     Elevated LFTs     Thrombocytopenia, unspecified (H)     Anal fissure     Past Surgical History:   Procedure Laterality Date     AS ESOPHAGOSCOPY, DIAGNOSTIC       COLONOSCOPY       EXAM UNDER ANESTHESIA ANUS N/A 2021    Procedure: EXAM UNDER ANESTHESIA, ANUS, ANAL BISOPY, BOTOX INJECTION;  Surgeon: Henrik Garduno MD;  Location: [facility] [place]     SPHINCTEROTOMY RECTUM N/A 2021    Procedure: Anal sphincterotomy and hemorroidectomy;  Surgeon: Rajendra Armando MD;  Location: [facility] [place]       Social History     Tobacco Use     Smoking status: Former Smoker     Quit date: 2015     Years since quittin.2     Smokeless tobacco: Never Used   Substance Use Topics     Alcohol use: No     Alcohol/week: 0.0 standard drinks     Comment: 2-3 drinks a week     Family History   Problem Relation Age of Onset     Coronary Artery Disease Father         at age 60      Hyperlipidemia Brother      Cancer Brother         wall of scrotum     Breast Cancer Mother      Diabetes No family hx of      Cerebrovascular Disease No family hx of      Colon Cancer No family hx of      Prostate Cancer No family hx of            Reviewed and updated as needed this visit by clinical staff  Tobacco  Allergies  Meds             Reviewed and updated as needed this visit by Provider               Past Medical History:   Diagnosis Date     GERD (gastroesophageal reflux disease)      HTN (hypertension)      Hyperlipidemia      Hypothyroid      Vertigo      ,         Review of Systems  CONSTITUTIONAL: NEGATIVE for fever, chills, change in weight  INTEGUMENTARY/SKIN: NEGATIVE for worrisome rashes, moles or lesions  EYES: NEGATIVE for vision changes or irritation  ENT: NEGATIVE for ear, mouth and throat problems  RESP: NEGATIVE for significant cough or SOB  CV: NEGATIVE for chest pain, palpitations or peripheral edema  GI:  NEGATIVE for nausea, abdominal pain, heartburn, or change in bowel habits   male: negative for dysuria, hematuria, decreased urinary stream, erectile dysfunction, urethral discharge  MUSCULOSKELETAL: NEGATIVE for significant arthralgias or myalgia  NEURO: NEGATIVE for weakness, dizziness or paresthesias  ENDOCRINE: NEGATIVE for temperature intolerance, skin/hair changes  HEME/ALLERGY/IMMUNE: NEGATIVE for bleeding problems  PSYCHIATRIC: NEGATIVE for changes in mood or affect    OBJECTIVE:   /88   Pulse 71   Temp 97.6  F (36.4  C) (Tympanic)   Resp 14   Ht 1.829 m (6')   SpO2 97%   BMI 33.23 kg/m      Physical Exam  GENERAL: healthy, alert and no distress  EYES: Eyes grossly normal to inspection, PERRL and conjunctivae and sclerae normal  HENT: ear canals and TM's normal, nose and mouth without ulcers or lesions  NECK: no adenopathy, no asymmetry, masses, or scars and thyroid normal to palpation  RESP: lungs clear to auscultation - no rales, rhonchi or wheezes  CV: regular rate and rhythm, normal S1 S2, no S3 or S4, no murmur, click or rub, no peripheral edema and peripheral pulses strong  ABDOMEN: soft, nontender, no hepatosplenomegaly, no masses and bowel sounds normal   (male): normal male genitalia without lesions or urethral discharge, no hernia  RECTAL: normal sphincter tone, no rectal masses and prostate of normal size for age, smooth, nontender without masses/nodules  MS: no gross musculoskeletal defects noted, no edema  SKIN: no suspicious lesions or rashes  NEURO: Normal strength and tone, mentation intact and speech normal  PSYCH: mentation appears normal, affect normal/bright    Diagnostic Test Results:  Labs reviewed in Epic    ASSESSMENT/PLAN:     (Z00.00) Routine history and physical examination of adult  Comment:   Plan: PSA, screen            (Z23) High priority for 2019-nCoV vaccine  (primary encounter diagnosis)  Comment:   Plan: COVID-19,PF,PFIZER (12+ Yrs GRAY LABEL), Lipid          panel reflex to direct LDL Fasting            (Z12.5) Screening PSA (prostate specific antigen)  Comment:   Plan: PSA, screen                (E78.5) Hyperlipidemia with target LDL less than 130  Comment:   Plan: Lipid panel reflex to direct LDL Fasting            (F41.9) Anxiety  Comment:   Plan: sertraline (ZOLOFT) 100 MG tablet          Discussed cares, talked about signs and symptoms of anxiety/ depression and treatment options. Willing to continue same med's for now. He will limit his klonopin to 10/ month and well continue to taper down.        . Pros/ cons of med's discussed . encouraged to see  to help and referral given . spent sometimes counseling patient. Follow up in 2-3 months, sooner if problem.       (I10) Essential hypertension  Comment:   Plan: Comprehensive metabolic panel, losartan         (COZAAR) 100 MG tablet        BP in adequate control. Discussed cares, low fat low salt  diet etc. Check labs, call pt with results. Refill given. encouraged home BP monitoring. Follow up recheck in 6 months, sooner if problem.       (E03.8) Other specified hypothyroidism  Comment:   Plan: TSH with free T4 reflex, levothyroxine         (SYNTHROID/LEVOTHROID) 175 MCG tablet        Check labs. Adjust med's if needed     Check labs. refill sent.Cares and  treatment discussed follow. up if problem   Patient expressed understanding and agreement with treatment plan. All patient's questions were answered, will let me know if has more later.  Medications: Rx's: Reviewed the potential side effects/complications of medications prescribed.         COUNSELING:   Reviewed preventive health counseling, as reflected in patient instructions       Regular exercise       Healthy diet/nutrition       Vision screening       Immunizations    Vaccinated for: Influenza             Alcohol Use        Family planning       Colon cancer screening       Prostate cancer screening       Advance Care Planning    Estimated body mass  index is 33.23 kg/m  as calculated from the following:    Height as of this encounter: 1.829 m (6').    Weight as of 10/20/21: 111.1 kg (245 lb).     Weight management plan: Discussed healthy diet and exercise guidelines    He reports that he quit smoking about 6 years ago. He has never used smokeless tobacco.      Counseling Resources:  ATP IV Guidelines  Pooled Cohorts Equation Calculator  FRAX Risk Assessment  ICSI Preventive Guidelines  Dietary Guidelines for Americans, 2010  USDA's MyPlate  ASA Prophylaxis  Lung CA Screening    Fifi Fleming MD  [facility]

## 2021-12-29 NOTE — PATIENT INSTRUCTIONS
Patient Education     Prevention Guidelines, Men Ages 50 to 64  Screening tests and vaccines are an important part of managing your health. A screening test is done to find diseases in people who don't have any symptoms. The goal is to find a disease early so lifestyle changes and checkups can reduce the risk of disease. Or the goal may be to detect it early to treat it most effectively. Screening tests are not used to diagnose a disease. But they are used to see if more testing is needed. Health counseling is important, too. Below are guidelines for these, for men ages 50 to 64. Keep in mind that screening recommendations vary among expert groups. Talk with your healthcare provider about which tests are best for you and to make sure you re up-to-date on what you need.   Screening  Who needs it  How often    Unhealthy alcohol use  All men in this age group  At routine exams   Blood pressure All men in this age group  Yearly checkup if your blood pressure is normal   Normal blood pressure is less than 120/80 mm Hg   If your blood pressure reading is higher than normal, follow the advice of your healthcare provider    Colorectal cancer All men at average risk in this age group  Multiple tests are available and are used at different times. Possible tests include:     Flexible sigmoidoscopy every 5 years, or    Colonoscopy every 10 years, or    CT colonography (virtual colonoscopy) every 5 years, or    Yearly fecal occult blood test, or    Yearly fecal immunochemical test every year, or    Stool DNA test, every 3 years  If you choose a test other than a colonoscopy and have an abnormal test result, you will need to follow-up with a colonoscopy. Screening recommendations advice vary varies among expert groups. Talk with your healthcare provider about which tests are best for you.   Some people should be screened using a different schedule because of their personal or family health history. Talk with your healthcare  provider about your health history.    Depression All men in this age group  At routine exams   Type 2 diabetes or prediabetes  All men beginning at age 45 and men without symptoms at any age who are overweight or obese and have 1 or more other risk factors for diabetes  At least every 3 years (yearly if your blood sugar has already begun to rise)    Type 2 diabetes All men with prediabetes  Every year   Hepatitis C Men at increased risk for infection; 1 time for those born between 1945 and 1965  At routine exams; talk with your healthcare provider.    High cholesterol or triglycerides  All men in this age group  At least every 5 years; talk with your healthcare provider about your risk    HIV All males up to age 64 and men at increased risk.  At least once up to age 64 at routine exams; talk with your healthcare provider if you are at risk    Lung cancer Men between the ages of 55 to 74 who in fairly good health and are at higher risk for lung cancer     Currently smoke or who have quit within past 15 years    30-pack year smoking history  a Eligibility criteria and age limit (possibly up to age 80) may vary across major organizations      Yearly lung cancer screening with a low-dose CT scan (LDCT); talk with your healthcare provider    Obesity All men in this age group  At yearly routine exams    BMI (body mass index) All men in this age group Every year, to help find out if you are at a healthy weight for your height    Prostate cancer Starting at age 50, talk with your healthcare provider about risks and benefits of testing with digital rectal exam (DI) and prostate-specific antigen (PSA) screening  At routine exams if you decide to be tested.    Syphilis Men at increased risk for infection  At routine exams; talk with your healthcare provider    Tuberculosis Men at increased risk for infection  Talk with your healthcare provider    Vision All men in this age group  Talk with your healthcare provider   Vaccine  Who needs it How often   Chickenpox (varicella)  All men in this age group who have no record of this infection or vaccine  2 doses; second dose should be given at least 4 weeks after the first dose    Hepatitis A Men at increased risk for infection  2 or 3 doses (depending on the vaccine) given at least 6 months apart; talk with your healthcare provider    Hepatitis B Men at increased risk for infection  2 or 3 doses (depending on the vaccine) second dose should be given 1 month after the first dose; if a third dose , it should be given at least 2 months after the second dose and at least 4 months after the first dose; talk with your healthcare provider    Haemophilus influenzae Type B (HIB)  Men at increased risk for infection  1 or 3 doses; talk with your healthcare provider    Influenza (flu) All men in this age group  Once a year   Measles, mumps, rubella (MMR)  Men in this age group born in 1957 or later who have no record of these infections or vaccines  1 or 2 doses; talk with your healthcare provider    Meningococcal ACWY (MenACWY)  Men at increased risk for infection  1 or 2 doses depending on your case. Then a booster every 5 years if you are still at risk. Talk with your healthcare provider.    Meningococcal B (MenB) Men at increased risk for infection 2 or 3 doses, depending on the vaccine and your case; talk with your healthcare provider    Pneumococcal conjugate vaccine (PCV13) and pneumococcal polysaccharide vaccine (PPSV23)  Men at increased risk for infection  PCV13: 1 dose ages 19 to 65 (protects against 13 types of pneumococcal bacteria)   PPSV23: 1 to 2 doses through age 64, (protects against 23 types of pneumococcal bacteria)    Tetanus/diphtheria/pertussis (Td/Tdap) booster All men in this age group  Td every 10 years, or a 1-time dose of Tdap instead of a Td booster after age 18, then Td every 10 years    Recombinant zoster vaccine (RZV0)  All men in this age group  2 doses; the 2nd dose is  given 2 to 6 months after the first. This is given even if you've had shingles before or had a previous zoster live vaccine    Counseling Who needs it How often   Diet and exercise Men who are overweight or obese  When diagnosed, and then at routine exams    Sexually transmitted infection prevention  Men at increased risk for infection  At routine exams; talk with your healthcare provider    Use of daily aspirin  Men ages 50 years and up in this age group who are at high risk for cardiovascular health problems and not at increased risk for bleeding as identified by their healthcare provider y  At routine exams; talk with your healthcare provider    Use of statins Men between the ages of 40 and 75 years who have     An LDL-C level of more than 70 mg/dL but less than 190 mg/dL, no diabetes and borderline to high level of risk    An LDL-C level of 190 mg/dL or greater    A diagnosis of diabetes and LDL-C level of greater than 70mg/dL At routine exams, or more often as directed by your healthcare provider. Statin dosages may vary based on your overall health, risk factors, and other health conditions such as diabetes. Talk with your healthcare provider about your risk .    Use of tobacco and the health effects it can cause  All men in this age group  Every exam   [employer] last reviewed this educational content on 5/1/2019 2000-2021 [employer]. All rights reserved. This information is not intended as a substitute for professional medical care. Always follow your healthcare professional's instructions.

## 2022-02-10 DIAGNOSIS — F41.9 ANXIETY: ICD-10-CM

## 2022-02-11 DIAGNOSIS — K21.9 GASTROESOPHAGEAL REFLUX DISEASE: ICD-10-CM

## 2022-02-11 RX ORDER — CLONAZEPAM 0.5 MG/1
TABLET ORAL
Qty: 10 TABLET | Refills: 0 | Status: SHIPPED | OUTPATIENT
Start: 2022-02-11 | End: 2022-04-05

## 2022-02-11 NOTE — TELEPHONE ENCOUNTER
Clonazepam 0.5 mg      Last Written Prescription Date:  11/15/21  Last Fill Quantity: 10,   # refills: 0  Last Office Visit: 12/29/21 Lonnie  Future Office visit:       Routing refill request to provider for review/approval because:  Drug not on the [facility], [facility] or [facility] refill protocol or controlled substance    Fara HAWKINS RN  EP Triage

## 2022-02-12 RX ORDER — OMEPRAZOLE 40 MG/1
CAPSULE, DELAYED RELEASE ORAL
Qty: 90 CAPSULE | Refills: 3 | Status: SHIPPED | OUTPATIENT
Start: 2022-02-12 | End: 2022-08-01

## 2022-02-19 DIAGNOSIS — E03.8 OTHER SPECIFIED HYPOTHYROIDISM: ICD-10-CM

## 2022-02-19 NOTE — TELEPHONE ENCOUNTER
Failed protocol.  please route to  team if patient needs an appointment     America MIRANDARN BSN  [facility]  [phone]

## 2022-02-20 RX ORDER — LEVOTHYROXINE SODIUM 175 UG/1
TABLET ORAL
Qty: 30 TABLET | Refills: 0 | Status: SHIPPED | OUTPATIENT
Start: 2022-02-20 | End: 2022-08-23

## 2022-02-22 NOTE — TELEPHONE ENCOUNTER
Patient called back, he will check his schedule and call back for lab visit.     No future orders are in chart, please place orders     Pt unsure which labs needed, last lab notes state to recheck in 6 months     *If we need to call pt back, it's okay to leave a detailed message     Alicia SAUCEDA, Triage RN  [facility]

## 2022-03-08 DIAGNOSIS — F41.9 ANXIETY: Primary | ICD-10-CM

## 2022-03-08 DIAGNOSIS — G47.09 OTHER INSOMNIA: ICD-10-CM

## 2022-04-05 DIAGNOSIS — F41.9 ANXIETY: ICD-10-CM

## 2022-04-05 RX ORDER — CLONAZEPAM 0.5 MG/1
TABLET ORAL
Qty: 10 TABLET | Refills: 0 | Status: SHIPPED | OUTPATIENT
Start: 2022-04-05 | End: 2022-05-11

## 2022-04-05 NOTE — TELEPHONE ENCOUNTER
Clonazepam 0.5 mg      Last Written Prescription Date:  2/11/22  Last Fill Quantity: 10,   # refills: 0  Last Office Visit: 12/29/21 Lonnie  Future Office visit:       Routing refill request to provider for review/approval because:  Drug not on the [facility], [facility] or [facility] refill protocol or controlled substance    Fara HAWKINS RN  EP Triage

## 2022-05-10 DIAGNOSIS — F41.9 ANXIETY: ICD-10-CM

## 2022-05-11 RX ORDER — CLONAZEPAM 0.5 MG/1
TABLET ORAL
Qty: 10 TABLET | Refills: 0 | Status: SHIPPED | OUTPATIENT
Start: 2022-05-11 | End: 2022-08-08

## 2022-05-11 NOTE — TELEPHONE ENCOUNTER
Routing refill request to provider for review/approval because:  Drug not on the [facility] refill protocol     Darlyn CLEMENTS RN  EP Triage

## 2022-05-29 NOTE — LETTER
"Individual Therapy, 55 minutes  Referred [facility]  Date:            5/20/22    Diagonosis:  Major Depression, moderate, Generalized anxiety, Alcohol dependency, in full Remission    Video-Visit Details    Type of service:  Video Visit    Video Start Time (time video started): 4::05pm    Video End Time (time video stopped): 5pmpm    Originating Location (pt. Location): Pts home    Distant Location (provider location):  Provider's home    Mode of Communication:  Video Conference via zoom    Physician has received verbal consent for a Video Visit from the patient? {Yes      Rocio Benavides, PhD LP    S/O:\"Everything turned out great for my  follow-up regarding my hx of prostrate cancer. No reoccuence.  Bert came in and stated above . He reported that  He never told his daughter who had a recent relapse of Psychosis/ Bipolar. Her  daughter and her wife and their new baby live only a few mile away.     Other issues discussed.     1. Patel wife reportedly  Is back talking so mean   and telling him he \"does nothing \" yet he is doing things but not on her timeline. e. He believes that she is trying. He did speak up a little to her.    --We outlined options previous session. Consider sooner or later Hw is to create a script regarding what he can say such as \"I am 61 year old and I can say what I want.\" .  2. Bert is more upset now and now agrees over 50% of the issues are the couple and he needs to pus tor,k Keep the same and do nothing until more uncomfortable or things get unbearable  2.suggest couples session--with her therapist, his therapist (me) or neutral couple's counselor--to assess need for couple's counseling  3. Suggest, give a timeline to make the decision to have couple's counseling--that this is urgent for the marriage to continue. Remember that his goal is to stay  so he need to tell her and remind her that he wants to stay  and the impact of the scolding and " February 5, 2020      Angus Wynne  [address] [place] [address]        Dear Angus,    I care about your health and have reviewed your health plan. I have reviewed your medical conditions, medication list, and lab results and am making recommendations based on this review, to better manage your health.    You are in particular need of attention regarding:  -Medication    I am recommending that you:  -Schedule an appointment    Here is a list of Health Maintenance topics that are due now or due soon:  Health Maintenance Due   Topic Date Due     Colonoscopy  09/09/2018     Liver Function Panel  11/03/2018     PHQ-2  01/01/2020       Please call us at [phone] (or use [employer]) to address the above recommendations.     Thank you for trusting [facility] and we appreciate the opportunity to serve you.  We look forward to supporting your healthcare needs in the future.    Healthy Regards,    Fifi Fleming MD             "threats of divorce is making him depressed and affecting his work. \"I don't know what to do, I still am not doing the right things and I don't want to get more depressed.\"   4.If all is turned down and Corina is unwilling to do couple's counseling or at least a couples' session then even though he wants the marriage he will need a trial separation to see what it will be like not being scolded. Reminding her it is the only thing he can think of if there is not an intervention as a couple. He will also need to the NOT drive her anywhere and NOT go to [place] with her to see granddaughter. He would go alone. (She also argued, put him down in front of daughter who felt the tension and commented. This is NOT ok in front of daughter who is in recovery from psychotic episode nor with granddaughter present who can feel the tension too even though she is a .    5.  Wife is still  drinking alcohol  He reports she is drinking \"less\"--she is at risk for falling.  6. So happy being a grandpa--Reji (first grandchild).born 2021.Plans to go see daughter and wife and Reji early March.-now with the broken leg don't know   7 Friendships--who are his male friends? No movement in this area.  He needs to cultivate old/new sober friends---if wife does not want to be around him or treats him poorly she needs positive people around him.  8. Work is busy and going well.       Assessment;  Bert came on time . Verbal and engaging. Bert reported that he is less depressed now--more depressed\" and helpless in making changes in his life.And now with his wife falling and breaking her leg he is overwhelmed \"doing everything.\"Perhaps this is the time for change--wife is now \"dependent\" on him for almost everything including dressing her?? His  wive's alcohol use, OCD symptomsmay need to be addressed now? Dioes she still want a a divorce and doesn't respect him.    Plan: Continue to see bi-weekly in the [facility] to work on " "goals.  Assignment--he never completed--last session : .Write out what he wants to tell his wife--outline the 4 options above regarding having couples' counseling and/or move to a planned separation. Include several statements on how his still loves her and wants to stay . If he chooses to read this to her prior to next session make sure she has NOT been drinking and not  after 7pm or \"need to talk before the second cocktail or glass of wine\" since the arguments happen after she has had greater than  He no longer purchases her liquor?? (wife does not drive). When is he going to stop smoking?    this is on hole. Deal with daughter and the move to [place].    Goals: Treatment plan:   1.  Continue to learn and practice skills to  Manage his  anxiety and depression.  2. Increase assertiveness and standing up for himself with his wife who appears to hold on to the past when he was unemployed and  Not getting any positive feedback of how he has improved is difficult--he needs   To work on Self-validation.   3. Make 2 male friends he can meet with and possibly do a hobby together.         "

## 2022-06-04 DIAGNOSIS — G47.09 OTHER INSOMNIA: ICD-10-CM

## 2022-06-07 NOTE — TELEPHONE ENCOUNTER
"Requested Prescriptions   Pending Prescriptions Disp Refills     traZODone (DESYREL) 50 MG tablet [Pharmacy Med Name: TRAZODONE 50MG TABLETS] 90 tablet 3     Sig: TAKE 1 TO 2 TABLETS BY MOUTH EVERY DAY FOR SLEEP       Serotonin Modulators Passed - 6/4/2022  3:35 AM        Passed - Recent (12 mo) or future (30 days) visit within the authorizing provider's specialty     Patient has had an office visit with the authorizing provider or a provider within the authorizing providers department within the previous 12 mos or has a future within next 30 days. See \"Patient Info\" tab in inbasket, or \"Choose Columns\" in Meds & Orders section of the refill encounter.              Passed - Medication is active on med list        Passed - Patient is age 18 or older           Routing refill request to provider for review/approval because:  Drug interaction warning  High    Drug-Drug: sertraline and traZODone  Additive serotonergic effects may occur during coadministration of selective serotonin reuptake inhibitors (SSRIs) and Serotonergic Non-Opioid CNS Depressants, and the risk of developing serotonin syndrome may be increased.    Alicia SAUCEDA, Triage [facility]     "

## 2022-06-09 RX ORDER — TRAZODONE HYDROCHLORIDE 50 MG/1
TABLET, FILM COATED ORAL
Qty: 180 TABLET | Refills: 1 | Status: SHIPPED | OUTPATIENT
Start: 2022-06-09 | End: 2022-08-23

## 2022-06-19 DIAGNOSIS — E78.5 HYPERLIPIDEMIA LDL GOAL <100: ICD-10-CM

## 2022-06-20 DIAGNOSIS — I10 ESSENTIAL HYPERTENSION: ICD-10-CM

## 2022-06-21 RX ORDER — ROSUVASTATIN CALCIUM 40 MG/1
TABLET, COATED ORAL
Qty: 90 TABLET | Refills: 1 | Status: ON HOLD | OUTPATIENT
Start: 2022-06-21 | End: 2022-08-04

## 2022-06-22 RX ORDER — LOSARTAN POTASSIUM 100 MG/1
TABLET ORAL
Qty: 90 TABLET | Refills: 1 | Status: SHIPPED | OUTPATIENT
Start: 2022-06-22 | End: 2022-08-23

## 2022-06-22 NOTE — TELEPHONE ENCOUNTER
Prescription approved per [facility] Refill Protocol.    Harlan Hyde RN  [facility]

## 2022-07-20 ENCOUNTER — HOSPITAL ENCOUNTER (EMERGENCY)
Facility: CLINIC | Age: 62
Discharge: LEFT BEFORE DISCHARGE | End: 2022-07-20
Admitting: EMERGENCY MEDICINE
Payer: COMMERCIAL

## 2022-07-20 ENCOUNTER — APPOINTMENT (OUTPATIENT)
Dept: GENERAL RADIOLOGY | Facility: CLINIC | Age: 62
End: 2022-07-20
Attending: EMERGENCY MEDICINE
Payer: COMMERCIAL

## 2022-07-20 VITALS
BODY MASS INDEX: 33.18 KG/M2 | HEART RATE: 87 BPM | RESPIRATION RATE: 14 BRPM | DIASTOLIC BLOOD PRESSURE: 83 MMHG | TEMPERATURE: 96.8 F | OXYGEN SATURATION: 97 % | SYSTOLIC BLOOD PRESSURE: 98 MMHG | WEIGHT: 245 LBS | HEIGHT: 72 IN

## 2022-07-20 PROCEDURE — 999N000104 HC STATISTIC NO CHARGE

## 2022-07-20 PROCEDURE — 73630 X-RAY EXAM OF FOOT: CPT | Mod: LT

## 2022-07-21 ENCOUNTER — OFFICE VISIT (OUTPATIENT)
Dept: ORTHOPEDICS | Facility: CLINIC | Age: 62
End: 2022-07-21
Payer: COMMERCIAL

## 2022-07-21 ENCOUNTER — TELEPHONE (OUTPATIENT)
Dept: FAMILY MEDICINE | Facility: CLINIC | Age: 62
End: 2022-07-21

## 2022-07-21 DIAGNOSIS — M79.672 LEFT FOOT PAIN: Primary | ICD-10-CM

## 2022-07-21 DIAGNOSIS — S92.902S FOOT FRACTURE, LEFT, SEQUELA: Primary | ICD-10-CM

## 2022-07-21 PROCEDURE — 99203 OFFICE O/P NEW LOW 30 MIN: CPT | Performed by: FAMILY MEDICINE

## 2022-07-21 NOTE — LETTER
7/21/2022      RE: Angus Wynne  [address]  [place] [address]     Dear Colleague,    Thank you for referring your patient, Angus Wynne, to the [facility] SP[facility]TS [facility]IC[facility]. Please see a copy of my visit note below.      [facility] CLINICS AND SURGERY CENTER  SPORTS & ORTHOPEDIC CLINIC VISIT     Jul 21, 2022        ASSESSMENT & PLAN    63 yo with left 5th mt fx    Reviewed imaging and assessment with patient in detail  Continue to use boot for ambulation with WBAT. Crutches if necessary  Follow up in two weeks for reassessment and remiaging  No elevate when possible  Ice at least once daily     Steven Meyer MD  [facility] SP[facility]    -----  Chief Complaint   Patient presents with     Left Foot - Pain       SUBJECTIVE  Angus Wynne is a/an 62 year old male who is seen as an ER referral for evaluation of  left base of 5th fx.     The patient is seen with their son.  The patient is Right handed    Onset: 1 day(s) ago. Patient describes injury as tripping doing down the stairs in his garage. Suspect inversion   Location of Pain: left foot   Worsened by: Walking   Better with: NA   Treatments tried: Tylenol  Associated symptoms: swelling    Orthopedic/Surgical history: NO  Social History/Occupation: No      REVIEW OF SYSTEMS:    Do you have fever, chills, weight loss? No    Do you have any vision problems? No    Do you have any chest pain or edema? No    Do you have any shortness of breath or wheezing?  No    Do you have stomach problems? No    Do you have any numbness or focal weakness? No    Do you have diabetes? No    Do you have problems with bleeding or clotting? No    Do you have an rashes or other skin lesions? No    OBJECTIVE:  There were no vitals taken for this visit.     General: Alert, pleasant, no distress  Left foot: warm, well perfused, SILT throughout     Inspection: swelling over lateral foot. No deformity     ROM:note  tested. No obvious restriction     Strength:intact without pain     Palpation:ttp over 5th mt no ttp over midfoot or remaining MT, nottp of lateral ankle     Special Tests: none      RADIOLOGY:    3 view xrays of left foot performed 7/20/22 and reviewed independently demonstrating mildly displaced fx of the base of the 5th MT. See EMR for formal radiology report.                Again, thank you for allowing me to participate in the care of your patient.      Sincerely,    Steven Meyer MD

## 2022-07-21 NOTE — TELEPHONE ENCOUNTER
Patient called in very upset due to the long hold times and the long wait at the ER    Patient was at ER and LWBS but did have an Xray done that shows a fracture     Patient insistent that he needs to talk to Dr. Fleming directly for a few minutes today     Offered to send message and recommended patient go to [facility] today to be seen patient requesting address be sent via [employer]     Patient would like to know what Dr. Fleming suggested and to be worked in today     Paramjit Spann RN

## 2022-07-21 NOTE — ED TRIAGE NOTES
Patient states tripped going down stair in his garage today.  Injury, pain to left foot & jerked his back.  Complaints of back pain.  Denies hitting head & no LOC.      Triage Assessment     Row Name 07/20/22 1925       Triage Assessment (Adult)    Airway WDL WDL       Respiratory WDL    Respiratory WDL WDL       Skin Circulation/Temperature WDL    Skin Circulation/Temperature WDL WDL       Cardiac WDL    Cardiac WDL WDL       Peripheral/Neurovascular WDL    Peripheral Neurovascular WDL WDL       Cognitive/Neuro/Behavioral WDL    Cognitive/Neuro/Behavioral WDL WDL

## 2022-07-21 NOTE — TELEPHONE ENCOUNTER
Left message for patient to call back to discuss  .  I can see his xray , so best is that he should see orthopedic. Referral placed , not sure how soon they can get him in, but in the case he could to go  Ortho urgent care  if havimg  Problem     Ok to infor patient calls back

## 2022-07-21 NOTE — PROGRESS NOTES
[facility] CLINICS AND SURGERY CENTER  SPORTS & ORTHOPEDIC CLINIC VISIT     Jul 21, 2022        ASSESSMENT & PLAN    61 yo with left 5th mt fx    Reviewed imaging and assessment with patient in detail  Continue to use boot for ambulation with WBAT. Crutches if necessary  Follow up in two weeks for reassessment and remiaging  No elevate when possible  Ice at least once daily     Steven Meyer MD  [facility]IC[facility] [place]    -----  Chief Complaint   Patient presents with     Left Foot - Pain       SUBJECTIVE  Angus Wynne is a/an 62 year old male who is seen as an ER referral for evaluation of  left base of 5th fx.     The patient is seen with their son.  The patient is Right handed    Onset: 1 day(s) ago. Patient describes injury as tripping doing down the stairs in his garage. Suspect inversion   Location of Pain: left foot   Worsened by: Walking   Better with: NA   Treatments tried: Tylenol  Associated symptoms: swelling    Orthopedic/Surgical history: NO  Social History/Occupation: No      REVIEW OF SYSTEMS:    Do you have fever, chills, weight loss? No    Do you have any vision problems? No    Do you have any chest pain or edema? No    Do you have any shortness of breath or wheezing?  No    Do you have stomach problems? No    Do you have any numbness or focal weakness? No    Do you have diabetes? No    Do you have problems with bleeding or clotting? No    Do you have an rashes or other skin lesions? No    OBJECTIVE:  There were no vitals taken for this visit.     General: Alert, pleasant, no distress  Left foot: warm, well perfused, SILT throughout     Inspection: swelling over lateral foot. No deformity     ROM:note tested. No obvious restriction     Strength:intact without pain     Palpation:ttp over 5th mt no ttp over midfoot or remaining MT, nottp of lateral ankle     Special Tests: none      RADIOLOGY:    3 view xrays of left foot performed 7/20/22 and reviewed independently  demonstrating mildly displaced fx of the base of the 5th MT. See EMR for formal radiology report.

## 2022-07-29 NOTE — TELEPHONE ENCOUNTER
Call attempt # 2.  left a voice mail message for patient for referral and also the phone number to call for ortho scheduling appointment.     He has appointment 8/8/2022 with ortho provider.     Echo Samuel RN  [facility]

## 2022-08-01 ENCOUNTER — TELEPHONE (OUTPATIENT)
Dept: FAMILY MEDICINE | Facility: CLINIC | Age: 62
End: 2022-08-01

## 2022-08-01 ENCOUNTER — NURSE TRIAGE (OUTPATIENT)
Dept: FAMILY MEDICINE | Facility: CLINIC | Age: 62
End: 2022-08-01

## 2022-08-01 ENCOUNTER — HOSPITAL ENCOUNTER (INPATIENT)
Facility: CLINIC | Age: 62
LOS: 3 days | Discharge: HOME | End: 2022-08-04
Attending: EMERGENCY MEDICINE | Admitting: HOSPITALIST
Payer: COMMERCIAL

## 2022-08-01 ENCOUNTER — APPOINTMENT (OUTPATIENT)
Dept: CT IMAGING | Facility: CLINIC | Age: 62
End: 2022-08-01
Attending: EMERGENCY MEDICINE
Payer: COMMERCIAL

## 2022-08-01 DIAGNOSIS — M62.82 NON-TRAUMATIC RHABDOMYOLYSIS: ICD-10-CM

## 2022-08-01 DIAGNOSIS — R79.89 ELEVATED TROPONIN: ICD-10-CM

## 2022-08-01 DIAGNOSIS — N17.9 AKI (ACUTE KIDNEY INJURY) (H): ICD-10-CM

## 2022-08-01 DIAGNOSIS — R79.89 ELEVATED LFTS: ICD-10-CM

## 2022-08-01 DIAGNOSIS — E78.5 HYPERLIPIDEMIA LDL GOAL <100: ICD-10-CM

## 2022-08-01 LAB
ABO/RH(D): NORMAL
ALBUMIN SERPL BCG-MCNC: 4 G/DL (ref 3.5–5.2)
ALBUMIN UR-MCNC: 10 MG/DL
ALP SERPL-CCNC: 127 U/L (ref 40–129)
ALT SERPL W P-5'-P-CCNC: 422 U/L (ref 10–50)
ANION GAP SERPL CALCULATED.3IONS-SCNC: 14 MMOL/L (ref 7–15)
ANTIBODY SCREEN: NEGATIVE
APAP SERPL-MCNC: 5 UG/ML (ref 10–30)
APPEARANCE UR: ABNORMAL
AST SERPL W P-5'-P-CCNC: 1157 U/L (ref 10–50)
ATRIAL RATE - MUSE: 75 BPM
BASOPHILS # BLD AUTO: 0 10E3/UL (ref 0–0.2)
BASOPHILS NFR BLD AUTO: 1 %
BILIRUB SERPL-MCNC: 1.9 MG/DL
BILIRUB UR QL STRIP: NEGATIVE
BUN SERPL-MCNC: 46.2 MG/DL (ref 8–23)
CALCIUM SERPL-MCNC: 8.9 MG/DL (ref 8.8–10.2)
CHLORIDE SERPL-SCNC: 91 MMOL/L (ref 98–107)
CK SERPL-CCNC: ABNORMAL U/L (ref 39–308)
COLOR UR AUTO: YELLOW
CREAT BLD-MCNC: 3.1 MG/DL (ref 0.7–1.3)
CREAT SERPL-MCNC: 2.88 MG/DL (ref 0.67–1.17)
DEPRECATED HCO3 PLAS-SCNC: 22 MMOL/L (ref 22–29)
DIASTOLIC BLOOD PRESSURE - MUSE: NORMAL MMHG
EOSINOPHIL # BLD AUTO: 0.2 10E3/UL (ref 0–0.7)
EOSINOPHIL NFR BLD AUTO: 3 %
ERYTHROCYTE [DISTWIDTH] IN BLOOD BY AUTOMATED COUNT: 12.7 % (ref 10–15)
ETHANOL SERPL-MCNC: <0.01 G/DL
GFR SERPL CREATININE-BSD FRML MDRD: 22 ML/MIN/1.73M2
GFR SERPL CREATININE-BSD FRML MDRD: 24 ML/MIN/1.73M2
GLUCOSE SERPL-MCNC: 129 MG/DL (ref 70–99)
GLUCOSE UR STRIP-MCNC: NEGATIVE MG/DL
HCT VFR BLD AUTO: 41.1 % (ref 40–53)
HGB BLD-MCNC: 13.9 G/DL (ref 13.3–17.7)
HGB UR QL STRIP: ABNORMAL
HOLD SPECIMEN: NORMAL
IMM GRANULOCYTES # BLD: 0.1 10E3/UL
IMM GRANULOCYTES NFR BLD: 1 %
INR PPP: 1.08 (ref 0.85–1.15)
INTERPRETATION ECG - MUSE: NORMAL
KETONES UR STRIP-MCNC: NEGATIVE MG/DL
LACTATE SERPL-SCNC: 0.7 MMOL/L (ref 0.7–2)
LEUKOCYTE ESTERASE UR QL STRIP: NEGATIVE
LYMPHOCYTES # BLD AUTO: 1.6 10E3/UL (ref 0.8–5.3)
LYMPHOCYTES NFR BLD AUTO: 25 %
MAGNESIUM SERPL-MCNC: 2.4 MG/DL (ref 1.7–2.3)
MCH RBC QN AUTO: 31.2 PG (ref 26.5–33)
MCHC RBC AUTO-ENTMCNC: 33.8 G/DL (ref 31.5–36.5)
MCV RBC AUTO: 92 FL (ref 78–100)
MONOCYTES # BLD AUTO: 0.5 10E3/UL (ref 0–1.3)
MONOCYTES NFR BLD AUTO: 8 %
MUCOUS THREADS #/AREA URNS LPF: PRESENT /LPF
NEUTROPHILS # BLD AUTO: 3.9 10E3/UL (ref 1.6–8.3)
NEUTROPHILS NFR BLD AUTO: 62 %
NITRATE UR QL: NEGATIVE
NRBC # BLD AUTO: 0 10E3/UL
NRBC BLD AUTO-RTO: 0 /100
NT-PROBNP SERPL-MCNC: 124 PG/ML (ref 0–900)
P AXIS - MUSE: 16 DEGREES
PH UR STRIP: 5 [PH] (ref 5–7)
PHOSPHATE SERPL-MCNC: 5 MG/DL (ref 2.5–4.5)
PLATELET # BLD AUTO: 149 10E3/UL (ref 150–450)
POTASSIUM SERPL-SCNC: 3.8 MMOL/L (ref 3.4–5.3)
PR INTERVAL - MUSE: 162 MS
PROCALCITONIN SERPL IA-MCNC: 0.36 NG/ML
PROT SERPL-MCNC: 7.3 G/DL (ref 6.4–8.3)
QRS DURATION - MUSE: 96 MS
QT - MUSE: 414 MS
QTC - MUSE: 462 MS
R AXIS - MUSE: -9 DEGREES
RBC # BLD AUTO: 4.46 10E6/UL (ref 4.4–5.9)
RBC URINE: 1 /HPF
SARS-COV-2 RNA RESP QL NAA+PROBE: NEGATIVE
SODIUM SERPL-SCNC: 127 MMOL/L (ref 136–145)
SP GR UR STRIP: 1.01 (ref 1–1.03)
SPECIMEN EXPIRATION DATE: NORMAL
SQUAMOUS EPITHELIAL: <1 /HPF
SYSTOLIC BLOOD PRESSURE - MUSE: NORMAL MMHG
T AXIS - MUSE: 111 DEGREES
TROPONIN T SERPL HS-MCNC: 112 NG/L
UROBILINOGEN UR STRIP-MCNC: NORMAL MG/DL
VENTRICULAR RATE- MUSE: 75 BPM
WBC # BLD AUTO: 6.2 10E3/UL (ref 4–11)
WBC URINE: 4 /HPF

## 2022-08-01 PROCEDURE — 84145 PROCALCITONIN (PCT): CPT | Performed by: EMERGENCY MEDICINE

## 2022-08-01 PROCEDURE — 82077 ASSAY SPEC XCP UR&BREATH IA: CPT | Performed by: EMERGENCY MEDICINE

## 2022-08-01 PROCEDURE — 99222 1ST HOSP IP/OBS MODERATE 55: CPT | Mod: AI | Performed by: HOSPITALIST

## 2022-08-01 PROCEDURE — 84484 ASSAY OF TROPONIN QUANT: CPT | Performed by: EMERGENCY MEDICINE

## 2022-08-01 PROCEDURE — 250N000011 HC RX IP 250 OP 636: Performed by: EMERGENCY MEDICINE

## 2022-08-01 PROCEDURE — 81001 URINALYSIS AUTO W/SCOPE: CPT | Performed by: EMERGENCY MEDICINE

## 2022-08-01 PROCEDURE — 258N000003 HC RX IP 258 OP 636: Performed by: EMERGENCY MEDICINE

## 2022-08-01 PROCEDURE — 85025 COMPLETE CBC W/AUTO DIFF WBC: CPT | Performed by: EMERGENCY MEDICINE

## 2022-08-01 PROCEDURE — 87040 BLOOD CULTURE FOR BACTERIA: CPT | Performed by: EMERGENCY MEDICINE

## 2022-08-01 PROCEDURE — 71250 CT THORAX DX C-: CPT

## 2022-08-01 PROCEDURE — 76604 US EXAM CHEST: CPT

## 2022-08-01 PROCEDURE — 84100 ASSAY OF PHOSPHORUS: CPT | Performed by: HOSPITALIST

## 2022-08-01 PROCEDURE — 258N000003 HC RX IP 258 OP 636: Performed by: HOSPITALIST

## 2022-08-01 PROCEDURE — 82565 ASSAY OF CREATININE: CPT

## 2022-08-01 PROCEDURE — U0003 INFECTIOUS AGENT DETECTION BY NUCLEIC ACID (DNA OR RNA); SEVERE ACUTE RESPIRATORY SYNDROME CORONAVIRUS 2 (SARS-COV-2) (CORONAVIRUS DISEASE [COVID-19]), AMPLIFIED PROBE TECHNIQUE, MAKING USE OF HIGH THROUGHPUT TECHNOLOGIES AS DESCRIBED BY CMS-2020-01-R: HCPCS | Performed by: EMERGENCY MEDICINE

## 2022-08-01 PROCEDURE — 96361 HYDRATE IV INFUSION ADD-ON: CPT

## 2022-08-01 PROCEDURE — 76857 US EXAM PELVIC LIMITED: CPT

## 2022-08-01 PROCEDURE — C9803 HOPD COVID-19 SPEC COLLECT: HCPCS

## 2022-08-01 PROCEDURE — 82550 ASSAY OF CK (CPK): CPT | Performed by: EMERGENCY MEDICINE

## 2022-08-01 PROCEDURE — 36415 COLL VENOUS BLD VENIPUNCTURE: CPT | Performed by: EMERGENCY MEDICINE

## 2022-08-01 PROCEDURE — 93005 ELECTROCARDIOGRAM TRACING: CPT

## 2022-08-01 PROCEDURE — 82040 ASSAY OF SERUM ALBUMIN: CPT | Performed by: EMERGENCY MEDICINE

## 2022-08-01 PROCEDURE — 120N000001 HC R&B MED SURG/OB

## 2022-08-01 PROCEDURE — 83735 ASSAY OF MAGNESIUM: CPT | Performed by: HOSPITALIST

## 2022-08-01 PROCEDURE — 76705 ECHO EXAM OF ABDOMEN: CPT

## 2022-08-01 PROCEDURE — 36415 COLL VENOUS BLD VENIPUNCTURE: CPT | Performed by: HOSPITALIST

## 2022-08-01 PROCEDURE — 86901 BLOOD TYPING SEROLOGIC RH(D): CPT | Performed by: EMERGENCY MEDICINE

## 2022-08-01 PROCEDURE — 86850 RBC ANTIBODY SCREEN: CPT | Performed by: EMERGENCY MEDICINE

## 2022-08-01 PROCEDURE — 85610 PROTHROMBIN TIME: CPT | Performed by: EMERGENCY MEDICINE

## 2022-08-01 PROCEDURE — 250N000013 HC RX MED GY IP 250 OP 250 PS 637: Performed by: HOSPITALIST

## 2022-08-01 PROCEDURE — 76775 US EXAM ABDO BACK WALL LIM: CPT

## 2022-08-01 PROCEDURE — 80143 DRUG ASSAY ACETAMINOPHEN: CPT | Performed by: HOSPITALIST

## 2022-08-01 PROCEDURE — 83605 ASSAY OF LACTIC ACID: CPT | Performed by: EMERGENCY MEDICINE

## 2022-08-01 PROCEDURE — 80053 COMPREHEN METABOLIC PANEL: CPT | Performed by: EMERGENCY MEDICINE

## 2022-08-01 PROCEDURE — 99285 EMERGENCY DEPT VISIT HI MDM: CPT | Mod: 25

## 2022-08-01 PROCEDURE — 96365 THER/PROPH/DIAG IV INF INIT: CPT | Mod: 59

## 2022-08-01 PROCEDURE — 83880 ASSAY OF NATRIURETIC PEPTIDE: CPT | Performed by: EMERGENCY MEDICINE

## 2022-08-01 RX ORDER — NALOXONE HYDROCHLORIDE 0.4 MG/ML
0.2 INJECTION, SOLUTION INTRAMUSCULAR; INTRAVENOUS; SUBCUTANEOUS
Status: DISCONTINUED | OUTPATIENT
Start: 2022-08-01 | End: 2022-08-04 | Stop reason: HOSPADM

## 2022-08-01 RX ORDER — MULTIPLE VITAMINS W/ MINERALS TAB 9MG-400MCG
1 TAB ORAL DAILY
Status: DISCONTINUED | OUTPATIENT
Start: 2022-08-01 | End: 2022-08-04 | Stop reason: HOSPADM

## 2022-08-01 RX ORDER — ONDANSETRON 2 MG/ML
4 INJECTION INTRAMUSCULAR; INTRAVENOUS EVERY 6 HOURS PRN
Status: DISCONTINUED | OUTPATIENT
Start: 2022-08-01 | End: 2022-08-04 | Stop reason: HOSPADM

## 2022-08-01 RX ORDER — SODIUM CHLORIDE 9 MG/ML
INJECTION, SOLUTION INTRAVENOUS CONTINUOUS
Status: DISCONTINUED | OUTPATIENT
Start: 2022-08-01 | End: 2022-08-01

## 2022-08-01 RX ORDER — SODIUM CHLORIDE 9 MG/ML
INJECTION, SOLUTION INTRAVENOUS CONTINUOUS
Status: DISCONTINUED | OUTPATIENT
Start: 2022-08-01 | End: 2022-08-03

## 2022-08-01 RX ORDER — NALOXONE HYDROCHLORIDE 0.4 MG/ML
0.4 INJECTION, SOLUTION INTRAMUSCULAR; INTRAVENOUS; SUBCUTANEOUS
Status: DISCONTINUED | OUTPATIENT
Start: 2022-08-01 | End: 2022-08-04 | Stop reason: HOSPADM

## 2022-08-01 RX ORDER — LIDOCAINE 40 MG/G
CREAM TOPICAL
Status: DISCONTINUED | OUTPATIENT
Start: 2022-08-01 | End: 2022-08-04 | Stop reason: HOSPADM

## 2022-08-01 RX ORDER — ONDANSETRON 4 MG/1
4 TABLET, ORALLY DISINTEGRATING ORAL EVERY 6 HOURS PRN
Status: DISCONTINUED | OUTPATIENT
Start: 2022-08-01 | End: 2022-08-04 | Stop reason: HOSPADM

## 2022-08-01 RX ORDER — HYDROMORPHONE HCL IN WATER/PF 6 MG/30 ML
0.2 PATIENT CONTROLLED ANALGESIA SYRINGE INTRAVENOUS EVERY 4 HOURS PRN
Status: DISCONTINUED | OUTPATIENT
Start: 2022-08-01 | End: 2022-08-04 | Stop reason: HOSPADM

## 2022-08-01 RX ORDER — MAGNESIUM SULFATE HEPTAHYDRATE 40 MG/ML
2 INJECTION, SOLUTION INTRAVENOUS ONCE
Status: COMPLETED | OUTPATIENT
Start: 2022-08-01 | End: 2022-08-01

## 2022-08-01 RX ORDER — CLONAZEPAM 0.5 MG/1
0.25 TABLET ORAL 2 TIMES DAILY PRN
Status: DISCONTINUED | OUTPATIENT
Start: 2022-08-01 | End: 2022-08-01

## 2022-08-01 RX ORDER — FOLIC ACID 1 MG/1
1 TABLET ORAL DAILY
Status: DISCONTINUED | OUTPATIENT
Start: 2022-08-01 | End: 2022-08-04 | Stop reason: HOSPADM

## 2022-08-01 RX ORDER — CLONAZEPAM 0.25 MG/1
0.25 TABLET, ORALLY DISINTEGRATING ORAL 2 TIMES DAILY PRN
Status: DISCONTINUED | OUTPATIENT
Start: 2022-08-01 | End: 2022-08-04

## 2022-08-01 RX ADMIN — SODIUM CHLORIDE 1000 ML: 9 INJECTION, SOLUTION INTRAVENOUS at 14:49

## 2022-08-01 RX ADMIN — SODIUM CHLORIDE: 9 INJECTION, SOLUTION INTRAVENOUS at 20:43

## 2022-08-01 RX ADMIN — Medication 5 MG: at 21:32

## 2022-08-01 RX ADMIN — SERTRALINE HYDROCHLORIDE 150 MG: 100 TABLET ORAL at 21:26

## 2022-08-01 RX ADMIN — Medication 1 MG: at 21:26

## 2022-08-01 RX ADMIN — SODIUM CHLORIDE, POTASSIUM CHLORIDE, SODIUM LACTATE AND CALCIUM CHLORIDE 500 ML: 600; 310; 30; 20 INJECTION, SOLUTION INTRAVENOUS at 17:34

## 2022-08-01 RX ADMIN — MULTIPLE VITAMINS W/ MINERALS TAB 1 TABLET: TAB at 21:26

## 2022-08-01 RX ADMIN — SODIUM CHLORIDE, POTASSIUM CHLORIDE, SODIUM LACTATE AND CALCIUM CHLORIDE 500 ML: 600; 310; 30; 20 INJECTION, SOLUTION INTRAVENOUS at 16:04

## 2022-08-01 RX ADMIN — MAGNESIUM SULFATE HEPTAHYDRATE 2 G: 40 INJECTION, SOLUTION INTRAVENOUS at 15:54

## 2022-08-01 RX ADMIN — SODIUM CHLORIDE 1000 ML: 9 INJECTION, SOLUTION INTRAVENOUS at 14:28

## 2022-08-01 RX ADMIN — THIAMINE HCL TAB 100 MG 100 MG: 100 TAB at 21:26

## 2022-08-01 ASSESSMENT — ACTIVITIES OF DAILY LIVING (ADL)
ADLS_ACUITY_SCORE: 37
ADLS_ACUITY_SCORE: 37
ADLS_ACUITY_SCORE: 22

## 2022-08-01 ASSESSMENT — ENCOUNTER SYMPTOMS
FEVER: 0
BACK PAIN: 1
MYALGIAS: 1
DYSURIA: 0
FREQUENCY: 0
APPETITE CHANGE: 1
ABDOMINAL PAIN: 0
HEMATURIA: 1

## 2022-08-01 NOTE — PHARMACY-ADMISSION MEDICATION HISTORY
Admission medication history interview status for this patient is complete. See [facility] admission navigator for allergy information, prior to admission medications and immunization status.     Medication history interview done, indicate source(s): Patient  Medication history resources (including written lists, pill bottles, clinic record):SureScripts, Care Everywhere  [id number] - [place] - [address] AT [address]    Changes made to PTA medication list:  Added: none  Changed: trazodone 50 mg 1-2 tabs daily -> 2 tabs at bedtime; Synthroid from 175 mcg 6 days per week and 87.5 mcg the 7th day of the week -> 175 mcg daily  Reported as Not Taking: none  Removed: hydrocortisone, omeprazole, Miralax, scheduled Tylenol    Actions taken by pharmacist (provider contacted, etc):sticky note/paged provider     Additional medication history information:patient reported he stopped his rosuvastatin about 5 days ago due to his liver issues.    Medication reconciliation/reorder completed by provider prior to medication history?  N    Prior to Admission medications    Medication Sig Last Dose Taking? Auth Provider Long Term End Date   acetaminophen (TYLENOL) 325 MG tablet Take 2 tablets (650 mg) by mouth every 4 hours as needed for mild pain  at PRN Yes Rajendra Armando MD     clonazePAM (KLONOPIN) 0.5 MG tablet TAKE 1/2 TO 1 TABLET BY MOUTH AS NEEDED FOR ANXIETY  at PRN Yes Hilda Calvillo PA-C Yes    levothyroxine (SYNTHROID/LEVOTHROID) 175 MCG tablet TAKE 1 TABLET BY MOUTH EVERY DAY 6 DAYS PER WEEK. AND ONE-HALF TABLET THE 7TH DAY OF THE WEEK  Patient taking differently: Take 175 mcg by mouth daily 8/1/2022 at Unknown time Yes Fifi Fleming MD Yes    losartan (COZAAR) 100 MG tablet TAKE 1 TABLET(100 MG) BY MOUTH DAILY 8/1/2022 at Unknown time Yes Fifi Fleming MD Yes    rosuvastatin (CRESTOR) 40 MG tablet TAKE 1 TABLET(40 MG) BY MOUTH  DAILY Past Week at Unknown time Yes Fifi Fleming MD Yes    sertraline (ZOLOFT) 100 MG tablet Take 1.5 tablets (150 mg) by mouth daily 7/31/2022 at Unknown time Yes Fifi Fleming MD Yes    traZODone (DESYREL) 50 MG tablet TAKE 1 TO 2 TABLETS BY MOUTH EVERY DAY FOR SLEEP  Patient taking differently: Take 100 mg by mouth At Bedtime 7/31/2022 at hs Yes Fifi Fleming MD Yes

## 2022-08-01 NOTE — TELEPHONE ENCOUNTER
Agree .Best is that he Should be seen at earliest appointment with other provider here or urgent care

## 2022-08-01 NOTE — ED PROVIDER NOTES
History   Chief Complaint:  Hematuria and Fatigue    The history is provided by the patient and a relative.      Angus Wynne is a 62 year old male with history of hypertension, hyperlipidemia, GERD, and vergito who presents with hematuria and fatigue. The patients relative reports that the patient has had at least 15 episodes of hematuria over the past 3 days. The patient states that he is feeling exhausted. Notes that he has some chest pressure when he moves his arms and that he has not been eating much over the last 5 days. Of note, the patient slipped down his garage stairs approximately 10 days ago. He was seen at [facility] ED and was diagnosed with mildly displaced fracture of the lateral aspect of the base of the fifth metatarsal. States he had a boot placed and then developed leg, chest, low back, and shoulder pain the next day. Denies fever, dysuria, urgency, frequency, and urinary retention. Denies pain with respiration, abdominal pain, and use of blood thinning medication.    Review of Systems   Constitutional: Positive for appetite change. Negative for fever.   Cardiovascular: Positive for chest pain (pressure).   Gastrointestinal: Negative for abdominal pain.   Genitourinary: Positive for hematuria. Negative for dysuria, frequency and urgency.   Musculoskeletal: Positive for back pain and myalgias.   All other systems reviewed and are negative.    Allergies:  No Known Allergies    Medications:  Clonazepam  Levothyroxine  Losartan  Omeprazole  Ondansetron  Rosuvastatin  Sertraline  Trazodone    Past Medical History:     Benign paroxysmal positional vertigo  Hypothyroidism  Acute alcoholic hepatitis  Colonic polyp  GERD  Vitamin D deficiency  Anxiety  Alcohol use disorder  Neck pain  Cervicalgia  Cervical spondylosis without myelopathy  Hypertension  Hyperlipidemia  Nasal congestion  Vertigo  Lumbago  Obesity  Dyspnea on exertion  Dysphasia  Elevated LFTs  Thrombocytopenia  Anal fissure     Past  Surgical History:    Exam under anesthesia anus  Sphincterotomy and hemorroidectomy rectum    Family History:    Father- CAD  Mother- breast cancer  Brother- hyperlipidemia, cancer    Social History:  Presents with family member  Presents via private vehicle     Physical Exam     Patient Vitals for the past 24 hrs:   BP Temp Pulse Resp SpO2   08/01/22 1930 -- -- 68 -- 100 %   08/01/22 1915 (!) 136/95 -- 75 -- 100 %   08/01/22 1900 111/71 -- 66 -- 100 %   08/01/22 1850 111/68 -- 64 -- 100 %   08/01/22 1840 107/78 -- 66 -- 100 %   08/01/22 1830 114/65 -- 75 -- --   08/01/22 1820 94/76 -- 73 -- 100 %   08/01/22 1810 94/71 -- 72 -- 100 %   08/01/22 1800 96/64 -- 72 -- 100 %   08/01/22 1750 103/74 -- 70 -- 100 %   08/01/22 1740 92/65 -- 70 -- 100 %   08/01/22 1730 99/65 -- 71 -- 100 %   08/01/22 1720 93/71 -- 69 -- 100 %   08/01/22 1710 90/65 -- 72 -- 100 %   08/01/22 1700 (!) 78/57 -- 70 -- 100 %   08/01/22 1656 (!) 78/56 -- 72 -- 100 %   08/01/22 1650 (!) 79/60 -- 75 -- 100 %   08/01/22 1640 113/73 -- 82 -- --   08/01/22 1620 95/68 -- 70 13 100 %   08/01/22 1610 92/71 -- 69 17 100 %   08/01/22 1600 98/65 -- 71 -- 100 %   08/01/22 1550 92/67 -- 70 -- 100 %   08/01/22 1545 99/71 -- 70 -- 100 %   08/01/22 1540 100/71 -- 69 12 100 %   08/01/22 1520 101/74 -- 70 16 100 %   08/01/22 1515 (!) 84/71 -- 69 18 98 %   08/01/22 1510 95/72 -- 69 12 100 %   08/01/22 1505 91/65 -- 74 -- 99 %   08/01/22 1500 104/66 -- 75 13 99 %   08/01/22 1455 100/62 -- 82 20 99 %   08/01/22 1450 (!) 89/66 -- 71 9 100 %   08/01/22 1445 92/58 -- 71 -- 100 %   08/01/22 1440 (!) 87/59 -- 71 -- 100 %   08/01/22 1435 (!) 79/59 -- 75 -- 99 %   08/01/22 1430 (!) 71/58 -- 80 -- 98 %   08/01/22 1427 (!) 87/58 -- 79 -- 98 %   08/01/22 1420 -- -- -- -- 99 %   08/01/22 1418 (!) 71/61 -- 70 -- --   08/01/22 1358 (!) 88/61 98  F (36.7  C) 79 18 100 %     Physical Exam  Constitutional: Alert, attentive, GCS 15  HENT:    Nose: Nose normal.    Mouth/Throat:  Oropharynx is clear, mucous membranes are moist  Eyes: EOM are normal, anicteric, conjugate gaze  CV: regular rate and rhythm; no murmurs  Chest: Effort normal and breath sounds clear without wheezing or rales, symmetric bilaterally   GI:  non tender. No distension. No guarding or rebound.    MSK: No LE edema, no tenderness to palpation of BLE.  Back: generalized thoracic and lumbar perispinal and muscular tenderness. No midline tenderness.  Neurological: Alert, attentive, moving all extremities equally.   Skin: Skin is warm and dry.    Emergency Department Course   ECG  ECG taken at 1430, ECG read at 1446  Normal sinus rhythm  Nonspecific ST and T wave abnormality  Prolonged QT   No change as compared to prior, dated 9/8/21.  Rate 75 bpm. NC interval 162 ms. QRS duration 96 ms. QT/QTc 414/462 ms. P-R-T axes 16 -9 111.     Imaging:  CT Chest Abdomen Pelvis w/o Contrast   Final Result   IMPRESSION:   1.  No acute findings or traumatic injury in the chest, abdomen and   pelvis.   2.  Few small pulmonary nodules measuring up to 3 mm. See follow-up   guidelines.   3.  Hepatic steatosis.      REFERENCE:   Guidelines for Management of Incidental Pulmonary Nodules Detected on   CT Images: From the Fleischner Society 2017.    Guidelines apply to incidental nodules in patients who are 35 years or   older.   Guidelines do not apply to lung cancer screening, patients with   immunosuppression, or patients with known primary cancer.      MULTIPLE NODULES   Nodule size <6 mm   Low-risk patients: No follow-up needed.   High-risk patients: Optional follow-up at 12 months.      Consider referral to lung nodule clinic.      FE WASSERMAN MD            SYSTEM ID:  [id number]      POC US ABDOMEN LIMITED   Final Result   PROCEDURE NOTE: ED BEDSIDE LIMITED ULTRASOUND   Name of the study: E-FAST Exam   Performed by: Amos Richey MD   Indications:Blunt Trauma    Body Location / Organs Imaged: Four quadrants (perihepatic, perisplenic, pelvic,  pericardial) of the abdomen were scanned; the exam was continued to the chest using the linear probe, where the lungs were evaluated.   Findings: Three quadrants (perihepatic, perisplenic, pelvic) were negative for free fluid.   Interpretation: No evidence of acute hemoperitoneum.   Archiving of images: Images were saved digitally to the internal hard drive of the ultrasound machine/PACS.           Report per radiology    Laboratory:  Labs Ordered and Resulted from Time of ED Arrival to Time of ED Departure   COMPREHENSIVE METABOLIC PANEL - Abnormal       Result Value    Sodium 127 (*)     Potassium 3.8      Creatinine 2.88 (*)     Urea Nitrogen 46.2 (*)     Chloride 91 (*)     Carbon Dioxide (CO2) 22      Anion Gap 14      Glucose 129 (*)     Calcium 8.9      Protein Total 7.3      Albumin 4.0      Bilirubin Total 1.9 (*)     Alkaline Phosphatase 127      AST 1,157 (*)      (*)     GFR Estimate 24 (*)    ROUTINE UA WITH MICROSCOPIC - Abnormal    Color Urine Yellow      Appearance Urine Slightly Cloudy (*)     Glucose Urine Negative      Bilirubin Urine Negative      Ketones Urine Negative      Specific Gravity Urine 1.010      Blood Urine Large (*)     pH Urine 5.0      Protein Albumin Urine 10  (*)     Urobilinogen Urine Normal      Nitrite Urine Negative      Leukocyte Esterase Urine Negative      Mucus Urine Present (*)     RBC Urine 1      WBC Urine 4      Squamous Epithelials Urine <1     ETHYL ALCOHOL LEVEL - Abnormal    Alcohol ethyl <0.01 (*)    TROPONIN T, HIGH SENSITIVITY - Abnormal    Troponin T, High Sensitivity 112 (*)    CBC WITH PLATELETS AND DIFFERENTIAL - Abnormal    WBC Count 6.2      RBC Count 4.46      Hemoglobin 13.9      Hematocrit 41.1      MCV 92      MCH 31.2      MCHC 33.8      RDW 12.7      Platelet Count 149 (*)     % Neutrophils 62      % Lymphocytes 25      % Monocytes 8      % Eosinophils 3      % Basophils 1      % Immature Granulocytes 1      NRBCs per 100 WBC 0      Absolute  Neutrophils 3.9      Absolute Lymphocytes 1.6      Absolute Monocytes 0.5      Absolute Eosinophils 0.2      Absolute Basophils 0.0      Absolute Immature Granulocytes 0.1      Absolute NRBCs 0.0     ISTAT CREATININE POCT - Abnormal    Creatinine POCT 3.1 (*)     GFR, ESTIMATED POCT 22 (*)    PROCALCITONIN - Abnormal    Procalcitonin 0.36 (*)    CK TOTAL - Abnormal    CK 16,361 (*)    LACTIC ACID WHOLE BLOOD - Normal    Lactic Acid 0.7     INR - Normal    INR 1.08     NT PROBNP INPATIENT - Normal    N terminal Pro BNP Inpatient 124     COVID-19 VIRUS (CORONAVIRUS) BY PCR - Normal    SARS CoV2 PCR Negative     ACETAMINOPHEN LEVEL   TYPE AND SCREEN, ADULT    ABO/RH(D) O POS      Antibody Screen Negative      SPECIMEN EXPIRATION DATE [id number]     BLOOD CULTURE   BLOOD CULTURE   ABO/RH TYPE AND SCREEN     Emergency Department Course:  Reviewed:  I reviewed nursing notes, vitals and past medical history    Assessments:  1414 I obtained history and examined the patient as noted above.   1416 I performed bedside ultrasound.  163 I rechecked and updated the patient.     Consults:  171 I spoke with Dr. Memo Chavez of the hospitalist service regarding admission.   I spoke with Dr. Chavez and updated him on the patients lab values.    Interventions:  1428 0.9% NS Bolus, 1000 mL, IV  1449 0.9% NS Bolus, 1000 mL, IV  1554 Magnesium sulfate, 2 g, IV  1604 LR Bolus, 500 mL, IV  1734 LR Bolus, 500 mL, IV    Disposition:  The patient was admitted to the hospital under the care of Dr. Chavez.     Impression & Plan     Medical Decision Makin-year-old male past medical history seen for hypertension, hyperlipidemia GERD, alcohol abuse, vertigo presenting for generalized weakness, fatigue, reports of blood in his urine and diffuse back pain and chest pain after a fall over a week ago in which he injured his left foot, currently in a walking boot.  He was noted to be hypotensive on arrival, with i-STAT creatinine of 3.1  up from his baseline of 0.8 prompting sepsis evaluation, lactate was normal he was given aggressive IV fluid resuscitation and his blood pressure rapidly improved.  Hemoglobin is stable, he does not have leukocytosis or fever, procalcitonin is mildly elevated, blood cultures were sent as a precaution.  CT chest abdomen pelvis was obtained due to the CHAS, hypotension and this was unremarkable with no traumatic injuries.  Prior CT imaging, bedside fast was also negative.  UA was notable for large blood but only 1 RBC, CK was sent and this returned elevated at 16,000 consistent with mild rhabdomyolysis.  He does have a mildly elevated troponin at 112 however EKG shows no ischemic changes and I suspect this is secondary to hypotension and CHAS.  LFTs are moderately elevated as well, unclear if this is secondary to alcohol abuse, hypotension, he does not have any upper abdominal pain, low suspicion for biliary obstruction he has been using Tylenol for his foot pain however none for the last 4 days.  Low suspicion for accidental Tylenol overdose though did report taking 4 to 6 tablets overnight for 2 or 3 nights, level pending will hold off on NAC.  Will admit to medicine on telemetry with continued IV fluids, I suspect the some component of this is severe dehydration.    Diagnosis:    ICD-10-CM    1. CHAS (acute kidney injury) (H)  N17.9    2. Elevated LFTs  R79.89    3. Elevated troponin  R77.8    4. Non-traumatic rhabdomyolysis  M62.82      Amos Richey MD  [employer]  7:36 PM 08/01/22     Scribe Disclosure:  I, Sushila Mcwilliams, am serving as a scribe at 2:13 PM on 8/1/2022 to document services personally performed by Amos Richey MD based on my observations and the provider's statements to me.      Amos Richey MD  08/01/22 1936

## 2022-08-01 NOTE — H&P
[facility]    History and Physical - Hospitalist Service       Date of Admission:  8/1/2022    Assessment & Plan     Angus Wynne is a 62 year old male w/PMH of GERD, HTN, DLD, alcohol use/abuse who presents with hematuria and found to have CHAS, hepatitis, hypotension    CHAS w/hyponatremia  -likely multifactorial from poor PO intake, hypotension, ARB, hx alcohol use. Reports use of  daily, denies other NSAID use  -Cr 3.1 on admission with normal baseline, improved after 2.5L IVF and he's urinating  -CT without evidence of obstruction  -cont NS at 125 with strict I/O's, hold home ARB  -CK ordered, pending. Concern for possible rhabdo    Hepatitis  -likely multifactorial from hx of alcohol abuse with fatty liver noted on CT. Also some ischemic component from hypotension, tylenol use. Statin also could be contributing so hold  -normal baseline liver function per chart review  -, AST 1157 in ED. Bili 1.9 but no abd pain, CT negative for acute findings  -stop his tylenol and check level  -rhabdo could also be contributing, awaiting CK as above  -doubt viral etiology, if not improving by tomorrow consider further workup    Hypotension  -multifactorial from poor PO intake, home ARB  -s/p 2.5L in ED, cont hydration. BP already improving  -hold home ARB and monitor response    Alcohol abuse  -reports hx of drinking 'large 6 pack' daily until this past Friday. Alcohol level negative in ED  -clinically without evidence of withdrawals currently, is outside window if he's being truthful which I believe he is  -hold off on CIWAs, if he develops symptamotology will start  -place on thiamine, folate    Elevated troponin  -likely related to CHAS, denies any CP and EKG without ischemic changes  -trend out and monitor on tele    Hematuria  -patient reports darkening of urine he associates with bloody, could be rhabdo as well  -on  at home he takes for general wellness, hold for now  -UA  with large amount of blood but no RBC's. Hgb is stable at 13.9  -monitor and await CK, if continued concern can consider urology eval. Could likely be done as outpt as well    Recently broken foot  -diagnosed last Wednesday, supposed to be in boot but took home. Encouraged to have family bring his boot in  -may benefit from PT eval prior to discharge  -he's being set up with outpt ortho eval per chart review    Obesity: Estimated body mass index is 33.23 kg/m  as calculated from the following:    Height as of 7/20/22: 1.829 m (6').    Weight as of 7/20/22: 111.1 kg (245 lb).    Hx GERD, HTN, DLD  -holding multiple home meds as above, awaiting med rec to adjust further     Diet:   regular diet  DVT Prophylaxis: Pneumatic Compression Devices  Bond Catheter: Not present  Central Lines: None  Cardiac Monitoring: None  Code Status:   full code         The patient's care was discussed with the Patient and ED Team.    Memo Chavez DO  Hospitalist Service  [facility]  Securely message with the [employer] Web Console (learn more here)  Text page via [facility] Paging/Directory   ______________________________________________________________________    Chief Complaint   hematuria    History of Present Illness   Angus Wynne is a 62 year old male w/PMH of GERD, HTN, DLD, alcohol use/abuse who presents with hematuria. He fell and broke his foot last Wednesday, was seen in ED and discharged home with boot. Since then he stopped drinking, previously drank a 'large 6 pack' daily and was taking  daily and tylenol. Last Friday noticed dark urine he reports as bloody but not patricia blood. Once he noticed that he stopped taking his ASA as well. Apparently this is not prescribed he just takes it for overall wellness. Since about Friday had poor appetite, fatigue, malaise but continued taking his ARB. Denies any CP, sob, fever, chills, nausea, vomiting or other complaints.     In ED was noted to be in CHAS with  Cr around 2.8, baseline is normal. Also with elevated liver function tests which is not his baseline. Troponin was mildly elevated in setting of CHAS, EKG with non ischemic findings. Was noted to be quite hypotensive and received 2.5L of IVF and BP has improved, he's urinating. CT was done and negative for evidence of urinary of obstruction, no acute findings although hepatic steatosis noted. He's admitted for further workup and treatment.    Review of Systems    The 10 point Review of Systems is negative other than noted in the HPI or here.     Past Medical History    I have reviewed this patient's medical history and updated it with pertinent information if needed.   Past Medical History:   Diagnosis Date     GERD (gastroesophageal reflux disease)      HTN (hypertension)      Hyperlipidemia      Hypothyroid      Vertigo      ,        Past Surgical History   I have reviewed this patient's surgical history and updated it with pertinent information if needed.  Past Surgical History:   Procedure Laterality Date     AS ESOPHAGOSCOPY, DIAGNOSTIC       COLONOSCOPY       EXAM UNDER ANESTHESIA ANUS N/A 2021    Procedure: EXAM UNDER ANESTHESIA, ANUS, ANAL BISOPY, BOTOX INJECTION;  Surgeon: Henrik Garduno MD;  Location: [facility] OR     SPHINCTEROTOMY RECTUM N/A 2021    Procedure: Anal sphincterotomy and hemorroidectomy;  Surgeon: Rajendra Armando MD;  Location: [facility] [place] OR       Social History   I have reviewed this patient's social history and updated it with pertinent information if needed.  Social History     Tobacco Use     Smoking status: Former Smoker     Quit date: 2015     Years since quittin.8     Smokeless tobacco: Never Used   Substance Use Topics     Alcohol use: No     Alcohol/week: 0.0 standard drinks     Comment: 2-3 drinks a week     Drug use: No       Family History   I have reviewed this patient's family history and updated it with pertinent information if needed.  Family History    Problem Relation Age of Onset     Coronary Artery Disease Father         at age 60      Hyperlipidemia Brother      Cancer Brother         wall of scrotum     Breast Cancer Mother      Diabetes No family hx of      Cerebrovascular Disease No family hx of      Colon Cancer No family hx of      Prostate Cancer No family hx of        Prior to Admission Medications   Prior to Admission Medications   Prescriptions Last Dose Informant Patient Reported? Taking?   acetaminophen (TYLENOL) 325 MG tablet   No No   Sig: Take 2 tablets (650 mg) by mouth every 4 hours as needed for mild pain   clonazePAM (KLONOPIN) 0.5 MG tablet   No No   Sig: TAKE 1/2 TO 1 TABLET BY MOUTH AS NEEDED FOR ANXIETY   hydrocortisone, Perianal, (HYDROCORTISONE) 2.5 % cream   No No   Sig: Place rectally 2 times daily   levothyroxine (SYNTHROID/LEVOTHROID) 175 MCG tablet   No No   Sig: TAKE 1 TABLET BY MOUTH EVERY DAY 6 DAYS PER WEEK. AND ONE-HALF TABLET THE 7TH DAY OF THE WEEK   losartan (COZAAR) 100 MG tablet   No No   Sig: TAKE 1 TABLET(100 MG) BY MOUTH DAILY   omeprazole (PRILOSEC) 40 MG DR capsule   No No   Sig: TAKE 1 CAPSULE(40 MG) BY MOUTH DAILY   ondansetron (ZOFRAN-ODT) 4 MG ODT tab   No No   Sig: Take 1-2 tablets (4-8 mg) by mouth every 8 hours as needed for nausea   polyethylene glycol (MIRALAX) 17 GM/Dose powder   No No   Sig: Take 17 g by mouth daily   rosuvastatin (CRESTOR) 40 MG tablet   No No   Sig: TAKE 1 TABLET(40 MG) BY MOUTH DAILY   sertraline (ZOLOFT) 100 MG tablet   No No   Sig: Take 1.5 tablets (150 mg) by mouth daily   traZODone (DESYREL) 50 MG tablet   No No   Sig: TAKE 1 TO 2 TABLETS BY MOUTH EVERY DAY FOR SLEEP      Facility-Administered Medications Last Administration Doses Remaining   acetaminophen (TYLENOL) tablet 500 mg None recorded         Allergies   No Known Allergies    Physical Exam   Vital Signs: Temp: 98  F (36.7  C)   BP: (!) 78/57 Pulse: 70   Resp: 13 SpO2: 100 %      Weight: 0 lbs 0 oz    Constitutional:  awake, alert and cooperative  Eyes: pupils equal, round and reactive to light and conjunctiva normal  ENT: normocepalic, without obvious abnormality, atramatic  Respiratory: no increased work of breathing, good air exchange and clear to auscultation  Cardiovascular: regular rate and rhythm and no murmur noted  GI: normal bowel sounds, soft, distended and non-tender  Skin: no bruising or bleeding  Neurologic: alert, oriented x4, no focal deficits. Did not ambulate him    Data   Data reviewed today: I reviewed all medications, new labs and imaging results over the last 24 hours.    Recent Labs   Lab 08/01/22  1431 08/01/22  1427 08/01/22  1422   WBC  --  6.2  --    HGB  --  13.9  --    MCV  --  92  --    PLT  --  149*  --    INR 1.08  --   --    NA  --  127*  --    POTASSIUM  --  3.8  --    CHLORIDE  --  91*  --    CO2  --  22  --    BUN  --  46.2*  --    CR  --  2.88* 3.1*   ANIONGAP  --  14  --    SAMMY  --  8.9  --    GLC  --  129*  --    ALBUMIN  --  4.0  --    PROTTOTAL  --  7.3  --    BILITOTAL  --  1.9*  --    ALKPHOS  --  127  --    ALT  --  422*  --    AST  --  1,157*  --      Most Recent 3 CBC's:Recent Labs   Lab Test 08/01/22  1427 09/08/21  1211 02/03/19  1810   WBC 6.2 6.4 6.3   HGB 13.9 14.3 14.2   MCV 92 90 94   * 149* 103*     Most Recent 3 BMP's:Recent Labs   Lab Test 08/01/22  1427 08/01/22  1422 12/29/21  1037 09/08/21  1211   *  --  137 132*   POTASSIUM 3.8  --  4.4 4.0   CHLORIDE 91*  --  104 103   CO2 22  --  27 23   BUN 46.2*  --  12 9   CR 2.88* 3.1* 0.86 0.73   ANIONGAP 14  --  6 6   SAMMY 8.9  --  9.4 7.3*   *  --  117* 111*     Most Recent 2 LFT's:Recent Labs   Lab Test 08/01/22  1427 12/29/21  1037   AST 1,157* 34   * 39   ALKPHOS 127 56   BILITOTAL 1.9* 0.5     Most Recent 3 INR's:Recent Labs   Lab Test 08/01/22  1431 02/13/17  1636   INR 1.08 0.95     Most Recent 3 Hemoglobins:Recent Labs   Lab Test 08/01/22  1427 09/08/21  1211 02/03/19  1810   HGB 13.9 14.3 14.2      Most Recent 3 Troponin's:Recent Labs   Lab Test 09/19/17  0425 09/18/17  2144 09/18/17  1650   TROPI <0.015 <0.015 <0.015     6.2    \    13.9    /    149 (L)   N 62    L N/A    127 (L)    91 (L)    46.2 (H) /   ------------------------------------ 129 (H)    (H)   AST 1,157 (HH)      ALB 4.0   Ca 8.9  3.8    22    2.88 (H) \    % RETIC N/A    LDH N/A  Troponin N/A    BNP N/A    CK N/A  INR 1.08   PTT N/A    D-dimer N/A    Fibrinogen N/A    Antithrombin N/A  Ferritin N/A  CRP N/A    IL-6 N/A  Recent Results (from the past 24 hour(s))   POC US ABDOMEN LIMITED    Impression    PROCEDURE NOTE: ED BEDSIDE LIMITED ULTRASOUND  Name of the study: E-FAST Exam  Performed by: Amos Richey MD  Indications:Blunt Trauma   Body Location / Organs Imaged: Four quadrants (perihepatic, perisplenic, pelvic, pericardial) of the abdomen were scanned; the exam was continued to the chest using the linear probe, where the lungs were evaluated.  Findings: Three quadrants (perihepatic, perisplenic, pelvic) were negative for free fluid.  Interpretation: No evidence of acute hemoperitoneum.  Archiving of images: Images were saved digitally to the internal hard drive of the ultrasound machine/PACS.     CT Chest Abdomen Pelvis w/o Contrast    Narrative    CT CHEST ABDOMEN PELVIS W/O CONTRAST 8/1/2022 3:35 PM    CLINICAL HISTORY: fall, hematuria, hypotension    TECHNIQUE: CT scan of the chest, abdomen, and pelvis was performed  without IV contrast. Multiplanar reformats were obtained. Dose  reduction techniques were used.   CONTRAST: None.    COMPARISON: Coronary CTA on 7/31/2019, CT of the thoracic and lumbar  spine on 2/3/2019    FINDINGS:   LUNGS AND PLEURA: No infiltrate, pleural effusion or pneumothorax. Few  small pulmonary nodules. For example, there is a 3 mm right apical  nodule (series 3, image 33), 3 mm left upper lobe nodule (series 3,  image 42), and 3 mm left upper lobe nodule (image 62). These were  outside the  field-of-view of the prior coronary CTA. Calcified  granuloma right upper lobe.    MEDIASTINUM/AXILLAE: No lymphadenopathy. No thoracic aortic aneurysm.  No pericardial effusion. Moderate coronary artery calcifications.    HEPATOBILIARY: Hepatic steatosis. No focal lesions of the liver  evident on noncontrast CT. No calcified gallstones or biliary ductal  dilatation.    PANCREAS: Normal.    SPLEEN: Normal.    ADRENAL GLANDS: Normal.    KIDNEYS/BLADDER: No calculi, hydronephrosis, perinephric stranding or  contour deforming masses in either kidney evident on noncontrast CT.  No urinary bladder calculi.    BOWEL: Normal with no obstruction or acute inflammatory change.  Nothing for appendicitis.    LYMPH NODES: Normal.    VASCULATURE: Mild aortobiiliac atherosclerotic calcifications. No  abdominal aortic aneurysm.    PELVIC ORGANS: Mild intraprostatic calcifications.    OTHER: No free fluid, fluid collections or extraluminal air.    MUSCULOSKELETAL: Chronic wedge compression fracture of T9 is stable  since 7/31/2019. No acute fractures. No suspicious lesions in the  bones.      Impression    IMPRESSION:  1.  No acute findings or traumatic injury in the chest, abdomen and  pelvis.  2.  Few small pulmonary nodules measuring up to 3 mm. See follow-up  guidelines.  3.  Hepatic steatosis.    REFERENCE:  Guidelines for Management of Incidental Pulmonary Nodules Detected on  CT Images: From the Fleischner Society 2017.   Guidelines apply to incidental nodules in patients who are 35 years or  older.  Guidelines do not apply to lung cancer screening, patients with  immunosuppression, or patients with known primary cancer.    MULTIPLE NODULES  Nodule size <6 mm  Low-risk patients: No follow-up needed.  High-risk patients: Optional follow-up at 12 months.    Consider referral to lung nodule clinic.    FE WASSERMAN MD         SYSTEM ID:  [id number]

## 2022-08-01 NOTE — TELEPHONE ENCOUNTER
Nurse Triage SBAR    Is this a 2nd Level Triage? NO    Patient calling clinic to report he has had blood in his urine 16x on 7/30/22. No blood in urine on 7/31 and 8/1 this morning. Denies dysuria, fever,flank pain.     Pt fell down stairs 1 week ago causing injury to foot, but did not injure back.    Patient stated he is afraid he may have bladder cancer and declines any further triage questions.    Protocol Recommended Disposition:   See Today Or Tomorrow In Office    Patient will only see Dr. Fleming and can only come in tomorrow 8/2/22. RN explained patient's PCP doesn't have schedule openings 8/2/22 but there are other providers available. Patient agrees to be scheduled with an available provider 8/2/22 but requesting pcp's recommendations as well.     Routed to provider    Does the patient meet one of the following criteria for ADS visit consideration? 16+ years old, with an [facility] PCP     TIP  Providers, please consider if this condition is appropriate for management at one of our Acute and Diagnostic Services sites.     If patient is a good candidate, please use dotphrase <dot>triageresponse and select Refer to ADS to document.        Reason for Disposition    All other patients with blood in urine (Exception: could be normal menstrual bleeding)    Additional Information    Negative: Shock suspected (e.g., cold/pale/clammy skin, too weak to stand, low BP, rapid pulse)    Negative: Sounds like a life-threatening emergency to the triager    Negative: Urinary catheter, questions about    Negative: Recent back or abdominal injury    Negative: Recent genital injury    Negative: Unable to urinate (or only a few drops) > 4 hours and bladder feels very full (e.g., palpable bladder or strong urge to urinate)    Negative: Passing pure blood or large blood clots (i.e., larger than a dime or grape)    Negative: Fever > 100.4 F (38.0 C)    Negative: Patient sounds very sick or weak to the triager    Negative: Known sickle  "cell disease    Negative: Taking Coumadin (warfarin) or other strong blood thinner, or known bleeding disorder (e.g., thrombocytopenia)    Negative: Side (flank) or back pain present    Negative: Pain or burning with passing urine (urination)    Negative: Patient wants to be seen    Negative: Pink or red-colored urine and likely from food (beets, rhubarb, red food dye) and lasts > 24 hours after stopping food    Answer Assessment - Initial Assessment Questions  1. COLOR of URINE: \"Describe the color of the urine.\"  (e.g., tea-colored, pink, red, blood clots, bloody)      No clots  2. ONSET: \"When did the bleeding start?\"       2 days ago  3. EPISODES: \"How many times has there been blood in the urine?\" or \"How many times today?\"      7/30 16 times      7/31 0      Today 0  4. PAIN with URINATION: \"Is there any pain with passing your urine?\" If so, ask: \"How bad is the pain?\"  (Scale 1-10; or mild, moderate, severe)     - MILD - complains slightly about urination hurting     - MODERATE - interferes with normal activities       - SEVERE - excruciating, unwilling or unable to urinate because of the pain       No pain  5. FEVER: \"Do you have a fever?\" If so, ask: \"What is your temperature, how was it measured, and when did it start?\"      No fever  6. ASSOCIATED SYMPTOMS: \"Are you passing urine more frequently than usual?\"      No  7. OTHER SYMPTOMS: \"Do you have any other symptoms?\" (e.g., back/flank pain, abdominal pain, vomiting)      A lot of back pain. Pt fell 1 week ago and broke foot.    Protocols used: URINE - BLOOD IN-A-OH      "

## 2022-08-01 NOTE — TELEPHONE ENCOUNTER
Central Scheduling contacted RN via triage line. States that son is calling asking what the provider will be able to do for his father during tomorrows appointment. Son told her that his father has blood in urine.   Patient does not have a signed consent for us to communicate with the son and advised to have patient call in with any questions or concerns.  Baltazar Vera RN

## 2022-08-01 NOTE — ED NOTES
.  [facility]  ED Nurse Handoff Report    Angus Wynne is a 62 year old male   ED Chief complaint: Hematuria and Fatigue  . ED Diagnosis:   Final diagnoses:   CHAS (acute kidney injury) (H)   Elevated LFTs   Elevated troponin     Allergies: No Known Allergies    Code Status: Full Code  Activity level - Baseline/Home:  Independent. Activity Level - Current:   Stand by Assist. Lift room needed: No. Bariatric: No   Needed: No   Isolation: No. Infection: Not Applicable.     Vital Signs:   Vitals:    08/01/22 1840 08/01/22 1850 08/01/22 1900 08/01/22 1915   BP: 107/78 111/68 111/71 (!) 136/95   Pulse: 66 64 66 75   Resp:       Temp:       SpO2: 100% 100% 100% 100%       Cardiac Rhythm:  ,      Pain level:    Patient confused: No. Patient Falls Risk: Yes.   Elimination Status: Has voided   Patient Report - Initial Complaint: 62 year old male with history of hypertension, hyperlipidemia, GERD, and vergito who presents with hematuria and fatigue. The patients relative reports that the patient has had at least 15 episodes of hematuria over the past 3 days. The patient states that he is feeling exhausted. Notes that he has some chest pressure when he moves his arms and that he has not been eating much over the last 5 days. Of note, the patient slipped down his garage stairs approximately 10 days ago. He was seen at [facility] ED and was diagnosed with mildly displaced fracture of the lateral aspect of the base of the fifth metatarsal. States he had a boot placed and then developed leg, chest, low back, and shoulder pain the next day. Denies fever, dysuria, urgency, frequency, and urinary retention. Denies pain with respiration, abdominal pain, and use of blood thinning medication.   .   Focused Assessment:   Cardiac Cardiac (Adult) - Cardiac WDL: .WDL except; all  Additional Documentation: ECG (Group)   Chest Pain Assessment - Chest Pain Location:  (denies any chest pain )  ECG - Rhythm: normal sinus  rhythm  Measurements/Intervals: QT interval  QT Interval (Sec):  (prolonged) LA       14:30 EKG EKG - Status of EKG: First EKG  Physician Notified: Yes  Physician Name: konrad  Provider Notified Time: 1430  MJ     14:30 Genitourinary Genitourinary (Adult) - Genitourinary WDL: .WDL except; all  Urine Characteristics: straw colored  LA     14:30 Musculoskeletal Musculoskeletal (Adult) - Musculoskeletal WDL: .WDL except; all  General Mobility: generalized weakness (general fatigue over past several days) LA     14:30 Neuro Cognitive Neuro Cognitive (Adult) - Cognitive/Neuro/Behavioral WDL: WDL; all   Arvin Coma Scale - Best Eye Response: 4-->(E4) spontaneous  Best Motor Response: 6-->(M6) obeys commands  Best Verbal Response: 5-->(V5) oriented  Arvin Coma Scale Score: 15           Tests Performed: see ED meds below.   Abnormal Results: .  Labs Ordered and Resulted from Time of ED Arrival to Time of ED Departure   COMPREHENSIVE METABOLIC PANEL - Abnormal       Result Value    Sodium 127 (*)     Potassium 3.8      Creatinine 2.88 (*)     Urea Nitrogen 46.2 (*)     Chloride 91 (*)     Carbon Dioxide (CO2) 22      Anion Gap 14      Glucose 129 (*)     Calcium 8.9      Protein Total 7.3      Albumin 4.0      Bilirubin Total 1.9 (*)     Alkaline Phosphatase 127      AST 1,157 (*)      (*)     GFR Estimate 24 (*)    ROUTINE UA WITH MICROSCOPIC - Abnormal    Color Urine Yellow      Appearance Urine Slightly Cloudy (*)     Glucose Urine Negative      Bilirubin Urine Negative      Ketones Urine Negative      Specific Gravity Urine 1.010      Blood Urine Large (*)     pH Urine 5.0      Protein Albumin Urine 10  (*)     Urobilinogen Urine Normal      Nitrite Urine Negative      Leukocyte Esterase Urine Negative      Mucus Urine Present (*)     RBC Urine 1      WBC Urine 4      Squamous Epithelials Urine <1     ETHYL ALCOHOL LEVEL - Abnormal    Alcohol ethyl <0.01 (*)    TROPONIN T, HIGH SENSITIVITY - Abnormal     Troponin T, High Sensitivity 112 (*)    CBC WITH PLATELETS AND DIFFERENTIAL - Abnormal    WBC Count 6.2      RBC Count 4.46      Hemoglobin 13.9      Hematocrit 41.1      MCV 92      MCH 31.2      MCHC 33.8      RDW 12.7      Platelet Count 149 (*)     % Neutrophils 62      % Lymphocytes 25      % Monocytes 8      % Eosinophils 3      % Basophils 1      % Immature Granulocytes 1      NRBCs per 100 WBC 0      Absolute Neutrophils 3.9      Absolute Lymphocytes 1.6      Absolute Monocytes 0.5      Absolute Eosinophils 0.2      Absolute Basophils 0.0      Absolute Immature Granulocytes 0.1      Absolute NRBCs 0.0     ISTAT CREATININE POCT - Abnormal    Creatinine POCT 3.1 (*)     GFR, ESTIMATED POCT 22 (*)    PROCALCITONIN - Abnormal    Procalcitonin 0.36 (*)    LACTIC ACID WHOLE BLOOD - Normal    Lactic Acid 0.7     INR - Normal    INR 1.08     NT PROBNP INPATIENT - Normal    N terminal Pro BNP Inpatient 124     COVID-19 VIRUS (CORONAVIRUS) BY PCR - Normal    SARS CoV2 PCR Negative     CK TOTAL   ACETAMINOPHEN LEVEL   TYPE AND SCREEN, ADULT    ABO/RH(D) O POS      Antibody Screen Negative      SPECIMEN EXPIRATION DATE [id number]     BLOOD CULTURE   BLOOD CULTURE   ABO/RH TYPE AND SCREEN     .  CT Chest Abdomen Pelvis w/o Contrast   Final Result   IMPRESSION:   1.  No acute findings or traumatic injury in the chest, abdomen and   pelvis.   2.  Few small pulmonary nodules measuring up to 3 mm. See follow-up   guidelines.   3.  Hepatic steatosis.      REFERENCE:   Guidelines for Management of Incidental Pulmonary Nodules Detected on   CT Images: From the Fleischner Society 2017.    Guidelines apply to incidental nodules in patients who are 35 years or   older.   Guidelines do not apply to lung cancer screening, patients with   immunosuppression, or patients with known primary cancer.      MULTIPLE NODULES   Nodule size <6 mm   Low-risk patients: No follow-up needed.   High-risk patients: Optional follow-up at 12  months.      Consider referral to lung nodule clinic.      FE WASSERMAN MD            SYSTEM ID:  [id number]      POC US ABDOMEN LIMITED   Final Result   PROCEDURE NOTE: ED BEDSIDE LIMITED ULTRASOUND   Name of the study: E-FAST Exam   Performed by: Amos Richey MD   Indications:Blunt Trauma    Body Location / Organs Imaged: Four quadrants (perihepatic, perisplenic, pelvic, pericardial) of the abdomen were scanned; the exam was continued to the chest using the linear probe, where the lungs were evaluated.   Findings: Three quadrants (perihepatic, perisplenic, pelvic) were negative for free fluid.   Interpretation: No evidence of acute hemoperitoneum.   Archiving of images: Images were saved digitally to the internal hard drive of the ultrasound machine/PACS.            Treatments provided: EKG, labs, UA, CT abd  Family Comments: NA  OBS brochure/video discussed/provided to patient:  N/A  ED Medications:   Medications   sodium chloride 0.9% infusion (has no administration in time range)   0.9% sodium chloride BOLUS (0 mLs Intravenous Stopped 8/1/22 1448)   0.9% sodium chloride BOLUS (0 mLs Intravenous Stopped 8/1/22 1543)   magnesium sulfate 2 g in water intermittent infusion (0 g Intravenous Stopped 8/1/22 1624)   lactated ringers BOLUS 500 mL (0 mLs Intravenous Stopped 8/1/22 1734)   lactated ringers BOLUS 500 mL (0 mLs Intravenous Stopped 8/1/22 1917)     Drips infusing:  No  For the majority of the shift, the patient's behavior Green. Interventions performed were NA.    Sepsis treatment initiated: No     Patient tested for COVID 19 prior to admission: YES    ED Nurse Name/Phone Number: Monisha Guadalupe RN,   5:08 PM  RECEIVING UNIT ED HANDOFF REVIEW    Above ED Nurse Handoff Report was reviewed: Yes  Reviewed by: Samantha Vaca RN on August 1, 2022 at 7:55 PM

## 2022-08-01 NOTE — TELEPHONE ENCOUNTER
Called pt to give him PCP response below but pt stated he needed to call writer back before writer could convey message. On call back, please relay message below. Pt is scheduled with Cam Nolasco on 8/2/22 at 2PM.    Darlyn CLEMENTS RN  EP Triage

## 2022-08-01 NOTE — ED TRIAGE NOTES
Patient presents to the ED reporting weakness and fatigue today. States that on Friday had blood in the urine, but that has now resolved. Reports some ongoing back pain from a fall 10 days ago.

## 2022-08-01 NOTE — TELEPHONE ENCOUNTER
"    Reason for Disposition    Patient wants to be seen    Additional Information    Negative: Shock suspected (e.g., cold/pale/clammy skin, too weak to stand, low BP, rapid pulse)    Negative: Sounds like a life-threatening emergency to the triager    Negative: Urinary catheter, questions about    Negative: Recent back or abdominal injury    Negative: Recent genital injury    Negative: Unable to urinate (or only a few drops) > 4 hours and bladder feels very full (e.g., palpable bladder or strong urge to urinate)    Negative: Passing pure blood or large blood clots (i.e., larger than a dime or grape)    Negative: Fever > 100.4 F (38.0 C)    Negative: Patient sounds very sick or weak to the triager    Negative: Known sickle cell disease    Negative: Taking Coumadin (warfarin) or other strong blood thinner, or known bleeding disorder (e.g., thrombocytopenia)    Negative: Side (flank) or back pain present    Negative: Pain or burning with passing urine (urination)    Answer Assessment - Initial Assessment Questions  1. COLOR of URINE: \"Describe the color of the urine.\"  (e.g., tea-colored, pink, red, blood clots, bloody)      Blood clots, medium red per patient  2. ONSET: \"When did the bleeding start?\"       Friday  3. EPISODES: \"How many times has there been blood in the urine?\" or \"How many times today?\"      15-20  4. PAIN with URINATION: \"Is there any pain with passing your urine?\" If so, ask: \"How bad is the pain?\"  (Scale 1-10; or mild, moderate, severe)     - MILD - complains slightly about urination hurting     - MODERATE - interferes with normal activities       - SEVERE - excruciating, unwilling or unable to urinate because of the pain       no  5. FEVER: \"Do you have a fever?\" If so, ask: \"What is your temperature, how was it measured, and when did it start?\"      no  6. ASSOCIATED SYMPTOMS: \"Are you passing urine more frequently than usual?\"      no  7. OTHER SYMPTOMS: \"Do you have any other symptoms?\" " "(e.g., back/flank pain, abdominal pain, vomiting)      Fell in garage 7/20/22- foot fracture and back pain, pain all over, no abdominal pain or vomitting  8. PREGNANCY: \"Is there any chance you are pregnant?\" \"When was your last menstrual period?\"      n/a    Protocols used: URINE - BLOOD IN-A-OH      "

## 2022-08-02 ENCOUNTER — APPOINTMENT (OUTPATIENT)
Dept: ULTRASOUND IMAGING | Facility: CLINIC | Age: 62
End: 2022-08-02
Attending: INTERNAL MEDICINE
Payer: COMMERCIAL

## 2022-08-02 LAB
ALBUMIN SERPL BCG-MCNC: 3.2 G/DL (ref 3.5–5.2)
ALBUMIN SERPL BCG-MCNC: 3.3 G/DL (ref 3.5–5.2)
ALP SERPL-CCNC: 94 U/L (ref 40–129)
ALP SERPL-CCNC: 99 U/L (ref 40–129)
ALT SERPL W P-5'-P-CCNC: 276 U/L (ref 10–50)
ALT SERPL W P-5'-P-CCNC: 290 U/L (ref 10–50)
ANION GAP SERPL CALCULATED.3IONS-SCNC: 8 MMOL/L (ref 7–15)
ANION GAP SERPL CALCULATED.3IONS-SCNC: 8 MMOL/L (ref 7–15)
AST SERPL W P-5'-P-CCNC: 614 U/L (ref 10–50)
AST SERPL W P-5'-P-CCNC: 745 U/L (ref 10–50)
BILIRUB DIRECT SERPL-MCNC: 0.48 MG/DL (ref 0–0.3)
BILIRUB DIRECT SERPL-MCNC: 0.66 MG/DL (ref 0–0.3)
BILIRUB SERPL-MCNC: 0.9 MG/DL
BILIRUB SERPL-MCNC: 1.1 MG/DL
BUN SERPL-MCNC: 36.9 MG/DL (ref 8–23)
BUN SERPL-MCNC: 43.2 MG/DL (ref 8–23)
CALCIUM SERPL-MCNC: 7.9 MG/DL (ref 8.8–10.2)
CALCIUM SERPL-MCNC: 8.3 MG/DL (ref 8.8–10.2)
CHLORIDE SERPL-SCNC: 101 MMOL/L (ref 98–107)
CHLORIDE SERPL-SCNC: 103 MMOL/L (ref 98–107)
CK SERPL-CCNC: 6737 U/L (ref 39–308)
CK SERPL-CCNC: 9266 U/L (ref 39–308)
CREAT SERPL-MCNC: 1.61 MG/DL (ref 0.67–1.17)
CREAT SERPL-MCNC: 2.27 MG/DL (ref 0.67–1.17)
DEPRECATED HCO3 PLAS-SCNC: 22 MMOL/L (ref 22–29)
DEPRECATED HCO3 PLAS-SCNC: 22 MMOL/L (ref 22–29)
ERYTHROCYTE [DISTWIDTH] IN BLOOD BY AUTOMATED COUNT: 12.3 % (ref 10–15)
GFR SERPL CREATININE-BSD FRML MDRD: 31 ML/MIN/1.73M2
GFR SERPL CREATININE-BSD FRML MDRD: 47 ML/MIN/1.73M2
GLUCOSE SERPL-MCNC: 109 MG/DL (ref 70–99)
GLUCOSE SERPL-MCNC: 134 MG/DL (ref 70–99)
HCT VFR BLD AUTO: 34.9 % (ref 40–53)
HGB BLD-MCNC: 11.5 G/DL (ref 13.3–17.7)
MCH RBC QN AUTO: 31.5 PG (ref 26.5–33)
MCHC RBC AUTO-ENTMCNC: 33 G/DL (ref 31.5–36.5)
MCV RBC AUTO: 96 FL (ref 78–100)
PLATELET # BLD AUTO: 99 10E3/UL (ref 150–450)
POTASSIUM SERPL-SCNC: 4.2 MMOL/L (ref 3.4–5.3)
POTASSIUM SERPL-SCNC: 4.4 MMOL/L (ref 3.4–5.3)
PROT SERPL-MCNC: 5.8 G/DL (ref 6.4–8.3)
PROT SERPL-MCNC: 6.1 G/DL (ref 6.4–8.3)
RBC # BLD AUTO: 3.65 10E6/UL (ref 4.4–5.9)
SODIUM SERPL-SCNC: 131 MMOL/L (ref 136–145)
SODIUM SERPL-SCNC: 133 MMOL/L (ref 136–145)
TROPONIN T SERPL HS-MCNC: 100 NG/L
TROPONIN T SERPL HS-MCNC: 98 NG/L
TSH SERPL DL<=0.005 MIU/L-ACNC: 3.59 UIU/ML (ref 0.3–4.2)
WBC # BLD AUTO: 3.8 10E3/UL (ref 4–11)

## 2022-08-02 PROCEDURE — 82310 ASSAY OF CALCIUM: CPT | Performed by: STUDENT IN AN ORGANIZED HEALTH CARE EDUCATION/TRAINING PROGRAM

## 2022-08-02 PROCEDURE — 93976 VASCULAR STUDY: CPT

## 2022-08-02 PROCEDURE — 86803 HEPATITIS C AB TEST: CPT | Performed by: STUDENT IN AN ORGANIZED HEALTH CARE EDUCATION/TRAINING PROGRAM

## 2022-08-02 PROCEDURE — 87340 HEPATITIS B SURFACE AG IA: CPT | Performed by: STUDENT IN AN ORGANIZED HEALTH CARE EDUCATION/TRAINING PROGRAM

## 2022-08-02 PROCEDURE — 99233 SBSQ HOSP IP/OBS HIGH 50: CPT | Performed by: STUDENT IN AN ORGANIZED HEALTH CARE EDUCATION/TRAINING PROGRAM

## 2022-08-02 PROCEDURE — 36415 COLL VENOUS BLD VENIPUNCTURE: CPT | Performed by: HOSPITALIST

## 2022-08-02 PROCEDURE — 86706 HEP B SURFACE ANTIBODY: CPT | Performed by: STUDENT IN AN ORGANIZED HEALTH CARE EDUCATION/TRAINING PROGRAM

## 2022-08-02 PROCEDURE — 82550 ASSAY OF CK (CPK): CPT | Performed by: HOSPITALIST

## 2022-08-02 PROCEDURE — 250N000013 HC RX MED GY IP 250 OP 250 PS 637: Performed by: HOSPITALIST

## 2022-08-02 PROCEDURE — 258N000003 HC RX IP 258 OP 636: Performed by: HOSPITALIST

## 2022-08-02 PROCEDURE — 120N000001 HC R&B MED SURG/OB

## 2022-08-02 PROCEDURE — 82248 BILIRUBIN DIRECT: CPT | Performed by: STUDENT IN AN ORGANIZED HEALTH CARE EDUCATION/TRAINING PROGRAM

## 2022-08-02 PROCEDURE — 85027 COMPLETE CBC AUTOMATED: CPT | Performed by: HOSPITALIST

## 2022-08-02 PROCEDURE — 84484 ASSAY OF TROPONIN QUANT: CPT | Performed by: HOSPITALIST

## 2022-08-02 PROCEDURE — 86709 HEPATITIS A IGM ANTIBODY: CPT | Performed by: STUDENT IN AN ORGANIZED HEALTH CARE EDUCATION/TRAINING PROGRAM

## 2022-08-02 PROCEDURE — 87389 HIV-1 AG W/HIV-1&-2 AB AG IA: CPT | Performed by: STUDENT IN AN ORGANIZED HEALTH CARE EDUCATION/TRAINING PROGRAM

## 2022-08-02 PROCEDURE — 82248 BILIRUBIN DIRECT: CPT | Performed by: HOSPITALIST

## 2022-08-02 PROCEDURE — 36415 COLL VENOUS BLD VENIPUNCTURE: CPT | Performed by: STUDENT IN AN ORGANIZED HEALTH CARE EDUCATION/TRAINING PROGRAM

## 2022-08-02 PROCEDURE — 84450 TRANSFERASE (AST) (SGOT): CPT | Performed by: STUDENT IN AN ORGANIZED HEALTH CARE EDUCATION/TRAINING PROGRAM

## 2022-08-02 PROCEDURE — 82550 ASSAY OF CK (CPK): CPT | Performed by: STUDENT IN AN ORGANIZED HEALTH CARE EDUCATION/TRAINING PROGRAM

## 2022-08-02 PROCEDURE — 84443 ASSAY THYROID STIM HORMONE: CPT | Performed by: STUDENT IN AN ORGANIZED HEALTH CARE EDUCATION/TRAINING PROGRAM

## 2022-08-02 RX ORDER — METOPROLOL TARTRATE 1 MG/ML
2.5 INJECTION, SOLUTION INTRAVENOUS EVERY 4 HOURS PRN
Status: DISCONTINUED | OUTPATIENT
Start: 2022-08-02 | End: 2022-08-04 | Stop reason: HOSPADM

## 2022-08-02 RX ADMIN — SODIUM CHLORIDE: 9 INJECTION, SOLUTION INTRAVENOUS at 21:45

## 2022-08-02 RX ADMIN — MULTIPLE VITAMINS W/ MINERALS TAB 1 TABLET: TAB at 10:15

## 2022-08-02 RX ADMIN — Medication 5 MG: at 23:03

## 2022-08-02 RX ADMIN — SERTRALINE HYDROCHLORIDE 150 MG: 100 TABLET ORAL at 10:15

## 2022-08-02 RX ADMIN — LEVOTHYROXINE SODIUM 175 MCG: 125 TABLET ORAL at 06:36

## 2022-08-02 RX ADMIN — THIAMINE HCL TAB 100 MG 100 MG: 100 TAB at 10:15

## 2022-08-02 RX ADMIN — SODIUM CHLORIDE: 9 INJECTION, SOLUTION INTRAVENOUS at 13:18

## 2022-08-02 RX ADMIN — Medication 1 MG: at 10:15

## 2022-08-02 RX ADMIN — SODIUM CHLORIDE: 9 INJECTION, SOLUTION INTRAVENOUS at 05:01

## 2022-08-02 ASSESSMENT — ACTIVITIES OF DAILY LIVING (ADL)
ADLS_ACUITY_SCORE: 22

## 2022-08-02 NOTE — PLAN OF CARE
Goal Outcome Evaluation:      Patient admitted to room 350 at 2030. Denied pain at rest, no nausea. Up to BR with SBA. IV fluid running at 125 ml. Regular diet. Tele SR. Continue monitor lab.

## 2022-08-02 NOTE — PLAN OF CARE
VSS on RA, A/OX4, denies pain, LS clear, NS infusing at 125 mL/hr, SR w/ inverted T waves on tele, pt has had 4 BM's since 0700 today, discharge possible in 1-2 days.

## 2022-08-02 NOTE — PROGRESS NOTES
[facility]  Hospitalist Progress Note  Benigno Da Silva DO 08/02/22       Reason for Stay (Diagnosis): diarrhea, red urine         Assessment and Plan:      Summary of Stay: Angus Wynne is a 62 year old male with past medical history significant for GERD, hypertension, DL D, alcohol use/abuse admitted on 8/1/2022 with red urine.    ED work-up notable for creatinine of 2.8 with baseline normal, elevated LFTs, elevated troponin, elevated CK.  He was hypotensive on presentation and therefore received aggressive IV fluid resuscitation.  CT abdomen pelvis completed which did not show any evidence for urinary obstruction but did show findings consistent with hepatic steatosis.  He was admitted for aggressive IV fluid resuscitation and further work-up.    Problem List/Assessment and Plan:   Myoglobinuria  Acute rhabdomyolysis:  Presents with concerns for hematuria.  Urinalysis with large blood but no RBCs consistent with myoglobinuria.  CK elevated greater than 16,000.  CHAS present.  Etiology for rhabdomyolysis remains unclear.  The patient recently had a fall last week complicated by a left ankle fracture.  However, he was able to stand immediately following this and was not down for prolonged period of time.  He does endorse some proximal muscle weakness, however this is not obvious as he is still able to stand up and ambulate without significant difficulties.  He has no tenderness to palpation in any muscle groups that I can find making me less concerned for an obvious myositis or compartment syndrome.  He does take a statin and drink alcohol putting him at a high risk for statin mediated  myopathy.  He does have a history of hypothyroidism and is on Synthroid and therefore hypothyroid-mediated myopathy is also possible.  -Aggressive IV fluid resuscitation with NS at 125 cc/h  -Trend CK  -Trend BMP  -TSH with reflex T4  -Holding home nephrotoxins, statin  -PT evaluation to assess for any proximal muscle  weakness  -No focal muscle abnormalities at this time and therefore I do not think imaging is indicated    Acute kidney injury:  CHAS is likely related to the above rhabdomyolysis.  There may also be a component of dehydration in the setting of alcohol use, diarrhea.  -Aggressive IV fluid resuscitation as above in the setting of rhabdomyolysis  -Trend BMP, avoid nephrotoxins    Transaminitis:  AST 1100,  on a hemolyzed lab on presentation.  Repeat remains elevated with an AST of 745, and ALT of 290.  With such elevated LFTs, differential is narrow and includes ischemic hepatitis, viral hepatitis, or drug induced hepatitis.  Given presentation with blood pressure into 70s systolic, I suspect liver injury primary ischemia related with possible component of alcohol/rhabdo contributing.   -GI consultation  -Trend LFTs   -Obtain viral studies    Elevated troponin:  Suspect that troponin elevation is related to rhabdomyolysis.  Patient denies chest pain.  EKG without ischemia.    Hypotension, resolved:  Presents with hypotension with systolics in the 70s.  Likely related to alcohol use, poor p.o. intake, significant dehydration.  This resolved with aggressive fluid resuscitation.  -Monitor p.o. intake  -Ongoing aggressive IV fluid resuscitation in the setting of rhabdomyolysis above    Alcohol use disorder:  Patient admits that he has an alcohol use disorder.  He reports that he has been drinking heavily a significant portion of his life.  He reports that his last drink was 10 days ago.  -Declined CD counseling consultation    Recent foot fracture:  He is supposed to be in a boot.  Boot to be brought in by family.      Obesity: Complicates cares    GERD: Does not appear to be on medications for this PTA  Hypertension: Holding antihypertensives at this time given presentation of hypotension  Hyperlipidemia: Holding PTA statin given rhabdomyolysis as above      DVT Prophylaxis: Pneumatic Compression Devices  Code  Status: Full Code  Discharge Dispo/Date: Anticipate discharge in 1 to 2 days pending improvement in acute kidney injury, liver function tests.        Interval History (Subjective):      The patient is feeling okay today.  He denies any new symptoms.  He does endorse ongoing diarrhea overnight.  He denies any significant muscle aches or pains at this time.  He does endorse chronic back pain is ongoing.  We had a long discussion about his alcohol use, his chronic pain.  He also does endorse some weakness when getting up and walking around but is able to do so without significant difficulty.                  Physical Exam:      Last Vital Signs:  /68 (BP Location: Right arm)   Pulse 78   Temp 98  F (36.7  C) (Oral)   Resp 20   Wt 114.1 kg (251 lb 9.6 oz)   SpO2 100%   BMI 34.12 kg/m        Intake/Output Summary (Last 24 hours) at 8/2/2022 1333  Last data filed at 8/2/2022 1016  Gross per 24 hour   Intake 3290 ml   Output --   Net 3290 ml       General: Alert, awake, no acute distress.  HEENT: NC/AT, eyes anicteric, external occular movements intact, face symmetric.   Cardiac: RRR, S1, S2.  No murmurs appreciated.  Pulmonary: Normal work of breathing.  Lungs CTAB.  Abdomen: Obese, soft, non-tender, non-distended.  Bowel sounds present.  No guarding.  Extremities: no deformities.  Warm, well perfused.  There is no tenderness to palpation in any large muscle groups.  His left ankle is mildly tender to palpation but not significantly so and without obvious deformities.  Skin: no rashes or lesions noted.  Warm and dry.  Neuro: No gross deficits noted.  Speech clear.    Psych: Appropriate affect.         Medications:      All current medications were reviewed with changes reflected in problem list.         Data:      All new lab and imaging data was reviewed.   Labs:       Lab Results   Component Value Date     08/02/2022     08/01/2022     12/29/2021     11/18/2020     07/30/2019      02/04/2019    Lab Results   Component Value Date    CHLORIDE 101 08/02/2022    CHLORIDE 91 08/01/2022    CHLORIDE 104 12/29/2021    CHLORIDE 103 09/08/2021    CHLORIDE 103 11/18/2020    CHLORIDE 106 07/30/2019    CHLORIDE 106 02/04/2019    Lab Results   Component Value Date    BUN 43.2 08/02/2022    BUN 46.2 08/01/2022    BUN 12 12/29/2021    BUN 9 09/08/2021    BUN 12 11/18/2020    BUN 11 07/30/2019    BUN 10 02/04/2019      Lab Results   Component Value Date    POTASSIUM 4.4 08/02/2022    POTASSIUM 3.8 08/01/2022    POTASSIUM 4.4 12/29/2021    POTASSIUM 4.0 09/08/2021    POTASSIUM 4.2 11/18/2020    POTASSIUM 4.4 07/30/2019    POTASSIUM 4.7 02/04/2019    Lab Results   Component Value Date    CO2 22 08/02/2022    CO2 22 08/01/2022    CO2 27 12/29/2021    CO2 23 09/08/2021    CO2 26 11/18/2020    CO2 25 07/30/2019    CO2 27 02/04/2019    Lab Results   Component Value Date    CR 2.27 08/02/2022    CR 2.88 08/01/2022    CR 3.1 08/01/2022    CR 0.86 12/29/2021    CR 0.87 11/18/2020    CR 0.91 07/30/2019    CR 1.02 02/04/2019        Recent Labs   Lab 08/02/22  0837   WBC 3.8*   HGB 11.5*   HCT 34.9*   MCV 96   PLT 99*      Imaging:   Recent Results (from the past 24 hour(s))   POC US ABDOMEN LIMITED    Impression    PROCEDURE NOTE: ED BEDSIDE LIMITED ULTRASOUND  Name of the study: E-FAST Exam  Performed by: Amos Richey MD  Indications:Blunt Trauma   Body Location / Organs Imaged: Four quadrants (perihepatic, perisplenic, pelvic, pericardial) of the abdomen were scanned; the exam was continued to the chest using the linear probe, where the lungs were evaluated.  Findings: Three quadrants (perihepatic, perisplenic, pelvic) were negative for free fluid.  Interpretation: No evidence of acute hemoperitoneum.  Archiving of images: Images were saved digitally to the internal hard drive of the ultrasound machine/PACS.     CT Chest Abdomen Pelvis w/o Contrast    Narrative    CT CHEST ABDOMEN PELVIS W/O CONTRAST 8/1/2022  3:35 PM    CLINICAL HISTORY: fall, hematuria, hypotension    TECHNIQUE: CT scan of the chest, abdomen, and pelvis was performed  without IV contrast. Multiplanar reformats were obtained. Dose  reduction techniques were used.   CONTRAST: None.    COMPARISON: Coronary CTA on 7/31/2019, CT of the thoracic and lumbar  spine on 2/3/2019    FINDINGS:   LUNGS AND PLEURA: No infiltrate, pleural effusion or pneumothorax. Few  small pulmonary nodules. For example, there is a 3 mm right apical  nodule (series 3, image 33), 3 mm left upper lobe nodule (series 3,  image 42), and 3 mm left upper lobe nodule (image 62). These were  outside the field-of-view of the prior coronary CTA. Calcified  granuloma right upper lobe.    MEDIASTINUM/AXILLAE: No lymphadenopathy. No thoracic aortic aneurysm.  No pericardial effusion. Moderate coronary artery calcifications.    HEPATOBILIARY: Hepatic steatosis. No focal lesions of the liver  evident on noncontrast CT. No calcified gallstones or biliary ductal  dilatation.    PANCREAS: Normal.    SPLEEN: Normal.    ADRENAL GLANDS: Normal.    KIDNEYS/BLADDER: No calculi, hydronephrosis, perinephric stranding or  contour deforming masses in either kidney evident on noncontrast CT.  No urinary bladder calculi.    BOWEL: Normal with no obstruction or acute inflammatory change.  Nothing for appendicitis.    LYMPH NODES: Normal.    VASCULATURE: Mild aortobiiliac atherosclerotic calcifications. No  abdominal aortic aneurysm.    PELVIC ORGANS: Mild intraprostatic calcifications.    OTHER: No free fluid, fluid collections or extraluminal air.    MUSCULOSKELETAL: Chronic wedge compression fracture of T9 is stable  since 7/31/2019. No acute fractures. No suspicious lesions in the  bones.      Impression    IMPRESSION:  1.  No acute findings or traumatic injury in the chest, abdomen and  pelvis.  2.  Few small pulmonary nodules measuring up to 3 mm. See follow-up  guidelines.  3.  Hepatic  steatosis.    REFERENCE:  Guidelines for Management of Incidental Pulmonary Nodules Detected on  CT Images: From the Fleischner Society 2017.   Guidelines apply to incidental nodules in patients who are 35 years or  older.  Guidelines do not apply to lung cancer screening, patients with  immunosuppression, or patients with known primary cancer.    MULTIPLE NODULES  Nodule size <6 mm  Low-risk patients: No follow-up needed.  High-risk patients: Optional follow-up at 12 months.    Consider referral to lung nodule clinic.    FE WASSERMAN MD         SYSTEM ID:  [id number]     Benigno Da Silva DO

## 2022-08-02 NOTE — PROVIDER NOTIFICATION
MD Da Silva notified  lab called for critical, Trop-100 up from 98, pt does have chest pressure but only with movement

## 2022-08-02 NOTE — PLAN OF CARE
CK level back at >16k. Cont aggressive IV hydration, increase fluids from 125 to 150ml/hr. BP improved. Recheck CK and BMP in am.

## 2022-08-02 NOTE — TELEPHONE ENCOUNTER
Per chart review pt went to ED and admitted to hospital yesterday 8/1/22.     Elina GARCIA RN  [facility]

## 2022-08-02 NOTE — PLAN OF CARE
Goal Outcome Evaluation:      Tele SR. VSS. On 2L O2 with NC. SOB when up to bathroom. Multiple episodes of diarrhea overnight. For now going to monitor for additional symptoms, per MD. Denies pain. Fluids infusing. Continue POC.

## 2022-08-02 NOTE — CONSULTS
[facility] - Gastroenterology Consultation    Consultation Assessment/Plan:    1.) Elevated LFTs:  - significantly elevated LFTs with normal INR, normal mental status suggestive of acute process, and normal INR which is reassuring and speaks against hepatic dysfunction  - association with significantly elevated CK suggest rhabdomyolysis as cause, possibly statin related vs other myopathy  - will check US with dopplers to exclude hepatic thrombosis  - Hep A/B/C studies pending  - transminases and CK are improving simultaneously at this point with current management, continue to follow        Rory Zamudio MD    Office: [phone]    ---------------------------------------------------------------------------------------------------------------------------  Patient Name: Angus Wynne      YOB: 1960 (Age: 62 year old)   Medical Record #: [id number]       Primary Physician: Fifi Fleming   Referring Provider: Memo Chavez DO   Admit Date/Time: 8/1/2022  2:05 PM       I was asked to see this patient by Memo Chavez DO for evaluation of elevated LFTs.    History of Present Illness:  61 y/o with hx EtOH abuse, HTN, hyperlipidemia, GERD and obesity who presents with hematuria and is subsequently found to have evidence of rhabdomyolysis (CK>16K), CHAS (Cr=2.88) and elevated transaminases (AST=1,157, ALT=422).  CT abdomen demonstrates hepatic steatosis but otherwise no evidence of cirrhosis.  He mentions mild muscle weakness and fall 1 week ago with ankle fracture.  He takes Crestor (rosuvastatin) at home.  He has a history of mildly elevated LFTs which was felt to be related to EtOH and/or fatty liver disease based on prior work-up at [facility] (2017).  Viral hepatitis studies pending.  Acetaminophen level low.    Past Medical History:  Past Medical History:   Diagnosis Date     GERD (gastroesophageal reflux disease)      HTN (hypertension)      Hyperlipidemia      Hypothyroid       Vertigo      ,      Past Surgical History:   Procedure Laterality Date     AS ESOPHAGOSCOPY, DIAGNOSTIC       COLONOSCOPY       EXAM UNDER ANESTHESIA ANUS N/A 2021    Procedure: EXAM UNDER ANESTHESIA, ANUS, ANAL BISOPY, BOTOX INJECTION;  Surgeon: Henrik Garduno MD;  Location: [facility] OR     SPHINCTEROTOMY RECTUM N/A 2021    Procedure: Anal sphincterotomy and hemorroidectomy;  Surgeon: Rajendra Armando MD;  Location: [facility] OR       Review of Systems: A comprehensive review of systems was negative except for items noted in HPI/Subjective.    Current Medications:  No current outpatient medications on file.       Allergies/Sensitivities: No Known Allergies       Social History:   Social History     Socioeconomic History     Marital status:      Spouse name: Not on file     Number of children: Not on file     Years of education: Not on file     Highest education level: Not on file   Occupational History     Not on file   Tobacco Use     Smoking status: Former Smoker     Quit date: 2015     Years since quittin.8     Smokeless tobacco: Never Used   Substance and Sexual Activity     Alcohol use: No     Alcohol/week: 0.0 standard drinks     Comment: 2-3 drinks a week     Drug use: No     Sexual activity: Yes   Other Topics Concern     Parent/sibling w/ CABG, MI or angioplasty before 65F 55M? Not Asked   Social History Narrative    , one child, non smoker - quit 3 months  ago , alcohol none now( but had used excessively previously )      Social Determinants of Health     Financial Resource Strain: Not on file   Food Insecurity: Not on file   Transportation Needs: Not on file   Physical Activity: Not on file   Stress: Not on file   Social Connections: Not on file   Intimate Partner Violence: Not on file   Housing Stability: Not on file     Family History:   Family History   Problem Relation Age of Onset     Coronary Artery Disease Father         at age 60      Hyperlipidemia Brother       Cancer Brother         wall of scrotum     Breast Cancer Mother      Diabetes No family hx of      Cerebrovascular Disease No family hx of      Colon Cancer No family hx of      Prostate Cancer No family hx of        Physical Exam:  /68 (BP Location: Right arm)   Pulse 78   Temp 98  F (36.7  C) (Oral)   Resp 20   Wt 114.1 kg (251 lb 9.6 oz)   SpO2 100%   BMI 34.12 kg/m     General Appearance: Comfortable, laying in bed  Eyes: Normal  HEENT: Normal  Neck: Normal, no palpable lymph nodes  Respiratory: Normal  Cardiovascular: Normal  Gastrointestinal: Normal bowel sounds  Musculoskeletal: Normal  Lymphatic: Normal  Skin: Normal  Neurologic: Normal     Labs/Imaging:  Recent Labs   Lab Test 08/02/22  0837 08/01/22  1431 08/01/22  1427 09/08/21  1211 02/03/19  1810 02/03/19  0611 09/18/17  1304 02/13/17  1636   WBC 3.8*  --  6.2 6.4 6.3 4.6 4.6 5.2   HGB 11.5*  --  13.9 14.3 14.2 15.1 16.0 15.7   MCV 96  --  92 90 94 94 94 90   PLT 99*  --  149* 149* 103* 116* 158 165   INR  --  1.08  --   --   --   --   --  0.95     No lab results found.    Invalid input(s): FERRITIN  Recent Labs   Lab Test 08/02/22  1224 08/02/22  0001 08/01/22  1427 12/29/21  1037 09/08/21  1211 11/18/20  1054 07/30/19  1431   POTASSIUM 4.2 4.4 3.8 4.4 4.0 4.2 4.4   CHLORIDE 103 101 91* 104 103 103 106   BUN 36.9* 43.2* 46.2* 12 9 12 11     Recent Labs   Lab Test 08/02/22  0001 08/01/22  1508 08/01/22  1427 12/29/21  1037 09/08/21  1211 11/18/20  1054 07/30/19  1431 12/13/18  1504   PROTEIN  --  10 *  --   --   --   --   --   --    ALBUMIN 3.2*  --  4.0 3.9 3.4 4.0 4.5 4.4   BILITOTAL 1.1  --  1.9* 0.5 0.6 0.4 0.5 0.8   *  --  1,157* 34 41 98* 56* 43   *  --  422* 39 51 74* 76* 51

## 2022-08-03 LAB
ALBUMIN SERPL BCG-MCNC: 3.3 G/DL (ref 3.5–5.2)
ALP SERPL-CCNC: 83 U/L (ref 40–129)
ALT SERPL W P-5'-P-CCNC: 233 U/L (ref 10–50)
ANION GAP SERPL CALCULATED.3IONS-SCNC: 7 MMOL/L (ref 7–15)
AST SERPL W P-5'-P-CCNC: 413 U/L (ref 10–50)
BASOPHILS # BLD AUTO: 0 10E3/UL (ref 0–0.2)
BASOPHILS NFR BLD AUTO: 1 %
BILIRUB SERPL-MCNC: 0.8 MG/DL
BUN SERPL-MCNC: 28.6 MG/DL (ref 8–23)
C COLI+JEJUNI+LARI FUSA STL QL NAA+PROBE: NOT DETECTED
C DIFF TOX B STL QL: NEGATIVE
CALCIUM SERPL-MCNC: 8.4 MG/DL (ref 8.8–10.2)
CHLORIDE SERPL-SCNC: 106 MMOL/L (ref 98–107)
CK SERPL-CCNC: 3738 U/L (ref 39–308)
CREAT SERPL-MCNC: 1.13 MG/DL (ref 0.67–1.17)
DEPRECATED HCO3 PLAS-SCNC: 20 MMOL/L (ref 22–29)
EC STX1 GENE STL QL NAA+PROBE: NOT DETECTED
EC STX2 GENE STL QL NAA+PROBE: NOT DETECTED
EOSINOPHIL # BLD AUTO: 0.1 10E3/UL (ref 0–0.7)
EOSINOPHIL NFR BLD AUTO: 3 %
ERYTHROCYTE [DISTWIDTH] IN BLOOD BY AUTOMATED COUNT: 13.1 % (ref 10–15)
ERYTHROCYTE [DISTWIDTH] IN BLOOD BY AUTOMATED COUNT: 13.2 % (ref 10–15)
GFR SERPL CREATININE-BSD FRML MDRD: 73 ML/MIN/1.73M2
GLUCOSE SERPL-MCNC: 97 MG/DL (ref 70–99)
HAV IGM SERPL QL IA: NONREACTIVE
HBV SURFACE AB SERPL IA-ACNC: 2.21 M[IU]/ML
HBV SURFACE AG SERPL QL IA: NONREACTIVE
HCT VFR BLD AUTO: 32.8 % (ref 40–53)
HCT VFR BLD AUTO: 34.9 % (ref 40–53)
HCV AB SERPL QL IA: NONREACTIVE
HGB BLD-MCNC: 10.6 G/DL (ref 13.3–17.7)
HGB BLD-MCNC: 11.3 G/DL (ref 13.3–17.7)
HIV 1+2 AB+HIV1 P24 AG SERPL QL IA: NONREACTIVE
IMM GRANULOCYTES # BLD: 0 10E3/UL
IMM GRANULOCYTES NFR BLD: 1 %
LYMPHOCYTES # BLD AUTO: 0.9 10E3/UL (ref 0.8–5.3)
LYMPHOCYTES NFR BLD AUTO: 22 %
MAGNESIUM SERPL-MCNC: 2 MG/DL (ref 1.7–2.3)
MCH RBC QN AUTO: 31.5 PG (ref 26.5–33)
MCH RBC QN AUTO: 31.7 PG (ref 26.5–33)
MCHC RBC AUTO-ENTMCNC: 32.3 G/DL (ref 31.5–36.5)
MCHC RBC AUTO-ENTMCNC: 32.4 G/DL (ref 31.5–36.5)
MCV RBC AUTO: 98 FL (ref 78–100)
MCV RBC AUTO: 98 FL (ref 78–100)
MONOCYTES # BLD AUTO: 0.4 10E3/UL (ref 0–1.3)
MONOCYTES NFR BLD AUTO: 9 %
NEUTROPHILS # BLD AUTO: 2.7 10E3/UL (ref 1.6–8.3)
NEUTROPHILS NFR BLD AUTO: 64 %
NOROV GI+II ORF1-ORF2 JNC STL QL NAA+PR: NOT DETECTED
NRBC # BLD AUTO: 0 10E3/UL
NRBC BLD AUTO-RTO: 0 /100
PATH REPORT.COMMENTS IMP SPEC: NORMAL
PATH REPORT.COMMENTS IMP SPEC: NORMAL
PATH REPORT.FINAL DX SPEC: NORMAL
PATH REPORT.MICROSCOPIC SPEC OTHER STN: NORMAL
PATH REPORT.MICROSCOPIC SPEC OTHER STN: NORMAL
PATH REPORT.RELEVANT HX SPEC: NORMAL
PHOSPHATE SERPL-MCNC: 3.2 MG/DL (ref 2.5–4.5)
PLATELET # BLD AUTO: 75 10E3/UL (ref 150–450)
PLATELET # BLD AUTO: 85 10E3/UL (ref 150–450)
POTASSIUM SERPL-SCNC: 4.6 MMOL/L (ref 3.4–5.3)
PROT SERPL-MCNC: 6.1 G/DL (ref 6.4–8.3)
RBC # BLD AUTO: 3.36 10E6/UL (ref 4.4–5.9)
RBC # BLD AUTO: 3.56 10E6/UL (ref 4.4–5.9)
RETICS # AUTO: 0.09 10E6/UL (ref 0.03–0.1)
RETICS/RBC NFR AUTO: 2.5 % (ref 0.5–2)
RVA NSP5 STL QL NAA+PROBE: NOT DETECTED
SALMONELLA SP RPOD STL QL NAA+PROBE: NOT DETECTED
SHIGELLA SP+EIEC IPAH STL QL NAA+PROBE: NOT DETECTED
SODIUM SERPL-SCNC: 133 MMOL/L (ref 136–145)
V CHOL+PARA RFBL+TRKH+TNAA STL QL NAA+PR: NOT DETECTED
WBC # BLD AUTO: 3.8 10E3/UL (ref 4–11)
WBC # BLD AUTO: 4.2 10E3/UL (ref 4–11)
Y ENTERO RECN STL QL NAA+PROBE: NOT DETECTED

## 2022-08-03 PROCEDURE — 85060 BLOOD SMEAR INTERPRETATION: CPT | Performed by: PATHOLOGY

## 2022-08-03 PROCEDURE — 83735 ASSAY OF MAGNESIUM: CPT | Performed by: INTERNAL MEDICINE

## 2022-08-03 PROCEDURE — 87506 IADNA-DNA/RNA PROBE TQ 6-11: CPT | Performed by: PHYSICIAN ASSISTANT

## 2022-08-03 PROCEDURE — 82040 ASSAY OF SERUM ALBUMIN: CPT | Performed by: STUDENT IN AN ORGANIZED HEALTH CARE EDUCATION/TRAINING PROGRAM

## 2022-08-03 PROCEDURE — 87493 C DIFF AMPLIFIED PROBE: CPT | Performed by: PHYSICIAN ASSISTANT

## 2022-08-03 PROCEDURE — 85027 COMPLETE CBC AUTOMATED: CPT | Performed by: STUDENT IN AN ORGANIZED HEALTH CARE EDUCATION/TRAINING PROGRAM

## 2022-08-03 PROCEDURE — 82550 ASSAY OF CK (CPK): CPT | Performed by: STUDENT IN AN ORGANIZED HEALTH CARE EDUCATION/TRAINING PROGRAM

## 2022-08-03 PROCEDURE — 85014 HEMATOCRIT: CPT | Performed by: STUDENT IN AN ORGANIZED HEALTH CARE EDUCATION/TRAINING PROGRAM

## 2022-08-03 PROCEDURE — 84100 ASSAY OF PHOSPHORUS: CPT | Performed by: INTERNAL MEDICINE

## 2022-08-03 PROCEDURE — 250N000013 HC RX MED GY IP 250 OP 250 PS 637: Performed by: HOSPITALIST

## 2022-08-03 PROCEDURE — 250N000013 HC RX MED GY IP 250 OP 250 PS 637: Performed by: STUDENT IN AN ORGANIZED HEALTH CARE EDUCATION/TRAINING PROGRAM

## 2022-08-03 PROCEDURE — 36415 COLL VENOUS BLD VENIPUNCTURE: CPT | Performed by: STUDENT IN AN ORGANIZED HEALTH CARE EDUCATION/TRAINING PROGRAM

## 2022-08-03 PROCEDURE — 85045 AUTOMATED RETICULOCYTE COUNT: CPT | Performed by: STUDENT IN AN ORGANIZED HEALTH CARE EDUCATION/TRAINING PROGRAM

## 2022-08-03 PROCEDURE — 120N000001 HC R&B MED SURG/OB

## 2022-08-03 PROCEDURE — 80053 COMPREHEN METABOLIC PANEL: CPT | Performed by: STUDENT IN AN ORGANIZED HEALTH CARE EDUCATION/TRAINING PROGRAM

## 2022-08-03 PROCEDURE — 258N000003 HC RX IP 258 OP 636: Performed by: HOSPITALIST

## 2022-08-03 PROCEDURE — 99233 SBSQ HOSP IP/OBS HIGH 50: CPT | Performed by: STUDENT IN AN ORGANIZED HEALTH CARE EDUCATION/TRAINING PROGRAM

## 2022-08-03 RX ORDER — MECLIZINE HCL 12.5 MG 12.5 MG/1
12.5 TABLET ORAL 3 TIMES DAILY PRN
Status: DISCONTINUED | OUTPATIENT
Start: 2022-08-03 | End: 2022-08-04 | Stop reason: HOSPADM

## 2022-08-03 RX ORDER — TRAZODONE HYDROCHLORIDE 50 MG/1
50 TABLET, FILM COATED ORAL AT BEDTIME
Status: DISCONTINUED | OUTPATIENT
Start: 2022-08-03 | End: 2022-08-04 | Stop reason: HOSPADM

## 2022-08-03 RX ORDER — LOPERAMIDE HCL 2 MG
2 CAPSULE ORAL 4 TIMES DAILY PRN
Status: DISCONTINUED | OUTPATIENT
Start: 2022-08-03 | End: 2022-08-04 | Stop reason: HOSPADM

## 2022-08-03 RX ADMIN — SERTRALINE HYDROCHLORIDE 150 MG: 100 TABLET ORAL at 09:28

## 2022-08-03 RX ADMIN — MULTIPLE VITAMINS W/ MINERALS TAB 1 TABLET: TAB at 09:28

## 2022-08-03 RX ADMIN — MECLIZINE 12.5 MG: 12.5 TABLET ORAL at 13:58

## 2022-08-03 RX ADMIN — SODIUM CHLORIDE: 9 INJECTION, SOLUTION INTRAVENOUS at 05:46

## 2022-08-03 RX ADMIN — LOPERAMIDE HYDROCHLORIDE 2 MG: 2 CAPSULE ORAL at 17:52

## 2022-08-03 RX ADMIN — TRAZODONE HYDROCHLORIDE 50 MG: 50 TABLET ORAL at 21:40

## 2022-08-03 RX ADMIN — THIAMINE HCL TAB 100 MG 100 MG: 100 TAB at 09:28

## 2022-08-03 RX ADMIN — LEVOTHYROXINE SODIUM 175 MCG: 125 TABLET ORAL at 05:42

## 2022-08-03 RX ADMIN — Medication 1 MG: at 09:28

## 2022-08-03 ASSESSMENT — ACTIVITIES OF DAILY LIVING (ADL)
ADLS_ACUITY_SCORE: 22
ADLS_ACUITY_SCORE: 20
ADLS_ACUITY_SCORE: 20
ADLS_ACUITY_SCORE: 22
ADLS_ACUITY_SCORE: 20
ADLS_ACUITY_SCORE: 22
ADLS_ACUITY_SCORE: 20
ADLS_ACUITY_SCORE: 22
ADLS_ACUITY_SCORE: 20

## 2022-08-03 NOTE — PROVIDER NOTIFICATION
Provider Notified: FLACO received a call from [employer] that pt had 17 beats of PSVT. Pt asymptomatic and in bed sleeping. Please advise as needed. Thank you.

## 2022-08-03 NOTE — PROGRESS NOTES
I was called for a 27 beat run of PSVT.  Patient was asymptomatic and hemodynamically stable.  I ordered as needed IV metoprolol.  I ordered BMP, magnesium, and phosphorus for morning.  Continue to monitor on telemetry for now.

## 2022-08-03 NOTE — PROGRESS NOTES
GASTROENTEROLOGY PROGRESS NOTE        SUBJECTIVE:  No abdominal pain, nausea/ vomiting or fever. Reports frequent diarrhea. Per documented I and Os about 5 episodes yesterday.      OBJECTIVE:    BP (!) 153/83 (BP Location: Left arm)   Pulse 82   Temp 98.2  F (36.8  C) (Oral)   Resp 20   Wt 114.1 kg (251 lb 9.6 oz)   SpO2 100%   BMI 34.12 kg/m    Temp (24hrs), Av.1  F (36.7  C), Min:98  F (36.7  C), Max:98.2  F (36.8  C)    Patient Vitals for the past 72 hrs:   Weight   22 114.1 kg (251 lb 9.6 oz)       Intake/Output Summary (Last 24 hours) at 8/3/2022 1026  Last data filed at 2022 1850  Gross per 24 hour   Intake 700 ml   Output --   Net 700 ml        PHYSICAL EXAM     Constitutional: NAD    Abdomen: soft, NTTP          Additional Comments:  ROS, FH, SH: See initial GI consult for details.     I have reviewed the patient's new clinical lab results:     Recent Labs   Lab Test 22  0908 22  0735 22  0837 22  1431 17  1304 17  1636   WBC 4.2 3.8* 3.8*  --    < > 5.2   HGB 11.3* 10.6* 11.5*  --    < > 15.7   MCV 98 98 96  --    < > 90   PLT 85* 75* 99*  --    < > 165   INR  --   --   --  1.08  --  0.95    < > = values in this interval not displayed.     Recent Labs   Lab Test 22  0735 22  1224 22  0001   POTASSIUM 4.6 4.2 4.4   CHLORIDE 106 103 101   CO2    BUN 28.6* 36.9* 43.2*   ANIONGAP 7 8 8     Recent Labs   Lab Test 22  0735 22  1224 22  0001 22  1508   ALBUMIN 3.3* 3.3* 3.2*  --    BILITOTAL 0.8 0.9 1.1  --    * 276* 290*  --    * 614* 745*  --    PROTEIN  --   --   --  10 *       ASSESSMENT/ PLAN  Angus Wynne is a 63 yo male with hx EtOH abuse, HTN, hyperlipidemia, GERD and obesity who presented with hematuria found to have evidence of rhabdomyolysis, CHAS, and elevated transaminases.    1. Acute hepatitis: On presentation significantly elevated LFTs with normal INR and mental status.  Etiology thought secondary to rhabdomyolysis ( statin or other myopathy ?) vs viral vs drug induced. LFTs continue to improve.  CT a/p on admission with hepatic steatosis ( patient has history of chronic alcohol use). Liver US with dopplers indicating patient hepatic vasculature.   -- Pending hepatitis B, C and A  -- Follow LFTs and INR    2. Diarrhea:  Reports 10-12 episodes yesterday and today, he has struggled with diarrhea for about a months per his report. 5 documented episodes yesterday.   -- Check C.diff and enteric panel.    3. Alcohol abuse / dependency : He reports that he has been drinking heavily a significant portion of his life.  He reports that his last drink was 10 days ago.  -- Declined CD counseling consultation    Discussed with Dr. Robb Lozano PA-C  [facility] ( [facility])

## 2022-08-03 NOTE — PROGRESS NOTES
[facility]  Hospitalist Progress Note  Benigno Da Silva,  08/03/22       Reason for Stay (Diagnosis): diarrhea, red urine         Assessment and Plan:      Summary of Stay: Angus Wynne is a 62 year old male with past medical history significant for GERD, hypertension, DL D, alcohol use/abuse admitted on 8/1/2022 with red urine.    ED work-up notable for creatinine of 2.8 with baseline normal, elevated LFTs, elevated troponin, elevated CK.  He was hypotensive on presentation and therefore received aggressive IV fluid resuscitation.  CT abdomen pelvis completed which did not show any evidence for urinary obstruction but did show findings consistent with hepatic steatosis.  He was admitted for aggressive IV fluid resuscitation and further work-up.    Problem List/Assessment and Plan:   Myoglobinuria  Acute rhabdomyolysis:  Presents with concerns for hematuria.  Urinalysis with large blood but no RBCs consistent with myoglobinuria.  CK elevated greater than 16,000.  CHAS present.  Etiology for rhabdomyolysis remains unclear.  The patient recently had a fall last week complicated by a left ankle fracture.  However, he was able to stand immediately following this and was not down for prolonged period of time.  He does endorse some proximal muscle weakness, however this is not obvious as he is still able to stand up and ambulate without significant difficulties.  He has no tenderness to palpation in any muscle groups that I can find making me less concerned for an obvious myositis or compartment syndrome.  He does take a statin and drink alcohol putting him at a high risk for statin mediated  myopathy.  He does have a history of hypothyroidism and is on Synthroid and therefore hypothyroid-mediated myopathy is also possible.  -Will stop fluids now that renal function has resolved and patient tolerating diet  -Trend CK  -Trend BMP  -TSH with reflex T4  -Holding home nephrotoxins, statin  -PT evaluation to  assess for any proximal muscle weakness  -No focal muscle abnormalities at this time and therefore I do not think imaging is indicated    Thrombocytopenia:  Platelet count 75 today. 100s on presentation.  Hx of thrombocytopenia in the past but never this low.  Suspect large component of alcohol related bone marrow suppression and liver injury.   No bleeding, bruising, or obvious petechiae.  Has not received heparin products.  Less likely ITP/TTP but will monitor trend.  -Monitor trend  -Peripheral smear    Transaminitis:  AST 1100,  on a hemolyzed lab on presentation.  Repeat remains elevated with an AST of 745, and ALT of 290.  With such elevated LFTs, differential is narrow and includes ischemic hepatitis, viral hepatitis, or drug induced hepatitis.  Also likely large contribution from rhabdo.  -GI consultation  -Trend LFTs   -Obtain viral studies    Diarrhea:  Presents with diarrhea.  Unclear etiology.  The patient reports that he is having 10-15 bowel movements per day.  He is afebrile.  No leukocytosis.  -R/o cdiff  -GI panel      Chronic/stable/resolved:  Elevated troponin:  Suspect that troponin elevation is related to rhabdomyolysis.  Patient denies chest pain.  EKG without ischemia.    Acute kidney injury, resolved:  CHAS is likely related to the above rhabdomyolysis.  There may also be a component of dehydration in the setting of alcohol use, diarrhea.  -Stop MIVF for now given resolution  -Trend BMP, avoid nephrotoxins    Hypotension, resolved:  Presents with hypotension with systolics in the 70s.  Likely related to alcohol use, poor p.o. intake, significant dehydration.  This resolved with aggressive fluid resuscitation.  -Monitor p.o. intake  -Stop MIVF    Alcohol use disorder:  Patient admits that he has an alcohol use disorder.  He reports that he has been drinking heavily a significant portion of his life.  He reports that his last drink was 10 days ago.  -Declined CD counseling  consultation  -Folate, thiamine    Recent foot fracture:  He is supposed to be in a boot.  Boot to be brought in by family.      Obesity: Complicates cares    GERD: Does not appear to be on medications for this PTA  Hypertension: Holding antihypertensives at this time given presentation of hypotension  Hyperlipidemia: Holding PTA statin given rhabdomyolysis as above      DVT Prophylaxis: Pneumatic Compression Devices  Code Status: Full Code  Discharge Dispo/Date: Anticipate discharge in 1 to 2 days pending improvement in acute kidney injury, liver function tests.        Interval History (Subjective):      Patient feeling okay.  No acute events overnight.  Tolerating diet go to the bathroom okay.  Ongoing diarrhea.                  Physical Exam:      Last Vital Signs:  /84 (BP Location: Left arm)   Pulse 82   Temp 98.2  F (36.8  C) (Oral)   Resp 20   Wt 114.1 kg (251 lb 9.6 oz)   SpO2 100%   BMI 34.12 kg/m        Intake/Output Summary (Last 24 hours) at 8/2/2022 1333  Last data filed at 8/2/2022 1016  Gross per 24 hour   Intake 3290 ml   Output --   Net 3290 ml       General: Alert, awake, no acute distress.  HEENT: NC/AT, eyes anicteric, external occular movements intact, face symmetric.   Cardiac: RRR, S1, S2.  No murmurs appreciated.  Pulmonary: Normal work of breathing.  Lungs CTAB.  Abdomen: Obese, soft, non-tender, non-distended.  Bowel sounds present.  No guarding.  Extremities: no deformities.  Warm, well perfused.    Skin: no rashes or lesions noted.  Warm and dry.  Neuro: No gross deficits noted.  Speech clear.    Psych: Appropriate affect.         Medications:      All current medications were reviewed with changes reflected in problem list.         Data:      All new lab and imaging data was reviewed.   Labs:       Lab Results   Component Value Date     08/02/2022     08/01/2022     12/29/2021     11/18/2020     07/30/2019     02/04/2019    Lab Results    Component Value Date    CHLORIDE 101 08/02/2022    CHLORIDE 91 08/01/2022    CHLORIDE 104 12/29/2021    CHLORIDE 103 09/08/2021    CHLORIDE 103 11/18/2020    CHLORIDE 106 07/30/2019    CHLORIDE 106 02/04/2019    Lab Results   Component Value Date    BUN 43.2 08/02/2022    BUN 46.2 08/01/2022    BUN 12 12/29/2021    BUN 9 09/08/2021    BUN 12 11/18/2020    BUN 11 07/30/2019    BUN 10 02/04/2019      Lab Results   Component Value Date    POTASSIUM 4.4 08/02/2022    POTASSIUM 3.8 08/01/2022    POTASSIUM 4.4 12/29/2021    POTASSIUM 4.0 09/08/2021    POTASSIUM 4.2 11/18/2020    POTASSIUM 4.4 07/30/2019    POTASSIUM 4.7 02/04/2019    Lab Results   Component Value Date    CO2 22 08/02/2022    CO2 22 08/01/2022    CO2 27 12/29/2021    CO2 23 09/08/2021    CO2 26 11/18/2020    CO2 25 07/30/2019    CO2 27 02/04/2019    Lab Results   Component Value Date    CR 2.27 08/02/2022    CR 2.88 08/01/2022    CR 3.1 08/01/2022    CR 0.86 12/29/2021    CR 0.87 11/18/2020    CR 0.91 07/30/2019    CR 1.02 02/04/2019        Recent Labs   Lab 08/03/22  0908   WBC 4.2   HGB 11.3*   HCT 34.9*   MCV 98   PLT 85*      Imaging:   Recent Results (from the past 24 hour(s))   POC US ABDOMEN LIMITED    Impression    PROCEDURE NOTE: ED BEDSIDE LIMITED ULTRASOUND  Name of the study: E-FAST Exam  Performed by: Amos Richey MD  Indications:Blunt Trauma   Body Location / Organs Imaged: Four quadrants (perihepatic, perisplenic, pelvic, pericardial) of the abdomen were scanned; the exam was continued to the chest using the linear probe, where the lungs were evaluated.  Findings: Three quadrants (perihepatic, perisplenic, pelvic) were negative for free fluid.  Interpretation: No evidence of acute hemoperitoneum.  Archiving of images: Images were saved digitally to the internal hard drive of the ultrasound machine/PACS.     CT Chest Abdomen Pelvis w/o Contrast    Narrative    CT CHEST ABDOMEN PELVIS W/O CONTRAST 8/1/2022 3:35 PM    CLINICAL HISTORY: fall,  hematuria, hypotension    TECHNIQUE: CT scan of the chest, abdomen, and pelvis was performed  without IV contrast. Multiplanar reformats were obtained. Dose  reduction techniques were used.   CONTRAST: None.    COMPARISON: Coronary CTA on 7/31/2019, CT of the thoracic and lumbar  spine on 2/3/2019    FINDINGS:   LUNGS AND PLEURA: No infiltrate, pleural effusion or pneumothorax. Few  small pulmonary nodules. For example, there is a 3 mm right apical  nodule (series 3, image 33), 3 mm left upper lobe nodule (series 3,  image 42), and 3 mm left upper lobe nodule (image 62). These were  outside the field-of-view of the prior coronary CTA. Calcified  granuloma right upper lobe.    MEDIASTINUM/AXILLAE: No lymphadenopathy. No thoracic aortic aneurysm.  No pericardial effusion. Moderate coronary artery calcifications.    HEPATOBILIARY: Hepatic steatosis. No focal lesions of the liver  evident on noncontrast CT. No calcified gallstones or biliary ductal  dilatation.    PANCREAS: Normal.    SPLEEN: Normal.    ADRENAL GLANDS: Normal.    KIDNEYS/BLADDER: No calculi, hydronephrosis, perinephric stranding or  contour deforming masses in either kidney evident on noncontrast CT.  No urinary bladder calculi.    BOWEL: Normal with no obstruction or acute inflammatory change.  Nothing for appendicitis.    LYMPH NODES: Normal.    VASCULATURE: Mild aortobiiliac atherosclerotic calcifications. No  abdominal aortic aneurysm.    PELVIC ORGANS: Mild intraprostatic calcifications.    OTHER: No free fluid, fluid collections or extraluminal air.    MUSCULOSKELETAL: Chronic wedge compression fracture of T9 is stable  since 7/31/2019. No acute fractures. No suspicious lesions in the  bones.      Impression    IMPRESSION:  1.  No acute findings or traumatic injury in the chest, abdomen and  pelvis.  2.  Few small pulmonary nodules measuring up to 3 mm. See follow-up  guidelines.  3.  Hepatic steatosis.    REFERENCE:  Guidelines for Management of  Incidental Pulmonary Nodules Detected on  CT Images: From the Fleischner Society 2017.   Guidelines apply to incidental nodules in patients who are 35 years or  older.  Guidelines do not apply to lung cancer screening, patients with  immunosuppression, or patients with known primary cancer.    MULTIPLE NODULES  Nodule size <6 mm  Low-risk patients: No follow-up needed.  High-risk patients: Optional follow-up at 12 months.    Consider referral to lung nodule clinic.    FE WASSERMAN MD         SYSTEM ID:  [id number]     Benigno Da Silva DO

## 2022-08-03 NOTE — PROVIDER NOTIFICATION
"\"Pt reporting moderate dizziness w/ room spinning, BP stable @ 127/84. Can I get something to give him prn for the dizziness? Thx! \" @ 1127  "

## 2022-08-03 NOTE — PLAN OF CARE
"VSS on 2L via NC. SpO2 100% but pt reports feeling SOB without O2, kept on for comfort this shift. Denies CP. Denies N/V. LS clear. Pt had 3 watery bowel movements this shift, stool sample collected to test for C-diff. Awaiting results, enteric precautions initiated this shift. Pt complaining of dizziness, antivert given PRN. On tele, SR w/ inverted t-waves. SBA w/ alarms on for dizziness. Continue to monitor/POC.     Problem: Plan of Care - These are the overarching goals to be used throughout the patient stay.    Goal: Plan of Care Review/Shift Note  Description: The Plan of Care Review/Shift note should be completed every shift.  The Outcome Evaluation is a brief statement about your assessment that the patient is improving, declining, or no change.  This information will be displayed automatically on your shift note.  Outcome: Ongoing, Not Progressing  Goal: Patient-Specific Goal (Individualized)  Description: You can add care plan individualizations to a care plan. Examples of Individualization might be:  \"Parent requests to be called daily at 9am for status\", \"I have a hard time hearing out of my right ear\", or \"Do not touch me to wake me up as it startles me\".  Outcome: Ongoing, Not Progressing  Goal: Absence of Hospital-Acquired Illness or Injury  Outcome: Ongoing, Not Progressing  Intervention: Identify and Manage Fall Risk  Recent Flowsheet Documentation  Taken 8/3/2022 0930 by Bianca Thakkar RN  Safety Promotion/Fall Prevention: (alarm on for dizziness)    activity supervised    bed alarm on    clutter free environment maintained    fall prevention program maintained    increased rounding and observation    increase visualization of patient    lighting adjusted    nonskid shoes/slippers when out of bed    patient and family education    room near nurse's station    room organization consistent    safety round/check completed    supervised activity  Intervention: Prevent Skin Injury  Recent Flowsheet " Documentation  Taken 8/3/2022 0930 by Bianca Thakkar, RN  Body Position:    position changed independently    sitting up in bed  Intervention: Prevent and Manage VTE (Venous Thromboembolism) Risk  Recent Flowsheet Documentation  Taken 8/3/2022 1350 by Bianca Thakkar RN  Activity Management: ambulated to bathroom  Taken 8/3/2022 1212 by Bianca Thakkar RN  Activity Management: ambulated to bathroom  Taken 8/3/2022 0930 by Bianca Thakkar RN  VTE Prevention/Management: SCDs (sequential compression devices) off  Activity Management: activity adjusted per tolerance  Goal: Optimal Comfort and Wellbeing  Outcome: Ongoing, Not Progressing  Goal: Readiness for Transition of Care  Outcome: Ongoing, Not Progressing     Problem: Fatigue  Goal: Improved Activity Tolerance  Outcome: Ongoing, Not Progressing  Intervention: Promote Improved Energy  Recent Flowsheet Documentation  Taken 8/3/2022 1350 by Bianca Thakkar, RN  Activity Management: ambulated to bathroom  Taken 8/3/2022 1212 by Bianca Thakkar, RN  Activity Management: ambulated to bathroom  Taken 8/3/2022 0930 by Bianca Thakkar RN  Activity Management: activity adjusted per tolerance

## 2022-08-03 NOTE — PLAN OF CARE
Goal Outcome Evaluation:    VS stable. No complaints of pain. Tele is sinus rhythm with inverted T waves. Slept well throughout the night.

## 2022-08-03 NOTE — PLAN OF CARE
Provider Notified: FLACO received a call from [employer] that pt had 17 beats of PSVT. Pt asymptomatic and in bed sleeping. Please advise as needed. Thank you.     Vital signs stable, 100% on 1 LPM per pts request. Denies pain. Alert and orientated x4. Up with SBA. NS infusing in PIV at 125 ml\hr. Pt had one BM this shift. Pt had 17 beats of PSVT, MD notified. Pt asymptomatic and sleeping in bed. Order placed for PRN Lopressor. Regular diet. PRN melatonin given per pts request. Discharge TBD. Will continue to monitor and follow POC.

## 2022-08-04 VITALS
OXYGEN SATURATION: 98 % | WEIGHT: 251.6 LBS | HEART RATE: 76 BPM | DIASTOLIC BLOOD PRESSURE: 81 MMHG | SYSTOLIC BLOOD PRESSURE: 144 MMHG | RESPIRATION RATE: 20 BRPM | BODY MASS INDEX: 34.12 KG/M2 | TEMPERATURE: 99.3 F

## 2022-08-04 LAB
ALBUMIN SERPL BCG-MCNC: 3.4 G/DL (ref 3.5–5.2)
ALP SERPL-CCNC: 77 U/L (ref 40–129)
ALT SERPL W P-5'-P-CCNC: 193 U/L (ref 10–50)
ANION GAP SERPL CALCULATED.3IONS-SCNC: 10 MMOL/L (ref 7–15)
AST SERPL W P-5'-P-CCNC: 244 U/L (ref 10–50)
BILIRUB SERPL-MCNC: 0.8 MG/DL
BUN SERPL-MCNC: 19.2 MG/DL (ref 8–23)
CALCIUM SERPL-MCNC: 8.7 MG/DL (ref 8.8–10.2)
CHLORIDE SERPL-SCNC: 103 MMOL/L (ref 98–107)
CREAT SERPL-MCNC: 1.03 MG/DL (ref 0.67–1.17)
DEPRECATED HCO3 PLAS-SCNC: 20 MMOL/L (ref 22–29)
ERYTHROCYTE [DISTWIDTH] IN BLOOD BY AUTOMATED COUNT: 13.2 % (ref 10–15)
ERYTHROCYTE [DISTWIDTH] IN BLOOD BY AUTOMATED COUNT: 13.7 % (ref 10–15)
GFR SERPL CREATININE-BSD FRML MDRD: 81 ML/MIN/1.73M2
GLUCOSE SERPL-MCNC: 100 MG/DL (ref 70–99)
HCT VFR BLD AUTO: 33.9 % (ref 40–53)
HCT VFR BLD AUTO: 34.5 % (ref 40–53)
HGB BLD-MCNC: 10.9 G/DL (ref 13.3–17.7)
HGB BLD-MCNC: 11.1 G/DL (ref 13.3–17.7)
INR PPP: 1.1 (ref 0.85–1.15)
MCH RBC QN AUTO: 31.3 PG (ref 26.5–33)
MCH RBC QN AUTO: 31.4 PG (ref 26.5–33)
MCHC RBC AUTO-ENTMCNC: 32.2 G/DL (ref 31.5–36.5)
MCHC RBC AUTO-ENTMCNC: 32.2 G/DL (ref 31.5–36.5)
MCV RBC AUTO: 97 FL (ref 78–100)
MCV RBC AUTO: 98 FL (ref 78–100)
PLATELET # BLD AUTO: 74 10E3/UL (ref 150–450)
PLATELET # BLD AUTO: 76 10E3/UL (ref 150–450)
POTASSIUM SERPL-SCNC: 4.4 MMOL/L (ref 3.4–5.3)
PROT SERPL-MCNC: 6.4 G/DL (ref 6.4–8.3)
RBC # BLD AUTO: 3.48 10E6/UL (ref 4.4–5.9)
RBC # BLD AUTO: 3.53 10E6/UL (ref 4.4–5.9)
SODIUM SERPL-SCNC: 133 MMOL/L (ref 136–145)
WBC # BLD AUTO: 4.1 10E3/UL (ref 4–11)
WBC # BLD AUTO: 4.5 10E3/UL (ref 4–11)

## 2022-08-04 PROCEDURE — 99239 HOSP IP/OBS DSCHRG MGMT >30: CPT | Performed by: STUDENT IN AN ORGANIZED HEALTH CARE EDUCATION/TRAINING PROGRAM

## 2022-08-04 PROCEDURE — 36415 COLL VENOUS BLD VENIPUNCTURE: CPT | Performed by: STUDENT IN AN ORGANIZED HEALTH CARE EDUCATION/TRAINING PROGRAM

## 2022-08-04 PROCEDURE — 85610 PROTHROMBIN TIME: CPT | Performed by: PHYSICIAN ASSISTANT

## 2022-08-04 PROCEDURE — 250N000013 HC RX MED GY IP 250 OP 250 PS 637: Performed by: STUDENT IN AN ORGANIZED HEALTH CARE EDUCATION/TRAINING PROGRAM

## 2022-08-04 PROCEDURE — 36415 COLL VENOUS BLD VENIPUNCTURE: CPT | Performed by: PHYSICIAN ASSISTANT

## 2022-08-04 PROCEDURE — 80053 COMPREHEN METABOLIC PANEL: CPT | Performed by: STUDENT IN AN ORGANIZED HEALTH CARE EDUCATION/TRAINING PROGRAM

## 2022-08-04 PROCEDURE — 85027 COMPLETE CBC AUTOMATED: CPT | Performed by: STUDENT IN AN ORGANIZED HEALTH CARE EDUCATION/TRAINING PROGRAM

## 2022-08-04 PROCEDURE — 250N000013 HC RX MED GY IP 250 OP 250 PS 637: Performed by: HOSPITALIST

## 2022-08-04 RX ORDER — CLONAZEPAM 0.5 MG/1
0.5 TABLET ORAL 2 TIMES DAILY PRN
Status: DISCONTINUED | OUTPATIENT
Start: 2022-08-04 | End: 2022-08-04 | Stop reason: HOSPADM

## 2022-08-04 RX ORDER — CLONAZEPAM 0.5 MG/1
0.5 TABLET ORAL ONCE
Status: DISCONTINUED | OUTPATIENT
Start: 2022-08-04 | End: 2022-08-04

## 2022-08-04 RX ORDER — CLONAZEPAM 0.25 MG/1
0.25 TABLET, ORALLY DISINTEGRATING ORAL ONCE
Status: COMPLETED | OUTPATIENT
Start: 2022-08-04 | End: 2022-08-04

## 2022-08-04 RX ADMIN — THIAMINE HCL TAB 100 MG 100 MG: 100 TAB at 08:38

## 2022-08-04 RX ADMIN — LEVOTHYROXINE SODIUM 175 MCG: 125 TABLET ORAL at 05:29

## 2022-08-04 RX ADMIN — MULTIPLE VITAMINS W/ MINERALS TAB 1 TABLET: TAB at 08:38

## 2022-08-04 RX ADMIN — Medication 1 MG: at 08:38

## 2022-08-04 RX ADMIN — LOPERAMIDE HYDROCHLORIDE 2 MG: 2 CAPSULE ORAL at 02:12

## 2022-08-04 RX ADMIN — CLONAZEPAM 0.25 MG: 0.25 TABLET, ORALLY DISINTEGRATING ORAL at 09:32

## 2022-08-04 RX ADMIN — SERTRALINE HYDROCHLORIDE 150 MG: 100 TABLET ORAL at 08:41

## 2022-08-04 RX ADMIN — CLONAZEPAM 0.25 MG: 0.25 TABLET, ORALLY DISINTEGRATING ORAL at 08:41

## 2022-08-04 ASSESSMENT — ACTIVITIES OF DAILY LIVING (ADL)
ADLS_ACUITY_SCORE: 20

## 2022-08-04 NOTE — PROGRESS NOTES
GASTROENTEROLOGY PROGRESS NOTE        SUBJECTIVE:  No abdominal pain.  No bowel movements overnight and none this morning.     OBJECTIVE:    BP (!) 144/81   Pulse 76   Temp 99.3  F (37.4  C) (Oral)   Resp 20   Wt 114.1 kg (251 lb 9.6 oz)   SpO2 98%   BMI 34.12 kg/m    Temp (24hrs), Av.1  F (36.7  C), Min:98  F (36.7  C), Max:98.2  F (36.8  C)    Patient Vitals for the past 72 hrs:   Weight   22 114.1 kg (251 lb 9.6 oz)       Intake/Output Summary (Last 24 hours) at 8/3/2022 1026  Last data filed at 2022 1850  Gross per 24 hour   Intake 700 ml   Output --   Net 700 ml        PHYSICAL EXAM     Constitutional: NAD    Abdomen: soft, NTTP          Additional Comments:  ROS, FH, SH: See initial GI consult for details.     I have reviewed the patient's new clinical lab results:     Recent Labs   Lab Test 22  0659 22  0908 22  0735 22  0837 22  1431 17  1304 17  1636   WBC 4.5 4.2 3.8*   < >  --    < > 5.2   HGB 11.1* 11.3* 10.6*   < >  --    < > 15.7   MCV 98 98 98   < >  --    < > 90   PLT 76* 85* 75*   < >  --    < > 165   INR 1.10  --   --   --  1.08  --  0.95    < > = values in this interval not displayed.     Recent Labs   Lab Test 22  0659 22  0735 22  1224   POTASSIUM 4.4 4.6 4.2   CHLORIDE 103 106 103   CO2 20* 20* 22   BUN 19.2 28.6* 36.9*   ANIONGAP 10 7 8     Recent Labs   Lab Test 22  0659 22  0735 22  1224 22  0001 22  1508   ALBUMIN 3.4* 3.3* 3.3*   < >  --    BILITOTAL 0.8 0.8 0.9   < >  --    * 233* 276*   < >  --    * 413* 614*   < >  --    PROTEIN  --   --   --   --  10 *    < > = values in this interval not displayed.       ASSESSMENT/ PLAN  Angus Wynne is a 63 yo male with hx EtOH abuse, HTN, hyperlipidemia, GERD and obesity who presented with hematuria found to have evidence of rhabdomyolysis, CHAS, and elevated transaminases.    1. Acute hepatitis: On presentation  significantly elevated LFTs with normal INR and mental status. Etiology thought secondary to rhabdomyolysis ( most likely) vs drug induced. CT a/p on admission with hepatic steatosis ( patient has history of chronic alcohol use). Liver US with dopplers indicating patent hepatic vasculature.   -- Pending hepatitis B surface antigen is negative, hepatitis C is negative and hepatitis A IgM is negative.  -- LFTs continue to improve.    -- Meld score is 8 today  -- Follow LFTs and INR    2. Diarrhea:  Reports 10-12 episodes yesterday and today, he has struggled with diarrhea for about a months per his report. 5 documented episodes yesterday.   -- Check C.diff and enteric panel are negative.  -- Diarrhea improving, can use Imodium as needed.    3. Alcohol abuse / dependency : He reports that he has been drinking heavily a significant portion of his life.  He reports that his last drink was 10 days ago.  -- Declined CD counseling consultation    Discussed with Dr. Zamudio.  Patient continues to improve clinically, we will no longer follow. Please call with questions or change in condition.     Love Lozano PA-C  [facility] ( [facility])

## 2022-08-04 NOTE — PLAN OF CARE
Goal Outcome Evaluation:     VS stable. IBARRA. No complaints of pain. No stools overnight. Tele is sinus rhythm with inverted T waves. Slept well throughout the night.

## 2022-08-04 NOTE — PLAN OF CARE
Goal Outcome Evaluation:    Pt is a/o, very agitated and anxious. Demanding with staff about medications and cares. Up independent in room with cam boot. VSS. Denies pain and diarrhea.Discontinued the enteric iso.  Appetite is good. Labs are improving. Plan was to have repeat chest xray and cbc- pt anxious and wanting to go. Refusing xray but lab was at bedside so pt accepted the lab draw.     Pt asking staff why we did bedside report and said he was Liliana speaking, asking staff if writer and staff were racially profiling him. Staff explained we are not profiling but if he needed an  for explaining his medical care we would need to know what language he spoke. Pt declined  services. Per pt chart preferred language is Liliana. Later in day son was at bedside and discharge instructions we explained.      Patient's After Visit Summary was reviewed with patient and/or son.   Patient verbalized understanding of After Visit Summary, recommended follow up and was given an opportunity to ask questions.   Discharge medications sent home with patient/family: No   Discharged with son.   All belongings including glasses, cam boot, and clothes, cell phone sent home       Shereen EDMOND   8673  pt is very agitated and anxious, demanding his clonipin be his home dose at .5-1mg. I gave the 0.25 but can you reorder his home dose and a one time dose to get me to his home dose. Thanks  Shereen EDMOND  1034  Pt said he is willing to go home when he is done with his tests  Shereen EDMOND  1237  pt anxious looking for discharge orders. wondering if you are going to discharge him soon??  Shereen EDMOND  0565  he is ready to go now, declining the chest xray and repeat platlets. His son is here and ready to transport. If any medications ordered please send them to his pharmacy. thanks

## 2022-08-04 NOTE — DISCHARGE SUMMARY
[facility]  Discharge Summary  Name: Angus Wynne    MRN: [id number]  YOB: 1960    Age: 62 year old  Date of Discharge:  8/4/2022  1:23 PM  Date of Admission: 8/1/2022  Primary Care Provider: Fifi Fleming  Discharge Physician:  Benigno Da Silva DO  Discharging Service:  Hospitalist      Hospital Course  Summary of Stay: Angus Wynne is a 62 year old male with past medical history significant for GERD, hypertension, DL D, alcohol use/abuse admitted on 8/1/2022 with red urine.     ED work-up notable for creatinine of 2.8 with baseline normal, elevated LFTs, elevated troponin, elevated CK.  He was hypotensive on presentation and therefore received aggressive IV fluid resuscitation.  CT abdomen pelvis completed which did not show any evidence for urinary obstruction but did show findings consistent with hepatic steatosis.  He was admitted for aggressive IV fluid resuscitation and further work-up.    Laboratory values and clinical status improved day by day with aggressive IV fluid resuscitation.  His kidney function returned to normal.  His liver functions returned to near normal.  His platelet count did downtrend while inpatient but was ultimately stable on the day of discharge.  I offered him a recheck, however he declines this and would like to discharge.  He was discharged home in the presence of his son in stable condition    Discharge Diagnoses:  Myoglobinuria  Acute rhabdomyolysis:  Presents with concerns for hematuria.  Urinalysis with large blood but no RBCs consistent with myoglobinuria.  CK elevated greater than 16,000 consistent with rhabdomyolysis. Etiology remains unclear.  The patient recently had a fall last week complicated by a left ankle fracture.  However, he was able to stand immediately following this and was not down for prolonged period of time.  He does endorse some proximal muscle weakness, however he is still able to stand up and ambulate without  difficulty.  He has no tenderness to palpation in any muscle groups making me less concerned for myositis or compartment syndrome.  He does take a statin and drinks alcohol putting him at a high risk for statin mediated  myopathy.  TSH normal so hypothyroidism related myositis is less likely.  The patient received aggressive IV fluid resuscitation while inpatient with improvement in kidney function, reduction in CK.  If recurs, should have further work-up for possible myositis as outpatient.  Holding statin for the time being - further HLD treatment per PCP.     Thrombocytopenia:  Platelet count down to 75s. 100s on presentation.  Hx of thrombocytopenia in the past but never this low.  Suspect large component of alcohol related bone marrow suppression, acute illness, and liver injury.  No bleeding, bruising, or obvious petechiae.  Has not received heparin products.  Peripheral smear without concerning findings.  CBC on follow-up with PCP.     Transaminitis:  AST 1100,  on a hemolyzed lab on presentation.  Repeat remains elevated with an AST of 745, and ALT of 290.  With such elevated LFTs, differential is narrow and includes ischemic hepatitis, viral hepatitis, or drug induced hepatitis.  Doppler US of liver blood vessels unremarkable.  Also likely large contribution from rhabdo. CMP on follow-up with PCP.      Diarrhea:  Presents with diarrhea.  Unclear etiology.  The patient reports that he is having 10-15 bowel movements per day.  He is afebrile.  No leukocytosis.  Cdiff negative. GI panel negative.         Chronic/stable/resolved:  Elevated troponin:  Suspect that troponin elevation is related to rhabdomyolysis.  Patient denies chest pain.  EKG without ischemia.     Acute kidney injury, resolved:  CHAS is likely related to the above rhabdomyolysis.  There may also be a component of dehydration in the setting of alcohol use, diarrhea.  -Stop MIVF for now given resolution  -Trend BMP, avoid  nephrotoxins     Hypotension, resolved:  Presents with hypotension with systolics in the 70s.  Likely related to alcohol use, poor p.o. intake, significant dehydration.  This resolved with aggressive fluid resuscitation.     Alcohol use disorder:  Patient admits that he has an alcohol use disorder.  He reports that he has been drinking heavily a significant portion of his life.  He reports that his last drink was 10 days ago.  Declined CD counseling consultation     Recent foot fracture:  He is supposed to be in a boot.  Boot to be brought in by family.       Obesity: Complicates cares     GERD: Does not appear to be on medications for this PTA  Hypertension: Holding antihypertensives at this time given presentation of hypotension  Hyperlipidemia: Holding PTA statin given rhabdomyolysis as above.  He was told to discontinue this on discharge for the time being.  F/u with PCP for alternate options.     Discharge Disposition:  Discharged to home    Allergies:  No Known Allergies     Discharge Medications:        Review of your medicines      CONTINUE these medicines which may have CHANGED, or have new prescriptions. If we are uncertain of the size of tablets/capsules you have at home, strength may be listed as something that might have changed.      Dose / Directions   levothyroxine 175 MCG tablet  Commonly known as: SYNTHROID/LEVOTHROID  This may have changed:     how much to take    how to take this    when to take this    additional instructions  Used for: Other specified hypothyroidism      TAKE 1 TABLET BY MOUTH EVERY DAY 6 DAYS PER WEEK. AND ONE-HALF TABLET THE 7TH DAY OF THE WEEK  Quantity: 30 tablet  Refills: 0     traZODone 50 MG tablet  Commonly known as: DESYREL  This may have changed: See the new instructions.  Used for: Other insomnia      TAKE 1 TO 2 TABLETS BY MOUTH EVERY DAY FOR SLEEP  Quantity: 180 tablet  Refills: 1        CONTINUE these medicines which have NOT CHANGED      Dose / Directions    acetaminophen 325 MG tablet  Commonly known as: TYLENOL  Used for: Anal fissure      Dose: 650 mg  Take 2 tablets (650 mg) by mouth every 4 hours as needed for mild pain  Quantity: 50 tablet  Refills: 0     clonazePAM 0.5 MG tablet  Commonly known as: klonoPIN  Used for: Anxiety      TAKE 1/2 TO 1 TABLET BY MOUTH AS NEEDED FOR ANXIETY  Quantity: 10 tablet  Refills: 0     losartan 100 MG tablet  Commonly known as: COZAAR  Used for: Essential hypertension      TAKE 1 TABLET(100 MG) BY MOUTH DAILY  Quantity: 90 tablet  Refills: 1     sertraline 100 MG tablet  Commonly known as: ZOLOFT  Used for: Anxiety      Dose: 150 mg  Take 1.5 tablets (150 mg) by mouth daily  Quantity: 135 tablet  Refills: 1        STOP taking    rosuvastatin 40 MG tablet  Commonly known as: CRESTOR             Condition on Discharge:  Discharge condition: Stable       Code status on discharge: Full Code     History of Illness:  See detailed admission note for full details.    Physical Exam:  Vital signs:  Temp: 99.3  F (37.4  C) Temp src: Oral BP: (!) 144/81 Pulse: 76   Resp: 20 SpO2: 98 % O2 Device: None (Room air) Oxygen Delivery: 2 LPM   Weight: 114.1 kg (251 lb 9.6 oz)  Estimated body mass index is 34.12 kg/m  as calculated from the following:    Height as of 7/20/22: 1.829 m (6').    Weight as of this encounter: 114.1 kg (251 lb 9.6 oz).    Wt Readings from Last 1 Encounters:   08/01/22 114.1 kg (251 lb 9.6 oz)     General: Alert, awake, no acute distress.  HEENT: NC/AT, eyes anicteric, external occular movements intact, face symmetric.    Cardiac: RRR, S1, S2.  No murmurs appreciated.  Pulmonary: Normal chest rise, normal work of breathing.  Lungs CTA BL  Abdomen: soft, non-tender, non-distended.  Bowel Sounds Present.  No guarding.  Extremities: no deformities.  Warm, well perfused.  Skin: no rashes or lesions noted.  Warm and Dry.  Neuro: No focal deficits noted.  Speech clear.  Coordination and strength grossly normal.  Psych: Anxious  affect.    Procedures other than Imaging:  None     Imaging:  No results found for this or any previous visit (from the past 24 hour(s)).     Consultations:  Consultation during this admission received from gastroenterology.     Recent Lab Results:  Recent Labs   Lab 08/04/22  1240 08/04/22  0659 08/03/22  0908   WBC 4.1 4.5 4.2   HGB 10.9* 11.1* 11.3*   HCT 33.9* 34.5* 34.9*   MCV 97 98 98   PLT 74* 76* 85*          Lab Results   Component Value Date     08/04/2022     08/03/2022     08/02/2022     11/18/2020     07/30/2019     02/04/2019    Lab Results   Component Value Date    CHLORIDE 103 08/04/2022    CHLORIDE 106 08/03/2022    CHLORIDE 103 08/02/2022    CHLORIDE 104 12/29/2021    CHLORIDE 103 09/08/2021    CHLORIDE 103 11/18/2020    CHLORIDE 106 07/30/2019    CHLORIDE 106 02/04/2019    Lab Results   Component Value Date    BUN 19.2 08/04/2022    BUN 28.6 08/03/2022    BUN 36.9 08/02/2022    BUN 12 12/29/2021    BUN 9 09/08/2021    BUN 12 11/18/2020    BUN 11 07/30/2019    BUN 10 02/04/2019      Lab Results   Component Value Date    POTASSIUM 4.4 08/04/2022    POTASSIUM 4.6 08/03/2022    POTASSIUM 4.2 08/02/2022    POTASSIUM 4.4 12/29/2021    POTASSIUM 4.0 09/08/2021    POTASSIUM 4.2 11/18/2020    POTASSIUM 4.4 07/30/2019    POTASSIUM 4.7 02/04/2019    Lab Results   Component Value Date    CO2 20 08/04/2022    CO2 20 08/03/2022    CO2 22 08/02/2022    CO2 27 12/29/2021    CO2 23 09/08/2021    CO2 26 11/18/2020    CO2 25 07/30/2019    CO2 27 02/04/2019    Lab Results   Component Value Date    CR 1.03 08/04/2022    CR 1.13 08/03/2022    CR 1.61 08/02/2022    CR 0.87 11/18/2020    CR 0.91 07/30/2019    CR 1.02 02/04/2019             Pending Results:    Unresulted Labs Ordered in the Past 30 Days of this Admission     Date and Time Order Name Status Description    8/1/2022  2:50 PM Blood Culture Arm, Right Preliminary     8/1/2022  2:45 PM Blood Culture Peripheral Blood  Preliminary            Discharge Instructions and Follow-Up:   Discharge Procedure Orders   Reason for your hospital stay   Order Comments: You were hospitalized for diarrhea, rhabdomyolysis, liver injury, and kidney injury. These improved with aggressive IV fluids. You should abstain from drinking alcohol.     Follow-up and recommended labs and tests    Order Comments: Follow up with primary care provider, Fifi Fleming, within 7 days for hospital follow- up.  The following labs/tests are recommended: CMP, CBC.     Activity   Order Comments: Your activity upon discharge: activity as tolerated     Order Specific Question Answer Comments   Is discharge order? Yes      Diet   Order Comments: Follow this diet upon discharge: Regular Diet Adult     Order Specific Question Answer Comments   Is discharge order? Yes        Total time spent in face to face contact with the patient and coordinating discharge was:  >30 Minutes.

## 2022-08-05 ENCOUNTER — PATIENT OUTREACH (OUTPATIENT)
Dept: FAMILY MEDICINE | Facility: CLINIC | Age: 62
End: 2022-08-05

## 2022-08-05 DIAGNOSIS — M79.672 LEFT FOOT PAIN: Primary | ICD-10-CM

## 2022-08-05 NOTE — TELEPHONE ENCOUNTER
What type of discharge? Inpatient  Risk of Hospital admission or ED visit: 20%  Is a TCM episode required? No  When should the patient follow up with PCP? 14 days of discharge.    Elina Lagos RN  [facility]

## 2022-08-06 LAB
BACTERIA BLD CULT: NO GROWTH
BACTERIA BLD CULT: NO GROWTH

## 2022-08-08 ENCOUNTER — MYC REFILL (OUTPATIENT)
Dept: FAMILY MEDICINE | Facility: CLINIC | Age: 62
End: 2022-08-08

## 2022-08-08 DIAGNOSIS — F41.9 ANXIETY: ICD-10-CM

## 2022-08-09 NOTE — TELEPHONE ENCOUNTER
"Hospital/TCU/ED for chronic condition Discharge Protocol    \"Hi, my name is Harlan Hyde RN, a registered nurse, and I am calling from [facility].  I am calling to follow up and see how things are going for you after your recent emergency visit/hospital/TCU stay.\"    Tell me how you are doing now that you are home?\"     \"I am doing fine at home but I need a sooner appointment with Dr. Fleming for my follow up, I also need a refill of clonazepam\" - PCP please advise - medication last ordered by other provider    Discharge Instructions    \"Let's review your discharge instructions.  What is/are the follow-up recommendations?  Pt. Response:     \"They changed some of my medications, I am not taking rosuvastatin anymore and they changed my trazadone\"     \"Has an appointment with your primary care provider been scheduled?\"   Yes. (confirm)     Patient has upcoming appointment on 8/23/22 but would like to PCP sooner, is not willing to see any other provider. No sooner appointments with PCP available - please advise     \"When you see the provider, I would recommend that you bring your medications with you.\"    Medications    \"Tell me what changed about your medicines when you discharged?\"    Changes to chronic meds?    0-1    \"What questions do you have about your medications?\"    None     New diagnoses of heart failure, COPD, diabetes, or MI?    No    Post Discharge Medication Reconciliation Status: discharge medications reconciled and changed, per note/orders.    Was MTM referral placed (*Make sure to put transitions as reason for referral)?   No  Call Summary    \"What questions or concerns do you have about your recent visit and your follow-up care?\"       Patient wants follow up appointment with PCP asap - please advise. No hosp f/u slots available with PCP prior to 8/23/22. Patient callback [phone] ok to detailed VM     \"If you have questions or things don't continue to improve, we encourage you " "contact us through the main clinic number (give number).  Even if the clinic is not open, triage nurses are available 24/7 to help you.     We would like you to know that our clinic has extended hours (provide information).  We also have urgent care (provide details on closest location and hours/contact info)\"      \"Thank you for your time and take care!\"    Harlan Hyde RN  [facility]                 "

## 2022-08-09 NOTE — TELEPHONE ENCOUNTER
Attempted to call pt to follow up. No answer. Left VM. Routing to provider for recommendations.      Patient Contact    Attempt # 2    Was call answered?  No.  Left message on voicemail with information to call me back.

## 2022-08-13 RX ORDER — CLONAZEPAM 0.5 MG/1
TABLET ORAL
Qty: 10 TABLET | Refills: 0 | Status: SHIPPED | OUTPATIENT
Start: 2022-08-13 | End: 2022-08-23 | Stop reason: ALTCHOICE

## 2022-08-23 ENCOUNTER — OFFICE VISIT (OUTPATIENT)
Dept: FAMILY MEDICINE | Facility: CLINIC | Age: 62
End: 2022-08-23
Payer: COMMERCIAL

## 2022-08-23 VITALS
RESPIRATION RATE: 16 BRPM | BODY MASS INDEX: 33 KG/M2 | TEMPERATURE: 97.4 F | OXYGEN SATURATION: 99 % | HEIGHT: 72 IN | HEART RATE: 91 BPM | WEIGHT: 243.6 LBS | SYSTOLIC BLOOD PRESSURE: 130 MMHG | DIASTOLIC BLOOD PRESSURE: 80 MMHG

## 2022-08-23 DIAGNOSIS — E03.8 OTHER SPECIFIED HYPOTHYROIDISM: ICD-10-CM

## 2022-08-23 DIAGNOSIS — R79.89 ELEVATED LFTS: ICD-10-CM

## 2022-08-23 DIAGNOSIS — S92.902S FOOT FRACTURE, LEFT, SEQUELA: ICD-10-CM

## 2022-08-23 DIAGNOSIS — F41.9 ANXIETY: ICD-10-CM

## 2022-08-23 DIAGNOSIS — I10 ESSENTIAL HYPERTENSION: ICD-10-CM

## 2022-08-23 DIAGNOSIS — R82.1 MYOGLOBINURIA: ICD-10-CM

## 2022-08-23 DIAGNOSIS — N17.9 AKI (ACUTE KIDNEY INJURY) (H): ICD-10-CM

## 2022-08-23 DIAGNOSIS — E66.01 MORBID OBESITY (H): ICD-10-CM

## 2022-08-23 DIAGNOSIS — D69.6 THROMBOCYTOPENIA, UNSPECIFIED (H): ICD-10-CM

## 2022-08-23 DIAGNOSIS — Z87.19 HX OF ACUTE ALCOHOLIC HEPATITIS: ICD-10-CM

## 2022-08-23 DIAGNOSIS — Z09 HOSPITAL DISCHARGE FOLLOW-UP: Primary | ICD-10-CM

## 2022-08-23 DIAGNOSIS — Z78.9 ALCOHOL USE: ICD-10-CM

## 2022-08-23 DIAGNOSIS — G47.09 OTHER INSOMNIA: ICD-10-CM

## 2022-08-23 LAB
ERYTHROCYTE [DISTWIDTH] IN BLOOD BY AUTOMATED COUNT: 12.6 % (ref 10–15)
HCT VFR BLD AUTO: 44.3 % (ref 40–53)
HGB BLD-MCNC: 14.7 G/DL (ref 13.3–17.7)
MCH RBC QN AUTO: 30.9 PG (ref 26.5–33)
MCHC RBC AUTO-ENTMCNC: 33.2 G/DL (ref 31.5–36.5)
MCV RBC AUTO: 93 FL (ref 78–100)
PLATELET # BLD AUTO: 183 10E3/UL (ref 150–450)
RBC # BLD AUTO: 4.76 10E6/UL (ref 4.4–5.9)
WBC # BLD AUTO: 5.6 10E3/UL (ref 4–11)

## 2022-08-23 PROCEDURE — 82248 BILIRUBIN DIRECT: CPT | Performed by: FAMILY MEDICINE

## 2022-08-23 PROCEDURE — 85027 COMPLETE CBC AUTOMATED: CPT | Performed by: FAMILY MEDICINE

## 2022-08-23 PROCEDURE — 99214 OFFICE O/P EST MOD 30 MIN: CPT | Performed by: FAMILY MEDICINE

## 2022-08-23 PROCEDURE — 36415 COLL VENOUS BLD VENIPUNCTURE: CPT | Performed by: FAMILY MEDICINE

## 2022-08-23 PROCEDURE — 96127 BRIEF EMOTIONAL/BEHAV ASSMT: CPT | Performed by: FAMILY MEDICINE

## 2022-08-23 PROCEDURE — 82550 ASSAY OF CK (CPK): CPT | Performed by: FAMILY MEDICINE

## 2022-08-23 PROCEDURE — 80053 COMPREHEN METABOLIC PANEL: CPT | Performed by: FAMILY MEDICINE

## 2022-08-23 RX ORDER — SERTRALINE HYDROCHLORIDE 100 MG/1
150 TABLET, FILM COATED ORAL DAILY
Qty: 135 TABLET | Refills: 1 | Status: SHIPPED | OUTPATIENT
Start: 2022-08-23 | End: 2023-01-04

## 2022-08-23 RX ORDER — LOSARTAN POTASSIUM 100 MG/1
100 TABLET ORAL DAILY
Qty: 90 TABLET | Refills: 1 | Status: SHIPPED | OUTPATIENT
Start: 2022-08-23 | End: 2023-06-05

## 2022-08-23 RX ORDER — TRAZODONE HYDROCHLORIDE 50 MG/1
50-100 TABLET, FILM COATED ORAL AT BEDTIME
Qty: 180 TABLET | Refills: 0 | Status: SHIPPED | OUTPATIENT
Start: 2022-08-23 | End: 2022-11-22

## 2022-08-23 RX ORDER — LEVOTHYROXINE SODIUM 175 UG/1
TABLET ORAL
Qty: 84 TABLET | Refills: 3 | Status: SHIPPED | OUTPATIENT
Start: 2022-08-23 | End: 2023-01-05

## 2022-08-23 RX ORDER — LORAZEPAM 0.5 MG/1
0.5 TABLET ORAL 2 TIMES DAILY PRN
Qty: 30 TABLET | Refills: 1 | Status: SHIPPED | OUTPATIENT
Start: 2022-08-23 | End: 2022-09-22

## 2022-08-23 ASSESSMENT — ANXIETY QUESTIONNAIRES
GAD7 TOTAL SCORE: 11
6. BECOMING EASILY ANNOYED OR IRRITABLE: MORE THAN HALF THE DAYS
2. NOT BEING ABLE TO STOP OR CONTROL WORRYING: SEVERAL DAYS
7. FEELING AFRAID AS IF SOMETHING AWFUL MIGHT HAPPEN: NOT AT ALL
GAD7 TOTAL SCORE: 11
5. BEING SO RESTLESS THAT IT IS HARD TO SIT STILL: MORE THAN HALF THE DAYS
IF YOU CHECKED OFF ANY PROBLEMS ON THIS QUESTIONNAIRE, HOW DIFFICULT HAVE THESE PROBLEMS MADE IT FOR YOU TO DO YOUR WORK, TAKE CARE OF THINGS AT HOME, OR GET ALONG WITH OTHER PEOPLE: SOMEWHAT DIFFICULT
3. WORRYING TOO MUCH ABOUT DIFFERENT THINGS: NOT AT ALL
1. FEELING NERVOUS, ANXIOUS, OR ON EDGE: NEARLY EVERY DAY

## 2022-08-23 ASSESSMENT — PAIN SCALES - GENERAL: PAINLEVEL: MODERATE PAIN (5)

## 2022-08-23 ASSESSMENT — PATIENT HEALTH QUESTIONNAIRE - PHQ9: 5. POOR APPETITE OR OVEREATING: NEARLY EVERY DAY

## 2022-08-23 NOTE — PROGRESS NOTES
Assessment & Plan     (Z09) Hospital discharge follow-up  (primary encounter diagnosis)  Comment:   Plan:     (I10) Essential hypertension  Comment: improved, stable   Plan: Basic metabolic panel  (Ca, Cl, CO2, Creat,         Gluc, K, Na, BUN), losartan (COZAAR) 100 MG         tablet            (R79.89) Elevated LFT's  Comment:   Plan: losartan (COZAAR) 100 MG tablet            (Z87.19) Hx of acute alcoholic hepatitis  Comment:   Plan: CBC with platelets, Hepatic panel (Albumin,         ALT, AST, Bili, Alk Phos, TP)            (Z72.89) Alcohol use  Comment:   Plan:     (S92.902S) Foot fracture, left, sequela  Comment: IMPROVING. HAS ORTHO APPOINTMENT - 08/25/ 22   Plan:     (R82.1) Myoglobinuria  Comment: LIKELY RELATED TO FRACTURE   Plan: CK total, UA Macro with Reflex to Micro and         Culture - lab collect, UA reflex to Microscopic        and Culture            (G47.09) Other insomnia  Comment:   Plan: traZODone (DESYREL) 50 MG tablet            (F41.9) Anxiety  Comment:   Plan: sertraline (ZOLOFT) 100 MG tablet, LORazepam         (ATIVAN) 0.5 MG tablet            (E66.01) Morbid obesity (H)  Comment:   Plan:     (D69.6) Thrombocytopenia, unspecified (H)  Comment:   Plan: His numbers did improve.     (E03.8) Other specified hypothyroidism  Comment:   Plan: On levothyroxine.     (N17.9) CHAS (acute kidney injury) (H)  Comment: improving , need f/u check   Plan: Kidney functions did improve for discharge.     Patient with recent hospitalization, multiple issues as above.    Sound like it was complicated by vertigo leading to foot fracture//combination of alcohol withdrawal/anxiety   with past medical history significant for GERD, hypertension, alcohol use/abuse,  admitted on 8/1/2022 with    hematuria.  Urinalysis with large blood but no RBCs consistent with myoglobinuria.    CK  Was also elevated greater than 16,000 consistent with rhabdomyolysis.     Etiology remained  unclear.  Although patient had a fall a   week complicated by a left ankle fracture. He had  some proximal muscle weakness     .  He had been on  a statin for many years ( although with no previous problem) , but  drinks alcohol putting him at a high risk for statin mediated myopathy.              Statin currently on hold.          TSH  Was also normal so hypothyroidism related myositis was thought be  less likely.       The patient received aggressive IV fluid resuscitation while inpatient with improvement in kidney function, reduction in CK.       Need  recheck of labs today       clinically feeling better       If any further recurrence  of symptoms, should have further work-up for possible myositis as outpatient.      Holding statin for the time being                Check labs. refill sent.Cares and  treatment discussed.  follow up if problem   Patient expressed understanding and agreement with treatment plan. All patient's questions were answered, will let me know if has more later.  Medications: Rx's: Reviewed the potential side effects/complications of medications prescribed.          BMI:   Estimated body mass index is 33.04 kg/m  as calculated from the following:    Height as of this encounter: 1.829 m (6').    Weight as of this encounter: 110.5 kg (243 lb 9.6 oz).   Weight management plan: Discussed healthy diet and exercise guidelines          Fifi Fleming MD  [facility] TANVIR Greco is a 62 year old, presenting for the following health issues:  ER F/U      HPI       Hospital Follow-up Visit:    Hospital/Nursing Home/IP Rehab Facility: [facility]  Date of Admission: 08/01/2022  Date of Discharge: 08/03/2022  Reason(s) for Admission: Red urine/dizziness    Was your hospitalization related to COVID-19? No   Problems taking medications regularly:  None  Medication changes since discharge: None  Problems adhering to non-medication therapy:  None    Summary of hospitalization:   [facility] information obtained and reviewed  Diagnostic Tests/Treatments reviewed.  Follow up needed: He has Ortho//other follow-up scheduled, as per hospital discharge.   Other Healthcare Providers Involved in Patient s Care:         None  Update since discharge: improved.         Post Medication Reconciliation Status:        Plan of care communicated with patient      Discharge Diagnoses:  Myoglobinuria  Acute rhabdomyolysis:  Thrombocytopenia:  Transaminates:  Diarrhea:         per chart review - Hospital Course  Summary of Stay: Angus Wynne is a 62 year old male with past medical history significant for GERD, hypertension, DL D, alcohol use/abuse admitted on 8/1/2022 with red urine.  ED work-up notable for creatinine of 2.8 with baseline normal, elevated LFTs, elevated troponin, elevated CK.    He was hypotensive and received aggressive IV fluid resuscitation.    CT abdomen pelvis  did not show any evidence for urinary obstruction but did show findings consistent with hepatic steatosis    .Doppler US of liver blood vessels was unremarkable.    He was admitted for aggressive IV fluid resuscitation and further work-up.     Laboratory values and clinical status improved , with aggressive IV fluid resuscitation.    His kidney function returned to normal.    His liver functions returned to near normal.    His platelet count did downtrend while inpatient but was ultimately were  stable on the day of discharge.  Peripheral smear without concerning findings.  Platelet count down to 75s. 100s on presentation.  Hx of thrombocytopenia in the past but never this low.    So it was suspected to b related to  large component of alcohol related bone marrow suppression, acute illness, and liver injury. he had  no bleeding, bruising, or obvious petechiae.  Peripheral smear was also without concerning findings.  CBC on follow-up with PCP.   troponin elevation - was also thought to be  related to rhabdomyolysis.  Patient  had no  chest pain.  EKG without ischemia.   has an alcohol use disorder.  He  declined CD counseling consultation during hospitalization        he has been in boot for Boot - for fot fracture , as follow up schdued      Obesity: Complicates cares     GERD: Does not appear to be on medications for this PTA  Hypertension: Holding antihypertensives at this time given presentation of hypotension  Hyperlipidemia: Holding PTA statin given rhabdomyolysis as above.  He was told to discontinue this on discharge for the time being.  F/u with PCP for alternate options.                     Hyperlipidemia Follow-Up      Are you regularly taking any medication or supplement to lower your cholesterol?   No medication has been on hold since recent hospitalization because of elevated CK/myoglobinurea, likely related to injury//fracture related to fall at home.  Clinically he is improved.  Statin still on hold.  Although he had previous problem taking the statin for many years.     Are you having muscle aches or other side effects that you think could be caused by your cholesterol lowering medication?  No    Hypertension Follow-up      Do you check your blood pressure regularly outside of the clinic? No     Are you following a low salt diet? Yes    Are your blood pressures ever more than 140 on the top number (systolic) OR more   than 90 on the bottom number (diastolic), for example 140/90? No    Depression and Anxiety Follow-Up    How are you doing with your depression since your last visit?  Is doing better since the hospitalization, although admits that he still has anxiety.  Need  Something for anxiety.  He had been on Klonopin on and off for a while although is willing to, cut down on that as he does not want to be  dependent on that as well ,     but he thinks he still needs something to help.     He is trying to stay sober, but he does feel jittery at times, but  he has not been drinking.     How are you doing with your  anxiety since your last visit?  Improved since after the hospitalization but still quite anxious, and could use refill on anxiety medication.     Still taking Zoloft, although he was just breaking the pill and taking half pill 3 times a day, at home.  Willing to stay on Zoloft dose and could use refill.     Are you having other symptoms that might be associated with depression or anxiety? Yes:  as mar     Have you had a significant life event? Health Concerns related to recent hospitalization.     Do you have any concerns with your use of alcohol or other drugs? No none since the last hospitalization.  So far staying sober.     Social History     Tobacco Use     Smoking status: Former Smoker     Quit date: 2015     Years since quittin.9     Smokeless tobacco: Never Used   Substance Use Topics     Alcohol use: No     Alcohol/week: 0.0 standard drinks     Comment: 2-3 drinks a week     Drug use: No     PHQ 2020   PHQ-9 Total Score 2 9 -   Q9: Thoughts of better off dead/self-harm past 2 weeks Not at all Not at all Not at all     RUTH-7 SCORE 2021   Total Score - 0 (minimal anxiety) -   Total Score 11 0 11     Last PHQ-9 2022   1.  Little interest or pleasure in doing things 2   2.  Feeling down, depressed, or hopeless 2   3.  Trouble falling or staying asleep, or sleeping too much 3   4.  Feeling tired or having little energy 3   5.  Poor appetite or overeating 0   6.  Feeling bad about yourself 0   7.  Trouble concentrating -   8.  Moving slowly or restless 1   Q9: Thoughts of better off dead/self-harm past 2 weeks 0   PHQ-9 Total Score -   Difficulty at work, home, or with people Somewhat difficult     RUTH-7  2022   1. Feeling nervous, anxious, or on edge 3   2. Not being able to stop or control worrying 1   3. Worrying too much about different things 0   4. Trouble relaxing 3   5. Being so restless that it is hard to sit still 2   6. Becoming  easily annoyed or irritable 2   7. Feeling afraid, as if something awful might happen 0   RUTH-7 Total Score 11   If you checked any problems, how difficult have they made it for you to do your work, take care of things at home, or get along with other people? Somewhat difficult       Suicide Assessment Five-step Evaluation and Treatment (SAFE-T)        Review of Systems   Constitutional, HEENT, cardiovascular, pulmonary, GI, , musculoskeletal, neuro, skin, endocrine and psych systems are negative, except as otherwise noted.      Objective    Temp 97.4  F (36.3  C) (Tympanic)   There is no height or weight on file to calculate BMI.  Physical Exam   GENERAL: Looks a bit tired and anxious, but alert and no distress  EYES: Eyes grossly normal to inspection,  conjunctivae and sclerae normal  HENT: ear canals and TM's normal, nose and mouth without ulcers or lesions  NECK: no adenopathy, no asymmetry, masses, or scars and thyroid normal to palpation  RESP: lungs clear to auscultation - no rales, rhonchi or wheezes  CV: regular rate and rhythm, normal S1 S2, no S3 or S4, no murmur,  ABDOMEN: obese but soft, nontender, no hepatosplenomegaly, no masses and bowel sounds normal  MS: no gross musculoskeletal defects noted, no edema  SKIN: no suspicious lesions or rashes. No  bruising, or obvious petechiae  NEURO: Normal strength and tone, mentation intact and speech normal  PSYCH: mentation appears normal, affect flat, very anxious, fatigued, speech pressured, judgement and insight intact and appearance well groomed

## 2022-08-23 NOTE — PROGRESS NOTES
{PROVIDER CHARTING PREFERENCE:427852}    Maria Eugenia Greco is a 62 year old{ACCOMPANIED BY STATEMENT (Optional):633562}, presenting for the following health issues:  No chief complaint on file.      HPI     ED/UC Followup:    Facility:  [facility]  Date of visit: 08/01/2022  Reason for visit:   Current Status:     {additonal problems for provider to add (Optional):815076}    Review of Systems   {ROS COMP (Optional):691258}      Objective    There were no vitals taken for this visit.  There is no height or weight on file to calculate BMI.  Physical Exam   {Exam List (Optional):584755}    {Diagnostic Test Results (Optional):304036}    {AMBULATORY ATTESTATION (Optional):161096}            .  ..

## 2022-08-24 ENCOUNTER — LAB (OUTPATIENT)
Dept: LAB | Facility: CLINIC | Age: 62
End: 2022-08-24
Payer: COMMERCIAL

## 2022-08-24 DIAGNOSIS — R82.1 MYOGLOBINURIA: ICD-10-CM

## 2022-08-24 LAB
ALBUMIN SERPL-MCNC: 4.2 G/DL (ref 3.4–5)
ALBUMIN UR-MCNC: NEGATIVE MG/DL
ALP SERPL-CCNC: 59 U/L (ref 40–150)
ALT SERPL W P-5'-P-CCNC: 43 U/L (ref 0–70)
ANION GAP SERPL CALCULATED.3IONS-SCNC: 7 MMOL/L (ref 3–14)
APPEARANCE UR: CLEAR
AST SERPL W P-5'-P-CCNC: 40 U/L (ref 0–45)
BILIRUB DIRECT SERPL-MCNC: 0.2 MG/DL (ref 0–0.2)
BILIRUB SERPL-MCNC: 0.4 MG/DL (ref 0.2–1.3)
BILIRUB UR QL STRIP: NEGATIVE
BUN SERPL-MCNC: 11 MG/DL (ref 7–30)
CALCIUM SERPL-MCNC: 9.7 MG/DL (ref 8.5–10.1)
CHLORIDE BLD-SCNC: 106 MMOL/L (ref 94–109)
CK SERPL-CCNC: 88 U/L (ref 30–300)
CO2 SERPL-SCNC: 23 MMOL/L (ref 20–32)
COLOR UR AUTO: YELLOW
CREAT SERPL-MCNC: 0.83 MG/DL (ref 0.66–1.25)
GFR SERPL CREATININE-BSD FRML MDRD: >90 ML/MIN/1.73M2
GLUCOSE BLD-MCNC: 115 MG/DL (ref 70–99)
GLUCOSE UR STRIP-MCNC: NEGATIVE MG/DL
HGB UR QL STRIP: NEGATIVE
KETONES UR STRIP-MCNC: NEGATIVE MG/DL
LEUKOCYTE ESTERASE UR QL STRIP: NEGATIVE
NITRATE UR QL: NEGATIVE
PH UR STRIP: 5 [PH] (ref 5–7)
POTASSIUM BLD-SCNC: 4.7 MMOL/L (ref 3.4–5.3)
PROT SERPL-MCNC: 7.9 G/DL (ref 6.8–8.8)
SODIUM SERPL-SCNC: 136 MMOL/L (ref 133–144)
SP GR UR STRIP: 1.02 (ref 1–1.03)
UROBILINOGEN UR STRIP-ACNC: 0.2 E.U./DL

## 2022-08-24 PROCEDURE — 81003 URINALYSIS AUTO W/O SCOPE: CPT

## 2022-08-25 ENCOUNTER — ANCILLARY PROCEDURE (OUTPATIENT)
Dept: GENERAL RADIOLOGY | Facility: CLINIC | Age: 62
End: 2022-08-25
Attending: FAMILY MEDICINE
Payer: COMMERCIAL

## 2022-08-25 PROCEDURE — 73630 X-RAY EXAM OF FOOT: CPT | Mod: TC | Performed by: RADIOLOGY

## 2022-09-11 ENCOUNTER — HEALTH MAINTENANCE LETTER (OUTPATIENT)
Age: 62
End: 2022-09-11

## 2022-09-20 DIAGNOSIS — F41.9 ANXIETY: ICD-10-CM

## 2022-09-22 ENCOUNTER — OFFICE VISIT (OUTPATIENT)
Dept: FAMILY MEDICINE | Facility: CLINIC | Age: 62
End: 2022-09-22
Payer: COMMERCIAL

## 2022-09-22 VITALS
DIASTOLIC BLOOD PRESSURE: 80 MMHG | TEMPERATURE: 97.1 F | HEIGHT: 72 IN | SYSTOLIC BLOOD PRESSURE: 136 MMHG | BODY MASS INDEX: 32.34 KG/M2 | RESPIRATION RATE: 18 BRPM | HEART RATE: 95 BPM | OXYGEN SATURATION: 99 % | WEIGHT: 238.8 LBS

## 2022-09-22 DIAGNOSIS — I10 ESSENTIAL HYPERTENSION: ICD-10-CM

## 2022-09-22 DIAGNOSIS — Z23 NEEDS FLU SHOT: ICD-10-CM

## 2022-09-22 DIAGNOSIS — Z23 HIGH PRIORITY FOR 2019-NCOV VACCINE: ICD-10-CM

## 2022-09-22 DIAGNOSIS — R21 RASH: ICD-10-CM

## 2022-09-22 DIAGNOSIS — R79.89 ELEVATED LFTS: ICD-10-CM

## 2022-09-22 DIAGNOSIS — F41.9 ANXIETY: Primary | ICD-10-CM

## 2022-09-22 DIAGNOSIS — Z87.19 HX OF ACUTE ALCOHOLIC HEPATITIS: ICD-10-CM

## 2022-09-22 DIAGNOSIS — E66.01 MORBID OBESITY (H): ICD-10-CM

## 2022-09-22 DIAGNOSIS — E55.9 VITAMIN D DEFICIENCY: ICD-10-CM

## 2022-09-22 DIAGNOSIS — E03.8 OTHER SPECIFIED HYPOTHYROIDISM: ICD-10-CM

## 2022-09-22 PROCEDURE — 82550 ASSAY OF CK (CPK): CPT | Performed by: FAMILY MEDICINE

## 2022-09-22 PROCEDURE — 90682 RIV4 VACC RECOMBINANT DNA IM: CPT | Performed by: FAMILY MEDICINE

## 2022-09-22 PROCEDURE — 82306 VITAMIN D 25 HYDROXY: CPT | Performed by: FAMILY MEDICINE

## 2022-09-22 PROCEDURE — 99214 OFFICE O/P EST MOD 30 MIN: CPT | Mod: 25 | Performed by: FAMILY MEDICINE

## 2022-09-22 PROCEDURE — 36415 COLL VENOUS BLD VENIPUNCTURE: CPT | Performed by: FAMILY MEDICINE

## 2022-09-22 PROCEDURE — 84443 ASSAY THYROID STIM HORMONE: CPT | Performed by: FAMILY MEDICINE

## 2022-09-22 PROCEDURE — 0124A COVID-19,PF,PFIZER BOOSTER BIVALENT: CPT | Performed by: FAMILY MEDICINE

## 2022-09-22 PROCEDURE — 91312 COVID-19,PF,PFIZER BOOSTER BIVALENT: CPT | Performed by: FAMILY MEDICINE

## 2022-09-22 PROCEDURE — 90471 IMMUNIZATION ADMIN: CPT | Performed by: FAMILY MEDICINE

## 2022-09-22 RX ORDER — SERTRALINE HYDROCHLORIDE 100 MG/1
TABLET, FILM COATED ORAL
Qty: 135 TABLET | Refills: 1 | OUTPATIENT
Start: 2022-09-22

## 2022-09-22 RX ORDER — LORAZEPAM 0.5 MG/1
TABLET ORAL
Qty: 30 TABLET | Refills: 0 | Status: SHIPPED | OUTPATIENT
Start: 2022-09-29 | End: 2022-10-10

## 2022-09-22 ASSESSMENT — ANXIETY QUESTIONNAIRES
GAD7 TOTAL SCORE: 3
1. FEELING NERVOUS, ANXIOUS, OR ON EDGE: NOT AT ALL
3. WORRYING TOO MUCH ABOUT DIFFERENT THINGS: SEVERAL DAYS
4. TROUBLE RELAXING: SEVERAL DAYS
IF YOU CHECKED OFF ANY PROBLEMS ON THIS QUESTIONNAIRE, HOW DIFFICULT HAVE THESE PROBLEMS MADE IT FOR YOU TO DO YOUR WORK, TAKE CARE OF THINGS AT HOME, OR GET ALONG WITH OTHER PEOPLE: NOT DIFFICULT AT ALL
7. FEELING AFRAID AS IF SOMETHING AWFUL MIGHT HAPPEN: SEVERAL DAYS
7. FEELING AFRAID AS IF SOMETHING AWFUL MIGHT HAPPEN: SEVERAL DAYS
GAD7 TOTAL SCORE: 3
5. BEING SO RESTLESS THAT IT IS HARD TO SIT STILL: NOT AT ALL
2. NOT BEING ABLE TO STOP OR CONTROL WORRYING: NOT AT ALL
6. BECOMING EASILY ANNOYED OR IRRITABLE: NOT AT ALL
8. IF YOU CHECKED OFF ANY PROBLEMS, HOW DIFFICULT HAVE THESE MADE IT FOR YOU TO DO YOUR WORK, TAKE CARE OF THINGS AT HOME, OR GET ALONG WITH OTHER PEOPLE?: NOT DIFFICULT AT ALL
GAD7 TOTAL SCORE: 3

## 2022-09-22 ASSESSMENT — PATIENT HEALTH QUESTIONNAIRE - PHQ9
10. IF YOU CHECKED OFF ANY PROBLEMS, HOW DIFFICULT HAVE THESE PROBLEMS MADE IT FOR YOU TO DO YOUR WORK, TAKE CARE OF THINGS AT HOME, OR GET ALONG WITH OTHER PEOPLE: NOT DIFFICULT AT ALL
SUM OF ALL RESPONSES TO PHQ QUESTIONS 1-9: 3
SUM OF ALL RESPONSES TO PHQ QUESTIONS 1-9: 3

## 2022-09-22 ASSESSMENT — PAIN SCALES - GENERAL: PAINLEVEL: MODERATE PAIN (4)

## 2022-09-22 NOTE — PROGRESS NOTES
Assessment & Plan       (F41.9) Anxiety  (primary encounter diagnosis)  Comment: Plan: : LORazepam (ATIVAN) 0.5         MG tablet          Improved and stable. Need to continue to stay off klonipin. Also need to limit lorazepam use.  Continue with zoloft     (I10) Essential hypertension  Comment:   Plan: BP in adequate control.. encouraged home BP monitoring. Follow up recheck in 6 months, sooner if problem.         (E55.9) Vitamin D deficiency  Comment:   Plan: Vitamin D Deficiency            (E66.01) Morbid obesity (H)  Comment: improving bmi 32 now   Plan: Healthy diet and exercise reviewed.Risks of obesity discussed   .  Continue encourage exercise.        (Z87.19) Hx of acute alcoholic hepatitis  Comment:   Plan: sober - [facility] last hospitlizaton    (R21) Rash  Comment: dry skin - causing irritation     Plan: skin cares and symptomatic treatment discussed. follow up if problem         (Z23) High priority for 2019-nCoV vaccine  Comment:   Plan:   , booster given. Follow up as needed           Check labs.Cares and  treatment discussed follow. up if problem   Patient expressed understanding and agreement with treatment plan. All patient's questions were answered, will let me know if has more later.  Medications: Rx's: Reviewed the potential side effects/complications of medications prescribed.         Fifi Fleming MD  [facility] TANVIR Greco is a 62 year old, presenting for the following health issues:  No chief complaint on file.      History of Present Illness       CKD: He uses over the counter pain medication, including Tynol, a few times a month.    Mental Health Follow-up:  Patient presents to follow-up on Depression & Anxiety.Patient's depression since last visit has been:  Medium  The patient is having other symptoms associated with depression.  Patient's anxiety since last visit has been:  Medium  The patient is having other symptoms associated with  anxiety.  Any significant life events: health concerns  Patient is not feeling anxious or having panic attacks.  Patient has no concerns about alcohol or drug use.    Reason for visit:  Follow up    He eats 0-1 servings of fruits and vegetables daily.He consumes 1 sweetened beverage(s) daily.He exercises with enough effort to increase his heart rate 10 to 19 minutes per day.  He exercises with enough effort to increase his heart rate 4 days per week.   He is taking medications regularly.    Today's PHQ-9         PHQ-9 Total Score: 3    PHQ-9 Q9 Thoughts of better off dead/self-harm past 2 weeks :   Not at all    How difficult have these problems made it for you to do your work, take care of things at home, or get along with other people: Not difficult at all  Today's RUTH-7 Score: 3       Hyperlipidemia Follow-Up      Are you regularly taking any medication or supplement to lower your cholesterol?   No  He is on hold for now  bc of recent hospitliaztion , and his CPK was up,  after fall related injury.    .  they were not sure if it is injury related or medication so he was asked to hold and he has not started taking back yet          Are you having muscle aches or other side effects that you think could be caused by your cholesterol lowering medication?  As per HPI     Hypertension Follow-up      Do you check your blood pressure regularly outside of the clinic? No  [facility]emtiems at [facility] and it is good mostly     Are you following a low salt diet? Yes    Are your blood pressures ever more than 140 on the top number (systolic) OR more   than 90 on the bottom number (diastolic), for example 140/90? No    Depression and Anxiety Follow-Up    How are you doing with your depression since your last visit? Improved  And lorazepam has helped relaxing him so not taking colnazepam any more. He has remained sober since and has not have any drink since [facility] so he thinks lorazepam is helping so aleksandr like  refill .  also taking zoloft and  is doing ok and wants to continue .     How are you doing with your anxiety since your last visit?  Improved     Are you having other symptoms that might be associated with depression or anxiety? Yes:  as above,    Have you had a significant life event? Health Concerns As above     Do you have any concerns with your use of alcohol or other drugs? Yes:  as above    Social History     Tobacco Use     Smoking status: Former Smoker     Quit date: 2015     Years since quittin.9     Smokeless tobacco: Never Used   Substance Use Topics     Alcohol use: No     Alcohol/week: 0.0 standard drinks     Comment: 2-3 drinks a week     Drug use: No     PHQ 2021   PHQ-9 Total Score 9 - 3   Q9: Thoughts of better off dead/self-harm past 2 weeks Not at all Not at all Not at all     RUTH-7 SCORE 2021   Total Score 0 (minimal anxiety) - 3 (minimal anxiety)   Total Score 0 11 3     Last PHQ-9 2022   1.  Little interest or pleasure in doing things 1   2.  Feeling down, depressed, or hopeless 1   3.  Trouble falling or staying asleep, or sleeping too much 0   4.  Feeling tired or having little energy 1   5.  Poor appetite or overeating 0   6.  Feeling bad about yourself 0   7.  Trouble concentrating 0   8.  Moving slowly or restless 0   Q9: Thoughts of better off dead/self-harm past 2 weeks 0   PHQ-9 Total Score 3   Difficulty at work, home, or with people -     RUTH-7  2022   1. Feeling nervous, anxious, or on edge 0   2. Not being able to stop or control worrying 0   3. Worrying too much about different things 1   4. Trouble relaxing 1   5. Being so restless that it is hard to sit still 0   6. Becoming easily annoyed or irritable 0   7. Feeling afraid, as if something awful might happen 1   RUTH-7 Total Score 3   If you checked any problems, how difficult have they made it for you to do your work, take care of things at home, or get along with other  people? Not difficult at all       Suicide Assessment Five-step Evaluation and Treatment (SAFE-T)        Review of Systems   Constitutional, HEENT, cardiovascular, pulmonary, GI, , musculoskeletal, neuro, skin, endocrine and psych systems are negative, except as otherwise noted.      Objective    There were no vitals taken for this visit.  There is no height or weight on file to calculate BMI.  Physical Exam   GENERAL: healthy, alert and no distress  EYES: Eyes grossly normal to inspection, PERRL and conjunctivae and sclerae normal  HENT: ear canals and TM's normal, nose and mouth without ulcers or lesions  NECK: no adenopathy, no asymmetry, masses, or scars and thyroid normal to palpation  RESP: lungs clear to auscultation - no rales, rhonchi or wheezes  CV: regular rate and rhythm, normal S1 S2, no S3 or S4, no murmur  ABDOMEN: soft, nontender, no hepatosplenomegaly, no masses and bowel sounds normal  MS: no edema  SKIN: no suspicious lesions or rashes  NEURO: Normal strength and tone, mentation intact and speech normal  PSYCH: mentation appears normal, affect normal

## 2022-09-22 NOTE — PATIENT INSTRUCTIONS
Take medications as directed.  Cares and symptomatic cares discussed   Follow up  in 6-8 weeks , sooner if problem if problem or concern     Proper skin care from [facility]:     -Eliminate harsh soaps as they strip the natural oils from the skin, often resulting in dry itchy skin ( i.e. Dial, Zest, Priya Spring)  -Use mild soaps such as Cetaphil or Dove Sensitive Skin in the shower. You do not need to use soap on arms, legs, and trunk every time you shower unless visibly soiled.   -Avoid hot or cold showers.  -After showering, lightly dry off and apply moisturizing within 2-3 minutes. This will help trap moisture in the skin.   -Aggressive use of a moisturizer at least 1-2 times a day to the entire body (including -Vanicream, Cetaphil, Aquaphor or Cerave) and moisturize hands after every washing.  -We recommend using moisturizers that come in a tub that needs to be scooped out, not a pump. This has more of an oil base. It will hold moisture in your skin much better than a water base moisturizer. The above recommended are non-pore clogging.

## 2022-09-22 NOTE — TELEPHONE ENCOUNTER
Should have refills  E-Prescribing Status: Receipt confirmed by pharmacy (8/23/2022  2:54 PM CDT)  Trisha MIRANDA RN, BSN

## 2022-09-23 LAB
CK SERPL-CCNC: 56 U/L (ref 30–300)
DEPRECATED CALCIDIOL+CALCIFEROL SERPL-MC: 9 UG/L (ref 20–75)
TSH SERPL DL<=0.005 MIU/L-ACNC: 1.41 MU/L (ref 0.4–4)

## 2022-10-07 DIAGNOSIS — F41.9 ANXIETY: ICD-10-CM

## 2022-10-10 RX ORDER — LORAZEPAM 0.5 MG/1
TABLET ORAL
Qty: 30 TABLET | Refills: 0 | Status: SHIPPED | OUTPATIENT
Start: 2022-10-10 | End: 2023-01-05

## 2022-11-19 DIAGNOSIS — G47.09 OTHER INSOMNIA: ICD-10-CM

## 2022-11-23 RX ORDER — TRAZODONE HYDROCHLORIDE 50 MG/1
TABLET, FILM COATED ORAL
Qty: 180 TABLET | Refills: 1 | Status: SHIPPED | OUTPATIENT
Start: 2022-11-23 | End: 2023-01-05

## 2022-12-28 DIAGNOSIS — I10 ESSENTIAL HYPERTENSION: ICD-10-CM

## 2022-12-28 DIAGNOSIS — R79.89 ELEVATED LFTS: ICD-10-CM

## 2022-12-29 RX ORDER — LOSARTAN POTASSIUM 100 MG/1
TABLET ORAL
Qty: 90 TABLET | Refills: 1 | OUTPATIENT
Start: 2022-12-29

## 2023-01-05 ENCOUNTER — OFFICE VISIT (OUTPATIENT)
Dept: FAMILY MEDICINE | Facility: CLINIC | Age: 63
End: 2023-01-05
Payer: COMMERCIAL

## 2023-01-05 VITALS
OXYGEN SATURATION: 98 % | TEMPERATURE: 97.7 F | DIASTOLIC BLOOD PRESSURE: 70 MMHG | RESPIRATION RATE: 17 BRPM | SYSTOLIC BLOOD PRESSURE: 122 MMHG | HEART RATE: 82 BPM

## 2023-01-05 DIAGNOSIS — E78.5 HYPERLIPIDEMIA WITH TARGET LDL LESS THAN 130: ICD-10-CM

## 2023-01-05 DIAGNOSIS — R09.81 NASAL CONGESTION: ICD-10-CM

## 2023-01-05 DIAGNOSIS — E55.9 VITAMIN D DEFICIENCY: Primary | ICD-10-CM

## 2023-01-05 DIAGNOSIS — F41.9 ANXIETY: ICD-10-CM

## 2023-01-05 DIAGNOSIS — G47.09 OTHER INSOMNIA: ICD-10-CM

## 2023-01-05 DIAGNOSIS — E66.01 MORBID OBESITY (H): ICD-10-CM

## 2023-01-05 DIAGNOSIS — E03.8 OTHER SPECIFIED HYPOTHYROIDISM: ICD-10-CM

## 2023-01-05 DIAGNOSIS — R74.8 ELEVATED CK: ICD-10-CM

## 2023-01-05 PROCEDURE — 99214 OFFICE O/P EST MOD 30 MIN: CPT | Performed by: FAMILY MEDICINE

## 2023-01-05 RX ORDER — LEVOTHYROXINE SODIUM 175 UG/1
175 TABLET ORAL DAILY
Qty: 84 TABLET | Refills: 3
Start: 2023-01-05 | End: 2023-05-11

## 2023-01-05 RX ORDER — TRAZODONE HYDROCHLORIDE 50 MG/1
100-150 TABLET, FILM COATED ORAL AT BEDTIME
Qty: 180 TABLET | Refills: 0 | Status: SHIPPED | OUTPATIENT
Start: 2023-01-05 | End: 2023-05-23

## 2023-01-05 RX ORDER — FEXOFENADINE HCL 180 MG/1
180 TABLET ORAL DAILY
COMMUNITY
Start: 2023-01-05

## 2023-01-05 RX ORDER — LORAZEPAM 0.5 MG/1
TABLET ORAL
Qty: 60 TABLET | Refills: 0 | Status: SHIPPED | OUTPATIENT
Start: 2023-01-05 | End: 2023-02-02

## 2023-01-05 RX ORDER — FLUTICASONE PROPIONATE 50 MCG
2 SPRAY, SUSPENSION (ML) NASAL DAILY
Qty: 16 G | Refills: 3 | Status: SHIPPED | OUTPATIENT
Start: 2023-01-05 | End: 2023-05-19

## 2023-01-05 ASSESSMENT — PAIN SCALES - GENERAL: PAINLEVEL: MILD PAIN (2)

## 2023-01-05 NOTE — PROGRESS NOTES
Assessment & Plan       (G47.09) Other insomnia  Comment:   Plan: traZODone (DESYREL) 50 MG tablet            (F41.9) Anxiety  Comment:   Plan:LORazepam (ATIVAN) 0.5 MG tablet              Discussed cares, talked about signs and symptoms of anxiety/ depression and treatment options. Willing to,  continue with Zoloft. and take trazodone for sleep .  Gave few lorazepam to take as needed only for acute anxiety , although encouraged to taper the use gradually because of the potential habit-forming nature of this medication . pros/ cons of med's discussed . encouraged to see  to help and referral has been given previously. spent sometimes counseling patient. Follow up in 6-8 weeks , sooner if problem.     (R74.8) Elevated CK  Comment: related to illness / during hospitalization in August-   Plan: Comprehensive metabolic panel, CK total, Lipid         panel reflex to direct LDL Fasting      Willing to back on statin if ck remains normal     (E78.5) Hyperlipidemia with target LDL less than 130  Comment: HAS BEEN OFF STATING FOR FEW MONTHS - WILLING TO GO BACK IF CK REMAINS NORMAL   Plan: Comprehensive metabolic panel, CK total       (E03.8) Other specified hypothyroidism  Comment:   Plan: TSH with free T4 reflex, levothyroxine         (SYNTHROID/LEVOTHROID) 175 MCG tablet    Check labs . Adjust dose further if needed     (E66.01) Morbid obesity (H)  Comment:   Plan: Healthy diet and exercise reviewed.   Risks of obesity discussed.  Encourage exercise      (R09.81) Nasal congestion  Comment: rebound congestion probably for afrin use   Plan: fluticasone (FLONASE) 50 MCG/ACT nasal spray,         fexofenadine (ALLEGRA) 180 MG tablet    Need refill . He is working on quitting afrin and Flonase has helped       Check labs. refill sent.Cares and  treatment discussed follow. up if problem   Patient expressed understanding and agreement with treatment plan. All patient's questions were answered, will let me know if has  more later.  Medications: Rx's: Reviewed the potential side effects/complications of medications prescribed.     Fifi Fleming MD  [facility] TANVIR Greco is a 62 year old, presenting for the following health issues:  Follow Up      Taking 175mcg daily of Levothyroxine    HPI     Hyperlipidemia Follow-Up      Are you regularly taking any medication or supplement to lower your cholesterol?   No has not started back yet since last hospitalization many months ago as ck was high but it was thought to be related to injury as he has done well on statin for a longtime prior to that, so debating if he can start that again as lipid has been running high again     Are you having muscle aches or other side effects that you think could be caused by your cholesterol lowering medication?  No    Hypertension Follow-up      Do you check your blood pressure regularly outside of the clinic? No not all the time but he is mostly ok when checks it     Are you following a low salt diet? Yes    Are your blood pressures ever more than 140 on the top number (systolic) OR more   than 90 on the bottom number (diastolic), for example 140/90? No    Anxiety Follow-Up    How are you doing with your anxiety since your last visit? Improved. Zoloft is working well for mood most of the time , but still need lorazepam to help with acute anxiety only in certain situation. trazodone working well for sleep . He needs refill     Are you having other symptoms that might be associated with anxiety? No     Have you had a significant life event? No     Are you feeling depressed? No    Do you have any concerns with your use of alcohol or other drugs? No .  He is staying sober .  Has not had any drink since last hospitalization several months ago.    Social History     Tobacco Use     Smoking status: Former     Types: Cigarettes     Quit date: 2015     Years since quittin.3     Smokeless tobacco: Never    Substance Use Topics     Alcohol use: No     Alcohol/week: 0.0 standard drinks     Comment: 2-3 drinks a week     Drug use: No     RUTH-7 SCORE 7/29/2021 8/23/2022 9/22/2022   Total Score 0 (minimal anxiety) - 3 (minimal anxiety)   Total Score 0 11 3     PHQ 4/8/2021 8/23/2022 9/22/2022   PHQ-9 Total Score 9 - 3   Q9: Thoughts of better off dead/self-harm past 2 weeks Not at all Not at all Not at all     Last PHQ-9 9/22/2022   1.  Little interest or pleasure in doing things 1   2.  Feeling down, depressed, or hopeless 1   3.  Trouble falling or staying asleep, or sleeping too much 0   4.  Feeling tired or having little energy 1   5.  Poor appetite or overeating 0   6.  Feeling bad about yourself 0   7.  Trouble concentrating 0   8.  Moving slowly or restless 0   Q9: Thoughts of better off dead/self-harm past 2 weeks 0   PHQ-9 Total Score 3   Difficulty at work, home, or with people -     RUTH-7  9/22/2022   1. Feeling nervous, anxious, or on edge 0   2. Not being able to stop or control worrying 0   3. Worrying too much about different things 1   4. Trouble relaxing 1   5. Being so restless that it is hard to sit still 0   6. Becoming easily annoyed or irritable 0   7. Feeling afraid, as if something awful might happen 1   RUTH-7 Total Score 3   If you checked any problems, how difficult have they made it for you to do your work, take care of things at home, or get along with other people? Not difficult at all       Hypothyroidism Follow-up      Since last visit, patient describes the following symptoms: Weight stable, no hair loss, no skin changes, no constipation, no loose stools          Review of Systems   Constitutional, HEENT, cardiovascular, pulmonary, GI, , musculoskeletal, neuro, skin, endocrine and psych systems are negative, except as otherwise noted.      Objective    /70   Pulse 82   Temp 97.7  F (36.5  C) (Tympanic)   Resp 17   SpO2 98%   There is no height or weight on file to calculate  BMI.  Physical Exam   GENERAL: healthy, alert and no distress  EYES: Eyes grossly normal to inspection, PERRL and conjunctivae and sclerae normal  HENT: ear canals and TM's normal, nose and mouth without ulcers or lesions  NECK: no adenopathy, no asymmetry, masses, or scars and thyroid normal to palpation  RESP: lungs clear to auscultation - no rales, rhonchi or wheezes  CV: regular rate and rhythm, normal S1 S2, no S3 or S4, no murmur, click or rub, no peripheral edema and peripheral pulses strong  ABDOMEN: soft, nontender, no hepatosplenomegaly, no masses and bowel sounds normal  MS: no edema  SKIN: no suspicious lesions or rashes  NEURO: Normal strength and tone, mentation intact and speech normal  PSYCH: mentation appears normal, affect upright somewhat anxious, judgment and insight intact, well groomed

## 2023-01-12 ENCOUNTER — LAB (OUTPATIENT)
Dept: LAB | Facility: CLINIC | Age: 63
End: 2023-01-12
Payer: COMMERCIAL

## 2023-01-12 DIAGNOSIS — E78.5 HYPERLIPIDEMIA WITH TARGET LDL LESS THAN 130: ICD-10-CM

## 2023-01-12 DIAGNOSIS — E03.8 OTHER SPECIFIED HYPOTHYROIDISM: ICD-10-CM

## 2023-01-12 DIAGNOSIS — R74.8 ELEVATED CK: ICD-10-CM

## 2023-01-12 LAB
ALBUMIN SERPL BCG-MCNC: 4.6 G/DL (ref 3.5–5.2)
ALP SERPL-CCNC: 61 U/L (ref 40–129)
ALT SERPL W P-5'-P-CCNC: 37 U/L (ref 10–50)
ANION GAP SERPL CALCULATED.3IONS-SCNC: 17 MMOL/L (ref 7–15)
AST SERPL W P-5'-P-CCNC: 33 U/L (ref 10–50)
BILIRUB SERPL-MCNC: 0.5 MG/DL
BUN SERPL-MCNC: 12.4 MG/DL (ref 8–23)
CALCIUM SERPL-MCNC: 10.2 MG/DL (ref 8.8–10.2)
CHLORIDE SERPL-SCNC: 103 MMOL/L (ref 98–107)
CHOLEST SERPL-MCNC: 321 MG/DL
CK SERPL-CCNC: 56 U/L (ref 39–308)
CREAT SERPL-MCNC: 0.93 MG/DL (ref 0.67–1.17)
DEPRECATED HCO3 PLAS-SCNC: 22 MMOL/L (ref 22–29)
GFR SERPL CREATININE-BSD FRML MDRD: >90 ML/MIN/1.73M2
GLUCOSE SERPL-MCNC: 124 MG/DL (ref 70–99)
HDLC SERPL-MCNC: 72 MG/DL
LDLC SERPL CALC-MCNC: 212 MG/DL
NONHDLC SERPL-MCNC: 249 MG/DL
POTASSIUM SERPL-SCNC: 4.4 MMOL/L (ref 3.4–5.3)
PROT SERPL-MCNC: 7.6 G/DL (ref 6.4–8.3)
SODIUM SERPL-SCNC: 142 MMOL/L (ref 136–145)
TRIGL SERPL-MCNC: 187 MG/DL
TSH SERPL DL<=0.005 MIU/L-ACNC: 3.76 UIU/ML (ref 0.3–4.2)

## 2023-01-12 PROCEDURE — 84443 ASSAY THYROID STIM HORMONE: CPT

## 2023-01-12 PROCEDURE — 80061 LIPID PANEL: CPT

## 2023-01-12 PROCEDURE — 80053 COMPREHEN METABOLIC PANEL: CPT

## 2023-01-12 PROCEDURE — 36415 COLL VENOUS BLD VENIPUNCTURE: CPT

## 2023-01-12 PROCEDURE — 82550 ASSAY OF CK (CPK): CPT

## 2023-02-02 ENCOUNTER — MYC REFILL (OUTPATIENT)
Dept: FAMILY MEDICINE | Facility: CLINIC | Age: 63
End: 2023-02-02

## 2023-02-02 DIAGNOSIS — F41.9 ANXIETY: ICD-10-CM

## 2023-02-07 ENCOUNTER — MYC MEDICAL ADVICE (OUTPATIENT)
Dept: FAMILY MEDICINE | Facility: CLINIC | Age: 63
End: 2023-02-07

## 2023-02-07 DIAGNOSIS — F41.9 ANXIETY: ICD-10-CM

## 2023-02-07 RX ORDER — LORAZEPAM 0.5 MG/1
TABLET ORAL
Qty: 60 TABLET | Refills: 0 | Status: SHIPPED | OUTPATIENT
Start: 2023-02-07 | End: 2023-02-16

## 2023-02-09 NOTE — TELEPHONE ENCOUNTER
Spoke with compounding pharmacy and rx for lorazepam was transferred to  mail order pharmacy. Spoke with [facility] who states that she will route to pharmacist to cancel.    Please reorder lorazepam to local [facility].  Ashleigh Messina RN

## 2023-02-16 RX ORDER — LORAZEPAM 0.5 MG/1
TABLET ORAL
Qty: 60 TABLET | Refills: 0 | Status: SHIPPED | OUTPATIENT
Start: 2023-02-16 | End: 2023-05-08

## 2023-02-21 NOTE — TELEPHONE ENCOUNTER
Clonazepam 0.5 mg      Last Written Prescription Date:  9/30/21  Last Fill Quantity: 10,   # refills: 0  Last Office Visit: 9/8/21 [facility]  Future Office visit:       Routing refill request to provider for review/approval because:  Drug not on the [facility], [facility] or [facility] refill protocol or controlled substance    Fara HAWKINS RN  EP Triage       21-Feb-2023 06:09

## 2023-02-28 ENCOUNTER — E-VISIT (OUTPATIENT)
Dept: FAMILY MEDICINE | Facility: CLINIC | Age: 63
End: 2023-02-28
Payer: COMMERCIAL

## 2023-02-28 ENCOUNTER — TELEPHONE (OUTPATIENT)
Dept: FAMILY MEDICINE | Facility: CLINIC | Age: 63
End: 2023-02-28

## 2023-02-28 DIAGNOSIS — M54.50 ACUTE MIDLINE LOW BACK PAIN WITHOUT SCIATICA: Primary | ICD-10-CM

## 2023-02-28 PROCEDURE — 99207 PR NON-BILLABLE SERV PER CHARTING: CPT | Performed by: PHYSICIAN ASSISTANT

## 2023-02-28 NOTE — PATIENT INSTRUCTIONS
Thank you for choosing us for your care. I think an in-clinic visit would be best next steps based on your symptoms. Please schedule a clinic appointment; you won t be charged for this eVisit.      I will ask our team to call and get you scheduled for an in-person visit ASAP.     Cam Nolasco PA-C

## 2023-02-28 NOTE — TELEPHONE ENCOUNTER
Provider E-Visit time total (minutes): 0    Patient will need to be seen in person.     Cam Nolasco PA-C

## 2023-02-28 NOTE — TELEPHONE ENCOUNTER
Patient submitted an eVisit for back pain, however I would like him to be seen in person to get an appropriate evaluation.  Please call patient and add to a SameDay opening for any provider at [facility] this week.  This is a Dr. Fleming patient and she is out of town.     Cam Nolasco PA-C

## 2023-03-01 NOTE — TELEPHONE ENCOUNTER
Called pt w , and LVM for pt requesting callback to schedule appt.    Fara BAXTER    [facility]

## 2023-04-26 NOTE — PROGRESS NOTES
Cardiology Consultation     Assessment & Plan     1.  History of T9 compression fracture, followed by neurosurgery  2.  CTA of coronaries 2019 with a total calcium score of 52.  Trivial nonocclusive coronary artery disease  3.  Hypothyroidism  4.  Gastroesophageal reflux disease  5.  Anxiety  6.  Hypertension  7.  Hyperlipidemia   8.  ETOH abuse  9.  Hospitalization [facility] in August 2022 for rhabdomyolysis and acute renal failure.      Recommendations    1.  We reviewed his prior cardiac testing including his coronary CTA in 2019 which was not suggestive of any obstructive coronary disease.  I suspect the majority of his symptoms are anxiety driven and related to longstanding problems and his recent hospitalization in August 2022.  I provided him with reassurance.    2.  Dyspnea on exertion: Likely related to deconditioning.  We will get an echocardiogram to make sure no changes are noted above his baseline.    3.  Hyperlipidemia: We will can reconsider starting statin therapy upon next visit.  He will need close monitoring of his CK level as well as ALT to make sure no changes occur given his rhabdomyolysis episode last year.    4.  I do not have any clinic availability over the next month.  I am happy to coordinate with my [facility] colleague when he returns to clinic.  Please call me to help guide his care on his follow-up visit.    Thank you kindly for consult      Raoul Camargo MD, MD      HPI:    Patient is a pleasant 62-year-old male with a prior cardiac history of coronary angiography being performed 10 years ago in [place] for an abnormal EKG.  The details are unknown to patient.  However he remembers being told that his coronary anatomy was normal.  He in 2017 had an EKG performed due to shortness of breath.  This demonstrated symmetric T wave inversions in V4 to V6.  As a result his primary care physician advised a hospital evaluation.  He underwent a stress echocardiogram demonstrating  normal LV systolic function at rest with no evidence of ischemia.  The Duke score was low for suspected coronary artery disease.  Patient more recently had a fall in February 2019 he slipped on a patch of ice and hurt his vertebra..  He had a T9 fracture.  Neurosurgery advise some form of surgical therapy however patient wished conservative management and wore a brace for several months.    In 2019 we had last seen him in cardiology clinic.  At that time he had wanted a formal evaluation for coronary artery disease and he underwent a coronary CTA.  His total calcium score was 52 and there was trivial nonocclusive coronary artery disease by CTA angiography.    Patient had an ER visit leading to hospitalization in August 2022.  Unfortunate that time he had acute rhabdomyolysis and CK level of 16,000.  He had red urine at that time.  Fortunately with conservative therapy with IV fluids his numbers had normalized.  Appropriately his medications were stopped.  Prior to that he had been taking his statin due to his elevated cholesterol.  Blood pressure medications have been slowly reintroduced and his creatinine is also since normalized.  He had a fasting lipid profile earlier and not surprisingly his numbers are elevated.  His main symptoms are dyspnea on exertion and severe anxiety.  He had a very thorough work-up a few years ago and is concerned about his heart status.  He feels deconditioned.  Here to reestablish care.  EKG was performed today and please note that he has chronic findings that are unchanged when going back reviewing his serial EKGs.  Never has had any chest pain        Primary Care Physician   Fifi Fleming      Patient Active Problem List   Diagnosis     BPPV (benign paroxysmal positional vertigo), unspecified laterality     Other specified hypothyroidism     Hx of acute alcoholic hepatitis     Hx of colonic polyp     Gastroesophageal reflux disease, esophagitis presence not specified      Vitamin D deficiency     Anxiety     Alcohol use disorder     Neck pain     Cervicalgia     Cervical spondylosis without myelopathy     Essential hypertension     Hyperlipidemia with target LDL less than 130     Nasal congestion     History of colonic polyps     Vertigo     Lumbago     Morbid obesity (H)     Colon cancer screening     Thoracic compression fracture (H)     IBARRA (dyspnea on exertion)       Past Medical History   I have reviewed this patient's medical history and updated it with pertinent information if needed.   Past Medical History:   Diagnosis Date     GERD (gastroesophageal reflux disease)      HTN (hypertension)      Hyperlipidemia      Hypothyroid      Vertigo     2015 ,        Past Surgical History   I have reviewed this patient's surgical history and updated it with pertinent information if needed.  Past Surgical History:   Procedure Laterality Date     AS ESOPHAGOSCOPY, DIAGNOSTIC       COLONOSCOPY       EXAM UNDER ANESTHESIA ANUS N/A 9/9/2021    Procedure: EXAM UNDER ANESTHESIA, ANUS, ANAL BISOPY, BOTOX INJECTION;  Surgeon: Henrik Garduno MD;  Location: [facility] OR     SPHINCTEROTOMY RECTUM N/A 9/30/2021    Procedure: Anal sphincterotomy and hemorroidectomy;  Surgeon: Rajendra Armando MD;  Location: [facility] OR       Prior to Admission Medications   Cannot display prior to admission medications because the patient has not been admitted in this contact.     [unfilled]  [unfilled]  Allergies   No Known Allergies    Social History    reports that he quit smoking about 7 years ago. He has never used smokeless tobacco. He reports that he does not drink alcohol and does not use drugs.    Family History   Family History   Problem Relation Age of Onset     Coronary Artery Disease Father         at age 60      Hyperlipidemia Brother      Cancer Brother         wall of scrotum     Breast Cancer Mother      Diabetes No family hx of      Cerebrovascular Disease No family hx of      Colon Cancer No  family hx of      Prostate Cancer No family hx of        Review of Systems   The comprehensive 10 point Review of Systems is negative other than noted in the HPI or here.     Physical Exam   Vital Signs with Ranges     Wt Readings from Last 4 Encounters:   09/22/22 108.3 kg (238 lb 12.8 oz)   08/23/22 110.5 kg (243 lb 9.6 oz)   08/01/22 114.1 kg (251 lb 9.6 oz)   07/20/22 111.1 kg (245 lb)     [unfilled]      Vitals:  There were no vitals taken for this visit.      Constitutional:   awake, alert, cooperative, no apparent distress, and appears stated age     ENT:   Normocephalic, without obvious abnormality, atraumatic, sinuses nontender on palpation, external ears without lesions, oral pharynx with moist mucous membranes, tonsils without erythema or exudates, gums normal and good dentition.     Neck:   Supple, symmetrical, trachea midline, no adenopathy, thyroid symmetric, not enlarged and no tenderness, skin normal     Back:   Symmetric, no curvature, spinous processes are non-tender on palpation, paraspinous muscles are non-tender on palpation, no costal vertebral tenderness     Lungs:   No increased work of breathing, good air exchange, clear to auscultation bilaterally, no crackles or wheezing     Cardiovascular:   Normal apical impulse, regular rate and rhythm, normal S1 and S2, no S3 or S4, and no murmur noted     Abdomen:   No scars, normal bowel sounds, soft, non-distended, non-tender, no masses palpated, no hepatosplenomegally     Musculoskeletal:   There is no redness, warmth, or swelling of the joints.  Full range of motion noted.  Motor strength is 5 out of 5 all extremities bilaterally.  Tone is normal.       Neurologic:   Awake, alert, oriented to name, place and time.  Cranial nerves II-XII are grossly intact.  Motor is 5 out of 5 bilaterally.  Cerebellar finger to nose, heel to shin intact.  Sensory is intact.  Babinski down going, Romberg negative, and gait is normal.

## 2023-04-27 ENCOUNTER — TELEPHONE (OUTPATIENT)
Dept: CARDIOLOGY | Facility: CLINIC | Age: 63
End: 2023-04-27

## 2023-04-27 ENCOUNTER — OFFICE VISIT (OUTPATIENT)
Dept: CARDIOLOGY | Facility: CLINIC | Age: 63
End: 2023-04-27
Payer: COMMERCIAL

## 2023-04-27 VITALS
WEIGHT: 272 LBS | HEIGHT: 72 IN | HEART RATE: 91 BPM | BODY MASS INDEX: 36.84 KG/M2 | DIASTOLIC BLOOD PRESSURE: 90 MMHG | SYSTOLIC BLOOD PRESSURE: 129 MMHG | OXYGEN SATURATION: 97 %

## 2023-04-27 DIAGNOSIS — R06.09 DOE (DYSPNEA ON EXERTION): Primary | ICD-10-CM

## 2023-04-27 PROCEDURE — 99204 OFFICE O/P NEW MOD 45 MIN: CPT | Performed by: INTERNAL MEDICINE

## 2023-04-27 PROCEDURE — 93000 ELECTROCARDIOGRAM COMPLETE: CPT | Performed by: INTERNAL MEDICINE

## 2023-04-27 NOTE — TELEPHONE ENCOUNTER
[facility] Call Center    Phone Message    May a detailed message be left on voicemail: yes     Reason for Call: Other: patient called to speak to a member of his care team regarding his appoitment today, patient is experiencing high anxiety,please call patient back to advise.     Action Taken: Other: cardiology    Travel Screening: Not Applicable   Thank you!  [facility]

## 2023-04-27 NOTE — TELEPHONE ENCOUNTER
Spoke with pt and confirmed he will keep visit with Dr. Camargo today.    Detail Level: Detailed Size Of Lesion In Cm (Optional): 0 Morphology Per Location (Optional): Few toenails involved

## 2023-04-27 NOTE — LETTER
4/27/2023    Fifi Fleming MD  [address]  [place] [address]    RE: Angus Wynne       Dear Colleague,     I had the pleasure of seeing Angus Wynne in the [facility].      Cardiology Consultation     Assessment & Plan     1.  History of T9 compression fracture, followed by neurosurgery  2.  CTA of coronaries 2019 with a total calcium score of 52.  Trivial nonocclusive coronary artery disease  3.  Hypothyroidism  4.  Gastroesophageal reflux disease  5.  Anxiety  6.  Hypertension  7.  Hyperlipidemia   8.  ETOH abuse  9.  Hospitalization [facility] in August 2022 for rhabdomyolysis and acute renal failure.      Recommendations    1.  We reviewed his prior cardiac testing including his coronary CTA in 2019 which was not suggestive of any obstructive coronary disease.  I suspect the majority of his symptoms are anxiety driven and related to longstanding problems and his recent hospitalization in August 2022.  I provided him with reassurance.    2.  Dyspnea on exertion: Likely related to deconditioning.  We will get an echocardiogram to make sure no changes are noted above his baseline.    3.  Hyperlipidemia: We will can reconsider starting statin therapy upon next visit.  He will need close monitoring of his CK level as well as ALT to make sure no changes occur given his rhabdomyolysis episode last year.    4.  I do not have any clinic availability over the next month.  I am happy to coordinate with my [facility] colleague when he returns to clinic.  Please call me to help guide his care on his follow-up visit.    Thank you kindly for consult      Raoul Camargo MD, MD      HPI:    Patient is a pleasant 62-year-old male with a prior cardiac history of coronary angiography being performed 10 years ago in [place] for an abnormal EKG.  The details are unknown to patient.  However he remembers being told that his coronary anatomy was normal.  He in 2017 had an EKG performed  due to shortness of breath.  This demonstrated symmetric T wave inversions in V4 to V6.  As a result his primary care physician advised a hospital evaluation.  He underwent a stress echocardiogram demonstrating normal LV systolic function at rest with no evidence of ischemia.  The Duke score was low for suspected coronary artery disease.  Patient more recently had a fall in February 2019 he slipped on a patch of ice and hurt his vertebra..  He had a T9 fracture.  Neurosurgery advise some form of surgical therapy however patient wished conservative management and wore a brace for several months.    In 2019 we had last seen him in cardiology clinic.  At that time he had wanted a formal evaluation for coronary artery disease and he underwent a coronary CTA.  His total calcium score was 52 and there was trivial nonocclusive coronary artery disease by CTA angiography.    Patient had an ER visit leading to hospitalization in August 2022.  Unfortunate that time he had acute rhabdomyolysis and CK level of 16,000.  He had red urine at that time.  Fortunately with conservative therapy with IV fluids his numbers had normalized.  Appropriately his medications were stopped.  Prior to that he had been taking his statin due to his elevated cholesterol.  Blood pressure medications have been slowly reintroduced and his creatinine is also since normalized.  He had a fasting lipid profile earlier and not surprisingly his numbers are elevated.  His main symptoms are dyspnea on exertion and severe anxiety.  He had a very thorough work-up a few years ago and is concerned about his heart status.  He feels deconditioned.  Here to reestablish care.  EKG was performed today and please note that he has chronic findings that are unchanged when going back reviewing his serial EKGs.  Never has had any chest pain        Primary Care Physician   Fifi Fleming      Patient Active Problem List   Diagnosis    BPPV (benign paroxysmal positional  vertigo), unspecified laterality    Other specified hypothyroidism    Hx of acute alcoholic hepatitis    Hx of colonic polyp    Gastroesophageal reflux disease, esophagitis presence not specified    Vitamin D deficiency    Anxiety    Alcohol use disorder    Neck pain    Cervicalgia    Cervical spondylosis without myelopathy    Essential hypertension    Hyperlipidemia with target LDL less than 130    Nasal congestion    History of colonic polyps    Vertigo    Lumbago    Morbid obesity (H)    Colon cancer screening    Thoracic compression fracture (H)    IBARRA (dyspnea on exertion)       Past Medical History   I have reviewed this patient's medical history and updated it with pertinent information if needed.   Past Medical History:   Diagnosis Date    GERD (gastroesophageal reflux disease)     HTN (hypertension)     Hyperlipidemia     Hypothyroid     Vertigo     2015 ,        Past Surgical History   I have reviewed this patient's surgical history and updated it with pertinent information if needed.  Past Surgical History:   Procedure Laterality Date    AS ESOPHAGOSCOPY, DIAGNOSTIC      COLONOSCOPY      EXAM UNDER ANESTHESIA ANUS N/A 9/9/2021    Procedure: EXAM UNDER ANESTHESIA, ANUS, ANAL BISOPY, BOTOX INJECTION;  Surgeon: Henrik Garduno MD;  Location: [facility] OR    SPHINCTEROTOMY RECTUM N/A 9/30/2021    Procedure: Anal sphincterotomy and hemorroidectomy;  Surgeon: Rajendra Armando MD;  Location: [facility] [place]       Prior to Admission Medications   Cannot display prior to admission medications because the patient has not been admitted in this contact.     [unfilled]  [unfilled]  Allergies   No Known Allergies    Social History    reports that he quit smoking about 7 years ago. He has never used smokeless tobacco. He reports that he does not drink alcohol and does not use drugs.    Family History   Family History   Problem Relation Age of Onset    Coronary Artery Disease Father         at age 60     Hyperlipidemia  Brother     Cancer Brother         wall of scrotum    Breast Cancer Mother     Diabetes No family hx of     Cerebrovascular Disease No family hx of     Colon Cancer No family hx of     Prostate Cancer No family hx of        Review of Systems   The comprehensive 10 point Review of Systems is negative other than noted in the HPI or here.     Physical Exam   Vital Signs with Ranges     Wt Readings from Last 4 Encounters:   09/22/22 108.3 kg (238 lb 12.8 oz)   08/23/22 110.5 kg (243 lb 9.6 oz)   08/01/22 114.1 kg (251 lb 9.6 oz)   07/20/22 111.1 kg (245 lb)     [unfilled]      Vitals:  There were no vitals taken for this visit.      Constitutional:   awake, alert, cooperative, no apparent distress, and appears stated age     ENT:   Normocephalic, without obvious abnormality, atraumatic, sinuses nontender on palpation, external ears without lesions, oral pharynx with moist mucous membranes, tonsils without erythema or exudates, gums normal and good dentition.     Neck:   Supple, symmetrical, trachea midline, no adenopathy, thyroid symmetric, not enlarged and no tenderness, skin normal     Back:   Symmetric, no curvature, spinous processes are non-tender on palpation, paraspinous muscles are non-tender on palpation, no costal vertebral tenderness     Lungs:   No increased work of breathing, good air exchange, clear to auscultation bilaterally, no crackles or wheezing     Cardiovascular:   Normal apical impulse, regular rate and rhythm, normal S1 and S2, no S3 or S4, and no murmur noted     Abdomen:   No scars, normal bowel sounds, soft, non-distended, non-tender, no masses palpated, no hepatosplenomegally     Musculoskeletal:   There is no redness, warmth, or swelling of the joints.  Full range of motion noted.  Motor strength is 5 out of 5 all extremities bilaterally.  Tone is normal.       Neurologic:   Awake, alert, oriented to name, place and time.  Cranial nerves II-XII are grossly intact.  Motor is 5 out of 5  bilaterally.  Cerebellar finger to nose, heel to shin intact.  Sensory is intact.  Babinski down going, Romberg negative, and gait is normal.           Thank you for allowing me to participate in the care of your patient.      Sincerely,     Raoul Camargo MD     [facility]  cc:   No referring provider defined for this encounter.

## 2023-04-30 ENCOUNTER — HEALTH MAINTENANCE LETTER (OUTPATIENT)
Age: 63
End: 2023-04-30

## 2023-05-07 DIAGNOSIS — K21.9 GASTROESOPHAGEAL REFLUX DISEASE: ICD-10-CM

## 2023-05-08 ENCOUNTER — MYC REFILL (OUTPATIENT)
Dept: FAMILY MEDICINE | Facility: CLINIC | Age: 63
End: 2023-05-08

## 2023-05-08 DIAGNOSIS — F41.9 ANXIETY: ICD-10-CM

## 2023-05-08 RX ORDER — OMEPRAZOLE 40 MG/1
CAPSULE, DELAYED RELEASE ORAL
Qty: 90 CAPSULE | Refills: 3 | OUTPATIENT
Start: 2023-05-08

## 2023-05-08 RX ORDER — LORAZEPAM 0.5 MG/1
TABLET ORAL
Qty: 30 TABLET | Refills: 0 | Status: SHIPPED | OUTPATIENT
Start: 2023-05-08 | End: 2023-05-23

## 2023-05-08 NOTE — TELEPHONE ENCOUNTER
Small cript refill faxed. Remind pt to do follow up for med check and labs, since he is due.

## 2023-05-19 DIAGNOSIS — R09.81 NASAL CONGESTION: ICD-10-CM

## 2023-05-19 RX ORDER — FLUTICASONE PROPIONATE 50 MCG
2 SPRAY, SUSPENSION (ML) NASAL DAILY
Qty: 16 G | Refills: 1 | Status: SHIPPED | OUTPATIENT
Start: 2023-05-19 | End: 2023-07-17

## 2023-05-23 ENCOUNTER — OFFICE VISIT (OUTPATIENT)
Dept: FAMILY MEDICINE | Facility: CLINIC | Age: 63
End: 2023-05-23
Payer: COMMERCIAL

## 2023-05-23 VITALS
SYSTOLIC BLOOD PRESSURE: 126 MMHG | HEART RATE: 81 BPM | TEMPERATURE: 97.4 F | OXYGEN SATURATION: 99 % | RESPIRATION RATE: 18 BRPM | DIASTOLIC BLOOD PRESSURE: 80 MMHG

## 2023-05-23 DIAGNOSIS — G47.09 OTHER INSOMNIA: ICD-10-CM

## 2023-05-23 DIAGNOSIS — I10 ESSENTIAL HYPERTENSION: ICD-10-CM

## 2023-05-23 DIAGNOSIS — F41.9 ANXIETY: Primary | ICD-10-CM

## 2023-05-23 DIAGNOSIS — E03.8 OTHER SPECIFIED HYPOTHYROIDISM: ICD-10-CM

## 2023-05-23 DIAGNOSIS — E78.5 HYPERLIPIDEMIA LDL GOAL <100: ICD-10-CM

## 2023-05-23 DIAGNOSIS — R73.09 ELEVATED GLUCOSE: ICD-10-CM

## 2023-05-23 PROBLEM — D69.6 THROMBOCYTOPENIA, UNSPECIFIED (H): Status: RESOLVED | Noted: 2021-04-18 | Resolved: 2023-05-23

## 2023-05-23 LAB — HBA1C MFR BLD: 5.5 % (ref 0–5.6)

## 2023-05-23 PROCEDURE — 83036 HEMOGLOBIN GLYCOSYLATED A1C: CPT | Performed by: FAMILY MEDICINE

## 2023-05-23 PROCEDURE — 99214 OFFICE O/P EST MOD 30 MIN: CPT | Performed by: FAMILY MEDICINE

## 2023-05-23 PROCEDURE — 36415 COLL VENOUS BLD VENIPUNCTURE: CPT | Performed by: FAMILY MEDICINE

## 2023-05-23 RX ORDER — LORAZEPAM 0.5 MG/1
TABLET ORAL
Qty: 30 TABLET | Refills: 0 | Status: SHIPPED | OUTPATIENT
Start: 2023-06-08 | End: 2023-06-14

## 2023-05-23 RX ORDER — ROSUVASTATIN CALCIUM 40 MG/1
40 TABLET, COATED ORAL DAILY
Qty: 90 TABLET | Refills: 1 | Status: SHIPPED | OUTPATIENT
Start: 2023-05-23 | End: 2023-06-14

## 2023-05-23 RX ORDER — TRAZODONE HYDROCHLORIDE 50 MG/1
100-150 TABLET, FILM COATED ORAL AT BEDTIME
Qty: 180 TABLET | Refills: 0 | Status: SHIPPED | OUTPATIENT
Start: 2023-05-23 | End: 2023-07-19

## 2023-05-23 ASSESSMENT — ANXIETY QUESTIONNAIRES
8. IF YOU CHECKED OFF ANY PROBLEMS, HOW DIFFICULT HAVE THESE MADE IT FOR YOU TO DO YOUR WORK, TAKE CARE OF THINGS AT HOME, OR GET ALONG WITH OTHER PEOPLE?: NOT DIFFICULT AT ALL
6. BECOMING EASILY ANNOYED OR IRRITABLE: NOT AT ALL
GAD7 TOTAL SCORE: 0
4. TROUBLE RELAXING: NOT AT ALL
7. FEELING AFRAID AS IF SOMETHING AWFUL MIGHT HAPPEN: NOT AT ALL
7. FEELING AFRAID AS IF SOMETHING AWFUL MIGHT HAPPEN: NOT AT ALL
IF YOU CHECKED OFF ANY PROBLEMS ON THIS QUESTIONNAIRE, HOW DIFFICULT HAVE THESE PROBLEMS MADE IT FOR YOU TO DO YOUR WORK, TAKE CARE OF THINGS AT HOME, OR GET ALONG WITH OTHER PEOPLE: NOT DIFFICULT AT ALL
1. FEELING NERVOUS, ANXIOUS, OR ON EDGE: NOT AT ALL
2. NOT BEING ABLE TO STOP OR CONTROL WORRYING: NOT AT ALL
GAD7 TOTAL SCORE: 0
5. BEING SO RESTLESS THAT IT IS HARD TO SIT STILL: NOT AT ALL
3. WORRYING TOO MUCH ABOUT DIFFERENT THINGS: NOT AT ALL

## 2023-05-23 ASSESSMENT — PAIN SCALES - GENERAL: PAINLEVEL: NO PAIN (0)

## 2023-05-23 NOTE — PROGRESS NOTES
Assessment & Plan         (F41.9) Anxiety  Comment:   Plan: LORazepam (ATIVAN) 0.5 MG tablet        (G47.09) Other insomnia  Comment:   Plan: traZODone (DESYREL) 50 MG tablet            Anxiety has improved.  We will continue with the same dose of Zoloft trazodone.  To help with sleep.  Gave him 1 refill on lorazepam.  He still needs to working on cutting down and reduce the need.   Follow-up in 3 months.     (E78.5) Hyperlipidemia LDL goal <100  Comment:   Plan: rosuvastatin (CRESTOR) 40 MG tablet        His statin was stopped during hospitalization few months ago because of illness.  Although clinically he has improved.  His CK is back to normal.  He had taken statin for a long time prior to that with no problem he is wiling to start slowly low dose and if he is tolerataing well he will go back to his full dose. He has cardiology follow up coming in up I couple of weeks as well. Fasting labs in 3-4 months after  being back on statin .       (I10) Essential hypertension  Comment:   Plan: stable on current med's     (R73.09) Elevated glucose  Comment:   Plan: Hemoglobin AC    Check labs . Healthy diet and exercise reviewed.  Risks of obesity discussed.  Encourage exercise.            (E03.8) Other specified hypothyroidism  (primary encounter diagnosis)  Comment:   Plan:  Recent labs stable .     .Cares and  treatment discussed.  follow up if problem   Patient expressed understanding and agreement with treatment plan. All patient's questions were answered, will let me know if has more later.  Medications: Rx's: Reviewed the potential side effects/complications of medications prescribed.     BMI:   Estimated body mass index is 36.89 kg/m  as calculated from the following:    Height as of 4/27/23: 1.829 m (6').    Weight as of 4/27/23: 123.4 kg (272 lb).   Weight management plan: Discussed healthy diet and exercise guidelines    Work on weight loss  Regular exercise  See Patient Instructions    Fifi Fleming,  MD [facility] TANVIR Greco is a 62 year old, presenting for the following health issues:  Recheck Medication        2023     3:56 PM   Additional Questions   Roomed by Daniel   Accompanied by Wife     History of Present Illness       Hypertension: He presents for follow up of hypertension.  He does check blood pressure  regularly outside of the clinic. Outpatient blood pressures have not been over 140/90. He follows a low salt diet.     Reason for visit:  Follow up    He eats 0-1 servings of fruits and vegetables daily.He consumes 0 sweetened beverage(s) daily.He exercises with enough effort to increase his heart rate 10 to 19 minutes per day.  He exercises with enough effort to increase his heart rate 5 days per week.   He is taking medications regularly.  Today's RUTH-7 Score: 0       Hyperlipidemia Follow-Up      Are you regularly taking any medication or supplement to lower your cholesterol?   No since it was stopped last year. His ck was back to normla and he was told to restart so he took some pills but he ran out , so he wasn't refill. His recent lipid was quiet high again so just concerned he feels well otherwise     Are you having muscle aches or other side effects that you think could be caused by your cholesterol lowering medication?  No    Anxiety Follow-Up    How are you doing with your anxiety since your last visit? Improved and doing well . Still needing lorazepam may be twice a day but he is trying to cut down on it still     Are you having other symptoms that might be associated with anxiety? No    Have you had a significant life event? No     Are you feeling depressed? No    Do you have any concerns with your use of alcohol or other drugs? No. He has quit drinking and doing quiet well    Social History     Tobacco Use     Smoking status: Former     Types: Cigarettes     Quit date: 2015     Years since quittin.6     Smokeless tobacco: Never    Substance Use Topics     Alcohol use: No     Alcohol/week: 0.0 standard drinks of alcohol     Comment: 2-3 drinks a week     Drug use: No         8/23/2022     3:05 PM 9/22/2022    10:49 AM 5/23/2023     3:36 PM   RUTH-7 SCORE   Total Score  3 (minimal anxiety) 0 (minimal anxiety)   Total Score 11 3 0         4/8/2021     8:43 AM 8/23/2022     3:05 PM 9/22/2022    10:49 AM   PHQ   PHQ-9 Total Score 9  3   Q9: Thoughts of better off dead/self-harm past 2 weeks Not at all Not at all Not at all         9/22/2022    10:49 AM   Last PHQ-9   1.  Little interest or pleasure in doing things 1   2.  Feeling down, depressed, or hopeless 1   3.  Trouble falling or staying asleep, or sleeping too much 0   4.  Feeling tired or having little energy 1   5.  Poor appetite or overeating 0   6.  Feeling bad about yourself 0   7.  Trouble concentrating 0   8.  Moving slowly or restless 0   Q9: Thoughts of better off dead/self-harm past 2 weeks 0   PHQ-9 Total Score 3         5/23/2023     3:36 PM   RUTH-7    1. Feeling nervous, anxious, or on edge 0   2. Not being able to stop or control worrying 0   3. Worrying too much about different things 0   4. Trouble relaxing 0   5. Being so restless that it is hard to sit still 0   6. Becoming easily annoyed or irritable 0   7. Feeling afraid, as if something awful might happen 0   RUTH-7 Total Score 0   If you checked any problems, how difficult have they made it for you to do your work, take care of things at home, or get along with other people? Not difficult at all       Hypothyroidism Follow-up      Since last visit, patient describes the following symptoms: Weight stable, no hair loss, no skin changes, no constipation, no loose stools    Recent labs were stable           Review of Systems   Constitutional, HEENT, cardiovascular, pulmonary, GI, , musculoskeletal, neuro, skin, endocrine and psych systems are negative, except as otherwise noted.      Objective    There were no vitals  taken for this visit.  There is no height or weight on file to calculate BMI.  Physical Exam   GENERAL: healthy, alert and no distress  EYES: Eyes grossly normal to inspection, PERRL and conjunctivae and sclerae normal  HENT: ear canals and TM's normal, nose and mouth without ulcers or lesions  NECK: no adenopathy, no asymmetry, masses, or scars and thyroid normal to palpation  RESP: lungs clear to auscultation - no rales, rhonchi or wheezes  CV: regular rate and rhythm, normal S1 S2, no S3 or S4  ABDOMEN: soft, nontender, no hepatosplenomegaly, no masses and bowel sounds normal  MS: no gross musculoskeletal defects noted, no edema  SKIN: no suspicious lesions or rashes  NEURO: Normal strength and tone, mentation intact and speech normal  PSYCH: mentation appears normal and affect normal

## 2023-05-26 ENCOUNTER — HOSPITAL ENCOUNTER (OUTPATIENT)
Dept: CARDIOLOGY | Facility: CLINIC | Age: 63
Discharge: HOME | End: 2023-05-26
Attending: INTERNAL MEDICINE | Admitting: INTERNAL MEDICINE
Payer: COMMERCIAL

## 2023-05-26 DIAGNOSIS — R06.09 DOE (DYSPNEA ON EXERTION): ICD-10-CM

## 2023-05-26 LAB — LVEF ECHO: NORMAL

## 2023-05-26 PROCEDURE — 255N000002 HC RX 255 OP 636: Performed by: INTERNAL MEDICINE

## 2023-05-26 PROCEDURE — 999N000208 ECHOCARDIOGRAM COMPLETE

## 2023-05-26 PROCEDURE — 93306 TTE W/DOPPLER COMPLETE: CPT | Mod: 26 | Performed by: INTERNAL MEDICINE

## 2023-05-26 RX ADMIN — HUMAN ALBUMIN MICROSPHERES AND PERFLUTREN 3 ML: 10; .22 INJECTION, SOLUTION INTRAVENOUS at 11:15

## 2023-05-30 ENCOUNTER — OFFICE VISIT (OUTPATIENT)
Dept: CARDIOLOGY | Facility: CLINIC | Age: 63
End: 2023-05-30
Attending: INTERNAL MEDICINE
Payer: COMMERCIAL

## 2023-05-30 VITALS
HEART RATE: 79 BPM | WEIGHT: 268 LBS | OXYGEN SATURATION: 98 % | HEIGHT: 73 IN | BODY MASS INDEX: 35.52 KG/M2 | SYSTOLIC BLOOD PRESSURE: 124 MMHG | RESPIRATION RATE: 17 BRPM | DIASTOLIC BLOOD PRESSURE: 85 MMHG

## 2023-05-30 DIAGNOSIS — I51.7 LVH (LEFT VENTRICULAR HYPERTROPHY): Primary | ICD-10-CM

## 2023-05-30 DIAGNOSIS — R06.09 DOE (DYSPNEA ON EXERTION): ICD-10-CM

## 2023-05-30 PROCEDURE — 99214 OFFICE O/P EST MOD 30 MIN: CPT | Performed by: NURSE PRACTITIONER

## 2023-05-30 NOTE — LETTER
5/30/2023    Fifi Fleming MD  [address]  [place] [address]    RE: Angus Wynne       Dear Colleague,     I had the pleasure of seeing Angus Wynne in the [facility].    General Cardiology Clinic Progress Note  Angus Wynne MRN# [id number]   YOB: 1960 Age: 62 year old     Primary cardiologist: Dr. Camargo    Reason for visit: Echocardiogram follow up    History of presenting illness:    Angus Wynne is a pleasant 62 year old patient with past medical history significant for:    Mild CAD: Noted on CTA in 2019 with a total calcium score of 52 and trivial nonocclusive coronary artery disease  Hypothyroidism  GERD  Anxiety  Hypertension  EtOH abuse    The patient has a reported history of a previous coronary angiogram approximately 10 years ago in [place] for an abnormal EKG.  The patient reported that he had normal coronaries at that time.  In 2017 he had an EKG performed for shortness of breath that showed symmetric T wave inversions in V4 through V6 and was recommended that he be evaluated in the hospital.  He then went under stress echocardiogram that noted normal LV systolic function at rest without evidence of exercise-induced ischemia.    Unfortunately, in 2/2019 he slipped on a patch of ice and hurt his vertebrae and had a T9 fracture.  It was recommended that time to undergo surgical intervention however the patient elected for conservative management.  In 2019 the patient was seen in cardiology clinic and underwent a CTA that showed a total calcium score of 52 with trivial nonocclusive CAD.  In August 2022 he was hospitalized for acute rhabdomyolysis with a CK of 16,000 and CHAS.  He was treated with IV fluids and had improvement in his laboratory values.    The patient was evaluated by Dr. Camargo on 4/27/2023 due to dyspnea on exertion.  It was initially felt that this was due to deconditioning and an echocardiogram was recommended.  Also, today he  is asked to discuss reintroduction of a statin.    The echocardiogram was completed on 4/26/2023 that showed mild LVH with a hyperdynamic LV and LVEF greater than 70%.  LVOT gradient of 43 mmHg with Valsalva 124 mm.  MV was not well visualized but suspect chordal SHIKHA, AV gradients were attempted but could not be measured.    Today the patient returns for a follow-up to review his echocardiogram.  He states he is more short of breath with less activity.  Also, he reports increased fatigue, lack of energy that have been progressive since his spinal fracture in 2019.  We discussed the echocardiogram findings and the rationale for the cardiac MRI.  The patient states he would feel more reassured if we would do an ischemic evaluation, therefore we will proceed with a stress MRI.     Diagnotic studies:  Echocardiogram (5/26/2023): mild LVH with a hyperdynamic LV and LVEF greater than 70%.  LVOT gradient of 43 mmHg with Valsalva 124 mm.  MV was not well visualized but suspect chordal SHIKHA, AV gradients were attempted but could not be measured.  CT coronary angiogram total calcium score of 52 with trivial nonocclusive CAD.          Assessment and Plan:     ASSESSMENT:    Dyspnea on exertion  Particularly progressive since his back injury in 2019  Patient reports significant concerns and anxiety right guarding hip symptoms.  He will feel more reassured once his cardiac evaluation has been completed.  Poor image quality.  Mild concentric LVH, hyperdynamic LVEF greater than 70%, LVOT gradient 43mmH, with valsalva is 124 mmHg, MV is not well visualized, suspect chordal SHIKHA, AV gradients attempted but could not be measured. ( AV is poorly visualized)    PLAN:     Obtain a cardiac MRI for further visualization of mitral valve, aortic valve, LVEF and LVOT  Follow-up with Dr. Camargo or one of the LUIS' in general cardiology.      Orders this Visit:  Orders Placed This Encounter   Procedures    Follow-Up with Cardiology [facility]     No  "orders of the defined types were placed in this encounter.    There are no discontinued medications.    Today's clinic visit entailed:  Review of the result(s) of each unique test - EKG, Echo, CT, SE  Ordering of each unique test  35 minutes spent by me on the date of the encounter doing chart review, history and exam, documentation and further activities per the note  Provider  Link to [facility] Help Grid     The level of medical decision making during this visit was of moderate complexity.           Review of Systems:     Review of Systems:  Skin:  Negative     Eyes:  Positive for glasses  ENT:  Negative    Respiratory:  Positive for dyspnea on exertion;shortness of breath;wheezing  Cardiovascular:    Positive for;palpitations;lightheadedness  Gastroenterology: Negative    Genitourinary:  Negative    Musculoskeletal:  Positive for joint pain  Neurologic:  Negative    Psychiatric:  Negative    Heme/Lymph/Imm:       Endocrine:  Positive for thyroid disorder            Physical Exam:     Vitals: /85   Pulse 79   Resp 17   Ht 1.854 m (6' 1\")   Wt 121.6 kg (268 lb)   SpO2 98%   BMI 35.36 kg/m    Constitutional: Well nourished and in no apparent distress.  Eyes: Pupils equal, round. Sclerae anicteric.   HEENT: Normocephalic, atraumatic.   Neck: Supple.   Respiratory: Breathing non-labored. Lungs clear to auscultation bilaterally. No crackles, wheezes, rhonchi, or rales.  Cardiovascular: Regular rate and rhythm  Skin: Warm, dry. No rashes, cyanosis, or xanthelasma.  Extremities: No edema.  Neurologic: No gross motor deficits. Alert, awake, and oriented to person, place and time.  Psychiatric: Affect appropriate.        CURRENT MEDICATIONS:  Current Outpatient Medications   Medication Sig Dispense Refill    acetaminophen (TYLENOL) 325 MG tablet Take 2 tablets (650 mg) by mouth every 4 hours as needed for mild pain 50 tablet 0    fexofenadine (ALLEGRA) 180 MG tablet Take 1 tablet (180 mg) by mouth daily      " fluticasone (FLONASE) 50 MCG/ACT nasal spray Spray 2 sprays into both nostrils daily 16 g 1    levothyroxine (SYNTHROID/LEVOTHROID) 175 MCG tablet TAKE 1 TABLET BY MOUTH EVERY DAY 6 DAYS PER WEEK AND ONE-HALF TABLET THE 7TH DAY OF THE WEEK (Patient taking differently: Take 175 mcg by mouth daily) 84 tablet 1    [START ON 6/8/2023] LORazepam (ATIVAN) 0.5 MG tablet TAKE ONE TABLET BY MOUTH TWICE DAILY AS NEEDED FOR ANXIETY Strength: 0.5 mg 30 tablet 0    losartan (COZAAR) 100 MG tablet Take 1 tablet (100 mg) by mouth daily 90 tablet 1    rosuvastatin (CRESTOR) 40 MG tablet Take 1 tablet (40 mg) by mouth daily 90 tablet 1    sertraline (ZOLOFT) 100 MG tablet TAKE 1 AND 1/2 TABLETS(150 MG) BY MOUTH DAILY WITH DINNER (Patient taking differently: Take 25 mg by mouth daily) 135 tablet 0    traZODone (DESYREL) 50 MG tablet Take 2-3 tablets (100-150 mg) by mouth At Bedtime 180 tablet 0       ALLERGIES  No Known Allergies      PAST MEDICAL HISTORY:  Past Medical History:   Diagnosis Date    GERD (gastroesophageal reflux disease)     HTN (hypertension)     Hyperlipidemia     Hypothyroid     Vertigo     2015 ,        PAST SURGICAL HISTORY:  Past Surgical History:   Procedure Laterality Date    AS ESOPHAGOSCOPY, DIAGNOSTIC      COLONOSCOPY      EXAM UNDER ANESTHESIA ANUS N/A 9/9/2021    Procedure: EXAM UNDER ANESTHESIA, ANUS, ANAL BISOPY, BOTOX INJECTION;  Surgeon: Henrik Garduno MD;  [place] [facility] [place]    SPHINCTEROTOMY RECTUM N/A 9/30/2021    Procedure: Anal sphincterotomy and hemorroidectomy;  Surgeon: Rajendra Armando MD;  [place] [facility] [place]       FAMILY HISTORY:  Family History   Problem Relation Age of Onset    Coronary Artery Disease Father         at age 60     Hyperlipidemia Brother     Cancer Brother         wall of scrotum    Breast Cancer Mother     Diabetes No family hx of     Cerebrovascular Disease No family hx of     Colon Cancer No family hx of     Prostate Cancer No family hx of        SOCIAL  HISTORY:  Social History     Socioeconomic History    Marital status:      Spouse name: None    Number of children: None    Years of education: None    Highest education level: None   Tobacco Use    Smoking status: Former     Types: Cigarettes     Quit date: 2015     Years since quittin.6    Smokeless tobacco: Never   Substance and Sexual Activity    Alcohol use: No     Alcohol/week: 0.0 standard drinks of alcohol     Comment: 2-3 drinks a week    Drug use: No    Sexual activity: Yes   Social History Narrative    , one child, non smoker - quit 3 months  ago , alcohol none now( but had used excessively previously )               Thank you for allowing me to participate in the care of your patient.      Sincerely,     YUKO Llanos [facility]  cc:   Raoul Camargo MD  [address]  [place] [id number]

## 2023-05-30 NOTE — PATIENT INSTRUCTIONS
Today's Recommendations    Stress cardiac MRI  Continue all medications without changes.  Please follow up with Dr. Camargo's [facility] in 1 month post MRI.    Please send a [place] message or call [phone] to the RN team with questions or concerns.     Scheduling number [phone]    YUKO Preciado, CNP

## 2023-05-30 NOTE — PROGRESS NOTES
General Cardiology Clinic Progress Note  Angus Wynne MRN# [id number]   YOB: 1960 Age: 62 year old     Primary cardiologist: Dr. Camargo    Reason for visit: Echocardiogram follow up    History of presenting illness:    Angus Wynne is a pleasant 62 year old patient with past medical history significant for:    1. Mild CAD: Noted on CTA in 2019 with a total calcium score of 52 and trivial nonocclusive coronary artery disease  2. Hypothyroidism  3. GERD  4. Anxiety  5. Hypertension  6. EtOH abuse    The patient has a reported history of a previous coronary angiogram approximately 10 years ago in [place] for an abnormal EKG.  The patient reported that he had normal coronaries at that time.  In 2017 he had an EKG performed for shortness of breath that showed symmetric T wave inversions in V4 through V6 and was recommended that he be evaluated in the hospital.  He then went under stress echocardiogram that noted normal LV systolic function at rest without evidence of exercise-induced ischemia.    Unfortunately, in 2/2019 he slipped on a patch of ice and hurt his vertebrae and had a T9 fracture.  It was recommended that time to undergo surgical intervention however the patient elected for conservative management.  In 2019 the patient was seen in cardiology clinic and underwent a CTA that showed a total calcium score of 52 with trivial nonocclusive CAD.  In August 2022 he was hospitalized for acute rhabdomyolysis with a CK of 16,000 and CHAS.  He was treated with IV fluids and had improvement in his laboratory values.    The patient was evaluated by Dr. Camargo on 4/27/2023 due to dyspnea on exertion.  It was initially felt that this was due to deconditioning and an echocardiogram was recommended.  Also, today he is asked to discuss reintroduction of a statin.    The echocardiogram was completed on 4/26/2023 that showed mild LVH with a hyperdynamic LV and LVEF greater than 70%.  LVOT gradient of 43 mmHg  with Valsalva 124 mm.  MV was not well visualized but suspect chordal SHIKHA, AV gradients were attempted but could not be measured.    Today the patient returns for a follow-up to review his echocardiogram.  He states he is more short of breath with less activity.  Also, he reports increased fatigue, lack of energy that have been progressive since his spinal fracture in 2019.  We discussed the echocardiogram findings and the rationale for the cardiac MRI.  The patient states he would feel more reassured if we would do an ischemic evaluation, therefore we will proceed with a stress MRI.     Diagnotic studies:    Echocardiogram (5/26/2023): mild LVH with a hyperdynamic LV and LVEF greater than 70%.  LVOT gradient of 43 mmHg with Valsalva 124 mm.  MV was not well visualized but suspect chordal SHIKHA, AV gradients were attempted but could not be measured.    CT coronary angiogram total calcium score of 52 with trivial nonocclusive CAD.          Assessment and Plan:     ASSESSMENT:    1. Dyspnea on exertion    Particularly progressive since his back injury in 2019    Patient reports significant concerns and anxiety right guarding hip symptoms.  He will feel more reassured once his cardiac evaluation has been completed.    Poor image quality.  Mild concentric LVH, hyperdynamic LVEF greater than 70%, LVOT gradient 43mmH, with valsalva is 124 mmHg, MV is not well visualized, suspect chordal SHIKHA, AV gradients attempted but could not be measured. ( AV is poorly visualized)    PLAN:     1. Obtain a cardiac MRI for further visualization of mitral valve, aortic valve, LVEF and LVOT  2. Follow-up with Dr. Camargo or one of the LUIS' in general cardiology.      Orders this Visit:  Orders Placed This Encounter   Procedures     Follow-Up with Cardiology [facility]     No orders of the defined types were placed in this encounter.    There are no discontinued medications.    Today's clinic visit entailed:  Review of the result(s) of each unique  "test - EKG, Echo, CT, SE  Ordering of each unique test  35 minutes spent by me on the date of the encounter doing chart review, history and exam, documentation and further activities per the note  Provider  Link to [facility] Help Grid     The level of medical decision making during this visit was of moderate complexity.           Review of Systems:     Review of Systems:  Skin:  Negative     Eyes:  Positive for glasses  ENT:  Negative    Respiratory:  Positive for dyspnea on exertion;shortness of breath;wheezing  Cardiovascular:    Positive for;palpitations;lightheadedness  Gastroenterology: Negative    Genitourinary:  Negative    Musculoskeletal:  Positive for joint pain  Neurologic:  Negative    Psychiatric:  Negative    Heme/Lymph/Imm:       Endocrine:  Positive for thyroid disorder            Physical Exam:     Vitals: /85   Pulse 79   Resp 17   Ht 1.854 m (6' 1\")   Wt 121.6 kg (268 lb)   SpO2 98%   BMI 35.36 kg/m    Constitutional: Well nourished and in no apparent distress.  Eyes: Pupils equal, round. Sclerae anicteric.   HEENT: Normocephalic, atraumatic.   Neck: Supple.   Respiratory: Breathing non-labored. Lungs clear to auscultation bilaterally. No crackles, wheezes, rhonchi, or rales.  Cardiovascular: Regular rate and rhythm  Skin: Warm, dry. No rashes, cyanosis, or xanthelasma.  Extremities: No edema.  Neurologic: No gross motor deficits. Alert, awake, and oriented to person, place and time.  Psychiatric: Affect appropriate.        CURRENT MEDICATIONS:  Current Outpatient Medications   Medication Sig Dispense Refill     acetaminophen (TYLENOL) 325 MG tablet Take 2 tablets (650 mg) by mouth every 4 hours as needed for mild pain 50 tablet 0     fexofenadine (ALLEGRA) 180 MG tablet Take 1 tablet (180 mg) by mouth daily       fluticasone (FLONASE) 50 MCG/ACT nasal spray Spray 2 sprays into both nostrils daily 16 g 1     levothyroxine (SYNTHROID/LEVOTHROID) 175 MCG tablet TAKE 1 TABLET BY MOUTH EVERY " DAY 6 DAYS PER WEEK AND ONE-HALF TABLET THE 7TH DAY OF THE WEEK (Patient taking differently: Take 175 mcg by mouth daily) 84 tablet 1     [START ON 6/8/2023] LORazepam (ATIVAN) 0.5 MG tablet TAKE ONE TABLET BY MOUTH TWICE DAILY AS NEEDED FOR ANXIETY Strength: 0.5 mg 30 tablet 0     losartan (COZAAR) 100 MG tablet Take 1 tablet (100 mg) by mouth daily 90 tablet 1     rosuvastatin (CRESTOR) 40 MG tablet Take 1 tablet (40 mg) by mouth daily 90 tablet 1     sertraline (ZOLOFT) 100 MG tablet TAKE 1 AND 1/2 TABLETS(150 MG) BY MOUTH DAILY WITH DINNER (Patient taking differently: Take 25 mg by mouth daily) 135 tablet 0     traZODone (DESYREL) 50 MG tablet Take 2-3 tablets (100-150 mg) by mouth At Bedtime 180 tablet 0       ALLERGIES  No Known Allergies      PAST MEDICAL HISTORY:  Past Medical History:   Diagnosis Date     GERD (gastroesophageal reflux disease)      HTN (hypertension)      Hyperlipidemia      Hypothyroid      Vertigo     2015 ,        PAST SURGICAL HISTORY:  Past Surgical History:   Procedure Laterality Date     AS ESOPHAGOSCOPY, DIAGNOSTIC       COLONOSCOPY       EXAM UNDER ANESTHESIA ANUS N/A 9/9/2021    Procedure: EXAM UNDER ANESTHESIA, ANUS, ANAL BISOPY, BOTOX INJECTION;  Surgeon: Henrik Garduno MD;  Location: [facility] OR     SPHINCTEROTOMY RECTUM N/A 9/30/2021    Procedure: Anal sphincterotomy and hemorroidectomy;  Surgeon: Rajendra Armando MD;  Location: [facility] OR       FAMILY HISTORY:  Family History   Problem Relation Age of Onset     Coronary Artery Disease Father         at age 60      Hyperlipidemia Brother      Cancer Brother         wall of scrotum     Breast Cancer Mother      Diabetes No family hx of      Cerebrovascular Disease No family hx of      Colon Cancer No family hx of      Prostate Cancer No family hx of        SOCIAL HISTORY:  Social History     Socioeconomic History     Marital status:      Spouse name: None     Number of children: None     Years of education: None      Highest education level: None   Tobacco Use     Smoking status: Former     Types: Cigarettes     Quit date: 2015     Years since quittin.6     Smokeless tobacco: Never   Substance and Sexual Activity     Alcohol use: No     Alcohol/week: 0.0 standard drinks of alcohol     Comment: 2-3 drinks a week     Drug use: No     Sexual activity: Yes   Social History Narrative    , one child, non smoker - quit 3 months  ago , alcohol none now( but had used excessively previously )

## 2023-06-05 DIAGNOSIS — I10 ESSENTIAL HYPERTENSION: ICD-10-CM

## 2023-06-05 DIAGNOSIS — R79.89 ELEVATED LFTS: ICD-10-CM

## 2023-06-05 RX ORDER — LOSARTAN POTASSIUM 100 MG/1
TABLET ORAL
Qty: 90 TABLET | Refills: 0 | Status: SHIPPED | OUTPATIENT
Start: 2023-06-05 | End: 2023-06-14

## 2023-06-14 ENCOUNTER — OFFICE VISIT (OUTPATIENT)
Dept: FAMILY MEDICINE | Facility: CLINIC | Age: 63
End: 2023-06-14
Payer: COMMERCIAL

## 2023-06-14 VITALS
WEIGHT: 268 LBS | SYSTOLIC BLOOD PRESSURE: 138 MMHG | OXYGEN SATURATION: 100 % | BODY MASS INDEX: 35.52 KG/M2 | HEART RATE: 101 BPM | HEIGHT: 73 IN | TEMPERATURE: 97.5 F | DIASTOLIC BLOOD PRESSURE: 84 MMHG

## 2023-06-14 DIAGNOSIS — I10 ESSENTIAL HYPERTENSION: ICD-10-CM

## 2023-06-14 DIAGNOSIS — Z12.5 SCREENING PSA (PROSTATE SPECIFIC ANTIGEN): ICD-10-CM

## 2023-06-14 DIAGNOSIS — F41.9 ANXIETY: ICD-10-CM

## 2023-06-14 DIAGNOSIS — E66.01 MORBID OBESITY (H): ICD-10-CM

## 2023-06-14 DIAGNOSIS — Z00.00 ROUTINE HISTORY AND PHYSICAL EXAMINATION OF ADULT: Primary | ICD-10-CM

## 2023-06-14 DIAGNOSIS — E78.5 HYPERLIPIDEMIA LDL GOAL <100: ICD-10-CM

## 2023-06-14 DIAGNOSIS — R79.89 ELEVATED LFTS: ICD-10-CM

## 2023-06-14 DIAGNOSIS — E03.8 OTHER SPECIFIED HYPOTHYROIDISM: ICD-10-CM

## 2023-06-14 PROCEDURE — 99396 PREV VISIT EST AGE 40-64: CPT | Performed by: FAMILY MEDICINE

## 2023-06-14 PROCEDURE — 99213 OFFICE O/P EST LOW 20 MIN: CPT | Mod: 25 | Performed by: FAMILY MEDICINE

## 2023-06-14 RX ORDER — LORAZEPAM 0.5 MG/1
TABLET ORAL
Qty: 60 TABLET | Refills: 0 | Status: CANCELLED | OUTPATIENT
Start: 2023-06-21

## 2023-06-14 RX ORDER — SERTRALINE HYDROCHLORIDE 100 MG/1
150 TABLET, FILM COATED ORAL DAILY
Qty: 135 TABLET | Refills: 0 | Status: SHIPPED | OUTPATIENT
Start: 2023-06-14 | End: 2023-09-15

## 2023-06-14 RX ORDER — LEVOTHYROXINE SODIUM 175 UG/1
175 TABLET ORAL DAILY
Qty: 84 TABLET | Refills: 1
Start: 2023-06-14 | End: 2024-03-04

## 2023-06-14 RX ORDER — ROSUVASTATIN CALCIUM 40 MG/1
40 TABLET, COATED ORAL DAILY
Qty: 90 TABLET | Refills: 3 | Status: SHIPPED | OUTPATIENT
Start: 2023-06-14 | End: 2023-11-16

## 2023-06-14 RX ORDER — LORAZEPAM 0.5 MG/1
TABLET ORAL
Qty: 30 TABLET | Refills: 0 | Status: SHIPPED | OUTPATIENT
Start: 2023-06-21 | End: 2023-07-18

## 2023-06-14 RX ORDER — LOSARTAN POTASSIUM 100 MG/1
100 TABLET ORAL DAILY
Qty: 90 TABLET | Refills: 1 | Status: SHIPPED | OUTPATIENT
Start: 2023-06-14 | End: 2023-08-21

## 2023-06-14 ASSESSMENT — PAIN SCALES - GENERAL: PAINLEVEL: NO PAIN (0)

## 2023-06-14 NOTE — PROGRESS NOTES
SUBJECTIVE:   CC: Angus is an 62 year old who presents for preventative health visit.       2023    10:51 AM   Additional Questions   Roomed by VANESSA   Accompanied by WIFE     Healthy Habits:    Getting at least 3 servings of Calcium per day:  Yes    Bi-annual eye exam:  Yes    Dental care twice a year:  NO    Sleep apnea or symptoms of sleep apnea:  None    Diet:  Low salt    Frequency of exercise:  2-3 days/week    Duration of exercise:  Less than 15 minutes    Taking medications regularly:  Yes    Medication side effects:  None    PHQ-2 Total Score:    Additional concerns today:  No              PROBLEMS TO ADD ON...        He is back on statin and tolerating well and no problem taking med's. Denies any myalgias/ arthralgias etc        He is due for labs but not  fasting today     Also wasn't refill on other med's  Thyroid dose - e is taking one every day now and no skipping doses , so wants follow up labs he feels well on current dose    Social History     Tobacco Use     Smoking status: Former     Types: Cigarettes     Quit date: 2015     Years since quittin.7     Smokeless tobacco: Never   Vaping Use     Vaping status: Not on file   Substance Use Topics     Alcohol use: No     Alcohol/week: 0.0 standard drinks of alcohol     Comment: 2-3 drinks a week             2023    10:43 AM   Alcohol Use   Prescreen: >3 drinks/day or >7 drinks/week? Not Applicable       Last PSA:   PSA   Date Value Ref Range Status   2020 0.32 0 - 4 ug/L Final     Comment:     Assay Method:  Chemiluminescence using Siemens Vista analyzer     Prostate Specific Antigen Screen   Date Value Ref Range Status   2021 0.48 0.00 - 4.00 ug/L Final       Reviewed orders with patient. Reviewed health maintenance and updated orders accordingly - Yes  Patient Active Problem List   Diagnosis     BPPV (benign paroxysmal positional vertigo), unspecified laterality     Other specified hypothyroidism     Hx of acute  alcoholic hepatitis     Hx of colonic polyp     Gastroesophageal reflux disease, esophagitis presence not specified     Vitamin D deficiency     Anxiety     Alcohol use disorder     Neck pain     Cervicalgia     Cervical spondylosis without myelopathy     Essential hypertension     Hyperlipidemia with target LDL less than 130     Nasal congestion     History of colonic polyps     Vertigo     Lumbago     Obesity (BMI 30.0-34.9)     IBARRA (dyspnea on exertion)     Dysphagia, unspecified type     Elevated LFTs     Anal fissure     Elevated troponin     CHAS (acute kidney injury) (H)     Morbid obesity (H)     Past Surgical History:   Procedure Laterality Date     AS ESOPHAGOSCOPY, DIAGNOSTIC       COLONOSCOPY       EXAM UNDER ANESTHESIA ANUS N/A 2021    Procedure: EXAM UNDER ANESTHESIA, ANUS, ANAL BISOPY, BOTOX INJECTION;  Surgeon: Henrik Garduno MD;  Location: [facility] OR     SPHINCTEROTOMY RECTUM N/A 2021    Procedure: Anal sphincterotomy and hemorroidectomy;  Surgeon: Rajendra Armando MD;  [place] [facility] [place]       Social History     Tobacco Use     Smoking status: Former     Types: Cigarettes     Quit date: 2015     Years since quittin.7     Smokeless tobacco: Never   Vaping Use     Vaping status: Not on file   Substance Use Topics     Alcohol use: No     Alcohol/week: 0.0 standard drinks of alcohol     Comment: 2-3 drinks a week     Family History   Problem Relation Age of Onset     Coronary Artery Disease Father         at age 60      Hyperlipidemia Brother      Cancer Brother         wall of scrotum     Breast Cancer Mother      Diabetes No family hx of      Cerebrovascular Disease No family hx of      Colon Cancer No family hx of      Prostate Cancer No family hx of            Reviewed and updated as needed this visit by clinical staff   Tobacco  Allergies  Meds              Reviewed and updated as needed this visit by Provider                 Past Medical History:   Diagnosis Date      "GERD (gastroesophageal reflux disease)      HTN (hypertension)      Hyperlipidemia      Hypothyroid      Vertigo     2015 ,         Review of Systems   Cardiovascular: Positive for chest pain.   Genitourinary: Negative for impotence.     CONSTITUTIONAL: NEGATIVE for fever, chills, change in weight  INTEGUMENTARY/SKIN: NEGATIVE for worrisome rashes, moles or lesions  EYES: NEGATIVE for vision changes or irritation  ENT: NEGATIVE for ear, mouth and throat problems  RESP: NEGATIVE for significant cough or SOB  CV: NEGATIVE for chest pain, palpitations or peripheral edema  GI: NEGATIVE for nausea, abdominal pain, heartburn, or change in bowel habits   male: negative for dysuria, hematuria, decreased urinary stream, erectile dysfunction, urethral discharge  MUSCULOSKELETAL: NEGATIVE for significant arthralgias or myalgia  NEURO: NEGATIVE for weakness, dizziness or paresthesias  ENDOCRINE: NEGATIVE for temperature intolerance, skin/hair changes  PSYCHIATRIC: NEGATIVE for changes in mood or affect, POSITIVE for and HX anxiety, he will be running ou of med's next week so he wants to get refill. He states that once he has his heart test done and hoping it is all fine then he will work on cutting don on lorazepam . Zoloft is working fine and wants to continue .     OBJECTIVE:   /84   Pulse 101   Temp 97.5  F (36.4  C) (Temporal)   Ht 1.854 m (6' 1\")   Wt 121.6 kg (268 lb)   SpO2 100%   BMI 35.36 kg/m      Physical Exam  GENERAL: healthy, alert and no distress  EYES: Eyes grossly normal to inspection, PERRL and conjunctivae and sclerae normal  HENT: ear canals and TM's normal, nose and mouth without ulcers or lesions  NECK: no adenopathy, no asymmetry, masses, or scars and thyroid normal to palpation  RESP: lungs clear to auscultation - no rales, rhonchi or wheezes  CV: regular rate and rhythm, normal S1 S2, no S3 or S4, no murmur, click or rub, no peripheral edema   ABDOMEN: soft, nontender, no " hepatosplenomegaly, no masses and bowel sounds normal   (male):  deferred  RECTAL: deferred  MS: no gross musculoskeletal defects noted, no edema  SKIN: no suspicious lesions or rashes  NEURO: Normal strength and tone, mentation intact and speech normal  PSYCH: mentation appears normal, affect normal/bright        ASSESSMENT/PLAN:   (Z00.00) Routine history and physical examination of adult  (primary encounter diagnosis)  Comment:   Plan:     (E78.5) Hyperlipidemia LDL goal <100  Comment:   Plan: Lipid panel reflex to direct LDL Fasting,         rosuvastatin (CRESTOR) 40 MG tablet,         Comprehensive metabolic panel          He is back on statin and tolerating well and no problem taking med's. He will return for fasting labs       (Z12.5) Screening PSA (prostate specific antigen)  Comment:   Plan: PSA, screen            (E03.8) Other specified hypothyroidism  Comment: he is back on once daily and not skipping dose     Plan: levothyroxine (SYNTHROID/LEVOTHROID) 175 MCG         tablet, TSH with free T4 reflex        Check labs adjust dose if needed     (I10) Essential hypertension  Comment:   Plan: Comprehensive metabolic panel   losartan (COZAAR) 100 MG tablet        BP in adequate control. Discussed cares, low fat low slat/ DASH diet etc. Check labs, call pt with results. Refill given. encouraged home BP monitoring. Follow up recheck in 6 months, sooner if problem.               (E66.01) Morbid obesity (H)  Comment:   Plan: Healthy diet and exercise reviewed. Talked about intermittent fasting/ weight watchers, meal planning , reducing carb's and adding more protein etc. Risks of obesity discussed.  Encourage exercise.       (F41.9) Anxiety  Comment:   Plan: sertraline (ZOLOFT) 100 MG tablet, LORazepam         (ATIVAN) 0.5 MG tablet        Sent one more refill that should last him for a month. He will then work on cutting marika on the dose . Continue with zoloft same dose    Cares and symptomatic treatment  discussed follow up if problem         Check labs. refill sent.Cares and  treatment discussed follow. up if problem   Patient expressed understanding and agreement with treatment plan. All patient's questions were answered, will let me know if has more later.  Medications: Rx's: Reviewed the potential side effects/complications of medications prescribed.         COUNSELING:   Reviewed preventive health counseling, as reflected in patient instructions       Regular exercise       Healthy diet/nutrition       Vision screening       Hearing screening       Immunizations    Vaccination [place]            Alcohol Use        Colorectal cancer screening       Prostate cancer screening        He reports that he quit smoking about 7 years ago. He has never used smokeless tobacco.            Fifi Fleming MD  [facility]

## 2023-06-30 ENCOUNTER — LAB (OUTPATIENT)
Dept: LAB | Facility: CLINIC | Age: 63
End: 2023-06-30
Payer: COMMERCIAL

## 2023-06-30 DIAGNOSIS — E03.8 OTHER SPECIFIED HYPOTHYROIDISM: ICD-10-CM

## 2023-06-30 DIAGNOSIS — E78.5 HYPERLIPIDEMIA LDL GOAL <100: ICD-10-CM

## 2023-06-30 DIAGNOSIS — Z12.5 SCREENING PSA (PROSTATE SPECIFIC ANTIGEN): ICD-10-CM

## 2023-06-30 LAB
ALBUMIN SERPL BCG-MCNC: 4.7 G/DL (ref 3.5–5.2)
ALP SERPL-CCNC: 48 U/L (ref 40–129)
ALT SERPL W P-5'-P-CCNC: 48 U/L (ref 0–70)
ANION GAP SERPL CALCULATED.3IONS-SCNC: 15 MMOL/L (ref 7–15)
AST SERPL W P-5'-P-CCNC: 42 U/L (ref 0–45)
BILIRUB SERPL-MCNC: 0.5 MG/DL
BUN SERPL-MCNC: 11 MG/DL (ref 8–23)
CALCIUM SERPL-MCNC: 9.4 MG/DL (ref 8.8–10.2)
CHLORIDE SERPL-SCNC: 100 MMOL/L (ref 98–107)
CHOLEST SERPL-MCNC: 311 MG/DL
CREAT SERPL-MCNC: 0.88 MG/DL (ref 0.67–1.17)
DEPRECATED HCO3 PLAS-SCNC: 23 MMOL/L (ref 22–29)
GFR SERPL CREATININE-BSD FRML MDRD: >90 ML/MIN/1.73M2
GLUCOSE SERPL-MCNC: 121 MG/DL (ref 70–99)
HDLC SERPL-MCNC: 88 MG/DL
LDLC SERPL CALC-MCNC: 157 MG/DL
NONHDLC SERPL-MCNC: 223 MG/DL
POTASSIUM SERPL-SCNC: 4.7 MMOL/L (ref 3.4–5.3)
PROT SERPL-MCNC: 7.4 G/DL (ref 6.4–8.3)
PSA SERPL DL<=0.01 NG/ML-MCNC: 0.29 NG/ML (ref 0–4.5)
SODIUM SERPL-SCNC: 138 MMOL/L (ref 136–145)
TRIGL SERPL-MCNC: 331 MG/DL
TSH SERPL DL<=0.005 MIU/L-ACNC: 3.72 UIU/ML (ref 0.3–4.2)

## 2023-06-30 PROCEDURE — 80061 LIPID PANEL: CPT

## 2023-06-30 PROCEDURE — 36415 COLL VENOUS BLD VENIPUNCTURE: CPT

## 2023-06-30 PROCEDURE — 80053 COMPREHEN METABOLIC PANEL: CPT

## 2023-06-30 PROCEDURE — 84443 ASSAY THYROID STIM HORMONE: CPT

## 2023-06-30 PROCEDURE — G0103 PSA SCREENING: HCPCS

## 2023-07-05 ENCOUNTER — HOSPITAL ENCOUNTER (OUTPATIENT)
Dept: CARDIOLOGY | Facility: CLINIC | Age: 63
Discharge: HOME | End: 2023-07-05
Attending: NURSE PRACTITIONER | Admitting: NURSE PRACTITIONER
Payer: COMMERCIAL

## 2023-07-05 VITALS — SYSTOLIC BLOOD PRESSURE: 113 MMHG | HEART RATE: 88 BPM | DIASTOLIC BLOOD PRESSURE: 73 MMHG

## 2023-07-05 DIAGNOSIS — R06.09 DOE (DYSPNEA ON EXERTION): ICD-10-CM

## 2023-07-05 DIAGNOSIS — I51.7 LVH (LEFT VENTRICULAR HYPERTROPHY): ICD-10-CM

## 2023-07-05 PROCEDURE — 255N000002 HC RX 255 OP 636: Performed by: NURSE PRACTITIONER

## 2023-07-05 PROCEDURE — 93018 CV STRESS TEST I&R ONLY: CPT | Performed by: INTERNAL MEDICINE

## 2023-07-05 PROCEDURE — 75565 CARD MRI VELOC FLOW MAPPING: CPT | Mod: 26 | Performed by: INTERNAL MEDICINE

## 2023-07-05 PROCEDURE — 250N000011 HC RX IP 250 OP 636: Mod: JZ | Performed by: INTERNAL MEDICINE

## 2023-07-05 PROCEDURE — A9585 GADOBUTROL INJECTION: HCPCS | Performed by: NURSE PRACTITIONER

## 2023-07-05 PROCEDURE — 93017 CV STRESS TEST TRACING ONLY: CPT

## 2023-07-05 PROCEDURE — 93016 CV STRESS TEST SUPVJ ONLY: CPT | Performed by: INTERNAL MEDICINE

## 2023-07-05 PROCEDURE — 75563 CARD MRI W/STRESS IMG & DYE: CPT | Mod: 26 | Performed by: INTERNAL MEDICINE

## 2023-07-05 RX ORDER — REGADENOSON 0.08 MG/ML
0.4 INJECTION, SOLUTION INTRAVENOUS ONCE
Status: COMPLETED | OUTPATIENT
Start: 2023-07-05 | End: 2023-07-05

## 2023-07-05 RX ORDER — DIPHENHYDRAMINE HYDROCHLORIDE 50 MG/ML
25-50 INJECTION INTRAMUSCULAR; INTRAVENOUS
Status: DISCONTINUED | OUTPATIENT
Start: 2023-07-05 | End: 2023-07-06 | Stop reason: HOSPADM

## 2023-07-05 RX ORDER — DIPHENHYDRAMINE HCL 25 MG
25 CAPSULE ORAL
Status: DISCONTINUED | OUTPATIENT
Start: 2023-07-05 | End: 2023-07-06 | Stop reason: HOSPADM

## 2023-07-05 RX ORDER — ONDANSETRON 2 MG/ML
4 INJECTION INTRAMUSCULAR; INTRAVENOUS
Status: DISCONTINUED | OUTPATIENT
Start: 2023-07-05 | End: 2023-07-06 | Stop reason: HOSPADM

## 2023-07-05 RX ORDER — GADOBUTROL 604.72 MG/ML
32 INJECTION INTRAVENOUS ONCE
Status: COMPLETED | OUTPATIENT
Start: 2023-07-05 | End: 2023-07-05

## 2023-07-05 RX ORDER — AMINOPHYLLINE 25 MG/ML
100 INJECTION, SOLUTION INTRAVENOUS ONCE
Status: DISCONTINUED | OUTPATIENT
Start: 2023-07-05 | End: 2023-07-06 | Stop reason: HOSPADM

## 2023-07-05 RX ORDER — METHYLPREDNISOLONE SODIUM SUCCINATE 125 MG/2ML
125 INJECTION, POWDER, LYOPHILIZED, FOR SOLUTION INTRAMUSCULAR; INTRAVENOUS
Status: DISCONTINUED | OUTPATIENT
Start: 2023-07-05 | End: 2023-07-06 | Stop reason: HOSPADM

## 2023-07-05 RX ORDER — DIAZEPAM 5 MG
5 TABLET ORAL EVERY 30 MIN PRN
Status: DISCONTINUED | OUTPATIENT
Start: 2023-07-05 | End: 2023-07-06 | Stop reason: HOSPADM

## 2023-07-05 RX ORDER — CAFFEINE CITRATE 20 MG/ML
60 SOLUTION INTRAVENOUS
Status: DISCONTINUED | OUTPATIENT
Start: 2023-07-05 | End: 2023-07-06 | Stop reason: HOSPADM

## 2023-07-05 RX ORDER — ALBUTEROL SULFATE 90 UG/1
2 AEROSOL, METERED RESPIRATORY (INHALATION) EVERY 5 MIN PRN
Status: DISCONTINUED | OUTPATIENT
Start: 2023-07-05 | End: 2023-07-06 | Stop reason: HOSPADM

## 2023-07-05 RX ORDER — ACYCLOVIR 200 MG/1
0-1 CAPSULE ORAL
Status: DISCONTINUED | OUTPATIENT
Start: 2023-07-05 | End: 2023-07-06 | Stop reason: HOSPADM

## 2023-07-05 RX ADMIN — GADOBUTROL 32 ML: 604.72 INJECTION INTRAVENOUS at 14:40

## 2023-07-05 RX ADMIN — REGADENOSON 0.4 MG: 0.08 INJECTION, SOLUTION INTRAVENOUS at 13:36

## 2023-07-07 ENCOUNTER — OFFICE VISIT (OUTPATIENT)
Dept: CARDIOLOGY | Facility: CLINIC | Age: 63
End: 2023-07-07
Attending: NURSE PRACTITIONER
Payer: COMMERCIAL

## 2023-07-07 VITALS
WEIGHT: 260 LBS | HEART RATE: 95 BPM | RESPIRATION RATE: 17 BRPM | HEIGHT: 72 IN | BODY MASS INDEX: 35.21 KG/M2 | OXYGEN SATURATION: 98 % | DIASTOLIC BLOOD PRESSURE: 88 MMHG | SYSTOLIC BLOOD PRESSURE: 117 MMHG

## 2023-07-07 DIAGNOSIS — R06.09 DOE (DYSPNEA ON EXERTION): ICD-10-CM

## 2023-07-07 DIAGNOSIS — I51.7 LVH (LEFT VENTRICULAR HYPERTROPHY): ICD-10-CM

## 2023-07-07 DIAGNOSIS — I42.2 HYPERTROPHIC CARDIOMYOPATHY (H): Primary | ICD-10-CM

## 2023-07-07 PROCEDURE — 99214 OFFICE O/P EST MOD 30 MIN: CPT | Performed by: NURSE PRACTITIONER

## 2023-07-07 RX ORDER — METOPROLOL SUCCINATE 25 MG/1
25 TABLET, EXTENDED RELEASE ORAL EVERY EVENING
Qty: 30 TABLET | Refills: 3 | Status: SHIPPED | OUTPATIENT
Start: 2023-07-07 | End: 2023-09-21

## 2023-07-07 NOTE — LETTER
7/7/2023    Fifi Fleming MD  [address]  [place] [address]    RE: Angus Wynne       Dear Colleague,     I had the pleasure of seeing Angus Wynne in the [facility].              ~Cardiology Clinic Visit~    Primary Cardiologist: Dr. Camargo  Reason for visit: Testing review    History of Present Illness  Angus Wynne is a pleasant 62 year old patient with past medical history significant for:     Mild CAD: Noted on CTA in 2019 with a total calcium score of 52 and trivial nonocclusive coronary artery disease  Hypothyroidism  GERD  Anxiety  Hypertension  EtOH abuse     The patient has a reported history of a previous coronary angiogram approximately 10 years ago in [place] for an abnormal EKG.  The patient reported that he had normal coronaries at that time.  In 2017 he had an EKG performed for shortness of breath that showed symmetric T wave inversions in V4 through V6 and was recommended that he be evaluated in the hospital.  He then went under stress echocardiogram that noted normal LV systolic function at rest without evidence of exercise-induced ischemia.     Unfortunately, in 2/2019 he slipped on a patch of ice and hurt his vertebrae and had a T9 fracture.  It was recommended that time to undergo surgical intervention however the patient elected for conservative management.  In 2019 the patient was seen in cardiology clinic and underwent a CTA that showed a total calcium score of 52 with trivial nonocclusive CAD.  In August 2022 he was hospitalized for acute rhabdomyolysis with a CK of 16,000 and CHAS.       The patient was evaluated by Dr. Camargo on 4/27/2023 due to dyspnea on exertion.  It was initially felt that this was due to deconditioning and an echocardiogram was recommended.  The echocardiogram was completed on 4/26/2023 that showed mild LVH with a hyperdynamic LV and LVEF greater than 70%.  LVOT gradient of 43 mmHg with Valsalva 124 mm.  MV was not well  visualized but suspect chordal SHIKHA, AV gradients were attempted but could not be measured.     We proceeded with stress cardiac MRI - stress perfusion revealed mild ischemia in the apical lateral portion, and showed hyperdynamic LV systolic function with LVEF 80.5%, RVEF 69.2%.  MRI showed asymmetric LVH with maximal basal septal thickness 16 mm and apical hypertrophy was noted with apical lateral thickness of 19 mm.  SHIKHA of mitral valve noted with evidence of LV outflow obstruction.  LA hyperenhancement revealed mild, patchy non-CAD scar in the apex and apical lateral region.  Collective findings representative of hypertrophic cardiomyopathy.    Interval 07/07/23    Today, patient states that his symptoms of fatigue and dyspnea are stable and not progressing.  He denies patricia chest pain, lightheadedness or dizziness, palpitations, presyncope or syncope.  We reviewed the results of his cardiac MRI in great detail and discussed the findings together.  He is quite anxious about these new findings.  BP well controlled today in clinic at 117/88, heart rate 95.  No acute distress at rest; conversational without increased work of breathing.  __________________________________________________________________         Assessment and Impression:     Dyspnea on exertion  Hypertrophic Cardiomyopathy  Trivial nonocclusive CAD  -Ongoing dyspnea, stable.    -No palpitations  -Reports his dyspnea has been progressive since his back injury in 2019  -Echocardiogram showing mild concentric LVH, hyperdynamic LVEF greater than 70%, LVOT gradient 43mmH, with valsalva is 124 mmHg, MV is not well visualized, suspect chordal SHIKHA, AV gradients attempted but could not be measured. ( AV is poorly visualized).  -Stress CMR suggested of hypertrophic cardiomyopathy.  -BP well controlled.    Anxiety         Recommendations and Plan:     Start Toprol XL 25 mg daily.  Repeat CT coronary angiogram.  Complete 48-hour holter monitor for arrhythmia  assessment.  Patient explained plan of care and verbalized understanding of information.  Complete the above testing and follow up with Dr. Camargo in about 3-months.    Plan of care discussed with Cardiologist present in clinic during this visit.  CMR and patient presentation reviewed and discussed to formulate the above outlined plan.  __________________________________________________________________    Thank you for the opportunity to participate in this pleasant patient's care.    We would be happy to see this patient sooner for any concerns in the meantime.    YUKO Chacon, CNP   Nurse Practitioner  [facility]    Today's clinic visit entailed:  Review of the result(s) of each unique test - cardiac MRI, echo, other cardiac testing, labs  The following tests were independently interpreted by me as noted in my documentation: CMR  Prescription drug management   Coordination of care and review of testing with CMR reading MD.    The level of medical decision making during this visit was of moderate complexity.    Orders this Visit:  Orders Placed This Encounter   Procedures    Adult Genetics & Metabolism Referral    Follow-Up with Cardiology    Adult Holter Monitor 48 hour     Orders Placed This Encounter   Medications    metoprolol succinate ER (TOPROL XL) 25 MG 24 hr tablet     Sig: Take 1 tablet (25 mg) by mouth every evening     Dispense:  30 tablet     Refill:  3     There are no discontinued medications.  Encounter Diagnoses   Name Primary?    IBARRA (dyspnea on exertion)     LVH (left ventricular hypertrophy)     Hypertrophic cardiomyopathy (H) Yes     CURRENT MEDICATIONS:  Current Outpatient Medications   Medication Sig Dispense Refill    acetaminophen (TYLENOL) 325 MG tablet Take 2 tablets (650 mg) by mouth every 4 hours as needed for mild pain 50 tablet 0    fexofenadine (ALLEGRA) 180 MG tablet Take 1 tablet (180 mg) by mouth daily      fluticasone (FLONASE) 50 MCG/ACT nasal  spray Spray 2 sprays into both nostrils daily 16 g 1    levothyroxine (SYNTHROID/LEVOTHROID) 175 MCG tablet Take 1 tablet (175 mcg) by mouth daily 84 tablet 1    LORazepam (ATIVAN) 0.5 MG tablet TAKE ONE TABLET BY MOUTH TWICE DAILY AS NEEDED FOR ANXIETY Strength: 0.5 mg 30 tablet 0    losartan (COZAAR) 100 MG tablet Take 1 tablet (100 mg) by mouth daily 90 tablet 1    metoprolol succinate ER (TOPROL XL) 25 MG 24 hr tablet Take 1 tablet (25 mg) by mouth every evening 30 tablet 3    rosuvastatin (CRESTOR) 40 MG tablet Take 1 tablet (40 mg) by mouth daily 90 tablet 3    sertraline (ZOLOFT) 100 MG tablet Take 1.5 tablets (150 mg) by mouth daily 135 tablet 0    traZODone (DESYREL) 50 MG tablet Take 2-3 tablets (100-150 mg) by mouth At Bedtime 180 tablet 0     ALLERGIES   No Known Allergies  PAST MEDICAL, SURGICAL, FAMILY, SOCIAL HISTORY:  History was reviewed and updated as needed, see medical record.    Review of Systems:  A 10-point Review Of Systems is otherwise normal except for that which is noted in the HPI and interval summary.    Physical Exam:    Vitals: /88   Pulse 95   Resp 17   Ht 1.829 m (6')   Wt 117.9 kg (260 lb)   SpO2 98%   BMI 35.26 kg/m    Constitutional: Appears stated age, well nourished, NAD.  Eyes: Pupils equal, round. Sclerae anicteric.   HEENT: Normocephalic, atraumatic.   Neck: Supple. Carotid pulses full and equal. No carotid bruit.  No JVD appreciated.  Respiratory: Non-labored. Lungs CTAB.  Cardiovascular: RRR, normal S1 and S2. No M/G/R.  No edema.  GI: Soft, non-distended, non-tender.  Skin: Warm and dry. No rashes, cyanosis, edema.  Musculoskeletal/Extremities: Symmetrical movement to all extremities.  Neurologic: No gross focal deficits. Alert, awake, and oriented to all spheres.  Psychiatric: Affect appropriate. Mentation normal.    Recent Lab Results:  LIPID RESULTS:  Lab Results   Component Value Date    CHOL 311 (H) 06/30/2023    CHOL 209 (H) 11/18/2020    HDL 88  06/30/2023    HDL 59 11/18/2020     (H) 06/30/2023     (H) 11/18/2020    TRIG 331 (H) 06/30/2023    TRIG 207 (H) 11/18/2020     LIVER ENZYME RESULTS:  Lab Results   Component Value Date    AST 42 06/30/2023    AST 98 (H) 11/18/2020    ALT 48 06/30/2023    ALT 74 (H) 11/18/2020     CBC RESULTS:  Lab Results   Component Value Date    WBC 5.6 08/23/2022    WBC 6.3 02/03/2019    RBC 4.76 08/23/2022    RBC 4.33 (L) 02/03/2019    HGB 14.7 08/23/2022    HGB 14.2 02/03/2019    HCT 44.3 08/23/2022    HCT 40.6 02/03/2019    MCV 93 08/23/2022    MCV 94 02/03/2019    MCH 30.9 08/23/2022    MCH 32.8 02/03/2019    MCHC 33.2 08/23/2022    MCHC 35.0 02/03/2019    RDW 12.6 08/23/2022    RDW 12.0 02/03/2019     08/23/2022     (L) 02/03/2019     BMP RESULTS:  Lab Results   Component Value Date     06/30/2023     11/18/2020    POTASSIUM 4.7 06/30/2023    POTASSIUM 4.7 08/23/2022    POTASSIUM 4.2 11/18/2020    CHLORIDE 100 06/30/2023    CHLORIDE 106 08/23/2022    CHLORIDE 103 11/18/2020    CO2 23 06/30/2023    CO2 23 08/23/2022    CO2 26 11/18/2020    ANIONGAP 15 06/30/2023    ANIONGAP 7 08/23/2022    ANIONGAP 5 11/18/2020     (H) 06/30/2023     (H) 08/23/2022     (H) 11/18/2020    BUN 11.0 06/30/2023    BUN 11 08/23/2022    BUN 12 11/18/2020    CR 0.88 06/30/2023    CR 0.87 11/18/2020    GFRESTIMATED >90 06/30/2023    GFRESTIMATED 22 (L) 08/01/2022    GFRESTIMATED >90 11/18/2020    GFRESTBLACK >90 11/18/2020    SAMMY 9.4 06/30/2023    SAMMY 9.3 11/18/2020      A1C RESULTS:  Lab Results   Component Value Date    A1C 5.5 05/23/2023     INR RESULTS:  Lab Results   Component Value Date    INR 1.10 08/04/2022    INR 1.08 08/01/2022    INR 0.95 02/13/2017       Thank you for allowing me to participate in the care of your patient.      Sincerely,     YUKO Chacon CNP     [facility]  cc:   YUKO Preciado CNP  [id number]  [place] [id number]

## 2023-07-07 NOTE — PROGRESS NOTES
~Cardiology Clinic Visit~    Primary Cardiologist: Dr. Camargo  Reason for visit: Testing review    History of Present Illness  Angus Wynne is a pleasant 62 year old patient with past medical history significant for:     1. Mild CAD: Noted on CTA in 2019 with a total calcium score of 52 and trivial nonocclusive coronary artery disease  2. Hypothyroidism  3. GERD  4. Anxiety  5. Hypertension  6. EtOH abuse     The patient has a reported history of a previous coronary angiogram approximately 10 years ago in [place] for an abnormal EKG.  The patient reported that he had normal coronaries at that time.  In 2017 he had an EKG performed for shortness of breath that showed symmetric T wave inversions in V4 through V6 and was recommended that he be evaluated in the hospital.  He then went under stress echocardiogram that noted normal LV systolic function at rest without evidence of exercise-induced ischemia.     Unfortunately, in 2/2019 he slipped on a patch of ice and hurt his vertebrae and had a T9 fracture.  It was recommended that time to undergo surgical intervention however the patient elected for conservative management.  In 2019 the patient was seen in cardiology clinic and underwent a CTA that showed a total calcium score of 52 with trivial nonocclusive CAD.  In August 2022 he was hospitalized for acute rhabdomyolysis with a CK of 16,000 and CHAS.       The patient was evaluated by Dr. Camargo on 4/27/2023 due to dyspnea on exertion.  It was initially felt that this was due to deconditioning and an echocardiogram was recommended.  The echocardiogram was completed on 4/26/2023 that showed mild LVH with a hyperdynamic LV and LVEF greater than 70%.  LVOT gradient of 43 mmHg with Valsalva 124 mm.  MV was not well visualized but suspect chordal SHIKHA, AV gradients were attempted but could not be measured.     We proceeded with stress cardiac MRI - stress perfusion revealed mild ischemia in the apical lateral  portion, and showed hyperdynamic LV systolic function with LVEF 80.5%, RVEF 69.2%.  MRI showed asymmetric LVH with maximal basal septal thickness 16 mm and apical hypertrophy was noted with apical lateral thickness of 19 mm.  SHIKHA of mitral valve noted with evidence of LV outflow obstruction.  LA hyperenhancement revealed mild, patchy non-CAD scar in the apex and apical lateral region.  Collective findings representative of hypertrophic cardiomyopathy.    Interval 07/07/23    Today, patient states that his symptoms of fatigue and dyspnea are stable and not progressing.  He denies patricia chest pain, lightheadedness or dizziness, palpitations, presyncope or syncope.  We reviewed the results of his cardiac MRI in great detail and discussed the findings together.  He is quite anxious about these new findings.  BP well controlled today in clinic at 117/88, heart rate 95.  No acute distress at rest; conversational without increased work of breathing.  __________________________________________________________________         Assessment and Impression:     Dyspnea on exertion  Hypertrophic Cardiomyopathy  Trivial nonocclusive CAD  -Ongoing dyspnea, stable.    -No palpitations  -Reports his dyspnea has been progressive since his back injury in 2019  -Echocardiogram showing mild concentric LVH, hyperdynamic LVEF greater than 70%, LVOT gradient 43mmH, with valsalva is 124 mmHg, MV is not well visualized, suspect chordal SHIKHA, AV gradients attempted but could not be measured. ( AV is poorly visualized).  -Stress CMR suggested of hypertrophic cardiomyopathy.  -BP well controlled.    Anxiety         Recommendations and Plan:     1. Start Toprol XL 25 mg daily.  2. Repeat CT coronary angiogram.  3. Complete 48-hour holter monitor for arrhythmia assessment.  4. Patient explained plan of care and verbalized understanding of information.  5. Complete the above testing and follow up with Dr. Camargo in about 3-months.    Plan of care  discussed with Cardiologist present in clinic during this visit.  CMR and patient presentation reviewed and discussed to formulate the above outlined plan.  __________________________________________________________________    Thank you for the opportunity to participate in this pleasant patient's care.    We would be happy to see this patient sooner for any concerns in the meantime.    YUKO Chacon, CNP   Nurse Practitioner  [facility]    Today's clinic visit entailed:  Review of the result(s) of each unique test - cardiac MRI, echo, other cardiac testing, labs  The following tests were independently interpreted by me as noted in my documentation: CMR  Prescription drug management   Coordination of care and review of testing with CMR reading MD.    The level of medical decision making during this visit was of moderate complexity.    Orders this Visit:  Orders Placed This Encounter   Procedures     Adult Genetics & Metabolism Referral     Follow-Up with Cardiology     Adult Holter Monitor 48 hour     Orders Placed This Encounter   Medications     metoprolol succinate ER (TOPROL XL) 25 MG 24 hr tablet     Sig: Take 1 tablet (25 mg) by mouth every evening     Dispense:  30 tablet     Refill:  3     There are no discontinued medications.  Encounter Diagnoses   Name Primary?     IBARRA (dyspnea on exertion)      LVH (left ventricular hypertrophy)      Hypertrophic cardiomyopathy (H) Yes     CURRENT MEDICATIONS:  Current Outpatient Medications   Medication Sig Dispense Refill     acetaminophen (TYLENOL) 325 MG tablet Take 2 tablets (650 mg) by mouth every 4 hours as needed for mild pain 50 tablet 0     fexofenadine (ALLEGRA) 180 MG tablet Take 1 tablet (180 mg) by mouth daily       fluticasone (FLONASE) 50 MCG/ACT nasal spray Spray 2 sprays into both nostrils daily 16 g 1     levothyroxine (SYNTHROID/LEVOTHROID) 175 MCG tablet Take 1 tablet (175 mcg) by mouth daily 84 tablet 1     LORazepam  (ATIVAN) 0.5 MG tablet TAKE ONE TABLET BY MOUTH TWICE DAILY AS NEEDED FOR ANXIETY Strength: 0.5 mg 30 tablet 0     losartan (COZAAR) 100 MG tablet Take 1 tablet (100 mg) by mouth daily 90 tablet 1     metoprolol succinate ER (TOPROL XL) 25 MG 24 hr tablet Take 1 tablet (25 mg) by mouth every evening 30 tablet 3     rosuvastatin (CRESTOR) 40 MG tablet Take 1 tablet (40 mg) by mouth daily 90 tablet 3     sertraline (ZOLOFT) 100 MG tablet Take 1.5 tablets (150 mg) by mouth daily 135 tablet 0     traZODone (DESYREL) 50 MG tablet Take 2-3 tablets (100-150 mg) by mouth At Bedtime 180 tablet 0     ALLERGIES   No Known Allergies  PAST MEDICAL, SURGICAL, FAMILY, SOCIAL HISTORY:  History was reviewed and updated as needed, see medical record.    Review of Systems:  A 10-point Review Of Systems is otherwise normal except for that which is noted in the HPI and interval summary.    Physical Exam:    Vitals: /88   Pulse 95   Resp 17   Ht 1.829 m (6')   Wt 117.9 kg (260 lb)   SpO2 98%   BMI 35.26 kg/m    Constitutional: Appears stated age, well nourished, NAD.  Eyes: Pupils equal, round. Sclerae anicteric.   HEENT: Normocephalic, atraumatic.   Neck: Supple. Carotid pulses full and equal. No carotid bruit.  No JVD appreciated.  Respiratory: Non-labored. Lungs CTAB.  Cardiovascular: RRR, normal S1 and S2. No M/G/R.  No edema.  GI: Soft, non-distended, non-tender.  Skin: Warm and dry. No rashes, cyanosis, edema.  Musculoskeletal/Extremities: Symmetrical movement to all extremities.  Neurologic: No gross focal deficits. Alert, awake, and oriented to all spheres.  Psychiatric: Affect appropriate. Mentation normal.    Recent Lab Results:  LIPID RESULTS:  Lab Results   Component Value Date    CHOL 311 (H) 06/30/2023    CHOL 209 (H) 11/18/2020    HDL 88 06/30/2023    HDL 59 11/18/2020     (H) 06/30/2023     (H) 11/18/2020    TRIG 331 (H) 06/30/2023    TRIG 207 (H) 11/18/2020     LIVER ENZYME RESULTS:  Lab Results    Component Value Date    AST 42 06/30/2023    AST 98 (H) 11/18/2020    ALT 48 06/30/2023    ALT 74 (H) 11/18/2020     CBC RESULTS:  Lab Results   Component Value Date    WBC 5.6 08/23/2022    WBC 6.3 02/03/2019    RBC 4.76 08/23/2022    RBC 4.33 (L) 02/03/2019    HGB 14.7 08/23/2022    HGB 14.2 02/03/2019    HCT 44.3 08/23/2022    HCT 40.6 02/03/2019    MCV 93 08/23/2022    MCV 94 02/03/2019    MCH 30.9 08/23/2022    MCH 32.8 02/03/2019    MCHC 33.2 08/23/2022    MCHC 35.0 02/03/2019    RDW 12.6 08/23/2022    RDW 12.0 02/03/2019     08/23/2022     (L) 02/03/2019     BMP RESULTS:  Lab Results   Component Value Date     06/30/2023     11/18/2020    POTASSIUM 4.7 06/30/2023    POTASSIUM 4.7 08/23/2022    POTASSIUM 4.2 11/18/2020    CHLORIDE 100 06/30/2023    CHLORIDE 106 08/23/2022    CHLORIDE 103 11/18/2020    CO2 23 06/30/2023    CO2 23 08/23/2022    CO2 26 11/18/2020    ANIONGAP 15 06/30/2023    ANIONGAP 7 08/23/2022    ANIONGAP 5 11/18/2020     (H) 06/30/2023     (H) 08/23/2022     (H) 11/18/2020    BUN 11.0 06/30/2023    BUN 11 08/23/2022    BUN 12 11/18/2020    CR 0.88 06/30/2023    CR 0.87 11/18/2020    GFRESTIMATED >90 06/30/2023    GFRESTIMATED 22 (L) 08/01/2022    GFRESTIMATED >90 11/18/2020    GFRESTBLACK >90 11/18/2020    SAMMY 9.4 06/30/2023    SAMMY 9.3 11/18/2020      A1C RESULTS:  Lab Results   Component Value Date    A1C 5.5 05/23/2023     INR RESULTS:  Lab Results   Component Value Date    INR 1.10 08/04/2022    INR 1.08 08/01/2022    INR 0.95 02/13/2017

## 2023-07-07 NOTE — PATIENT INSTRUCTIONS
Thank you for your visit with the [facility] today.    Today's Summary     Start Metoprolol 25 mg daily.  Continue other heart medications.  I will place a referral to our genetics counselor for additional testing and education.  Recommend family screening for genetic variant for hypertrophic cardiomyopathy.  Follow up in 3-months with Dr. Camargo.  I will send you a [employer] message with the MRI report.    If you have questions or concerns, please do not hesitate to call my nursing support team at [phone].    Scheduling phone number: [phone]  [facility] Clinic Number: [phone]    It was a pleasure seeing you today.     YUKO Chacon, CNP  Nurse Practitioner  [facility]  July 7, 2023  ________________________________________________________

## 2023-07-11 ENCOUNTER — PATIENT OUTREACH (OUTPATIENT)
Dept: GERIATRIC MEDICINE | Facility: CLINIC | Age: 63
End: 2023-07-11
Payer: COMMERCIAL

## 2023-07-11 NOTE — LETTER
July 11, 2023    WILLIAM HARRIS  [address] [place] [address]    Dear  William,    Welcome to [facility] MSC+ health program. My name is Niecy Angelo RN. I am your MSC+ care coordinator. You are eligible for Care Coordination through [facility] MSC+ plan.    As your care coordinator, we ll:  Meet to go over your care coordination benefits  Talk about your physical and mental health care needs   Review your preventative care needs  Create a plan that meets your needs with the services you choose    What happens next?  I ll call you soon to introduce myself and tell you more about my role. We ll then plan time to go over your health and safety needs. Our goal is to keep you as healthy and independent as possible.    Soon, you will receive a new MSC+ member identification (ID) card from [facility]. When you receive it, please use this card along with your [place] [facility] card and Prescription Drug Coverage Program card. When you receive, it please use this card where you get your health services. If you have Medicare, you will need to show your Medicare card when you get health services.    The [facility] care coordination program is voluntary and offered to you at no cost. If you wish to stop being in the care coordination program or have questions, call me at [phone]. If you reach my voicemail, leave a message and your phone number. TTY users, call the [place] [employer] at 878 or 1-160.122.2107 (fgeymz-re-hkflst relay service).    Sincerely,      Niecy Angelo RN    Phone: [phone]   E-mail: [email or web address]    [id number] accepted   [id number]       [id number] (07/2022)

## 2023-07-11 NOTE — TELEPHONE ENCOUNTER
[facility] Care Coordination Contact    Member became effective with  Cesario on 07/01/2023 with [employer] MSC+.  [facility] Health [facility]  Previous Care System: [facility]  Previous care coordinators name and number: n/a  Waiver Type: N/A  Last MMIS Entry: Date n/a and Type n/a  MMIS visit date (and type) if different from above: n/a  Services Listed in MMIS: n/a  UTF received: No UTF to request  Address/Phone discrepancy: everything matches    Cherie Ruano  Care Management Specialist  [facility]  [phone]

## 2023-07-17 ENCOUNTER — HOSPITAL ENCOUNTER (OUTPATIENT)
Dept: CARDIOLOGY | Facility: CLINIC | Age: 63
Discharge: HOME | End: 2023-07-17
Attending: NURSE PRACTITIONER | Admitting: NURSE PRACTITIONER
Payer: COMMERCIAL

## 2023-07-17 DIAGNOSIS — R09.81 NASAL CONGESTION: ICD-10-CM

## 2023-07-17 DIAGNOSIS — I42.2 HYPERTROPHIC CARDIOMYOPATHY (H): ICD-10-CM

## 2023-07-17 PROCEDURE — 93225 XTRNL ECG REC<48 HRS REC: CPT

## 2023-07-17 PROCEDURE — 93227 XTRNL ECG REC<48 HR R&I: CPT | Performed by: INTERNAL MEDICINE

## 2023-07-17 RX ORDER — FLUTICASONE PROPIONATE 50 MCG
SPRAY, SUSPENSION (ML) NASAL
Qty: 16 G | Refills: 1 | Status: SHIPPED | OUTPATIENT
Start: 2023-07-17 | End: 2023-08-16

## 2023-07-18 ENCOUNTER — PATIENT OUTREACH (OUTPATIENT)
Dept: GERIATRIC MEDICINE | Facility: CLINIC | Age: 63
End: 2023-07-18
Payer: COMMERCIAL

## 2023-07-18 DIAGNOSIS — R09.81 NASAL CONGESTION: ICD-10-CM

## 2023-07-18 RX ORDER — FLUTICASONE PROPIONATE 50 MCG
2 SPRAY, SUSPENSION (ML) NASAL DAILY
Qty: 16 G | Refills: 1 | OUTPATIENT
Start: 2023-07-18

## 2023-07-18 NOTE — PROGRESS NOTES
[facility] Care Coordination Contact    Called member to schedule annual HRA home visit. Left a message requesting a return call to schedule HRA.     Niecy Angelo RN  [facility]  [phone]

## 2023-07-19 ENCOUNTER — MYC REFILL (OUTPATIENT)
Dept: FAMILY MEDICINE | Facility: CLINIC | Age: 63
End: 2023-07-19
Payer: COMMERCIAL

## 2023-07-19 DIAGNOSIS — F41.9 ANXIETY: ICD-10-CM

## 2023-07-19 DIAGNOSIS — R09.81 NASAL CONGESTION: ICD-10-CM

## 2023-07-19 RX ORDER — FEXOFENADINE HCL 180 MG/1
180 TABLET ORAL DAILY
OUTPATIENT
Start: 2023-07-19

## 2023-07-19 RX ORDER — LORAZEPAM 0.5 MG/1
TABLET ORAL
Qty: 30 TABLET | Refills: 0 | OUTPATIENT
Start: 2023-07-19

## 2023-07-19 NOTE — PROGRESS NOTES
[facility] Care Coordination Contact    Called member to schedule annual HRA home visit. Family member answered the member's phone stating he is in the hospital. There are no records within the [facility] system to show he is in the hospital at this time, so may be in another system.   CC will be out of the office 7/20-7/24 and will return call Tuesday morning (7/25). Family member verbalized understanding.    Niecy Angelo RN  [facility]  [phone]

## 2023-07-25 ENCOUNTER — HOSPITAL ENCOUNTER (OUTPATIENT)
Dept: CARDIOLOGY | Facility: CLINIC | Age: 63
Discharge: HOME | End: 2023-07-25
Attending: NURSE PRACTITIONER | Admitting: NURSE PRACTITIONER
Payer: COMMERCIAL

## 2023-07-25 DIAGNOSIS — I42.2 HYPERTROPHIC CARDIOMYOPATHY (H): ICD-10-CM

## 2023-07-25 PROCEDURE — 75574 CT ANGIO HRT W/3D IMAGE: CPT

## 2023-07-25 PROCEDURE — 250N000011 HC RX IP 250 OP 636: Performed by: NURSE PRACTITIONER

## 2023-07-25 PROCEDURE — 75574 CT ANGIO HRT W/3D IMAGE: CPT | Mod: 26 | Performed by: INTERNAL MEDICINE

## 2023-07-25 PROCEDURE — 250N000013 HC RX MED GY IP 250 OP 250 PS 637: Performed by: INTERNAL MEDICINE

## 2023-07-25 RX ORDER — IOPAMIDOL 755 MG/ML
500 INJECTION, SOLUTION INTRAVASCULAR ONCE
Status: COMPLETED | OUTPATIENT
Start: 2023-07-25 | End: 2023-07-25

## 2023-07-25 RX ORDER — METOPROLOL TARTRATE 25 MG/1
25-100 TABLET, FILM COATED ORAL
Status: DISCONTINUED | OUTPATIENT
Start: 2023-07-25 | End: 2023-07-26 | Stop reason: HOSPADM

## 2023-07-25 RX ORDER — NITROGLYCERIN 0.4 MG/1
0.4 TABLET SUBLINGUAL
Status: DISCONTINUED | OUTPATIENT
Start: 2023-07-25 | End: 2023-07-26 | Stop reason: HOSPADM

## 2023-07-25 RX ORDER — ACYCLOVIR 200 MG/1
0-1 CAPSULE ORAL
Status: DISCONTINUED | OUTPATIENT
Start: 2023-07-25 | End: 2023-07-26 | Stop reason: HOSPADM

## 2023-07-25 RX ORDER — METHYLPREDNISOLONE SODIUM SUCCINATE 125 MG/2ML
125 INJECTION, POWDER, LYOPHILIZED, FOR SOLUTION INTRAMUSCULAR; INTRAVENOUS
Status: DISCONTINUED | OUTPATIENT
Start: 2023-07-25 | End: 2023-07-26 | Stop reason: HOSPADM

## 2023-07-25 RX ORDER — DILTIAZEM HYDROCHLORIDE 5 MG/ML
10-15 INJECTION INTRAVENOUS
Status: DISCONTINUED | OUTPATIENT
Start: 2023-07-25 | End: 2023-07-26 | Stop reason: HOSPADM

## 2023-07-25 RX ORDER — DILTIAZEM HCL 60 MG
120 TABLET ORAL
Status: DISCONTINUED | OUTPATIENT
Start: 2023-07-25 | End: 2023-07-26 | Stop reason: HOSPADM

## 2023-07-25 RX ORDER — METOPROLOL TARTRATE 1 MG/ML
5-15 INJECTION, SOLUTION INTRAVENOUS
Status: DISCONTINUED | OUTPATIENT
Start: 2023-07-25 | End: 2023-07-26 | Stop reason: HOSPADM

## 2023-07-25 RX ORDER — IVABRADINE 5 MG/1
5-15 TABLET, FILM COATED ORAL
Status: DISCONTINUED | OUTPATIENT
Start: 2023-07-25 | End: 2023-07-26 | Stop reason: HOSPADM

## 2023-07-25 RX ORDER — ONDANSETRON 2 MG/ML
4 INJECTION INTRAMUSCULAR; INTRAVENOUS
Status: DISCONTINUED | OUTPATIENT
Start: 2023-07-25 | End: 2023-07-26 | Stop reason: HOSPADM

## 2023-07-25 RX ORDER — DIPHENHYDRAMINE HYDROCHLORIDE 50 MG/ML
25-50 INJECTION INTRAMUSCULAR; INTRAVENOUS
Status: DISCONTINUED | OUTPATIENT
Start: 2023-07-25 | End: 2023-07-26 | Stop reason: HOSPADM

## 2023-07-25 RX ORDER — DIPHENHYDRAMINE HCL 25 MG
25 CAPSULE ORAL
Status: DISCONTINUED | OUTPATIENT
Start: 2023-07-25 | End: 2023-07-26 | Stop reason: HOSPADM

## 2023-07-25 RX ADMIN — NITROGLYCERIN 0.4 MG: 0.4 TABLET SUBLINGUAL at 14:31

## 2023-07-25 RX ADMIN — IOPAMIDOL 120 ML: 755 INJECTION, SOLUTION INTRAVENOUS at 14:36

## 2023-07-25 NOTE — PROGRESS NOTES
[facility] Care Coordination Contact    Called member to schedule annual HRA home visit. Left a message requesting a return call to schedule HRA.  CC explained if the member would like a health risk assessment then it is required to be complete before 8/1/2023. Otherwise, the member can request a health risk assessment when ever he feels he needs one.    Niecy Angelo RN  [facility]  [phone]

## 2023-07-26 NOTE — PROGRESS NOTES
[facility] Care Coordination Contact    Called member and adult son Eliazar  to schedule annual HRA home visit. HRA has been scheduled for 7/27/2023. Member speaks and understands English, as well as son.    Niecy Angelo RN  [facility]  [phone]

## 2023-07-27 ENCOUNTER — PATIENT OUTREACH (OUTPATIENT)
Dept: GERIATRIC MEDICINE | Facility: CLINIC | Age: 63
End: 2023-07-27
Payer: COMMERCIAL

## 2023-07-27 DIAGNOSIS — E66.01 MORBID OBESITY (H): ICD-10-CM

## 2023-07-27 DIAGNOSIS — H81.10 BPPV (BENIGN PAROXYSMAL POSITIONAL VERTIGO), UNSPECIFIED LATERALITY: ICD-10-CM

## 2023-07-27 DIAGNOSIS — R06.09 DOE (DYSPNEA ON EXERTION): Primary | ICD-10-CM

## 2023-07-27 DIAGNOSIS — I10 ESSENTIAL HYPERTENSION: ICD-10-CM

## 2023-07-27 DIAGNOSIS — F41.9 ANXIETY: ICD-10-CM

## 2023-07-27 SDOH — ECONOMIC STABILITY: INCOME INSECURITY: IN THE LAST 12 MONTHS, WAS THERE A TIME WHEN YOU WERE NOT ABLE TO PAY THE MORTGAGE OR RENT ON TIME?: YES

## 2023-07-27 SDOH — ECONOMIC STABILITY: FOOD INSECURITY: WITHIN THE PAST 12 MONTHS, YOU WORRIED THAT YOUR FOOD WOULD RUN OUT BEFORE YOU GOT MONEY TO BUY MORE.: SOMETIMES TRUE

## 2023-07-27 SDOH — ECONOMIC STABILITY: FOOD INSECURITY: WITHIN THE PAST 12 MONTHS, THE FOOD YOU BOUGHT JUST DIDN'T LAST AND YOU DIDN'T HAVE MONEY TO GET MORE.: NEVER TRUE

## 2023-07-27 SDOH — ECONOMIC STABILITY: TRANSPORTATION INSECURITY
IN THE PAST 12 MONTHS, HAS LACK OF TRANSPORTATION KEPT YOU FROM MEETINGS, WORK, OR FROM GETTING THINGS NEEDED FOR DAILY LIVING?: NO

## 2023-07-27 SDOH — HEALTH STABILITY: PHYSICAL HEALTH: ON AVERAGE, HOW MANY MINUTES DO YOU ENGAGE IN EXERCISE AT THIS LEVEL?: 0 MIN

## 2023-07-27 SDOH — HEALTH STABILITY: PHYSICAL HEALTH: ON AVERAGE, HOW MANY DAYS PER WEEK DO YOU ENGAGE IN MODERATE TO STRENUOUS EXERCISE (LIKE A BRISK WALK)?: 0 DAYS

## 2023-07-27 SDOH — ECONOMIC STABILITY: TRANSPORTATION INSECURITY
IN THE PAST 12 MONTHS, HAS THE LACK OF TRANSPORTATION KEPT YOU FROM MEDICAL APPOINTMENTS OR FROM GETTING MEDICATIONS?: NO

## 2023-07-27 ASSESSMENT — SOCIAL DETERMINANTS OF HEALTH (SDOH)
DO YOU BELONG TO ANY CLUBS OR ORGANIZATIONS SUCH AS CHURCH GROUPS UNIONS, FRATERNAL OR ATHLETIC GROUPS, OR SCHOOL GROUPS?: NO
HOW HARD IS IT FOR YOU TO PAY FOR THE VERY BASICS LIKE FOOD, HOUSING, MEDICAL CARE, AND HEATING?: VERY HARD
HOW OFTEN DO YOU ATTENT MEETINGS OF THE CLUB OR ORGANIZATION YOU BELONG TO?: NEVER
HOW OFTEN DO YOU GET TOGETHER WITH FRIENDS OR RELATIVES?: NEVER
IN A TYPICAL WEEK, HOW MANY TIMES DO YOU TALK ON THE PHONE WITH FAMILY, FRIENDS, OR NEIGHBORS?: MORE THAN THREE TIMES A WEEK
HOW OFTEN DO YOU ATTEND CHURCH OR RELIGIOUS SERVICES?: NEVER

## 2023-07-27 ASSESSMENT — LIFESTYLE VARIABLES
HOW MANY STANDARD DRINKS CONTAINING ALCOHOL DO YOU HAVE ON A TYPICAL DAY: 1 OR 2
SKIP TO QUESTIONS 9-10: 0
HOW OFTEN DO YOU HAVE A DRINK CONTAINING ALCOHOL: 2-4 TIMES A MONTH
HOW OFTEN DO YOU HAVE SIX OR MORE DRINKS ON ONE OCCASION: LESS THAN MONTHLY
AUDIT-C TOTAL SCORE: 3

## 2023-07-27 ASSESSMENT — ACTIVITIES OF DAILY LIVING (ADL): DEPENDENT_IADLS:: CLEANING;COOKING;LAUNDRY;SHOPPING;MEAL PREPARATION;MEDICATION MANAGEMENT;TRANSPORTATION

## 2023-07-27 NOTE — Clinical Note
There are orders attached to this encounter. Please review, assign a diagnoses and sign.   Thank you!  Niecy Angelo RN [facility] [phone]

## 2023-07-28 NOTE — PROGRESS NOTES
[facility] Care Coordination Contact    [facility] Initial Assessment     Home visit for Initial Health Risk Assessment with Angus Wynne completed on July 27, 2023    Type of residence:: Private home - stairs (Two flights upstairs, and basement stairs)  Current living arrangement:: I live in a private home with family     Assessment completed with:: Patient, Son, and wife.    Current Care Plan  Member currently receiving the following home care services:   None  Member currently receiving the following community resources: None    Medication Review  Medication reconciliation completed in Epic: Yes  Medication set-up & administration: Family/informal caregiver sets up  (Angus states he tells his son which medication he needs from the bottle at that time) .  Self-administers medications.  Medication Risk Assessment Medication (1 or more, place referral to MTM): Recent falls within past year  MTM Referral Placed: Yes: MTM Referral Placed    Mental/Behavioral Health   Depression Screening:   PHQ-2 Total Score (Adult) - Positive if 3 or more points; Administer PHQ-9 if positive: 2       Mental health DX:: Yes   Mental health DX how managed:: Medication (Sertraline and Lorazepam for anxiety).    No current  services-will place referral for Medication    Falls Assessment:   Fallen 2 or more times in the past year?: Yes   Any fall with injury in the past year?: Yes. Member states he broke his left foot July of 2022.    ADL/IADL Dependencies:   Dependent ADLs:: Bathing, Dressing, Positioning, Transfers, Grooming, Ambulation-no assistive device  Dependent IADLs:: Cleaning, Cooking, Laundry, Shopping, Meal Preparation, Medication Management, Transportation    [facility] sponsored benefits: Shared information re: Silver Sneakers/gym memberships, ASA, Calcium +D.    PCA Assessment completed at visit: Yes Initial PCA Assessment indicated 5.75 hours per day of PCA.     Elderly Waiver Eligibility: Yes-will  open to EW    Care Plan & Recommendations:     -Set up personal care attendant services. His son will be his personal care attendant.    -Supplemental benefits:   +Pill minder   +Blood pressure cuff  +[employer] for fitness to use pool    -Request from PCP:  +Home Safety Evaluation for proper DME (bathroom, ambulating, etc)  +MTM (Medication Therapy Management)  +[employer] paperwork for transportation    -Referral for Honoring Cho[employer] ACP education and paperwork    See [facility] for detailed assessment information.    Follow-Up Plan: Member informed of future contact, plan to f/u with member with a 6 month telephone assessment.  Contact information shared with member and family, encouraged member to call with any questions or concerns at any time.    [place] care continuum providers: Please see Snapshot and Care Management Flowsheets for Specific details of care plan.    This CC note routed to PCP, Fifi Fleming.     Niecy Angelo RN  [facility]  [phone]

## 2023-08-07 ENCOUNTER — MYC REFILL (OUTPATIENT)
Dept: FAMILY MEDICINE | Facility: CLINIC | Age: 63
End: 2023-08-07
Payer: COMMERCIAL

## 2023-08-07 DIAGNOSIS — F41.9 ANXIETY: ICD-10-CM

## 2023-08-09 ENCOUNTER — PATIENT OUTREACH (OUTPATIENT)
Dept: GERIATRIC MEDICINE | Facility: CLINIC | Age: 63
End: 2023-08-09
Payer: COMMERCIAL

## 2023-08-09 NOTE — LETTER
[facility]  [address], [address]  [place] [address]  Phone:  [phone]  Fax:  [phone]      August 9, 2023    WILLIAM HARRIS  [address]   [place] [address]    Dear William,    Enclosed is a copy of your completed PCA Assessment and Service Plan.  This is for your records and no action is required by you.  If you have additional questions regarding your assessment please contact me at [phone]. If you feel that your needs are not being met, please contact the Clinical Supervisor at [phone].    Sincerely,    Niecy Angelo RN    Phone: [phone]   E-mail: [email or web address]            Enclosure:  Completed PCA assessment

## 2023-08-09 NOTE — TELEPHONE ENCOUNTER
[facility] Care Coordination Contact    [facility]:  Emailed completed PCA assessment to [facility].  Mailed copy to member.  Faxed MD Communication to PCP. Agency [facility].    Cherie Ruano  Care Management Specialist  [facility]  [phone]

## 2023-08-10 RX ORDER — LORAZEPAM 0.5 MG/1
TABLET ORAL
Qty: 30 TABLET | Refills: 0 | Status: SHIPPED | OUTPATIENT
Start: 2023-08-16 | End: 2023-08-21

## 2023-08-10 NOTE — TELEPHONE ENCOUNTER
He  is requesting refill too son   He  needs to  stick  with his assigned dose as per discussion last visit

## 2023-08-16 DIAGNOSIS — R09.81 NASAL CONGESTION: ICD-10-CM

## 2023-08-16 RX ORDER — FLUTICASONE PROPIONATE 50 MCG
SPRAY, SUSPENSION (ML) NASAL
Qty: 48 G | Refills: 3 | Status: SHIPPED | OUTPATIENT
Start: 2023-08-16 | End: 2024-05-14

## 2023-08-17 ENCOUNTER — PATIENT OUTREACH (OUTPATIENT)
Dept: GERIATRIC MEDICINE | Facility: CLINIC | Age: 63
End: 2023-08-17
Payer: COMMERCIAL

## 2023-08-17 NOTE — PROGRESS NOTES
[facility] Care Coordination Contact    Order placed with [employer] [facility] [employer]  for PERS button.  Order placed on 8/17/2023. Information added to budget worksheet.    Niecy Angelo RN  [facility]  [phone]

## 2023-08-21 ENCOUNTER — TELEPHONE (OUTPATIENT)
Dept: FAMILY MEDICINE | Facility: CLINIC | Age: 63
End: 2023-08-21

## 2023-08-21 ENCOUNTER — VIRTUAL VISIT (OUTPATIENT)
Dept: FAMILY MEDICINE | Facility: CLINIC | Age: 63
End: 2023-08-21
Payer: COMMERCIAL

## 2023-08-21 DIAGNOSIS — Z74.8 ASSISTANCE NEEDED WITH TRANSPORTATION: ICD-10-CM

## 2023-08-21 DIAGNOSIS — F41.9 ANXIETY: Primary | ICD-10-CM

## 2023-08-21 DIAGNOSIS — I10 ESSENTIAL HYPERTENSION: ICD-10-CM

## 2023-08-21 PROCEDURE — 99214 OFFICE O/P EST MOD 30 MIN: CPT | Mod: VID | Performed by: FAMILY MEDICINE

## 2023-08-21 RX ORDER — LOSARTAN POTASSIUM 100 MG/1
100 TABLET ORAL DAILY
Qty: 90 TABLET | Refills: 1 | Status: SHIPPED | OUTPATIENT
Start: 2023-08-21 | End: 2024-04-23

## 2023-08-21 RX ORDER — LORAZEPAM 0.5 MG/1
TABLET ORAL
Qty: 60 TABLET | Refills: 0 | Status: SHIPPED | OUTPATIENT
Start: 2023-08-21 | End: 2023-09-14

## 2023-08-21 NOTE — PROGRESS NOTES
Angus is a 63 year old who is being evaluated via a billable video visit.      How would you like to obtain your AVS? MyChart  If the video visit is dropped, the invitation should be resent by: Text to cell phone: [phone]  Will anyone else be joining your video visit? No          Assessment & Plan               (F41.9) Anxiety  Comment:   Plan: LORazepam (ATIVAN) 0.5 MG tablet, Adult Mental         Health  Referral    Discussed cares, talked about signs and symptoms of anxiety/ depression and treatment options. Willing to continue same dose of zoloft but needs refill on lorazepam, he still needs at least twice daily , but willing to continue to work o tapering down the dose    Pros/ cons of med's discussed . encouraged to see  to help and referral  has been given .   spent sometimes counseling patient. Follow up in 2-3 months, sooner if problem.       (I10) Essential hypertension  Comment:   Plan: losartan (COZAAR) 100 MG tablet          BP in adequate control. Discussed cares, low fat low salt diet etc. Check labs, call pt with results. Refill given.   encouraged home BP monitoring. Follow up recheck in 6 months, sooner if problem.             (Z74.8) Assistance needed with transportation  (primary encounter diagnosis)  Comment:   Plan: Primary Care - Care Coordination Referral          Discusses cares concerns. He has no car any other transportation means , so need assitance. Referral given to care - coordination / home care to assist . F/unit(s) as needed     Check labs. refill sent.Cares and  treatment discussed .follow up if problem   Patient expressed understanding and agreement with treatment plan. All patient's questions were answered, will let me know if has more later.  Medications: Rx's: Reviewed the potential side effects/complications of medications prescribed.       Fifi Fleming MD  [facility] [place]    Maria Eugenia Greco is a 63 year old, presenting  for the following health issues:  Forms and Recheck Medication      2023    11:04 AM   Additional Questions   Roomed by Trish CARRERA     Patient is here regarding:    Concern - lorazepam, / anxiety follow up     2. Losartin: out of medication, needs refill             Per provider on . He needs to stick with his assigned dose as per discussion last visit     3. Patient is requesting: [employer] forms. Was assessed in home regarding this.        Does not have transportation at home this time. Needs help make arrangement for that     Niecy Angelo, RN   [phone]        Depression and Anxiety Follow-Up  How are you doing with your depression since your last visit? Worsened, as  he ran out of anxiety med's.  so he was feeling overwhelmed and was jut laying in bed and could not control anxiety.  he prefers to go back 0.5 mg full  pill rather then  half tab, as it does not work .   He is trying to also arrange transportation so he can go back to his routine of going to gym and pool etc,  bc that had helped   How are you doing with your anxiety since your last visit?  Worsened   Are you having other symptoms that might be associated with depression or anxiety? No   Have you had a significant life event? No    Do you have any concerns with your use of alcohol or other drugs? Yes:  alcohol  not frequent now  he has occasional relapse with just couple of drinks recent weeks but doing ok now         Hypertension Follow-up    Do you check your blood pressure regularly outside of the clinic? Not checking but he is feeling fine   Are you following a low salt diet? Yes  Are your blood pressures ever more than 140 on the top number (systolic) OR more   than 90 on the bottom number (diastolic), for example 140/90? No    Social History     Tobacco Use    Smoking status: Former     Types: Cigarettes     Quit date: 2015     Years since quittin.9    Smokeless tobacco: Never   Substance Use Topics    Alcohol  use: No     Alcohol/week: 0.0 standard drinks of alcohol     Comment: 2-3 drinks a week    Drug use: No         4/8/2021     8:43 AM 8/23/2022     3:05 PM 9/22/2022    10:49 AM   PHQ   PHQ-9 Total Score 9  3   Q9: Thoughts of better off dead/self-harm past 2 weeks Not at all Not at all Not at all         8/23/2022     3:05 PM 9/22/2022    10:49 AM 5/23/2023     3:36 PM   RUTH-7 SCORE   Total Score  3 (minimal anxiety) 0 (minimal anxiety)   Total Score 11 3 0         9/22/2022    10:49 AM   Last PHQ-9   1.  Little interest or pleasure in doing things 1   2.  Feeling down, depressed, or hopeless 1   3.  Trouble falling or staying asleep, or sleeping too much 0   4.  Feeling tired or having little energy 1   5.  Poor appetite or overeating 0   6.  Feeling bad about yourself 0   7.  Trouble concentrating 0   8.  Moving slowly or restless 0   Q9: Thoughts of better off dead/self-harm past 2 weeks 0   PHQ-9 Total Score 3         5/23/2023     3:36 PM   RUTH-7    1. Feeling nervous, anxious, or on edge 0   2. Not being able to stop or control worrying 0   3. Worrying too much about different things 0   4. Trouble relaxing 0   5. Being so restless that it is hard to sit still 0   6. Becoming easily annoyed or irritable 0   7. Feeling afraid, as if something awful might happen 0   RUTH-7 Total Score 0   If you checked any problems, how difficult have they made it for you to do your work, take care of things at home, or get along with other people? Not difficult at all       Suicide Assessment Five-step Evaluation and Treatment (SAFE-T)                Review of Systems   Constitutional, HEENT, cardiovascular, pulmonary, GI, , musculoskeletal, neuro, skin, endocrine and psych systems are negative, except as otherwise noted.      Objective           Vitals:  No vitals were obtained today due to virtual visit.    Physical Exam   GENERAL: Healthy, alert and no distress  EYES: Eyes grossly normal to inspection.  No discharge or  erythema, or obvious scleral/conjunctival abnormalities.  RESP: No audible wheeze, cough, or visible cyanosis.  No visible retractions or increased work of breathing.    SKIN: Visible skin clear. No significant rash, abnormal pigmentation or lesions.  NEURO: Cranial nerves grossly intact.  Mentation and speech appropriate for age.  PSYCH: mentation appears normal, anxious, speech pressured, judgement and insight intact, and appearance well groomed            Video-Visit Details    Type of service:  Video Visit       Originating Location (pt. Location): Home    Distant Location (provider location):  Off-site  Platform used for Video Visit: Ruma

## 2023-08-21 NOTE — TELEPHONE ENCOUNTER
Patient sent MyChart communication as final attempt to reach patient.    Zaina Ramírez, PharmD  Medication Therapy Management Pharmacist  Voicemail: [phone]

## 2023-08-21 NOTE — TELEPHONE ENCOUNTER
MTM referral from: [facility] visit (referral by provider)    MTM referral outreach attempt #2 on August 21, 2023 at 11:17 AM      Outcome: Patient not reachable after several attempts, will route to MTM Pharmacist/Provider as an FYI.  MT scheduling number is [phone].  Thank you for the referral.    Use Bib HARRY for the carrier/Plan on the flowsheet    Michelle Lebron CPhT  MT

## 2023-08-22 ENCOUNTER — PATIENT OUTREACH (OUTPATIENT)
Dept: GERIATRIC MEDICINE | Facility: CLINIC | Age: 63
End: 2023-08-22
Payer: COMMERCIAL

## 2023-08-22 NOTE — TELEPHONE ENCOUNTER
[facility] Care Coordination Contact    Received after visit chart from care coordinator.  Completed following tasks: Mailed copy of care plan to client, Mailed Safe Medication Disposal , Mailed [employer] Leave Behind Letter, and Uploaded consent to communicate form(s) to Epic   and Provider Signature - No POC Shared:  Member indicates that they do not want their POC shared with any EW providers.    CMS sent HCD forms with info sheets per CC.  Per CC, mailed LTCC caregiver assessment to Eliazar Wynne along with self-addressed, stamped return envelope.    CMS will submit referrals for PERS once CC confirms agency and date span.    Cherie Ruano  Care Management Specialist  [facility]  [phone]

## 2023-08-22 NOTE — LETTER
August 22, 2023    WILLIAM HARRIS  [address] [place] [address]        Dear William:    At [facility], we re dedicated to improving your health and wellness. Enclosed is the Care Plan developed with you on 07/27/2023. Please review the Care Plan carefully.    As a reminder, during your visit we talked about:  Ways to manage your physical and mental health  Using health care to maintain and improve your health   Your preventive care needs     Remember to contact your care coordinator if you:  Are hospitalized, or plan to be hospitalized   Have a fall    Have a change in your physical or mental health  Need help finding support or services    If you have questions, or don t agree with your Care Plan, call me at [phone]. You can also call me if your needs change. TTY users, call the [place] [employer] at (284) or 1-141.929.4303 (nutdzg-bd-zjripr relay service).    Sincerely,        Niecy Angelo RN    Phone: [phone]   E-mail: [email or web address]    [id number] accepted    [id number] (07/2022)

## 2023-08-22 NOTE — LETTER
[facility] [employer] Advance Care Planning       Angus Wynne  [address] [place] [address]      Dear Angus     You shared with me your interest in receiving information on Advance Care Planning and Health Care Directives. Discussing and making decisions about this part of our health is very important.  A Health Care Directive is a written document that outlines your goals, values, beliefs and choices for health care and medical treatment in the event you are unable to speak for yourself.     We greatly value the opportunity to assist you in documenting your choices and to honor your   wishes. We ve enclosed forms to help you get started thinking about your values and goals. We have several options for additional resources:     Health Care Directives and Advance Care Planning resources can be viewed and printed   for free at our web site:  [email or web address].     Free group classes on Advance Care Planning and completing a Health Care Directive are available at multiple locations and times. These classes are led by trained staff who will provide information and guide you through a Health Care Directive.  They can also review, notarize and add your Health Care Directive to your medical record. [place] for a class at [email or web address][email or web address]/choices or by calling [employer] at [phone] or toll free [phone].    COPIES of completed Health Care Directives can be brought or mailed to any of our   locations, including the address listed below. You can also email a copy to [email or web address][email or web address] .    Email or call me at the contact information listed below for questions, assistance, or to   make an appointment to discuss creating a Health Care Directive. You can also contact   our [facility] [employer] Department for questions or assistance.       Sincerely,     Niecy Angelo RN

## 2023-08-22 NOTE — TELEPHONE ENCOUNTER
Orders signed  Yes I can fill form - so  staff please assist in getting the form ( care coordination referral was also placed during recent visit   Thx

## 2023-08-22 NOTE — LETTER
[address], [address]  [place] [address]  Phone:  [phone]  Fax:  [phone]      August 22, 2023        Dear Eliazar Wynne,     Enclosed is a caregiver assessment as you are a person who provides assistance to WILLIAM WYNNE on a regular basis.    Please complete and return the assessment in the self-addressed stamped envelope provided at your earliest convenience.  If you have questions about the form, please feel free to call me at [phone].  Thank you.    Sincerely,    Niecy Angelo RN    Phone: [phone]   E-mail: [email or web address]            Enclosures:   Caregiver assessment     Self-addressed stamped envelope

## 2023-08-24 ENCOUNTER — DOCUMENTATION ONLY (OUTPATIENT)
Dept: CARE COORDINATION | Facility: CLINIC | Age: 63
End: 2023-08-24
Payer: COMMERCIAL

## 2023-08-24 NOTE — PROGRESS NOTES
[facility]     Thank you so much for your referral. Unfortunately, [facility] is unable to accept this referral at this time due to capacity constraints. We have also reached out to partner agencies and [facility], [facility], [employer] and [employer] have all declined. I apologize we are unable to assist at this time. Please reach out to other agencies for review and acceptance. Thank you!      Ritu Ma RN  [phone]

## 2023-08-28 ENCOUNTER — TELEPHONE (OUTPATIENT)
Dept: FAMILY MEDICINE | Facility: CLINIC | Age: 63
End: 2023-08-28
Payer: COMMERCIAL

## 2023-08-28 NOTE — TELEPHONE ENCOUNTER
Order/Referral Request    Who is requesting:   Orders being requested: DME - Home blood pressure monitor order.     Reason service is needed/diagnosis: IBARRA (dyspnea on exertion) [R06.09]  - Primary     When are orders needed by:    Orders signed by provider   Fifi Fleming MD, [facility]     Faxed to [phone]    Faxed to HIMS and Filed Team 1.

## 2023-08-29 NOTE — PROGRESS NOTES
Documentation of Face to Face and Certification for Home Health Services     I attest that I saw or will see Angus Wynne on this date:  8/21/2023     This encounter with the patient was in whole, or in part, for medical condition, which is the primary reason for Home Health Care:       Patient Active Problem List   Diagnosis    BPPV (benign paroxysmal positional vertigo), unspecified laterality    Other specified hypothyroidism    Hx of acute alcoholic hepatitis    Hx of colonic polyp    Gastroesophageal reflux disease, esophagitis presence not specified    Vitamin D deficiency    Anxiety    Alcohol use disorder    Neck pain    Cervicalgia    Cervical spondylosis without myelopathy    Essential hypertension    Hyperlipidemia with target LDL less than 130    Nasal congestion    History of colonic polyps    Vertigo    Lumbago    Obesity (BMI 30.0-34.9)    IBARRA (dyspnea on exertion)    Dysphagia, unspecified type    Elevated LFTs    Anal fissure    Elevated troponin    CHAS (acute kidney injury) (H)    Morbid obesity (H)      I certify that, based on my findings, the following services are medically necessary Home Health Services: Physical Therapy   Home Safety Assessment     Additional services needed:        Further, I certify that my clinical findings support that this patient is homebound (i.e. absences from home require considerable and taxing effort and are for medical reasons or [employer] services or infrequently or of short duration when for other reasons) related to:Dyspnea on exertion that makes it unsafe to leave home without clinical deterioration  Requires assistance of another person or specialized equipment is needed     Based on the above findings, I certify that this patient is confined to the home and needs intermittent skilled nursing care, physical therapy and/or speech therapy. The patient is under my care, and I have initiated the establishment of the plan of care. This patient will be followed  by a physician who will periodically review the plan of care.        Physician/Provider to provide follow up care: Provider to follow patient: JOSE FLEMING [[id number]]        Please be aware that coverage of these services is subject to the terms and limitations of your health insurance plan.  Call member services at your health plan with any benefit or coverage questions.  _______________________________________________________________________  Authorized by:  Jose Fleming MD       PLEASE EVALUATE AND TREAT (Evaluation timeline is 24 - 48 hrs. Please call if there is need for a variance to this timeline).     Medications:         Current Outpatient Medications   Medication Sig Dispense Refill    acetaminophen (TYLENOL) 325 MG tablet Take 2 tablets (650 mg) by mouth every 4 hours as needed for mild pain (Patient not taking: Reported on 8/21/2023) 50 tablet 0    levothyroxine (SYNTHROID/LEVOTHROID) 175 MCG tablet Take 1 tablet (175 mcg) by mouth daily 84 tablet 1    metoprolol succinate ER (TOPROL XL) 25 MG 24 hr tablet Take 1 tablet (25 mg) by mouth every evening 30 tablet 3    rosuvastatin (CRESTOR) 40 MG tablet Take 1 tablet (40 mg) by mouth daily 90 tablet 3    sertraline (ZOLOFT) 100 MG tablet Take 1.5 tablets (150 mg) by mouth daily 135 tablet 0    traZODone (DESYREL) 50 MG tablet TAKE 2 TO 3 TABLETS(100  MG) BY MOUTH AT BEDTIME 180 tablet 3    fexofenadine (ALLEGRA) 180 MG tablet Take 1 tablet (180 mg) by mouth daily (Patient not taking: Reported on 8/21/2023)        fluticasone (FLONASE) 50 MCG/ACT nasal spray SHAKE LIQUID AND USE 2 SPRAYS IN EACH NOSTRIL DAILY 48 g 3    LORazepam (ATIVAN) 0.5 MG tablet TAKE 1,  TABLET BY MOUTH TWICE DAILY AS NEEDED FOR ANXIETYTAKE 60 tablet 0    losartan (COZAAR) 100 MG tablet Take 1 tablet (100 mg) by mouth daily 90 tablet 1      Problems:      Patient Active Problem List   Diagnosis    BPPV (benign paroxysmal positional vertigo), unspecified laterality     Other specified hypothyroidism    Hx of acute alcoholic hepatitis    Hx of colonic polyp    Gastroesophageal reflux disease, esophagitis presence not specified    Vitamin D deficiency    Anxiety    Alcohol use disorder    Neck pain    Cervicalgia    Cervical spondylosis without myelopathy    Essential hypertension    Hyperlipidemia with target LDL less than 130    Nasal congestion    History of colonic polyps    Vertigo    Lumbago    Obesity (BMI 30.0-34.9)    IBARRA (dyspnea on exertion)    Dysphagia, unspecified type    Elevated LFTs    Anal fissure    Elevated troponin    CHAS (acute kidney injury) (H)    Morbid obesity (H)      Diet:  None        Code Status:    Code Status: Prior     Allergies:  Patient has no known allergies.

## 2023-08-29 NOTE — PROGRESS NOTES
[facility] Care Coordination Contact    CC sent e-mail to [facility] inquiring if there was any availability for a referral regarding a Home Safety Evaluation to be completed on this member. CC sent face sheet information and physician order.    Niecy Angelo RN  [facility]  [phone]

## 2023-08-29 NOTE — PROGRESS NOTES
[facility] Care Coordination Contact    CC received an e-mail back from [facility] who states they are unable to take this patient due to under staffing.    CC reached out to [facility] who states they may be able to get this patient in. They require the following information faxed:   Medication list, visit note with provider, home health care order, and patient's demographics.  Virtual visit note with provider is incomplete at this time, but will send when filled out. CC routed note to PCP and asks for notification when complete.    Niecy Angelo RN  [facility]  [phone]

## 2023-08-29 NOTE — PROGRESS NOTES
[facility] Care Coordination Contact    CC reached out to [facility] Alert to confirm the referral was received. CC tasked CMS to place authorization.    iNecy Angelo RN  [facility]  [phone]

## 2023-08-30 PROBLEM — Z74.8 ASSISTANCE NEEDED WITH TRANSPORTATION: Status: ACTIVE | Noted: 2023-08-30

## 2023-08-30 NOTE — PROGRESS NOTES
[facility] Care Coordination Contact    CC faxed visit summary (med list within), home care orders, and face sheet to [facility]. Will await to see if member is approved for services.    Niecy Angelo RN  [facility]  [phone]

## 2023-09-05 NOTE — TELEPHONE ENCOUNTER
[facility] Care Coordination Contact    Received after visit chart from care coordinator.  Completed following tasks: Submitted referrals/auths for PERS - CMS reminded CC that PCA agency should send in the PCA Communication form to update agency with [facility].    Cherie Ruano  Care Management Specialist  [facility]  [phone]

## 2023-09-05 NOTE — TELEPHONE ENCOUNTER
Order placed with [facility] (p: [phone]; f: [phone]) for bp cuff.  Order placed on 09/05/2023. Database updated.  As required, authorization submitted to health plan.    Cherie Ruano  Care Management Specialist  [facility]  [phone]

## 2023-09-07 DIAGNOSIS — I10 ESSENTIAL HYPERTENSION: ICD-10-CM

## 2023-09-07 RX ORDER — LOSARTAN POTASSIUM 100 MG/1
100 TABLET ORAL DAILY
Qty: 90 TABLET | Refills: 1 | OUTPATIENT
Start: 2023-09-07

## 2023-09-11 ENCOUNTER — MYC MEDICAL ADVICE (OUTPATIENT)
Dept: FAMILY MEDICINE | Facility: CLINIC | Age: 63
End: 2023-09-11
Payer: COMMERCIAL

## 2023-09-13 ENCOUNTER — PATIENT OUTREACH (OUTPATIENT)
Dept: GERIATRIC MEDICINE | Facility: CLINIC | Age: 63
End: 2023-09-13

## 2023-09-13 NOTE — PROGRESS NOTES
[facility] Care Coordination Contact    CC was alerted by personal care attendant agency that the member's elderly waiver was not open.   CC contacted [facility] who states the member is not open to elderly waiver because he is not listed as being on MA.   CC sent the member an e-mail on 9/8 to the member with information on how to contact [facility] Keep Your Coverage Team and [facility].   CC followed up with a voicemail to the member today asking for a call back if he has any questions or needs assistance.     Niecy Angelo RN  [facility]  [phone]

## 2023-09-14 NOTE — TELEPHONE ENCOUNTER
Pt notified that refill request was approved and will be filled next on 9/21.    Michelle Sosa RN

## 2023-09-15 ENCOUNTER — NURSE TRIAGE (OUTPATIENT)
Dept: FAMILY MEDICINE | Facility: CLINIC | Age: 63
End: 2023-09-15

## 2023-09-15 ENCOUNTER — NURSE TRIAGE (OUTPATIENT)
Dept: NURSING | Facility: CLINIC | Age: 63
End: 2023-09-15
Payer: COMMERCIAL

## 2023-09-15 DIAGNOSIS — F41.9 ANXIETY: ICD-10-CM

## 2023-09-15 DIAGNOSIS — F41.9 ANXIETY: Primary | ICD-10-CM

## 2023-09-15 RX ORDER — HYDROXYZINE HYDROCHLORIDE 25 MG/1
25-50 TABLET, FILM COATED ORAL
Qty: 10 TABLET | Refills: 0 | Status: SHIPPED | OUTPATIENT
Start: 2023-09-15 | End: 2023-09-21

## 2023-09-15 RX ORDER — SERTRALINE HYDROCHLORIDE 100 MG/1
150 TABLET, FILM COATED ORAL DAILY
Qty: 135 TABLET | Refills: 0 | Status: SHIPPED | OUTPATIENT
Start: 2023-09-15 | End: 2024-02-12

## 2023-09-15 RX ORDER — LORAZEPAM 0.5 MG/1
0.5 TABLET ORAL
Qty: 5 TABLET | Refills: 0 | Status: SHIPPED | OUTPATIENT
Start: 2023-09-15 | End: 2024-03-04

## 2023-09-15 NOTE — TELEPHONE ENCOUNTER
Provider, please read the patient My Chart message and advise the triage staff.       Should we call the pharmacy for today's refill 9/15/2023?         Echo Samuel RN  [facility]

## 2023-09-15 NOTE — TELEPHONE ENCOUNTER
Patient given message from Hilda Calvillo PA-C.  Patient was upset and states that he needs lorazepam.  States that he tried to  the lorazepam and the pharmacy said that he could not.   Writer checked with Wing DALTON at [facility] who informed that he just got off the phone with Swati RN and to call back if any questions.  Ashleigh Messina RN

## 2023-09-15 NOTE — TELEPHONE ENCOUNTER
Multiple encounters for this patient.     Spoke with patient through .  He was instructed that this refill is only 5 tablets and his next refill is not until 9/21/2023.       Echo Samuel RN  [facility]

## 2023-09-15 NOTE — TELEPHONE ENCOUNTER
Patient Contact     S/w pt and relayed message from provider, see below. He states yesterday he took the medication he had such as Trazodone, Lorazepam, and Sertraline as prescribed. He is unsure where he lost the Lorazepam and nurse explained to him multiple times that this is a controlled substance and cannot be replaced if lost. He insists on an additional 3 tablets be sent for a total of 8 to assist him with getting to his refill since he usually takes 1 twice daily as needed. He states in the past when he wasn't given enough he has panic attack while he was in the hospital. He again states his son is with him to  medication and take him to the ED if needed. He requests a [employer] message with response.     Elina GARCIA RN  [facility]

## 2023-09-15 NOTE — TELEPHONE ENCOUNTER
Again, per clinic policy we cannot replace lost or stolen controlled substances. I have sent a small rx for Atarax prn to use if he does not have access to his lorazepam.

## 2023-09-15 NOTE — TELEPHONE ENCOUNTER
Small refill of 5 tablets sent to the pharmacy.  Unfortunately we cannot give him long-term refill it is already been ordered for 9/21.  These controlled substances cannot be replaced if lost.    Vahe Mobley MD

## 2023-09-15 NOTE — TELEPHONE ENCOUNTER
Pt's son is looking for information on what ER wait times and which one he can take his father to for a bad anxiety attack he is having. Stated we do not have wait times listed but any ER should be able to manage him.   Reason for Disposition   General information question, no triage required and triager able to answer question    Protocols used: Information Only Call - No Triage-A-  Gilma Deras RN on 9/15/2023 at 3:03 AM

## 2023-09-15 NOTE — TELEPHONE ENCOUNTER
Nurse Triage SBAR    Is this a 2nd Level Triage? YES, LICENSED PRACTITIONER REVIEW IS REQUIRED    Situation:     CC: Patient calling stating he is currently out of lorazepam and requesting an urgent refill     Background:     Hx of anxiety - had VV with PCP on 8/21/23    Future refill of lorazepam sent by Dr. Fleming on 9/14/23 to start on 9/21/23 - patient is currently out of medication     Patient states he lost 8-10 lorazepam tablets while he was out of the house - did not have any medication on hand yesterday and had a panic attack yesterday evening     Patient is very anxious about receiving refill and having medication on hand as he is currently out     Assessment:     CONCERN: requesting urgent refill of lorazepam   ANXIETY SYMPTOMS: denies any symptoms currently - had panic attack last night   ONSET: dx of anxiety   SEVERITY: severe if not taking lorazepam   FUNCTIONAL IMPAIRMENT:  HISTORY: hx of anxiety   RISK OF HARM - SI/HI: denies   TREATMENT: takes prn lorazepam   TREATMENT - THERAPIST: per chart review - PCP recommended that patient see a counselor   OTHER SYMPTOMS: denies any symptoms currently     Protocol Recommended Disposition:   See in Office Today    Recommendation:     Patient requesting for urgent refill to be sent to the pharmacy below:    [id number] [place] - [address] AT [facility] [address]     Patient's son Eliazar (C2C) - is also on the line and spoke with writer - Eliazar is staying with patient and states he will take patient to ER at [facility] if patient has any further panic attack, or any other new/worsening symptoms, SI/HI etc. Patient is also in agreement with this plan.     Routing to PCP to please advise if able to provide refill or other recommendations for this patient? Please review and advise - thank you!     Callback [phone] - ok to leave detailed VM     Harlan Hyde RN  [facility]  Clinic    Reason for Disposition   Patient sounds very upset or troubled to the triager    Additional Information   Negative: SEVERE difficulty breathing (e.g., struggling for each breath, speaks in single words)   Negative: Bluish (or gray) lips or face   Negative: Difficult to awaken or acting confused (e.g., disoriented, slurred speech)   Negative: Hysterical or combative behavior   Negative: Sounds like a life-threatening emergency to the triager   Negative: Chest pain   Negative: Palpitations, skipped heartbeat, or rapid heartbeat is main symptom   Negative: Cough is main symptom   Negative: Suicide thoughts, threats, attempts, or questions   Negative: Depression is main problem or symptom (e.g., feelings of sadness or hopelessness)   Negative: Difficulty breathing and persists > 10 minutes and not relieved by reassurance provided by triager   Negative: Lightheadedness or dizziness and persists > 10 minutes and not relieved by reassurance provided by triager   Negative: SEVERE anxiety (e.g., extremely anxious with intense emotional symptoms such as feeling of unreality, urge to flee, unable to calm down; unable to cope or function), which is not better after 10 minutes of reassurance and Care Advice   Negative: Panic attack symptoms (e.g., sudden onset of intense fear and symptoms such as dizziness, feeling of impending doom or fear of dying, hyperventilation, numbness or tingling, sweating, trembling), and has not been evaluated for this by doctor (or NP/PA)   Negative: Panic attack symptoms (diagnosed in the past) that is not better with usual treatment, reassurance, or Care Advice   Negative: Alcohol or drug use, known or suspected, and feeling very shaky (i.e., visible tremors of hands)   Negative: Patient sounds very sick or weak to the triager    Protocols used: Anxiety and Panic Attack-A-[place]

## 2023-09-21 DIAGNOSIS — I42.2 HYPERTROPHIC CARDIOMYOPATHY (H): ICD-10-CM

## 2023-09-21 DIAGNOSIS — F41.9 ANXIETY: ICD-10-CM

## 2023-09-21 RX ORDER — METOPROLOL SUCCINATE 25 MG/1
25 TABLET, EXTENDED RELEASE ORAL EVERY EVENING
Qty: 90 TABLET | Refills: 3 | Status: SHIPPED | OUTPATIENT
Start: 2023-09-21 | End: 2024-06-27

## 2023-09-21 RX ORDER — HYDROXYZINE HYDROCHLORIDE 25 MG/1
TABLET, FILM COATED ORAL
Qty: 10 TABLET | Refills: 0 | Status: SHIPPED | OUTPATIENT
Start: 2023-09-21 | End: 2023-10-03

## 2023-09-21 NOTE — TELEPHONE ENCOUNTER
Request received from [facility] pharmacy for refill of Metoprolol Succinate ER 25mg tabs 1 every evening    Last OV   7/7/223 with Marii Huston    [facility] Cardiology Refill Guideline reviewed.  Medication meets criteria for refill.    Mago Veliz RN on 9/21/2023 at 2:27 PM

## 2023-09-22 ENCOUNTER — PATIENT OUTREACH (OUTPATIENT)
Dept: GERIATRIC MEDICINE | Facility: CLINIC | Age: 63
End: 2023-09-22
Payer: COMMERCIAL

## 2023-09-25 NOTE — TELEPHONE ENCOUNTER
Per [employer], dme was delivered.    Angus Wynne 1960 BP Cuff 9/5/2023 Niecy Cowart DELIVERED 9/13/2023     Cherie Ruano  Care Management Specialist  [facility]  [phone]

## 2023-09-28 NOTE — PROGRESS NOTES
[facility] Care Coordination Contact    CC was contacted by member who states he had someone from [facility] help him fill out the application for MA and dropped it off to the [facility]'s [place] office. He just received confirmation that the [facility] has received the paperwork.   Member will wait to receive confirmation from the [facility] that the paperwork was processed and will have his son contact the personal care attendant agency at that time. Member will also call to inform CC.    Niecy Angelo RN  [facility]  [phone]

## 2023-10-03 DIAGNOSIS — F41.9 ANXIETY: ICD-10-CM

## 2023-10-03 RX ORDER — HYDROXYZINE HYDROCHLORIDE 25 MG/1
TABLET, FILM COATED ORAL
Qty: 10 TABLET | Refills: 0 | Status: SHIPPED | OUTPATIENT
Start: 2023-10-03 | End: 2023-10-17

## 2023-10-03 NOTE — TELEPHONE ENCOUNTER
Prescription approved per [facility] Refill Protocol.  Alma Hooks, RN  [facility] Triage Nurse

## 2023-10-13 DIAGNOSIS — F41.9 ANXIETY: ICD-10-CM

## 2023-10-14 RX ORDER — LORAZEPAM 0.5 MG/1
TABLET ORAL
Qty: 60 TABLET | OUTPATIENT
Start: 2023-10-14

## 2023-10-16 ENCOUNTER — MYC REFILL (OUTPATIENT)
Dept: FAMILY MEDICINE | Facility: CLINIC | Age: 63
End: 2023-10-16
Payer: COMMERCIAL

## 2023-10-16 DIAGNOSIS — F41.9 ANXIETY: ICD-10-CM

## 2023-10-17 ENCOUNTER — TRANSFERRED RECORDS (OUTPATIENT)
Dept: HEALTH INFORMATION MANAGEMENT | Facility: CLINIC | Age: 63
End: 2023-10-17
Payer: COMMERCIAL

## 2023-10-17 RX ORDER — LORAZEPAM 0.5 MG/1
TABLET ORAL
Qty: 60 TABLET | Refills: 0 | Status: SHIPPED | OUTPATIENT
Start: 2023-10-17 | End: 2023-11-08

## 2023-10-17 RX ORDER — HYDROXYZINE HYDROCHLORIDE 25 MG/1
TABLET, FILM COATED ORAL
Qty: 60 TABLET | Refills: 0 | Status: SHIPPED | OUTPATIENT
Start: 2023-10-17 | End: 2023-11-16

## 2023-10-18 ENCOUNTER — PATIENT OUTREACH (OUTPATIENT)
Dept: GERIATRIC MEDICINE | Facility: CLINIC | Age: 63
End: 2023-10-18
Payer: COMMERCIAL

## 2023-10-18 NOTE — PROGRESS NOTES
[facility] Care Coordination Contact    [employer] left a voicemail for CC stating they have been trying to contact the member for this service. CC informed [employer] that his MA is in review and should know by the end of next week. [employer] states they will temporarily close the service until CC calls and reopens the service.    Niecy Angelo RN  [facility]  [phone]

## 2023-10-18 NOTE — TELEPHONE ENCOUNTER
Will ok one refill but pt needs to know that this hold last him for  a month and he should be now taking only twice a day as needed . He has also been  given atarax , that he can substitute at night instead of lorazepam   Also will need follow up check before next refill    Virtual/ Video visit ok

## 2023-10-27 NOTE — PROGRESS NOTES
[facility] Care Coordination Contact    CC called [facility] to check on the member's MA application status, which is still pending. CC spoke with member to update.    Niecy Angelo RN  [facility]  [phone]

## 2023-11-07 DIAGNOSIS — F41.9 ANXIETY: ICD-10-CM

## 2023-11-08 RX ORDER — LORAZEPAM 0.5 MG/1
TABLET ORAL
Qty: 60 TABLET | Refills: 0 | Status: SHIPPED | OUTPATIENT
Start: 2023-11-13 | End: 2023-12-12

## 2023-11-08 NOTE — TELEPHONE ENCOUNTER
Script refill faxed. Wi date change , since he requested to soon    Remind pt to do follow up for med check , since she is due.

## 2023-11-13 NOTE — TELEPHONE ENCOUNTER
Patient is aware, and has scheduled a appointment in person on December 19th.    Chante Burgess MA on 11/13/2023 at 2:13 PM

## 2023-11-16 DIAGNOSIS — E78.5 HYPERLIPIDEMIA LDL GOAL <100: ICD-10-CM

## 2023-11-16 DIAGNOSIS — F41.9 ANXIETY: ICD-10-CM

## 2023-11-16 RX ORDER — HYDROXYZINE HYDROCHLORIDE 25 MG/1
TABLET, FILM COATED ORAL
Qty: 60 TABLET | Refills: 0 | Status: SHIPPED | OUTPATIENT
Start: 2023-11-16 | End: 2024-01-03

## 2023-11-16 RX ORDER — ROSUVASTATIN CALCIUM 40 MG/1
TABLET, COATED ORAL
Qty: 90 TABLET | Refills: 2 | Status: SHIPPED | OUTPATIENT
Start: 2023-11-16 | End: 2024-06-27

## 2023-11-20 ENCOUNTER — OFFICE VISIT (OUTPATIENT)
Dept: CARDIOLOGY | Facility: CLINIC | Age: 63
End: 2023-11-20
Attending: NURSE PRACTITIONER
Payer: COMMERCIAL

## 2023-11-20 VITALS
WEIGHT: 268.5 LBS | BODY MASS INDEX: 36.37 KG/M2 | OXYGEN SATURATION: 97 % | HEART RATE: 93 BPM | SYSTOLIC BLOOD PRESSURE: 137 MMHG | HEIGHT: 72 IN | DIASTOLIC BLOOD PRESSURE: 86 MMHG

## 2023-11-20 DIAGNOSIS — I42.2 HYPERTROPHIC CARDIOMYOPATHY (H): Primary | ICD-10-CM

## 2023-11-20 PROCEDURE — 93000 ELECTROCARDIOGRAM COMPLETE: CPT | Performed by: INTERNAL MEDICINE

## 2023-11-20 PROCEDURE — 99214 OFFICE O/P EST MOD 30 MIN: CPT | Performed by: INTERNAL MEDICINE

## 2023-11-20 NOTE — LETTER
11/20/2023    Fifi Fleming MD  [address]  [place] [address]    RE: Angus Wynne       Dear Colleague,     I had the pleasure of seeing Angus Wynne in the [facility].      Cardiology Consultation     Assessment & Plan    1.  History of T9 compression fracture, followed by neurosurgery  2.  CTA of coronaries 2019 with a total calcium score of 52.  Trivial nonocclusive coronary artery disease  3.  Hypothyroidism  4.  Gastroesophageal reflux disease  5.  Anxiety  6.  Hypertension  7.  Hyperlipidemia   8.  ETOH abuse  9.  Hospitalization [facility] in August 2022 for rhabdomyolysis and acute renal failure.  10.  Apical hypertrophic cardiomyopathy    Recommendations    1.  Reviewed his recent cardiac testing which included echocardiogram, coronary CTA as well as cardiac MRI.  He is reassured by the findings.  Let us continue with his beta-blocker.    2.  Dyspnea on exertion: Likely related to deconditioning.  Encouraged patient to exercise.    3.  Return to clinic in 1 years time.    Raoul Camargo MD, MD      HPI:    Patient is a pleasant 63-year-old male with a prior cardiac history of coronary angiography being performed 10 years ago in [place] for an abnormal EKG.  The details are unknown to patient.  However he remembers being told that his coronary anatomy was normal.  He in 2017 had an EKG performed due to shortness of breath.  This demonstrated symmetric T wave inversions in V4 to V6.  As a result his primary care physician advised a hospital evaluation.  He underwent a stress echocardiogram demonstrating normal LV systolic function at rest with no evidence of ischemia.  The Duke score was low for suspected coronary artery disease.  Patient more recently had a fall in February 2019 he slipped on a patch of ice and hurt his vertebra..  He had a T9 fracture.  Neurosurgery advise some form of surgical therapy however patient wished conservative management and  wore a brace for several months.    In 2019 we had last seen him in cardiology clinic.  At that time he had wanted a formal evaluation for coronary artery disease and he underwent a coronary CTA.  His total calcium score was 52 and there was trivial nonocclusive coronary artery disease by CTA angiography.    Patient had an ER visit leading to hospitalization in August 2022.  Unfortunate that time he had acute rhabdomyolysis and CK level of 16,000.  He had red urine at that time.  Fortunately with conservative therapy with IV fluids his numbers had normalized.  Appropriately his medications were stopped.  Prior to that he had been taking his statin due to his elevated cholesterol.  Blood pressure medications have been slowly reintroduced and his creatinine is also since normalized.  He had a fasting lipid profile earlier and not surprisingly his numbers are elevated.  His main symptoms are dyspnea on exertion and severe anxiety.  He had a very thorough work-up a few years ago and is concerned about his heart status.  He feels deconditioned.  Here to reestablish care.  EKG was performed today and please note that he has chronic findings that are unchanged when going back reviewing his serial EKGs.  Never has had any chest pain      Echo 2023  Poor image quality.  LVH appears concentric, mild.  Hyperdynamic left ventricular function  The visual ejection fraction is >70%.  LVOT gradient 43mmH, with valsalva is 124mmHg  MV is not well visualized, suspect chordal SHIKHA  AV gradients attempted but could not be measured. ( AV is poorly visualized)  Consider cardiac MRI for better assessment of LVOT and AV if clinically  appropriate    Cardiac MRI  Regadenoson induced stress perfusion reveals mild ischemia in the apicolateral portion.  Normal biventricular size with hyperdynamic LV systolic function. Quantitative LVEF 80.5 %. Quantitative  RVEF 69.2 %.   There is asymmetric LVH. Maximal basal septal thickness is 16mm. Apical  hypertrophy noted with apicolateral  segment thickness of 19mm.   Systolic anterior motion of mitral valve noted with evidence of LV outflow obstruction with peak velocity  of 2.6m/sec and peak gradient of 23mm at rest.    Delayed hyperenhancement reveals mild, patchy, non-CAD scarring in the apex and apicolateral region.   Collectively, these findings are consistent with hypertrophic cardiomyopathy. Consider genetic testing if  clinically appropriate      CTA     CORONARY CALCIUM SCORE: The total Agatston calcium score is 102, Left  main: 0.305, left anterior descendin.4,  circumflex: 0, right  coronary artery: 72.8. This places the patient in the 72nd percentile  when compared to age and gender matched control group.     CORONARY ANGIOGRAPHY:    1. Trivial left anterior descending artery disease  2. Trivial left main disease  3. Trivial ramus intermedius and circumflex disease  4. Trivial right coronary artery disease.  5. Borderline dilated ascending aorta     CORONARY ANGIOGRAPHY     DOMINANCE: Right dominant system.      LEFT MAIN:      The left main arises from the left coronary cusp.      The left main is trivially calcified is trivially stenosed (<25%) with  mixed plaque.         LEFT ANTERIOR DESCENDING ARTERY:      The ostium of the left anterior descending artery is mildly calcified.  The ostium and proximal left anterior descending artery are trivially  stenosed with mixed plaque. Mid left anterior descending artery is  mildly calcified and trivially stenosed with mixed plaque. The distal  left anterior descending artery is trivially stenosed with  noncalcified plaque. The first diagonal artery is a small caliber  vessel which appears to be trivially stenosed with noncalcified  plaque. Second diagonal artery appears to be at most trivially  stenosed with noncalcified plaque.        LEFT CIRCUMFLEX ARTERY:      The ramus intermedius is trivially stenosed with noncalcified plaque.  The proximal,  mid and distal circumflex appears to be, at most,  trivially stenosed with noncalcified plaque. The sizable first obtuse  marginal artery appears to be at most trivially stenosed with  noncalcified plaque. The small caliber second obtuse marginal artery  appears to be patent with at most trivially stenosed noncalcified  plaque.        RIGHT CORONARY ARTERY:     The proximal right coronary artery has diffuse mild calcification. The  proximal right coronary artery appears to be trivially stenosed with  mixed plaque. The mid right coronary artery is mildly calcified and  trivially stenosed with mixed plaque. The distal right coronary artery  is trivially stenosed with noncalcified plaque. The posterior  descending artery appears to be patent without significant stenosis or  plaque. The posterolateral artery appears to be widely patent without  stenosis or plaque.         ADDITIONAL FINDINGS:      The aortic root is normal in dimension measuring 3.38 x 3.60 cm. The  aortic valve is trileaflet. The ascending aorta at the level of the  right pulmonary artery is borderline dilated measuring 3.58 x 3.47 cm.     Normal pulmonary venous anatomy with all four pulmonary veins draining  into the left atrium.       There is no left ventricular mass or thrombus. The visualized left  atrial appendage appears to be free of thrombus.     Normal pericardial thickness. There is no pericardial effusion.     The proximal pulmonary arteries are not well opacified.          Primary Care Physician  Fifi Fleming      Patient Active Problem List   Diagnosis    BPPV (benign paroxysmal positional vertigo), unspecified laterality    Other specified hypothyroidism    Hx of acute alcoholic hepatitis    Hx of colonic polyp    Gastroesophageal reflux disease, esophagitis presence not specified    Vitamin D deficiency    Anxiety    Alcohol use disorder    Neck pain    Cervicalgia    Cervical spondylosis without myelopathy    Essential  hypertension    Hyperlipidemia with target LDL less than 130    Nasal congestion    History of colonic polyps    Vertigo    Lumbago    Morbid obesity (H)    Colon cancer screening    Thoracic compression fracture (H)    IBARRA (dyspnea on exertion)       Past Medical History  I have reviewed this patient's medical history and updated it with pertinent information if needed.   Past Medical History:   Diagnosis Date    GERD (gastroesophageal reflux disease)     HTN (hypertension)     Hyperlipidemia     Hypothyroid     Vertigo     2015 ,        Past Surgical History  I have reviewed this patient's surgical history and updated it with pertinent information if needed.  Past Surgical History:   Procedure Laterality Date    AS ESOPHAGOSCOPY, DIAGNOSTIC      COLONOSCOPY      EXAM UNDER ANESTHESIA ANUS N/A 9/9/2021    Procedure: EXAM UNDER ANESTHESIA, ANUS, ANAL BISOPY, BOTOX INJECTION;  Surgeon: Henrik Garduno MD;  Location: [facility] OR    SPHINCTEROTOMY RECTUM N/A 9/30/2021    Procedure: Anal sphincterotomy and hemorroidectomy;  Surgeon: Rajendra Armando MD;  Location: [facility] OR       Prior to Admission Medications  Cannot display prior to admission medications because the patient has not been admitted in this contact.     [unfilled]  [email or web address]  Allergies  No Known Allergies    Social History   reports that he quit smoking about 8 years ago. He has never used smokeless tobacco. He reports that he does not drink alcohol and does not use drugs.    Family History  Family History   Problem Relation Age of Onset    Coronary Artery Disease Father         at age 60     Hyperlipidemia Brother     Cancer Brother         wall of scrotum    Breast Cancer Mother     Diabetes No family hx of     Cerebrovascular Disease No family hx of     Colon Cancer No family hx of     Prostate Cancer No family hx of        Review of Systems  The comprehensive 10 point Review of Systems is negative other than noted in the HPI or here.      Physical Exam  Vital Signs with Ranges     Wt Readings from Last 4 Encounters:   07/07/23 117.9 kg (260 lb)   06/14/23 121.6 kg (268 lb)   05/30/23 121.6 kg (268 lb)   04/27/23 123.4 kg (272 lb)     [unfilled]      Vitals:  There were no vitals taken for this visit.      Constitutional:   awake, alert, cooperative, no apparent distress, and appears stated age     ENT:   Normocephalic, without obvious abnormality, atraumatic, sinuses nontender on palpation, external ears without lesions, oral pharynx with moist mucous membranes, tonsils without erythema or exudates, gums normal and good dentition.     Neck:   Supple, symmetrical, trachea midline, no adenopathy, thyroid symmetric, not enlarged and no tenderness, skin normal     Back:   Symmetric, no curvature, spinous processes are non-tender on palpation, paraspinous muscles are non-tender on palpation, no costal vertebral tenderness     Lungs:   No increased work of breathing, good air exchange, clear to auscultation bilaterally, no crackles or wheezing     Cardiovascular:   Normal apical impulse, regular rate and rhythm, normal S1 and S2, no S3 or S4, and no murmur noted     Abdomen:   No scars, normal bowel sounds, soft, non-distended, non-tender, no masses palpated, no hepatosplenomegally     Musculoskeletal:   There is no redness, warmth, or swelling of the joints.  Full range of motion noted.  Motor strength is 5 out of 5 all extremities bilaterally.  Tone is normal.       Neurologic:   Awake, alert, oriented to name, place and time.  Cranial nerves II-XII are grossly intact.  Motor is 5 out of 5 bilaterally.  Cerebellar finger to nose, heel to shin intact.  Sensory is intact.  Babinski down going, Romberg negative, and gait is normal.   Thank you for allowing me to participate in the care of your patient.      Sincerely,     Raoul Camargo MD     [facility]  cc:   Marii Huston,  APRN CNP  [address]  [place] [address]

## 2023-11-20 NOTE — PROGRESS NOTES
Cardiology Consultation     Assessment & Plan     1.  History of T9 compression fracture, followed by neurosurgery  2.  CTA of coronaries 2019 with a total calcium score of 52.  Trivial nonocclusive coronary artery disease  3.  Hypothyroidism  4.  Gastroesophageal reflux disease  5.  Anxiety  6.  Hypertension  7.  Hyperlipidemia   8.  ETOH abuse  9.  Hospitalization [facility] in August 2022 for rhabdomyolysis and acute renal failure.  10.  Apical hypertrophic cardiomyopathy    Recommendations    1.  Reviewed his recent cardiac testing which included echocardiogram, coronary CTA as well as cardiac MRI.  He is reassured by the findings.  Let us continue with his beta-blocker.    2.  Dyspnea on exertion: Likely related to deconditioning.  Encouraged patient to exercise.    3.  Return to clinic in 1 years time.    Raoul Camargo MD, MD      HPI:    Patient is a pleasant 63-year-old male with a prior cardiac history of coronary angiography being performed 10 years ago in [place] for an abnormal EKG.  The details are unknown to patient.  However he remembers being told that his coronary anatomy was normal.  He in 2017 had an EKG performed due to shortness of breath.  This demonstrated symmetric T wave inversions in V4 to V6.  As a result his primary care physician advised a hospital evaluation.  He underwent a stress echocardiogram demonstrating normal LV systolic function at rest with no evidence of ischemia.  The Duke score was low for suspected coronary artery disease.  Patient more recently had a fall in February 2019 he slipped on a patch of ice and hurt his vertebra..  He had a T9 fracture.  Neurosurgery advise some form of surgical therapy however patient wished conservative management and wore a brace for several months.    In 2019 we had last seen him in cardiology clinic.  At that time he had wanted a formal evaluation for coronary artery disease and he underwent a coronary CTA.  His total  calcium score was 52 and there was trivial nonocclusive coronary artery disease by CTA angiography.    Patient had an ER visit leading to hospitalization in August 2022.  Unfortunate that time he had acute rhabdomyolysis and CK level of 16,000.  He had red urine at that time.  Fortunately with conservative therapy with IV fluids his numbers had normalized.  Appropriately his medications were stopped.  Prior to that he had been taking his statin due to his elevated cholesterol.  Blood pressure medications have been slowly reintroduced and his creatinine is also since normalized.  He had a fasting lipid profile earlier and not surprisingly his numbers are elevated.  His main symptoms are dyspnea on exertion and severe anxiety.  He had a very thorough work-up a few years ago and is concerned about his heart status.  He feels deconditioned.  Here to reestablish care.  EKG was performed today and please note that he has chronic findings that are unchanged when going back reviewing his serial EKGs.  Never has had any chest pain      Echo 2023  Poor image quality.  LVH appears concentric, mild.  Hyperdynamic left ventricular function  The visual ejection fraction is >70%.  LVOT gradient 43mmH, with valsalva is 124mmHg  MV is not well visualized, suspect chordal SHIKHA  AV gradients attempted but could not be measured. ( AV is poorly visualized)  Consider cardiac MRI for better assessment of LVOT and AV if clinically  appropriate    Cardiac MRI  Regadenoson induced stress perfusion reveals mild ischemia in the apicolateral portion.  Normal biventricular size with hyperdynamic LV systolic function. Quantitative LVEF 80.5 %. Quantitative  RVEF 69.2 %.   There is asymmetric LVH. Maximal basal septal thickness is 16mm. Apical hypertrophy noted with apicolateral  segment thickness of 19mm.   Systolic anterior motion of mitral valve noted with evidence of LV outflow obstruction with peak velocity  of 2.6m/sec and peak gradient of  23mm at rest.    Delayed hyperenhancement reveals mild, patchy, non-CAD scarring in the apex and apicolateral region.   Collectively, these findings are consistent with hypertrophic cardiomyopathy. Consider genetic testing if  clinically appropriate      CTA     CORONARY CALCIUM SCORE: The total Agatston calcium score is 102, Left  main: 0.305, left anterior descendin.4,  circumflex: 0, right  coronary artery: 72.8. This places the patient in the 72nd percentile  when compared to age and gender matched control group.     CORONARY ANGIOGRAPHY:    1. Trivial left anterior descending artery disease  2. Trivial left main disease  3. Trivial ramus intermedius and circumflex disease  4. Trivial right coronary artery disease.  5. Borderline dilated ascending aorta     CORONARY ANGIOGRAPHY     DOMINANCE: Right dominant system.      LEFT MAIN:      The left main arises from the left coronary cusp.      The left main is trivially calcified is trivially stenosed (<25%) with  mixed plaque.         LEFT ANTERIOR DESCENDING ARTERY:      The ostium of the left anterior descending artery is mildly calcified.  The ostium and proximal left anterior descending artery are trivially  stenosed with mixed plaque. Mid left anterior descending artery is  mildly calcified and trivially stenosed with mixed plaque. The distal  left anterior descending artery is trivially stenosed with  noncalcified plaque. The first diagonal artery is a small caliber  vessel which appears to be trivially stenosed with noncalcified  plaque. Second diagonal artery appears to be at most trivially  stenosed with noncalcified plaque.        LEFT CIRCUMFLEX ARTERY:      The ramus intermedius is trivially stenosed with noncalcified plaque.  The proximal, mid and distal circumflex appears to be, at most,  trivially stenosed with noncalcified plaque. The sizable first obtuse  marginal artery appears to be at most trivially stenosed with  noncalcified plaque.  The small caliber second obtuse marginal artery  appears to be patent with at most trivially stenosed noncalcified  plaque.        RIGHT CORONARY ARTERY:     The proximal right coronary artery has diffuse mild calcification. The  proximal right coronary artery appears to be trivially stenosed with  mixed plaque. The mid right coronary artery is mildly calcified and  trivially stenosed with mixed plaque. The distal right coronary artery  is trivially stenosed with noncalcified plaque. The posterior  descending artery appears to be patent without significant stenosis or  plaque. The posterolateral artery appears to be widely patent without  stenosis or plaque.         ADDITIONAL FINDINGS:      The aortic root is normal in dimension measuring 3.38 x 3.60 cm. The  aortic valve is trileaflet. The ascending aorta at the level of the  right pulmonary artery is borderline dilated measuring 3.58 x 3.47 cm.     Normal pulmonary venous anatomy with all four pulmonary veins draining  into the left atrium.       There is no left ventricular mass or thrombus. The visualized left  atrial appendage appears to be free of thrombus.     Normal pericardial thickness. There is no pericardial effusion.     The proximal pulmonary arteries are not well opacified.          Primary Care Physician   Fifi Fleming      Patient Active Problem List   Diagnosis    BPPV (benign paroxysmal positional vertigo), unspecified laterality    Other specified hypothyroidism    Hx of acute alcoholic hepatitis    Hx of colonic polyp    Gastroesophageal reflux disease, esophagitis presence not specified    Vitamin D deficiency    Anxiety    Alcohol use disorder    Neck pain    Cervicalgia    Cervical spondylosis without myelopathy    Essential hypertension    Hyperlipidemia with target LDL less than 130    Nasal congestion    History of colonic polyps    Vertigo    Lumbago    Morbid obesity (H)    Colon cancer screening    Thoracic compression fracture  (H)    IBARRA (dyspnea on exertion)       Past Medical History   I have reviewed this patient's medical history and updated it with pertinent information if needed.   Past Medical History:   Diagnosis Date    GERD (gastroesophageal reflux disease)     HTN (hypertension)     Hyperlipidemia     Hypothyroid     Vertigo     2015 ,        Past Surgical History   I have reviewed this patient's surgical history and updated it with pertinent information if needed.  Past Surgical History:   Procedure Laterality Date    AS ESOPHAGOSCOPY, DIAGNOSTIC      COLONOSCOPY      EXAM UNDER ANESTHESIA ANUS N/A 9/9/2021    Procedure: EXAM UNDER ANESTHESIA, ANUS, ANAL BISOPY, BOTOX INJECTION;  Surgeon: Henrik Garduno MD;  Location: [facility] OR    SPHINCTEROTOMY RECTUM N/A 9/30/2021    Procedure: Anal sphincterotomy and hemorroidectomy;  Surgeon: Rajendra Armando MD;  Location: [facility] OR       Prior to Admission Medications   Cannot display prior to admission medications because the patient has not been admitted in this contact.     [unfilled]  [unfilled]  Allergies   No Known Allergies    Social History    reports that he quit smoking about 8 years ago. He has never used smokeless tobacco. He reports that he does not drink alcohol and does not use drugs.    Family History   Family History   Problem Relation Age of Onset    Coronary Artery Disease Father         at age 60     Hyperlipidemia Brother     Cancer Brother         wall of scrotum    Breast Cancer Mother     Diabetes No family hx of     Cerebrovascular Disease No family hx of     Colon Cancer No family hx of     Prostate Cancer No family hx of        Review of Systems   The comprehensive 10 point Review of Systems is negative other than noted in the HPI or here.     Physical Exam   Vital Signs with Ranges     Wt Readings from Last 4 Encounters:   07/07/23 117.9 kg (260 lb)   06/14/23 121.6 kg (268 lb)   05/30/23 121.6 kg (268 lb)   04/27/23 123.4 kg (272 lb)      [unfilled]      Vitals:  There were no vitals taken for this visit.      Constitutional:   awake, alert, cooperative, no apparent distress, and appears stated age     ENT:   Normocephalic, without obvious abnormality, atraumatic, sinuses nontender on palpation, external ears without lesions, oral pharynx with moist mucous membranes, tonsils without erythema or exudates, gums normal and good dentition.     Neck:   Supple, symmetrical, trachea midline, no adenopathy, thyroid symmetric, not enlarged and no tenderness, skin normal     Back:   Symmetric, no curvature, spinous processes are non-tender on palpation, paraspinous muscles are non-tender on palpation, no costal vertebral tenderness     Lungs:   No increased work of breathing, good air exchange, clear to auscultation bilaterally, no crackles or wheezing     Cardiovascular:   Normal apical impulse, regular rate and rhythm, normal S1 and S2, no S3 or S4, and no murmur noted     Abdomen:   No scars, normal bowel sounds, soft, non-distended, non-tender, no masses palpated, no hepatosplenomegally     Musculoskeletal:   There is no redness, warmth, or swelling of the joints.  Full range of motion noted.  Motor strength is 5 out of 5 all extremities bilaterally.  Tone is normal.       Neurologic:   Awake, alert, oriented to name, place and time.  Cranial nerves II-XII are grossly intact.  Motor is 5 out of 5 bilaterally.  Cerebellar finger to nose, heel to shin intact.  Sensory is intact.  Babinski down going, Romberg negative, and gait is normal.

## 2023-11-28 ENCOUNTER — PATIENT OUTREACH (OUTPATIENT)
Dept: GASTROENTEROLOGY | Facility: CLINIC | Age: 63
End: 2023-11-28
Payer: COMMERCIAL

## 2023-12-11 DIAGNOSIS — F41.9 ANXIETY: ICD-10-CM

## 2023-12-12 RX ORDER — LORAZEPAM 0.5 MG/1
.25-.5 TABLET ORAL 2 TIMES DAILY PRN
Qty: 45 TABLET | Refills: 0 | Status: SHIPPED | OUTPATIENT
Start: 2023-12-12 | End: 2024-01-03

## 2023-12-12 NOTE — PROGRESS NOTES
[facility] Care Coordination Contact    Member contacted  to state that the application was sent in to the [facility] and awaiting for approval.   contacted [facility] to check on status. Staff states it is currently in review and should know the outcome by next week.   sent [facility] liaison an email to see if the member is in jeopardy of losing his health care benefits. [facility] states his plan is month-to-month and as of now, he is covered through the end of October.    Niecy Angelo RN  [facility]  [phone]    
[facility] Care Coordination Contact    CC called [facility] to check on MA application status. Automated system states it is still pending. Waited on hold for 40 minutes before hanging up.    Niecy Angelo RN  [facility]  [phone]      
[facility] Care Coordination Contact    CC called [facility] to check on member's MA status. Automation states the status is still pending.    Niecy Angelo RN  [facility]  [phone]      
Bilateral Helical Rim Advancement Flap Text: The defect edges were debeveled with a #15 blade scalpel.  Given the location of the defect and the proximity to free margins (helical rim) a bilateral helical rim advancement flap was deemed most appropriate.  Using a sterile surgical marker, the appropriate advancement flaps were drawn incorporating the defect and placing the expected incisions between the helical rim and antihelix where possible.  The area thus outlined was incised through and through with a #15 scalpel blade.  With a skin hook and iris scissors, the flaps were gently and sharply undermined and freed up.
yes

## 2023-12-12 NOTE — TELEPHONE ENCOUNTER
Script refill faxed. Remind pt to do follow up for med check  since he is due. Also he needs to start cutting down on the dose and reduce the use of medication ( this should last him  for a month )

## 2024-01-02 DIAGNOSIS — F41.9 ANXIETY: ICD-10-CM

## 2024-01-03 ENCOUNTER — PATIENT OUTREACH (OUTPATIENT)
Dept: GERIATRIC MEDICINE | Facility: CLINIC | Age: 64
End: 2024-01-03

## 2024-01-03 RX ORDER — HYDROXYZINE HYDROCHLORIDE 25 MG/1
TABLET, FILM COATED ORAL
Qty: 60 TABLET | Refills: 0 | Status: SHIPPED | OUTPATIENT
Start: 2024-01-03 | End: 2024-02-01

## 2024-01-03 RX ORDER — LORAZEPAM 0.5 MG/1
TABLET ORAL
Qty: 45 TABLET | Refills: 0 | Status: SHIPPED | OUTPATIENT
Start: 2024-01-03 | End: 2024-01-26

## 2024-01-03 NOTE — PROGRESS NOTES
[facility] Care Coordination Contact    CC was sent an email by this member's personal care attendant agency stating they were notified the member's insurance was dropped 1/1/2024.  CC called and left a voice mail message for the member to return the phone call. CC explained the above information and expressed concerns over the discontinuation of the member's health insurance.   CC has been communicating with this member since the assessment date 6/2023. This is when it was discovered the member's MA paperwork was not completed. Since then, the member has been working with a  through the [facility] to fill out the remainder of the paperwork.  Member called CC back, stating the member needs to apply for [place] citizenship and once that is complete then he can reapply for MA.   The member states he received a letter in the mail from the [facility] stating his MA was disenrolled on 12/31/23 but would continue receiving [facility] MSHO for 3 more months.  CC encouraged the member to contact [facility] to make sure this was true, and to contact [employer] to see what his next steps should be to obtain insurance after. Member was given the contact information for [employer].  CC sent an email to [facility] to verify whether this member has insurance for 3 months or if it is truly inactive since 1/1/2024.  CC tasked CMS to verify through [employer].    Niecy Angelo RN  [facility]  [phone]

## 2024-01-04 NOTE — PROGRESS NOTES
[facility] Care Coordination Contact    CC received an email from [facility] stating the member's insurance was inactive as of 1/1/2024. Members with [employer]+ product do not get the 3 month sarah period after disenrollment with MA, only MSHO products do.   CC contacted the member to confirm he is aware of this. Member states he is aware of this. He states he has been working with a [facility] worker (Trey Calle [phone]) who informed the member that due to his green card, the [facility] requires information from his sponsor. Unfortunately, the sponsor would not supply the [facility] with information to provide the member with MA.   CC conferenced in Ray with the member's phone call to discuss further. Trey says the member needs to either get the sponsor to share information or to become a [place] Citizen, which requires member to apply and pay.  Member was confused why he has had no problems with MA until he turned 63 years of age. CC explained, and [facility]  agreed, that during the pandemic, when people were on [employer] and then turned 63 they were automatically enrolled with either [facility] or [employer] with their MA. Once the pandemic crisis was over, the [facility] made MA renewal mandatory and members were forced to complete necessary documentation to stay on MA.  Member is concerned he won't be able to get medication at the pharmacy due to inability to pay for it. CC encouraged member to contact [employer] regarding this, and to find out how to get health care coverage until he figures everything out.   CC offered to contact member's son to help assist with calling if the member feels overwhelmed. Member did not answer.    Niecy Angelo, RN  [facility]  [phone]

## 2024-01-08 DIAGNOSIS — Z87.19 HX OF ACUTE ALCOHOLIC HEPATITIS: ICD-10-CM

## 2024-01-08 DIAGNOSIS — E03.8 OTHER SPECIFIED HYPOTHYROIDISM: ICD-10-CM

## 2024-01-08 DIAGNOSIS — R09.81 NASAL CONGESTION: ICD-10-CM

## 2024-01-08 DIAGNOSIS — F41.9 ANXIETY: Primary | ICD-10-CM

## 2024-01-08 NOTE — TELEPHONE ENCOUNTER
Noted. May be he can qualify for medicare ? May be care coordination can also assist ? Referral placed   . I can send medication refill until he can sort out his insurance

## 2024-01-18 ENCOUNTER — PATIENT OUTREACH (OUTPATIENT)
Dept: CARE COORDINATION | Facility: CLINIC | Age: 64
End: 2024-01-18

## 2024-01-18 NOTE — PROGRESS NOTES
Clinic Care Coordination Contact  Program:   [facility]: [place]   Renewal: [employer]  Date Applied:      PATRIC Outreach:   1/18/24:  CTA called to see if patient needed assistance with their [employer] Renewal. Patient declined needing assistance and no follow up needed   Gabby Gomez  Care   [facility]  Clinic Care Coordination  [phone]       Health Insurance:        Referral/Screening:

## 2024-01-26 ENCOUNTER — MYC REFILL (OUTPATIENT)
Dept: FAMILY MEDICINE | Facility: CLINIC | Age: 64
End: 2024-01-26

## 2024-01-26 DIAGNOSIS — F41.9 ANXIETY: ICD-10-CM

## 2024-01-30 ENCOUNTER — MYC REFILL (OUTPATIENT)
Dept: FAMILY MEDICINE | Facility: CLINIC | Age: 64
End: 2024-01-30

## 2024-01-30 DIAGNOSIS — F41.9 ANXIETY: ICD-10-CM

## 2024-01-30 RX ORDER — LORAZEPAM 0.5 MG/1
TABLET ORAL
Qty: 45 TABLET | Refills: 0 | Status: SHIPPED | OUTPATIENT
Start: 2024-01-30 | End: 2024-09-25

## 2024-01-30 RX ORDER — LORAZEPAM 0.5 MG/1
TABLET ORAL
Qty: 45 TABLET | Refills: 0 | Status: CANCELLED | OUTPATIENT
Start: 2024-01-30

## 2024-01-30 NOTE — TELEPHONE ENCOUNTER
Pt does need to work on tapering medication dose so hope that this script would last him longer, and he needs to try to go off this medication

## 2024-01-30 NOTE — TELEPHONE ENCOUNTER
Patient Contact    Attempt # 1    Was Call answered? No    Non-detailed voicemail left to call clinic at: [phone].     On Call Back:    Please relay provider's message below.    Maryam GALARZA RN  [facility] Triage Team

## 2024-01-30 NOTE — TELEPHONE ENCOUNTER
Received a call back from the patient following up on the refill request below. Patient states he is completely out of medication and needs a refill sent to the pharmacy today.     Will route HP to PCP for review.     Alma Hooks RN

## 2024-01-31 NOTE — TELEPHONE ENCOUNTER
Patient has appointment 2/5/2024 and will discuss the medication .       Echo Samuel RN  [facility]

## 2024-02-01 DIAGNOSIS — F41.9 ANXIETY: ICD-10-CM

## 2024-02-01 DIAGNOSIS — G47.09 OTHER INSOMNIA: ICD-10-CM

## 2024-02-01 RX ORDER — HYDROXYZINE HYDROCHLORIDE 25 MG/1
TABLET, FILM COATED ORAL
Qty: 60 TABLET | Refills: 0 | Status: SHIPPED | OUTPATIENT
Start: 2024-02-01 | End: 2024-02-26

## 2024-02-02 NOTE — PROGRESS NOTES
[facility] Care Coordination Contact      [facility] Six-Month Telephone Assessment    6 month telephone assessment completed on 01/04/2024.    ER visits: No  Hospitalizations: No  TCU stays: No  Significant health status changes: None  Falls/Injuries: No  ADL/IADL changes: No  Changes in services: Yes: Member has fallen of MA and Health Plan. Please see note below.    Caregiver Assessment follow up:  None    Goals: See POC in chart for goal progress documentation.      Will see member in 6 months for an annual health risk assessment.   Encouraged member to call CC with any questions or concerns in the meantime.       Niecy Angelo RN  [facility]  [phone]

## 2024-02-05 ENCOUNTER — VIRTUAL VISIT (OUTPATIENT)
Dept: FAMILY MEDICINE | Facility: CLINIC | Age: 64
End: 2024-02-05
Payer: COMMERCIAL

## 2024-02-05 DIAGNOSIS — E78.5 HYPERLIPIDEMIA LDL GOAL <100: ICD-10-CM

## 2024-02-05 DIAGNOSIS — Z78.9 ALCOHOL USE: ICD-10-CM

## 2024-02-05 DIAGNOSIS — F41.9 ANXIETY: Primary | ICD-10-CM

## 2024-02-05 DIAGNOSIS — Z87.19 HX OF ACUTE ALCOHOLIC HEPATITIS: ICD-10-CM

## 2024-02-05 DIAGNOSIS — G47.09 OTHER INSOMNIA: ICD-10-CM

## 2024-02-05 DIAGNOSIS — I10 ESSENTIAL HYPERTENSION: ICD-10-CM

## 2024-02-05 PROCEDURE — 96127 BRIEF EMOTIONAL/BEHAV ASSMT: CPT | Performed by: FAMILY MEDICINE

## 2024-02-05 PROCEDURE — 99215 OFFICE O/P EST HI 40 MIN: CPT | Mod: 95 | Performed by: FAMILY MEDICINE

## 2024-02-05 RX ORDER — GABAPENTIN 100 MG/1
CAPSULE ORAL
Qty: 30 CAPSULE | Refills: 1 | Status: SHIPPED | OUTPATIENT
Start: 2024-02-05 | End: 2024-02-20

## 2024-02-05 ASSESSMENT — ANXIETY QUESTIONNAIRES
1. FEELING NERVOUS, ANXIOUS, OR ON EDGE: NEARLY EVERY DAY
3. WORRYING TOO MUCH ABOUT DIFFERENT THINGS: SEVERAL DAYS
GAD7 TOTAL SCORE: 7
2. NOT BEING ABLE TO STOP OR CONTROL WORRYING: SEVERAL DAYS
7. FEELING AFRAID AS IF SOMETHING AWFUL MIGHT HAPPEN: NOT AT ALL
GAD7 TOTAL SCORE: 7
GAD7 TOTAL SCORE: 7
4. TROUBLE RELAXING: SEVERAL DAYS
8. IF YOU CHECKED OFF ANY PROBLEMS, HOW DIFFICULT HAVE THESE MADE IT FOR YOU TO DO YOUR WORK, TAKE CARE OF THINGS AT HOME, OR GET ALONG WITH OTHER PEOPLE?: SOMEWHAT DIFFICULT
7. FEELING AFRAID AS IF SOMETHING AWFUL MIGHT HAPPEN: NOT AT ALL
IF YOU CHECKED OFF ANY PROBLEMS ON THIS QUESTIONNAIRE, HOW DIFFICULT HAVE THESE PROBLEMS MADE IT FOR YOU TO DO YOUR WORK, TAKE CARE OF THINGS AT HOME, OR GET ALONG WITH OTHER PEOPLE: SOMEWHAT DIFFICULT
6. BECOMING EASILY ANNOYED OR IRRITABLE: NOT AT ALL
5. BEING SO RESTLESS THAT IT IS HARD TO SIT STILL: SEVERAL DAYS

## 2024-02-05 ASSESSMENT — PATIENT HEALTH QUESTIONNAIRE - PHQ9
10. IF YOU CHECKED OFF ANY PROBLEMS, HOW DIFFICULT HAVE THESE PROBLEMS MADE IT FOR YOU TO DO YOUR WORK, TAKE CARE OF THINGS AT HOME, OR GET ALONG WITH OTHER PEOPLE: SOMEWHAT DIFFICULT
SUM OF ALL RESPONSES TO PHQ QUESTIONS 1-9: 12
SUM OF ALL RESPONSES TO PHQ QUESTIONS 1-9: 12

## 2024-02-05 NOTE — PROGRESS NOTES
Angus is a 63 year old who is being evaluated via a billable video visit.      How would you like to obtain your AVS? MyChart  If the video visit is dropped, the invitation should be resent by: Text to cell phone: [phone]  Will anyone else be joining your video visit? No          Assessment & Plan   (F41.9) Anxiety  (primary encounter diagnosis)  Comment:   Plan: gabapentin (NEURONTIN) 100 MG capsule, Adult         Mental Health  Referral, Adult Mental         Health  Referral            (G47.09) Other insomnia  Comment:   Plan: gabapentin (NEURONTIN) 100 MG capsule, Adult         Mental Health  Referral, Adult Mental         Health  Referral             Discussed cares, talked about signs and symptoms of anxiety/ depression and treatment options. Alcohol use causes    Recurrent relapses and this contributes to anxiety/ depression   . Willing to Neurontin to help with anxiety/ sleep and may be also help with alcohol relapses . He also needs to go off lorazepam bc of him getting addicted to it and needing more frequently so advised pt to continue to work on gradual taper and go off this on next couple of months, as I will not continue to fill his lorazepam and needs to work with therapists as well to help with mood / anxiety sleep etc .  Also encouraged regular exercise , as he admits he is not doing any , but plans t work on it .    Pros/ cons of med's discussed . encouraged to see  to help and referral given . spent sometimes counseling patient.   Follow up in 2-3 weeks  sooner if problem.       (Z78.9) Alcohol use  Comment: has recurrent relapses and this contributes to anxiety/ depression   Plan: Hepatic panel (Albumin, ALT, AST, Bili, Alk         Phos, TP), gabapentin (NEURONTIN) 100 MG capsule, Adult         Mental Health  Referral          Referral given to get professional help with . Also need to monitor labs/ treat anxiety/ depression adequately as  well.     (Z87.19) Hx of acute alcoholic hepatitis  Comment: has seen GI for this as well   Plan: Hepatic panel (Albumin, ALT, AST, Bili, Alk         Phos, TP), CBC with platelets              (I10) Essential hypertension  Comment:   Plan: BP  has been in adequate control. Discussed cares, low fat low salt  diet etc and need to continue to monitor closely   . so  encouraged home BP monitoring. Follow up recheck in 4-6 weeks   sooner if problem.         (E78.5) Hyperlipidemia LDL goal <100  Comment: he will schedule for lab   Plan: Lipid panel reflex to direct LDL Fasting,         Hepatic panel (Albumin, ALT, AST, Bili, Alk         Phos, TP), CBC with platelets              Check labs. Script  sent.Cares and  treatment discussed.  follow up in 3 weeks sooner  if problem   Patient expressed understanding and agreement with treatment plan. All patient's questions were answered, will let me know if has more later.  Medications: Rx's: Reviewed the potential side effects/complications of medications prescribed.   Spent 45 + min with patient and more then 50% of the time spent counseling and coordination of cares       BMI  Estimated body mass index is 36.42 kg/m  as calculated from the following:    Height as of 11/20/23: 1.829 m (6').    Weight as of 11/20/23: 121.8 kg (268 lb 8 oz).   Weight management plan: Discussed healthy diet and exercise guidelines    Depression Screening Follow Up        2/5/2024     9:12 AM   PHQ   PHQ-9 Total Score 12   Q9: Thoughts of better off dead/self-harm past 2 weeks Not at all         2/5/2024     9:12 AM   Last PHQ-9   1.  Little interest or pleasure in doing things 3   2.  Feeling down, depressed, or hopeless 1   3.  Trouble falling or staying asleep, or sleeping too much 3   4.  Feeling tired or having little energy 3   5.  Poor appetite or overeating 0   6.  Feeling bad about yourself 0   7.  Trouble concentrating 1   8.  Moving slowly or restless 1   Q9: Thoughts of better off  dead/self-harm past 2 weeks 0   PHQ-9 Total Score 12       Follow Up Actions Taken  Depression Action Plan reviewed with patient.  Mental Health Referral placed  Follow up recommended: with me in few weeks as well           Maria Eugenia Greco is a 63 year old, presenting for the following health issues:  Hand Problem (Trembling in hands and legs for last months )        2/5/2024     9:09 AM   Additional Questions   Roomed by Refugio     History of Present Illness       Reason for visit:  Leg and hands trembling  Symptom onset:  More than a month  Symptom intensity:  Severe  Symptom progression:  Staying the same  Had these symptoms before:  No    He eats 2-3 servings of fruits and vegetables daily.He consumes 0 sweetened beverage(s) daily.He exercises with enough effort to increase his heart rate 9 or less minutes per day.  He exercises with enough effort to increase his heart rate 3 or less days per week.   He is taking medications regularly.       Hypertension Follow-up    Do you check your blood pressure regularly outside of the clinic? Not checking lately but has been ok mostly   Are you following a low salt diet? Yes  Are your blood pressures ever more than 140 on the top number (systolic) OR more   than 90 on the bottom number (diastolic), for example 140/90? No    Depression and Anxiety Follow-Up  How are you doing with your depression since your last visit? Worsened, lack of motivation, does not like going out or shaving etc, sometimes when anxiety is bad then he ends up drinking.  Lorazepam help with sleep and anxiety, although he was given smaller dose but he ends taking more then he should ( full tab twice a day instead of half)   How are you doing with your anxiety since your last visit?  Worsened as above. Still taking  zoloft. But not sleeping well still   Are you having other symptoms that might be associated with depression or anxiety? Yes:  he relapses to alcohol when he is too anxious. He was doing  quiet well after his last hospitalization but struggling again   Have you had a significant life event? No   Do you have any concerns with your use of alcohol or other drugs? Yes:  as above     Social History     Tobacco Use    Smoking status: Former     Types: Cigarettes     Quit date: 2015     Years since quittin.3    Smokeless tobacco: Never   Substance Use Topics    Alcohol use: Yes     Comment: 2-3 drinks a week    Drug use: No         2022     3:05 PM 2022    10:49 AM 2024     9:12 AM   PHQ   PHQ-9 Total Score  3 12   Q9: Thoughts of better off dead/self-harm past 2 weeks Not at all Not at all Not at all         2022    10:49 AM 2023     3:36 PM 2024     9:13 AM   RUTH-7 SCORE   Total Score 3 (minimal anxiety) 0 (minimal anxiety) 7 (mild anxiety)   Total Score 3 0 7         2024     9:12 AM   Last PHQ-9   1.  Little interest or pleasure in doing things 3   2.  Feeling down, depressed, or hopeless 1   3.  Trouble falling or staying asleep, or sleeping too much 3   4.  Feeling tired or having little energy 3   5.  Poor appetite or overeating 0   6.  Feeling bad about yourself 0   7.  Trouble concentrating 1   8.  Moving slowly or restless 1   Q9: Thoughts of better off dead/self-harm past 2 weeks 0   PHQ-9 Total Score 12         2024     9:13 AM   RUTH-7    1. Feeling nervous, anxious, or on edge 3   2. Not being able to stop or control worrying 1   3. Worrying too much about different things 1   4. Trouble relaxing 1   5. Being so restless that it is hard to sit still 1   6. Becoming easily annoyed or irritable 0   7. Feeling afraid, as if something awful might happen 0   RUTH-7 Total Score 7   If you checked any problems, how difficult have they made it for you to do your work, take care of things at home, or get along with other people? Somewhat difficult       Suicide Assessment Five-step Evaluation and Treatment (SAFE-T)              Objective           Vitals:  No  vitals were obtained today due to virtual visit.    Physical Exam   GENERAL: alert and no distress  EYES: Eyes grossly normal to inspection.  No discharge or erythema, or obvious scleral/conjunctival abnormalities.  RESP: No audible wheeze, cough, or visible cyanosis.    SKIN: Visible skin clear. No significant rash, abnormal pigmentation or lesions.  NEURO: Cranial nerves grossly intact.  Mentation and speech appropriate for age.  PSYCH: mentation appears normal, affect normal/bright, speech pressured, judgement and insight intact, and appearance well groomed        Video-Visit Details    Type of service:  Video Visit     Originating Location (pt. Location): Home    Distant Location (provider location):  Off-site  Platform used for Video Visit: TouchMail did not work so used doximity   Signed Electronically by: Fifi Fleming MD

## 2024-02-06 RX ORDER — LANOLIN ALCOHOL/MO/W.PET/CERES
100 CREAM (GRAM) TOPICAL DAILY
Qty: 30 TABLET | Refills: 0 | Status: SHIPPED | OUTPATIENT
Start: 2024-02-06 | End: 2024-03-07

## 2024-02-06 RX ORDER — LANOLIN ALCOHOL/MO/W.PET/CERES
400 CREAM (GRAM) TOPICAL DAILY
Qty: 30 TABLET | Refills: 0 | Status: SHIPPED | OUTPATIENT
Start: 2024-02-06 | End: 2024-03-21

## 2024-02-09 DIAGNOSIS — F41.9 ANXIETY: ICD-10-CM

## 2024-02-12 RX ORDER — SERTRALINE HYDROCHLORIDE 100 MG/1
150 TABLET, FILM COATED ORAL DAILY
Qty: 45 TABLET | Refills: 1 | Status: SHIPPED | OUTPATIENT
Start: 2024-02-12 | End: 2024-03-04

## 2024-02-12 RX ORDER — TRAZODONE HYDROCHLORIDE 50 MG/1
TABLET, FILM COATED ORAL
Qty: 180 TABLET | Refills: 0 | Status: SHIPPED | OUTPATIENT
Start: 2024-02-12 | End: 2024-04-23

## 2024-02-12 NOTE — TELEPHONE ENCOUNTER
Script refill faxed. Remind pt to do follow up for med check and labs, since he is due. Ok to use my same day

## 2024-02-19 ENCOUNTER — TELEPHONE (OUTPATIENT)
Dept: FAMILY MEDICINE | Facility: CLINIC | Age: 64
End: 2024-02-19
Payer: COMMERCIAL

## 2024-02-20 DIAGNOSIS — F41.9 ANXIETY: ICD-10-CM

## 2024-02-20 DIAGNOSIS — I10 ESSENTIAL HYPERTENSION: ICD-10-CM

## 2024-02-20 DIAGNOSIS — G47.09 OTHER INSOMNIA: ICD-10-CM

## 2024-02-20 RX ORDER — GABAPENTIN 100 MG/1
CAPSULE ORAL
Qty: 30 CAPSULE | Refills: 1 | Status: SHIPPED | OUTPATIENT
Start: 2024-02-20 | End: 2024-03-04

## 2024-02-20 NOTE — TELEPHONE ENCOUNTER
My main concerns is his safety . If he has any concerting injury should go to urgent care .  Labs he can reschedule for a different day and not urgent  
Patient Contact   ID: [id number]  Spoke with son Eliazar ([facility]) and relayed providers message. Son stated understanding and will help father schedule a lab only visit.     Michelle Sosa RN             
Patient calling stating that he fell and that was the reason he has missed or had to reschedule lab visits. Patient refused the nurse when she offered to call with an interpretor and triage his symptoms. Also asked if she could ask general questions about the fall to alert Dr. Fleming. He again refused. He wondered if there was a nurse service that would be able to go to his house to get the blood. The nurse stated there was not.     Routing to PCP as an FYI.     BARBARA Carrillo  [facility] Triage     
English

## 2024-02-23 ENCOUNTER — LAB (OUTPATIENT)
Dept: LAB | Facility: CLINIC | Age: 64
End: 2024-02-23
Payer: COMMERCIAL

## 2024-02-23 DIAGNOSIS — E78.5 HYPERLIPIDEMIA LDL GOAL <100: ICD-10-CM

## 2024-02-23 DIAGNOSIS — Z78.9 ALCOHOL USE: ICD-10-CM

## 2024-02-23 LAB
ALBUMIN SERPL BCG-MCNC: 4.5 G/DL (ref 3.5–5.2)
ALP SERPL-CCNC: 49 U/L (ref 40–150)
ALT SERPL W P-5'-P-CCNC: 56 U/L (ref 0–70)
AST SERPL W P-5'-P-CCNC: 77 U/L (ref 0–45)
BILIRUB DIRECT SERPL-MCNC: 0.21 MG/DL (ref 0–0.3)
BILIRUB SERPL-MCNC: 0.6 MG/DL
CHOLEST SERPL-MCNC: 260 MG/DL
ERYTHROCYTE [DISTWIDTH] IN BLOOD BY AUTOMATED COUNT: 14.3 % (ref 10–15)
FASTING STATUS PATIENT QL REPORTED: ABNORMAL
HCT VFR BLD AUTO: 36.9 % (ref 40–53)
HDLC SERPL-MCNC: 72 MG/DL
HGB BLD-MCNC: 12.2 G/DL (ref 13.3–17.7)
LDLC SERPL CALC-MCNC: 163 MG/DL
MCH RBC QN AUTO: 33.2 PG (ref 26.5–33)
MCHC RBC AUTO-ENTMCNC: 33.1 G/DL (ref 31.5–36.5)
MCV RBC AUTO: 100 FL (ref 78–100)
NONHDLC SERPL-MCNC: 188 MG/DL
PLATELET # BLD AUTO: 106 10E3/UL (ref 150–450)
PROT SERPL-MCNC: 7.7 G/DL (ref 6.4–8.3)
RBC # BLD AUTO: 3.68 10E6/UL (ref 4.4–5.9)
TRIGL SERPL-MCNC: 124 MG/DL
WBC # BLD AUTO: 5.4 10E3/UL (ref 4–11)

## 2024-02-23 PROCEDURE — 80061 LIPID PANEL: CPT

## 2024-02-23 PROCEDURE — 80076 HEPATIC FUNCTION PANEL: CPT

## 2024-02-23 PROCEDURE — 36415 COLL VENOUS BLD VENIPUNCTURE: CPT

## 2024-02-23 PROCEDURE — 85027 COMPLETE CBC AUTOMATED: CPT

## 2024-02-24 DIAGNOSIS — F41.9 ANXIETY: ICD-10-CM

## 2024-02-26 RX ORDER — HYDROXYZINE HYDROCHLORIDE 25 MG/1
TABLET, FILM COATED ORAL
Qty: 60 TABLET | Refills: 0 | Status: SHIPPED | OUTPATIENT
Start: 2024-02-26 | End: 2024-03-04

## 2024-02-29 ENCOUNTER — MYC REFILL (OUTPATIENT)
Dept: FAMILY MEDICINE | Facility: CLINIC | Age: 64
End: 2024-02-29

## 2024-02-29 DIAGNOSIS — F41.9 ANXIETY: ICD-10-CM

## 2024-03-04 ENCOUNTER — VIRTUAL VISIT (OUTPATIENT)
Dept: FAMILY MEDICINE | Facility: CLINIC | Age: 64
End: 2024-03-04
Payer: COMMERCIAL

## 2024-03-04 DIAGNOSIS — E03.8 OTHER SPECIFIED HYPOTHYROIDISM: ICD-10-CM

## 2024-03-04 DIAGNOSIS — E66.01 MORBID OBESITY (H): ICD-10-CM

## 2024-03-04 DIAGNOSIS — E66.811 OBESITY (BMI 30.0-34.9): ICD-10-CM

## 2024-03-04 DIAGNOSIS — E78.5 HYPERLIPIDEMIA WITH TARGET LDL LESS THAN 130: ICD-10-CM

## 2024-03-04 DIAGNOSIS — F10.90 ALCOHOL USE DISORDER: ICD-10-CM

## 2024-03-04 DIAGNOSIS — I42.2 HYPERTROPHIC CARDIOMYOPATHY (H): ICD-10-CM

## 2024-03-04 DIAGNOSIS — D64.9 LOW HEMOGLOBIN: ICD-10-CM

## 2024-03-04 DIAGNOSIS — R26.89 BALANCE PROBLEMS: Primary | ICD-10-CM

## 2024-03-04 DIAGNOSIS — I10 ESSENTIAL HYPERTENSION: ICD-10-CM

## 2024-03-04 DIAGNOSIS — G47.09 OTHER INSOMNIA: ICD-10-CM

## 2024-03-04 DIAGNOSIS — F41.9 ANXIETY: ICD-10-CM

## 2024-03-04 DIAGNOSIS — Z87.19 HX OF ACUTE ALCOHOLIC HEPATITIS: ICD-10-CM

## 2024-03-04 PROCEDURE — 96127 BRIEF EMOTIONAL/BEHAV ASSMT: CPT | Performed by: FAMILY MEDICINE

## 2024-03-04 PROCEDURE — 99215 OFFICE O/P EST HI 40 MIN: CPT | Mod: 95 | Performed by: FAMILY MEDICINE

## 2024-03-04 RX ORDER — TRAZODONE HYDROCHLORIDE 50 MG/1
TABLET, FILM COATED ORAL
Qty: 180 TABLET | Refills: 0 | Status: CANCELLED | OUTPATIENT
Start: 2024-03-04

## 2024-03-04 RX ORDER — LORAZEPAM 0.5 MG/1
TABLET ORAL
Qty: 45 TABLET | Refills: 0 | Status: CANCELLED | OUTPATIENT
Start: 2024-03-04

## 2024-03-04 RX ORDER — HYDROXYZINE HYDROCHLORIDE 25 MG/1
25 TABLET, FILM COATED ORAL 2 TIMES DAILY PRN
Qty: 60 TABLET | Refills: 0 | Status: SHIPPED | OUTPATIENT
Start: 2024-03-04 | End: 2024-04-23

## 2024-03-04 RX ORDER — LORAZEPAM 0.5 MG/1
TABLET ORAL
Qty: 45 TABLET | Refills: 0 | OUTPATIENT
Start: 2024-03-04

## 2024-03-04 RX ORDER — SERTRALINE HYDROCHLORIDE 100 MG/1
150 TABLET, FILM COATED ORAL DAILY
Qty: 45 TABLET | Refills: 3 | Status: SHIPPED | OUTPATIENT
Start: 2024-03-04 | End: 2024-04-23

## 2024-03-04 RX ORDER — LANOLIN ALCOHOL/MO/W.PET/CERES
100 CREAM (GRAM) TOPICAL DAILY
COMMUNITY
Start: 2024-03-04 | End: 2024-03-21

## 2024-03-04 RX ORDER — GABAPENTIN 100 MG/1
CAPSULE ORAL
Qty: 90 CAPSULE | Refills: 3 | Status: SHIPPED | OUTPATIENT
Start: 2024-03-04 | End: 2024-04-23

## 2024-03-04 RX ORDER — LEVOTHYROXINE SODIUM 175 UG/1
175 TABLET ORAL DAILY
Qty: 84 TABLET | Refills: 1 | Status: SHIPPED | OUTPATIENT
Start: 2024-03-04 | End: 2024-04-23

## 2024-03-04 ASSESSMENT — ANXIETY QUESTIONNAIRES
6. BECOMING EASILY ANNOYED OR IRRITABLE: NOT AT ALL
GAD7 TOTAL SCORE: 8
2. NOT BEING ABLE TO STOP OR CONTROL WORRYING: MORE THAN HALF THE DAYS
5. BEING SO RESTLESS THAT IT IS HARD TO SIT STILL: NOT AT ALL
IF YOU CHECKED OFF ANY PROBLEMS ON THIS QUESTIONNAIRE, HOW DIFFICULT HAVE THESE PROBLEMS MADE IT FOR YOU TO DO YOUR WORK, TAKE CARE OF THINGS AT HOME, OR GET ALONG WITH OTHER PEOPLE: SOMEWHAT DIFFICULT
7. FEELING AFRAID AS IF SOMETHING AWFUL MIGHT HAPPEN: SEVERAL DAYS
GAD7 TOTAL SCORE: 8
1. FEELING NERVOUS, ANXIOUS, OR ON EDGE: NEARLY EVERY DAY
3. WORRYING TOO MUCH ABOUT DIFFERENT THINGS: SEVERAL DAYS

## 2024-03-04 ASSESSMENT — PATIENT HEALTH QUESTIONNAIRE - PHQ9
SUM OF ALL RESPONSES TO PHQ QUESTIONS 1-9: 6
5. POOR APPETITE OR OVEREATING: SEVERAL DAYS

## 2024-03-04 NOTE — PATIENT INSTRUCTIONS
Take medications as directed.  Gabapentine 100 mg cap - 2 at bed time, on ein am.  Hydroxyzine 25 mg - as needed for acute anxiety once or twice daily as needed   Need to schedule appointmnet with neurology/ addiction medication doctor and referral are losd  [facility] lab only appointment in 2 weeks   Need to increase exrcise nd eat helathy   No alcohol   Continue to take your vitamins - B1, B12, , D and Folate as instrction per your bottle ( they are all OTC   Cares and symptomatic cares discussed   Follow up if problem check in 4 weeks, in offcie visit - sooner if problem

## 2024-03-04 NOTE — PROGRESS NOTES
Angus is a 63 year old who is being evaluated via a billable video visit.      How would you like to obtain your AVS? MyChart  If the video visit is dropped, the invitation should be resent by: Text to cell phone: [phone]  Will anyone else be joining your video visit? No      Assessment & Plan       (F41.9) Anxiety  Comment:   Plan: gabapentin (NEURONTIN) 100 MG capsule,         hydrOXYzine HCl (ATARAX) 25 MG tablet,         sertraline (ZOLOFT) 100 MG tablet            (E03.8) Other specified hypothyroidism  Comment:   Plan: levothyroxine (SYNTHROID/LEVOTHROID) 175 MCG         tablet            (G47.09) Other insomnia  Comment: taking hydroxyzine/ trazadone and still not sleeping well  Plan: gabapentin (NEURONTIN) 100 MG capsule            (I10) Essential hypertension  Comment:   Plan: gabapentin (NEURONTIN) 100 MG capsule, Basic         metabolic panel  (Ca, Cl, CO2, Creat, Gluc, K,         Na, BUN)            (E78.5) Hyperlipidemia with target LDL less than 130  Comment: high again bc h stopped med's- he is doing ok on med's otherwise . no problem taking med's  Plan: CK total, Basic metabolic panel  (Ca, Cl, PCO2,         Creat, Gluc, K, Na, BUN)                (D64.9) Low hemoglobin  Comment: recent Hgb was slightly lower but he denies any concerning sx. We need to watch and will repeat labs in few weeks   Plan: CBC with platelets, AST            (Z87.19) Hx of acute alcoholic hepatitis  Comment:   Plan: discussed recent labs. Ast is mildly elevated again . Discusses potential risk . Cares and concerns discussed     (F10.90) Alcohol use disorder  Comment:   Plan: GGT, Adult Mental Health  Referral          Spent time counseling pt and need to stay sober and avoid relapsing .   [facility] health Referral also given to help     (R26.89) Balance problems  (primary encounter diagnosis)  Comment:   Plan: Adult Neurology  Referral, Adult         Neurology  Referral       (I42.2)  Hypertrophic cardiomyopathy (H)  Comment:   Plan: had seen cardiology and stable          (E66.9) Obesity (BMI 30.0-34.9)  Comment:   Plan: Healthy diet and exercise reviewed.  Limit pop and juice intake.  Risks of obesity discussed.  Encourage exercise.     Check labs. refill sent.Cares and  treatment discussed .follow up if problem   Patient expressed understanding and agreement with treatment plan. All patient's questions were answered, will let me know if has more later.  Medications: Rx's: Reviewed the potential side effects/complications of medications prescribed.   Spent 40+ min with patient and more then 50% of the time spent counseling and coordination of cares           Maria Eugenia Greco is a 63 year old, presenting for the following health issues:  Recheck Medication (Labs completed 02/23/2024)        3/4/2024    10:27 AM   Additional Questions   Roomed by Dao HE CMA     HPI     Hyperlipidemia Follow-Up    Are you regularly taking any medication or supplement to lower your cholesterol? Yes he is back on it again, as  recent labs showed elevated lipid again       Are you having muscle aches or other side effects that you think could be caused by your cholesterol lowering medication?  No    Hemoglobin A1C (%)   Date Value   05/23/2023 5.5     LDL Cholesterol Calculated (mg/dL)   Date Value   02/23/2024 163 (H)   06/30/2023 157 (H)   11/18/2020 109 (H)   07/30/2019 151 (H)     Medication Followup for: LORazepam (ATIVAN) 0.5 MG tablet   Taking Medication as prescribed: yes  Side Effects:  None  Medication Helping Symptoms:  yes    Medication Followup for Insomnia: traZODone (DESYREL) 50 MG tablet   Taking Medication as prescribed: yes  Side Effects:  None  Medication Helping Symptoms:  yes      Depression and Anxiety Follow-Up  How are you doing with your depression since your last visit? No change. He is taking zoloft for mood and trazadone for sleep.   He was also taking hydroxyzine for sleep along  with gabapentine that was also added more recently   But anxiety is still bad sometimes , if he does not take lorazepam. And when he is out of lorazepam then he relapses to alcohol, last drink was 2 weeks ago . He is not getting out of even his room much lately bc he feels week and dizzy etc. He thinsk he is having balance issue that is why he is avoiding going out. He did loose his balance and fell recently although he is ok and no significant injury etc but he is just concerned   He denies any chest pain palpitation sob etc with dizziness   How are you doing with your anxiety since your last visit?  Worsened   Are you having other symptoms that might be associated with depression or anxiety? No  Have you had a significant life event? No   Do you have any concerns with your use of alcohol or other drugs? No    Social History     Tobacco Use    Smoking status: Former     Types: Cigarettes     Quit date: 2015     Years since quittin.4    Smokeless tobacco: Never   Vaping Use    Vaping Use: Never used   Substance Use Topics    Alcohol use: Yes     Comment: 2-3 drinks a week    Drug use: No         2022    10:49 AM 2024     9:12 AM 3/4/2024    10:30 AM   PHQ   PHQ-9 Total Score 3 12 6   Q9: Thoughts of better off dead/self-harm past 2 weeks Not at all Not at all Not at all         2023     3:36 PM 2024     9:13 AM 3/4/2024    10:30 AM   RUTH-7 SCORE   Total Score 0 (minimal anxiety) 7 (mild anxiety)    Total Score 0 7 8         3/4/2024    10:30 AM   Last PHQ-9   1.  Little interest or pleasure in doing things 1   2.  Feeling down, depressed, or hopeless 0   3.  Trouble falling or staying asleep, or sleeping too much 2   4.  Feeling tired or having little energy 1   5.  Poor appetite or overeating 0   6.  Feeling bad about yourself 0   7.  Trouble concentrating 0   8.  Moving slowly or restless 2   Q9: Thoughts of better off dead/self-harm past 2 weeks 0   PHQ-9 Total Score 6   Difficulty at  work, home, or with people Somewhat difficult         3/4/2024    10:30 AM   RUTH-7    1. Feeling nervous, anxious, or on edge 3   2. Not being able to stop or control worrying 2   3. Worrying too much about different things 1   4. Trouble relaxing 1   5. Being so restless that it is hard to sit still 0   6. Becoming easily annoyed or irritable 0   7. Feeling afraid, as if something awful might happen 1   RUTH-7 Total Score 8   If you checked any problems, how difficult have they made it for you to do your work, take care of things at home, or get along with other people? Somewhat difficult           Review of Systems  Constitutional, HEENT, cardiovascular, pulmonary, GI, , musculoskeletal, neuro, skin, endocrine and psych systems are negative, except as otherwise noted.      Objective       Vitals:  No vitals were obtained today due to virtual visit.    Physical Exam   GENERAL: alert and no distress  EYES: Eyes grossly normal to inspection.  No discharge or erythema, or obvious scleral/conjunctival abnormalities.  RESP: No audible wheeze, cough, or visible cyanosis.    SKIN: Visible skin clear. No significant rash, abnormal pigmentation or lesions.  NEURO: Cranial nerves grossly intact.  Mentation and speech appropriate for age.  PSYCH: Appropriate affect, tone, and pace of words          Video-Visit Details    Type of service:  Video Visit       Originating Location (pt. Location): Home    Distant Location (provider location):  Off-site  Platform used for Video Visit: Shamar  Signed Electronically by: Fifi Fleming MD

## 2024-03-16 DIAGNOSIS — Z87.19 HX OF ACUTE ALCOHOLIC HEPATITIS: ICD-10-CM

## 2024-03-18 DIAGNOSIS — F10.90 ALCOHOL USE DISORDER: Primary | ICD-10-CM

## 2024-03-21 RX ORDER — LANOLIN ALCOHOL/MO/W.PET/CERES
100 CREAM (GRAM) TOPICAL DAILY
Qty: 90 TABLET | Refills: 3 | Status: SHIPPED | OUTPATIENT
Start: 2024-03-21

## 2024-03-21 RX ORDER — LANOLIN ALCOHOL/MO/W.PET/CERES
400 CREAM (GRAM) TOPICAL DAILY
Qty: 90 TABLET | Refills: 3 | Status: SHIPPED | OUTPATIENT
Start: 2024-03-21

## 2024-04-02 ENCOUNTER — PATIENT OUTREACH (OUTPATIENT)
Dept: GERIATRIC MEDICINE | Facility: CLINIC | Age: 64
End: 2024-04-02
Payer: COMMERCIAL

## 2024-04-02 NOTE — PROGRESS NOTES
[facility] Care Coordination Contact    No longer active with [facility] community case management effective 12/31/2023.  Reason for community disenrollment: Other:  [place]itched [place].  Niecy Angelo RN  [facility]  [phone]

## 2024-04-04 ENCOUNTER — LAB (OUTPATIENT)
Dept: LAB | Facility: CLINIC | Age: 64
End: 2024-04-04
Payer: COMMERCIAL

## 2024-04-04 DIAGNOSIS — F10.90 ALCOHOL USE DISORDER: ICD-10-CM

## 2024-04-04 DIAGNOSIS — I10 ESSENTIAL HYPERTENSION: ICD-10-CM

## 2024-04-04 DIAGNOSIS — E78.5 HYPERLIPIDEMIA WITH TARGET LDL LESS THAN 130: ICD-10-CM

## 2024-04-04 DIAGNOSIS — D64.9 LOW HEMOGLOBIN: ICD-10-CM

## 2024-04-04 LAB
ERYTHROCYTE [DISTWIDTH] IN BLOOD BY AUTOMATED COUNT: 12 % (ref 10–15)
HCT VFR BLD AUTO: 42 % (ref 40–53)
HGB BLD-MCNC: 14.2 G/DL (ref 13.3–17.7)
MCH RBC QN AUTO: 31.3 PG (ref 26.5–33)
MCHC RBC AUTO-ENTMCNC: 33.8 G/DL (ref 31.5–36.5)
MCV RBC AUTO: 93 FL (ref 78–100)
PLATELET # BLD AUTO: 104 10E3/UL (ref 150–450)
RBC # BLD AUTO: 4.54 10E6/UL (ref 4.4–5.9)
WBC # BLD AUTO: 4.4 10E3/UL (ref 4–11)

## 2024-04-04 PROCEDURE — 85027 COMPLETE CBC AUTOMATED: CPT

## 2024-04-04 PROCEDURE — 82977 ASSAY OF GGT: CPT

## 2024-04-04 PROCEDURE — 82550 ASSAY OF CK (CPK): CPT

## 2024-04-04 PROCEDURE — 36415 COLL VENOUS BLD VENIPUNCTURE: CPT

## 2024-04-04 PROCEDURE — 84450 TRANSFERASE (AST) (SGOT): CPT

## 2024-04-04 PROCEDURE — 80048 BASIC METABOLIC PNL TOTAL CA: CPT

## 2024-04-05 LAB
ANION GAP SERPL CALCULATED.3IONS-SCNC: 11 MMOL/L (ref 7–15)
AST SERPL W P-5'-P-CCNC: 30 U/L (ref 0–45)
BUN SERPL-MCNC: 12.4 MG/DL (ref 8–23)
CALCIUM SERPL-MCNC: 9.7 MG/DL (ref 8.8–10.2)
CHLORIDE SERPL-SCNC: 104 MMOL/L (ref 98–107)
CK SERPL-CCNC: 48 U/L (ref 39–308)
CREAT SERPL-MCNC: 0.79 MG/DL (ref 0.67–1.17)
DEPRECATED HCO3 PLAS-SCNC: 23 MMOL/L (ref 22–29)
EGFRCR SERPLBLD CKD-EPI 2021: >90 ML/MIN/1.73M2
GGT SERPL-CCNC: 72 U/L (ref 8–61)
GLUCOSE SERPL-MCNC: 154 MG/DL (ref 70–99)
POTASSIUM SERPL-SCNC: 4.7 MMOL/L (ref 3.4–5.3)
SODIUM SERPL-SCNC: 138 MMOL/L (ref 135–145)

## 2024-04-11 ENCOUNTER — TELEPHONE (OUTPATIENT)
Dept: FAMILY MEDICINE | Facility: CLINIC | Age: 64
End: 2024-04-11
Payer: COMMERCIAL

## 2024-04-11 NOTE — TELEPHONE ENCOUNTER
Triage Patient Outreach    Attempt # 1    Was call answered?  No.  Left voicemail to return call to [facility] at [facility]    Zaina Xiong RN

## 2024-04-11 NOTE — TELEPHONE ENCOUNTER
----- Message from Fifi Fleming MD sent at 4/11/2024  2:18 PM CDT -----  Please contact pt to inform as well   See results note       Hi Angus    your lab result are back . See details below.  Feel free to call me or follow up if you have questions or ongoing health concerns     Here is your report.     Normal electrolytes(various salts in your body), glucose high but likely non fasting so no concerning      liver function tests,- AST id normal  GGT - is high likely related to excessive alcohol intake so need to avoid alcohol         complete blood count including-  shows   1. white blood cells (infection fighting/inflammatory cells),  2. red blood cell (oxygen carrying ) / hemoglobin is normal  ( no anemia)   3. platelets  count (which help with blood clotting ) remains  slightly out of range likely related to alcohol .     Ok to watch and will monitor    Recommend follow up check in office in few weeks , so we can discussed heatlh issue        Thanks for your attention ,                               Dr. Fifi Fleming   Written by Fifi Fleming MD on 4/11/2024  2:18 PM CDT

## 2024-04-12 NOTE — TELEPHONE ENCOUNTER
Patient given result message from Dr. Fleming.  Patient verbalizes understanding and agrees with plan.     1) Unable to find opening in clinic for the patient until 6/19. OK to use same day or next day slot or schedule virtual visit?    2) Patient requests referral for arthritis due to joint pain all over the body when he get up in the morning.     Ashleigh Messina RN

## 2024-04-17 NOTE — TELEPHONE ENCOUNTER
LVMTCB to schedule appt w/ Dr. Fleming for referral discussion.   Okay to use same day slot. (Wife also needs appt, they normally come together) Separate enc open for her.     Dilcia Guan on 4/17/2024 at 5:38 PM

## 2024-04-23 ENCOUNTER — OFFICE VISIT (OUTPATIENT)
Dept: FAMILY MEDICINE | Facility: CLINIC | Age: 64
End: 2024-04-23
Payer: COMMERCIAL

## 2024-04-23 VITALS
HEART RATE: 74 BPM | DIASTOLIC BLOOD PRESSURE: 70 MMHG | OXYGEN SATURATION: 97 % | TEMPERATURE: 98.9 F | SYSTOLIC BLOOD PRESSURE: 126 MMHG

## 2024-04-23 DIAGNOSIS — M54.9 BACK PAIN, UNSPECIFIED BACK LOCATION, UNSPECIFIED BACK PAIN LATERALITY, UNSPECIFIED CHRONICITY: ICD-10-CM

## 2024-04-23 DIAGNOSIS — G47.09 OTHER INSOMNIA: ICD-10-CM

## 2024-04-23 DIAGNOSIS — E66.01 MORBID OBESITY (H): ICD-10-CM

## 2024-04-23 DIAGNOSIS — F41.9 ANXIETY: ICD-10-CM

## 2024-04-23 DIAGNOSIS — E03.8 OTHER SPECIFIED HYPOTHYROIDISM: ICD-10-CM

## 2024-04-23 DIAGNOSIS — E78.5 HYPERLIPIDEMIA LDL GOAL <100: ICD-10-CM

## 2024-04-23 DIAGNOSIS — F10.90 ALCOHOL USE DISORDER: ICD-10-CM

## 2024-04-23 DIAGNOSIS — I10 ESSENTIAL HYPERTENSION: ICD-10-CM

## 2024-04-23 DIAGNOSIS — I42.2 HYPERTROPHIC CARDIOMYOPATHY (H): ICD-10-CM

## 2024-04-23 DIAGNOSIS — D69.6 LOW PLATELET COUNT (H): ICD-10-CM

## 2024-04-23 DIAGNOSIS — I10 ESSENTIAL HYPERTENSION: Primary | ICD-10-CM

## 2024-04-23 PROCEDURE — 90677 PCV20 VACCINE IM: CPT | Performed by: FAMILY MEDICINE

## 2024-04-23 PROCEDURE — 90480 ADMN SARSCOV2 VAC 1/ONLY CMP: CPT | Performed by: FAMILY MEDICINE

## 2024-04-23 PROCEDURE — 91320 SARSCV2 VAC 30MCG TRS-SUC IM: CPT | Performed by: FAMILY MEDICINE

## 2024-04-23 PROCEDURE — 99215 OFFICE O/P EST HI 40 MIN: CPT | Mod: 25 | Performed by: FAMILY MEDICINE

## 2024-04-23 PROCEDURE — 90471 IMMUNIZATION ADMIN: CPT | Performed by: FAMILY MEDICINE

## 2024-04-23 RX ORDER — HYDROXYZINE HYDROCHLORIDE 25 MG/1
25 TABLET, FILM COATED ORAL 2 TIMES DAILY PRN
Qty: 60 TABLET | Refills: 0 | Status: SHIPPED | OUTPATIENT
Start: 2024-04-23 | End: 2024-05-20

## 2024-04-23 RX ORDER — SERTRALINE HYDROCHLORIDE 100 MG/1
150 TABLET, FILM COATED ORAL DAILY
Qty: 45 TABLET | Refills: 3 | Status: SHIPPED | OUTPATIENT
Start: 2024-04-23 | End: 2024-06-27

## 2024-04-23 RX ORDER — LEVOTHYROXINE SODIUM 175 UG/1
175 TABLET ORAL DAILY
Qty: 90 TABLET | Refills: 1 | Status: SHIPPED | OUTPATIENT
Start: 2024-04-23 | End: 2024-06-27

## 2024-04-23 RX ORDER — LOSARTAN POTASSIUM 100 MG/1
100 TABLET ORAL DAILY
Qty: 90 TABLET | Refills: 2 | Status: SHIPPED | OUTPATIENT
Start: 2024-04-23 | End: 2024-06-27

## 2024-04-23 RX ORDER — TRAZODONE HYDROCHLORIDE 50 MG/1
50-100 TABLET, FILM COATED ORAL AT BEDTIME
Qty: 180 TABLET | Refills: 0 | Status: SHIPPED | OUTPATIENT
Start: 2024-04-23 | End: 2024-07-22

## 2024-04-23 RX ORDER — LOSARTAN POTASSIUM 100 MG/1
100 TABLET ORAL DAILY
Qty: 90 TABLET | Refills: 1 | Status: CANCELLED | OUTPATIENT
Start: 2024-04-23

## 2024-04-23 RX ORDER — LOSARTAN POTASSIUM 100 MG/1
100 TABLET ORAL DAILY
Qty: 90 TABLET | Refills: 2 | Status: CANCELLED | OUTPATIENT
Start: 2024-04-23

## 2024-04-23 RX ORDER — ROSUVASTATIN CALCIUM 40 MG/1
TABLET, COATED ORAL
Qty: 90 TABLET | Refills: 2 | Status: CANCELLED | OUTPATIENT
Start: 2024-04-23

## 2024-04-23 RX ORDER — GABAPENTIN 100 MG/1
CAPSULE ORAL
Qty: 90 CAPSULE | Refills: 3 | Status: SHIPPED | OUTPATIENT
Start: 2024-04-23 | End: 2024-06-27

## 2024-04-23 ASSESSMENT — PAIN SCALES - GENERAL: PAINLEVEL: MILD PAIN (3)

## 2024-04-23 NOTE — PROGRESS NOTES
Assessment & Plan     (I10) Essential hypertension  (primary encounter diagnosis)  Comment:   Plan: LOSARTAN         BP in adequate control. Discussed cares, low fat low salt  diet etc. Check labs, call pt with results.           Refill has been sent. encouraged home BP monitoring. Follow up recheck in 6 months, sooner if problem.       (E78.5) Hyperlipidemia LDL goal <100  Comment: need improvement- back on med's recheck in 2-3 months  Plan: Lipid panel reflex to direct LDL Fasting            (F41.9) Anxiety  Comment: improved and stable  Plan: sertraline (ZOLOFT) 100 MG tablet, hydrOXYzine         HCl (ATARAX) 25 MG tablet, gabapentin         (NEURONTIN) 100 MG capsule      Discussed cares, talked about signs and symptoms of anxiety/ depression and treatment options. Willing to stay on same med's for now .     He is off lorazepam for a while now and not needing much and over all feeling better   . Pros/ cons of med's discussed . encouraged to continue to do some exercises / yoga etc spent sometimes counseling patient. Follow up in  3 months, sooner if problem.       (G47.09) Other insomnia  Comment:  Plan: traZODone (DESYREL) 50 MG tablet, gabapentin         (NEURONTIN) 100 MG capsule         improved taking less of trazadon now - only 2 instead of 3         Gabapentin also helping. Comfortable staying on same dose for now. Follow up check in 2- 3 months     (I42.2) Hypertrophic cardiomyopathy (H)  Comment: stable   Plan:  seeing cardiology    (E03.8) Other specified hypothyroidism  Comment: recent labs were good - need refill   Plan: levothyroxine (SYNTHROID/LEVOTHROID) 175 MCG         tablet       (D69.6) Low platelet count (H24)  Comment: need improvement, but hgb was back to normal . Will do f/unit(s) check in couple of months   Plan: CBC with platelets            (M54.9) Back pain, unspecified back location, unspecified back pain laterality, unspecified chronicity  Comment: on and off few months  Plan:     discussed back  cares and symptomatic treatment including  adequate pain control, heat,  stretches etc.       He admits that stretching helps , so he is comfortable taking OTC pain med's if needed           he will do follow up if no improvement or problem. Consider further evaluation and  physical therapy if needed.       (E66.01) Morbid obesity (H)  Comment: has gained weight- will monitor and  Plan: Healthy diet and exercise reviewed.  Risks of obesity discussed.  Encourage exercise.        (F10.90) Alcohol use disorder  Comment: sober - 2- months  Plan: doing well , LFT also have improved     Check labs. refill sent.Cares and  treatment discussed follow. up if problem   Patient expressed understanding and agreement with treatment plan. All patient's questions were answered, will let me know if has more later.  Medications: Rx's: Reviewed the potential side effects/complications of medications prescribed.   Spent 40 + min with patient and more then 50% of the time spent counseling and coordination of cares           Maria Eugenia Greco is a 63 year old, presenting for the following health issues:  Consult        4/23/2024    12:46 PM   Additional Questions   Roomed by Lg CARRERA     Hypertension Follow-up    Do you check your blood pressure regularly outside of the clinic? No not always but sometimes and it has bene good   Are you following a low salt diet? No  Are your blood pressures ever more than 140 on the top number (systolic) OR more   than 90 on the bottom number (diastolic), for example 140/90? NA  How many days per week do you miss taking your medication? 0  Hyperlipidemia Follow-Up    Are you regularly taking any medication or supplement to lower your cholesterol?   Yes- last lipid was still high, but now he is taking med's more regular without problm so comfortable continuing  and watching for now on same dose   Are you having muscle aches or other side effects that you think could be caused by  your cholesterol lowering medication?  No      Depression and Anxiety   How are you doing with your depression since your last visit? Improved and he is doing much better . Increasing zoloft dose also has helped   He is off lorazepam for a while now,  and not needing much and over all feeling better    he is feeling better has been sober for 2 months . He is exercising more now and doing some yoga exercises    gabapentin is helping but taking only bedtime dose , and has reduced trazodone to 2 tabs  only (  instead of 3 0 .   He is comfortable at current dose of all  his med's   How are you doing with your anxiety since your last visit?  Improved   Are you having other symptoms that might be associated with depression or anxiety? No  Have you had a significant life event? No   Do you have any concerns with your use of alcohol or other drugs? No      back pain concerns     Has been having more Back pain  lately lower back , with no radiating pain or numbness down the leg. Feels very stiff after  resting/ sleeping etc although if he stretches and moves around it feels better. Admits that he has gained weight recently mostly bc of lack of exercising and was no really doing much but he has started doing youga exercises and also eating better etc and willing to work on losing weight. OTC med's are helpful but he avoids taking much dasia for that     Social History     Tobacco Use    Smoking status: Former     Current packs/day: 0.00     Types: Cigarettes     Quit date: 2015     Years since quittin.5    Smokeless tobacco: Never   Vaping Use    Vaping status: Never Used   Substance Use Topics    Alcohol use: Yes     Comment: 2-3 drinks a week    Drug use: No         2022    10:49 AM 2024     9:12 AM 3/4/2024    10:30 AM   PHQ   PHQ-9 Total Score 3 12 6   Q9: Thoughts of better off dead/self-harm past 2 weeks Not at all Not at all Not at all         2023     3:36 PM 2024     9:13 AM 3/4/2024    10:30  AM   RUTH-7 SCORE   Total Score 0 (minimal anxiety) 7 (mild anxiety)    Total Score 0 7 8           Suicide Assessment Five-step Evaluation and Treatment (SAFE-T)            Review of Systems  Constitutional, HEENT, cardiovascular, pulmonary, GI, , musculoskeletal, neuro, skin, endocrine and psych systems are negative, except as otherwise noted.      Objective    /70   Pulse 74   Temp 98.9  F (37.2  C) (Tympanic)   SpO2 97%   There is no height or weight on file to calculate BMI.  Physical Exam   GENERAL: alert and no distress  EYES: Eyes grossly normal to inspection, PERRL and conjunctivae and sclerae normal  HENT: ear canals and TM's normal, nose and mouth without ulcers or lesions  NECK: no adenopathy, no asymmetry, masses, or scars  RESP: lungs clear to auscultation - no rales, rhonchi or wheezes  CV: regular rate and rhythm, normal S1 S2, no S3 or S4,  no peripheral edema  ABDOMEN: soft, nontender, no hepatosplenomegaly, no masses and bowel sounds normal  MS: no edema  NEURO: Normal strength and tone, mentation intact and speech normal  PSYCH: mentation appears normal, affect dolores ,  speech normal , judgement and insight intact, and appearance well groomed        Signed Electronically by: Fifi Fleming MD

## 2024-04-23 NOTE — PATIENT INSTRUCTIONS
Take medications as directed.  Treatment and   cares discussed   Follow up if problem or concern

## 2024-05-14 DIAGNOSIS — R09.81 NASAL CONGESTION: ICD-10-CM

## 2024-05-14 RX ORDER — FLUTICASONE PROPIONATE 50 MCG
SPRAY, SUSPENSION (ML) NASAL
Qty: 48 G | Refills: 0 | Status: SHIPPED | OUTPATIENT
Start: 2024-05-14 | End: 2024-06-27

## 2024-05-18 DIAGNOSIS — F41.9 ANXIETY: ICD-10-CM

## 2024-05-20 RX ORDER — HYDROXYZINE HYDROCHLORIDE 25 MG/1
TABLET, FILM COATED ORAL
Qty: 60 TABLET | Refills: 0 | Status: SHIPPED | OUTPATIENT
Start: 2024-05-20 | End: 2024-06-24

## 2024-05-30 ENCOUNTER — TRANSFERRED RECORDS (OUTPATIENT)
Dept: HEALTH INFORMATION MANAGEMENT | Facility: CLINIC | Age: 64
End: 2024-05-30
Payer: COMMERCIAL

## 2024-06-17 ENCOUNTER — TELEPHONE (OUTPATIENT)
Dept: FAMILY MEDICINE | Facility: CLINIC | Age: 64
End: 2024-06-17
Payer: COMMERCIAL

## 2024-06-17 NOTE — TELEPHONE ENCOUNTER
[facility] (through ).     Need to schedule pre-op VV with Dr. Fleming (dental visit pre-op - Date of surgery?)      Placed a hold on 6/24/24 at 12:30pm for VV w/ Dr. Fleming. Schedule/confirm appt.      Fyi - Annual Px scheduled in-person with Dr. Fleming.   6/27/24 (8:40am check-in)       Dilcia Guan on 6/17/2024 at 12:02 PM

## 2024-06-17 NOTE — TELEPHONE ENCOUNTER
Forms/Letter Request     Type of form/letter: Medical opinion - Pre-op appt for Dental appt.      Do we have the form/letter: Yes, red folder, TC Team 1 pending file.      Who is the form from? [facility]     Where did/will the form come from? form was faxed in     When is form/letter needed by: As able.      How would you like the form/letter returned:   Fax to [phone]      Dilcia Guan on 6/17/2024 at 11:45 AM

## 2024-06-19 NOTE — TELEPHONE ENCOUNTER
Spoke to Dr. Fleming who will assess need for separate pre-op appt for dental appt at annual Px 6/27/24.     Placed pre-op forms in red folder, Dr. Fleming's inbox with date of annual Px noted.     Dilcia Guan on 6/19/2024 at 5:44 PM

## 2024-06-21 DIAGNOSIS — F41.9 ANXIETY: ICD-10-CM

## 2024-06-24 RX ORDER — HYDROXYZINE HYDROCHLORIDE 25 MG/1
TABLET, FILM COATED ORAL
Qty: 60 TABLET | Refills: 0 | Status: SHIPPED | OUTPATIENT
Start: 2024-06-24 | End: 2024-07-23

## 2024-06-27 ENCOUNTER — OFFICE VISIT (OUTPATIENT)
Dept: FAMILY MEDICINE | Facility: CLINIC | Age: 64
End: 2024-06-27
Payer: COMMERCIAL

## 2024-06-27 VITALS
DIASTOLIC BLOOD PRESSURE: 88 MMHG | HEIGHT: 71 IN | HEART RATE: 81 BPM | BODY MASS INDEX: 39.48 KG/M2 | TEMPERATURE: 97.3 F | WEIGHT: 282 LBS | OXYGEN SATURATION: 97 % | SYSTOLIC BLOOD PRESSURE: 136 MMHG | RESPIRATION RATE: 20 BRPM

## 2024-06-27 DIAGNOSIS — F41.9 ANXIETY: ICD-10-CM

## 2024-06-27 DIAGNOSIS — R73.01 ELEVATED FASTING GLUCOSE: ICD-10-CM

## 2024-06-27 DIAGNOSIS — Z87.891 EX-SMOKER: ICD-10-CM

## 2024-06-27 DIAGNOSIS — R09.81 NASAL CONGESTION: ICD-10-CM

## 2024-06-27 DIAGNOSIS — E78.5 HYPERLIPIDEMIA LDL GOAL <100: ICD-10-CM

## 2024-06-27 DIAGNOSIS — E66.01 CLASS 2 SEVERE OBESITY DUE TO EXCESS CALORIES WITH SERIOUS COMORBIDITY AND BODY MASS INDEX (BMI) OF 39.0 TO 39.9 IN ADULT (H): ICD-10-CM

## 2024-06-27 DIAGNOSIS — G47.09 OTHER INSOMNIA: ICD-10-CM

## 2024-06-27 DIAGNOSIS — I42.2 HYPERTROPHIC CARDIOMYOPATHY (H): ICD-10-CM

## 2024-06-27 DIAGNOSIS — D69.6 LOW PLATELET COUNT (H): ICD-10-CM

## 2024-06-27 DIAGNOSIS — Z00.00 ROUTINE GENERAL MEDICAL EXAMINATION AT A HEALTH CARE FACILITY: Primary | ICD-10-CM

## 2024-06-27 DIAGNOSIS — M25.50 ARTHRALGIA, UNSPECIFIED JOINT: ICD-10-CM

## 2024-06-27 DIAGNOSIS — E66.812 CLASS 2 SEVERE OBESITY DUE TO EXCESS CALORIES WITH SERIOUS COMORBIDITY AND BODY MASS INDEX (BMI) OF 39.0 TO 39.9 IN ADULT (H): ICD-10-CM

## 2024-06-27 DIAGNOSIS — I10 ESSENTIAL HYPERTENSION: ICD-10-CM

## 2024-06-27 DIAGNOSIS — E03.8 OTHER SPECIFIED HYPOTHYROIDISM: ICD-10-CM

## 2024-06-27 DIAGNOSIS — Z12.5 SCREENING PSA (PROSTATE SPECIFIC ANTIGEN): ICD-10-CM

## 2024-06-27 LAB
ALBUMIN SERPL BCG-MCNC: 4.3 G/DL (ref 3.5–5.2)
ALP SERPL-CCNC: 52 U/L (ref 40–150)
ALT SERPL W P-5'-P-CCNC: 18 U/L (ref 0–70)
ANION GAP SERPL CALCULATED.3IONS-SCNC: 12 MMOL/L (ref 7–15)
AST SERPL W P-5'-P-CCNC: 27 U/L (ref 0–45)
BILIRUB SERPL-MCNC: 0.6 MG/DL
BUN SERPL-MCNC: 11.5 MG/DL (ref 8–23)
CALCIUM SERPL-MCNC: 9.5 MG/DL (ref 8.8–10.2)
CHLORIDE SERPL-SCNC: 105 MMOL/L (ref 98–107)
CHOLEST SERPL-MCNC: 175 MG/DL
CREAT SERPL-MCNC: 0.87 MG/DL (ref 0.67–1.17)
CRP SERPL-MCNC: <3 MG/L
DEPRECATED HCO3 PLAS-SCNC: 23 MMOL/L (ref 22–29)
EGFRCR SERPLBLD CKD-EPI 2021: >90 ML/MIN/1.73M2
ERYTHROCYTE [DISTWIDTH] IN BLOOD BY AUTOMATED COUNT: 12.7 % (ref 10–15)
ERYTHROCYTE [SEDIMENTATION RATE] IN BLOOD BY WESTERGREN METHOD: 10 MM/HR (ref 0–20)
FASTING STATUS PATIENT QL REPORTED: ABNORMAL
FASTING STATUS PATIENT QL REPORTED: NORMAL
GLUCOSE SERPL-MCNC: 137 MG/DL (ref 70–99)
HCT VFR BLD AUTO: 43 % (ref 40–53)
HDLC SERPL-MCNC: 77 MG/DL
HGB BLD-MCNC: 14.8 G/DL (ref 13.3–17.7)
LDLC SERPL CALC-MCNC: 79 MG/DL
MCH RBC QN AUTO: 28.7 PG (ref 26.5–33)
MCHC RBC AUTO-ENTMCNC: 34.4 G/DL (ref 31.5–36.5)
MCV RBC AUTO: 84 FL (ref 78–100)
NONHDLC SERPL-MCNC: 98 MG/DL
PLATELET # BLD AUTO: 126 10E3/UL (ref 150–450)
POTASSIUM SERPL-SCNC: 4.8 MMOL/L (ref 3.4–5.3)
PROT SERPL-MCNC: 7.5 G/DL (ref 6.4–8.3)
PSA SERPL DL<=0.01 NG/ML-MCNC: 0.42 NG/ML (ref 0–4.5)
RBC # BLD AUTO: 5.15 10E6/UL (ref 4.4–5.9)
RHEUMATOID FACT SERPL-ACNC: <10 IU/ML
SODIUM SERPL-SCNC: 140 MMOL/L (ref 135–145)
TRIGL SERPL-MCNC: 95 MG/DL
TSH SERPL DL<=0.005 MIU/L-ACNC: 0.35 UIU/ML (ref 0.3–4.2)
WBC # BLD AUTO: 5.5 10E3/UL (ref 4–11)

## 2024-06-27 PROCEDURE — 86140 C-REACTIVE PROTEIN: CPT | Performed by: FAMILY MEDICINE

## 2024-06-27 PROCEDURE — 80061 LIPID PANEL: CPT | Performed by: FAMILY MEDICINE

## 2024-06-27 PROCEDURE — 83036 HEMOGLOBIN GLYCOSYLATED A1C: CPT | Performed by: FAMILY MEDICINE

## 2024-06-27 PROCEDURE — 85027 COMPLETE CBC AUTOMATED: CPT | Performed by: FAMILY MEDICINE

## 2024-06-27 PROCEDURE — 86038 ANTINUCLEAR ANTIBODIES: CPT | Performed by: FAMILY MEDICINE

## 2024-06-27 PROCEDURE — 80053 COMPREHEN METABOLIC PANEL: CPT | Performed by: FAMILY MEDICINE

## 2024-06-27 PROCEDURE — 99214 OFFICE O/P EST MOD 30 MIN: CPT | Mod: 25 | Performed by: FAMILY MEDICINE

## 2024-06-27 PROCEDURE — 86431 RHEUMATOID FACTOR QUANT: CPT | Performed by: FAMILY MEDICINE

## 2024-06-27 PROCEDURE — 85652 RBC SED RATE AUTOMATED: CPT | Performed by: FAMILY MEDICINE

## 2024-06-27 PROCEDURE — G0103 PSA SCREENING: HCPCS | Performed by: FAMILY MEDICINE

## 2024-06-27 PROCEDURE — 99397 PER PM REEVAL EST PAT 65+ YR: CPT | Performed by: FAMILY MEDICINE

## 2024-06-27 PROCEDURE — 96127 BRIEF EMOTIONAL/BEHAV ASSMT: CPT | Performed by: FAMILY MEDICINE

## 2024-06-27 PROCEDURE — 36415 COLL VENOUS BLD VENIPUNCTURE: CPT | Performed by: FAMILY MEDICINE

## 2024-06-27 PROCEDURE — 84443 ASSAY THYROID STIM HORMONE: CPT | Performed by: FAMILY MEDICINE

## 2024-06-27 RX ORDER — ROSUVASTATIN CALCIUM 40 MG/1
40 TABLET, COATED ORAL DAILY
Qty: 90 TABLET | Refills: 3 | Status: SHIPPED | OUTPATIENT
Start: 2024-06-27

## 2024-06-27 RX ORDER — FLUTICASONE PROPIONATE 50 MCG
2 SPRAY, SUSPENSION (ML) NASAL DAILY
Qty: 48 G | Refills: 11 | Status: SHIPPED | OUTPATIENT
Start: 2024-06-27

## 2024-06-27 RX ORDER — LOSARTAN POTASSIUM 100 MG/1
100 TABLET ORAL DAILY
Qty: 90 TABLET | Refills: 2 | Status: SHIPPED | OUTPATIENT
Start: 2024-06-27

## 2024-06-27 RX ORDER — METOPROLOL SUCCINATE 25 MG/1
25 TABLET, EXTENDED RELEASE ORAL EVERY EVENING
Qty: 90 TABLET | Refills: 3 | Status: SHIPPED | OUTPATIENT
Start: 2024-06-27

## 2024-06-27 RX ORDER — LEVOTHYROXINE SODIUM 175 UG/1
175 TABLET ORAL DAILY
Qty: 90 TABLET | Refills: 3 | Status: SHIPPED | OUTPATIENT
Start: 2024-06-27

## 2024-06-27 RX ORDER — SERTRALINE HYDROCHLORIDE 100 MG/1
150 TABLET, FILM COATED ORAL DAILY
Qty: 45 TABLET | Refills: 3 | Status: SHIPPED | OUTPATIENT
Start: 2024-06-27

## 2024-06-27 RX ORDER — GABAPENTIN 100 MG/1
CAPSULE ORAL
Qty: 90 CAPSULE | Refills: 3 | Status: SHIPPED | OUTPATIENT
Start: 2024-06-27

## 2024-06-27 SDOH — HEALTH STABILITY: PHYSICAL HEALTH: ON AVERAGE, HOW MANY MINUTES DO YOU ENGAGE IN EXERCISE AT THIS LEVEL?: 0 MIN

## 2024-06-27 SDOH — HEALTH STABILITY: PHYSICAL HEALTH: ON AVERAGE, HOW MANY DAYS PER WEEK DO YOU ENGAGE IN MODERATE TO STRENUOUS EXERCISE (LIKE A BRISK WALK)?: 0 DAYS

## 2024-06-27 ASSESSMENT — PAIN SCALES - GENERAL: PAINLEVEL: MILD PAIN (2)

## 2024-06-27 ASSESSMENT — SOCIAL DETERMINANTS OF HEALTH (SDOH): HOW OFTEN DO YOU GET TOGETHER WITH FRIENDS OR RELATIVES?: THREE TIMES A WEEK

## 2024-06-27 NOTE — PROGRESS NOTES
[facility]  [facility]  Fifi Fleming MD, Family Medicine  Jun 27, 2024      Assessment & Plan     (Z00.00) Routine history and physical examination of adult  (primary encounter diagnosis)  Comment:   Plan: COMPREHENSIVE METABOLIC PANEL, Lipid panel         reflex to direct LDL Fasting            (I10) Essential hypertension  Comment:   Plan: COMPREHENSIVE METABOLIC PANEL, losartan         (COZAAR) 100 MG tablet, gabapentin (NEURONTIN)         100 MG capsule        BP in adequate control. Discussed cares, low fat low salt  diet etc. Check labs, call pt with results. Refill given.             encouraged home BP monitoring. Follow up recheck in 6 months, sooner if problem.       (E03.8) Other specified hypothyroidism  Comment:   Plan: TSH WITH FREE T4 REFLEX, levothyroxine         (SYNTHROID/LEVOTHROID) 175 MCG tablet              (E78.5) Hyperlipidemia LDL goal <100  Comment:  back on med's due for recheck   Plan: COMPREHENSIVE METABOLIC PANEL, rosuvastatin         (CRESTOR) 40 MG tablet        (G47.09) Other insomnia  Comment: taking hydroxyzine/ trazodone and still not sleeping well  Plan: gabapentin (NEURONTIN) 100 MG capsule,         sertraline (ZOLOFT) 100 MG tablet              (F41.9) Anxiety  Comment: improved and stable  Plan: gabapentin (NEURONTIN) 100 MG capsule,         sertraline (ZOLOFT) 100 MG tablet              Discussed cares, talked about signs and symptoms of anxiety/ depression and treatment options.             Willing to stay on the same dose of zoloft 100 mg.  Will increase gabapentin dose one in am and 2 at bed time .        Will taper off trazodone gradually if not helping much, may add melatonin or hydroxyzine as needed at bed time                 Pros/ cons of med's discussed . encouraged to see  to help and referral given .              spent sometimes counseling patient. Follow up in 2-3 months, sooner if problem.         (R09.81)  Nasal congestion  Comment: rebound congestion-  probably related to afrin use   Plan: fluticasone (FLONASE) 50 MCG/ACT nasal spray        Asked pt to stop afrin and start Flonase. Cares and symptomatic treatment discussed follow up if problem       (I42.2) Hypertrophic cardiomyopathy (H)  Comment: stable has seen cardiology . Need refill   Plan: metoprolol succinate ER (TOPROL XL) 25 MG 24 hr        tablet           (M25.50) Arthralgia, unspecified joint  Comment:   Plan: Rheumatoid factor, Anti Nuclear Rocio IgG by IFA         with Reflex, CBC with platelets, ESR:         Erythrocyte sedimentation rate, CRP,         Inflammation          Discussed possible differential diagnosis for her symptoms. Check labs. Cares and symptomatic treatment discussed follow up if problem           (Z12.5) Screening PSA (prostate specific antigen)  Comment:   Plan: PSA, screen              (D69.6) Low platelet count (H)  Comment: need improvement  Plan: CBC with platelets,    (R73.01) Elevated fasting glucose  Comment:   Plan: Hemoglobin A1c     Discussed cares, diet/ exercise . Talked about DM  health risk etc.  Check lab. Follow up as needed        (E66.01,  Z68.39) Class 2 severe obesity due to excess calories with serious comorbidity and body mass index (BMI) of 39.0 to 39.9 in adult (H)  Comment:   Plan: Adult Comprehensive Weight Management         Referral          Healthy diet and exercise reviewed. Talked about need to loose weight,  meal planning , reducing carb's and adding more protein etc. Risks of obesity discussed.  Encourage exercise.   Referral   also given       Check labs. refill sent.Cares and  treatment discussed.  follow up if problem   Patient expressed understanding and agreement with treatment plan. All patient's questions were answered, will let me know if has more later.  Medications: Rx's: Reviewed the potential side effects/complications of medications prescribed.       BMI  Estimated body mass  "index is 39.67 kg/m  as calculated from the following:    Height as of this encounter: 1.796 m (5' 10.7\").    Weight as of this encounter: 127.9 kg (282 lb).   Weight management plan: Discussed healthy diet and exercise guidelines    Counseling  Appropriate preventive services were addressed with this patient via screening, questionnaire, or discussion as appropriate for fall prevention, nutrition, physical activity, Tobacco-use cessation, social engagement, weight loss and cognition.  Checklist reviewing preventive services available has been given to the patient.      Work on weight loss  Regular exercise  See Patient Instructions    Maria Eugenia Greco is a 64 year old, presenting for the following:  Physical        6/27/2024     9:18 AM   Additional Questions   Roomed by Amaal   Accompanied by wife       Health Care Directive  Patient does not have a Health Care Directive or Living Will: Discussed advance care planning with patient; information given to patient to review.    HPI      Hyperlipidemia Follow-Up    Are you regularly taking any medication or supplement to lower your cholesterol?   Yes- back on statin without any problem . So due for fasting labs   Are you having muscle aches or other side effects that you think could be caused by your cholesterol lowering medication?  No    Hypertension Follow-up    Do you check your blood pressure regularly outside of the clinic? Yes   Are you following a low salt diet? Yes  Are your blood pressures ever more than 140 on the top number (systolic) OR more   than 90 on the bottom number (diastolic), for example 140/90? No              Elevated glucose  Follow-up    What concerns do you have today about your diabetes? None and Other: fasting to do follow-up labs    Do you have any of these symptoms? (Select all that apply)  No numbness or tingling in feet.  No redness, sores or blisters on feet.  No complaints of excessive thirst.  No reports of blurry vision.  No " significant changes to weight.although hard to loose weight but admits that he has not been exercising and although not eating as good         Hemoglobin A1C (%)   Date Value       2023 5.5     LDL Cholesterol Calculated (mg/dL)   Date Value       2024 163 (H)   2020 109 (H)   2019 151 (H)       Depression and Anxiety   How are you doing with your depression since your last visit? No change  mostly doing ok except in the morning sometime it is worse so he had take lorazepam some but trying to cut down and  taking hydroxyzine as needed   Also takes trazodone and still not sleeping well  Gabapentin  was started but did not refill so not taking but had no problem taking it   How are you doing with your anxiety since your last visit?  No change  Are you having other symptoms that might be associated with depression or anxiety? Yes:  but lack of motivation due O fatigue and aches and pains so not exercising much , wife also concerned that he does not do any exercise   Have you had a significant life event? No   Do you have any concerns with your use of alcohol or other drugs? No has hx of alcohol excessive use but trying to better     Social History     Tobacco Use    Smoking status: Former     Current packs/day: 0.00     Types: Cigarettes     Quit date: 2015     Years since quittin.0    Smokeless tobacco: Never   Vaping Use    Vaping status: Never Used   Substance Use Topics    Alcohol use: Yes     Comment: 2-3 drinks a week    Drug use: No         2022    10:49 AM 2024     9:12 AM 3/4/2024    10:30 AM   PHQ   PHQ-9 Total Score 3 12 6   Q9: Thoughts of better off dead/self-harm past 2 weeks Not at all Not at all Not at all           2024     9:13 AM 3/4/2024    10:30 AM    RUTH-7 SCORE   Total Score 7 (mild anxiety)     Total Score 7 8      Suicide Assessment Five-step Evaluation and Treatment (SAFE-T)        Hypothyroidism Follow-up    Since last visit, patient describes the  following symptoms: Weight gain. no hair loss, no skin changes, no constipation, no loose stools. Has hx of  anxiety, depression, and fatigue  and admits that weight gain is likely related to lack of activity and exercise     Recurring Pain Follow Up    Where is your  pain located? (Select all that apply) various joints muscles/ just feels achy and stiff , worse in am   How would you describe your  pain?  dull ache and sore, stiff  and achy tired feeling   Do you have any swollen  or red joints  No   Since your last clinic visit  how has your pain changed? gradually worsening  Does your back pain interfere with your job? YES, No, Not applicable  not working   Since your last visit, have you tried any new treatment? On gabapentin low dose , not helping enough           6/27/2024   General Health   How would you rate your overall physical health? (!) FAIR   Feel stress (tense, anxious, or unable to sleep) Rather much      (!) STRESS CONCERN      6/27/2024   Nutrition   Diet: Regular (no restrictions)            6/27/2024   Exercise   Days per week of moderate/strenous exercise 0 days   Average minutes spent exercising at this level 0 min      (!) EXERCISE CONCERN      6/27/2024   Social Factors   Frequency of gathering with friends or relatives Three times a week   Worry food won't last until get money to buy more No   Food not last or not have enough money for food? No   Do you have housing? (Housing is defined as stable permanent housing and does not include staying ouside in a car, in a tent, in an abandoned building, in an overnight shelter, or couch-surfing.) Yes   Are you worried about losing your housing? No   Lack of transportation? No   Unable to get utilities (heat,electricity)? No            6/27/2024   Fall Risk   Fallen 2 or more times in the past year? No   Trouble with walking or balance? Yes              6/27/2024   Activities of Daily Living- Home Safety   Needs help with the following daily activites  Housework   Safety concerns in the home None of the above            2024   Dental   Dentist two times every year? Yes            2024   Hearing Screening   Hearing concerns? None of the above            2024   Driving Risk Screening   Patient/family members have concerns about driving No            2024   General Alertness/Fatigue Screening   Have you been more tired than usual lately? (!) YES            2024   Urinary Incontinence Screening   Bothered by leaking urine in past 6 months No            2024   TB Screening   Were you born outside of the [place]? Yes              Today's PHQ-2 Score:       2024     9:12 AM   PHQ-2 (  [employer])   Q1: Little interest or pleasure in doing things 3   Q2: Feeling down, depressed or hopeless 1   PHQ-2 Score 4   Q1: Little interest or pleasure in doing things Nearly every day   Q2: Feeling down, depressed or hopeless Several days   PHQ-2 Score 4         2024   Substance Use   Alcohol more than 3/day or more than 7/wk No   Do you have a current opioid prescription? No   How severe/bad is pain from 1 to 10? 2/10   Do you use any other substances recreationally? No        Social History     Tobacco Use    Smoking status: Former     Current packs/day: 0.00     Types: Cigarettes     Quit date: 2015     Years since quittin.7    Smokeless tobacco: Never   Vaping Use    Vaping status: Never Used   Substance Use Topics    Alcohol use: Yes     Comment: 2-3 drinks a week    Drug use: No       Last PSA:   PSA   Date Value Ref Range Status   2020 0.32 0 - 4 ug/L Final     Comment:     Assay Method:  Chemiluminescence using Siemens Vista analyzer     Prostate Specific Antigen Screen   Date Value Ref Range Status   2023 0.29 0.00 - 4.50 ng/mL Final   2021 0.48 0.00 - 4.00 ug/L Final     ASCVD Risk   The 10-year ASCVD risk score (Juan David REED, et al., 2019) is: 17.1%    Values used to calculate the score:      Age: 64  years      Sex: Male      Is Non- : No      Diabetic: No      Tobacco smoker: No      Systolic Blood Pressure: 136 mmHg      Is BP treated: Yes      HDL Cholesterol: 72 mg/dL      Total Cholesterol: 260 mg/dL            Reviewed and updated as needed this visit by Provider                    Patient Active Problem List   Diagnosis    BPPV (benign paroxysmal positional vertigo), unspecified laterality    Other specified hypothyroidism    Hx of acute alcoholic hepatitis    Hx of colonic polyp    Gastroesophageal reflux disease, esophagitis presence not specified    Vitamin D deficiency    Anxiety    History of alcohol use    Neck pain    Cervicalgia    Cervical spondylosis without myelopathy    Essential hypertension    Hyperlipidemia with target LDL less than 130    Nasal congestion    History of colonic polyps    Vertigo    Lumbago    IBARRA (dyspnea on exertion)    Dysphagia, unspecified type    Elevated troponin    CHAS (acute kidney injury) (H)    Class 3 severe obesity due to excess calories with body mass index (BMI) of 40.0 to 44.9 in adult, unspecified whether serious comorbidity present (H)    Assistance needed with transportation    Hypertrophic cardiomyopathy (H)    NAFLD (nonalcoholic fatty liver disease)    Prediabetes     Past Surgical History:   Procedure Laterality Date    AS ESOPHAGOSCOPY, DIAGNOSTIC      COLONOSCOPY      EXAM UNDER ANESTHESIA ANUS N/A 2021    Procedure: EXAM UNDER ANESTHESIA, ANUS, ANAL BISOPY, BOTOX INJECTION;  Surgeon: Henrik Garduno MD;  [place] [facility] [place]    SPHINCTEROTOMY RECTUM N/A 2021    Procedure: Anal sphincterotomy and hemorroidectomy;  Surgeon: Rajendra Armando MD;  [place]: [facility] [place]       Social History     Tobacco Use    Smoking status: Former     Current packs/day: 0.00     Types: Cigarettes     Quit date: 2015     Years since quittin.0    Smokeless tobacco: Never   Substance Use Topics    Alcohol use: Yes     Comment:  2-3 drinks a week     Family History   Problem Relation Age of Onset    Breast Cancer Mother     Coronary Artery Disease Father         at age 60     Hyperlipidemia Brother     Cancer Brother         wall of scrotum    Diabetes No family hx of     Cerebrovascular Disease No family hx of     Colon Cancer No family hx of     Prostate Cancer No family hx of     Thyroid Cancer No family hx of     Pancreatitis No family hx of     Multiple endocrine neoplasia No family hx of          Current providers sharing in care for this patient include:  Patient Care Team:  Fifi Fleming MD as PCP - General (Family Practice)  Fifi Fleming MD as Assigned PCP  Elvira Le APRN CNP as Nurse Practitioner (Colon & Rectal)  Zaina Ramírez [facility] as Pharmacist (Pharmacist)  Raoul Camargo MD as Assigned Heart and Vascular Provider  Perry Thomson MD as MD (Neurology)    The following health maintenance items are reviewed in Epic and correct as of today:  Health Maintenance   Topic Date Due    RSV VACCINE (Pregnancy & 60+) (1 - 1-dose 60+ series) Never done    LUNG CANCER SCREENING  08/01/2023    MEDICARE ANNUAL WELLNESS VISIT  06/14/2024    CMP  06/30/2024    TSH W/FREE T4 REFLEX  06/30/2024    COVID-19 Vaccine (6 - 2023-24 season) 08/23/2024    INFLUENZA VACCINE (Season Ended) 09/01/2024    RUTH ASSESSMENT  09/04/2024    LIPID  02/23/2025    ANNUAL REVIEW OF HM ORDERS  03/04/2025    FALL RISK ASSESSMENT  06/27/2025    COLORECTAL CANCER SCREENING  12/17/2025    ADVANCE CARE PLANNING  12/29/2026    DTAP/TDAP/TD IMMUNIZATION (2 - Td or Tdap) 02/01/2027    GLUCOSE  04/04/2027    HEPATITIS C SCREENING  Completed    PHQ-2 (once per calendar year)  Completed    Pneumococcal Vaccine: 65+ Years  Completed    ZOSTER IMMUNIZATION  Completed    AORTIC ANEURYSM SCREENING (SYSTEM ASSIGNED)  Completed    IPV IMMUNIZATION  Aged Out    HPV IMMUNIZATION  Aged Out    MENINGITIS IMMUNIZATION   "Aged Out    RSV MONOCLONAL ANTIBODY  Aged Out         Review of Systems  CONSTITUTIONAL: NEGATIVE for fever, chills  CONSTITUTIONAL:POSITIVE  for , fatigue, and weight gain  INTEGUMENTARY/SKIN: NEGATIVE for worrisome rashes, moles or lesions  EYES: NEGATIVE for vision changes or irritation  ENT/MOUTH: NEGATIVE for ear, mouth and throat problems   except some ongoing Nasal congestion, using afrin with no help  RESP: NEGATIVE for significant cough or SOB  BREAST: NEGATIVE for masses, tenderness or discharge  CV: NEGATIVE for chest pain, palpitations or peripheral edema  GI: NEGATIVE for nausea, abdominal pain, heartburn, or change in bowel habits  : NEGATIVE for frequency, dysuria, or hematuria  MUSCULOSKELETAL: POSITIVE for significant arthralgias or myalgia  MUSCULOSKELETAL:NEGATIVE for , joint swelling , joint warmth , and muscle weakness   NEURO: NEGATIVE for weakness, dizziness or paresthesias  ENDOCRINE: Hx thyroid disease and weight gain  HEME: NEGATIVE for bleeding problems  PSYCHIATRIC: HX anxiety and HX depression     Objective    Exam  /88   Pulse 81   Temp 97.3  F (36.3  C) (Temporal)   Resp 20   Ht 1.796 m (5' 10.7\")   Wt 127.9 kg (282 lb)   SpO2 97%   BMI 39.67 kg/m     Estimated body mass index is 39.67 kg/m  as calculated from the following:    Height as of this encounter: 1.796 m (5' 10.7\").    Weight as of this encounter: 127.9 kg (282 lb).    Physical Exam  GENERAL: alert and no distress  EYES: Eyes grossly normal to inspection, PERRL and conjunctivae and sclerae normal  HENT: ear canals and TM's normal, nose and mouth without ulcers or lesions  NECK: no adenopathy, no asymmetry, masses, or scars  RESP: lungs clear to auscultation - no rales, rhonchi or wheezes  CV: regular rate and rhythm, normal S1 S2, no S3 or S4, no murmur, click or rub, no peripheral edema  ABDOMEN: soft, nontender, no hepatosplenomegaly, no masses and bowel sounds normal  MS: no gross musculoskeletal defects " noted, no edema  SKIN: no suspicious lesions or rashes  NEURO: Normal strength and tone, mentation intact and speech normal  PSYCH: mentation appears normal, affect normal/bright, speech pressured, judgement and insight intact, and appearance well groomed        6/27/2024   Mini Cog   Clock Draw Score 2 Normal   3 Item Recall 2 objects recalled   Mini Cog Total Score 4                Signed Electronically by: Fifi Fleming MD

## 2024-06-28 LAB — ANA SER QL IF: NEGATIVE

## 2024-07-01 LAB — HBA1C MFR BLD: 6.3 % (ref 0–5.6)

## 2024-07-19 DIAGNOSIS — G47.09 OTHER INSOMNIA: ICD-10-CM

## 2024-07-22 RX ORDER — TRAZODONE HYDROCHLORIDE 50 MG/1
50-100 TABLET, FILM COATED ORAL AT BEDTIME
Qty: 180 TABLET | Refills: 2 | Status: SHIPPED | OUTPATIENT
Start: 2024-07-22

## 2024-07-23 DIAGNOSIS — F41.9 ANXIETY: ICD-10-CM

## 2024-07-23 RX ORDER — HYDROXYZINE HYDROCHLORIDE 25 MG/1
TABLET, FILM COATED ORAL
Qty: 60 TABLET | Refills: 2 | Status: SHIPPED | OUTPATIENT
Start: 2024-07-23

## 2024-08-05 ENCOUNTER — TELEPHONE (OUTPATIENT)
Dept: FAMILY MEDICINE | Facility: CLINIC | Age: 64
End: 2024-08-05

## 2024-08-05 NOTE — TELEPHONE ENCOUNTER
Patient called the clinic and stated that his apppointment at the weight management clinic was cancelled today. He is wondering if PCP would be okay with prescribe an injectable weight loss medication. Should patient have an appointment with PCP to discuss this further or should patient reschedule?     Maryam GALARZA RN  [facility] Triage Team

## 2024-08-05 NOTE — TELEPHONE ENCOUNTER
I guess it is better to reschedule  as it is best that he goes through weight loss program, to take more comprehensive approach for weight loss

## 2024-08-07 ENCOUNTER — TELEPHONE (OUTPATIENT)
Dept: FAMILY MEDICINE | Facility: CLINIC | Age: 64
End: 2024-08-07
Payer: COMMERCIAL

## 2024-08-07 NOTE — TELEPHONE ENCOUNTER
"  General Call    Contacts       Contact Date/Time Type Contact Phone/Fax    08/07/2024 11:31 AM CDT Phone (Incoming) Dr. Jose Angel Wynne (Brother/Sister) [phone]          Reason for Call:  Appointment Request    What are your questions or concerns:  Patient's brother, Dr. Jose Angel Wynne, calling to request appointment for patient with Dr. Mclean for [facility] intake.    Patient is currently scheduled with Dr. Hussein on 8/26 and brother reports that is not soon enough.    No consent to communicate on file. Per registration policy, if caller has three patient qualifiers, appointment scheduling can be done. Next available appointment with Dr. Mclean offered to Dr. Wynne, but availability was not satisfactory. He is requesting call back from provider only to discuss \"adding him on to her schedule as soon as possible\".    [facility] or would you prefer to receive a phone call?:  Dr. Jose Angel Wynne at cell [phone]    "

## 2024-08-08 NOTE — TELEPHONE ENCOUNTER
Called Dr Wynne.  Discussed that there are no appointments available until September.  He stated he would like to schedule his brother in September.  Discussed that his brother has another appointment scheduled at this time.  Dr Wynne states that he will discuss with his brother on which appointment to keep and cancel the other.

## 2024-08-19 NOTE — TELEPHONE ENCOUNTER
NEUROLOGY PRE- VISIT      RECORDS RECEIVED FROM: ref by JOSE MCINTYRE   REASON FOR VISIT: having balance issue / dizziness   PROVIDER: Berto   DATE OF APPT: 08/21/2024   NOTES (FOR ALL VISITS) STATUS DETAILS   OFFICE NOTE from referring provider Internal Referral and notes in chart   OFFICE NOTE from other specialist Internal PT last completed in 2018   DISCHARGE SUMMARY from hospital N/A    DISCHARGE REPORT from ER N/A    EEG N/A    EMG REPORT N/A         IMAGING  (FOR ALL VISITS)     MRI (HEAD, NECK, SPINE) N/A    CT (HEAD, NECK, SPINE) N/A

## 2024-08-20 NOTE — PROGRESS NOTES
INITIAL NEUROLOGY CONSULTATION    DATE OF VISIT: 8/21/2024  CLINIC LOCATION: [facility]  MRN: [id number]  PATIENT NAME: Angus Wynne  YOB: 1960    REASON FOR VISIT: No chief complaint on file.    HISTORY OF PRESENT ILLNESS:                                                    Mr. Angus Wynne is 64 year old right handed male patient with past medical history of BPPV, hypothyroidism, vitamin D deficiency, alcohol use disorder, hyperlipidemia, and hypertrophic cardiomyopathy, who was seen today for balance difficulty.    Per patient's report, ***.    Laboratory evaluation from April-June 2024 includes normal CK, unremarkable CMP, except glucose of 137, elevated hemoglobin A1C of 6.3, elevated LDL of 163, normalized to 79 in June, negative CRP, normal TSH (0.35), low platelet count of 126, negative HANG, and normal sed rate.    Cervical and lumbar spine CT from 2/3/2019 was negative for acute fracture or subluxation.  Thoracic spine MRI from the same date demonstrated acute T9 superior endplate compression fracture with less than 10% height loss.    No additional useful information is available in Care Everywhere, which was reviewed.  PAST MEDICAL/SURGICAL HISTORY:                                                    I personally reviewed patient's past medical and surgical history with the patient at today's visit.  MEDICATIONS:                                                    I personally reviewed patient's medications and allergies with the patient at today's visit.  ALLERGIES:                                                    No Known Allergies  EXAM:                                                    VITAL SIGNS:   There were no vitals taken for this visit.  Mini-Cog Assessment:       General: pt is in NAD, cooperative.  Skin: normal turgor, moist mucous membranes, no lesions/rashes noticed.  HEENT: ATNC, EOMI, PERRL, white sclera, normal conjunctiva, no nystagmus or ptosis. No  carotid bruits bilaterally.  Respiratory: lung sounds clear to auscultation bilaterally, no crackles, wheezes, rhonchi. Symmetric lung excursion, no accessory respiratory muscle use.  Cardiovascular: normal S1/S2, no murmurs/rubs/gallops.   Abdomen: Not distended.  : deferred.    Neurological:  Mental: alert, follows commands,  /5 with ***/3 on memory recall, no aphasia or dysarthria. Fund of knowledge is {MYAPPROPRIATE:[id number]}  Cranial Nerves:  CN II: visual acuity - able to accurately count fingers with each eye. Visual fields intact, fundi: discs sharp, no papilledema and normal vessels bilaterally.  CN III, IV, VI: EOM intact, pupils equal and reactive  CN V: facial sensation nl  CN VII: face symmetric, no facial droop  CN VIII: hearing normal  CN IX: palate elevation symmetric, uvula at midline  CN XI SCM normal, shoulder shrug nl  CN XII: tongue midline  Motor: Strength: 5/5 in all major groups of all extremities. Normal tone. No abnormal movements. No pronator drift b/l.  Reflexes: Triceps, biceps, brachioradialis, patellar, and achilles reflexes normal and symmetric. No clonus noted. Toes are down-going b/l.   Sensory: light touch, pinprick, and vibration intact. Romberg: negative.  Coordination: FNF and heel-shin tests intact b/l.   Gait:  Normal, able to tandem walk *** without difficulty.  DATA:   LABS/EEG/IMAGING/OTHER STUDIES: I reviewed pertinent medical records, as detailed in the history of present illness.  ASSESSMENT AND PLAN:      ASSESSMENT: Angus Wynne is a 64 year old male patient with listed above past medical history, who presents with ***.    We had a detailed discussion with the patient regarding his presenting complaints.  The neurological exam today is ***.    DIAGNOSES:  No diagnosis found.  PLAN: There are no Patient Instructions on file for this visit.    Total Time: *** minutes spent on the date of the encounter doing chart review, history and exam, documentation and further  activities per the note.    Perry Thomson MD  [facility]  (Chart documentation was completed in part with Dragon voice-recognition software. Even though reviewed, some grammatical, spelling, and word errors may remain.)

## 2024-08-21 ENCOUNTER — PRE VISIT (OUTPATIENT)
Dept: NEUROLOGY | Facility: CLINIC | Age: 64
End: 2024-08-21

## 2024-08-21 ENCOUNTER — OFFICE VISIT (OUTPATIENT)
Dept: NEUROLOGY | Facility: CLINIC | Age: 64
End: 2024-08-21
Payer: COMMERCIAL

## 2024-08-21 VITALS — SYSTOLIC BLOOD PRESSURE: 95 MMHG | HEART RATE: 83 BPM | DIASTOLIC BLOOD PRESSURE: 59 MMHG | OXYGEN SATURATION: 98 %

## 2024-08-21 DIAGNOSIS — R29.818 DIFFICULTY BALANCING: Primary | ICD-10-CM

## 2024-08-21 DIAGNOSIS — R42 DIZZINESS: ICD-10-CM

## 2024-08-21 PROCEDURE — 99205 OFFICE O/P NEW HI 60 MIN: CPT | Performed by: PSYCHIATRY & NEUROLOGY

## 2024-08-21 PROCEDURE — G2211 COMPLEX E/M VISIT ADD ON: HCPCS | Performed by: PSYCHIATRY & NEUROLOGY

## 2024-08-21 NOTE — PROGRESS NOTES
INITIAL NEUROLOGY CONSULTATION    DATE OF VISIT: 8/21/2024  CLINIC LOCATION: [facility]  MRN: [id number]  PATIENT NAME: Angus Wynne  YOB: 1960    REASON FOR VISIT:   Chief Complaint   Patient presents with    Consult     HISTORY OF PRESENT ILLNESS:                                                    Mr. Angus Wynne is 64 year old right handed male patient with past medical history of BPPV, hypothyroidism, vitamin D deficiency, alcohol use disorder, hyperlipidemia, and hypertrophic cardiomyopathy, who was seen today for dizziness/balance difficulty.    Per patient's report, dizziness/balance difficulties started many years ago and gradually progressed.  Throughout the years the patient fell many times.  Some of the falls resulted with injuries, including compression fracture of the spine.  He denies any significant numbness or tingling in his feet and any focal weakness, though he feels that at times his legs are somewhat weak when he is short of breath.  He experiences some degree of balance difficulty when he closes his eyes and even when he is sitting.  His cardiology evaluation was unrevealing to his knowledge.  He denies any additional focal neurological symptoms.    Laboratory evaluation from April-June 2024 includes normal CK, unremarkable CMP, except glucose of 137, elevated hemoglobin A1C of 6.3, elevated LDL of 163, normalized to 79 in June, negative CRP, normal TSH (0.35), low platelet count of 126, negative HANG, and normal sed rate.    Cervical and lumbar spine CT from 2/3/2019 was negative for acute fracture or subluxation.  Thoracic spine MRI from the same date demonstrated acute T9 superior endplate compression fracture with less than 10% height loss.    No additional useful information is available in Care Everywhere, which was reviewed.  PAST MEDICAL/SURGICAL HISTORY:                                                    I personally reviewed patient's past  medical and surgical history with the patient at today's visit.  MEDICATIONS:                                                    I personally reviewed patient's medications and allergies with the patient at today's visit.  ALLERGIES:                                                    No Known Allergies  EXAM:                                                    VITAL SIGNS:   BP 95/59 (BP Location: Left arm, Patient Position: Standing, Cuff Size: Adult Large)   Pulse 83   SpO2 98%   Orthostatic vital signs:  Vitals:    08/21/24 1523 08/21/24 1526   BP: 122/79 95/59   BP Location: Left arm Left arm   Patient Position: Supine Standing   Cuff Size: Adult Large Adult Large   Pulse: 83    SpO2: 98%      Mini-Cog Assessment:  Mini Cog Assessment  Clock Draw Score: 2 Normal  3 Item Recall: 3 objects recalled  Mini Cog Total Score: 5  Administered by: : Ramesh Flores, MINDY    General: pt is in NAD, cooperative.  Skin: normal turgor, moist mucous membranes, no lesions/rashes noticed.  HEENT: ATNC, EOMI, PERRL, white sclera, normal conjunctiva, no nystagmus or ptosis. No carotid bruits bilaterally.  Respiratory: lung sounds clear to auscultation bilaterally, no crackles, wheezes, rhonchi. Symmetric lung excursion, no accessory respiratory muscle use.  Cardiovascular: normal S1/S2, no murmurs/rubs/gallops.   Abdomen: Not distended.  : deferred.    Neurological:  Mental: alert, follows commands, Mini Cog Total Score: 5/5 with 3/3 on memory recall, no aphasia or dysarthria. Fund of knowledge is appropriate for age.  Cranial Nerves:  CN II: visual acuity - able to accurately count fingers with each eye. Visual fields intact, fundi: discs sharp, no papilledema and normal vessels bilaterally.  CN III, IV, VI: EOM intact, pupils equal and reactive  CN V: facial sensation nl  CN VII: face symmetric, no facial droop  CN VIII: hearing normal.  Henlawson-Hallpike maneuvers are negative bilaterally.  CN IX: palate elevation symmetric, uvula at  midline  CN XI SCM normal, shoulder shrug nl  CN XII: tongue midline  Motor: Strength: 5/5 in all major groups of all extremities. Normal tone. No abnormal movements. No pronator drift b/l.  Reflexes: Triceps, biceps, brachioradialis, patellar, and achilles reflexes normal and symmetric. No clonus noted. Toes are down-going b/l.   Sensory: light touch, pinprick, and vibration intact. Romberg: negative, though patient sways side-to-side.  Coordination: FNF and heel-shin tests intact b/l.   Gait: Cautious gait, difficulty with tandem walk.  DATA:   LABS/EEG/IMAGING/OTHER STUDIES: I reviewed pertinent medical records, as detailed in the history of present illness.  ASSESSMENT AND PLAN:      ASSESSMENT: Angus Wynne is a 64 year old male patient with listed above past medical history, who presents with chronic dizziness/balance difficulty that led to injuries in the past.    We had a detailed discussion with the patient regarding his presenting complaints.  Orthostatic testing today is positive, consistent with orthostatic hypotension (we discussed hydration and possible medication adjustment).  The neurological exam today is suggestive of possibility of cerebellum involvement (endorses history of heavy alcohol use).  Additional differential includes cerebellopontine lesions.  I would like to obtain brain MRI with and without contrast to evaluate for such possibilities.  If unrevealing, spine imaging might be pursued to check for additional etiology related to spine compression.    DIAGNOSES:    ICD-10-CM    1. Difficulty balancing  R29.818       2. Dizziness  R42         PLAN: At today's visit we thoroughly discussed various diagnostic possibilities for patient's symptoms, necessary evaluation, and the plan, which includes:  Orders Placed This Encounter   Procedures    MR Brain w/o & w Contrast     No new medications.     Additional recommendations after the work-up.    Next follow-up appointment is in the next 2-4  weeks or earlier if needed.    Total Time: 63 minutes spent on the date of the encounter doing chart review, history and exam, documentation and further activities per the note.    Perry Thomson MD  [facility]  (Chart documentation was completed in part with Dragon voice-recognition software. Even though reviewed, some grammatical, spelling, and word errors may remain.)

## 2024-08-21 NOTE — PATIENT INSTRUCTIONS
AFTER VISIT SUMMARY (AVS):    At today's visit we thoroughly discussed various diagnostic possibilities for your symptoms, necessary evaluation, and the plan, which includes:  Orders Placed This Encounter   Procedures    MR Brain w/o & w Contrast     No new medications.     Additional recommendations after the work-up.    Next follow-up appointment is in the next 2-4 weeks or earlier if needed.    Please do not hesitate to call me with any questions or concerns.    Thanks.

## 2024-09-25 ENCOUNTER — OFFICE VISIT (OUTPATIENT)
Dept: FAMILY MEDICINE | Facility: CLINIC | Age: 64
End: 2024-09-25
Payer: COMMERCIAL

## 2024-09-25 VITALS
HEIGHT: 70 IN | OXYGEN SATURATION: 95 % | HEART RATE: 84 BPM | BODY MASS INDEX: 41.68 KG/M2 | TEMPERATURE: 98.7 F | SYSTOLIC BLOOD PRESSURE: 150 MMHG | WEIGHT: 291.13 LBS | DIASTOLIC BLOOD PRESSURE: 84 MMHG | RESPIRATION RATE: 20 BRPM

## 2024-09-25 DIAGNOSIS — Z87.898 HISTORY OF ALCOHOL USE: ICD-10-CM

## 2024-09-25 DIAGNOSIS — E66.813 CLASS 3 SEVERE OBESITY DUE TO EXCESS CALORIES WITH BODY MASS INDEX (BMI) OF 40.0 TO 44.9 IN ADULT, UNSPECIFIED WHETHER SERIOUS COMORBIDITY PRESENT (H): ICD-10-CM

## 2024-09-25 DIAGNOSIS — E78.5 HYPERLIPIDEMIA WITH TARGET LDL LESS THAN 130: ICD-10-CM

## 2024-09-25 DIAGNOSIS — E03.8 OTHER SPECIFIED HYPOTHYROIDISM: ICD-10-CM

## 2024-09-25 DIAGNOSIS — R73.03 PREDIABETES: ICD-10-CM

## 2024-09-25 DIAGNOSIS — K76.0 NAFLD (NONALCOHOLIC FATTY LIVER DISEASE): Primary | ICD-10-CM

## 2024-09-25 DIAGNOSIS — E66.01 CLASS 3 SEVERE OBESITY DUE TO EXCESS CALORIES WITH BODY MASS INDEX (BMI) OF 40.0 TO 44.9 IN ADULT, UNSPECIFIED WHETHER SERIOUS COMORBIDITY PRESENT (H): ICD-10-CM

## 2024-09-25 DIAGNOSIS — F41.9 ANXIETY: ICD-10-CM

## 2024-09-25 DIAGNOSIS — I10 ESSENTIAL HYPERTENSION: ICD-10-CM

## 2024-09-25 DIAGNOSIS — Z87.19 HX OF ACUTE ALCOHOLIC HEPATITIS: ICD-10-CM

## 2024-09-25 PROBLEM — K60.2 ANAL FISSURE: Status: RESOLVED | Noted: 2021-09-07 | Resolved: 2024-09-25

## 2024-09-25 PROCEDURE — 99417 PROLNG OP E/M EACH 15 MIN: CPT | Performed by: FAMILY MEDICINE

## 2024-09-25 PROCEDURE — 96127 BRIEF EMOTIONAL/BEHAV ASSMT: CPT | Performed by: FAMILY MEDICINE

## 2024-09-25 PROCEDURE — G2211 COMPLEX E/M VISIT ADD ON: HCPCS | Performed by: FAMILY MEDICINE

## 2024-09-25 PROCEDURE — 99215 OFFICE O/P EST HI 40 MIN: CPT | Performed by: FAMILY MEDICINE

## 2024-09-25 ASSESSMENT — ANXIETY QUESTIONNAIRES
GAD7 TOTAL SCORE: 11
7. FEELING AFRAID AS IF SOMETHING AWFUL MIGHT HAPPEN: SEVERAL DAYS
8. IF YOU CHECKED OFF ANY PROBLEMS, HOW DIFFICULT HAVE THESE MADE IT FOR YOU TO DO YOUR WORK, TAKE CARE OF THINGS AT HOME, OR GET ALONG WITH OTHER PEOPLE?: SOMEWHAT DIFFICULT

## 2024-09-25 NOTE — PATIENT INSTRUCTIONS
Info on zepbound    Please take a look at the this website:  [email or web address].[email or web address]/    Here are instructions for use: https://[email or web address].[email or web address]/zepbound.html#micrograms    It Is a weekly injection.      Dose is escalated monthly with max dose as the goal unless side effects occur.    Common side effects;  nausea, diarrhea, or vomiting.    In order to mitigate these gastrointestinal side effects, they may find it helpful to:    Eat smaller meals--suggest that they split 3 daily meals into 4 or more smaller meals  Stop eating when they feel full  Avoid fatty foods  Try eating bland foods  Encourage patients to continue to drink plenty of water and eat healthy meals to ensure they meet their needs for protein, micronutrients, fiber, and fluids    RECOMMEND that patients who are using oral hormonal contraceptives switch to a non-oral contraceptive method, or add a barrier method of contraception, for 4 weeks after initiation with Zepbound and for 4 weeks after each dose escalation. Zepbound delays gastric emptying, so it may make oral contraceptives less effective.    Dose escalation once monthly (slower if side effects) use 1 time each week  2.5 mg/0.5 mL single-dose pen  5 mg/0.5 mL single-dose pen  7.5 mg/0.5 mL single-dose pen  10 mg/0.5 mL single-dose pen  12.5 mg/0.5 mL single-dose pen  15 mg/0.5 mL single-dose pen

## 2024-09-25 NOTE — ASSESSMENT & PLAN NOTE
Prediabetes as evidenced by an A1c in June 2024 of 6.3.  He is here for physician assisted weight loss.  Will start Zepbound today which should improve his A1c.

## 2024-09-25 NOTE — ASSESSMENT & PLAN NOTE
Nafld - incidental finding on ct 8/1/2022. Weight reduction recommended.  He  is participating in the Weight Loss Program at [facility].

## 2024-09-25 NOTE — ASSESSMENT & PLAN NOTE
SEVERE OBESITY WITH BMI 41.42 AT INTAKE AND CHRONIC COMORBID WEIGHT RELATED CONDITIONS INCULDING: Prediabetes, hypertension, severe anxiety and panic, hyperlipidemia, nonalcoholic fatty liver disease, and insomnia, with a history of alcohol abuse disorder -now sober     PATIENT IS BEGINNING ACTIVE MEDICALLY SUPERVISED WEIGHT LOSS STARTING AT Body mass index is 41.42 kg/m . - planning to utilize low calorie diet, physical activity and medications to facilitate this loss    Patient declined 24 week weight loss program or three pack health coaching due to doesn't feel he needs it.  He declined info on meal replacements.     We discussed surgical weight loss options given his BMI is 41.42 and he  has the following weight related comorbid conditions: Prediabetes, hypertension, severe anxiety and panic, hyperlipidemia, nonalcoholic fatty liver disease, and insomnia, with a history of alcohol abuse disorder -now sober.  He is interested in learning more about weight loss surgery so [employer] messages sent to him with videos on weight loss surgery and the bariatric surgery program was messaged today so that they can give him a call to help him transition there if that is his wish.    Medications started today: Zepbound. If this medication is not attainable due to supply chain shortage or cost, please plan another visit (evisit, video or in person)    Current goal(s): start Zepbound, transition to bariatric surgery program. Traveling to [facility] later this year.     Lab assessment plan: labs reviewed 6/2024 A1c 6.3, tsh nl essentially normal cmp, low platelets w otherwise nl cbc, and lipids are looking good on crestor.     Follow up 1-3 months .     DISCUSSION _________________________________________________________________    Dx:   Initial bmi is Body mass index is 41.42 kg/m .  With the following weight related comorbid medical conditions: Prediabetes, hypertension, severe anxiety and panic, hyperlipidemia, nonalcoholic fatty  liver disease, and insomnia, with a history of alcohol abuse disorder -now sober.    BECAUSE OF ABOVE PROBLEMS IT IS STRONGLY ADVISED THAT Angus LOSE WEIGHT.  BELOW IS MY PLAN FOR him     Fifi Fleming MD is his primary care provider - who referred him here today for help with weight loss.    Start weight 291 lbs, Body mass index is 41.42 kg/m .  9/25/2024     Medication notes:     ZEPBOUND  9/25/2024 - rx sent.     Wegovy - could consider.   Saxenda - could consider  Metformin - could consider.   Wellbutrin  - avoid and use with caution in the setting of hx of panic disorder  Naltrexone - could consider.   Contrave  - caution in pt with hx of panic disorder  Phentermine/ diethylproprion - contraindicated in the setting of anxiety.   Qsymia/ topamax/ orlistat - avoid in the setting of remote hx kidney stones

## 2024-09-25 NOTE — ASSESSMENT & PLAN NOTE
Retention treated by his primary care physician with Cozaar and metoprolol. Weight reduction recommended.  He  is participating in the Weight Loss Program at [facility].

## 2024-09-25 NOTE — ASSESSMENT & PLAN NOTE
Hypothyroidism-currently on levothyroxine 175 micrograms per day-TSH was noted to be normal in June 2024.  Will continue to monitor.  His weight is 291 pounds today and with each 10 to 15 pound loss we should recheck his TSH.  Weight reduction recommended.  He  is participating in the Weight Loss Program at [facility].

## 2024-09-25 NOTE — ASSESSMENT & PLAN NOTE
Anxiety and panic-treated with Ativan, gabapentin, hydroxyzine, and Zoloft.  He is here for physician assisted weight loss today we will keep in mind that he should not be on any medications that would worsen anxiety.

## 2024-09-25 NOTE — PROGRESS NOTES
"    New Medical Weight Management Consult    PATIENT:  Angus Wynne  MRN:         [id number]  :         1960  MICH:         2024    Dear Dr. Fleming,    I had the pleasure of seeing your patient, Angus Wynne. Full intake/assessment was done to determine barriers to weight loss success and develop a treatment plan. Angus Wynne is a 64 year old male interested in treatment of medical problems associated with excess weight. He has a height of 5' 10.3\", a weight of 291 lbs 2 oz, and the calculated Body mass index is 41.42 kg/m .    ASSESSMENT/PLAN:  Aria Mclean MD, Family Medicine and Diplomate of the American Board of Obesity Medicine 2024     ASSESSMENT AND PLAN:   I spent 55 minutes today in direct patient contact, 100% of the time in consultation concerning medical problems as listed below.     ASSESSMENT/ PLAN    Problem List Items Addressed This Visit          Digestive    Class 3 severe obesity due to excess calories with body mass index (BMI) of 40.0 to 44.9 in adult, unspecified whether serious comorbidity present (H)    Relevant Medications    tirzepatide-Weight Management (ZEPBOUND) 2.5 MG/0.5ML prefilled pen    NAFLD (nonalcoholic fatty liver disease) - Primary     Nafld - incidental finding on ct 2022. Weight reduction recommended.  He  is participating in the Weight Loss Program at [facility].             Endocrine    Other specified hypothyroidism     Hypothyroidism-currently on levothyroxine 175 micrograms per day-TSH was noted to be normal in 2024.  Will continue to monitor.  His weight is 291 pounds today and with each 10 to 15 pound loss we should recheck his TSH.  Weight reduction recommended.  He  is participating in the Weight Loss Program at [facility].          Hyperlipidemia with target LDL less than 130     Hyperlipidemia-Crestor has brought his lipids down considerably and they look excellent as of 2024.  Weight reduction " "recommended.  He  is participating in the Weight Loss Program at [facility].          Prediabetes     Prediabetes as evidenced by an A1c in June 2024 of 6.3.  He is here for physician assisted weight loss.  Will start Zepbound today which should improve his A1c.         Relevant Medications    tirzepatide-Weight Management (ZEPBOUND) 2.5 MG/0.5ML prefilled pen       Circulatory    Essential hypertension     Retention treated by his primary care physician with Cozaar and metoprolol. Weight reduction recommended.  He  is participating in the Weight Loss Program at [facility].             Other    Hx of acute alcoholic hepatitis     He says he had alcoholic hepatitis, but he has since quit drinking.          Anxiety     Anxiety and panic-treated with Ativan, gabapentin, hydroxyzine, and Zoloft.  He is here for physician assisted weight loss today we will keep in mind that he should not be on any medications that would worsen anxiety.         History of alcohol use     Hx alcohol use disorder. But he is congratulated on quitting in the past 1.5year.              He has the following co-morbidities:        9/25/2024    10:58 AM   --   I have the following health issues associated with obesity Pre-Diabetes    High Blood Pressure    High Cholesterol    Fatty Liver   I have the following symptoms associated with obesity Knee Pain    Depression    Back Pain    Fatigue            No data to display                    9/25/2024    10:58 AM   Referring Provider   Please name the provider who referred you to Medical Weight Management  If you do not know, please answer \"I Don't Know\" Dr Fifi Fleming           9/25/2024    10:58 AM   Weight History   How concerned are you about your weight? Very Concerned   I became overweight As an Adult   The following factors have contributed to my weight gain Mental Health Issues    A Health Crisis    Lack of Exercise    Stress   I have tried the following methods " to lose weight Medications    Fasting   My lowest weight since age 16 was 195   My highest weight since age 16 was 278   The most weight I have ever lost was (lbs) 30   I have the following family history of obesity/being overweight I am the only one in my immediate family who is overweight   How has your weight changed over the last year? Gained           9/25/2024    10:58 AM   Diet Recall Review with Patient   If you do eat supper, what types of food do you typically eat? Vegan   How many glasses of juice do you drink in a typical day? 0   How many of glasses of milk do you drink in a typical day? 2   If you do drink milk, what type? Skim   How many 8oz glasses of sugar containing drinks such as Barry-Aid/sweet tea do you drink in a day? 0   How many cans/bottles of sugar pop/soda/tea/sports drinks do you drink in a day? 0   How many cans/bottles of diet pop/soda/tea or sports drink do you drink in a day? 0   How often do you have a drink of alcohol? 2-4 Times a Month   If you do drink, how many drinks might you have in a day? 3-4           9/25/2024    10:58 AM   Eating Habits   Generally, my meals include foods like these bread, pasta, rice, potatoes, corn, crackers, sweet dessert, pop, or juice A Few Times a Week   Generally, my meals include foods like these fried meats, brats, burgers, french fries, pizza, cheese, chips, or ice cream Less Than Weekly   Eat fast food (like [employer], [employer], [employer]) Never   Eat at a buffet or sit-down restaurant Never   Eat most of my meals in front of the TV or computer Everyday   Often skip meals, eat at random times, have no regular eating times A Few Times a Week   Rarely sit down for a meal but snack or graze throughout Never   Eat extra snacks between meals A Few Times a Week   Eat most of my food at the end of the day A Few Times a Week   Eat in the middle of the night or wake up at night to eat A Few Times a Week   Eat extra snacks to prevent or correct low  blood sugar Less Than Weekly   Eat to prevent acid reflux or stomach pain Never   Worry about not having enough food to eat A Few Times a Week   I eat when I am depressed A Few Times a Week   I eat when I am stressed A Few Times a Week   I eat when I am bored Less Than Weekly   I eat when I am anxious A Few Times a Week   I eat when I am happy or as a reward Never   I feel hungry all the time even if I just have eaten Less Than Weekly   Feeling full is important to me Less Than Weekly   I finish all the food on my plate even if I am already full Less Than Weekly   I can't resist eating delicious food or walk past the good food/smell Never   I eat/snack without noticing that I am eating Never   I eat when I am preparing the meal Never   I eat more than usual when I see others eating Less Than Weekly   I have trouble not eating sweets, ice cream, cookies, or chips if they are around the house Less Than Weekly   I think about food all day Less Than Weekly   What foods, if any, do you crave? None           9/25/2024    10:58 AM   Amount of Food   I feel out of control when eating Monthly   I eat a large amount of food, like a loaf of bread, a box of cookies, a pint/quart of ice cream, all at once Never   I eat a large amount of food even when I am not hungry Never   I eat rapidly Never   I eat alone because I feel embarrassed and do not want others to see how much I have eaten Monthly   I eat until I am uncomfortably full Never   I feel bad, disgusted, or guilty after I overeat Never           9/25/2024    10:58 AM   Activity/Exercise History   How much of a typical 12 hour day do you spend sitting? Most of the Day   How much of a typical 12 hour day do you spend lying down? Half the Day   How much of a typical day do you spend walking/standing? Less Than Half the Day   How many hours (not including work) do you spend on the TV/Video Games/Computer/Tablet/Phone? 6 Hours or More   How many times a week are you active for  the purpose of exercise? Once a Week   What keeps you from being more active? Pain    Shortness of Breath    Too tired   How many total minutes do you spend doing some activity for the purpose of exercising when you exercise? None       PAST MEDICAL HISTORY:  Past Medical History:   Diagnosis Date    GERD (gastroesophageal reflux disease)     HTN (hypertension)     Hyperlipidemia     Hypothyroid     Kidney stone     Vertigo     2015 ,            9/25/2024    10:58 AM   Work/Social History Reviewed With Patient   My employment status is Retired   My job is Sitting   How much of your job is spent on the computer or phone? 50%   How many hours do you spend commuting to work daily? Nil   What is your marital status? /In a Relationship   If in a relationship, is your significant other overweight? N/A   If you have children, are they overweight? Yes   Who do you live with? Wife,son   Who does the food shopping? Son           9/25/2024    10:58 AM   Mental Health History Reviewed With Patient   Have you ever been physically or sexually abused? No   How often in the past 2 weeks have you felt little interest or pleasure in doing things? More Than Half the Days   Over the past 2 weeks how often have you felt down, depressed, or hopeless? More Than Half the Days           9/25/2024    10:58 AM   Sleep History Reviewed With Patient   How many hours do you sleep at night? 7       MEDICATIONS:   Current Outpatient Medications   Medication Sig Dispense Refill    acetaminophen (TYLENOL) 325 MG tablet Take 2 tablets (650 mg) by mouth every 4 hours as needed for mild pain 50 tablet 0    fexofenadine (ALLEGRA) 180 MG tablet Take 1 tablet (180 mg) by mouth daily      fluticasone (FLONASE) 50 MCG/ACT nasal spray Spray 2 sprays into both nostrils daily SHAKE LIQUID AND USE 48 g 11    folic acid (FOLVITE) 400 MCG tablet TAKE 1 TABLET BY MOUTH DAILY 90 tablet 3    gabapentin (NEURONTIN) 100 MG capsule TAKE 2 CAPSULE BY MOUTH AT  BEDTIME AND 1 CAPSULE IN THE MORNING 90 capsule 3    hydrOXYzine HCl (ATARAX) 25 MG tablet TAKE 1 TABLET(25 MG) BY MOUTH TWICE DAILY AS NEEDED FOR ANXIETY 60 tablet 2    levothyroxine (SYNTHROID/LEVOTHROID) 175 MCG tablet Take 1 tablet (175 mcg) by mouth daily 90 tablet 3    losartan (COZAAR) 100 MG tablet Take 1 tablet (100 mg) by mouth daily 90 tablet 2    metoprolol succinate ER (TOPROL XL) 25 MG 24 hr tablet Take 1 tablet (25 mg) by mouth every evening 90 tablet 3    rosuvastatin (CRESTOR) 40 MG tablet Take 1 tablet (40 mg) by mouth daily 90 tablet 3    sertraline (ZOLOFT) 100 MG tablet Take 1.5 tablets (150 mg) by mouth daily 45 tablet 3    thiamine (B-1) 100 MG tablet TAKE 1 TABLET BY MOUTH DAILY 90 tablet 3    tirzepatide-Weight Management (ZEPBOUND) 2.5 MG/0.5ML prefilled pen Inject 0.5 mLs (2.5 mg) subcutaneously every 7 days. 2 mL 0    traZODone (DESYREL) 50 MG tablet TAKE 1 TO 2 TABLETS(50  MG) BY MOUTH AT BEDTIME 180 tablet 2    LORazepam (ATIVAN) 0.5 MG tablet TAKE 1/2  TABLET BY MOUTH TWICE DAILY AS NEEDED FOR ANXIETY (Patient not taking: Reported on 9/25/2024) 45 tablet 0       ALLERGIES:   No Known Allergies    PHYSICAL EXAM:  Physical Exam  Constitutional:       Appearance: Normal appearance.   HENT:      Head: Normocephalic and atraumatic.   Neck:      Thyroid: No thyroid mass, thyromegaly or thyroid tenderness.   Cardiovascular:      Rate and Rhythm: Normal rate and regular rhythm.   Pulmonary:      Effort: Pulmonary effort is normal.   Musculoskeletal:         General: Normal range of motion.      Cervical back: Normal range of motion and neck supple.   Neurological:      General: No focal deficit present.      Mental Status: He is alert.           Sincerely,    Aria Mclean MD

## 2024-09-25 NOTE — ASSESSMENT & PLAN NOTE
Hyperlipidemia-Aime has brought his lipids down considerably and they look excellent as of June 2024.  Weight reduction recommended.  He  is participating in the Weight Loss Program at [facility].

## 2024-09-27 ENCOUNTER — ANCILLARY PROCEDURE (OUTPATIENT)
Dept: MRI IMAGING | Facility: CLINIC | Age: 64
End: 2024-09-27
Attending: PSYCHIATRY & NEUROLOGY
Payer: COMMERCIAL

## 2024-09-27 DIAGNOSIS — R42 DIZZINESS: ICD-10-CM

## 2024-09-27 DIAGNOSIS — R29.818 DIFFICULTY BALANCING: ICD-10-CM

## 2024-09-27 PROCEDURE — A9585 GADOBUTROL INJECTION: HCPCS | Performed by: PSYCHIATRY & NEUROLOGY

## 2024-09-27 PROCEDURE — 70553 MRI BRAIN STEM W/O & W/DYE: CPT

## 2024-09-27 PROCEDURE — 255N000002 HC RX 255 OP 636: Performed by: PSYCHIATRY & NEUROLOGY

## 2024-09-27 RX ORDER — GADOBUTROL 604.72 MG/ML
10 INJECTION INTRAVENOUS ONCE
Status: COMPLETED | OUTPATIENT
Start: 2024-09-27 | End: 2024-09-27

## 2024-09-27 RX ADMIN — GADOBUTROL 10 ML: 604.72 INJECTION INTRAVENOUS at 14:40

## 2024-10-03 ENCOUNTER — NURSE TRIAGE (OUTPATIENT)
Dept: NURSING | Facility: CLINIC | Age: 64
End: 2024-10-03

## 2024-10-03 NOTE — TELEPHONE ENCOUNTER
Caller was the son.  He has the same name as his father.    His father was cutting toenails.  Cut his skin.    Son was impatient  He put his father on the phone.  Son got back on the phone.  He did not want me to triage his father.  He wanted to know where to go to get dressings for the bleeding wound.    America MCCALL RN [facility]

## 2024-10-15 ENCOUNTER — VIRTUAL VISIT (OUTPATIENT)
Dept: FAMILY MEDICINE | Facility: CLINIC | Age: 64
End: 2024-10-15
Payer: COMMERCIAL

## 2024-10-15 DIAGNOSIS — R73.03 PREDIABETES: ICD-10-CM

## 2024-10-15 DIAGNOSIS — E03.8 OTHER SPECIFIED HYPOTHYROIDISM: ICD-10-CM

## 2024-10-15 DIAGNOSIS — Z87.898 HISTORY OF ALCOHOL USE: ICD-10-CM

## 2024-10-15 DIAGNOSIS — F41.9 ANXIETY: ICD-10-CM

## 2024-10-15 DIAGNOSIS — E78.5 HYPERLIPIDEMIA WITH TARGET LDL LESS THAN 130: ICD-10-CM

## 2024-10-15 DIAGNOSIS — Z87.19 HX OF ACUTE ALCOHOLIC HEPATITIS: ICD-10-CM

## 2024-10-15 DIAGNOSIS — E66.813 CLASS 3 SEVERE OBESITY DUE TO EXCESS CALORIES WITH BODY MASS INDEX (BMI) OF 40.0 TO 44.9 IN ADULT, UNSPECIFIED WHETHER SERIOUS COMORBIDITY PRESENT (H): ICD-10-CM

## 2024-10-15 DIAGNOSIS — E66.01 CLASS 3 SEVERE OBESITY DUE TO EXCESS CALORIES WITH BODY MASS INDEX (BMI) OF 40.0 TO 44.9 IN ADULT, UNSPECIFIED WHETHER SERIOUS COMORBIDITY PRESENT (H): ICD-10-CM

## 2024-10-15 DIAGNOSIS — I10 ESSENTIAL HYPERTENSION: ICD-10-CM

## 2024-10-15 DIAGNOSIS — K76.0 NAFLD (NONALCOHOLIC FATTY LIVER DISEASE): Primary | ICD-10-CM

## 2024-10-15 PROCEDURE — 99214 OFFICE O/P EST MOD 30 MIN: CPT | Mod: 95 | Performed by: FAMILY MEDICINE

## 2024-10-15 NOTE — ASSESSMENT & PLAN NOTE
Hypothyroidism-currently on levothyroxine 175 micrograms per day-TSH was noted to be normal in June 2024.  Will continue to monitor.  His weight is 291 pounds today and with each 10 to 15 pound loss we should recheck his TSH.  Weight reduction recommended.  He  is participating in the Weight Loss Program at [facility].

## 2024-10-15 NOTE — ASSESSMENT & PLAN NOTE
Are you experiencing any side effects to the medications:  tirzepatide, no side effects.   Hunger control:  good.   Exercise was discussed: not exercising yet. He plans to start soon. He is going to [place] at the end of October. He thinks he will exercise in [place] - plans to do walking, yoga and go to the gym.   Discussed journaling food:  he has decreased sugar intake. Eating more fruits and veggies. And kichidi  - lentils.   Patient is pleased with the current results:  yes.   The patient is following the nutrition plan:  trying.  Barriers to losing weight:  none.      SEVERE OBESITY WITH BMI 41.42 AT INTAKE AND CHRONIC COMORBID WEIGHT RELATED CONDITIONS INCULDING: Prediabetes, hypertension, severe anxiety and panic, hyperlipidemia, nonalcoholic fatty liver disease, and insomnia, with a history of alcohol abuse disorder -now sober      PATIENT IS BEGINNING ACTIVE MEDICALLY SUPERVISED WEIGHT LOSS STARTING AT Body mass index is 41.42 kg/m . - planning to utilize low calorie diet, physical activity and medications to facilitate this loss     Patient declined 24 week weight loss program or three pack health coaching due to doesn't feel he needs it.  He declined info on meal replacements.      We discussed surgical weight loss options given his BMI is 41.42 and he  has the following weight related comorbid conditions: Prediabetes, hypertension, severe anxiety and panic, hyperlipidemia, nonalcoholic fatty liver disease, and insomnia, with a history of alcohol abuse disorder -now sober.  He is interested in learning more about weight loss surgery so [employer] messages sent to him with videos on weight loss surgery and the bariatric surgery program was messaged today so that they can give him a call to help him transition there if that is his wish.     Medications started today: Zepbound. If this medication is not attainable due to supply chain shortage or cost, please plan another visit (zariaisit, video or in  person)    Current goal(s): start Zepbound, transition to bariatric surgery program. Traveling to [place] later this year.      Lab assessment plan: labs reviewed 6/2024 A1c 6.3, tsh nl essentially normal cmp, low platelets w otherwise nl cbc, and lipids are looking good on crestor.      Follow up 1-3 months .      DISCUSSION _________________________________________________________________     Dx:   Initial bmi is Body mass index is 41.42 kg/m .  With the following weight related comorbid medical conditions: Prediabetes, hypertension, severe anxiety and panic, hyperlipidemia, nonalcoholic fatty liver disease, and insomnia, with a history of alcohol abuse disorder -now sober.     BECAUSE OF ABOVE PROBLEMS IT IS STRONGLY ADVISED THAT Angus LOSE WEIGHT.  BELOW IS MY PLAN FOR him      Fifi Fleming MD is his primary care provider - who referred him here today for help with weight loss.     Start weight 291 lbs, Body mass index is 41.42 kg/m .  9/25/2024   Weight is 289 lbs 10/15/2024     Medication notes:      ZEPBOUND  9/25/2024 - 10/15/2024 - doing well       Wegovy - could consider.   Saxenda - could consider  Metformin - could consider.   Wellbutrin  - avoid and use with caution in the setting of hx of panic disorder  Naltrexone - could consider.   Contrave  - caution in pt with hx of panic disorder  Phentermine/ diethylproprion - contraindicated in the setting of anxiety.   Qsymia/ topamax/ orlistat - avoid in the setting of remote hx kidney stones

## 2024-10-15 NOTE — PROGRESS NOTES
Angus is a 64 year old who is being evaluated via a billable video visit.    How would you like to obtain your AVS? MyChart  If the video visit is dropped, the invitation should be resent by: Text to cell phone: [phone]  Will anyone else be joining your video visit? No      Assessment & Plan   Problem List Items Addressed This Visit          Digestive    Class 3 severe obesity due to excess calories with body mass index (BMI) of 40.0 to 44.9 in adult, unspecified whether serious comorbidity present (H)     Are you experiencing any side effects to the medications:  tirzepatide, no side effects.   Hunger control:  good.   Exercise was discussed: not exercising yet. He plans to start soon. He is going to [facility] at the end of October. He thinks he will exercise in [place] - plans to do walking, yoga and go to the gym.   Discussed journaling food:  he has decreased sugar intake. Eating more fruits and veggies. And kichidi  - lentils.   Patient is pleased with the current results:  yes.   The patient is following the nutrition plan:  trying.  Barriers to losing weight:  none.      SEVERE OBESITY WITH BMI 41.42 AT INTAKE AND CHRONIC COMORBID WEIGHT RELATED CONDITIONS INCULDING: Prediabetes, hypertension, severe anxiety and panic, hyperlipidemia, nonalcoholic fatty liver disease, and insomnia, with a history of alcohol abuse disorder -now sober      PATIENT IS BEGINNING ACTIVE MEDICALLY SUPERVISED WEIGHT LOSS STARTING AT Body mass index is 41.42 kg/m . - planning to utilize low calorie diet, physical activity and medications to facilitate this loss     Patient declined 24 week weight loss program or three pack health coaching due to doesn't feel he needs it.  He declined info on meal replacements.      We discussed surgical weight loss options given his BMI is 41.42 and he  has the following weight related comorbid conditions: Prediabetes, hypertension, severe anxiety and panic, hyperlipidemia, nonalcoholic fatty liver  disease, and insomnia, with a history of alcohol abuse disorder -now sober.  He is interested in learning more about weight loss surgery so [employer] messages sent to him with videos on weight loss surgery and the bariatric surgery program was messaged today so that they can give him a call to help him transition there if that is his wish.     Medications started today: Zepbound. If this medication is not attainable due to supply chain shortage or cost, please plan another visit (evisit, video or in person)    Current goal(s): start Zepbound, transition to bariatric surgery program. Traveling to [facility] later this year.      Lab assessment plan: labs reviewed 6/2024 A1c 6.3, tsh nl essentially normal cmp, low platelets w otherwise nl cbc, and lipids are looking good on crestor.      Follow up 1-3 months .      DISCUSSION _________________________________________________________________     Dx:   Initial bmi is Body mass index is 41.42 kg/m .  With the following weight related comorbid medical conditions: Prediabetes, hypertension, severe anxiety and panic, hyperlipidemia, nonalcoholic fatty liver disease, and insomnia, with a history of alcohol abuse disorder -now sober.     BECAUSE OF ABOVE PROBLEMS IT IS STRONGLY ADVISED THAT Angus LOSE WEIGHT.  BELOW IS MY PLAN FOR him      Fifi Fleming MD is his primary care provider - who referred him here today for help with weight loss.     Start weight 291 lbs, Body mass index is 41.42 kg/m .  9/25/2024   Weight is 289 lbs 10/15/2024     Medication notes:      ZEPBOUND  9/25/2024 - 10/15/2024 - doing well       Wegovy - could consider.   Saxenda - could consider  Metformin - could consider.   Wellbutrin  - avoid and use with caution in the setting of hx of panic disorder  Naltrexone - could consider.   Contrave  - caution in pt with hx of panic disorder  Phentermine/ diethylproprion - contraindicated in the setting of anxiety.   Qsymia/ topamax/ orlistat - avoid in the  setting of remote hx kidney stones         Relevant Medications    tirzepatide-Weight Management (ZEPBOUND) 5 MG/0.5ML prefilled pen    tirzepatide-Weight Management (ZEPBOUND) 7.5 MG/0.5ML prefilled pen    tirzepatide-Weight Management (ZEPBOUND) 10 MG/0.5ML prefilled pen    NAFLD (nonalcoholic fatty liver disease) - Primary     Nafld - incidental finding on ct 8/1/2022. Weight reduction recommended.  He  is participating in the Weight Loss Program at [facility].               Endocrine    Other specified hypothyroidism     Hypothyroidism-currently on levothyroxine 175 micrograms per day-TSH was noted to be normal in June 2024.  Will continue to monitor.  His weight is 291 pounds today and with each 10 to 15 pound loss we should recheck his TSH.  Weight reduction recommended.  He  is participating in the Weight Loss Program at [facility].          Hyperlipidemia with target LDL less than 130     Hyperlipidemia-Crestor has brought his lipids down considerably and they look excellent as of June 2024.  Weight reduction recommended.  He  is participating in the Weight Loss Program at [facility]          Prediabetes     Prediabetes as evidenced by an A1c in June 2024 of 6.3.  He is here for physician assisted weight loss.  Will start Zepbound today which should improve his A1c.         Relevant Medications    tirzepatide-Weight Management (ZEPBOUND) 5 MG/0.5ML prefilled pen    tirzepatide-Weight Management (ZEPBOUND) 7.5 MG/0.5ML prefilled pen    tirzepatide-Weight Management (ZEPBOUND) 10 MG/0.5ML prefilled pen       Circulatory    Essential hypertension     Hypertension - treated by his primary care physician with Cozaar and metoprolol. Weight reduction recommended.  He  is participating in the Weight Loss Program at [facility].             Other    Hx of acute alcoholic hepatitis     He says he had alcoholic hepatitis, but he has since quit drinking.  "Weight reduction recommended.  He  is participating in the Weight Loss Program at [facility].          Anxiety     Anxiety and panic-treated with Ativan, gabapentin, hydroxyzine, and Zoloft. He is here for physician assisted weight loss today we will keep in mind that he should not be on any medications that would worsen anxiety.          History of alcohol use     Hx alcohol use disorder. But he is congratulated on quitting in the past 1.5year.                Maria Eugenia Greco is a 64 year old, presenting for the following health issues:  Weight Loss      Video Start Time: 2:31pm        Objective    Vitals - Patient Reported  Height (Patient Reported): 178.6 cm (5' 10.3\")        Physical Exam   GENERAL: alert and no distress  EYES: Eyes grossly normal to inspection.  No discharge or erythema, or obvious scleral/conjunctival abnormalities.  RESP: No audible wheeze, cough, or visible cyanosis.    SKIN: Visible skin clear. No significant rash, abnormal pigmentation or lesions.  NEURO: Cranial nerves grossly intact.  Mentation and speech appropriate for age.  PSYCH: Appropriate affect, tone, and pace of words          Video-Visit Details    Type of service:  Video Visit   Video End Time:2:45 PM  Originating Location (pt. Location): Home  Distant Location (provider location):  On-site  Platform used for Video Visit: Shamar  Signed Electronically by: Aria Mclean MD    "

## 2024-10-15 NOTE — ASSESSMENT & PLAN NOTE
Nafld - incidental finding on ct 8/1/2022. Weight reduction recommended.  He  is participating in the Weight Loss Program at [facility].

## 2024-10-15 NOTE — ASSESSMENT & PLAN NOTE
Hypertension - treated by his primary care physician with Cozaar and metoprolol. Weight reduction recommended.  He  is participating in the Weight Loss Program at [email or web address]

## 2024-10-15 NOTE — ASSESSMENT & PLAN NOTE
Hyperlipidemia-Aime has brought his lipids down considerably and they look excellent as of June 2024.  Weight reduction recommended.  He  is participating in the Weight Loss Program at [facility]

## 2024-10-15 NOTE — ASSESSMENT & PLAN NOTE
He says he had alcoholic hepatitis, but he has since quit drinking. Weight reduction recommended.  He  is participating in the Weight Loss Program at [facility].

## 2024-10-16 ENCOUNTER — OFFICE VISIT (OUTPATIENT)
Dept: ORTHOPEDICS | Facility: CLINIC | Age: 64
End: 2024-10-16
Payer: COMMERCIAL

## 2024-10-16 ENCOUNTER — ANCILLARY PROCEDURE (OUTPATIENT)
Dept: GENERAL RADIOLOGY | Facility: CLINIC | Age: 64
End: 2024-10-16
Attending: FAMILY MEDICINE
Payer: COMMERCIAL

## 2024-10-16 VITALS
HEIGHT: 71 IN | HEART RATE: 90 BPM | SYSTOLIC BLOOD PRESSURE: 136 MMHG | BODY MASS INDEX: 40.6 KG/M2 | DIASTOLIC BLOOD PRESSURE: 87 MMHG

## 2024-10-16 DIAGNOSIS — S76.012A STRAIN OF HIP FLEXOR, LEFT, INITIAL ENCOUNTER: ICD-10-CM

## 2024-10-16 DIAGNOSIS — S76.212A STRAIN OF ADDUCTOR MAGNUS MUSCLE OF LEFT LOWER EXTREMITY, INITIAL ENCOUNTER: ICD-10-CM

## 2024-10-16 DIAGNOSIS — M25.552 ACUTE PAIN OF LEFT HIP: Primary | ICD-10-CM

## 2024-10-16 DIAGNOSIS — M25.552 ACUTE PAIN OF LEFT HIP: ICD-10-CM

## 2024-10-16 DIAGNOSIS — F41.9 ANXIETY: ICD-10-CM

## 2024-10-16 PROCEDURE — 99204 OFFICE O/P NEW MOD 45 MIN: CPT | Performed by: FAMILY MEDICINE

## 2024-10-16 PROCEDURE — 73502 X-RAY EXAM HIP UNI 2-3 VIEWS: CPT | Mod: TC | Performed by: RADIOLOGY

## 2024-10-16 RX ORDER — CELECOXIB 100 MG/1
100 CAPSULE ORAL 2 TIMES DAILY PRN
Qty: 60 CAPSULE | Refills: 1 | Status: SHIPPED | OUTPATIENT
Start: 2024-10-16

## 2024-10-16 RX ORDER — HYDROXYZINE HYDROCHLORIDE 25 MG/1
TABLET, FILM COATED ORAL
Qty: 60 TABLET | Refills: 0 | Status: SHIPPED | OUTPATIENT
Start: 2024-10-16

## 2024-10-16 NOTE — LETTER
10/16/2024      Angus Wynne  [address] [place] [address]      Dear Colleague,    Thank you for referring your patient, Angus Wynne, to the [facility] [place]. Please see a copy of my visit note below.    ASSESSMENT & PLAN  Patient Instructions     1. Acute pain of left hip    2. Strain of adductor tray muscle of left lower extremity, initial encounter    3. Strain of hip flexor, left, initial encounter      -Patient has acute left hip pain due to abductor and hip flexor strain  -X-rays taken in office today of the left hip and pelvis show no acute fracture, dislocation or significant degenerative osseous abnormalities  -Patient was start Celebrex 100 mg twice a day as needed for pain.  Patient will also start tizanidine 4 mg twice a day as needed for muscle spasms.  -Patient will start formal physical therapy and home exercise program  -Patient will follow-up  -Call direct clinic number [[phone]] at any time with questions or concerns.    Albert Yeo MD [facility]  [facility]        -----    SUBJECTIVE  Angus Wynne is a/an 64 year old male who is seen as a self referral for evaluation of left hip pain. The patient is seen by themselves.    Onset: 1 week(s) ago. Reports insidious onset without acute precipitating event.  Location of Pain: left hip  Rating of Pain at worst: 6/10  Rating of Pain Currently: 4/10  Worsened by: going to the bathroom,moving,hard to get position in bed  Better with: tylenol  Treatments tried: Tylenol  Quality: throbbing  Associated symptoms: none  Orthopedic history: YES - h/o lumbar DDD  Relevant surgical history: NO  Social history: social history: retired    Past Medical History:   Diagnosis Date     GERD (gastroesophageal reflux disease)      HTN (hypertension)      Hyperlipidemia      Hypothyroid      Kidney stone      Vertigo     2015 ,      Social History      Socioeconomic History     Marital status:    Tobacco Use     Smoking status: Former     Current packs/day: 0.00     Types: Cigarettes     Quit date: 2015     Years since quittin.0     Smokeless tobacco: Never   Vaping Use     Vaping status: Never Used   Substance and Sexual Activity     Alcohol use: Yes     Comment: 2-3 drinks a week     Drug use: No     Sexual activity: Yes   Social History Narrative    , one child, non smoker - quit 3 months  ago , alcohol none now( but had used excessively previously )      Social Determinants of Health     Financial Resource Strain: Low Risk  (2024)    Financial Resource Strain      Within the past 12 months, have you or your family members you live with been unable to get utilities (heat, electricity) when it was really needed?: No   Food Insecurity: Low Risk  (2024)    Food Insecurity      Within the past 12 months, did you worry that your food would run out before you got money to buy more?: No      Within the past 12 months, did the food you bought just not last and you didn t have money to get more?: No   Transportation Needs: Low Risk  (2024)    Transportation Needs      Within the past 12 months, has lack of transportation kept you from medical appointments, getting your medicines, non-medical meetings or appointments, work, or from getting things that you need?: No   Physical Activity: Inactive (2024)    Exercise Vital Sign      Days of Exercise per Week: 0 days      Minutes of Exercise per Session: 0 min   Stress: Stress Concern Present (2024)    [employer] - Occupational Stress Questionnaire      Feeling of Stress : Rather much   Social Connections: Unknown (2024)    Social Connection and Isolation Panel [NHANES]      Frequency of Social Gatherings with Friends and Family: Three times a week   Recent Concern: Social Connections - Moderately Isolated (2024)    Social Connection  "and Isolation Panel [NHANES]      Frequency of Communication with Friends and Family: More than three times a week      Frequency of Social Gatherings with Friends and Family: Three times a week      Attends Pentecostal Services: Never      Active Member of Clubs or Organizations: No      Attends Club or Organization Meetings: Never      Marital Status:    Interpersonal Safety: Low Risk  (9/25/2024)    Interpersonal Safety      Do you feel physically and emotionally safe where you currently live?: Yes      Within the past 12 months, have you been hit, slapped, kicked or otherwise physically hurt by someone?: No      Within the past 12 months, have you been humiliated or emotionally abused in other ways by your partner or ex-partner?: No   Housing Stability: Low Risk  (6/27/2024)    Housing Stability      Do you have housing? : Yes      Are you worried about losing your housing?: No         Patient's past medical, surgical, social, and family histories were reviewed today and no changes are noted.    REVIEW OF SYSTEMS:  10 point ROS is negative other than symptoms noted above in HPI, Past Medical History or as stated below  Constitutional: NEGATIVE for fever, chills, change in weight  Skin: NEGATIVE for worrisome rashes, moles or lesions  GI/: NEGATIVE for bowel or bladder changes  Neuro: NEGATIVE for weakness, dizziness or paresthesias    OBJECTIVE:  /87   Pulse 90   Ht 1.803 m (5' 11\")   BMI 40.60 kg/m     General: healthy, alert and in no distress  HEENT: no scleral icterus or conjunctival erythema  Skin: no suspicious lesions or rash. No jaundice.  CV: no pedal edema  Resp: normal respiratory effort without conversational dyspnea   Psych: normal mood and affect  Gait: normal steady gait with appropriate coordination and balance  Neuro: Normal light sensory exam of lower extremity  MSK:  LEFT HIP  Inspection:    No obvious deformity or asymmetry, level pelvis  Palpation:    Tender about the " anterior groin/joint line and adductor group origin. Otherwise all other landmarks are nontender.  Active Range of Motion:     Flexion limited slightly by pain, IR limited slightly by pain, ER  within normal limits  Strength:    Flexion painful, adduction painful, abduction grossly intact  Special Tests:    Positive: anterior impingement (FADIR)    Negative: Logroll, resisted gluteus medius provocation, JANICE, posterior impingement (EX/AB/ER)    Independent visualization of the below image:  Recent Results (from the past 24 hour(s))   XR Pelvis and Hip Left 1 View    Narrative    XR PELVIS AND HIP LEFT 1 VIEW 10/16/2024 11:44 AM     HISTORY: Acute pain of left hip    COMPARISON: None.         Impression    IMPRESSION: Both hips for fracture. Slight superior joint space  narrowing right hip. Pelvis negative for fracture.    SCARLETT DIAZ MD         SYSTEM ID:  [id number]         Albert Yeo MD [facility]      Again, thank you for allowing me to participate in the care of your patient.        Sincerely,        Albert Yeo, MD

## 2024-10-16 NOTE — PATIENT INSTRUCTIONS
1. Acute pain of left hip    2. Strain of adductor tray muscle of left lower extremity, initial encounter    3. Strain of hip flexor, left, initial encounter      -Patient has acute left hip pain due to abductor and hip flexor strain  -X-rays taken in office today of the left hip and pelvis show no acute fracture, dislocation or significant degenerative osseous abnormalities  -Patient was start Celebrex 100 mg twice a day as needed for pain.  Patient will also start tizanidine 4 mg twice a day as needed for muscle spasms.  -Patient will start formal physical therapy and home exercise program  -Patient will follow-up  -Call direct clinic number [[phone]] at any time with questions or concerns.    Albert Yeo MD [facility]  [facility] [place]

## 2024-10-16 NOTE — PROGRESS NOTES
ASSESSMENT & PLAN  Patient Instructions     1. Acute pain of left hip    2. Strain of adductor tray muscle of left lower extremity, initial encounter    3. Strain of hip flexor, left, initial encounter      -Patient has acute left hip pain due to abductor and hip flexor strain  -X-rays taken in office today of the left hip and pelvis show no acute fracture, dislocation or significant degenerative osseous abnormalities  -Patient was start Celebrex 100 mg twice a day as needed for pain.  Patient will also start tizanidine 4 mg twice a day as needed for muscle spasms.  -Patient will start formal physical therapy and home exercise program  -Patient will follow-up  -Call direct clinic number [[phone]] at any time with questions or concerns.    Albert Yeo MD [facility]  [facility]        -----    SUBJECTIVE  Angus Wynne is a/an 64 year old male who is seen as a self referral for evaluation of left hip pain. The patient is seen by themselves.    Onset: 1 week(s) ago. Reports insidious onset without acute precipitating event.  Location of Pain: left hip  Rating of Pain at worst: 6/10  Rating of Pain Currently: 4/10  Worsened by: going to the bathroom,moving,hard to get position in bed  Better with: tylenol  Treatments tried: Tylenol  Quality: throbbing  Associated symptoms: none  Orthopedic history: YES - h/o lumbar DDD  Relevant surgical history: NO  Social history: social history: retired    Past Medical History:   Diagnosis Date    GERD (gastroesophageal reflux disease)     HTN (hypertension)     Hyperlipidemia     Hypothyroid     Kidney stone     Vertigo      ,      Social History     Socioeconomic History    Marital status:    Tobacco Use    Smoking status: Former     Current packs/day: 0.00     Types: Cigarettes     Quit date: 2015     Years since quittin.0    Smokeless tobacco: Never   Vaping Use    Vaping status: Never Used    Substance and Sexual Activity    Alcohol use: Yes     Comment: 2-3 drinks a week    Drug use: No    Sexual activity: Yes   Social History Narrative    , one child, non smoker - quit 3 months  ago , alcohol none now( but had used excessively previously )      Social Determinants of Health     Financial Resource Strain: Low Risk  (6/27/2024)    Financial Resource Strain     Within the past 12 months, have you or your family members you live with been unable to get utilities (heat, electricity) when it was really needed?: No   Food Insecurity: Low Risk  (6/27/2024)    Food Insecurity     Within the past 12 months, did you worry that your food would run out before you got money to buy more?: No     Within the past 12 months, did the food you bought just not last and you didn t have money to get more?: No   Transportation Needs: Low Risk  (6/27/2024)    Transportation Needs     Within the past 12 months, has lack of transportation kept you from medical appointments, getting your medicines, non-medical meetings or appointments, work, or from getting things that you need?: No   Physical Activity: Inactive (6/27/2024)    Exercise Vital Sign     Days of Exercise per Week: 0 days     Minutes of Exercise per Session: 0 min   Stress: Stress Concern Present (6/27/2024)    [employer] [place] [employer]ational [employer] - Occupational Stress Questionnaire     Feeling of Stress : Rather much   Social Connections: Unknown (6/27/2024)    Social Connection and Isolation Panel [NHANES]     Frequency of Social Gatherings with Friends and Family: Three times a week   Recent Concern: Social Connections - Moderately Isolated (6/27/2024)    Social Connection and Isolation Panel [NHANES]     Frequency of Communication with Friends and Family: More than three times a week     Frequency of Social Gatherings with Friends and Family: Three times a week     Attends Scientology Services: Never     Active Member of Clubs or Organizations: No      "Attends Club or Organization Meetings: Never     Marital Status:    Interpersonal Safety: Low Risk  (9/25/2024)    Interpersonal Safety     Do you feel physically and emotionally safe where you currently live?: Yes     Within the past 12 months, have you been hit, slapped, kicked or otherwise physically hurt by someone?: No     Within the past 12 months, have you been humiliated or emotionally abused in other ways by your partner or ex-partner?: No   Housing Stability: Low Risk  (6/27/2024)    Housing Stability     Do you have housing? : Yes     Are you worried about losing your housing?: No         Patient's past medical, surgical, social, and family histories were reviewed today and no changes are noted.    REVIEW OF SYSTEMS:  10 point ROS is negative other than symptoms noted above in HPI, Past Medical History or as stated below  Constitutional: NEGATIVE for fever, chills, change in weight  Skin: NEGATIVE for worrisome rashes, moles or lesions  GI/: NEGATIVE for bowel or bladder changes  Neuro: NEGATIVE for weakness, dizziness or paresthesias    OBJECTIVE:  /87   Pulse 90   Ht 1.803 m (5' 11\")   BMI 40.60 kg/m     General: healthy, alert and in no distress  HEENT: no scleral icterus or conjunctival erythema  Skin: no suspicious lesions or rash. No jaundice.  CV: no pedal edema  Resp: normal respiratory effort without conversational dyspnea   Psych: normal mood and affect  Gait: normal steady gait with appropriate coordination and balance  Neuro: Normal light sensory exam of lower extremity  MSK:  LEFT HIP  Inspection:    No obvious deformity or asymmetry, level pelvis  Palpation:    Tender about the anterior groin/joint line and adductor group origin. Otherwise all other landmarks are nontender.  Active Range of Motion:     Flexion limited slightly by pain, IR limited slightly by pain, ER  within normal limits  Strength:    Flexion painful, adduction painful, abduction grossly intact  Special " Tests:    Positive: anterior impingement (FADIR)    Negative: Logroll, resisted gluteus medius provocation, JANICE, posterior impingement (EX/AB/ER)    Independent visualization of the below image:  Recent Results (from the past 24 hour(s))   XR Pelvis and Hip Left 1 View    Narrative    XR PELVIS AND HIP LEFT 1 VIEW 10/16/2024 11:44 AM     HISTORY: Acute pain of left hip    COMPARISON: None.         Impression    IMPRESSION: Both hips for fracture. Slight superior joint space  narrowing right hip. Pelvis negative for fracture.    SCARLETT DIAZ MD         SYSTEM ID:  [id number]         Albert Yeo MD [facility]

## 2024-10-18 ENCOUNTER — IMMUNIZATION (OUTPATIENT)
Dept: FAMILY MEDICINE | Facility: CLINIC | Age: 64
End: 2024-10-18
Payer: COMMERCIAL

## 2024-10-18 DIAGNOSIS — Z23 ENCOUNTER FOR IMMUNIZATION: Primary | ICD-10-CM

## 2024-10-18 PROCEDURE — 90471 IMMUNIZATION ADMIN: CPT

## 2024-10-18 PROCEDURE — 90662 IIV NO PRSV INCREASED AG IM: CPT

## 2024-10-18 PROCEDURE — 99207 PR NO CHARGE NURSE ONLY: CPT

## 2024-10-18 PROCEDURE — 90480 ADMN SARSCOV2 VAC 1/ONLY CMP: CPT

## 2024-10-18 PROCEDURE — 91320 SARSCV2 VAC 30MCG TRS-SUC IM: CPT

## 2024-10-18 NOTE — PROGRESS NOTES
Prior to immunization administration, verified patients identity using patient s name and date of birth. Please see Immunization Activity for additional information.     Is the patient's temperature normal (100.5 or less)? Yes     Patient MEETS CRITERIA. PROCEED with vaccine administration.          10/18/2024   INFLUENZA   Would you like to receive the flu shot or the nasal flu vaccine today? Flu Shot   Have you had a serious reaction to a flu vaccine or something in a flu vaccine? No   Have you had Guillain-[place] syndrome within 6 weeks of getting a vaccine? No   Have you received a bone marrow transplant within the previous 6 months? No            Patient MEETS CRITERIA. PROCEED with vaccine administration.        Patient instructed to remain in clinic for 15 minutes afterwards, and to report any adverse reactions.      Link to Ancillary Visit Immunization Standing Orders SmartSet     Screening performed by Kathy Ruvalcaba MA on 10/18/2024 at 11:13 AM.

## 2024-10-27 DIAGNOSIS — F41.9 ANXIETY: ICD-10-CM

## 2024-10-27 DIAGNOSIS — G47.09 OTHER INSOMNIA: ICD-10-CM

## 2024-10-27 DIAGNOSIS — I10 ESSENTIAL HYPERTENSION: ICD-10-CM

## 2024-10-28 RX ORDER — GABAPENTIN 100 MG/1
CAPSULE ORAL
Qty: 90 CAPSULE | Refills: 1 | Status: SHIPPED | OUTPATIENT
Start: 2024-10-28

## 2024-11-15 DIAGNOSIS — F41.9 ANXIETY: ICD-10-CM

## 2024-11-15 RX ORDER — HYDROXYZINE HYDROCHLORIDE 25 MG/1
TABLET, FILM COATED ORAL
Qty: 60 TABLET | Refills: 4 | Status: SHIPPED | OUTPATIENT
Start: 2024-11-15

## 2025-01-14 ENCOUNTER — OFFICE VISIT (OUTPATIENT)
Dept: FAMILY MEDICINE | Facility: CLINIC | Age: 65
End: 2025-01-14
Attending: FAMILY MEDICINE
Payer: COMMERCIAL

## 2025-01-14 VITALS
DIASTOLIC BLOOD PRESSURE: 97 MMHG | BODY MASS INDEX: 39.34 KG/M2 | TEMPERATURE: 99.8 F | HEART RATE: 107 BPM | HEIGHT: 71 IN | OXYGEN SATURATION: 98 % | WEIGHT: 281 LBS | SYSTOLIC BLOOD PRESSURE: 150 MMHG | RESPIRATION RATE: 18 BRPM

## 2025-01-14 DIAGNOSIS — E66.813 CLASS 3 SEVERE OBESITY DUE TO EXCESS CALORIES WITH BODY MASS INDEX (BMI) OF 40.0 TO 44.9 IN ADULT, UNSPECIFIED WHETHER SERIOUS COMORBIDITY PRESENT (H): Primary | ICD-10-CM

## 2025-01-14 DIAGNOSIS — Z87.898 HISTORY OF ALCOHOL USE: ICD-10-CM

## 2025-01-14 DIAGNOSIS — E66.01 CLASS 3 SEVERE OBESITY DUE TO EXCESS CALORIES WITH BODY MASS INDEX (BMI) OF 40.0 TO 44.9 IN ADULT, UNSPECIFIED WHETHER SERIOUS COMORBIDITY PRESENT (H): Primary | ICD-10-CM

## 2025-01-14 DIAGNOSIS — K76.0 NAFLD (NONALCOHOLIC FATTY LIVER DISEASE): ICD-10-CM

## 2025-01-14 DIAGNOSIS — E03.8 OTHER SPECIFIED HYPOTHYROIDISM: ICD-10-CM

## 2025-01-14 DIAGNOSIS — I10 ESSENTIAL HYPERTENSION: ICD-10-CM

## 2025-01-14 DIAGNOSIS — Z87.19 HX OF ACUTE ALCOHOLIC HEPATITIS: ICD-10-CM

## 2025-01-14 DIAGNOSIS — F41.9 ANXIETY: ICD-10-CM

## 2025-01-14 DIAGNOSIS — R73.03 PREDIABETES: ICD-10-CM

## 2025-01-14 PROCEDURE — 99214 OFFICE O/P EST MOD 30 MIN: CPT | Performed by: FAMILY MEDICINE

## 2025-01-14 NOTE — ASSESSMENT & PLAN NOTE
Hypertension - elevated today. Recommend follow-up w pcp, he says he has something scheduled with new primary care physician. For now continue Cozaar and metoprolol. Weight reduction recommended.  He  is participating in the Weight Loss Program at [facility].

## 2025-01-14 NOTE — ASSESSMENT & PLAN NOTE
Hypothyroidism-currently on levothyroxine 175 micrograms per day-TSH was noted to be normal in June 2024.  Will continue to monitor.  His weight is 291 pounds today and with each 10 to 15 pound loss we should recheck his TSH.  Weight reduction recommended.  He  is participating in the Weight Loss Program at [facility].

## 2025-01-14 NOTE — PROGRESS NOTES
Assessment & Plan  Class 3 severe obesity due to excess calories with body mass index (BMI) of 40.0 to 44.9 in adult, unspecified whether serious comorbidity present (H)    Orders:    tirzepatide-Weight Management (ZEPBOUND) 10 MG/0.5ML prefilled pen; Inject 0.5 mLs (10 mg) subcutaneously every 7 days.    tirzepatide-Weight Management (ZEPBOUND) 12.5 MG/0.5ML prefilled pen; Inject 0.5 mLs (12.5 mg) subcutaneously every 7 days.    tirzepatide-Weight Management (ZEPBOUND) 15 MG/0.5ML prefilled pen; Inject 0.5 mLs (15 mg) subcutaneously every 7 days.    NAFLD (nonalcoholic fatty liver disease)  Nafld - incidental finding on ct 8/1/2022. Weight reduction recommended.  He  is participating in the Weight Loss Program at [facility].         Other specified hypothyroidism  Hypothyroidism-currently on levothyroxine 175 micrograms per day-TSH was noted to be normal in June 2024.  Will continue to monitor.  His weight is 291 pounds today and with each 10 to 15 pound loss we should recheck his TSH.  Weight reduction recommended.  He  is participating in the Weight Loss Program at [facility].          Prediabetes  Prediabetes as evidenced by an A1c in June 2024 of 6.3.  He is here for physician assisted weight loss.  Will start Zepbound today which should improve his A1c.         Essential hypertension  Hypertension - elevated today. Recommend follow-up w pcp, he says he has something scheduled with new primary care physician. For now continue Cozaar and metoprolol. Weight reduction recommended.  He  is participating in the Weight Loss Program at [facility].          Hx of acute alcoholic hepatitis  He says he had alcoholic hepatitis, but he has since quit drinking. Weight reduction recommended.  He  is participating in the Weight Loss Program at [facility].          Anxiety  Anxiety and panic-treated with Ativan, gabapentin, hydroxyzine, and Zoloft. He is here  "for physician assisted weight loss today we will keep in mind that he should not be on any medications that would worsen anxiety.          History of alcohol use  Hx alcohol use disorder. But he is congratulated on quitting in the past 1.5year.                    Maria Eugenia Greco is a 64 year old, presenting for the following health issues:  Weight Loss      1/14/2025     3:15 PM   Additional Questions   Roomed by as   Accompanied by self         1/14/2025     3:15 PM   Patient Reported Additional Medications   Patient reports taking the following new medications no     History of Present Illness       Reason for visit:  Follow up   He is taking medications regularly.             Objective    BP (!) 150/97 (BP Location: Left arm, Patient Position: Left side, Cuff Size: Adult Large)   Pulse 107   Temp 99.8  F (37.7  C) (Oral)   Resp 18   Ht 1.803 m (5' 11\")   Wt 127.5 kg (281 lb)   SpO2 98%   BMI 39.19 kg/m    Body mass index is 39.19 kg/m .  Physical Exam  Constitutional:       Appearance: Normal appearance.   HENT:      Head: Normocephalic and atraumatic.   Cardiovascular:      Rate and Rhythm: Normal rate and regular rhythm.   Pulmonary:      Effort: Pulmonary effort is normal.   Musculoskeletal:         General: Normal range of motion.      Cervical back: Normal range of motion and neck supple.   Neurological:      General: No focal deficit present.      Mental Status: He is alert.                Signed Electronically by: Aria Mclean MD    "

## 2025-01-14 NOTE — ASSESSMENT & PLAN NOTE
Orders:    tirzepatide-Weight Management (ZEPBOUND) 10 MG/0.5ML prefilled pen; Inject 0.5 mLs (10 mg) subcutaneously every 7 days.    tirzepatide-Weight Management (ZEPBOUND) 12.5 MG/0.5ML prefilled pen; Inject 0.5 mLs (12.5 mg) subcutaneously every 7 days.    tirzepatide-Weight Management (ZEPBOUND) 15 MG/0.5ML prefilled pen; Inject 0.5 mLs (15 mg) subcutaneously every 7 days.

## 2025-01-14 NOTE — Clinical Note
Angus is interested in learning more about bariatric surgery, can you reach out to him to help him transition to your team.

## 2025-01-14 NOTE — ASSESSMENT & PLAN NOTE
He says he had alcoholic hepatitis, but he has since quit drinking. Weight reduction recommended.  He  is participating in the Weight Loss Program at [facility].

## 2025-01-20 ENCOUNTER — OFFICE VISIT (OUTPATIENT)
Dept: BEHAVIORAL HEALTH | Facility: CLINIC | Age: 65
End: 2025-01-20
Payer: COMMERCIAL

## 2025-01-20 DIAGNOSIS — F41.9 ANXIETY: Primary | ICD-10-CM

## 2025-01-20 PROCEDURE — 90832 PSYTX W PT 30 MINUTES: CPT | Performed by: MARRIAGE & FAMILY THERAPIST

## 2025-01-20 NOTE — PROGRESS NOTES
"[facility] Primary Care: Integrated Behavioral Health    Integrated Behavioral Health   Mental Health & Addiction Services      Progress Note - Initial [facility] Visit     Patient Name: Angus Wynne    Date: January 20, 2025  Service Type: Individual   Visit Start Time: 11:04 AM  Visit End Time:  11:28 AM   Attendees: Patient   Service Modality: In-person     [facility] Visit Activities (Refresh list every visit): NEW         DATA:     Interactive Complexity: No   Crisis: No     Assessments completed prior to this visit:     The following assessments were completed by patient for this visit:  PHQ9:       7/30/2019     1:27 PM 10/8/2019    12:19 PM 11/18/2020    10:52 AM 4/8/2021     8:43 AM 9/22/2022    10:49 AM 2/5/2024     9:12 AM 3/4/2024    10:30 AM   PHQ-9 SCORE   PHQ-9 Total Score MyChart     3 (Minimal depression) 12 (Moderate depression)    PHQ-9 Total Score 2 0 2 9 3 12 6     GAD7:       7/29/2021    11:13 AM 8/23/2022     3:05 PM 9/22/2022    10:49 AM 5/23/2023     3:36 PM 2/5/2024     9:13 AM 3/4/2024    10:30 AM 9/25/2024    11:01 AM   RUTH-7 SCORE   Total Score 0 (minimal anxiety)  3 (minimal anxiety) 0 (minimal anxiety) 7 (mild anxiety)  11 (moderate anxiety)   Total Score 0 11 3 0 7 8 11     CAGE-AID:        No data to display              CRAFFT:         No data to display              [place] Suicide Severity Rating Scale (Lifetime/Recent)      2/3/2019     5:19 PM   [place] Suicide Severity Rating (Lifetime/Recent)   Q1 Wished to be Dead (Past Month) no   Q2 Suicidal Thoughts (Past Month) no   Q6 Suicide Behavior (Lifetime) no        Referral:   Patient was referred to [facility] by primary care provider.    Reason for referral: clarify behavioral health diagnosis and determine behavioral health treatment options.      [facility] introduced self and role. Discussed informed consent and limits to confidentiality.     Presenting Concerns/ Current Stressors:   Pt reports being \"an alcoholic 25 " "years ago\" and was treated by PCP in [place]. Currently drinking 2 shots of alcohol per day. Pt is seeking     Anxiety symptoms result in disordered eating, panic attacks (freeze), not driving due to anxiety symptoms and freezing     Therapeutic Interventions:  Motivational Interviewing (MI): Validated patient's thoughts, feelings and experience. Expressed respect for patient's autonomy in decision making.   Psycho-education: Provided psycho-education about patient's behavioral health condition and symptoms. Explained and reviewed treatment options.    Response to treatment interventions:   Patient was receptive to interventions utilized.  Patient was engaged in the therapy process.      Safety Issues and Plan for Safety and Risk Management:     Patient denies a history of suicidal ideation, suicide attempts, self-injurious behavior, homicidal ideation, homicidal behavior, and and other safety concerns   Patient denies current fears or concerns for personal safety.   Patient denies current or recent suicidal ideation or behaviors.   Patient denies current or recent homicidal ideation or behaviors.   Patient denies current or recent self injurious behavior or ideation.   Patient denies other safety concerns.   Recommended that patient call 911 or go to the local ED should there be a change in any of these risk factors   Patient reports there are no firearms in the house.       ASSESSMENT:   Mental Status:     Appearance:   Appropriate    Eye Contact:   Good    Psychomotor Behavior: Normal    Attitude:   Cooperative    Orientation:   All   Speech Rate / Production: Pressured    Volume:   Normal    Mood:    Anxious    Affect:    Constricted    Thought Content:  Clear    Thought Form:  Coherent    Insight:    Good         Diagnostic Criteria:   Generalized Anxiety Disorder  A. Excessive anxiety and worry about a number of events or activities (such as work or school performance).   B. The person finds it difficult to " control the worry.  C. Select 3 or more symptoms (required for diagnosis). Only one item is required in children.   - Restlessness or feeling keyed up or on edge.    - Muscle tension.    - Sleep disturbance (difficulty falling or staying asleep, or restless unsatisfying sleep).   D. The focus of the anxiety and worry is not confined to features of an Axis I disorder.  E. The anxiety, worry, or physical symptoms cause clinically significant distress or impairment in social, occupational, or other important areas of functioning.   F. The disturbance is not due to the direct physiological effects of a substance (e.g., a drug of abuse, a medication) or a general medical condition (e.g., hyperthyroidism) and does not occur exclusively during a Mood Disorder, a Psychotic Disorder, or a Pervasive Developmental Disorder.    - The aformentioned symptoms began 3 year(s) ago and occurs 7 days per week and is experienced as severe.        DSM5 Diagnoses: (Sustained by DSM5 Criteria Listed Above)     Diagnoses: 300.02 (F41.1) Generalized Anxiety Disorder     Psychosocial / Contextual Factors: Medical Complexities, Relationship Concerns, and eating, impairing ability to do exercises        Collateral Reports Completed:   Routed note to PCP        PLAN: (Homework, other):     1. Patient was provided:  recommendation to schedule follow-up with [facility] recommendation to follow through on referrals     2. Provider recommended the following referrals: following .        3. Suicide Risk and Safety Concerns were assessed for Angus Wynne    Safety Plan:   Patient denied any current/recent/lifetime history of suicidal ideation and/or behaviors. Recommended that patient call 911 or go to the local ED should there be a change in any of these risk factors       Guero ANGELES, [facility]   January 20, 2025

## 2025-01-27 ENCOUNTER — MYC MEDICAL ADVICE (OUTPATIENT)
Dept: FAMILY MEDICINE | Facility: CLINIC | Age: 65
End: 2025-01-27
Payer: COMMERCIAL

## 2025-01-27 DIAGNOSIS — E66.813 CLASS 3 SEVERE OBESITY DUE TO EXCESS CALORIES WITH BODY MASS INDEX (BMI) OF 40.0 TO 44.9 IN ADULT, UNSPECIFIED WHETHER SERIOUS COMORBIDITY PRESENT (H): Primary | ICD-10-CM

## 2025-01-27 DIAGNOSIS — E66.01 CLASS 3 SEVERE OBESITY DUE TO EXCESS CALORIES WITH BODY MASS INDEX (BMI) OF 40.0 TO 44.9 IN ADULT, UNSPECIFIED WHETHER SERIOUS COMORBIDITY PRESENT (H): Primary | ICD-10-CM

## 2025-01-27 NOTE — PROGRESS NOTES
~Cardiology Clinic Visit~    Primary Cardiologist: Dr. Camargo  Reason for visit: Annual follow up    History of Present Illness    Angus Wynne is a pleasant 62 year old patient with past medical history significant for:     Mild CAD: Noted on CTA in 2019 with a total calcium score of 52 and trivial nonocclusive coronary artery disease  Hypothyroidism  GERD  Anxiety  Hypertension  EtOH abuse     The patient has a reported history of a previous coronary angiogram approximately 10 years ago in [place] for an abnormal EKG.  The patient reported that he had normal coronaries at that time.  In 2017 he had an EKG performed for shortness of breath that showed symmetric T wave inversions in V4 through V6 and was recommended that he be evaluated in the hospital.  He then went under stress echocardiogram that noted normal LV systolic function at rest without evidence of exercise-induced ischemia.     Unfortunately, in 2/2019 he slipped on a patch of ice and hurt his vertebrae and had a T9 fracture.  It was recommended that time to undergo surgical intervention however the patient elected for conservative management.  In 2019 the patient was seen in cardiology clinic and underwent a CTA that showed a total calcium score of 52 with trivial nonocclusive CAD.  In August 2022 he was hospitalized for acute rhabdomyolysis with a CK of 16,000 and CHAS.       The patient was evaluated by Dr. Camargo on 4/27/2023 due to dyspnea on exertion.  Echocardiogram 4/26/2023 showed mild LVH with a hyperdynamic LV and LVEF greater than 70%.  LVOT gradient of 43 mmHg with Valsalva 124 mm.  MV was not well visualized but suspect chordal SHIKHA, AV gradients were attempted but could not be measured.     We proceeded with stress cardiac MRI showing mild ischemia in the apical lateral portion, and hyperdynamic LV systolic function with LVEF 80.5%, RVEF 69.2%.  MRI showed asymmetric LVH with maximal basal septal thickness 16 mm and apical  hypertrophy was noted with apical lateral thickness of 19 mm.  SHIKHA of mitral valve noted with evidence of LV outflow obstruction.  LA hyperenhancement revealed mild, patchy non-CAD scar in the apex and apical lateral region.  Collective findings representative of hypertrophic cardiomyopathy.  He was started on Toprol XL 25 mg daily.    We then completed CT coronary angiogram showing total calcium score 102 and trivial <25% stenosis of the LM.  Findings reviewed at his follow up with Dr. Camargo.  In regards to his dyspnea, it was felt this was likely due to overall deconditioning vs ischemic coronary disease.    Overall, patient is doing well.  He had a chest pain episode when he was in [place].  Echo completed and reviewed today.  EF improved to 55%.  He continues to struggle with severe anxiety.  He maintains he would like to loose weight and exercise more.  He has recently started Tirzepatide and is meeting with bariatric surgery team later this month.  __________________________________________________________________         Assessment and Impression:     Dyspnea on exertion  Hypertrophic Cardiomyopathy  Trivial nonocclusive CAD  -Ongoing dyspnea, stable.    -No palpitations  -Echocardiogram showing mild concentric LVH, hyperdynamic LVEF greater than 70%, LVOT gradient 43mmH, with valsalva is 124 mmHg, MV is not well visualized, suspect chordal SHIKHA.  -EF 55% on December 2024 echo report from [place].  -Stress CMR suggested of hypertrophic cardiomyopathy.  -CT coronary angiogram 2023: Total calcium score 102 (72.8th percentile); the left main is trivially calcified is trivially stenosed (<25%) with mixed plaque.   -BP well controlled.     Anxiety  Hypertension         Recommendations and Plan:     Start Amlodipine 5 mg daily.  Pt counseled.  Ongoing weight loss (down 10# since starting Tirzepatide 2-weeks ago).  Start dedicated exercise regimen.  LUIS visit in 6-months.  If ongoing IBARRA despite weight loss and improved  functional capacity, consider repeating echocardiogram and stress test at that time.  __________________________________________________________________    Thank you for the opportunity to participate in this pleasant patient's care.    We would be happy to see this patient sooner for any concerns in the meantime.    YUKO Chacon, CNP   Nurse Practitioner  [facility]    Today's clinic visit entailed:  Review of external notes as documented elsewhere in note  The following tests were independently interpreted by me as noted in my documentation: echo, labs  Prescription drug management  The level of medical decision making during this visit was of moderate complexity.    Review of the result(s) of each unique test - cardiac testing, cardiac imaging, labs,  Care everywhere reviewed for additional records to facilitate comprehensive patient care.    Orders this Visit:  Orders Placed This Encounter   Procedures    Follow-Up with Cardiology- LUIS     Orders Placed This Encounter   Medications    clonazePAM (KLONOPIN) 0.5 MG tablet     Sig: Take 1 tablet (0.5 mg) by mouth daily as needed for anxiety.    amLODIPine (NORVASC) 2.5 MG tablet     Sig: Take 2 tablets (5 mg) by mouth daily.     Dispense:  90 tablet     Refill:  3     Medications Discontinued During This Encounter   Medication Reason    fexofenadine (ALLEGRA) 180 MG tablet Med Rec(No AVS / No eCancel)    folic acid (FOLVITE) 400 MCG tablet Med Rec(No AVS / No eCancel)     Encounter Diagnoses   Name Primary?    Hypertrophic cardiomyopathy (H) Yes    IBARRA (dyspnea on exertion)     LVH (left ventricular hypertrophy)     Severe anxiety     Essential hypertension      CURRENT MEDICATIONS:  Current Outpatient Medications   Medication Sig Dispense Refill    acetaminophen (TYLENOL) 325 MG tablet Take 2 tablets (650 mg) by mouth every 4 hours as needed for mild pain 50 tablet 0    amLODIPine (NORVASC) 2.5 MG tablet Take 2 tablets (5 mg) by  mouth daily. 90 tablet 3    clonazePAM (KLONOPIN) 0.5 MG tablet Take 1 tablet (0.5 mg) by mouth daily as needed for anxiety.      gabapentin (NEURONTIN) 100 MG capsule TAKE 2 CAPSULE BY MOUTH AT BEDTIME AND 1 CAPSULE IN THE MORNING 90 capsule 1    hydrOXYzine HCl (ATARAX) 25 MG tablet TAKE 1 TABLET(25 MG) BY MOUTH TWICE DAILY AS NEEDED FOR ANXIETY 60 tablet 4    levothyroxine (SYNTHROID/LEVOTHROID) 175 MCG tablet Take 1 tablet (175 mcg) by mouth daily 90 tablet 3    losartan (COZAAR) 100 MG tablet Take 1 tablet (100 mg) by mouth daily 90 tablet 2    metoprolol succinate ER (TOPROL XL) 25 MG 24 hr tablet Take 1 tablet (25 mg) by mouth every evening 90 tablet 3    rosuvastatin (CRESTOR) 40 MG tablet Take 1 tablet (40 mg) by mouth daily 90 tablet 3    thiamine (B-1) 100 MG tablet TAKE 1 TABLET BY MOUTH DAILY 90 tablet 3    tirzepatide-Weight Management (ZEPBOUND) 10 MG/0.5ML prefilled pen Inject 0.5 mLs (10 mg) subcutaneously every 7 days. 2 mL 0    tirzepatide-Weight Management (ZEPBOUND) 10 MG/0.5ML prefilled pen Inject 0.5 mLs (10 mg) subcutaneously every 7 days. 2 mL 0    tirzepatide-Weight Management (ZEPBOUND) 12.5 MG/0.5ML prefilled pen Inject 0.5 mLs (12.5 mg) subcutaneously every 7 days. 2 mL 0    tirzepatide-Weight Management (ZEPBOUND) 15 MG/0.5ML prefilled pen Inject 0.5 mLs (15 mg) subcutaneously every 7 days. 2 mL 0    traZODone (DESYREL) 50 MG tablet TAKE 1 TO 2 TABLETS(50  MG) BY MOUTH AT BEDTIME 180 tablet 2    celecoxib (CELEBREX) 100 MG capsule Take 1 capsule (100 mg) by mouth 2 times daily as needed for moderate pain. (Patient not taking: Reported on 1/28/2025) 60 capsule 1    fluticasone (FLONASE) 50 MCG/ACT nasal spray Spray 2 sprays into both nostrils daily SHAKE LIQUID AND USE (Patient not taking: Reported on 1/28/2025) 48 g 11    sertraline (ZOLOFT) 100 MG tablet Take 1.5 tablets (150 mg) by mouth daily (Patient not taking: Reported on 1/28/2025) 45 tablet 3    tiZANidine (ZANAFLEX) 4 MG  "tablet Take 1 tablet (4 mg) by mouth 2 times daily as needed for muscle spasms. (Patient not taking: Reported on 1/28/2025) 40 tablet 0     ALLERGIES   No Known Allergies  PAST MEDICAL, SURGICAL, FAMILY, SOCIAL HISTORY:  History was reviewed and updated as needed, see medical record.    Review of Systems:  A 10-point Review Of Systems is otherwise normal except for that which is noted in the HPI and interval summary.    Physical Exam:    Vitals: BP (!) 164/92   Pulse 92   Ht 1.803 m (5' 11\")   Wt 128.8 kg (284 lb)   SpO2 100%   BMI 39.61 kg/m    Constitutional: Appears stated age, well nourished, NAD.  Neck: Supple. Carotid pulses full and equal.   Respiratory: Non-labored. Lungs CTAB.  Cardiovascular: RRR, normal S1 and S2. No M/G/R.  No edema.  GI: Soft, non-distended, non-tender.  Skin: Warm and dry.   Musculoskeletal/Extremities: Symmetrical movement.  Neurologic: No gross focal deficits. Alert, awake.  Psychiatric: Affect appropriate. Mentation normal.    Recent Lab Results:  LIPID RESULTS:  Lab Results   Component Value Date    CHOL 175 06/27/2024    CHOL 209 (H) 11/18/2020    HDL 77 06/27/2024    HDL 59 11/18/2020    LDL 79 06/27/2024     (H) 11/18/2020    TRIG 95 06/27/2024    TRIG 207 (H) 11/18/2020     LIVER ENZYME RESULTS:  Lab Results   Component Value Date    AST 27 06/27/2024    AST 98 (H) 11/18/2020    ALT 18 06/27/2024    ALT 74 (H) 11/18/2020     CBC RESULTS:  Lab Results   Component Value Date    WBC 5.5 06/27/2024    WBC 6.3 02/03/2019    RBC 5.15 06/27/2024    RBC 4.33 (L) 02/03/2019    HGB 14.8 06/27/2024    HGB 14.2 02/03/2019    HCT 43.0 06/27/2024    HCT 40.6 02/03/2019    MCV 84 06/27/2024    MCV 94 02/03/2019    MCH 28.7 06/27/2024    MCH 32.8 02/03/2019    MCHC 34.4 06/27/2024    MCHC 35.0 02/03/2019    RDW 12.7 06/27/2024    RDW 12.0 02/03/2019     (L) 06/27/2024     (L) 02/03/2019     BMP RESULTS:  Lab Results   Component Value Date     06/27/2024    NA " 134 11/18/2020    POTASSIUM 4.8 06/27/2024    POTASSIUM 4.7 08/23/2022    POTASSIUM 4.2 11/18/2020    CHLORIDE 105 06/27/2024    CHLORIDE 106 08/23/2022    CHLORIDE 103 11/18/2020    CO2 23 06/27/2024    CO2 23 08/23/2022    CO2 26 11/18/2020    ANIONGAP 12 06/27/2024    ANIONGAP 7 08/23/2022    ANIONGAP 5 11/18/2020     (H) 06/27/2024     (H) 08/23/2022     (H) 11/18/2020    BUN 11.5 06/27/2024    BUN 11 08/23/2022    BUN 12 11/18/2020    CR 0.87 06/27/2024    CR 0.87 11/18/2020    GFRESTIMATED >90 06/27/2024    GFRESTIMATED 22 (L) 08/01/2022    GFRESTIMATED >90 11/18/2020    GFRESTBLACK >90 11/18/2020    SAMMY 9.5 06/27/2024    SAMMY 9.3 11/18/2020      A1C RESULTS:  Lab Results   Component Value Date    A1C 6.3 (H) 06/27/2024     INR RESULTS:  Lab Results   Component Value Date    INR 1.10 08/04/2022    INR 1.08 08/01/2022    INR 0.95 02/13/2017

## 2025-01-27 NOTE — TELEPHONE ENCOUNTER
10/15 OV note-   ...We discussed surgical weight loss options given his BMI is 41.42 and he  has the following weight related comorbid conditions: Prediabetes, hypertension, severe anxiety and panic, hyperlipidemia, nonalcoholic fatty liver disease, and insomnia, with a history of alcohol abuse disorder -now sober.  He is interested in learning more about weight loss surgery so hiyalife messages sent to him with videos on weight loss surgery and the bariatric surgery program was messaged today so that they can give him a call to help him transition there if that is his wish.     Medications started today: Zepbound. If this medication is not attainable due to supply chain shortage or cost, please plan another visit (evisit, video or in person)    Current goal(s): start Zepbound, transition to bariatric surgery program. Traveling to [facility] later this year.

## 2025-01-28 ENCOUNTER — PRE VISIT (OUTPATIENT)
Dept: PSYCHIATRY | Facility: CLINIC | Age: 65
End: 2025-01-28

## 2025-01-28 ENCOUNTER — OFFICE VISIT (OUTPATIENT)
Dept: CARDIOLOGY | Facility: CLINIC | Age: 65
End: 2025-01-28
Payer: COMMERCIAL

## 2025-01-28 VITALS
BODY MASS INDEX: 39.76 KG/M2 | DIASTOLIC BLOOD PRESSURE: 92 MMHG | HEIGHT: 71 IN | WEIGHT: 284 LBS | SYSTOLIC BLOOD PRESSURE: 164 MMHG | HEART RATE: 92 BPM | OXYGEN SATURATION: 100 %

## 2025-01-28 DIAGNOSIS — I42.2 HYPERTROPHIC CARDIOMYOPATHY (H): Primary | ICD-10-CM

## 2025-01-28 DIAGNOSIS — I10 ESSENTIAL HYPERTENSION: ICD-10-CM

## 2025-01-28 DIAGNOSIS — I51.7 LVH (LEFT VENTRICULAR HYPERTROPHY): ICD-10-CM

## 2025-01-28 DIAGNOSIS — R06.09 DOE (DYSPNEA ON EXERTION): ICD-10-CM

## 2025-01-28 DIAGNOSIS — F41.9 SEVERE ANXIETY: ICD-10-CM

## 2025-01-28 PROCEDURE — 99214 OFFICE O/P EST MOD 30 MIN: CPT | Performed by: NURSE PRACTITIONER

## 2025-01-28 PROCEDURE — G2211 COMPLEX E/M VISIT ADD ON: HCPCS | Performed by: NURSE PRACTITIONER

## 2025-01-28 RX ORDER — CLONAZEPAM 0.5 MG/1
0.5 TABLET ORAL DAILY PRN
COMMUNITY
Start: 2025-01-28

## 2025-01-28 RX ORDER — AMLODIPINE BESYLATE 2.5 MG/1
5 TABLET ORAL DAILY
Qty: 90 TABLET | Refills: 3 | Status: SHIPPED | OUTPATIENT
Start: 2025-01-28

## 2025-01-28 NOTE — LETTER
1/28/2025    Fifi Fleming MD  [address]  [place] [address]    RE: Angus Wynne       Dear Colleague,     I had the pleasure of seeing Angus Wynne in the [facility].              ~Cardiology Clinic Visit~    Primary Cardiologist: Dr. Camargo  Reason for visit: Annual follow up    History of Present Illness    Angus Wynne is a pleasant 62 year old patient with past medical history significant for:     Mild CAD: Noted on CTA in 2019 with a total calcium score of 52 and trivial nonocclusive coronary artery disease  Hypothyroidism  GERD  Anxiety  Hypertension  EtOH abuse     The patient has a reported history of a previous coronary angiogram approximately 10 years ago in [place] for an abnormal EKG.  The patient reported that he had normal coronaries at that time.  In 2017 he had an EKG performed for shortness of breath that showed symmetric T wave inversions in V4 through V6 and was recommended that he be evaluated in the hospital.  He then went under stress echocardiogram that noted normal LV systolic function at rest without evidence of exercise-induced ischemia.     Unfortunately, in 2/2019 he slipped on a patch of ice and hurt his vertebrae and had a T9 fracture.  It was recommended that time to undergo surgical intervention however the patient elected for conservative management.  In 2019 the patient was seen in cardiology clinic and underwent a CTA that showed a total calcium score of 52 with trivial nonocclusive CAD.  In August 2022 he was hospitalized for acute rhabdomyolysis with a CK of 16,000 and CHAS.       The patient was evaluated by Dr. Camargo on 4/27/2023 due to dyspnea on exertion.  Echocardiogram 4/26/2023 showed mild LVH with a hyperdynamic LV and LVEF greater than 70%.  LVOT gradient of 43 mmHg with Valsalva 124 mm.  MV was not well visualized but suspect chordal SHIKHA, AV gradients were attempted but could not be measured.     We proceeded with stress  cardiac MRI showing mild ischemia in the apical lateral portion, and hyperdynamic LV systolic function with LVEF 80.5%, RVEF 69.2%.  MRI showed asymmetric LVH with maximal basal septal thickness 16 mm and apical hypertrophy was noted with apical lateral thickness of 19 mm.  SHIKHA of mitral valve noted with evidence of LV outflow obstruction.  LA hyperenhancement revealed mild, patchy non-CAD scar in the apex and apical lateral region.  Collective findings representative of hypertrophic cardiomyopathy.  He was started on Toprol XL 25 mg daily.    We then completed CT coronary angiogram showing total calcium score 102 and trivial <25% stenosis of the LM.  Findings reviewed at his follow up with Dr. Camargo.  In regards to his dyspnea, it was felt this was likely due to overall deconditioning vs ischemic coronary disease.    Overall, patient is doing well.  He had a chest pain episode when he was in [place].  Echo completed and reviewed today.  EF improved to 55%.  He continues to struggle with severe anxiety.  He maintains he would like to loose weight and exercise more.  He has recently started Tirzepatide and is meeting with bariatric surgery team later this month.  __________________________________________________________________         Assessment and Impression:     Dyspnea on exertion  Hypertrophic Cardiomyopathy  Trivial nonocclusive CAD  -Ongoing dyspnea, stable.    -No palpitations  -Echocardiogram showing mild concentric LVH, hyperdynamic LVEF greater than 70%, LVOT gradient 43mmH, with valsalva is 124 mmHg, MV is not well visualized, suspect chordal SHIKHA.  -EF 55% on December 2024 echo report from [place].  -Stress CMR suggested of hypertrophic cardiomyopathy.  -CT coronary angiogram 2023: Total calcium score 102 (72.8th percentile); the left main is trivially calcified is trivially stenosed (<25%) with mixed plaque.   -BP well controlled.     Anxiety  Hypertension         Recommendations and Plan:     Start  Amlodipine 5 mg daily.  Pt counseled.  Ongoing weight loss (down 10# since starting Tirzepatide 2-weeks ago).  Start dedicated exercise regimen.  LUIS visit in 6-months.  If ongoing IBARRA despite weight loss and improved functional capacity, consider repeating echocardiogram and stress test at that time.  __________________________________________________________________    Thank you for the opportunity to participate in this pleasant patient's care.    We would be happy to see this patient sooner for any concerns in the meantime.    YUKO Chacon, CNP   Nurse Practitioner  [facility]    Today's clinic visit entailed:  Review of external notes as documented elsewhere in note  The following tests were independently interpreted by me as noted in my documentation: echo, labs  Prescription drug management  The level of medical decision making during this visit was of moderate complexity.    Review of the result(s) of each unique test - cardiac testing, cardiac imaging, labs,  Care everywhere reviewed for additional records to facilitate comprehensive patient care.    Orders this Visit:  Orders Placed This Encounter   Procedures     Follow-Up with Cardiology- LUIS     Orders Placed This Encounter   Medications     clonazePAM (KLONOPIN) 0.5 MG tablet     Sig: Take 1 tablet (0.5 mg) by mouth daily as needed for anxiety.     amLODIPine (NORVASC) 2.5 MG tablet     Sig: Take 2 tablets (5 mg) by mouth daily.     Dispense:  90 tablet     Refill:  3     Medications Discontinued During This Encounter   Medication Reason     fexofenadine (ALLEGRA) 180 MG tablet Med Rec(No AVS / No eCancel)     folic acid (FOLVITE) 400 MCG tablet Med Rec(No AVS / No eCancel)     Encounter Diagnoses   Name Primary?     Hypertrophic cardiomyopathy (H) Yes     IBARRA (dyspnea on exertion)      LVH (left ventricular hypertrophy)      Severe anxiety      Essential hypertension      CURRENT MEDICATIONS:  Current Outpatient  Medications   Medication Sig Dispense Refill     acetaminophen (TYLENOL) 325 MG tablet Take 2 tablets (650 mg) by mouth every 4 hours as needed for mild pain 50 tablet 0     amLODIPine (NORVASC) 2.5 MG tablet Take 2 tablets (5 mg) by mouth daily. 90 tablet 3     clonazePAM (KLONOPIN) 0.5 MG tablet Take 1 tablet (0.5 mg) by mouth daily as needed for anxiety.       gabapentin (NEURONTIN) 100 MG capsule TAKE 2 CAPSULE BY MOUTH AT BEDTIME AND 1 CAPSULE IN THE MORNING 90 capsule 1     hydrOXYzine HCl (ATARAX) 25 MG tablet TAKE 1 TABLET(25 MG) BY MOUTH TWICE DAILY AS NEEDED FOR ANXIETY 60 tablet 4     levothyroxine (SYNTHROID/LEVOTHROID) 175 MCG tablet Take 1 tablet (175 mcg) by mouth daily 90 tablet 3     losartan (COZAAR) 100 MG tablet Take 1 tablet (100 mg) by mouth daily 90 tablet 2     metoprolol succinate ER (TOPROL XL) 25 MG 24 hr tablet Take 1 tablet (25 mg) by mouth every evening 90 tablet 3     rosuvastatin (CRESTOR) 40 MG tablet Take 1 tablet (40 mg) by mouth daily 90 tablet 3     thiamine (B-1) 100 MG tablet TAKE 1 TABLET BY MOUTH DAILY 90 tablet 3     tirzepatide-Weight Management (ZEPBOUND) 10 MG/0.5ML prefilled pen Inject 0.5 mLs (10 mg) subcutaneously every 7 days. 2 mL 0     tirzepatide-Weight Management (ZEPBOUND) 10 MG/0.5ML prefilled pen Inject 0.5 mLs (10 mg) subcutaneously every 7 days. 2 mL 0     tirzepatide-Weight Management (ZEPBOUND) 12.5 MG/0.5ML prefilled pen Inject 0.5 mLs (12.5 mg) subcutaneously every 7 days. 2 mL 0     tirzepatide-Weight Management (ZEPBOUND) 15 MG/0.5ML prefilled pen Inject 0.5 mLs (15 mg) subcutaneously every 7 days. 2 mL 0     traZODone (DESYREL) 50 MG tablet TAKE 1 TO 2 TABLETS(50  MG) BY MOUTH AT BEDTIME 180 tablet 2     celecoxib (CELEBREX) 100 MG capsule Take 1 capsule (100 mg) by mouth 2 times daily as needed for moderate pain. (Patient not taking: Reported on 1/28/2025) 60 capsule 1     fluticasone (FLONASE) 50 MCG/ACT nasal spray Spray 2 sprays into both  "nostrils daily SHAKE LIQUID AND USE (Patient not taking: Reported on 1/28/2025) 48 g 11     sertraline (ZOLOFT) 100 MG tablet Take 1.5 tablets (150 mg) by mouth daily (Patient not taking: Reported on 1/28/2025) 45 tablet 3     tiZANidine (ZANAFLEX) 4 MG tablet Take 1 tablet (4 mg) by mouth 2 times daily as needed for muscle spasms. (Patient not taking: Reported on 1/28/2025) 40 tablet 0     ALLERGIES   No Known Allergies  PAST MEDICAL, SURGICAL, FAMILY, SOCIAL HISTORY:  History was reviewed and updated as needed, see medical record.    Review of Systems:  A 10-point Review Of Systems is otherwise normal except for that which is noted in the HPI and interval summary.    Physical Exam:    Vitals: BP (!) 164/92   Pulse 92   Ht 1.803 m (5' 11\")   Wt 128.8 kg (284 lb)   SpO2 100%   BMI 39.61 kg/m    Constitutional: Appears stated age, well nourished, NAD.  Neck: Supple. Carotid pulses full and equal.   Respiratory: Non-labored. Lungs CTAB.  Cardiovascular: RRR, normal S1 and S2. No M/G/R.  No edema.  GI: Soft, non-distended, non-tender.  Skin: Warm and dry.   Musculoskeletal/Extremities: Symmetrical movement.  Neurologic: No gross focal deficits. Alert, awake.  Psychiatric: Affect appropriate. Mentation normal.    Recent Lab Results:  LIPID RESULTS:  Lab Results   Component Value Date    CHOL 175 06/27/2024    CHOL 209 (H) 11/18/2020    HDL 77 06/27/2024    HDL 59 11/18/2020    LDL 79 06/27/2024     (H) 11/18/2020    TRIG 95 06/27/2024    TRIG 207 (H) 11/18/2020     LIVER ENZYME RESULTS:  Lab Results   Component Value Date    AST 27 06/27/2024    AST 98 (H) 11/18/2020    ALT 18 06/27/2024    ALT 74 (H) 11/18/2020     CBC RESULTS:  Lab Results   Component Value Date    WBC 5.5 06/27/2024    WBC 6.3 02/03/2019    RBC 5.15 06/27/2024    RBC 4.33 (L) 02/03/2019    HGB 14.8 06/27/2024    HGB 14.2 02/03/2019    HCT 43.0 06/27/2024    HCT 40.6 02/03/2019    MCV 84 06/27/2024    MCV 94 02/03/2019    MCH 28.7 " 06/27/2024    MCH 32.8 02/03/2019    MCHC 34.4 06/27/2024    MCHC 35.0 02/03/2019    RDW 12.7 06/27/2024    RDW 12.0 02/03/2019     (L) 06/27/2024     (L) 02/03/2019     BMP RESULTS:  Lab Results   Component Value Date     06/27/2024     11/18/2020    POTASSIUM 4.8 06/27/2024    POTASSIUM 4.7 08/23/2022    POTASSIUM 4.2 11/18/2020    CHLORIDE 105 06/27/2024    CHLORIDE 106 08/23/2022    CHLORIDE 103 11/18/2020    CO2 23 06/27/2024    CO2 23 08/23/2022    CO2 26 11/18/2020    ANIONGAP 12 06/27/2024    ANIONGAP 7 08/23/2022    ANIONGAP 5 11/18/2020     (H) 06/27/2024     (H) 08/23/2022     (H) 11/18/2020    BUN 11.5 06/27/2024    BUN 11 08/23/2022    BUN 12 11/18/2020    CR 0.87 06/27/2024    CR 0.87 11/18/2020    GFRESTIMATED >90 06/27/2024    GFRESTIMATED 22 (L) 08/01/2022    GFRESTIMATED >90 11/18/2020    GFRESTBLACK >90 11/18/2020    SAMMY 9.5 06/27/2024    SAMMY 9.3 11/18/2020      A1C RESULTS:  Lab Results   Component Value Date    A1C 6.3 (H) 06/27/2024     INR RESULTS:  Lab Results   Component Value Date    INR 1.10 08/04/2022    INR 1.08 08/01/2022    INR 0.95 02/13/2017                     Thank you for allowing me to participate in the care of your patient.      Sincerely,     YUKO Chacon [facility]  cc:   No referring provider defined for this encounter.

## 2025-01-28 NOTE — TELEPHONE ENCOUNTER
PSYCHIATRY CLINIC PHONE INTAKE     SERVICES REQUESTED / INTERESTED IN          Med Management    Presenting Problem and Brief History                              What would you like to be seen for? (brief description):    20 years ago, anxiety, panic, gets very stiff and can't drive because of it    Reports having been on klonopin for a long time - used to be half mg am and pm. Using less now, previous pcp changed med from klonopin to gabapentin/hydroxyzin/trazodone - these aren't as good as he was hoping.    Wants to feel comfortable doing normal things. At that point wants to reduce medication use.    Reports he used to use alcohol to excess, but has not for some time now due to doctor's instructions.      Have you received a mental health diagnosis? Yes   Which one (s): anxiety, panic  Is there any history of developmental delay?  No   Are you currently seeing a mental health provider?  No            Who / month last seen:  no - single consult with [place] therapist  Do you have mental health records elsewhere?  No  Will you sign a release so we can obtain them?  Yes    Have you ever been hospitalized for psychiatric reasons?  No  Describe:  just emergency room for panic attacks    Do you have current thoughts of self-harm?  No    Do you currently have thoughts of harming others?  No    Do you have any safety concerns? No   If yes to these, offer to reach out to a  for follow up.      Substance Use History     Do you have any history of alcohol or other substance use?  Yes  Describe:  alcohol - this is under control now  Have you ever received treatment for this?  No    Describe:  did it on own with pcp guidance     Social History     Who is the patient's a guardian?   self     Name / number: self  Have you had an ACT team in last 12 months?  No  Describe: na   OK to leave a detailed voicemail?  Yes        Medical/ Surgical History                                   Patient Active Problem List    Diagnosis    BPPV (benign paroxysmal positional vertigo), unspecified laterality    Other specified hypothyroidism    Hx of acute alcoholic hepatitis    Hx of colonic polyp    Gastroesophageal reflux disease, esophagitis presence not specified    Vitamin D deficiency    Anxiety    History of alcohol use    Neck pain    Cervicalgia    Cervical spondylosis without myelopathy    Essential hypertension    Hyperlipidemia with target LDL less than 130    Nasal congestion    History of colonic polyps    Vertigo    Lumbago    IBARRA (dyspnea on exertion)    Dysphagia, unspecified type    Elevated troponin    CHAS (acute kidney injury)    Class 3 severe obesity due to excess calories with body mass index (BMI) of 40.0 to 44.9 in adult, unspecified whether serious comorbidity present (H)    Assistance needed with transportation    Hypertrophic cardiomyopathy (H)    NAFLD (nonalcoholic fatty liver disease)    Prediabetes          Medications             Have you taken >3 psychiatric medications in your past?  y  Do you currently take 5 or more medications, including prescriptions, supplements, and other over the counter products?  y    If YES to at least one of these questions:   As part of your evaluation in our clinic, we have specially trained pharmacists as part of your care team. Your provider would like for you to meet with one of our pharmacists to review your current and past medications, ensure your med list is up to date, and queue up any questions or concerns you have about medications. They will review all of your medications, not just for mental health, to help ensure you know what you re taking and that everything is working together.     Please schedule patient in [facility] (Fernanda Pearson or Radha Temple) for 60m MTM in any green space as virtual (video), telephone, or in person (designated in person days per Epic templates).  -Appt notes can say  Psych eval on xx/xx   -Route telephone encounter to  the pharmacist who will be seeing the patient.  If patient has questions about insurance coverage or billing, please still schedule the visit and refer them to call the [facility] coordinators at [phone].    Current Outpatient Medications   Medication Sig Dispense Refill    acetaminophen (TYLENOL) 325 MG tablet Take 2 tablets (650 mg) by mouth every 4 hours as needed for mild pain 50 tablet 0    amLODIPine (NORVASC) 2.5 MG tablet Take 2 tablets (5 mg) by mouth daily. 90 tablet 3    celecoxib (CELEBREX) 100 MG capsule Take 1 capsule (100 mg) by mouth 2 times daily as needed for moderate pain. (Patient not taking: Reported on 1/28/2025) 60 capsule 1    clonazePAM (KLONOPIN) 0.5 MG tablet Take 1 tablet (0.5 mg) by mouth daily as needed for anxiety.      fluticasone (FLONASE) 50 MCG/ACT nasal spray Spray 2 sprays into both nostrils daily SHAKE LIQUID AND USE (Patient not taking: Reported on 1/28/2025) 48 g 11    gabapentin (NEURONTIN) 100 MG capsule TAKE 2 CAPSULE BY MOUTH AT BEDTIME AND 1 CAPSULE IN THE MORNING 90 capsule 1    hydrOXYzine HCl (ATARAX) 25 MG tablet TAKE 1 TABLET(25 MG) BY MOUTH TWICE DAILY AS NEEDED FOR ANXIETY 60 tablet 4    levothyroxine (SYNTHROID/LEVOTHROID) 175 MCG tablet Take 1 tablet (175 mcg) by mouth daily 90 tablet 3    losartan (COZAAR) 100 MG tablet Take 1 tablet (100 mg) by mouth daily 90 tablet 2    metoprolol succinate ER (TOPROL XL) 25 MG 24 hr tablet Take 1 tablet (25 mg) by mouth every evening 90 tablet 3    rosuvastatin (CRESTOR) 40 MG tablet Take 1 tablet (40 mg) by mouth daily 90 tablet 3    sertraline (ZOLOFT) 100 MG tablet Take 1.5 tablets (150 mg) by mouth daily (Patient not taking: Reported on 1/28/2025) 45 tablet 3    thiamine (B-1) 100 MG tablet TAKE 1 TABLET BY MOUTH DAILY 90 tablet 3    tirzepatide-Weight Management (ZEPBOUND) 10 MG/0.5ML prefilled pen Inject 0.5 mLs (10 mg) subcutaneously every 7 days. 2 mL 0    tirzepatide-Weight Management (ZEPBOUND) 10 MG/0.5ML prefilled  pen Inject 0.5 mLs (10 mg) subcutaneously every 7 days. 2 mL 0    tirzepatide-Weight Management (ZEPBOUND) 12.5 MG/0.5ML prefilled pen Inject 0.5 mLs (12.5 mg) subcutaneously every 7 days. 2 mL 0    tirzepatide-Weight Management (ZEPBOUND) 15 MG/0.5ML prefilled pen Inject 0.5 mLs (15 mg) subcutaneously every 7 days. 2 mL 0    tiZANidine (ZANAFLEX) 4 MG tablet Take 1 tablet (4 mg) by mouth 2 times daily as needed for muscle spasms. (Patient not taking: Reported on 1/28/2025) 40 tablet 0    traZODone (DESYREL) 50 MG tablet TAKE 1 TO 2 TABLETS(50  MG) BY MOUTH AT BEDTIME 180 tablet 2         DISPOSITION      Phone screen completed with patient. Scheduled for MTM on 2/3/25 and AGE on 2/17/25. Emailed new patient packet.    Complex  Annie Hayes

## 2025-01-28 NOTE — PATIENT INSTRUCTIONS
Thank you for your visit with the [facility] today.    Today's Summary:    Start Amlodipine 5 mg daily in the morning.  Work on healthy weight loss for the next 6-months and start a dedicated exercise regimen.    Follow-up:  Cardiology follow up at [facility]: 6-months with Marii.     Cardiology Scheduling ~[phone]  Cardiology Clinic RN ~ [phone]    It was a pleasure seeing you today.     YUKO Chacon, CNP  Certified Nurse Practitioner  [facility]  January 28, 2025  ________________________________________________________

## 2025-01-29 ENCOUNTER — MYC MEDICAL ADVICE (OUTPATIENT)
Dept: CARDIOLOGY | Facility: CLINIC | Age: 65
End: 2025-01-29
Payer: COMMERCIAL

## 2025-01-29 DIAGNOSIS — I10 ESSENTIAL HYPERTENSION: ICD-10-CM

## 2025-01-29 DIAGNOSIS — F41.9 ANXIETY: ICD-10-CM

## 2025-01-29 DIAGNOSIS — G47.09 OTHER INSOMNIA: ICD-10-CM

## 2025-01-29 RX ORDER — GABAPENTIN 100 MG/1
CAPSULE ORAL
Qty: 90 CAPSULE | Refills: 0 | Status: SHIPPED | OUTPATIENT
Start: 2025-01-29

## 2025-01-29 RX ORDER — SERTRALINE HYDROCHLORIDE 100 MG/1
150 TABLET, FILM COATED ORAL DAILY
Qty: 135 TABLET | Refills: 0 | Status: SHIPPED | OUTPATIENT
Start: 2025-01-29

## 2025-01-30 RX ORDER — CLONAZEPAM 0.5 MG/1
0.5 TABLET ORAL DAILY PRN
OUTPATIENT
Start: 2025-01-30

## 2025-01-31 ASSESSMENT — ANXIETY QUESTIONNAIRES
5. BEING SO RESTLESS THAT IT IS HARD TO SIT STILL: SEVERAL DAYS
6. BECOMING EASILY ANNOYED OR IRRITABLE: SEVERAL DAYS
1. FEELING NERVOUS, ANXIOUS, OR ON EDGE: SEVERAL DAYS
8. IF YOU CHECKED OFF ANY PROBLEMS, HOW DIFFICULT HAVE THESE MADE IT FOR YOU TO DO YOUR WORK, TAKE CARE OF THINGS AT HOME, OR GET ALONG WITH OTHER PEOPLE?: SOMEWHAT DIFFICULT
GAD7 TOTAL SCORE: 7
7. FEELING AFRAID AS IF SOMETHING AWFUL MIGHT HAPPEN: SEVERAL DAYS
7. FEELING AFRAID AS IF SOMETHING AWFUL MIGHT HAPPEN: SEVERAL DAYS
2. NOT BEING ABLE TO STOP OR CONTROL WORRYING: SEVERAL DAYS
3. WORRYING TOO MUCH ABOUT DIFFERENT THINGS: SEVERAL DAYS
GAD7 TOTAL SCORE: 7
IF YOU CHECKED OFF ANY PROBLEMS ON THIS QUESTIONNAIRE, HOW DIFFICULT HAVE THESE PROBLEMS MADE IT FOR YOU TO DO YOUR WORK, TAKE CARE OF THINGS AT HOME, OR GET ALONG WITH OTHER PEOPLE: SOMEWHAT DIFFICULT
GAD7 TOTAL SCORE: 7
4. TROUBLE RELAXING: SEVERAL DAYS

## 2025-02-03 ENCOUNTER — VIRTUAL VISIT (OUTPATIENT)
Dept: PHARMACY | Facility: CLINIC | Age: 65
End: 2025-02-03
Payer: COMMERCIAL

## 2025-02-03 DIAGNOSIS — R52 PAIN: ICD-10-CM

## 2025-02-03 DIAGNOSIS — Z78.9 TAKES DIETARY SUPPLEMENTS: ICD-10-CM

## 2025-02-03 DIAGNOSIS — J30.1 SEASONAL ALLERGIC RHINITIS DUE TO POLLEN: ICD-10-CM

## 2025-02-03 DIAGNOSIS — E66.01 CLASS 3 SEVERE OBESITY DUE TO EXCESS CALORIES WITH BODY MASS INDEX (BMI) OF 40.0 TO 44.9 IN ADULT, UNSPECIFIED WHETHER SERIOUS COMORBIDITY PRESENT (H): ICD-10-CM

## 2025-02-03 DIAGNOSIS — E03.9 HYPOTHYROIDISM, UNSPECIFIED TYPE: ICD-10-CM

## 2025-02-03 DIAGNOSIS — F33.0 MILD EPISODE OF RECURRENT MAJOR DEPRESSIVE DISORDER: Primary | ICD-10-CM

## 2025-02-03 DIAGNOSIS — F41.1 GAD (GENERALIZED ANXIETY DISORDER): ICD-10-CM

## 2025-02-03 DIAGNOSIS — I10 BENIGN ESSENTIAL HYPERTENSION: ICD-10-CM

## 2025-02-03 DIAGNOSIS — E78.5 HYPERLIPIDEMIA, UNSPECIFIED HYPERLIPIDEMIA TYPE: ICD-10-CM

## 2025-02-03 DIAGNOSIS — E66.813 CLASS 3 SEVERE OBESITY DUE TO EXCESS CALORIES WITH BODY MASS INDEX (BMI) OF 40.0 TO 44.9 IN ADULT, UNSPECIFIED WHETHER SERIOUS COMORBIDITY PRESENT (H): ICD-10-CM

## 2025-02-03 PROCEDURE — 99605 MTMS BY PHARM NP 15 MIN: CPT | Mod: 95 | Performed by: PHARMACIST

## 2025-02-03 RX ORDER — FOLIC ACID 0.4 MG
400 TABLET ORAL DAILY
COMMUNITY

## 2025-02-03 NOTE — PROGRESS NOTES
Medication Therapy Management (MTM) Encounter    ASSESSMENT:                            Medication Adherence/Access: No issues identified.    Mental Health:   Angus is continuing to struggle with low mood 2 to 3 days a week and persistent anxiety when in public. Clonazepam has been beneficial for sleep when his other nighttime medications are not being effective. Has upcoming psychiatry intake to further review history of symptoms and would consider switching antidepressant for improved efficacy.    Hypertension:     Angus is not meeting a blood pressure goal of <130/80 mmHg. He was recently started on amlodipine 5 mg daily in addition to taking metoprolol ER 25 mg and losartan 100 mg. He should also receive some blood pressure lowering benefit from weight loss with Zepbound. He may have had a higher reading given the anxiety symptoms he describes when being in public. He may benefit from a blood pressure recheck at this next clinic visit.     Hyperlipidemia:     Angus is at LDL goal of <100 mg/dL on rosuvastatin 40 mg daily and may have further LDL lowering benefit from using Zepbound as well.       Allergy    Stable     Hypothyroidism:   Stable     Weight Management:   Stable    Pain:     Stable     Supplements:     Stable         PLAN:                            -Continue current medications     Follow-up: as needed for any medication adjustments after meeting with psychiatry.   Psychiatry intake on 2/17/25    SUBJECTIVE/OBJECTIVE:                          Angus Wynne is a 64 year old male seen for an initial visit. He was referred to me from psychiatry intake.      Reason for visit: Medication reconciliation.    Allergies/ADRs: Reviewed in chart  Past Medical History: Reviewed in chart  Tobacco: He reports that he quit smoking about 9 years ago. His smoking use included cigarettes. He has never used smokeless tobacco.  Alcohol: none; history of significant use    Medication Adherence/Access: no issues  reported.    Mental Health   Trazodone 50 - 100 mg at bedtime. (Reports taking 100 mg consistently)   Sertraline 150 mg daily (Patient reports taking only 100 mg)  Gabapentin 200 mg at bedtime and 100 mg every morning   Hydroxyzine 25 mg twice daily as needed for anxiety (patient reports only taking this medication at bedtime)  Clonazepam 0.5 mg daily as needed for anxiety at bedtime (Started 2 days ago)     Takes trazodone, gabapentin and hydroxyzine around 7:30 PM and uses clonazepam at about 9 PM if he still cannot sleep, which he started 2 nights ago and was very helpful.   Restarted sertraline 2 weeks ago after traveling to [facility] and is only taking 100 mg now.  Overall has been taking for about 10 years. No other antidepressant trials.   Feels more comfortable at home than leaving his house and reports increased anxiety when out in public. Denied any anxiety while at home. Sometimes has panic attacks about every 6 months.  Depression symptoms come and go; 2 or 3 days a week for a short period of time. Reports low mood during these spells that can cesar after rest or activity. Denied thoughts of self-harm.     Hypertension   Metoprolol succinate 25 mg once every evening.   Losartan 100 mg daily   Amlodipine 5 mg daily   Patient reports no current medication side effects  Patient does not self-monitor blood pressure.    Blood pressure was 164/92 mmHg at last clinic visit on 1/28/25. Prior to this visit he had been having blood pressures in the 130-140/80-90 range.      Hyperlipidemia   Rosuvastatin 40 mg daily  Patient reports no significant myalgias or other side effects.  The 10-year ASCVD risk score (Juan David DK, et al., 2019) is: 17.4%    Values used to calculate the score:      Age: 64 years      Sex: Male      Is Non- : No      Diabetic: No      Tobacco smoker: No      Systolic Blood Pressure: 164 mmHg      Is BP treated: Yes      HDL Cholesterol: 77 mg/dL      Total Cholesterol: 175  mg/dL     Allergy   Flonase (fluticasone) nasal spray - 2 spray(s) into each nostril once daily   Uses this medication seasonally - not currently using.      Hypothyroidism   Levothyroxine 175 mcg daily.   Patient is having the following symptoms: none.   Last TSH check in June was normal      Weight management    Zepbound 10 mg injected once weekly     Reports that this has been going well. Feels more full after eating, food intake has gone down and he reports that he has lost weight.  Started 2 weeks ago after traveling to [facility].     Pain:   Acetaminophen 650 mg every 4 hours as needed for mild pain   Uses occasionally and finds in beneficial for pain.     Supplements:   Thiamine (B-1) 100 mg daily  Folic acid 400 mcg daily   No concerns.    ----------------  I spent 20 minutes with this patient today.    A summary of these recommendations was sent via [employer].    Al Loja, PharmD [employer] Student     Radha Temple, PharmD  Medication Therapy Management Pharmacist  [facility]    Telemedicine Visit Details  The patient's medications can be safely assessed via a telemedicine encounter.  Type of service:  Video Conference via [employer]  Originating Location (pt. Location): Home    Distant Location (provider location):  Off-site  Start Time:  10:03 AM  End Time: 10:23 AM      Medication Therapy Recommendations  No medication therapy recommendations to display

## 2025-02-03 NOTE — PATIENT INSTRUCTIONS
"Recommendations from today's MTM visit:                                                    MTM (medication therapy management) is a service provided by a clinical pharmacist designed to help you get the most of out of your medicines.   Today we reviewed what your medicines are for, how to know if they are working, that your medicines are safe and how to make your medicine regimen as easy as possible.      Continue current medication.      Follow-up: as needed    It was great speaking with you today.  I value your experience and would be very thankful for your time in providing feedback in our clinic survey. In the next few days, you may receive an email or text message from [employer] with a link to a survey related to your  clinical pharmacist.\"     To schedule another MTM appointment, please call the clinic directly or you may call the MTM scheduling line at [phone] or toll-free at [phone].     My Clinical Pharmacist's contact information:                                                      Please feel free to contact me with any questions or concerns you have.      Radha Temple, PharmD  Medication Therapy Management Pharmacist  [facility]    "

## 2025-02-03 NOTE — Clinical Note
You see this patient in the coming weeks. Has only ever taken sertraline up to 150mg daily for the last 10 years. See note for details. Let me know if I can help going forward.

## 2025-02-05 ENCOUNTER — OFFICE VISIT (OUTPATIENT)
Dept: FAMILY MEDICINE | Facility: CLINIC | Age: 65
End: 2025-02-05
Payer: COMMERCIAL

## 2025-02-05 VITALS
TEMPERATURE: 97.1 F | DIASTOLIC BLOOD PRESSURE: 80 MMHG | HEART RATE: 78 BPM | RESPIRATION RATE: 19 BRPM | OXYGEN SATURATION: 98 % | BODY MASS INDEX: 39.47 KG/M2 | WEIGHT: 283 LBS | SYSTOLIC BLOOD PRESSURE: 126 MMHG

## 2025-02-05 DIAGNOSIS — I42.2 HYPERTROPHIC CARDIOMYOPATHY (H): ICD-10-CM

## 2025-02-05 DIAGNOSIS — G47.09 OTHER INSOMNIA: ICD-10-CM

## 2025-02-05 DIAGNOSIS — E66.01 CLASS 3 SEVERE OBESITY DUE TO EXCESS CALORIES WITH BODY MASS INDEX (BMI) OF 40.0 TO 44.9 IN ADULT, UNSPECIFIED WHETHER SERIOUS COMORBIDITY PRESENT (H): ICD-10-CM

## 2025-02-05 DIAGNOSIS — E03.8 OTHER SPECIFIED HYPOTHYROIDISM: ICD-10-CM

## 2025-02-05 DIAGNOSIS — E66.813 CLASS 3 SEVERE OBESITY DUE TO EXCESS CALORIES WITH BODY MASS INDEX (BMI) OF 40.0 TO 44.9 IN ADULT, UNSPECIFIED WHETHER SERIOUS COMORBIDITY PRESENT (H): ICD-10-CM

## 2025-02-05 DIAGNOSIS — F41.9 ANXIETY: Primary | ICD-10-CM

## 2025-02-05 DIAGNOSIS — I10 ESSENTIAL HYPERTENSION: ICD-10-CM

## 2025-02-05 DIAGNOSIS — E78.5 HYPERLIPIDEMIA WITH TARGET LDL LESS THAN 130: ICD-10-CM

## 2025-02-05 PROCEDURE — G2211 COMPLEX E/M VISIT ADD ON: HCPCS | Performed by: FAMILY MEDICINE

## 2025-02-05 PROCEDURE — 99214 OFFICE O/P EST MOD 30 MIN: CPT | Performed by: FAMILY MEDICINE

## 2025-02-05 RX ORDER — SERTRALINE HYDROCHLORIDE 100 MG/1
150 TABLET, FILM COATED ORAL DAILY
Qty: 135 TABLET | Refills: 0 | Status: CANCELLED | OUTPATIENT
Start: 2025-02-05

## 2025-02-05 RX ORDER — LOSARTAN POTASSIUM 100 MG/1
100 TABLET ORAL DAILY
Qty: 90 TABLET | Refills: 2 | Status: CANCELLED | OUTPATIENT
Start: 2025-02-05

## 2025-02-05 RX ORDER — TRAZODONE HYDROCHLORIDE 50 MG/1
50-100 TABLET ORAL AT BEDTIME
Qty: 180 TABLET | Refills: 2 | Status: CANCELLED | OUTPATIENT
Start: 2025-02-05

## 2025-02-05 ASSESSMENT — PAIN SCALES - GENERAL: PAINLEVEL_OUTOF10: NO PAIN (0)

## 2025-02-05 ASSESSMENT — PATIENT HEALTH QUESTIONNAIRE - PHQ9: SUM OF ALL RESPONSES TO PHQ QUESTIONS 1-9: 4

## 2025-02-05 NOTE — PROGRESS NOTES
"  Assessment & Plan       ICD-10-CM    1. Anxiety  F41.9       2. Essential hypertension  I10       3. Other insomnia  G47.09       4. Other specified hypothyroidism  E03.8       5. Hyperlipidemia with target LDL less than 130  E78.5       6. Hypertrophic cardiomyopathy (H)  I42.2       7. Class 3 severe obesity due to excess calories with body mass index (BMI) of 40.0 to 44.9 in adult, unspecified whether serious comorbidity present (H)  E66.813     E66.01     Z68.41         Patient has chronic medical conditions including blood pressure ,anxiety, insomnia ,which he has been struggling currently on trazodone and gabapentin as well as hydroxyzine and use Klonopin on a regular basis.  We talked about Klonopin use as this should not be a long-term medication he should only use it as needed and for the situation and he has not severe anxiety.  He is also getting Zepbound for his obesity.  For him.  Patient had anxiety which is well-controlled.  Also have some heart issues currently on amlodipine and metoprolol losartan.  Which are managing his situation well.  Appropriate blood work is advised to follow-up in 6 months for physical we will run all the lab and meanwhile if he need any refill of medication he will notify us.    The longitudinal plan of care for the diagnosis(es)/condition(s) as documented were addressed during this visit. Due to the added complexity in care, I will continue to support Angus in the subsequent management and with ongoing continuity of care.            BMI  Estimated body mass index is 39.47 kg/m  as calculated from the following:    Height as of 1/28/25: 1.803 m (5' 11\").    Weight as of this encounter: 128.4 kg (283 lb).       Maria Eugenia Greco is a 64 year old, presenting for the following health issues:  Establish Care        2/5/2025     2:19 PM   Additional Questions   Roomed by L[facility]   Accompanied by Wife Bailey     History of Present Illness       Back Pain:  He presents for " follow up of back pain. Patient's back pain is a recurring problem.  Location of back pain:  Right lower back  Description of back pain: dull ache  Back pain spreads: nowhere    Since patient first noticed back pain, pain is: no longer a problem  Does back pain interfere with his job:  Not applicable       Mental Health Follow-up:  Patient presents to follow-up on Depression & Anxiety.Patient's depression since last visit has been:  Medium  The patient is not having other symptoms associated with depression.  Patient's anxiety since last visit has been:  Medium  The patient is not having other symptoms associated with anxiety.  Any significant life events: financial concerns  Patient is feeling anxious or having panic attacks.  Patient has concerns about alcohol or drug use.    Hyperlipidemia:  He presents for follow up of hyperlipidemia.   He is taking medication to lower cholesterol. He is not having myalgia or other side effects to statin medications.    Hypertension: He presents for follow up of hypertension.  He does not check blood pressure  regularly outside of the clinic. Outside blood pressures have been over 140/90. He follows a low salt diet.     Hypothyroidism:     Since last visit, patient describes the following symptoms::  Anxiety, Depression, Fatigue, Tremors and Weight gain    Weight gain::  11-15 lbs.    Reason for visit:  Consultation    He eats 0-1 servings of fruits and vegetables daily.He consumes 1 sweetened beverage(s) daily.He exercises with enough effort to increase his heart rate 9 or less minutes per day.  He exercises with enough effort to increase his heart rate 3 or less days per week.   He is taking medications regularly.           Establish Care     Hypertension Follow-up    Do you check your blood pressure regularly outside of the clinic? Yes   Are you following a low salt diet? Yes  Are your blood pressures ever more than 140 on the top number (systolic) OR more   than 90 on the  bottom number (diastolic), for example 140/90? Yes    Anxiety   How are you doing with your anxiety since your last visit? No change  Are you having other symptoms that might be associated with anxiety? No  Have you had a significant life event? Financial Concerns   Are you feeling depressed? No  Do you have any concerns with your use of alcohol or other drugs? No    Social History     Tobacco Use    Smoking status: Former     Current packs/day: 0.00     Types: Cigarettes     Quit date: 2015     Years since quittin.3    Smokeless tobacco: Never   Vaping Use    Vaping status: Never Used   Substance Use Topics    Alcohol use: Yes     Comment: 2-3 drinks a day    Drug use: No         3/4/2024    10:30 AM 2024    11:01 AM 2025     9:26 AM   RUTH-7 SCORE   Total Score  11 (moderate anxiety) 7 (mild anxiety)   Total Score 8 11 7        Patient-reported         2024     9:12 AM 3/4/2024    10:30 AM 2025     2:33 PM   PHQ   PHQ-9 Total Score 12 6 4   Q9: Thoughts of better off dead/self-harm past 2 weeks Not at all  Not at all Not at all       Proxy-reported           Review of Systems  Constitutional, HEENT, cardiovascular, pulmonary, gi and gu systems are negative, except as otherwise noted.      Objective    /80   Pulse 78   Temp 97.1  F (36.2  C) (Tympanic)   Resp 19   Wt 128.4 kg (283 lb)   SpO2 98%   BMI 39.47 kg/m    Body mass index is 39.47 kg/m .  Physical Exam   GENERAL: alert and no distress  NECK: no adenopathy, no asymmetry, masses, or scars  RESP: lungs clear to auscultation - no rales, rhonchi or wheezes  CV: regular rate and rhythm, normal S1 S2, no S3 or S4, no murmur, click or rub, no peripheral edema  ABDOMEN: soft, nontender, no hepatosplenomegaly, no masses and bowel sounds normal  MS: no gross musculoskeletal defects noted, no edema        Signed Electronically by: Vahe Mobley MD

## 2025-02-10 ENCOUNTER — OFFICE VISIT (OUTPATIENT)
Dept: SURGERY | Facility: CLINIC | Age: 65
End: 2025-02-10
Payer: COMMERCIAL

## 2025-02-10 VITALS
WEIGHT: 273 LBS | HEART RATE: 88 BPM | OXYGEN SATURATION: 96 % | HEIGHT: 71 IN | SYSTOLIC BLOOD PRESSURE: 124 MMHG | BODY MASS INDEX: 38.22 KG/M2 | DIASTOLIC BLOOD PRESSURE: 85 MMHG

## 2025-02-10 DIAGNOSIS — R73.03 PREDIABETES: ICD-10-CM

## 2025-02-10 DIAGNOSIS — Z87.898 HISTORY OF ALCOHOL USE: ICD-10-CM

## 2025-02-10 DIAGNOSIS — Z13.0 SCREENING FOR IRON DEFICIENCY ANEMIA: ICD-10-CM

## 2025-02-10 DIAGNOSIS — F41.9 ANXIETY: ICD-10-CM

## 2025-02-10 DIAGNOSIS — Z87.19 HX OF ACUTE ALCOHOLIC HEPATITIS: ICD-10-CM

## 2025-02-10 DIAGNOSIS — E66.01 CLASS 2 SEVERE OBESITY DUE TO EXCESS CALORIES WITH SERIOUS COMORBIDITY AND BODY MASS INDEX (BMI) OF 38.0 TO 38.9 IN ADULT (H): Primary | ICD-10-CM

## 2025-02-10 DIAGNOSIS — E55.9 VITAMIN D DEFICIENCY: ICD-10-CM

## 2025-02-10 DIAGNOSIS — I42.2 HYPERTROPHIC CARDIOMYOPATHY (H): ICD-10-CM

## 2025-02-10 DIAGNOSIS — I10 ESSENTIAL HYPERTENSION: ICD-10-CM

## 2025-02-10 DIAGNOSIS — E78.5 HYPERLIPIDEMIA WITH TARGET LDL LESS THAN 130: ICD-10-CM

## 2025-02-10 DIAGNOSIS — K76.0 NAFLD (NONALCOHOLIC FATTY LIVER DISEASE): ICD-10-CM

## 2025-02-10 DIAGNOSIS — E66.812 CLASS 2 SEVERE OBESITY DUE TO EXCESS CALORIES WITH SERIOUS COMORBIDITY AND BODY MASS INDEX (BMI) OF 38.0 TO 38.9 IN ADULT (H): Primary | ICD-10-CM

## 2025-02-10 PROCEDURE — 99205 OFFICE O/P NEW HI 60 MIN: CPT | Performed by: PHYSICIAN ASSISTANT

## 2025-02-10 PROCEDURE — G2211 COMPLEX E/M VISIT ADD ON: HCPCS | Performed by: PHYSICIAN ASSISTANT

## 2025-02-10 NOTE — LETTER
Bariatric Letter of Clearance from Cardiology    Dear Janae Huston CNP:    Thank you for taking the time to see Angus  for a preoperative cardiac visit while preparing for bariatric surgery.  It is important to us that our surgical patients who have a history of cardiac disease get clearance from their cardiologist.     Clearance letter to include the following:    Is the patient under your care?  If so, for how long?  Comment on the relevant diagnosis.   Is he on daily medication for this diagnosis?  Arrange and expedite necessary testing.  Indicate what treatment is needed pre-surgery (if any), during inpatient hospitalization, and post surgery.   Provider a letter stating that the patient is (or is not) cleared and stable to proceed with bariatric surgery from a cardiac standpoint.      Sincerely,      Alicia Art PA-C   [facility]     Please fax letter to [phone] or route in Epic to Alicia Art PA-C

## 2025-02-10 NOTE — PROGRESS NOTES
New Bariatric Surgery Consultation Note    February 10, 2025    RE: Angus Wynne  MR#: [id number]  : 1960      Referring provider:        No data to display                Chief Complaint/Reason for visit: evaluation for possible weight loss surgery    Dear Vahe Mobley MD (General),    I had the pleasure of seeing your patient, Angus Wynne, to evaluate his obesity and consider him for possible weight loss surgery.      Assessment & Plan   Problem List Items Addressed This Visit       Hx of acute alcoholic hepatitis - Primary    Gastroesophageal reflux disease, esophagitis presence not specified    History of alcohol use    Essential hypertension    Hyperlipidemia with target LDL less than 130    Dysphagia, unspecified type    Class 2 severe obesity due to excess calories with serious comorbidity and body mass index (BMI) of 38.0 to 38.9 in adult (H)    Hypertrophic cardiomyopathy (H)    NAFLD (nonalcoholic fatty liver disease)    Prediabetes        In summary, Angus Wynne has {obesity class:390910} obesity with a body mass index of Body mass index is 38.08 kg/m . kg/m2 and the comorbidities stated above. He completed an informational seminar and is a possible candidate for bariatric surgery.   He will have to complete the tasklist below.  Once complete he will see the surgeon for consultation for bariatric surgery. Angus  verbalizes understanding of the process to surgery and post operative schedule.     BARIATRIC TASK LIST  ***        Pt to follow up in 12 wks for a face to face visit with me. We will discuss the available weight loss surgeries including risks and benefits, get an official weight, review tasklist, and do a physical exam.     *** minutes spent on the date of the encounter doing chart review, history and exam, review test results, counseling, developing plan of care, documentation, and further activities as noted above.       HISTORY OF PRESENT ILLNESS:      2025    10:32 AM    Weight Loss History Reviewed with Patient   I have tried the following weight loss medications? (Check all that apply) None       CO-MORBIDITIES OF OBESITY INCLUDE:      2/9/2025    10:32 AM   --   I have the following health issues associated with obesity High Blood Pressure    High Cholesterol       PAST MEDICAL HISTORY:  Past Medical History:   Diagnosis Date    GERD (gastroesophageal reflux disease)     HTN (hypertension)     Hyperlipidemia     Hypothyroid     Kidney stone     Vertigo     2015 ,        PAST SURGICAL HISTORY:  Past Surgical History:   Procedure Laterality Date    AS ESOPHAGOSCOPY, DIAGNOSTIC      COLONOSCOPY      EXAM UNDER ANESTHESIA ANUS N/A 9/9/2021    Procedure: EXAM UNDER ANESTHESIA, ANUS, ANAL BISOPY, BOTOX INJECTION;  Surgeon: Henrik Garduno MD;  Location: [facility] OR    SPHINCTEROTOMY RECTUM N/A 9/30/2021    Procedure: Anal sphincterotomy and hemorroidectomy;  Surgeon: Rajendra Armando MD;  Location: [facility] OR       SOCIAL HISTORY:       2/9/2025    10:32 AM   Social History Questions Reviewed With Patient   Which best describes your employment status (select all that apply) I am retired   Which best describes your marital status    Who do you have in your support network that can be available to help you for the first 2 weeks after surgery? Son   Who can you count on for support throughout your weight loss surgery journey? Son       HABITS:      2/9/2025    10:32 AM   --   How often do you drink alcohol? 2-3 times a week   If you do drink alcohol, how many drinks might you have in a day? (one drink = 5 oz. wine, 1 can/bottle of beer, 1 shot liquor) 3 or 4   Do you currently use any of the following Nicotine products? No   Have you ever used any of the following nicotine products? Cigarettes   If you previously used any of these products, what year did you quit? 2018   Have you or are you currently using street drugs or prescription strength medication for which you do not  have a prescription for? No   Do you have a history of chemical dependency (alcohol or drug abuse)? No       PSYCHOLOGICAL HISTORY:       2/9/2025    10:32 AM   Psychological History Reviewed With Patient   Have you ever attempted suicide? Never.   Have you had thoughts of suicide in the past year? No   Have you ever been hospitalized for mental illness or a suicide attempt? Never.   Do you have a history of chronic pain? No   Have you ever been diagnosed with fibromyalgia? No   Are you currently being treated for any of the following? (select all that apply) Depression    Anxiety    I take medication or see a therapist for another mental illness   Are you currently seeing a psychiatrist? No           2/9/2025    10:32 AM   Questions Reviewed With Patient   How ready are you to make changes regarding your weight? Number 1 = Not ready at all to make changes up to 10 = very ready. 10   How confident are you that you can change? 1 = Not confident that you will be successful making changes up to 10 = very confident. 10       ROS:      2/9/2025    10:32 AM   --   Skin None of the above   HEENT None of these   Musculoskeletal Limited mobility   Cardiovascular Shortness of breath with activity   Pulmonary Shortness of breath with activity   Gastrointestinal None of the above   Genitourinary None of the above   Hematological None of the above   Neurological None of the above       EATING BEHAVIORS:      2/9/2025    10:32 AM   --   Have you or anyone else thought that you had an eating disorder? No       EXERCISE:      2/9/2025    10:32 AM   --   How often do you exercise? Less than 1 time per week   What is the duration of your exercise (in minutes)? I do not exercise.   What keeps you from being more active? My ability to walk or move around is limited    Shortness of breath       MEDICATIONS:  Current Outpatient Medications   Medication Sig Dispense Refill    amLODIPine (NORVASC) 2.5 MG tablet Take 2 tablets (5 mg) by  mouth daily. 90 tablet 3    clonazePAM (KLONOPIN) 0.5 MG tablet Take 1 tablet (0.5 mg) by mouth daily as needed for anxiety.      folic acid (FOLVITE) 400 MCG tablet Take 400 mcg by mouth daily.      gabapentin (NEURONTIN) 100 MG capsule TAKE 2 CAPSULES BY MOUTH EVERY NIGHT AT BEDTIME AND 1 CAPSULE EVERY MORNING 90 capsule 0    hydrOXYzine HCl (ATARAX) 25 MG tablet TAKE 1 TABLET(25 MG) BY MOUTH TWICE DAILY AS NEEDED FOR ANXIETY 60 tablet 4    levothyroxine (SYNTHROID/LEVOTHROID) 175 MCG tablet Take 1 tablet (175 mcg) by mouth daily 90 tablet 3    losartan (COZAAR) 100 MG tablet Take 1 tablet (100 mg) by mouth daily 90 tablet 2    metoprolol succinate ER (TOPROL XL) 25 MG 24 hr tablet Take 1 tablet (25 mg) by mouth every evening 90 tablet 3    rosuvastatin (CRESTOR) 40 MG tablet Take 1 tablet (40 mg) by mouth daily 90 tablet 3    sertraline (ZOLOFT) 100 MG tablet TAKE 1 AND 1/2 TABLETS(150 MG) BY MOUTH DAILY 135 tablet 0    thiamine (B-1) 100 MG tablet TAKE 1 TABLET BY MOUTH DAILY 90 tablet 3    tirzepatide-Weight Management (ZEPBOUND) 10 MG/0.5ML prefilled pen Inject 0.5 mLs (10 mg) subcutaneously every 7 days. 2 mL 0    tirzepatide-Weight Management (ZEPBOUND) 10 MG/0.5ML prefilled pen Inject 0.5 mLs (10 mg) subcutaneously every 7 days. 2 mL 0    tirzepatide-Weight Management (ZEPBOUND) 12.5 MG/0.5ML prefilled pen Inject 0.5 mLs (12.5 mg) subcutaneously every 7 days. 2 mL 0    tirzepatide-Weight Management (ZEPBOUND) 15 MG/0.5ML prefilled pen Inject 0.5 mLs (15 mg) subcutaneously every 7 days. 2 mL 0    traZODone (DESYREL) 50 MG tablet TAKE 1 TO 2 TABLETS(50  MG) BY MOUTH AT BEDTIME 180 tablet 2    acetaminophen (TYLENOL) 325 MG tablet Take 2 tablets (650 mg) by mouth every 4 hours as needed for mild pain (Patient not taking: Reported on 2/10/2025) 50 tablet 0     No current facility-administered medications for this visit.        {Diagnostic Test Results (Optional):878461}    LABS AND RECORDS  REVIEWED:  Hemoglobin A1C   Date Value Ref Range Status   06/27/2024 6.3 (H) 0.0 - 5.6 % Final     Comment:     Normal <5.7%   Prediabetes 5.7-6.4%    Diabetes 6.5% or higher     Note: Adopted from ADA consensus guidelines.     Vitamin D Deficiency screening   Date Value Ref Range Status   12/21/2016 21 20 - 75 ug/L Final     Comment:     Season, race, dietary intake, and treatment affect the concentration of   25-hydroxy-Vitamin D. Values may decrease during winter months and increase   during summer months. Values 20-29 ug/L may indicate Vitamin D insufficiency   and values <20 ug/L may indicate Vitamin D deficiency.   Vitamin D determination is routinely performed by an immunoassay specific for   25 hydroxyvitamin D3.  If an individual is on vitamin D2 (ergocalciferol)   supplementation, please specify 25 OH vitamin D2 and D3 level determination   by   LCMSMS test [id number].       Vitamin D, Total (25-Hydroxy)   Date Value Ref Range Status   09/22/2022 9 (L) 20 - 75 ug/L Final     TSH   Date Value Ref Range Status   06/27/2024 0.35 0.30 - 4.20 uIU/mL Final   09/22/2022 1.41 0.40 - 4.00 mU/L Final   03/30/2021 10.48 (H) 0.40 - 4.00 mU/L Final     Sodium   Date Value Ref Range Status   06/27/2024 140 135 - 145 mmol/L Final   11/18/2020 134 133 - 144 mmol/L Final     Potassium   Date Value Ref Range Status   06/27/2024 4.8 3.4 - 5.3 mmol/L Final   08/23/2022 4.7 3.4 - 5.3 mmol/L Final   11/18/2020 4.2 3.4 - 5.3 mmol/L Final     Chloride   Date Value Ref Range Status   06/27/2024 105 98 - 107 mmol/L Final   08/23/2022 106 94 - 109 mmol/L Final   11/18/2020 103 94 - 109 mmol/L Final     Carbon Dioxide   Date Value Ref Range Status   11/18/2020 26 20 - 32 mmol/L Final     Carbon Dioxide (CO2)   Date Value Ref Range Status   06/27/2024 23 22 - 29 mmol/L Final   08/23/2022 23 20 - 32 mmol/L Final     Anion Gap   Date Value Ref Range Status   06/27/2024 12 7 - 15 mmol/L Final   08/23/2022 7 3 - 14 mmol/L Final   11/18/2020 5  3 - 14 mmol/L Final     Glucose   Date Value Ref Range Status   06/27/2024 137 (H) 70 - 99 mg/dL Final   08/23/2022 115 (H) 70 - 99 mg/dL Final   11/18/2020 114 (H) 70 - 99 mg/dL Final     Comment:     Fasting specimen     Urea Nitrogen   Date Value Ref Range Status   06/27/2024 11.5 8.0 - 23.0 mg/dL Final   08/23/2022 11 7 - 30 mg/dL Final   11/18/2020 12 7 - 30 mg/dL Final     Creatinine   Date Value Ref Range Status   06/27/2024 0.87 0.67 - 1.17 mg/dL Final   11/18/2020 0.87 0.66 - 1.25 mg/dL Final     GFR Estimate   Date Value Ref Range Status   06/27/2024 >90 >60 mL/min/1.73m2 Final     Comment:     eGFR calculated using 2021 CKD-EPI equation.   11/18/2020 >90 >60 mL/min/[1.73_m2] Final     Comment:     Non  GFR Calc  Starting 12/18/2018, serum creatinine based estimated GFR (eGFR) will be   calculated using the Chronic Kidney Disease Epidemiology Collaboration   (CKD-EPI) equation.       GFR, ESTIMATED POCT   Date Value Ref Range Status   08/01/2022 22 (L) >60 mL/min/1.73m2 Final     Calcium   Date Value Ref Range Status   06/27/2024 9.5 8.8 - 10.2 mg/dL Final   11/18/2020 9.3 8.5 - 10.1 mg/dL Final     Bilirubin Total   Date Value Ref Range Status   06/27/2024 0.6 <=1.2 mg/dL Final   11/18/2020 0.4 0.2 - 1.3 mg/dL Final     Alkaline Phosphatase   Date Value Ref Range Status   06/27/2024 52 40 - 150 U/L Final   11/18/2020 59 40 - 150 U/L Final     ALT   Date Value Ref Range Status   06/27/2024 18 0 - 70 U/L Final     Comment:     Reference intervals for this test were updated on 6/12/2023 to more accurately reflect our healthy population. There may be differences in the flagging of prior results with similar values performed with this method. Interpretation of those prior results can be made in the context of the updated reference intervals.     11/18/2020 74 (H) 0 - 70 U/L Final     AST   Date Value Ref Range Status   06/27/2024 27 0 - 45 U/L Final     Comment:     Reference intervals for this  "test were updated on 6/12/2023 to more accurately reflect our healthy population. There may be differences in the flagging of prior results with similar values performed with this method. Interpretation of those prior results can be made in the context of the updated reference intervals.   11/18/2020 98 (H) 0 - 45 U/L Final     Cholesterol   Date Value Ref Range Status   06/27/2024 175 <200 mg/dL Final   11/18/2020 209 (H) <200 mg/dL Final     Comment:     Desirable:       <200 mg/dl     HDL Cholesterol   Date Value Ref Range Status   11/18/2020 59 >39 mg/dL Final     Direct Measure HDL   Date Value Ref Range Status   06/27/2024 77 >=40 mg/dL Final     LDL Cholesterol Calculated   Date Value Ref Range Status   06/27/2024 79 <=100 mg/dL Final   11/18/2020 109 (H) <100 mg/dL Final     Comment:     Above desirable:  100-129 mg/dl  Borderline High:  130-159 mg/dL  High:             160-189 mg/dL  Very high:       >189 mg/dl       Triglycerides   Date Value Ref Range Status   06/27/2024 95 <150 mg/dL Final   11/18/2020 207 (H) <150 mg/dL Final     Comment:     Borderline high:  150-199 mg/dl  High:             200-499 mg/dl  Very high:       >499 mg/dl  Fasting specimen       WBC   Date Value Ref Range Status   02/03/2019 6.3 4.0 - 11.0 10e9/L Final     WBC Count   Date Value Ref Range Status   06/27/2024 5.5 4.0 - 11.0 10e3/uL Final     Hemoglobin   Date Value Ref Range Status   06/27/2024 14.8 13.3 - 17.7 g/dL Final   02/03/2019 14.2 13.3 - 17.7 g/dL Final     Hematocrit   Date Value Ref Range Status   06/27/2024 43.0 40.0 - 53.0 % Final   02/03/2019 40.6 40.0 - 53.0 % Final     MCV   Date Value Ref Range Status   06/27/2024 84 78 - 100 fL Final   02/03/2019 94 78 - 100 fl Final     Platelet Count   Date Value Ref Range Status   06/27/2024 126 (L) 150 - 450 10e3/uL Final   02/03/2019 103 (L) 150 - 450 10e9/L Final         PHYSICAL EXAM:  /85   Pulse 88   Ht 5' 11\" (1.803 m)   Wt 273 lb (123.8 kg)   SpO2 96%  "  BMI 38.08 kg/m    GENERAL: Healthy, alert and no distress  RESP: No audible wheeze, cough, or visible cyanosis.  No visible retractions or increased work of breathing.    SKIN: Visible skin clear. No significant rash, abnormal pigmentation or lesions.  PSYCH: Mentation appears normal, affect normal/bright, judgement and insight intact

## 2025-02-10 NOTE — LETTER
February 10, 2025      Angus Wynne  [address]   [place] [address]            Bariatric Letter of Support from Mental Health Provider    Dear Kalyn Nichols CNP:    Angus is seeking bariatric surgery.  Although he will undergo a separate bariatric psychological evaluation, it is important to us that mental health providers of our surgical patients are involved at all stages of the process.     Please write a letter of support including the following information:    Is the patient under your care?  If so, for how long?  Comment on the relevant diagnosis and any current related prescribed medications.  From a mental health standpoint do you feel patient is stable?  It is important that the patient have regular follow up initially and for the first year following surgery.  Are you able to see the patient for this?    Sincerely,      Alicia Art PA-C   [facility]     Please fax letter to [phone] or route in Epic to Alicia Art PA-C

## 2025-02-10 NOTE — PROGRESS NOTES
New Bariatric Surgery Consultation Note    February 10, 2025    RE: Angus Wynne  MR#: [id number]  : 1960      Referring provider:        No data to display                Chief Complaint/Reason for visit: evaluation for possible weight loss surgery    Dear Vahe Mobley MD (General),    I had the pleasure of seeing your patient, Angus Wynne, to evaluate his obesity and consider him for possible weight loss surgery.      Assessment & Plan   Problem List Items Addressed This Visit       Hx of acute alcoholic hepatitis    Vitamin D deficiency    Relevant Orders    Vitamin D Deficiency    Anxiety    Relevant Orders    Adult Mental Health  Referral    History of alcohol use    Relevant Orders    Adult Mental Health  Referral    Essential hypertension    Relevant Orders    Comprehensive metabolic panel    Hyperlipidemia with target LDL less than 130    Relevant Orders    Adult Mental Health  Referral    Comprehensive metabolic panel    Class 2 severe obesity due to excess calories with serious comorbidity and body mass index (BMI) of 38.0 to 38.9 in adult (H) - Primary     2/10/2025 SWL Initial Wt 273 lbs,  Goal: Maintain.  Continue Mounjaro titration.          Relevant Orders    Adult Mental Health  Referral    Hypertrophic cardiomyopathy (H)    NAFLD (nonalcoholic fatty liver disease)    Prediabetes    Relevant Orders    Comprehensive metabolic panel    Hemoglobin A1c     Other Visit Diagnoses       Screening for iron deficiency anemia        Relevant Orders    CBC with platelets             In summary, Angus Wynne has Class III obesity with a body mass index of Body mass index is 38.08 kg/m . kg/m2 and the comorbidities stated above. He completed an informational seminar and is a possible candidate for bariatric surgery.   He will have to complete the tasklist below.  Once complete he will see the surgeon for consultation for bariatric surgery. Angus  verbalizes  understanding of the process to surgery and post operative schedule.     Bariatric Task List  Maintain lbs prior to surgery.    Have preoperative laboratory tests drawn. -  Call [phone] for an appt.    Psychological Evaluation with MMPI and clearance for weight loss surgery.- Call [phone] to schedule.   Achieve clearance from dietitian to see surgeon.   Mental Health Letter of clearance  for psychiatrist  Cardiac Clearance    Today in the office we discussed gastric bypass and gastric sleeve surgery.  Preoperative, perioperative, and postoperative processes, management, and follow up were addressed.  Risks and benefits were outlined including the risk of death, PE, DVT, ulcer, N/V, stricture, GERD with sleeve, hernia, wound infection, weight regain, and vitamin deficiencies. I emphasized exercise and activity along with appropriate food choice as the main foundation for weight loss with surgery providing surgical reinforcement of this.  A goal sheet and support group handout were given to the patient. Patient contract was signed.     Once the patient has completed their task list and there are no further recommendations, they will see the surgeon for consultation for bariatric surgery.  All questions were answered. Patient verbalizes understanding of the process to surgery and expectations for the postoperative period including the need for lifelong lifestyle changes, vitamin supplementation, and laboratory monitoring.    Medication  Increase Zepbound 12.5 mg weekly for 4 weeks, then increase to 15 mg weekly.      Follow up: 5 months for med check    62 minutes spent on the date of the encounter doing chart review, history and exam, review test results, counseling, developing plan of care, documentation, and further activities as noted above.       HISTORY OF PRESENT ILLNESS:      2/9/2025    10:32 AM   Weight Loss History Reviewed with Patient   I have tried the following weight loss medications? (Check  all that apply) None       CO-MORBIDITIES OF OBESITY INCLUDE:      2/9/2025    10:32 AM   --   I have the following health issues associated with obesity High Blood Pressure    High Cholesterol       PAST MEDICAL HISTORY:  Past Medical History:   Diagnosis Date    GERD (gastroesophageal reflux disease)     HTN (hypertension)     Hyperlipidemia     Hypothyroid     Kidney stone     Vertigo     2015 ,        PAST SURGICAL HISTORY: No history of bleeding, clotting, malignant hyperthermia, or other anesthesia problems with surgery. Denies PONV.    Past Surgical History:   Procedure Laterality Date    AS ESOPHAGOSCOPY, DIAGNOSTIC      COLONOSCOPY      EXAM UNDER ANESTHESIA ANUS N/A 9/9/2021    Procedure: EXAM UNDER ANESTHESIA, ANUS, ANAL BISOPY, BOTOX INJECTION;  Surgeon: Henrik Garduno MD;  Location: [facility] OR    SPHINCTEROTOMY RECTUM N/A 9/30/2021    Procedure: Anal sphincterotomy and hemorroidectomy;  Surgeon: Rajendra Armando MD;  Location: [facility] [place]       SOCIAL HISTORY:       2/9/2025    10:32 AM   Social History Questions Reviewed With Patient   Which best describes your employment status (select all that apply) I am retired, worked in [employer]   Which best describes your marital status    Who do you have in your support network that can be available to help you for the first 2 weeks after surgery? Son   Who can you count on for support throughout your weight loss surgery journey? Son   Wife, son    HABITS:      2/9/2025    10:32 AM   --   How often do you drink alcohol? 2-3 times a week   If you do drink alcohol, how many drinks might you have in a day? (one drink = 5 oz. wine, 1 can/bottle of beer, 1 shot liquor) 3 or 4   Do you currently use any of the following Nicotine products? No   Have you ever used any of the following nicotine products? Cigarettes   If you previously used any of these products, what year did you quit? 2018   Have you or are you currently using street drugs or prescription  strength medication for which you do not have a prescription for? No   Do you have a history of chemical dependency (alcohol or drug abuse)? No   Hx of alcohol use disorder.    Stopped smoking and     PSYCHOLOGICAL HISTORY:       2/9/2025    10:32 AM   Psychological History Reviewed With Patient   Have you ever attempted suicide? Never.   Have you had thoughts of suicide in the past year? No   Have you ever been hospitalized for mental illness or a suicide attempt? Never.   Do you have a history of chronic pain? No   Have you ever been diagnosed with fibromyalgia? No   Are you currently being treated for any of the following? (select all that apply) Depression    Anxiety    I take medication or see a therapist for another mental illness   Are you currently seeing a psychiatrist? No           2/9/2025    10:32 AM   Questions Reviewed With Patient   How ready are you to make changes regarding your weight? Number 1 = Not ready at all to make changes up to 10 = very ready. 10   How confident are you that you can change? 1 = Not confident that you will be successful making changes up to 10 = very confident. 10       ROS:      2/9/2025    10:32 AM   --   Skin None of the above   HEENT None of these   Musculoskeletal Limited mobility   Cardiovascular Shortness of breath with activity   Pulmonary Shortness of breath with activity   Gastrointestinal None of the above   Genitourinary None of the above   Hematological None of the above   Neurological None of the above       EATING BEHAVIORS:      2/9/2025    10:32 AM   --   Have you or anyone else thought that you had an eating disorder? No       EXERCISE:      2/9/2025    10:32 AM   --   How often do you exercise? Less than 1 time per week   What is the duration of your exercise (in minutes)? I do not exercise.   What keeps you from being more active? My ability to walk or move around is limited    Shortness of breath       MEDICATIONS:  Current Outpatient Medications    Medication Sig Dispense Refill    amLODIPine (NORVASC) 2.5 MG tablet Take 2 tablets (5 mg) by mouth daily. 90 tablet 3    clonazePAM (KLONOPIN) 0.5 MG tablet Take 1 tablet (0.5 mg) by mouth daily as needed for anxiety.      folic acid (FOLVITE) 400 MCG tablet Take 400 mcg by mouth daily.      gabapentin (NEURONTIN) 100 MG capsule TAKE 2 CAPSULES BY MOUTH EVERY NIGHT AT BEDTIME AND 1 CAPSULE EVERY MORNING 90 capsule 0    hydrOXYzine HCl (ATARAX) 25 MG tablet TAKE 1 TABLET(25 MG) BY MOUTH TWICE DAILY AS NEEDED FOR ANXIETY 60 tablet 4    levothyroxine (SYNTHROID/LEVOTHROID) 175 MCG tablet Take 1 tablet (175 mcg) by mouth daily 90 tablet 3    losartan (COZAAR) 100 MG tablet Take 1 tablet (100 mg) by mouth daily 90 tablet 2    metoprolol succinate ER (TOPROL XL) 25 MG 24 hr tablet Take 1 tablet (25 mg) by mouth every evening 90 tablet 3    rosuvastatin (CRESTOR) 40 MG tablet Take 1 tablet (40 mg) by mouth daily 90 tablet 3    sertraline (ZOLOFT) 100 MG tablet TAKE 1 AND 1/2 TABLETS(150 MG) BY MOUTH DAILY 135 tablet 0    thiamine (B-1) 100 MG tablet TAKE 1 TABLET BY MOUTH DAILY 90 tablet 3    tirzepatide-Weight Management (ZEPBOUND) 10 MG/0.5ML prefilled pen Inject 0.5 mLs (10 mg) subcutaneously every 7 days. 2 mL 0    tirzepatide-Weight Management (ZEPBOUND) 10 MG/0.5ML prefilled pen Inject 0.5 mLs (10 mg) subcutaneously every 7 days. 2 mL 0    tirzepatide-Weight Management (ZEPBOUND) 12.5 MG/0.5ML prefilled pen Inject 0.5 mLs (12.5 mg) subcutaneously every 7 days. 2 mL 0    tirzepatide-Weight Management (ZEPBOUND) 15 MG/0.5ML prefilled pen Inject 0.5 mLs (15 mg) subcutaneously every 7 days. 2 mL 0    traZODone (DESYREL) 50 MG tablet TAKE 1 TO 2 TABLETS(50  MG) BY MOUTH AT BEDTIME 180 tablet 2    acetaminophen (TYLENOL) 325 MG tablet Take 2 tablets (650 mg) by mouth every 4 hours as needed for mild pain (Patient not taking: Reported on 2/10/2025) 50 tablet 0     No current facility-administered medications for  this visit.        Wt Readings from Last 10 Encounters:   02/10/25 273 lb (123.8 kg)   02/05/25 283 lb (128.4 kg)   01/28/25 284 lb (128.8 kg)   01/14/25 281 lb (127.5 kg)   09/25/24 291 lb 2 oz (132.1 kg)   06/27/24 282 lb (127.9 kg)   11/20/23 268 lb 8 oz (121.8 kg)   07/07/23 260 lb (117.9 kg)   06/14/23 268 lb (121.6 kg)   05/30/23 268 lb (121.6 kg)        LABS AND RECORDS REVIEWED:  Hemoglobin A1C   Date Value Ref Range Status   06/27/2024 6.3 (H) 0.0 - 5.6 % Final     Comment:     Normal <5.7%   Prediabetes 5.7-6.4%    Diabetes 6.5% or higher     Note: Adopted from ADA consensus guidelines.     Vitamin D Deficiency screening   Date Value Ref Range Status   12/21/2016 21 20 - 75 ug/L Final     Comment:     Season, race, dietary intake, and treatment affect the concentration of   25-hydroxy-Vitamin D. Values may decrease during winter months and increase   during summer months. Values 20-29 ug/L may indicate Vitamin D insufficiency   and values <20 ug/L may indicate Vitamin D deficiency.   Vitamin D determination is routinely performed by an immunoassay specific for   25 hydroxyvitamin D3.  If an individual is on vitamin D2 (ergocalciferol)   supplementation, please specify 25 OH vitamin D2 and D3 level determination   by   LCMSMS test [id number].       Vitamin D, Total (25-Hydroxy)   Date Value Ref Range Status   09/22/2022 9 (L) 20 - 75 ug/L Final     TSH   Date Value Ref Range Status   06/27/2024 0.35 0.30 - 4.20 uIU/mL Final   09/22/2022 1.41 0.40 - 4.00 mU/L Final   03/30/2021 10.48 (H) 0.40 - 4.00 mU/L Final     Sodium   Date Value Ref Range Status   06/27/2024 140 135 - 145 mmol/L Final   11/18/2020 134 133 - 144 mmol/L Final     Potassium   Date Value Ref Range Status   06/27/2024 4.8 3.4 - 5.3 mmol/L Final   08/23/2022 4.7 3.4 - 5.3 mmol/L Final   11/18/2020 4.2 3.4 - 5.3 mmol/L Final     Chloride   Date Value Ref Range Status   06/27/2024 105 98 - 107 mmol/L Final   08/23/2022 106 94 - 109 mmol/L Final    11/18/2020 103 94 - 109 mmol/L Final     Carbon Dioxide   Date Value Ref Range Status   11/18/2020 26 20 - 32 mmol/L Final     Carbon Dioxide (CO2)   Date Value Ref Range Status   06/27/2024 23 22 - 29 mmol/L Final   08/23/2022 23 20 - 32 mmol/L Final     Anion Gap   Date Value Ref Range Status   06/27/2024 12 7 - 15 mmol/L Final   08/23/2022 7 3 - 14 mmol/L Final   11/18/2020 5 3 - 14 mmol/L Final     Glucose   Date Value Ref Range Status   06/27/2024 137 (H) 70 - 99 mg/dL Final   08/23/2022 115 (H) 70 - 99 mg/dL Final   11/18/2020 114 (H) 70 - 99 mg/dL Final     Comment:     Fasting specimen     Urea Nitrogen   Date Value Ref Range Status   06/27/2024 11.5 8.0 - 23.0 mg/dL Final   08/23/2022 11 7 - 30 mg/dL Final   11/18/2020 12 7 - 30 mg/dL Final     Creatinine   Date Value Ref Range Status   06/27/2024 0.87 0.67 - 1.17 mg/dL Final   11/18/2020 0.87 0.66 - 1.25 mg/dL Final     GFR Estimate   Date Value Ref Range Status   06/27/2024 >90 >60 mL/min/1.73m2 Final     Comment:     eGFR calculated using 2021 CKD-EPI equation.   11/18/2020 >90 >60 mL/min/[1.73_m2] Final     Comment:     Non  GFR Calc  Starting 12/18/2018, serum creatinine based estimated GFR (eGFR) will be   calculated using the Chronic Kidney Disease Epidemiology Collaboration   (CKD-EPI) equation.       GFR, ESTIMATED POCT   Date Value Ref Range Status   08/01/2022 22 (L) >60 mL/min/1.73m2 Final     Calcium   Date Value Ref Range Status   06/27/2024 9.5 8.8 - 10.2 mg/dL Final   11/18/2020 9.3 8.5 - 10.1 mg/dL Final     Bilirubin Total   Date Value Ref Range Status   06/27/2024 0.6 <=1.2 mg/dL Final   11/18/2020 0.4 0.2 - 1.3 mg/dL Final     Alkaline Phosphatase   Date Value Ref Range Status   06/27/2024 52 40 - 150 U/L Final   11/18/2020 59 40 - 150 U/L Final     ALT   Date Value Ref Range Status   06/27/2024 18 0 - 70 U/L Final     Comment:     Reference intervals for this test were updated on 6/12/2023 to more accurately reflect  our healthy population. There may be differences in the flagging of prior results with similar values performed with this method. Interpretation of those prior results can be made in the context of the updated reference intervals.     11/18/2020 74 (H) 0 - 70 U/L Final     AST   Date Value Ref Range Status   06/27/2024 27 0 - 45 U/L Final     Comment:     Reference intervals for this test were updated on 6/12/2023 to more accurately reflect our healthy population. There may be differences in the flagging of prior results with similar values performed with this method. Interpretation of those prior results can be made in the context of the updated reference intervals.   11/18/2020 98 (H) 0 - 45 U/L Final     Cholesterol   Date Value Ref Range Status   06/27/2024 175 <200 mg/dL Final   11/18/2020 209 (H) <200 mg/dL Final     Comment:     Desirable:       <200 mg/dl     HDL Cholesterol   Date Value Ref Range Status   11/18/2020 59 >39 mg/dL Final     Direct Measure HDL   Date Value Ref Range Status   06/27/2024 77 >=40 mg/dL Final     LDL Cholesterol Calculated   Date Value Ref Range Status   06/27/2024 79 <=100 mg/dL Final   11/18/2020 109 (H) <100 mg/dL Final     Comment:     Above desirable:  100-129 mg/dl  Borderline High:  130-159 mg/dL  High:             160-189 mg/dL  Very high:       >189 mg/dl       Triglycerides   Date Value Ref Range Status   06/27/2024 95 <150 mg/dL Final   11/18/2020 207 (H) <150 mg/dL Final     Comment:     Borderline high:  150-199 mg/dl  High:             200-499 mg/dl  Very high:       >499 mg/dl  Fasting specimen       WBC   Date Value Ref Range Status   02/03/2019 6.3 4.0 - 11.0 10e9/L Final     WBC Count   Date Value Ref Range Status   06/27/2024 5.5 4.0 - 11.0 10e3/uL Final     Hemoglobin   Date Value Ref Range Status   06/27/2024 14.8 13.3 - 17.7 g/dL Final   02/03/2019 14.2 13.3 - 17.7 g/dL Final     Hematocrit   Date Value Ref Range Status   06/27/2024 43.0 40.0 - 53.0 % Final  "  02/03/2019 40.6 40.0 - 53.0 % Final     MCV   Date Value Ref Range Status   06/27/2024 84 78 - 100 fL Final   02/03/2019 94 78 - 100 fl Final     Platelet Count   Date Value Ref Range Status   06/27/2024 126 (L) 150 - 450 10e3/uL Final   02/03/2019 103 (L) 150 - 450 10e9/L Final         PHYSICAL EXAM:  /85   Pulse 88   Ht 5' 11\" (1.803 m)   Wt 273 lb (123.8 kg)   SpO2 96%   BMI 38.08 kg/m    GENERAL:  Good development and normal affect in no acute distress. Patient is alert and orientated x 3 and answers all questions appropriately.  HEENT: Head is normocephalic, nontraumatic. Pupils equal and round without conjunctival injection. Neck is supple without lymphadenopathy, thyroidmegaly, or mass. Trachea midline. Dentition 1 bottom rt molar missing  CARDIOVASCULAR:  Regular rate and rhythm without murmurs, rubs, or gallops.  RESPIRATORY: Lungs are clear to auscultation bilaterally with good breath sounds.  GASTROINTESTINAL: Abdomen is obese, nondistended, soft, nontender, without organomegaly or masses. No bruits.  LOWER EXTREMITIES: No LE edema bilaterally, no cyanosis, ulceration, or chronic venous stasis noted.  MUSCULOSKELETAL:  Moves all 4 extremities equal and strong. Has a normal gait.   NEUROLOGIC: Cranial nerves II-XII grossly intact.  SKIN: No intertriginous irritation or rash.       "

## 2025-02-11 DIAGNOSIS — E66.813 CLASS 3 SEVERE OBESITY DUE TO EXCESS CALORIES WITH BODY MASS INDEX (BMI) OF 40.0 TO 44.9 IN ADULT, UNSPECIFIED WHETHER SERIOUS COMORBIDITY PRESENT (H): ICD-10-CM

## 2025-02-11 DIAGNOSIS — E66.01 CLASS 3 SEVERE OBESITY DUE TO EXCESS CALORIES WITH BODY MASS INDEX (BMI) OF 40.0 TO 44.9 IN ADULT, UNSPECIFIED WHETHER SERIOUS COMORBIDITY PRESENT (H): ICD-10-CM

## 2025-02-11 RX ORDER — TIRZEPATIDE 12.5 MG/.5ML
INJECTION, SOLUTION SUBCUTANEOUS
Qty: 2 ML | Refills: 1 | Status: SHIPPED | OUTPATIENT
Start: 2025-02-11

## 2025-02-16 NOTE — PROGRESS NOTES
"  [facility]  NEW PATIENT DIAGNOSTIC ASSESSMENT     Angus Wynne is a 64 year old referred by Fifi Fleming.     Initial consultation on 02/17/25.   Who referred you to care?: (Patient-Rptd) primary care clinic  CARE TEAM:   PCP- Vahe Mobley  Therapist- None              Chief Complaint   Angus identified the reason for seeking services at this time as: \"(Patient-Rptd) Panic attack\". Reported this as beginning approximately  .              Assessment & Plan     History and interview support the following diagnoses:   Generalized anxiety disorder  Panic disorder  Major depressive disorder, in sustained remission  Insomnia, unspecified    History of alcohol abuse     Angus is a pleasant 64-year-old adult with history of anxiety, panic attacks, depression, and alcohol abuse who presents for psychiatric evaluation. He was referred by primary care.    Patient's current concerns relate to anxiety and panic attacks both of which were diagnosed about 20 years ago. He has been on and off benzodiazepines in the past for management of anxiety. Although his last panic attack was over a year ago, he reports persistent concern about experiencing additional panic attacks and their consequences. He has modified certain behaviors (such as driving) in response to panic attacks. Anxiety is suboptimally treated with current regimen. No suicidal ideation, self-injurious behavior/urges, violent ideation, aggression, psychosis, hallucinations, delusions, duke/hypomania. Angus is future oriented and focused on getting better control of anxiety so that he is better able to experience ricci in his life.     Anxiety/panic: Will increase sertraline to 200mg daily. He has been on 150mg daily for many years and never at a higher dose. He is aware that this medication change will take several weeks for full therapeutic benefit. He reports good tolerability with current dose " of 150mg but we did review potential side effects. We will also increase his bedtime dose of gabapentin from 200mg to 300mg for added anxiety support at bedtime. He has history of chronic insomnia, particularly sleep initiation - gabapentin may also target insomnia. Discussed risk of dizziness/drowsiness with higher dose of gabapentin.    We did discuss his use of benzodiazepines. He was recently prescribed clonazepam by cardiology. He is using the medication sparingly and reserves for times of heightened anxiety. Has used 2 tablets within the last 10-14 days. He does not use clonazepam on days he is consuming alcohol and is aware of the potential risks including respiratory depression and risk for falls. Recommended limiting use as short acting anxiolytics can be reinforcing of anxiety and should never be the first tool used for anxiety.    PSYCHOTROPIC DRUG INTERACTIONS: **Italicized interactions are for treatment plan options not currently implemented**  ADDITIVE SEDATION: clonazepam, hydroxyzine, gabapentin, trazodone  ADDITIVE QTc: trazodone, sertraline    MANAGEMENT:  use lowest therapeutic doses of psychotropic medications, routine monitoring, and patient aware of risks    [place] today: indicates that controlled prescriptions have been filled as prescribed                Plan    1) PSYCHOTROPIC MEDICATION RECOMMENDATIONS:  -Increase sertraline to 200mg daily  -Continue gabapentin 100mg every morning and increase to 300mg at bedtime  -Continue trazodone 50-100mg at bedtime  -Continue hydroxyzine 25mg twice daily as needed for anxiety    Prescribed clonazepam 0.5mg once daily as needed for anxiety by cardiology    2) THERAPY: None    3) NEXT DUE:   Labs- Routine monitoring is not indicated for current psychotropic medication regimen   ECG- As needed    4) REFERRALS / COORDINATION: None    5) DISPOSITION: 4/14 at 1pm (in-person)    Treatment Risk Statement:  The patient understands the risks,  "benefits, adverse effects and alternatives. Agrees to treatment with the capacity to do so. No medical contraindications to treatment. Agrees to contact provider for any problems. The patient understands to call 911 or go to the nearest ED if urgent or life threatening symptoms occur. Crisis contact numbers are provided routinely in the After Visit Summary.    Suicide Risk Assessment: Angus did not appear to be an imminent safety risk to self or others.    PROVIDER:  YUKO Martinez CNP              Pertinent Background  Angus notes longstanding history of depression and anxiety. He was initiated on clonazepam about 15-20 years ago due to panic attacks. He has had 6-7 panic attacks in his life and he notes that when they do occur, they are very disruptive to his life and can take up to 6 months for him to gain his \"confidence\" back. He has persistent concerns about repeated panic attacks and their consequences. Has modified behaviors (no longer driving) in response to panic attacks.   He has a history of alcohol use disorder. Heaviest drinking occurred about 20 years ago. Current use is 2-3 shots approximately 3-4 days per week.   Psych pertinent item history includes substance use: alcohol              History of Present Illness     Per [facility] visit with Radha Temple [facility] on 02/03/25:  \"Takes trazodone, gabapentin and hydroxyzine around 7:30 PM and uses clonazepam at about 9 PM if he still cannot sleep, which he started 2 nights ago and was very helpful.   Restarted sertraline 2 weeks ago after traveling to [facility] and is only taking 100 mg now.  Overall has been taking for about 10 years. No other antidepressant trials.   Feels more comfortable at home than leaving his house and reports increased anxiety when out in public. Denied any anxiety while at home. Sometimes has panic attacks about every 6 months.  Depression symptoms come and go; 2 or 3 days a week for a short period of time. Reports low mood during " "these spells that can cesar after rest or activity. Denied thoughts of self-harm.\"    Patient attends today's visit alone. He is a good historian. EHR reviewed for collateral.    Anxiety and depression started many years ago. Started on clonazepam about 15-20 years ago for anxiety. Leaving the home causes anxiety - worried that something bad will happen to him. Has been struggling with panic attacks for multiple years. Stopped driving about 2 years ago as driving seemed to trigger a lot of fear/worry. They tend to occur about once a year and it can take up to 6 months to gain his \"confidence\" back after a panic attack. Last panic attack about a year ago.     Taking medications for sleep - gabapentin, trazodone, and hydroxyzine. Only using hydroxyzine a few times per week. Reports moderate benefit from medications. Can take 1-3 hours to fall asleep. Endorses a lot of thoughts at bedtime. Lots of dreams - very realistic but not necessary distressing.    Endorses history of heavy alcohol use. Heaviest use was about 20 years ago. Current use is 2-3 shots approximately 3-4 days per week.     Benzodiazepine use:   -He was previously on lorazepam which was discontinued 2024. He was given a prescription for clonazepam by cardiology at the end of Jan 2025.   -Not using clonazepam daily; in the last 1-2 weeks he has used 2 tablets (takes for anxiety). He does not combine with alcohol.     Bariatric surgery:  -In process of pursuing bariatric surgery. Working with [facility].   -Intake has improved since starting Zepbound  -Is not engaging in physical activity; planning to restart his yoga practice.     Mood-  -Denies depression. Unable to recall last episode of depression.    Current psychotropic medication:  Trazodone 50 - 100 mg at bedtime. (Reports taking 100 mg consistently)   Sertraline 150 mg daily - has been on this dose for several years. Taking 1.5 tablets daily (150mg). Never on higher dose. " "  Gabapentin 200 mg at bedtime and 100 mg every morning -   Hydroxyzine 25 mg twice daily as needed for anxiety - taking about twice weekly at bedtime.   Clonazepam 0.5 mg daily as needed for anxiety at bedtime - using infrequently, 2 tablets within the past 1-2 weeks.    Medication ASE: Denies  Medication adherence: Reports good adherence.     Recent Psych Symptoms:   Depression:  Mood is \"fine.\" Denies symptoms of depression.   Elevated:  none  Psychosis:  none  Anxiety:  excessive worry and feeling fearful  Trauma Related:  none  Insomnia:  Yes: Longstanding issues with sleep initiation.    Pertinent Negatives: No suicidal or violent ideation, psychosis, or hypo/duke.      Pertinent Social Hx:  FINANCIAL SUPPORT- Retired  LIVING SITUATION / RELATIONSHIPS- Lives with wife and adult son. Feels safe at home.   SOCIAL/ SPIRITUAL SUPPORT- Family    Pertinent Substance Use  Alcohol- Drinking 2-3 shots about 3-4 times per week  Nicotine- Former smoker - quit about 2017/2018.   Opioids- None  Narcan Kit- N/A  THC/CBD- None  Other Illicit Drugs- none    Substance Use History  Past Use- alcohol use was heaviest about 20 years ago.   Treatment [#, most recent]- None  Medical Consequences [withdrawal, sz etc]- Per chart, hx of acute alcoholic hepatitis. Endorses history of withdrawal when alcohol use was higher.  Legal Consequences- None              Medical Review of Systems   Dizziness/orthostasis- History of several falls. Last fall was about 8 months ago. Denies current dizziness.  Respiratory- Some SOB on exertion  Gastrointestinal- Denies  Tics, tremors- Denies  A comprehensive review of systems was performed and is negative other than noted above.              Past Psychiatric History   Have you been previously diagnosed with any of these mental health condition(s)?: (Patient-Rptd) an anxiety disorder;depression What mental health services have you received in the past?: (Patient-Rptd) other, If other, please list " below:: (Patient-Rptd) Meditation Currently, are you receiving any of the following mental health services?: (Patient-Rptd) none    Self injurious behavior [method, most recent]- None  Suicide attempt [#, most recent, method]- None  Suicidal ideation [passive, active]- None   History of violence/aggression/HI- No   What in the following list protects you from causing harm to yourself or others?: (Patient-Rptd) safe and stable environment;regular sleep;sense of belonging;abstinence from substances;positive social skills, Are there firearms in your home?: (Patient-Rptd) are not    Psychosis hx- None  Psych hosp [ #, most recent, committed]- None  ECT/TMS [#, most recent]- None    Eating disorder hx- Hx of disordered eating. Followed by weight management.   Trauma hx- None indicated  Outpatient programs [Day treatment, DBT, eating disorder tx, etc]- None              Past Psychotropic Medications     Medication Max Dose (mg) Dates / Duration Helpful? DC Reason / Adverse Effects?   lorazepam       Are you taking/ not taking prescription medications as they were prescribed?: (Patient-Rptd) taking              Social History                [per patient report]  Financial- What are your current financial sources?: (Patient-Rptd) family;general assistance;county assistance, Does your finances cause stress?: (Patient-Rptd) does  Employment- What is your employment status?: (Patient-Rptd) retired, If you work in a paying job or as a volunteer, describe the job and how long you have held it: : (Patient-Rptd) None Did you serve in the ?: (Patient-Rptd) did not  Living situation- What is your housing situation?: (Patient-Rptd) staying in own home/apartment  Feels safe at home- Yes  Household / family- Name: (Patient-Rptd) Bailey mosley, Age: (Patient-Rptd) 61, Relationship: (Patient-Rptd) Wife, Living in same house?: (Patient-Rptd) yes, Name: (Patient-Rptd) Eliazar Ricci, Age: (Patient-Rptd) 36, Relationship:  (Patient-Rptd) Son, Living in same house?: (Patient-Rptd) yes  Relationships- What is your current relationship status? : (Patient-Rptd) , What is your sexual orientation?: (Patient-Rptd) heterosexual  Children- Do you have children?: (Patient-Rptd) yes, How many children do you have?: (Patient-Rptd) 1, Ages of boys:: (Patient-Rptd) 36  Social/spiritual support- Who are the most supportive people in your life?  : (Patient-Rptd) siblings;friends;spouse  Cultural- What is your cultural background? : (Patient-Rptd) other, If other, please list below:: (Patient-Rptd) , What are ethnic, cultural, or Temple influences that may be useful to know about you (for example history of experiencing discrimination, growing up rural/urban, valuing culturally specific treatments)?  : (Patient-Rptd) Faith, What is your preferred language?  : (Patient-Rptd) other, If other, please list below:: (Patient-Rptd) Liliana  Education- What is your highest education? : (Patient-Rptd) college graduate  Early history- Where did you grow up?: (Patient-Rptd) other, If other, please list below:: (Patient-Rptd) Angela, Who took care of you as a child?: (Patient-Rptd) biological parents  Raised by- How would you describe your parent's relationships?: (Patient-Rptd) were always together  Siblings- Do you have siblings?: (Patient-Rptd) yes, How many full siblings do you have?: (Patient-Rptd) 2  Quality of family relationships- How would you describe your current family relationships?: (Patient-Rptd) stable and meaningful  Legal- Have you been involved with the legal system (child custody, order for protection, DWI, etc.)?: (Patient-Rptd) have not, Do you have a ?  : (Patient-Rptd) does not              Family History   Denies              Past Medical History     Medication allergies:  No Known Allergies    Neurologic Hx [head injury, seizures, etc.]: None  Patient Active Problem List   Diagnosis    BPPV (benign paroxysmal  positional vertigo), unspecified laterality    Other specified hypothyroidism    Hx of acute alcoholic hepatitis    Hx of colonic polyp    Gastroesophageal reflux disease, esophagitis presence not specified    Vitamin D deficiency    Anxiety    History of alcohol use    Neck pain    Cervicalgia    Cervical spondylosis without myelopathy    Essential hypertension    Hyperlipidemia with target LDL less than 130    Nasal congestion    History of colonic polyps    Vertigo    Lumbago    IBARRA (dyspnea on exertion)    Dysphagia, unspecified type    Elevated troponin    CHAS (acute kidney injury)    Class 2 severe obesity due to excess calories with serious comorbidity and body mass index (BMI) of 38.0 to 38.9 in adult (H)    Assistance needed with transportation    Hypertrophic cardiomyopathy (H)    NAFLD (nonalcoholic fatty liver disease)    Prediabetes     Past Medical History:   Diagnosis Date    GERD (gastroesophageal reflux disease)     HTN (hypertension)     Hyperlipidemia     Hypothyroid     Kidney stone     Vertigo     2015 ,                  Medications   Current Outpatient Medications   Medication Sig Dispense Refill    acetaminophen (TYLENOL) 325 MG tablet Take 2 tablets (650 mg) by mouth every 4 hours as needed for mild pain (Patient not taking: Reported on 2/10/2025) 50 tablet 0    amLODIPine (NORVASC) 2.5 MG tablet Take 2 tablets (5 mg) by mouth daily. 90 tablet 3    clonazePAM (KLONOPIN) 0.5 MG tablet Take 1 tablet (0.5 mg) by mouth daily as needed for anxiety.      folic acid (FOLVITE) 400 MCG tablet Take 400 mcg by mouth daily.      gabapentin (NEURONTIN) 100 MG capsule TAKE 2 CAPSULES BY MOUTH EVERY NIGHT AT BEDTIME AND 1 CAPSULE EVERY MORNING 90 capsule 0    hydrOXYzine HCl (ATARAX) 25 MG tablet TAKE 1 TABLET(25 MG) BY MOUTH TWICE DAILY AS NEEDED FOR ANXIETY 60 tablet 4    levothyroxine (SYNTHROID/LEVOTHROID) 175 MCG tablet Take 1 tablet (175 mcg) by mouth daily 90 tablet 3    losartan (COZAAR) 100 MG  tablet Take 1 tablet (100 mg) by mouth daily 90 tablet 2    metoprolol succinate ER (TOPROL XL) 25 MG 24 hr tablet Take 1 tablet (25 mg) by mouth every evening 90 tablet 3    rosuvastatin (CRESTOR) 40 MG tablet Take 1 tablet (40 mg) by mouth daily 90 tablet 3    sertraline (ZOLOFT) 100 MG tablet TAKE 1 AND 1/2 TABLETS(150 MG) BY MOUTH DAILY 135 tablet 0    thiamine (B-1) 100 MG tablet TAKE 1 TABLET BY MOUTH DAILY 90 tablet 3    tirzepatide-Weight Management (ZEPBOUND) 10 MG/0.5ML prefilled pen Inject 0.5 mLs (10 mg) subcutaneously every 7 days. 2 mL 0    tirzepatide-Weight Management (ZEPBOUND) 10 MG/0.5ML prefilled pen Inject 0.5 mLs (10 mg) subcutaneously every 7 days. 2 mL 0    tirzepatide-Weight Management (ZEPBOUND) 12.5 MG/0.5ML prefilled pen ADMINISTER 12.5 MG UNDER THE SKIN EVERY 7 DAYS 2 mL 1    tirzepatide-Weight Management (ZEPBOUND) 15 MG/0.5ML prefilled pen Inject 0.5 mLs (15 mg) subcutaneously every 7 days. 2 mL 0    traZODone (DESYREL) 50 MG tablet TAKE 1 TO 2 TABLETS(50  MG) BY MOUTH AT BEDTIME 180 tablet 2                   Physical Exam  (Vitals Only)  There were no vitals taken for this visit.    Pulse Readings from Last 5 Encounters:   02/10/25 88   02/05/25 78   01/28/25 92   01/14/25 107   10/16/24 90     Wt Readings from Last 5 Encounters:   02/10/25 123.8 kg (273 lb)   02/05/25 128.4 kg (283 lb)   01/28/25 128.8 kg (284 lb)   01/14/25 127.5 kg (281 lb)   09/25/24 132.1 kg (291 lb 2 oz)     BP Readings from Last 5 Encounters:   02/10/25 124/85   02/05/25 126/80   01/28/25 (!) 164/92   01/14/25 (!) 150/97   10/16/24 136/87                   Mental Status Exam  Alertness: alert  and oriented  Appearance: adequately groomed  Behavior/Demeanor: cooperative, pleasant, and calm, with good eye contact   Speech: normal and regular rate and rhythm  Language: intact and no problems  Psychomotor: normal or unremarkable  Mood: worried  Affect: full range and appropriate; was congruent to  mood  Thought Process/Associations: unremarkable  Thought Content:  Reports none;  Denies suicidal ideation, violent ideation, and delusions  Perception:  Reports none;  Denies auditory hallucinations and visual hallucinations  Insight: good  Judgment: adequate for safety and intact  Cognition: does  appear grossly intact; formal cognitive testing was not done  Gait and Station:  N/A - telehealth                Data       2024     9:03 AM 2024    11:03 AM 2025     9:04 AM   PROMIS-10 Total Score w/o Sub Scores   PROMIS TOTAL - SUBSCORES 12 16 20        Patient-reported         2025     9:19 AM   CAGE-AID Total Score   Total Score 3    Total Score MyChart 3 (A total score of 2 or greater is considered clinically significant)       Patient-reported         3/4/2024    10:30 AM 2025     2:33 PM 2025     9:51 AM   PHQ   PHQ-9 Total Score 6 4 2    Q9: Thoughts of better off dead/self-harm past 2 weeks Not at all Not at all Not at all        Proxy-reported         3/4/2024    10:30 AM 2024    11:01 AM 2025     9:26 AM   RUTH-7 SCORE   Total Score  11 (moderate anxiety) 7 (mild anxiety)   Total Score 8 11 7        Patient-reported         Liver/kidney function Metabolic Blood counts   Recent Labs   Lab Test 24  1011 24  1140 24  1050   CR 0.87 0.79  --    AST 27 30 77*   ALT 18  --  56   ALKPHOS 52  --  49    Recent Labs   Lab Test 24  1011   CHOL 175   TRIG 95   LDL 79   HDL 77   A1C 6.3*   TSH 0.35    Recent Labs   Lab Test 24  1011   WBC 5.5   HGB 14.8   HCT 43.0   MCV 84   *          Administrative Billin minutes spent on the date of the encounter doing chart review and reviewing referral documents, history and exam of patient, documentation and further activities as noted above.    Video/Phone Start Time: 958   Video/Phone End Time:

## 2025-02-17 ENCOUNTER — VIRTUAL VISIT (OUTPATIENT)
Dept: PSYCHIATRY | Facility: CLINIC | Age: 65
End: 2025-02-17
Payer: COMMERCIAL

## 2025-02-17 DIAGNOSIS — F41.1 GAD (GENERALIZED ANXIETY DISORDER): ICD-10-CM

## 2025-02-17 DIAGNOSIS — F33.42 MAJOR DEPRESSIVE DISORDER, RECURRENT, IN FULL REMISSION: ICD-10-CM

## 2025-02-17 DIAGNOSIS — F41.0 PANIC DISORDER WITHOUT AGORAPHOBIA: Primary | ICD-10-CM

## 2025-02-17 PROCEDURE — 98003 SYNCH AUDIO-VIDEO NEW HI 60: CPT

## 2025-02-17 PROCEDURE — G2211 COMPLEX E/M VISIT ADD ON: HCPCS | Mod: 95

## 2025-02-17 RX ORDER — GABAPENTIN 300 MG/1
300 CAPSULE ORAL AT BEDTIME
Qty: 30 CAPSULE | Refills: 0 | Status: SHIPPED | OUTPATIENT
Start: 2025-02-17

## 2025-02-17 RX ORDER — GABAPENTIN 100 MG/1
100 CAPSULE ORAL EVERY MORNING
Qty: 30 CAPSULE | Refills: 0 | Status: SHIPPED | OUTPATIENT
Start: 2025-02-17

## 2025-02-17 RX ORDER — HYDROXYZINE HYDROCHLORIDE 25 MG/1
25 TABLET, FILM COATED ORAL 2 TIMES DAILY PRN
Qty: 60 TABLET | Refills: 0 | Status: SHIPPED | OUTPATIENT
Start: 2025-02-17

## 2025-02-17 RX ORDER — SERTRALINE HYDROCHLORIDE 100 MG/1
200 TABLET, FILM COATED ORAL DAILY
Qty: 60 TABLET | Refills: 0 | Status: SHIPPED | OUTPATIENT
Start: 2025-02-17

## 2025-02-17 ASSESSMENT — PAIN SCALES - GENERAL: PAINLEVEL_OUTOF10: NO PAIN (0)

## 2025-02-17 ASSESSMENT — PATIENT HEALTH QUESTIONNAIRE - PHQ9
10. IF YOU CHECKED OFF ANY PROBLEMS, HOW DIFFICULT HAVE THESE PROBLEMS MADE IT FOR YOU TO DO YOUR WORK, TAKE CARE OF THINGS AT HOME, OR GET ALONG WITH OTHER PEOPLE: NOT DIFFICULT AT ALL
SUM OF ALL RESPONSES TO PHQ QUESTIONS 1-9: 2
SUM OF ALL RESPONSES TO PHQ QUESTIONS 1-9: 2

## 2025-02-17 NOTE — PATIENT INSTRUCTIONS
Medication Plan  -Increase sertraline to 200mg daily  -Continue gabapentin 100mg every morning and increase to 300mg at bedtime  -Continue trazodone 50-100mg at bedtime  -Continue hydroxyzine 25mg twice daily as needed for anxiety    Serotonin syndrome (SS) has occurred with serotonergic antidepressants (eg, SSRIs, SNRIs), particularly when used in combination with other serotonergic agents. Signs/symptoms of serotonin syndrome. Symptoms typically occur within several hours of taking a new drug or increasing the dose. Please seek urgent medical care should concerns for this arise.   Serotonin Syndrome Symptoms:   -neuromuscular abnormalities (hyperreflexia, tremor, and ataxia)  -autonomic instability               (tachycardia, diaphoresis, and hyperthermia)  -gastrointestinal symptoms      (nausea, vomiting, diarrhea)  -mental status changes            (agitation and confusion)    Prescribed clonazepam 0.5mg once daily as needed for anxiety by cardiology    **For crisis resources, please see the information at the end of this document**   Patient Education    Thank you for coming to the [facility] [place] [facility].     Lab Testing:  If you had lab testing today and your results are reassuring or normal they will be mailed to you or sent through vozero within 7 days. If the lab tests need quick action we will call you with the results. The phone number we will call with results is # [phone]. If this is not the best number please call our clinic and change the number.     Medication Refills:  If you need any refills please call your pharmacy and they will contact us. Our fax number for refills is [phone].   Three business days of notice are needed for general medication refill requests.   Five business days of notice are needed for controlled substance refill requests.   If you need to change to a different pharmacy, please contact the new pharmacy directly. The new  pharmacy will help you get your medications transferred.     Contact Us:  Please call [phone] during business hours (8-5:00 M-F).   If you have medication related questions after clinic hours, or on the weekend, please call [phone].     Financial Assistance [phone]   Medical Records [phone]       MENTAL HEALTH CRISIS RESOURCES:  For a emergency help, please call 911 or go to the nearest Emergency Department.     Emergency Walk-In Options:   EmPATH Unit @ [facility] ([place]): [phone] - Specialized mental health emergency area designed to be calming  [facility] ([place]): [phone]  [facility] ([place]): [phone]  [facility] ([place]): [phone]    [facility] Crisis Information:   [place]: [phone]  [place]: [phone]  [place] ([place]) - Adult: [phone]     Child: [phone]  [place] - Adult: [phone]     Child: [phone]  [place]: [phone]  List of all [facility] resources:   https://[place].gov/dhs/people-we-serve/adults/health-care/mental-health/resources/crisis-contacts.jsp    National Crisis Information:   Crisis Text Line: Text  [place]  to 642708  Suicide & Crisis Lifeline: 988  National Suicide Prevention Lifeline: 2-912-272-TALK (1-836.194.8903)       For online chat options, visit https://suicidepreventionlifeline.org/chat/  Poison Control Center: 1-884.954.1252  Trans Lifeline: 8-149-699-9077 - Hotline for transgender people of all ages  [employer]: 1-496.799.4016 - Hotline for LGBT youth     For Non-Emergency Support:   Fast Tracker: Mental Health & Substance Use Disorder Resources -   https://www.[employer].org/

## 2025-02-17 NOTE — PROGRESS NOTES
Virtual Visit Details    Type of service:  Video Visit   Video Start Time: 0958   Video End Time: 1042     Originating Location (pt. Location): Home    Distant Location (provider location):  On-site  Platform used for Video Visit: NoraWell

## 2025-02-17 NOTE — LETTER
February 17, 2025      RE: Anguspeyton Wynne  [address]  [place] [address]         To Whom it May Concern,    I met with Angus Wynne on 02/17/25 for psychiatric intake and we have a follow-up appointment scheduled in 8 weeks. It is my understanding he is pursuing bariatric surgery. He has a remote history of alcohol abuse and his use will need to be closely monitored post-procedure. However, I feel he is stable at this time to proceed with the process and will continue to monitor him throughout the procedure.    Sincerely,    Kalyn Nichols DNP, APRN, PMHNP-[facility]

## 2025-02-17 NOTE — NURSING NOTE
Current patient location: [address] [place], [place] [address] [address]    Is the patient currently in the state of [place]? YES    Visit mode: VIDEO    If the visit is dropped, the patient can be reconnected by:VIDEO VISIT: Text to cell phone:   Telephone Information:   Mobile [phone]       Will anyone else be joining the visit? NO  (If patient encounters technical issues they should call [phone]    Are changes needed to the allergy or medication list? No    Are refills needed on medications prescribed by this physician? NO    Rooming Documentation:  Patient will complete questionnaire(s) in [employer]    Reason for visit: Consult    Cherie ANDINO

## 2025-03-04 ENCOUNTER — LAB (OUTPATIENT)
Dept: LAB | Facility: CLINIC | Age: 65
End: 2025-03-04
Payer: COMMERCIAL

## 2025-03-04 DIAGNOSIS — E55.9 VITAMIN D DEFICIENCY: ICD-10-CM

## 2025-03-04 DIAGNOSIS — R73.03 PREDIABETES: ICD-10-CM

## 2025-03-04 DIAGNOSIS — I10 ESSENTIAL HYPERTENSION: ICD-10-CM

## 2025-03-04 DIAGNOSIS — Z13.0 SCREENING FOR IRON DEFICIENCY ANEMIA: ICD-10-CM

## 2025-03-04 DIAGNOSIS — E78.5 HYPERLIPIDEMIA WITH TARGET LDL LESS THAN 130: ICD-10-CM

## 2025-03-04 LAB
ALBUMIN SERPL BCG-MCNC: 4.6 G/DL (ref 3.5–5.2)
ALP SERPL-CCNC: 68 U/L (ref 40–150)
ALT SERPL W P-5'-P-CCNC: 49 U/L (ref 0–70)
ANION GAP SERPL CALCULATED.3IONS-SCNC: 16 MMOL/L (ref 7–15)
AST SERPL W P-5'-P-CCNC: 49 U/L (ref 0–45)
BILIRUB SERPL-MCNC: 0.6 MG/DL
BUN SERPL-MCNC: 14.3 MG/DL (ref 8–23)
CALCIUM SERPL-MCNC: 9.7 MG/DL (ref 8.8–10.4)
CHLORIDE SERPL-SCNC: 100 MMOL/L (ref 98–107)
CREAT SERPL-MCNC: 0.93 MG/DL (ref 0.67–1.17)
EGFRCR SERPLBLD CKD-EPI 2021: >90 ML/MIN/1.73M2
ERYTHROCYTE [DISTWIDTH] IN BLOOD BY AUTOMATED COUNT: 12.6 % (ref 10–15)
EST. AVERAGE GLUCOSE BLD GHB EST-MCNC: 103 MG/DL
GLUCOSE SERPL-MCNC: 113 MG/DL (ref 70–99)
HBA1C MFR BLD: 5.2 % (ref 0–5.6)
HCO3 SERPL-SCNC: 21 MMOL/L (ref 22–29)
HCT VFR BLD AUTO: 43 % (ref 40–53)
HGB BLD-MCNC: 15.3 G/DL (ref 13.3–17.7)
MCH RBC QN AUTO: 30.2 PG (ref 26.5–33)
MCHC RBC AUTO-ENTMCNC: 35.6 G/DL (ref 31.5–36.5)
MCV RBC AUTO: 85 FL (ref 78–100)
PLATELET # BLD AUTO: 145 10E3/UL (ref 150–450)
POTASSIUM SERPL-SCNC: 4.7 MMOL/L (ref 3.4–5.3)
PROT SERPL-MCNC: 7.8 G/DL (ref 6.4–8.3)
RBC # BLD AUTO: 5.06 10E6/UL (ref 4.4–5.9)
SODIUM SERPL-SCNC: 137 MMOL/L (ref 135–145)
VIT D+METAB SERPL-MCNC: 12 NG/ML (ref 20–50)
WBC # BLD AUTO: 8.3 10E3/UL (ref 4–11)

## 2025-03-04 PROCEDURE — 85027 COMPLETE CBC AUTOMATED: CPT

## 2025-03-04 PROCEDURE — 80053 COMPREHEN METABOLIC PANEL: CPT

## 2025-03-04 PROCEDURE — 36415 COLL VENOUS BLD VENIPUNCTURE: CPT

## 2025-03-04 PROCEDURE — 82306 VITAMIN D 25 HYDROXY: CPT

## 2025-03-04 PROCEDURE — 83036 HEMOGLOBIN GLYCOSYLATED A1C: CPT

## 2025-03-06 ENCOUNTER — VIRTUAL VISIT (OUTPATIENT)
Dept: SURGERY | Facility: CLINIC | Age: 65
End: 2025-03-06
Payer: COMMERCIAL

## 2025-03-06 VITALS — BODY MASS INDEX: 38.22 KG/M2 | HEIGHT: 71 IN | WEIGHT: 273 LBS

## 2025-03-06 DIAGNOSIS — E66.01 CLASS 2 SEVERE OBESITY DUE TO EXCESS CALORIES WITH SERIOUS COMORBIDITY AND BODY MASS INDEX (BMI) OF 38.0 TO 38.9 IN ADULT (H): Primary | ICD-10-CM

## 2025-03-06 DIAGNOSIS — E66.812 CLASS 2 SEVERE OBESITY DUE TO EXCESS CALORIES WITH SERIOUS COMORBIDITY AND BODY MASS INDEX (BMI) OF 38.0 TO 38.9 IN ADULT (H): Primary | ICD-10-CM

## 2025-03-06 NOTE — PATIENT INSTRUCTIONS
Colin Greco!     It was great chatting with you today! Here are some links to the information we discussed:      Vitamins and Minerals  [email or web address]     Diet Guidelines:  [email or web address]     Your Stage 1 Diet: Clear Liquids  [email or web address]     Your Stage 2 Diet: Low-fat Full Liquids  [email or web address]     Your Stage 3 Diet: Pureed Foods  [email or web address]     Your Stage 4 Diet: Soft Foods  [email or web address]    Your Stage 5 Diet: Regular Foods  [email or web address]     Here are the goals we discussed for this first month:     1. Begin taking a daily multivitamin , calcium, and vitamin D supplement     Multivitamin Options:  Cao Multivitamin - available at both [employer] and [employer]! Multivitamin  Alive! Women's Multivitamin  Women's One-A-Day  Kids' Complete Multivitamin Chewable Tablets - Orange, Grape & Cherry - 150ct - Up&up  : Target   Children's Chewable via [employer]    Calcium:  Calcium Citrate via [employer] 2 tablets, 2x/day (also available via Amazon)  Calcium Citrate via [facility] - 3 tablets, 2x/day  Calcium Citrate via Bariatric Pal    Vitamin D:  Vitamin D via [employer]  Vitamin D via [facility]  Vitamin D via Target      Schedule:  Morning: Levothyroxine  Lunch: first dose of Calcium  Dinner: second dose of Calcium  Bedtime: Multivitamin and Vitamin D      2. Include protein with all meals and snacks  - Vegetarian proteins: low-fat dairy, eggs, beans/lentils, edamame, chickpeas, tofu, plant-based protein powders or protein shakes (Orgain, Owyn, Ripple, etc)     Protein Supplement Nutrition:  <250 calories  15-30g protein  <10g fat  <10g sugar    Examples: Premier Protein, Ensure MAX, Fair Life, Owyn, Orgain, Ripple    3. Practice  fluids from solids  - Avoid sipping/drinking 30 minutes before, during and after all meals and snacks              Your next visit can be scheduled via the call center at [phone] and should be in one month.  Have a great week and let us know if any questions/concerns pop up!        Mildred Hoffmann, RD, LD, CSOWM  Clinical Dietitian

## 2025-03-06 NOTE — PROGRESS NOTES
"Angus is a 64 year old who is being evaluated via a billable video visit.      The patient has been notified of following:     \"This video visit will be conducted via a call between you and your physician/provider. We have found that certain health care needs can be provided without the need for an in-person physical exam.  This service lets us provide the care you need with a video conversation.  If a prescription is necessary we can send it directly to your pharmacy.  If lab work is needed we can place an order for that and you can then stop by our lab to have the test done at a later time.    Video visits are billed at different rates depending on your insurance coverage.  Please reach out to your insurance provider with any questions.    If during the course of the call the physician/provider feels a video visit is not appropriate, you will not be charged for this service.\"    Patient has given verbal consent for Video visit? Yes    How would you like to obtain your AVS? MyChart    If the video visit is dropped, the invitation should be resent by: Text to cell phone: [phone]    Will anyone else be joining your video visit? No    I    Video-Visit Details    Type of service:  Video Visit    Originating Location (pt. Location): Home    Distant Location (provider location):   [facility]    Platform used for Video Visit: [employer]    Video Start Time:  11:00am    Video End Time: 11:31am      New Bariatric Nutrition Consultation Note    Reason For Visit: Nutrition Assessment    Angus Wynne is a 64 year old presenting today for new bariatric nutrition consult.  Pt is interested in laparoscopic  (undecided) .  Patient is accompanied by self.        2/9/2025    10:32 AM   Support System Reviewed With Patient   Who do you have in your support network that can be available to help you for the first 2 weeks after surgery? Son   Who can you count on for support throughout your " "weight loss surgery journey? Son       ANTHROPOMETRICS:  Estimated body mass index is 38.08 kg/m  as calculated from the following:    Height as of 2/10/25: 1.803 m (5' 11\").    Weight as of 2/10/25: 123.8 kg (273 lb).    Required weight loss goal pre-op: 0 lbs from initial consult weight (goal weight 273 lbs or less before surgery)          SUPPLEMENT INFORMATION:  Levothyroxine - AM  Statin noted - recommend Thiamin rather than Vitamin B Complex post-op  Kidney stone history - recommend Calcium Citrate rather than Carbonate  Folvite 400mcg + Thiamin 100mg d/t EtOH    NUTRITION HISTORY:  (Per provider visit on 2/20/2025)    HABITS:       2/9/2025    10:32 AM   --   How often do you drink alcohol? 2-3 times a week   If you do drink alcohol, how many drinks might you have in a day? (one drink = 5 oz. wine, 1 can/bottle of beer, 1 shot liquor) 3 or 4   Do you currently use any of the following Nicotine products? No   Have you ever used any of the following nicotine products? Cigarettes   If you previously used any of these products, what year did you quit? 2018   Have you or are you currently using street drugs or prescription strength medication for which you do not have a prescription for? No   Do you have a history of chemical dependency (alcohol or drug abuse)? No   Hx of alcohol use disorder.         PSYCHOLOGICAL HISTORY:        2/9/2025    10:32 AM   Psychological History Reviewed With Patient   Have you ever attempted suicide? Never.   Have you had thoughts of suicide in the past year? No   Have you ever been hospitalized for mental illness or a suicide attempt? Never.   Do you have a history of chronic pain? No   Have you ever been diagnosed with fibromyalgia? No   Are you currently being treated for any of the following? (select all that apply) Depression    Anxiety    I take medication or see a therapist for another mental illness   Are you currently seeing a psychiatrist? No              2/9/2025    10:32 " "AM   Questions Reviewed With Patient   How ready are you to make changes regarding your weight? Number 1 = Not ready at all to make changes up to 10 = very ready. 10   How confident are you that you can change? 1 = Not confident that you will be successful making changes up to 10 = very confident. 10        EATING BEHAVIORS:       2/9/2025    10:32 AM   --   Have you or anyone else thought that you had an eating disorder? No         EXERCISE:       2/9/2025    10:32 AM   --   How often do you exercise? Less than 1 time per week   What is the duration of your exercise (in minutes)? I do not exercise.   What keeps you from being more active? My ability to walk or move around is limited    Shortness of breath             ADDITIONAL INFORMATION:  Pt has been considering surgery to \"lose as much weight as possible.\" Pt's brother is a physician and assists with health care guidance. Pt is a lacto-ovo vegetarian and gets majority of his protein from lentils, yogurt, occasional eggs, and milk. Pt w/hx of EtOH abuse; currently drinks 2-3x/week, 3-4 drinks at a time. Taking Thiamin and Folic Acid per PCP per EMR med list.       NUTRITION DIAGNOSIS:  Obesity r/t long history of self-monitoring deficit and excessive energy intake aeb BMI >30 kg/m2.    INTERVENTION:  Intervention Provided/Education Provided on post-op diet guidelines, vitamins/minerals essential post-operatively, GI anatomy of bariatric surgeries, ways to help prepare for post-op diet guidelines pre-operatively, portion/calorie-control, mindful eating.  Provided pt with list of goals RD contact information.          2/9/2025    10:32 AM   Questions Reviewed With Patient   How ready are you to make changes regarding your weight? Number 1 = Not ready at all to make changes up to 10 = very ready. 10   How confident are you that you can change? 1 = Not confident that you will be successful making changes up to 10 = very confident. 10       Patient Understanding: " good  Expected Compliance: good    GOALS:  Begin taking multivitamin, calcium and vitamin D per guidelines  Include protein with all meals and snacks  Practice  fluids from meals by 30 minutes    Follow-Up:   Recommend 3-4 follow up visits to assist with lifestyle changes or per insurance.      Time spent with patient: 31 minutes.      Mildred Hoffmann RD, LD, CSM  Clinical Dietitian

## 2025-03-25 DIAGNOSIS — F41.0 PANIC DISORDER WITHOUT AGORAPHOBIA: ICD-10-CM

## 2025-03-25 DIAGNOSIS — F41.1 GAD (GENERALIZED ANXIETY DISORDER): ICD-10-CM

## 2025-03-25 RX ORDER — GABAPENTIN 300 MG/1
300 CAPSULE ORAL AT BEDTIME
Qty: 30 CAPSULE | Refills: 0 | Status: SHIPPED | OUTPATIENT
Start: 2025-03-25

## 2025-03-25 NOTE — TELEPHONE ENCOUNTER
Last seen: 2/17  RTC: 4/14 at 1pm (in-person)   Any patient initiated cancellations or no shows since last visit? No  Next appt: 4/14  Last filled: Per [place]       Incoming refill from pharmacy via fax by pharmacy    Medication requested:   Pending Prescriptions:                       Disp   Refills    gabapentin (NEURONTIN) 300 MG capsule     30 cap*0            Sig: Take 1 capsule (300 mg) by mouth at bedtime.      From chart note:   1) PSYCHOTROPIC MEDICATION RECOMMENDATIONS:  -Increase sertraline to 200mg daily  -Continue gabapentin 100mg every morning and increase to 300mg at bedtime  -Continue trazodone 50-100mg at bedtime  -Continue hydroxyzine 25mg twice daily as needed for anxiety     Prescribed clonazepam 0.5mg once daily as needed for anxiety by cardiology    Is note signed/closed? Yes    Is this a 90 day request for a psych medication? No    LPNs - Please check  if controlled and send to provider  Others - Send to RNs

## 2025-03-25 NOTE — TELEPHONE ENCOUNTER
Last refill per   -See below--    Medication unable to be refilled by RN due to criteria not met as indicated.                 []Eligibility - not seen in the last year              []Supervision - no future appointment              []Compliance - no shows, cancellations or lapse in therapy              []Verification - order discrepancy              [x]Controlled medication              []Medication not included in policy              []90-day supply request              []Other:

## 2025-03-29 DIAGNOSIS — E66.01 CLASS 3 SEVERE OBESITY DUE TO EXCESS CALORIES WITH BODY MASS INDEX (BMI) OF 40.0 TO 44.9 IN ADULT, UNSPECIFIED WHETHER SERIOUS COMORBIDITY PRESENT (H): ICD-10-CM

## 2025-03-29 DIAGNOSIS — E66.813 CLASS 3 SEVERE OBESITY DUE TO EXCESS CALORIES WITH BODY MASS INDEX (BMI) OF 40.0 TO 44.9 IN ADULT, UNSPECIFIED WHETHER SERIOUS COMORBIDITY PRESENT (H): ICD-10-CM

## 2025-04-01 RX ORDER — TIRZEPATIDE 15 MG/.5ML
INJECTION, SOLUTION SUBCUTANEOUS
Qty: 2 ML | Refills: 2 | Status: SHIPPED | OUTPATIENT
Start: 2025-04-01

## 2025-04-02 ENCOUNTER — TELEPHONE (OUTPATIENT)
Dept: FAMILY MEDICINE | Facility: CLINIC | Age: 65
End: 2025-04-02

## 2025-04-02 NOTE — TELEPHONE ENCOUNTER
Prior Authorization Retail Medication Request    Medication/Dose: tirzepatide-Weight Management (ZEPBOUND) 15 MG/0.5ML prefilled pen  Diagnosis and ICD code (if different than what is on RX):    New/renewal/insurance change PA/secondary ins. PA:  Previously Tried and Failed:    Rationale:      Insurance   Primary:   Insurance ID:  [id number]    Secondary (if applicable):  Insurance ID:      Pharmacy Information (if different than what is on RX)  Name:    Phone:  [phone]  Fax:    Clinic Information  Preferred routing pool for dept communication:

## 2025-04-03 NOTE — TELEPHONE ENCOUNTER
Retail Pharmacy Prior Authorization Team   Phone: [phone]    PA Initiation    Medication: ZEPBOUND 15 MG/0.5ML SC SOAJ  Insurance Company: [employer] - Phone [phone] Fax [phone]  Pharmacy Filling the Rx: [id number] [place] [facility] [place] [facility] [address] AT [facility] OF [address]  Filling Pharmacy Phone: [phone]  Filling Pharmacy Fax:    Start Date: 4/3/2025    [id number]

## 2025-04-06 DIAGNOSIS — I10 ESSENTIAL HYPERTENSION: ICD-10-CM

## 2025-04-07 RX ORDER — LOSARTAN POTASSIUM 100 MG/1
100 TABLET ORAL DAILY
Qty: 90 TABLET | Refills: 0 | Status: SHIPPED | OUTPATIENT
Start: 2025-04-07

## 2025-04-07 NOTE — TELEPHONE ENCOUNTER
PRIOR AUTHORIZATION DENIED    Medication: ZEPBOUND 15 MG/0.5ML SC SOAJ  Insurance Company: [employer] - Phone [phone] Fax [phone]  Denial Date: 4/4/2025  Denial Reason(s):           Appeal Information:         Patient Notified: No     Alert and oriented to person, place, time/situation. normal mood and affect. no apparent risk to self or others.

## 2025-04-10 ENCOUNTER — TELEPHONE (OUTPATIENT)
Dept: SURGERY | Facility: CLINIC | Age: 65
End: 2025-04-10
Payer: COMMERCIAL

## 2025-04-10 DIAGNOSIS — E66.01 CLASS 2 SEVERE OBESITY DUE TO EXCESS CALORIES WITH SERIOUS COMORBIDITY AND BODY MASS INDEX (BMI) OF 38.0 TO 38.9 IN ADULT (H): Primary | ICD-10-CM

## 2025-04-10 DIAGNOSIS — E66.812 CLASS 2 SEVERE OBESITY DUE TO EXCESS CALORIES WITH SERIOUS COMORBIDITY AND BODY MASS INDEX (BMI) OF 38.0 TO 38.9 IN ADULT (H): Primary | ICD-10-CM

## 2025-04-10 NOTE — TELEPHONE ENCOUNTER
General Call    Contacts       Contact Date/Time Type Contact Phone/Fax    04/10/2025 10:35 AM CDT Phone (Incoming) Angus Wynne (Self) [phone] (M)          Reason for Call:  please call pt to discuss meds. Something about he thought Alicia was ordering Zepbound        Could we [facility] this [facility] you [facility] or would you prefer to receive a phone call?:   Patient would prefer a phone call   Okay to leave a detailed message?: Yes at Cell number on file:    Telephone Information:   Mobile [phone]

## 2025-04-10 NOTE — TELEPHONE ENCOUNTER
Prior Authorization Retail Medication Request    Medication/Dose: PA:  Semaglutide-Weight Management (WEGOVY) 2.4 MG/0.75ML pen 9 mL 0 4/10/2025 - --  Sig - Route: Inject 2.4 mg subcutaneously once a week. - Subcutaneous    Diagnosis and ICD code (if different than what is on RX):   [E66.812, E66.01, Z68.38]    New/renewal/insurance change PA/secondary ins. PA: NEW  Previously Tried and Failed:  Zepbound no longer covered.  Rationale:  Weight management    Insurance   [facility]  Insurance ID:  [id number]     Pharmacy Information (if different than what is on RX)  Name:    [id number] - [place] - [address] AT [address]     Phone:  [phone]   Fax: [phone]

## 2025-04-10 NOTE — TELEPHONE ENCOUNTER
Returned call to Angus.    He reports that Zepbound is no longer covered under his plan.    I went through the denial with him over the phone. We discussed that as of the new year, Zepbound is no longer the preferred GLP drug on [employer]. Preferred are Saxenda and Wegovy.    Angus wonders if Alicia GILLIS would consider taking over his prescriptions and would like to have Wegovy prescribed instead.    He is still working on the bariatric surgery process.    Will ask MKD about taking over and ordering Wegovy.    Heather JAVIER, RN, BSN

## 2025-04-10 NOTE — TELEPHONE ENCOUNTER
Sounds good we can put him on Wegovy 2.4 mg weekly.  I have a follow-up with him in June. Please give a 3 mop supply.

## 2025-04-11 NOTE — TELEPHONE ENCOUNTER
Will route to Alicia Art who I believe Is managing his weight loss .     I see a prescription for semaglutide, so perhaps this denial of zepbound is not an active problem.    approved by jae

## 2025-04-14 NOTE — TELEPHONE ENCOUNTER
Retail Pharmacy Prior Authorization Team   Phone: [phone]    PA Initiation    Medication: WEGOVY 2.4 MG/0.75ML SC SOAJ  Insurance Company: [employer] - Phone [phone] Fax [phone]  Pharmacy Filling the Rx: [id number] [place] [address] AT [facility] OF [address]  Filling Pharmacy Phone: [phone]  Filling Pharmacy Fax:    Start Date: 4/14/2025    [id number]

## 2025-04-16 NOTE — TELEPHONE ENCOUNTER
Prior Authorization Approval    Medication: WEGOVY 2.4 MG/0.75ML SC SOAJ  Authorization Effective Date: 4/15/2025  Authorization Expiration Date: 4/15/2026  Approved Dose/Quantity:   Reference #:     Insurance Company: [employer] - Phone [phone] Fax [phone]  Expected CoPay: $    CoPay Card Available:      Financial Assistance Needed:   Which Pharmacy is filling the prescription: [id number] [place] [facility] [place] [facility] [address] AT [facility] OF [address]  Pharmacy Notified: Yes  Patient Notified: **Instructed pharmacy to notify patient when script is ready to /ship.**

## 2025-04-21 DIAGNOSIS — F41.1 GAD (GENERALIZED ANXIETY DISORDER): ICD-10-CM

## 2025-04-21 DIAGNOSIS — F41.0 PANIC DISORDER WITHOUT AGORAPHOBIA: ICD-10-CM

## 2025-04-21 DIAGNOSIS — G47.09 OTHER INSOMNIA: ICD-10-CM

## 2025-04-21 RX ORDER — GABAPENTIN 300 MG/1
300 CAPSULE ORAL AT BEDTIME
Qty: 30 CAPSULE | Refills: 0 | Status: SHIPPED | OUTPATIENT
Start: 2025-04-21

## 2025-04-21 NOTE — TELEPHONE ENCOUNTER
Last seen: 02/17/2027  RTC: 04/14/ @1 pm  Any patient initiated cancellations or no shows since last visit? YES - 1 Can  Next appt: 05/15/2025  Last filled: 03/25/2025     Incoming refill from pharmacy via fax by pharmacy    Medication requested:   Pending Prescriptions:                       Disp   Refills    gabapentin (NEURONTIN) 300 MG capsule     30 cap*0            Sig: Take 1 capsule (300 mg) by mouth at bedtime.      From chart note:   PSYCHOTROPIC MEDICATION RECOMMENDATIONS:  -Increase sertraline to 200mg daily  -Continue gabapentin 100mg every morning and increase to 300mg at bedtime  -Continue trazodone 50-100mg at bedtime  -Continue hydroxyzine 25mg twice daily as needed for anxiety    Is note signed/closed? Yes    Is this a 90 day request for a psych medication? No    LPNs - Please check  if controlled and send to provider  Others - Send to RNs

## 2025-04-22 RX ORDER — TRAZODONE HYDROCHLORIDE 50 MG/1
50-100 TABLET ORAL AT BEDTIME
Qty: 180 TABLET | Refills: 1 | Status: SHIPPED | OUTPATIENT
Start: 2025-04-22

## 2025-05-15 ENCOUNTER — TELEPHONE (OUTPATIENT)
Dept: PSYCHIATRY | Facility: CLINIC | Age: 65
End: 2025-05-15
Payer: COMMERCIAL

## 2025-05-15 ENCOUNTER — VIRTUAL VISIT (OUTPATIENT)
Dept: PSYCHIATRY | Facility: CLINIC | Age: 65
End: 2025-05-15
Payer: COMMERCIAL

## 2025-05-15 VITALS — WEIGHT: 265 LBS | BODY MASS INDEX: 36.96 KG/M2

## 2025-05-15 DIAGNOSIS — F41.1 GAD (GENERALIZED ANXIETY DISORDER): ICD-10-CM

## 2025-05-15 DIAGNOSIS — G47.09 OTHER INSOMNIA: ICD-10-CM

## 2025-05-15 DIAGNOSIS — F40.01 PANIC DISORDER WITH AGORAPHOBIA: Primary | ICD-10-CM

## 2025-05-15 RX ORDER — SERTRALINE HYDROCHLORIDE 100 MG/1
200 TABLET, FILM COATED ORAL DAILY
Qty: 60 TABLET | Refills: 1 | Status: SHIPPED | OUTPATIENT
Start: 2025-05-15

## 2025-05-15 RX ORDER — TRAZODONE HYDROCHLORIDE 50 MG/1
50-100 TABLET ORAL AT BEDTIME
Qty: 60 TABLET | Refills: 1 | Status: SHIPPED | OUTPATIENT
Start: 2025-05-15

## 2025-05-15 RX ORDER — HYDROXYZINE HYDROCHLORIDE 25 MG/1
25 TABLET, FILM COATED ORAL 2 TIMES DAILY PRN
Qty: 60 TABLET | Refills: 1 | Status: SHIPPED | OUTPATIENT
Start: 2025-05-15

## 2025-05-15 RX ORDER — GABAPENTIN 100 MG/1
CAPSULE ORAL
Qty: 90 CAPSULE | Refills: 1 | Status: SHIPPED | OUTPATIENT
Start: 2025-05-15

## 2025-05-15 RX ORDER — GABAPENTIN 300 MG/1
300 CAPSULE ORAL AT BEDTIME
Qty: 30 CAPSULE | Refills: 1 | Status: SHIPPED | OUTPATIENT
Start: 2025-05-15

## 2025-05-15 ASSESSMENT — PAIN SCALES - GENERAL: PAINLEVEL_OUTOF10: MODERATE PAIN (4)

## 2025-05-15 NOTE — NURSING NOTE
Current patient location: [address] [place] [address]  [place] [address]    Is the patient currently in the state of [place]? YES    Visit mode: VIDEO    If the visit is dropped, the patient can be reconnected by:VIDEO VISIT: Send to e-mail at: [email or web address]    Will anyone else be joining the visit? NO  (If patient encounters technical issues they should call [phone]    Are changes needed to the allergy or medication list? No    Are refills needed on medications prescribed by this physician? NO    Rooming Documentation:  Questionnaire(s) completed    Reason for visit: JIMI ANDINO

## 2025-05-15 NOTE — TELEPHONE ENCOUNTER
----- Message from Kalyn Nichols sent at 5/15/2025 11:55 AM CDT -----  Hey nurses-Would someone please send this pt a consent to communicate form for his brother? I let him know we could send it to him via [email or web address] if that works.Thanks!Kalyn

## 2025-05-15 NOTE — PATIENT INSTRUCTIONS
Plan:  -Continue sertraline 200mg daily  -Increase gabapentin to 100mg QAM, 200mg in the afternoon, 300mg at bedtime  -Continue trazodone 50-100mg at bedtime  -Continue hydroxyzine 25mg twice daily as needed for anxiety        **For crisis resources, please see the information at the end of this document**   Patient Education    Thank you for coming to the [facility] [place] [facility].     Lab Testing:  If you had lab testing today and your results are reassuring or normal they will be mailed to you or sent through [employer] within 7 days. If the lab tests need quick action we will call you with the results. The phone number we will call with results is # [phone]. If this is not the best number please call our clinic and change the number.     Medication Refills:  If you need any refills please call your pharmacy and they will contact us. Our fax number for refills is [phone].   Three business days of notice are needed for general medication refill requests.   Five business days of notice are needed for controlled substance refill requests.   If you need to change to a different pharmacy, please contact the new pharmacy directly. The new pharmacy will help you get your medications transferred.     Contact Us:  Please call [phone] during business hours (8-5:00 M-F).   If you have medication related questions after clinic hours, or on the weekend, please call [phone].     Financial Assistance [phone]   Medical Records [phone]       MENTAL HEALTH CRISIS RESOURCES:  For a emergency help, please call 911 or go to the nearest Emergency Department.     Emergency Walk-In Options:   Em[facility] Unit @ [facility] ([place]): [phone] - Specialized mental health emergency area designed to be calming  [facility] ([place]): [phone]  [facility] ([place]): [phone]  [facility] ([place]):  [phone]    [facility] Crisis Information:   [place]: [phone]  Angus: [phone]  [place] ([place]) - Adult: [phone]     Child: [phone]  [place] - Adult: [phone]     Child: [phone]  [place]: [phone]  List of all [facility] resources:   https://[place].gov/dhs/people-we-serve/adults/health-care/mental-health/resources/crisis-contacts.jsp    National Crisis Information:   Crisis Text Line: Text  [place]  to 545423  Suicide & Crisis Lifeline: 988  National Suicide Prevention Lifeline: 5-529-801-TALK (1-931.580.9749)       For online chat options, visit https://suicidepreventionlifeline.org/chat/  Poison Control Center: 1-427.273.5441  Trans Lifeline: 1-909.325.9790 - Hotline for transgender people of all ages  The Sincere Project: 2-809-267-4743 - Hotline for LGBT youth     For Non-Emergency Support:   Fast Tracker: Mental Health & Substance Use Disorder Resources -   https://www.Observe[employer]track[employer]Del[employer] Internationaln.org/

## 2025-05-15 NOTE — PROGRESS NOTES
[facility]  MEDICAL PROGRESS NOTE     Angus Wynne is a 64 year old referred by Fifi Fleming.     Initial consultation on 02/17/25.      CARE TEAM:   PCP- Vahe Mobley  Therapist- None              Assessment & Plan     History and interview support the following diagnoses:   Generalized anxiety disorder  Panic disorder with agoraphobia   Major depressive disorder, in sustained remission  Insomnia, unspecified    History of alcohol abuse     Angus is a 64-year-old adult with history of anxiety, panic attacks, depression, and alcohol abuse who presents for follow-up.     Angus was last seen 02/17/25 at which time sertraline was increased to 200mg daily and gabapentin dosing was changed to 100mg QAM + 300mg at bedtime. Today, Angus ran out of hydroxyzine about a month ago and has noticed an increased level of anxiety. He doesn't leave his home very often and contact is limited to his wife, son, and relatives.     No changes to alcohol use. Current use is reported as a half pint 2-3 times per week. Notes that alcohol use is driven by anxiety. Declines pharmacologic intervention for alcohol use. Recommend naltrexone in the future if patient becomes agreeable. We did discuss dual MI/BRIGHT IOP however Angus is apprehensive about pursuing this option.     For now we will titrate gabapentin for better coverage of anxiety with secondary benefit for reducing alcohol use. I recommend patient establish with a therapist and he is open to this option although somewhat apprehensive because talking with new people exacerbates his anxiety. I will first look into whether there are any therapy options through this clinic, and if not, will consider referral to the  [facility]. Patient is agreeable with this plan.     Assessment from initial consultation on 02/17/25:  Patient's current concerns relate to anxiety and panic attacks both of which were  diagnosed about 20 years ago. He has been on and off benzodiazepines in the past for management of anxiety. Although his last panic attack was over a year ago, he reports persistent concern about experiencing additional panic attacks and their consequences. He has modified certain behaviors (such as driving) in response to panic attacks. Anxiety is suboptimally treated with current regimen. No suicidal ideation, self-injurious behavior/urges, violent ideation, aggression, psychosis, hallucinations, delusions, duke/hypomania. Angus is future oriented and focused on getting better control of anxiety so that he is better able to experience ricci in his life.     Anxiety/panic: Will increase sertraline to 200mg daily. He has been on 150mg daily for many years and never at a higher dose. He is aware that this medication change will take several weeks for full therapeutic benefit. He reports good tolerability with current dose of 150mg but we did review potential side effects. We will also increase his bedtime dose of gabapentin from 200mg to 300mg for added anxiety support at bedtime. He has history of chronic insomnia, particularly sleep initiation - gabapentin may also target insomnia. Discussed risk of dizziness/drowsiness with higher dose of gabapentin.    We did discuss his use of benzodiazepines. He was recently prescribed clonazepam by cardiology. He is using the medication sparingly and reserves for times of heightened anxiety. Has used 2 tablets within the last 10-14 days. He does not use clonazepam on days he is consuming alcohol and is aware of the potential risks including respiratory depression and risk for falls. Recommended limiting use as short acting anxiolytics can be reinforcing of anxiety and should never be the first tool used for anxiety.    PSYCHOTROPIC DRUG INTERACTIONS: **Italicized interactions are for treatment plan options not currently implemented**  ADDITIVE SEDATION: clonazepam,  "hydroxyzine, gabapentin, trazodone  ADDITIVE QTc: trazodone, sertraline    MANAGEMENT:  use lowest therapeutic doses of psychotropic medications, routine monitoring, and patient aware of risks    [place]: indicates that controlled prescriptions have been filled as prescribed                Plan    1) PSYCHOTROPIC MEDICATION RECOMMENDATIONS:  -Continue sertraline 200mg daily  -Increase gabapentin to 100mg QAM, 200mg in the afternoon, 300mg at bedtime  -Continue trazodone 50-100mg at bedtime  -Continue hydroxyzine 25mg twice daily as needed for anxiety    Prescribed clonazepam 0.5mg once daily as needed for anxiety by cardiology    2) THERAPY: None    3) NEXT DUE:   Labs- Routine monitoring is not indicated for current psychotropic medication regimen   ECG- As needed    4) REFERRALS / COORDINATION: None    5) DISPOSITION: June 12th at 10a    Treatment Risk Statement:  The patient understands the risks, benefits, adverse effects and alternatives. Agrees to treatment with the capacity to do so. No medical contraindications to treatment. Agrees to contact provider for any problems. The patient understands to call 911 or go to the nearest ED if urgent or life threatening symptoms occur. Crisis contact numbers are provided routinely in the After Visit Summary.    Suicide Risk Assessment: Angus did not appear to be an imminent safety risk to self or others.    PROVIDER:  YUKO Martinez CNP              Pertinent Background  Angus notes longstanding history of depression and anxiety. He was initiated on clonazepam about 15-20 years ago due to panic attacks. He has had 6-7 panic attacks in his life and he notes that when they do occur, they are very disruptive to his life and can take up to 6 months for him to gain his \"confidence\" back. He has persistent concerns about repeated panic attacks and their consequences. Has modified behaviors (no longer driving) in response to panic attacks.   He has a " "history of alcohol use disorder. Heaviest drinking occurred about 20 years ago. Current use is 2-3 shots approximately 3-4 days per week.   Psych pertinent item history includes substance use: alcohol              Interim History    Angus was last seen 02/17/25 at which time sertraline was increased to 200mg daily and gabapentin dosing was changed to 100mg QAM + 300mg at bedtime. Since the last visit:    -As long as he is at home, he feels okay. The moment he tries to leave his home, his anxiety increases significantly.  -He ran out of hydroxyzine about a month ago. Without it, he finds it uncomfortable to be at home.   -Alcohol use: over the past month alcohol use has increased, consumes alcohol about 2-3 days per week, a half pint at a time.   -He's no longer using clonazepam as he ran out of it.   -Continues to have trouble falling asleep. Once he falls asleep, he sleeps through a night.     Benzodiazepine use:   -He was previously on lorazepam which was discontinued 2024. He was given a prescription for clonazepam by cardiology at the end of Jan 2025.   -He ran out of clonazepam and has not been using the medication.   -He does not combine with alcohol and is aware of the risks of respiratory depression with concurrent alcohol + benzo use.     Bariatric surgery:  -In process of pursuing bariatric surgery. Working with  [facility].   -Prescribed Wegovy.     Medication ASE: Denies  Medication adherence: Recently ran out of hydroxyzine.     Recent Psych Symptoms:   Depression:  Mood is \"fine.\" Denies symptoms of depression.   Elevated:  none  Psychosis:  none  Anxiety:  excessive worry and feeling fearful  Trauma Related:  none  Insomnia:  Yes: Longstanding issues with sleep initiation.    Pertinent Negatives: No suicidal or violent ideation, psychosis, or hypo/duke.      Pertinent Social Hx:  FINANCIAL SUPPORT- Retired  LIVING SITUATION / RELATIONSHIPS- Lives with wife and adult son. Feels safe " at home.   SOCIAL/ SPIRITUAL SUPPORT- Family    Pertinent Substance Use  Alcohol- 1/2 a pint 2-3 days per week  Nicotine- Former smoker - quit about 2017/2018.   Opioids- None  Narcan Kit- N/A  THC/CBD- None  Other Illicit Drugs- none    Substance Use History  Past Use- alcohol use was heaviest about 20 years ago.   Treatment [#, most recent]- None  Medical Consequences [withdrawal, sz etc]- Per chart, hx of acute alcoholic hepatitis. Endorses history of withdrawal when alcohol use was higher.  Legal Consequences- None              Medical Review of Systems   Dizziness/orthostasis- Reports one episode of dizziness in the middle of the night. No recent falls.   Respiratory- Some SOB on exertion  Gastrointestinal- Denies  Tics, tremors- Denies  A comprehensive review of systems was performed and is negative other than noted above.              Psych Summary Points  02/2025: Initial psychiatric consultation, increased sertraline to 200mg daily, changed gabapentin to 100mg QAM + 300mg at bedtime  05/2025: Increased gabapentin to 100mg QAM, 200mg in the afternoon, and 300mg at bedtime for anxiety (secondary benefit for alcohol cravings)              Past Psychotropic Medications     Medication Max Dose (mg) Dates / Duration Helpful? DC Reason / Adverse Effects?   lorazepam                     Past Medical History     Medication allergies:  No Known Allergies    Neurologic Hx [head injury, seizures, etc.]: None  Patient Active Problem List   Diagnosis    BPPV (benign paroxysmal positional vertigo), unspecified laterality    Other specified hypothyroidism    Hx of acute alcoholic hepatitis    Hx of colonic polyp    Gastroesophageal reflux disease, esophagitis presence not specified    Vitamin D deficiency    Anxiety    History of alcohol use    Neck pain    Cervicalgia    Cervical spondylosis without myelopathy    Essential hypertension    Hyperlipidemia with target LDL less than 130    Nasal congestion    History of  colonic polyps    Vertigo    Lumbago    IBARRA (dyspnea on exertion)    Dysphagia, unspecified type    Elevated troponin    CHAS (acute kidney injury)    Class 2 severe obesity due to excess calories with serious comorbidity and body mass index (BMI) of 38.0 to 38.9 in adult (H)    Assistance needed with transportation    Hypertrophic cardiomyopathy (H)    NAFLD (nonalcoholic fatty liver disease)    Prediabetes     Past Medical History:   Diagnosis Date    Arthritis     For 5 yrs    GERD (gastroesophageal reflux disease)     HTN (hypertension)     Hyperlipidemia     Hypothyroid     Kidney stone     Vertigo     2015 ,                  Medications   Current Outpatient Medications   Medication Sig Dispense Refill    acetaminophen (TYLENOL) 325 MG tablet Take 2 tablets (650 mg) by mouth every 4 hours as needed for mild pain (Patient not taking: Reported on 2/10/2025) 50 tablet 0    amLODIPine (NORVASC) 2.5 MG tablet Take 2 tablets (5 mg) by mouth daily. 90 tablet 3    clonazePAM (KLONOPIN) 0.5 MG tablet Take 1 tablet (0.5 mg) by mouth daily as needed for anxiety.      folic acid (FOLVITE) 400 MCG tablet Take 400 mcg by mouth daily.      gabapentin (NEURONTIN) 100 MG capsule Take 1 capsule (100 mg) by mouth every morning. 30 capsule 0    gabapentin (NEURONTIN) 300 MG capsule Take 1 capsule (300 mg) by mouth at bedtime. 30 capsule 0    hydrOXYzine HCl (ATARAX) 25 MG tablet Take 1 tablet (25 mg) by mouth 2 times daily as needed for itching. 60 tablet 0    levothyroxine (SYNTHROID/LEVOTHROID) 175 MCG tablet Take 1 tablet (175 mcg) by mouth daily 90 tablet 3    losartan (COZAAR) 100 MG tablet TAKE 1 TABLET(100 MG) BY MOUTH DAILY 90 tablet 0    metoprolol succinate ER (TOPROL XL) 25 MG 24 hr tablet Take 1 tablet (25 mg) by mouth every evening 90 tablet 3    rosuvastatin (CRESTOR) 40 MG tablet Take 1 tablet (40 mg) by mouth daily 90 tablet 3    Semaglutide-Weight Management (WEGOVY) 2.4 MG/0.75ML pen Inject 2.4 mg subcutaneously  once a week. 9 mL 0    sertraline (ZOLOFT) 100 MG tablet Take 2 tablets (200 mg) by mouth daily. 60 tablet 0    thiamine (B-1) 100 MG tablet TAKE 1 TABLET BY MOUTH DAILY 90 tablet 3    tirzepatide-Weight Management (ZEPBOUND) 10 MG/0.5ML prefilled pen Inject 0.5 mLs (10 mg) subcutaneously every 7 days. 2 mL 0    tirzepatide-Weight Management (ZEPBOUND) 10 MG/0.5ML prefilled pen Inject 0.5 mLs (10 mg) subcutaneously every 7 days. 2 mL 0    tirzepatide-Weight Management (ZEPBOUND) 12.5 MG/0.5ML prefilled pen ADMINISTER 12.5 MG UNDER THE SKIN EVERY 7 DAYS 2 mL 1    tirzepatide-Weight Management (ZEPBOUND) 15 MG/0.5ML prefilled pen ADMINISTER 15 MG UNDER THE SKIN EVERY 7 DAYS 2 mL 2    traZODone (DESYREL) 50 MG tablet TAKE 1 TO 2 TABLETS(50  MG) BY MOUTH AT BEDTIME 180 tablet 1                   Physical Exam  (Vitals Only)  Wt 120.2 kg (265 lb)   BMI 36.96 kg/m      Pulse Readings from Last 5 Encounters:   02/10/25 88   02/05/25 78   01/28/25 92   01/14/25 107   10/16/24 90     Wt Readings from Last 5 Encounters:   05/15/25 120.2 kg (265 lb)   03/06/25 123.8 kg (273 lb)   02/10/25 123.8 kg (273 lb)   02/05/25 128.4 kg (283 lb)   01/28/25 128.8 kg (284 lb)     BP Readings from Last 5 Encounters:   02/10/25 124/85   02/05/25 126/80   01/28/25 (!) 164/92   01/14/25 (!) 150/97   10/16/24 136/87                 Mental Status Exam  Alertness: alert  and oriented  Appearance: adequately groomed  Behavior/Demeanor: cooperative, pleasant, and calm, with good eye contact   Speech: normal and regular rate and rhythm  Language: intact and no problems  Psychomotor: normal or unremarkable  Mood: worried  Affect: full range and appropriate; was congruent to mood  Thought Process/Associations: unremarkable  Thought Content:  Reports none;  Denies suicidal ideation, violent ideation, and delusions  Perception:  Reports none;  Denies auditory hallucinations and visual hallucinations  Insight: good  Judgment: adequate for safety  and intact  Cognition: does  appear grossly intact; formal cognitive testing was not done  Gait and Station: N/A - telehealth                Data       9/25/2024    11:03 AM 2/12/2025     9:04 AM 5/14/2025     8:54 AM   PROMIS-10 Total Score w/o Sub Scores   PROMIS TOTAL - SUBSCORES 16 20  18        Patient-reported         2/12/2025     9:19 AM   CAGE-AID Total Score   Total Score 3    Total Score MyChart 3 (A total score of 2 or greater is considered clinically significant)       Patient-reported         3/4/2024    10:30 AM 2/5/2025     2:33 PM 2/17/2025     9:51 AM   PHQ   PHQ-9 Total Score 6 4 2    Q9: Thoughts of better off dead/self-harm past 2 weeks Not at all Not at all Not at all        Proxy-reported         3/4/2024    10:30 AM 9/25/2024    11:01 AM 1/31/2025     9:26 AM   RUTH-7 SCORE   Total Score  11 (moderate anxiety) 7 (mild anxiety)   Total Score 8 11 7        Patient-reported         Liver/kidney function Metabolic Blood counts   Recent Labs   Lab Test 03/04/25  1105 06/27/24  1011   CR 0.93 0.87   AST 49* 27   ALT 49 18   ALKPHOS 68 52    Recent Labs   Lab Test 03/04/25  1105 06/27/24  1011   CHOL  --  175   TRIG  --  95   LDL  --  79   HDL  --  77   A1C 5.2 6.3*   TSH  --  0.35    Recent Labs   Lab Test 03/04/25  1105   WBC 8.3   HGB 15.3   HCT 43.0   MCV 85   *          Administrative Billing:     Level of Medical Decision Making:   - At least 1 chronic problem that is not stable  - Engaged in prescription drug management during visit (discussed any medication benefits, side effects, alternatives, etc.)    The longitudinal plan of care for the diagnosis(es)/condition(s) as documented were addressed during this visit. Due to the added complexity in care, I will continue to support Angus in the subsequent management and with ongoing continuity of care.

## 2025-05-15 NOTE — PROGRESS NOTES
Virtual Visit Details    Type of service:  Video Visit   Video Start Time: 1003  Video End Time: 1025    Originating Location (pt. Location): Home    Distant Location (provider location):  Off-site  Platform used for Video Visit: [facility]

## 2025-06-10 ENCOUNTER — MYC MEDICAL ADVICE (OUTPATIENT)
Dept: PSYCHIATRY | Facility: CLINIC | Age: 65
End: 2025-06-10
Payer: COMMERCIAL

## 2025-06-23 DIAGNOSIS — I10 ESSENTIAL HYPERTENSION: ICD-10-CM

## 2025-06-23 RX ORDER — LOSARTAN POTASSIUM 100 MG/1
100 TABLET ORAL DAILY
Qty: 90 TABLET | Refills: 2 | Status: SHIPPED | OUTPATIENT
Start: 2025-06-23

## 2025-07-02 DIAGNOSIS — E03.8 OTHER SPECIFIED HYPOTHYROIDISM: ICD-10-CM

## 2025-07-02 DIAGNOSIS — I42.2 HYPERTROPHIC CARDIOMYOPATHY (H): ICD-10-CM

## 2025-07-03 RX ORDER — METOPROLOL SUCCINATE 25 MG/1
TABLET, EXTENDED RELEASE ORAL
Qty: 90 TABLET | Refills: 0 | Status: SHIPPED | OUTPATIENT
Start: 2025-07-03

## 2025-07-03 RX ORDER — LEVOTHYROXINE SODIUM 175 UG/1
175 TABLET ORAL DAILY
Qty: 90 TABLET | Refills: 0 | Status: SHIPPED | OUTPATIENT
Start: 2025-07-03

## 2025-07-10 ENCOUNTER — VIRTUAL VISIT (OUTPATIENT)
Dept: PSYCHIATRY | Facility: CLINIC | Age: 65
End: 2025-07-10
Payer: COMMERCIAL

## 2025-07-10 ENCOUNTER — MYC MEDICAL ADVICE (OUTPATIENT)
Dept: PSYCHIATRY | Facility: CLINIC | Age: 65
End: 2025-07-10

## 2025-07-10 ENCOUNTER — TELEPHONE (OUTPATIENT)
Dept: FAMILY MEDICINE | Facility: CLINIC | Age: 65
End: 2025-07-10

## 2025-07-10 VITALS — HEIGHT: 72 IN | BODY MASS INDEX: 36.44 KG/M2

## 2025-07-10 DIAGNOSIS — G47.09 OTHER INSOMNIA: ICD-10-CM

## 2025-07-10 DIAGNOSIS — F41.1 GAD (GENERALIZED ANXIETY DISORDER): ICD-10-CM

## 2025-07-10 DIAGNOSIS — F40.01 PANIC DISORDER WITH AGORAPHOBIA: Primary | ICD-10-CM

## 2025-07-10 PROCEDURE — 98007 SYNCH AUDIO-VIDEO EST HI 40: CPT

## 2025-07-10 PROCEDURE — 1125F AMNT PAIN NOTED PAIN PRSNT: CPT | Mod: 95

## 2025-07-10 PROCEDURE — G2211 COMPLEX E/M VISIT ADD ON: HCPCS | Mod: 95

## 2025-07-10 RX ORDER — GABAPENTIN 300 MG/1
300 CAPSULE ORAL 3 TIMES DAILY
Qty: 90 CAPSULE | Refills: 0 | Status: SHIPPED | OUTPATIENT
Start: 2025-07-10

## 2025-07-10 RX ORDER — HYDROXYZINE HYDROCHLORIDE 25 MG/1
25 TABLET, FILM COATED ORAL 2 TIMES DAILY PRN
Qty: 60 TABLET | Refills: 1 | Status: SHIPPED | OUTPATIENT
Start: 2025-07-10

## 2025-07-10 ASSESSMENT — PAIN SCALES - GENERAL: PAINLEVEL_OUTOF10: MODERATE PAIN (4)

## 2025-07-10 NOTE — PATIENT INSTRUCTIONS
Plan:  Cross taper from sertraline (Zoloft) to escitalopram (Lexapro):  Week 1: Decrease sertraline to 150mg daily   Week 2: Decrease sertraline to 100mg daily, start Lexapro 5mg daily  Week 3: Decrease sertraline to 50mg daily, increase Lexapro to 10mg daily  Week 4: Discontinue sertraline, continue Lexapro 10mg daily     -Increase gabapentin to 300mg three times daily for anxiety   -Increase trazodone to 50-150mg at bedtime as needed for sleep  -Continue hydroxyzine 25mg twice daily as needed for anxiety    **For crisis resources, please see the information at the end of this document**   Patient Education    Thank you for coming to the [facility] [place] [facility].     Lab Testing:  If you had lab testing today and your results are reassuring or normal they will be mailed to you or sent through [facility] within 7 days. If the lab tests need quick action we will call you with the results. The phone number we will call with results is # [phone]. If this is not the best number please call our clinic and change the number.     Medication Refills:  If you need any refills please call your pharmacy and they will contact us. Our fax number for refills is [phone].   Three business days of notice are needed for general medication refill requests.   Five business days of notice are needed for controlled substance refill requests.   If you need to change to a different pharmacy, please contact the new pharmacy directly. The new pharmacy will help you get your medications transferred.     Contact Us:  Please call [phone] during business hours (8-5:00 M-F).   If you have medication related questions after clinic hours, or on the weekend, please call [phone].     Financial Assistance [phone]   Medical Records [phone]       MENTAL HEALTH CRISIS RESOURCES:  For a emergency help, please call 911 or go to the nearest Emergency Department.     Emergency Walk-In Options:    [facility] ([place]): [phone] - Specialized mental health emergency area designed to be calming  [facility] ([place]): [phone]  [facility] ([place]): [phone]  [facility] ([place]): [phone]    County Crisis Information:   [place]: [phone]  [place]: [phone]  [place] ([place]) - Adult: [phone]     Child: [phone]  [place] - Adult: [phone]     Child: [phone]  [place]: [phone]  List of all [facility] resources:   https://[place].[facility]/dhs/people-we-serve/adults/health-care/mental-health/resources/crisis-contacts.jsp    National Crisis Information:   Crisis Text Line: Text  [place]  to 506019  Suicide & Crisis Lifeline: 988  National Suicide Prevention Lifeline: 7-085-836-DKPR (1-805.859.6694)       For online chat options, visit https://suicidepreventionlifeline.org/chat/  Poison Control Center: 1-268.544.2592  Trans Lifeline: 1-446.613.1048 - Hotline for transgender people of all ages  The [employer]: 8-105-161-7288 - Hotline for LGBT youth     For Non-Emergency Support:   Fast Tracker: Mental Health & Substance Use Disorder Resources -   https://www.W-21n.org/

## 2025-07-10 NOTE — NURSING NOTE
Current patient location: [address] [place] [address]  [facility] [place] [address]    Is the patient currently in the state of [place]? YES    Visit mode: VIDEO    If the visit is dropped, the patient can be reconnected by:VIDEO VISIT: Text to cell phone:   Telephone Information:   Mobile [phone]       Will anyone else be joining the visit? Brother, phone # [phone]   (If patient encounters technical issues they should call [phone])    Are changes needed to the allergy or medication list? No    Are refills needed on medications prescribed by this physician?will discuss with provider before refills are sent, wants to discuss dose change    Rooming Documentation:  Questionnaire(s) completed    Reason for visit: RECHECK    Kaylin CAUSEYF

## 2025-07-10 NOTE — TELEPHONE ENCOUNTER
Patient Quality Outreach    Patient is due for the following:   Physical Annual Wellness Visit    Action(s) Taken:   Patient only see Dr. Mclean for weight loss program, has an upcoming annual wellness with PCP.     Type of outreach:    Phone, spoke to patient/parent. Patient    Questions for provider review:    None         Hai Cole MA  Chart routed to None.

## 2025-07-10 NOTE — PROGRESS NOTES
[facility]  MEDICAL PROGRESS NOTE       Virtual Visit Details    Type of service:  Video Visit   Video Start Time: 4:27 PM  Video End Time: 5:01 PM    Originating Location (pt. Location): Home    Distant Location (provider location):  Off-site  Platform used for Video Visit: Shamar Wynne is a 65 year old referred by Fifi Fleming.     Initial consultation on 02/17/25.      CARE TEAM:   PCP- Vahe Mobley  Therapist- None              Assessment & Plan     History and interview support the following diagnoses:   Generalized anxiety disorder  Panic disorder with agoraphobia   Major depressive disorder, in sustained remission  Insomnia, unspecified    History of alcohol abuse   -R/O alcohol use disorder    Angus is a 65-year-old adult with history of anxiety, panic attacks, depression, and alcohol abuse who presents for follow-up.     Angus was last seen 05/15/25 at which time gabapentin dosing was increased to cover daytime anxiety symptoms. Today, panic symptoms continue to impair Angus's ability to leave his home. He has had difficulty attending healthcare visits due to fear of talking with other people.    He has recently decreased his alcohol consumption. Current use is once weekly, on Sunday evenings, about 5 oz of alcohol in one sitting. Denies drinking to intoxication.     Symptoms are suboptimally treated. He has now had a full trial of sertraline (200mg daily for about 4 months although reports somewhat inconsistent adherence at times). Recommend trial of Lexapro which has been shown to be one of the most effective and well tolerated selective serotonin reuptake inhibitors for treatment of panic disorder. Most recent EKG reviewed; no concerns for QTc prolongation. Reviewed with cardiology and no concerns voiced from their end; will consider repeat EKG once at steady state of Lexapro to monitor for any changes.      Psychotherapy, particularly CBT, has been shown to be one of the most effective interventions for treating panic disorder. Angus has been hesitant to engage in therapy in the past but expressed willingness to pursue during today's visit.     Assessment from initial consultation on 02/17/25:  Patient's current concerns relate to anxiety and panic attacks both of which were diagnosed about 20 years ago. He has been on and off benzodiazepines in the past for management of anxiety. Although his last panic attack was over a year ago, he reports persistent concern about experiencing additional panic attacks and their consequences. He has modified certain behaviors (such as driving) in response to panic attacks. Anxiety is suboptimally treated with current regimen. No suicidal ideation, self-injurious behavior/urges, violent ideation, aggression, psychosis, hallucinations, delusions, duke/hypomania. Angus is future oriented and focused on getting better control of anxiety so that he is better able to experience ricci in his life.     Anxiety/panic: Will increase sertraline to 200mg daily. He has been on 150mg daily for many years and never at a higher dose. He is aware that this medication change will take several weeks for full therapeutic benefit. He reports good tolerability with current dose of 150mg but we did review potential side effects. We will also increase his bedtime dose of gabapentin from 200mg to 300mg for added anxiety support at bedtime. He has history of chronic insomnia, particularly sleep initiation - gabapentin may also target insomnia. Discussed risk of dizziness/drowsiness with higher dose of gabapentin.    We did discuss his use of benzodiazepines. He was recently prescribed clonazepam by cardiology. He is using the medication sparingly and reserves for times of heightened anxiety. Has used 2 tablets within the last 10-14 days. He does not use clonazepam on days he is consuming alcohol and is  aware of the potential risks including respiratory depression and risk for falls. Recommended limiting use as short acting anxiolytics can be reinforcing of anxiety and should never be the first tool used for anxiety.    PSYCHOTROPIC DRUG INTERACTIONS: **Italicized interactions are for treatment plan options not currently implemented**  ADDITIVE SEDATION: hydroxyzine, gabapentin, trazodone  ADDITIVE QTc: trazodone, sertraline    MANAGEMENT:  use lowest therapeutic doses of psychotropic medications, routine monitoring, and patient aware of risks    [place] today: indicates that controlled prescriptions have been filled as prescribed              Plan    1) PSYCHOTROPIC MEDICATION RECOMMENDATIONS:    Cross taper from sertraline (Zoloft) to escitalopram (Lexapro):  Week 1: Decrease sertraline to 150mg daily   Week 2: Decrease sertraline to 100mg daily, start Lexapro 5mg daily  Week 3: Decrease sertraline to 50mg daily, increase Lexapro to 10mg daily  Week 4: Discontinue sertraline, continue Lexapro 10mg daily     -Increase gabapentin to 300mg three times daily for anxiety   -Increase trazodone to 50-150mg at bedtime as needed for sleep  -Continue hydroxyzine 25mg twice daily as needed for anxiety    2) THERAPY: None    3) NEXT DUE:   Labs- Routine monitoring is not indicated for current psychotropic medication regimen   ECG- As needed    4) REFERRALS / COORDINATION: None    5) DISPOSITION: July 31st at 11a    Treatment Risk Statement:  The patient understands the risks, benefits, adverse effects and alternatives. Agrees to treatment with the capacity to do so. No medical contraindications to treatment. Agrees to contact provider for any problems. The patient understands to call 911 or go to the nearest ED if urgent or life threatening symptoms occur. Crisis contact numbers are provided routinely in the After Visit Summary.    Suicide Risk Assessment: Angus did not appear to be an imminent safety risk to self  "or others.    PROVIDER:  YUKO Martinez CNP              Pertinent Background  Angus notes longstanding history of depression and anxiety. He was initiated on clonazepam about 15-20 years ago due to panic attacks. He has had 6-7 panic attacks in his life and he notes that when they do occur, they are very disruptive to his life and can take up to 6 months for him to gain his \"confidence\" back. He has persistent concerns about repeated panic attacks and their consequences. Has modified behaviors (no longer driving) in response to panic attacks. He has a history of alcohol use disorder. Heaviest drinking occurred about 20 years ago.   Psych pertinent item history includes substance use: alcohol              Interim History    Angus attends today's visit along with his brother who provides collateral.     Angus was last seen 05/15/25 at which time gabapentin dosing was increased to cover daytime anxiety symptoms. Since the last visit:  -He doesn't feel comfortable leaving the house. His comfort zone is his house.   -He increased trazodone to 150mg nightly a few weeks ago due to trouble sleeping. He's been sleeping better. Denies dizziness or grogginess.   -Gabapentin taking 200mg twice daily and 300mg nightly  -He sometimes forgets his sertraline dose.   -In the last two weeks he has consumed alcohol three times (he has only been drinking on Sundays, 5 oz per sitting). Not drinking to the point of intoxification.     Brother-  -Angus is depressed, he's having a hard time talking with others including his family.   -He's worried about his brother. Wonders about changing medications. Benefit of therapy.     Medication ASE: Denies  Medication adherence: \"sometimes\" forgets sertraline but reports consistent use of 200mg daily for the past two weeks.     Recent Psych Symptoms:   Depression: Insomnia, fatigue   Elevated:  none  Psychosis:  none  Anxiety:  excessive worry and feeling fearful, inability to leave his " home due to fear  Trauma Related:  none  Insomnia:  Yes: Longstanding issues with sleep initiation.    Pertinent Negatives: No suicidal or violent ideation, psychosis, or hypo/duke.      Pertinent Social Hx:  FINANCIAL SUPPORT- Retired  LIVING SITUATION / RELATIONSHIPS- Lives with wife and adult son. Feels safe at home.   SOCIAL/ SPIRITUAL SUPPORT- Family    Pertinent Substance Use  Alcohol- 5 oz once weekly on Sundays  Nicotine- Former smoker - quit about 2017/2018.   Opioids- None  Narcan Kit- N/A  THC/CBD- None  Other Illicit Drugs- none    Substance Use History  Past Use- alcohol use was heaviest about 20 years ago.   Treatment [#, most recent]- None  Medical Consequences [withdrawal, sz etc]- Per chart, hx of acute alcoholic hepatitis. Endorses history of withdrawal when alcohol use was higher.  Legal Consequences- None              Medical Review of Systems   Dizziness/orthostasis- Denies dizziness   Cardiovascular- Denies chest pain, palpitations.   Gastrointestinal- Denies  Tics, tremors- Denies  A comprehensive review of systems was performed and is negative other than noted above.              Psych Summary Points  02/2025: Initial psychiatric consultation, increased sertraline to 200mg daily, changed gabapentin to 100mg QAM + 300mg at bedtime  05/2025: Increased gabapentin to 100mg QAM, 200mg in the afternoon, and 300mg at bedtime for anxiety (secondary benefit for alcohol cravings)  07/2025: Self-increased trazodone to 150mg nightly with good benefit for insomnia, increased gabapentin to 300mg three times daily              Past Psychotropic Medications     Medication Max Dose (mg) Dates / Duration Helpful? DC Reason / Adverse Effects?   lorazepam                     Past Medical History     Medication allergies:  No Known Allergies    Neurologic Hx [head injury, seizures, etc.]: None  Patient Active Problem List   Diagnosis    BPPV (benign paroxysmal positional vertigo), unspecified laterality    Other  specified hypothyroidism    Hx of acute alcoholic hepatitis    Hx of colonic polyp    Gastroesophageal reflux disease, esophagitis presence not specified    Vitamin D deficiency    Anxiety    History of alcohol use    Neck pain    Cervicalgia    Cervical spondylosis without myelopathy    Essential hypertension    Hyperlipidemia with target LDL less than 130    Nasal congestion    History of colonic polyps    Vertigo    Lumbago    IBARRA (dyspnea on exertion)    Dysphagia, unspecified type    Elevated troponin    CHAS (acute kidney injury)    Class 2 severe obesity due to excess calories with serious comorbidity and body mass index (BMI) of 38.0 to 38.9 in adult (H)    Assistance needed with transportation    Hypertrophic cardiomyopathy (H)    NAFLD (nonalcoholic fatty liver disease)    Prediabetes     Past Medical History:   Diagnosis Date    Arthritis     For 5 yrs    GERD (gastroesophageal reflux disease)     HTN (hypertension)     Hyperlipidemia     Hypothyroid     Kidney stone     Vertigo     2015 ,                  Medications   Current Outpatient Medications   Medication Sig Dispense Refill    acetaminophen (TYLENOL) 325 MG tablet Take 2 tablets (650 mg) by mouth every 4 hours as needed for mild pain (Patient not taking: Reported on 2/10/2025) 50 tablet 0    amLODIPine (NORVASC) 2.5 MG tablet Take 2 tablets (5 mg) by mouth daily. 90 tablet 3    clonazePAM (KLONOPIN) 0.5 MG tablet Take 1 tablet (0.5 mg) by mouth daily as needed for anxiety.      folic acid (FOLVITE) 400 MCG tablet Take 400 mcg by mouth daily.      gabapentin (NEURONTIN) 100 MG capsule Take 1 capsule (100mg) by mouth in the morning and 2 capsules (200mg) in the afternoon 90 capsule 1    gabapentin (NEURONTIN) 300 MG capsule Take 1 capsule (300 mg) by mouth at bedtime. 30 capsule 1    hydrOXYzine HCl (ATARAX) 25 MG tablet Take 1 tablet (25 mg) by mouth 2 times daily as needed for anxiety. 60 tablet 1    levothyroxine (SYNTHROID/LEVOTHROID) 175 MCG  "tablet TAKE 1 TABLET(175 MCG) BY MOUTH DAILY 90 tablet 0    losartan (COZAAR) 100 MG tablet TAKE 1 TABLET(100 MG) BY MOUTH DAILY 90 tablet 2    metoprolol succinate ER (TOPROL XL) 25 MG 24 hr tablet TAKE 1 TABLET(25 MG) BY MOUTH EVERY EVENING 90 tablet 0    rosuvastatin (CRESTOR) 40 MG tablet Take 1 tablet (40 mg) by mouth daily 90 tablet 3    Semaglutide-Weight Management (WEGOVY) 2.4 MG/0.75ML pen Inject 2.4 mg subcutaneously once a week. 9 mL 0    sertraline (ZOLOFT) 100 MG tablet Take 2 tablets (200 mg) by mouth daily. 60 tablet 1    thiamine (B-1) 100 MG tablet TAKE 1 TABLET BY MOUTH DAILY 90 tablet 3    tirzepatide-Weight Management (ZEPBOUND) 10 MG/0.5ML prefilled pen Inject 0.5 mLs (10 mg) subcutaneously every 7 days. 2 mL 0    tirzepatide-Weight Management (ZEPBOUND) 10 MG/0.5ML prefilled pen Inject 0.5 mLs (10 mg) subcutaneously every 7 days. 2 mL 0    tirzepatide-Weight Management (ZEPBOUND) 12.5 MG/0.5ML prefilled pen ADMINISTER 12.5 MG UNDER THE SKIN EVERY 7 DAYS 2 mL 1    tirzepatide-Weight Management (ZEPBOUND) 15 MG/0.5ML prefilled pen ADMINISTER 15 MG UNDER THE SKIN EVERY 7 DAYS 2 mL 2    traZODone (DESYREL) 50 MG tablet Take 1-2 tablets ( mg) by mouth at bedtime. 60 tablet 1                   Physical Exam  (Vitals Only)  Ht 1.816 m (5' 11.5\")   BMI 36.44 kg/m      Pulse Readings from Last 5 Encounters:   02/10/25 88   02/05/25 78   01/28/25 92   01/14/25 107   10/16/24 90     Wt Readings from Last 5 Encounters:   05/15/25 120.2 kg (265 lb)   03/06/25 123.8 kg (273 lb)   02/10/25 123.8 kg (273 lb)   02/05/25 128.4 kg (283 lb)   01/28/25 128.8 kg (284 lb)     BP Readings from Last 5 Encounters:   02/10/25 124/85   02/05/25 126/80   01/28/25 (!) 164/92   01/14/25 (!) 150/97   10/16/24 136/87                 Mental Status Exam  Alertness: alert  and oriented  Appearance: adequately groomed  Behavior/Demeanor: cooperative, pleasant, and calm, with good eye contact   Speech: normal and regular " rate and rhythm  Language: intact and no problems  Psychomotor: normal or unremarkable  Mood: worried  Affect: full range and appropriate; was congruent to mood  Thought Process/Associations: unremarkable  Thought Content:  Reports none;  Denies suicidal ideation, violent ideation, and delusions  Perception:  Reports none;  Denies auditory hallucinations and visual hallucinations  Insight: good  Judgment: adequate for safety and intact  Cognition: does  appear grossly intact; formal cognitive testing was not done  Gait and Station: N/A - telehealth                Data       9/25/2024    11:03 AM 2/12/2025     9:04 AM 5/14/2025     8:54 AM   PROMIS-10 Total Score w/o Sub Scores   PROMIS TOTAL - SUBSCORES 16 20  18        Patient-reported         2/12/2025     9:19 AM   CAGE-AID Total Score   Total Score 3    Total Score MyChart 3 (A total score of 2 or greater is considered clinically significant)       Patient-reported         3/4/2024    10:30 AM 2/5/2025     2:33 PM 2/17/2025     9:51 AM   PHQ   PHQ-9 Total Score 6 4 2    Q9: Thoughts of better off dead/self-harm past 2 weeks Not at all Not at all Not at all        Proxy-reported         3/4/2024    10:30 AM 9/25/2024    11:01 AM 1/31/2025     9:26 AM   RUTH-7 SCORE   Total Score  11 (moderate anxiety) 7 (mild anxiety)   Total Score 8 11 7        Patient-reported         Liver/kidney function Metabolic Blood counts   Recent Labs   Lab Test 03/04/25  1105 06/27/24  1011   CR 0.93 0.87   AST 49* 27   ALT 49 18   ALKPHOS 68 52    Recent Labs   Lab Test 03/04/25  1105 06/27/24  1011   CHOL  --  175   TRIG  --  95   LDL  --  79   HDL  --  77   A1C 5.2 6.3*   TSH  --  0.35    Recent Labs   Lab Test 03/04/25  1105   WBC 8.3   HGB 15.3   HCT 43.0   MCV 85   *          Administrative Billing:     Video/phone Start Time: 4:27p  Video/Phone End Time: 5:01p     48 minutes spent on the date of the encounter doing chart review and reviewing referral documents, history and  exam of patient, documentation and further activities as noted above.    The longitudinal plan of care for the diagnosis(es)/condition(s) as documented were addressed during this visit. Due to the added complexity in care, I will continue to support Angus in the subsequent management and with ongoing continuity of care.

## 2025-07-13 RX ORDER — ESCITALOPRAM OXALATE 10 MG/1
TABLET ORAL
Qty: 30 TABLET | Refills: 1 | Status: SHIPPED | OUTPATIENT
Start: 2025-07-20

## 2025-07-13 RX ORDER — SERTRALINE HYDROCHLORIDE 100 MG/1
TABLET, FILM COATED ORAL
Qty: 21 TABLET | Refills: 0 | Status: SHIPPED | OUTPATIENT
Start: 2025-07-13 | End: 2025-08-03

## 2025-07-14 DIAGNOSIS — E66.01 CLASS 2 SEVERE OBESITY DUE TO EXCESS CALORIES WITH SERIOUS COMORBIDITY AND BODY MASS INDEX (BMI) OF 38.0 TO 38.9 IN ADULT (H): ICD-10-CM

## 2025-07-14 DIAGNOSIS — E66.812 CLASS 2 SEVERE OBESITY DUE TO EXCESS CALORIES WITH SERIOUS COMORBIDITY AND BODY MASS INDEX (BMI) OF 38.0 TO 38.9 IN ADULT (H): ICD-10-CM

## 2025-07-14 RX ORDER — SEMAGLUTIDE 2.4 MG/.75ML
INJECTION, SOLUTION SUBCUTANEOUS
Qty: 3 ML | Refills: 1 | Status: SHIPPED | OUTPATIENT
Start: 2025-07-14

## 2025-07-28 DIAGNOSIS — G47.09 OTHER INSOMNIA: ICD-10-CM

## 2025-07-29 RX ORDER — TRAZODONE HYDROCHLORIDE 50 MG/1
50-100 TABLET ORAL AT BEDTIME
Qty: 60 TABLET | Refills: 0 | Status: SHIPPED | OUTPATIENT
Start: 2025-07-29

## 2025-07-29 NOTE — TELEPHONE ENCOUNTER
"LOV: 7/10/25  RTC: July 31st at 11a   No shows: 0  Cancellations: 0  FOV: 7/31/25  ------------------------------  Last Script Details:  traZODone (DESYREL) 50 MG tablet 60 tablet 1 5/15/2025     ------------------------------  Last visit note:   \"Increase trazodone to 50-150mg at bedtime as needed for sleep \"        Refill Decision:    [x]Medication refilled per  Medication Refill in Ambulatory Care  policy; Psych protocol.       Request from pharmacy:  Requested Prescriptions   Pending Prescriptions Disp Refills    traZODone (DESYREL) 50 MG tablet 60 tablet 1     Sig: Take 1-2 tablets ( mg) by mouth at bedtime.       Serotonin Modulators Passed - 7/29/2025 10:34 AM        Passed - Medication is active on med list and the sig matches. RN to manually verify dose and sig if red X/fail.     If the protocol passes (green check), you do not need to verify med dose and sig.    A prescription matches if they are the same clinical intention.    For Example: once daily and every morning are the same.    The protocol can not identify upper and lower case letters as matching and will fail.     For Example: Take 1 tablet (50 mg) by mouth daily     TAKE 1 TABLET (50 MG) BY MOUTH DAILY    For all fails (red x), verify dose and sig.    If the refill does match what is on file, the RN can still proceed to approve the refill request.       If they do not match, route to the appropriate provider.             Passed - Recent (12 month) or future (90 days) visit with authorizing provider's specialty (provided they have been seen in the past 15 months)     The patient must have completed an in-person or virtual visit within the past 12 months or has a future visit scheduled within the next 90 days with the authorizing provider s specialty.  Urgent care and e-visits do not qualify as an office visit for this protocol.          Passed - Medication indicated for associated diagnosis     Medication is associated with one or more of " the following diagnoses:     Depression   Insomnia          Passed - Patient is age 18 or older

## 2025-08-05 ENCOUNTER — VIRTUAL VISIT (OUTPATIENT)
Dept: PSYCHIATRY | Facility: CLINIC | Age: 65
End: 2025-08-05
Attending: PSYCHOLOGIST
Payer: COMMERCIAL

## 2025-08-05 DIAGNOSIS — F41.0 PANIC DISORDER WITHOUT AGORAPHOBIA: ICD-10-CM

## 2025-08-05 DIAGNOSIS — F41.1 GAD (GENERALIZED ANXIETY DISORDER): Primary | ICD-10-CM

## 2025-08-05 DIAGNOSIS — F33.42 MAJOR DEPRESSIVE DISORDER, RECURRENT, IN FULL REMISSION: ICD-10-CM

## 2025-08-05 ASSESSMENT — PATIENT HEALTH QUESTIONNAIRE - PHQ9
SUM OF ALL RESPONSES TO PHQ QUESTIONS 1-9: 5
SUM OF ALL RESPONSES TO PHQ QUESTIONS 1-9: 5
10. IF YOU CHECKED OFF ANY PROBLEMS, HOW DIFFICULT HAVE THESE PROBLEMS MADE IT FOR YOU TO DO YOUR WORK, TAKE CARE OF THINGS AT HOME, OR GET ALONG WITH OTHER PEOPLE: SOMEWHAT DIFFICULT

## 2025-08-07 ENCOUNTER — OFFICE VISIT (OUTPATIENT)
Dept: SURGERY | Facility: CLINIC | Age: 65
End: 2025-08-07
Payer: COMMERCIAL

## 2025-08-09 ENCOUNTER — HEALTH MAINTENANCE LETTER (OUTPATIENT)
Age: 65
End: 2025-08-09

## 2025-08-11 DIAGNOSIS — F40.01 PANIC DISORDER WITH AGORAPHOBIA: ICD-10-CM

## 2025-08-11 DIAGNOSIS — F41.1 GAD (GENERALIZED ANXIETY DISORDER): ICD-10-CM

## 2025-08-11 RX ORDER — GABAPENTIN 300 MG/1
300 CAPSULE ORAL 3 TIMES DAILY
Qty: 90 CAPSULE | Refills: 0 | Status: SHIPPED | OUTPATIENT
Start: 2025-08-11

## 2025-08-26 SDOH — HEALTH STABILITY: PHYSICAL HEALTH: ON AVERAGE, HOW MANY DAYS PER WEEK DO YOU ENGAGE IN MODERATE TO STRENUOUS EXERCISE (LIKE A BRISK WALK)?: 0 DAYS

## 2025-08-26 SDOH — HEALTH STABILITY: PHYSICAL HEALTH: ON AVERAGE, HOW MANY MINUTES DO YOU ENGAGE IN EXERCISE AT THIS LEVEL?: 0 MIN

## 2025-08-26 ASSESSMENT — PATIENT HEALTH QUESTIONNAIRE - PHQ9
SUM OF ALL RESPONSES TO PHQ QUESTIONS 1-9: 7
SUM OF ALL RESPONSES TO PHQ QUESTIONS 1-9: 7
10. IF YOU CHECKED OFF ANY PROBLEMS, HOW DIFFICULT HAVE THESE PROBLEMS MADE IT FOR YOU TO DO YOUR WORK, TAKE CARE OF THINGS AT HOME, OR GET ALONG WITH OTHER PEOPLE: SOMEWHAT DIFFICULT

## 2025-08-27 ENCOUNTER — OFFICE VISIT (OUTPATIENT)
Dept: FAMILY MEDICINE | Facility: CLINIC | Age: 65
End: 2025-08-27
Payer: COMMERCIAL

## 2025-08-27 VITALS
SYSTOLIC BLOOD PRESSURE: 94 MMHG | HEIGHT: 71 IN | TEMPERATURE: 97.9 F | WEIGHT: 267 LBS | HEART RATE: 80 BPM | BODY MASS INDEX: 37.38 KG/M2 | DIASTOLIC BLOOD PRESSURE: 60 MMHG | RESPIRATION RATE: 19 BRPM | OXYGEN SATURATION: 97 %

## 2025-08-27 DIAGNOSIS — E78.5 HYPERLIPIDEMIA WITH TARGET LDL LESS THAN 130: ICD-10-CM

## 2025-08-27 DIAGNOSIS — E66.01 CLASS 2 SEVERE OBESITY DUE TO EXCESS CALORIES WITH SERIOUS COMORBIDITY AND BODY MASS INDEX (BMI) OF 38.0 TO 38.9 IN ADULT (H): ICD-10-CM

## 2025-08-27 DIAGNOSIS — Z00.00 ROUTINE GENERAL MEDICAL EXAMINATION AT A HEALTH CARE FACILITY: ICD-10-CM

## 2025-08-27 DIAGNOSIS — R26.89 BALANCE PROBLEMS: ICD-10-CM

## 2025-08-27 DIAGNOSIS — R73.03 PREDIABETES: ICD-10-CM

## 2025-08-27 DIAGNOSIS — R06.09 DOE (DYSPNEA ON EXERTION): ICD-10-CM

## 2025-08-27 DIAGNOSIS — F41.9 ANXIETY: ICD-10-CM

## 2025-08-27 DIAGNOSIS — I10 ESSENTIAL HYPERTENSION: ICD-10-CM

## 2025-08-27 DIAGNOSIS — Z12.5 SCREENING FOR PROSTATE CANCER: ICD-10-CM

## 2025-08-27 DIAGNOSIS — Z13.6 SCREENING FOR CARDIOVASCULAR CONDITION: Primary | ICD-10-CM

## 2025-08-27 DIAGNOSIS — E66.812 CLASS 2 SEVERE OBESITY DUE TO EXCESS CALORIES WITH SERIOUS COMORBIDITY AND BODY MASS INDEX (BMI) OF 38.0 TO 38.9 IN ADULT (H): ICD-10-CM

## 2025-08-27 DIAGNOSIS — I42.2 HYPERTROPHIC CARDIOMYOPATHY (H): ICD-10-CM

## 2025-08-27 DIAGNOSIS — E03.8 OTHER SPECIFIED HYPOTHYROIDISM: ICD-10-CM

## 2025-08-27 LAB
ALBUMIN SERPL BCG-MCNC: 4.4 G/DL (ref 3.5–5.2)
ALP SERPL-CCNC: 48 U/L (ref 40–150)
ALT SERPL W P-5'-P-CCNC: 32 U/L (ref 0–70)
ANION GAP SERPL CALCULATED.3IONS-SCNC: 14 MMOL/L (ref 7–15)
AST SERPL W P-5'-P-CCNC: 44 U/L (ref 0–45)
BILIRUB SERPL-MCNC: 0.8 MG/DL
BUN SERPL-MCNC: 9.1 MG/DL (ref 8–23)
CALCIUM SERPL-MCNC: 9.4 MG/DL (ref 8.8–10.4)
CHLORIDE SERPL-SCNC: 101 MMOL/L (ref 98–107)
CHOLEST SERPL-MCNC: 228 MG/DL
CREAT SERPL-MCNC: 0.82 MG/DL (ref 0.67–1.17)
EGFRCR SERPLBLD CKD-EPI 2021: >90 ML/MIN/1.73M2
ERYTHROCYTE [DISTWIDTH] IN BLOOD BY AUTOMATED COUNT: 11.7 % (ref 10–15)
EST. AVERAGE GLUCOSE BLD GHB EST-MCNC: 111 MG/DL
FASTING STATUS PATIENT QL REPORTED: NO
FASTING STATUS PATIENT QL REPORTED: NO
GLUCOSE SERPL-MCNC: 142 MG/DL (ref 70–99)
HBA1C MFR BLD: 5.5 % (ref 0–5.6)
HCO3 SERPL-SCNC: 21 MMOL/L (ref 22–29)
HCT VFR BLD AUTO: 42.5 % (ref 40–53)
HDLC SERPL-MCNC: 88 MG/DL
HGB BLD-MCNC: 14.8 G/DL (ref 13.3–17.7)
LDLC SERPL CALC-MCNC: 105 MG/DL
MCH RBC QN AUTO: 29.8 PG (ref 26.5–33)
MCHC RBC AUTO-ENTMCNC: 34.8 G/DL (ref 31.5–36.5)
MCV RBC AUTO: 85.7 FL (ref 78–100)
NONHDLC SERPL-MCNC: 140 MG/DL
PLATELET # BLD AUTO: 137 10E3/UL (ref 150–450)
POTASSIUM SERPL-SCNC: 4.2 MMOL/L (ref 3.4–5.3)
PROT SERPL-MCNC: 7.3 G/DL (ref 6.4–8.3)
PSA SERPL DL<=0.01 NG/ML-MCNC: 0.36 NG/ML (ref 0–4.5)
RBC # BLD AUTO: 4.96 10E6/UL (ref 4.4–5.9)
SODIUM SERPL-SCNC: 136 MMOL/L (ref 135–145)
T4 FREE SERPL-MCNC: 1.47 NG/DL (ref 0.9–1.7)
TRIGL SERPL-MCNC: 177 MG/DL
TSH SERPL DL<=0.005 MIU/L-ACNC: 0.19 UIU/ML (ref 0.3–4.2)
WBC # BLD AUTO: 7.63 10E3/UL (ref 4–11)

## 2025-08-27 RX ORDER — ROSUVASTATIN CALCIUM 40 MG/1
40 TABLET, COATED ORAL DAILY
Qty: 90 TABLET | Refills: 3 | Status: CANCELLED | OUTPATIENT
Start: 2025-08-27

## 2025-08-27 RX ORDER — LEVOTHYROXINE SODIUM 175 UG/1
175 TABLET ORAL DAILY
Qty: 90 TABLET | Refills: 0 | Status: CANCELLED | OUTPATIENT
Start: 2025-08-27

## 2025-08-27 ASSESSMENT — ANXIETY QUESTIONNAIRES
7. FEELING AFRAID AS IF SOMETHING AWFUL MIGHT HAPPEN: SEVERAL DAYS
6. BECOMING EASILY ANNOYED OR IRRITABLE: SEVERAL DAYS
GAD7 TOTAL SCORE: 7
2. NOT BEING ABLE TO STOP OR CONTROL WORRYING: SEVERAL DAYS
5. BEING SO RESTLESS THAT IT IS HARD TO SIT STILL: SEVERAL DAYS
GAD7 TOTAL SCORE: 7
1. FEELING NERVOUS, ANXIOUS, OR ON EDGE: SEVERAL DAYS
8. IF YOU CHECKED OFF ANY PROBLEMS, HOW DIFFICULT HAVE THESE MADE IT FOR YOU TO DO YOUR WORK, TAKE CARE OF THINGS AT HOME, OR GET ALONG WITH OTHER PEOPLE?: SOMEWHAT DIFFICULT
IF YOU CHECKED OFF ANY PROBLEMS ON THIS QUESTIONNAIRE, HOW DIFFICULT HAVE THESE PROBLEMS MADE IT FOR YOU TO DO YOUR WORK, TAKE CARE OF THINGS AT HOME, OR GET ALONG WITH OTHER PEOPLE: SOMEWHAT DIFFICULT
7. FEELING AFRAID AS IF SOMETHING AWFUL MIGHT HAPPEN: SEVERAL DAYS
3. WORRYING TOO MUCH ABOUT DIFFERENT THINGS: SEVERAL DAYS
4. TROUBLE RELAXING: SEVERAL DAYS
GAD7 TOTAL SCORE: 7

## 2025-08-27 ASSESSMENT — PAIN SCALES - GENERAL: PAINLEVEL_OUTOF10: MODERATE PAIN (4)

## (undated) DEVICE — NDL BLUNT 18GA 1" W/O FILTER 305181

## (undated) DEVICE — ESU GROUND PAD ADULT W/CORD E7507

## (undated) DEVICE — SUCTION TIP YANKAUER STR K87

## (undated) DEVICE — SYR 10ML LL W/O NDL

## (undated) DEVICE — STRAP KNEE/BODY 31143004

## (undated) DEVICE — SYR 01ML 27GA 0.5" NDL TBC 309623

## (undated) DEVICE — GLOVE PROTEXIS BLUE W/NEU-THERA 7.5  2D73EB75

## (undated) DEVICE — TAPE CLOTH ADHESIVE 3" LATEX 3554C

## (undated) DEVICE — PACK RECTAL KIT CUSTOM ASC

## (undated) DEVICE — DRSG GAUZE 4X4" TRAY 6939

## (undated) DEVICE — PREP TECHNI-CARE CHLOROXYLENOL 3% 4OZ BOTTLE C222-4ZWO

## (undated) DEVICE — RX BACITRACIN OINTMENT 0.9G 1/32OZ CUR001109

## (undated) DEVICE — LINEN TOWEL PACK X5 5464

## (undated) DEVICE — SOL WATER IRRIG 500ML BOTTLE 2F7113

## (undated) DEVICE — ANOSCOPE PLASTIC CLEAR UNSTERILE 82420

## (undated) DEVICE — GLOVE PROTEXIS MICRO 7.0  2D73PM70

## (undated) DEVICE — SYR BULB IRRIG 50ML LATEX FREE 0035280

## (undated) DEVICE — SOL BENZOIN 0.5OZ

## (undated) DEVICE — GLOVE PROTEXIS W/NEU-THERA 7.0  2D73TE70

## (undated) DEVICE — SUCTION MANIFOLD NEPTUNE 2 SYS 1 PORT 702-025-000

## (undated) DEVICE — PAD CHUX UNDERPAD 30X30"

## (undated) DEVICE — SOL NACL 0.9% 10ML VIAL 0409-4888-02

## (undated) DEVICE — TAPE DURAPORE 3" SILK 1538-3

## (undated) DEVICE — PANTIES MESH LG/XLG 2PK 706M2

## (undated) RX ORDER — REGADENOSON 0.08 MG/ML
INJECTION, SOLUTION INTRAVENOUS
Status: DISPENSED
Start: 2023-07-05

## (undated) RX ORDER — OXYCODONE HYDROCHLORIDE 5 MG/1
TABLET ORAL
Status: DISPENSED
Start: 2021-09-30

## (undated) RX ORDER — PROPOFOL 10 MG/ML
INJECTION, EMULSION INTRAVENOUS
Status: DISPENSED
Start: 2021-09-30

## (undated) RX ORDER — ACETAMINOPHEN 325 MG/1
TABLET ORAL
Status: DISPENSED
Start: 2021-09-09

## (undated) RX ORDER — METOPROLOL TARTRATE 1 MG/ML
INJECTION, SOLUTION INTRAVENOUS
Status: DISPENSED
Start: 2019-07-31

## (undated) RX ORDER — ACETAMINOPHEN 325 MG/1
TABLET ORAL
Status: DISPENSED
Start: 2021-09-30

## (undated) RX ORDER — GABAPENTIN 300 MG/1
CAPSULE ORAL
Status: DISPENSED
Start: 2021-09-30

## (undated) RX ORDER — FENTANYL CITRATE 50 UG/ML
INJECTION, SOLUTION INTRAMUSCULAR; INTRAVENOUS
Status: DISPENSED
Start: 2021-09-09

## (undated) RX ORDER — BUPIVACAINE HYDROCHLORIDE 2.5 MG/ML
INJECTION, SOLUTION EPIDURAL; INFILTRATION; INTRACAUDAL
Status: DISPENSED
Start: 2021-09-09

## (undated) RX ORDER — LIDOCAINE HYDROCHLORIDE 20 MG/ML
INJECTION, SOLUTION EPIDURAL; INFILTRATION; INTRACAUDAL; PERINEURAL
Status: DISPENSED
Start: 2021-09-09

## (undated) RX ORDER — CEFAZOLIN SODIUM 2 G/50ML
SOLUTION INTRAVENOUS
Status: DISPENSED
Start: 2021-09-30

## (undated) RX ORDER — KETOROLAC TROMETHAMINE 30 MG/ML
INJECTION, SOLUTION INTRAMUSCULAR; INTRAVENOUS
Status: DISPENSED
Start: 2021-09-09

## (undated) RX ORDER — PROPOFOL 10 MG/ML
INJECTION, EMULSION INTRAVENOUS
Status: DISPENSED
Start: 2021-09-09

## (undated) RX ORDER — ONDANSETRON 2 MG/ML
INJECTION INTRAMUSCULAR; INTRAVENOUS
Status: DISPENSED
Start: 2021-09-09

## (undated) RX ORDER — NITROGLYCERIN 0.4 MG/1
TABLET SUBLINGUAL
Status: DISPENSED
Start: 2019-07-31

## (undated) RX ORDER — FENTANYL CITRATE 50 UG/ML
INJECTION, SOLUTION INTRAMUSCULAR; INTRAVENOUS
Status: DISPENSED
Start: 2020-12-17

## (undated) RX ORDER — DEXAMETHASONE SODIUM PHOSPHATE 4 MG/ML
INJECTION, SOLUTION INTRA-ARTICULAR; INTRALESIONAL; INTRAMUSCULAR; INTRAVENOUS; SOFT TISSUE
Status: DISPENSED
Start: 2021-09-09

## (undated) RX ORDER — FENTANYL CITRATE 50 UG/ML
INJECTION, SOLUTION INTRAMUSCULAR; INTRAVENOUS
Status: DISPENSED
Start: 2021-09-30

## (undated) RX ORDER — GLYCOPYRROLATE 0.2 MG/ML
INJECTION INTRAMUSCULAR; INTRAVENOUS
Status: DISPENSED
Start: 2021-09-09